# Patient Record
Sex: FEMALE | Race: WHITE | NOT HISPANIC OR LATINO | Employment: OTHER | URBAN - METROPOLITAN AREA
[De-identification: names, ages, dates, MRNs, and addresses within clinical notes are randomized per-mention and may not be internally consistent; named-entity substitution may affect disease eponyms.]

---

## 2017-01-18 ENCOUNTER — MYC MEDICAL ADVICE (OUTPATIENT)
Dept: FAMILY MEDICINE | Facility: CLINIC | Age: 67
End: 2017-01-18

## 2017-01-18 DIAGNOSIS — E03.4 HYPOTHYROIDISM DUE TO ACQUIRED ATROPHY OF THYROID: Primary | ICD-10-CM

## 2017-01-19 RX ORDER — LEVOTHYROXINE SODIUM 50 UG/1
50 TABLET ORAL EVERY MORNING
Qty: 90 TABLET | Refills: 3 | Status: CANCELLED | OUTPATIENT
Start: 2017-01-19

## 2017-01-19 NOTE — TELEPHONE ENCOUNTER
TSH   Date Value Ref Range Status   04/20/2016 4.24* 0.40 - 4.00 mU/L Final   ]   Will route to provider to advise.  See MyChart.   1. TSH out of range.   2. Does she need a new sleep study referral?     Courtney Jarrell RN   January 19, 2017 9:47 AM  [facility] Triage   [phone]

## 2017-01-20 NOTE — TELEPHONE ENCOUNTER
1.  Okay to refill until her next visit with me.  2.  She is due for labs and appointment, please schedule.  3.  She will need to see sleep clinic for cpap questions and concerns.    Iris Killian MD

## 2017-01-20 NOTE — TELEPHONE ENCOUNTER
Scheduled 1/25, (she's leaving for two months soon), she has enough meds until then & has the information for the sleep clinic.  Oma Richardson RN

## 2017-01-24 ENCOUNTER — OFFICE VISIT (OUTPATIENT)
Dept: UROLOGY | Facility: CLINIC | Age: 67
End: 2017-01-24
Payer: COMMERCIAL

## 2017-01-24 VITALS — DIASTOLIC BLOOD PRESSURE: 79 MMHG | OXYGEN SATURATION: 97 % | SYSTOLIC BLOOD PRESSURE: 125 MMHG | HEART RATE: 84 BPM

## 2017-01-24 DIAGNOSIS — Z85.51 HISTORY OF BLADDER CANCER: Primary | ICD-10-CM

## 2017-01-24 PROCEDURE — 52000 CYSTOURETHROSCOPY: CPT | Performed by: UROLOGY

## 2017-01-24 NOTE — PROGRESS NOTES
S: Abbey Omer is a 66 year old female returns for bladder cancer surveillance.    she has history of bladder cancer many years ago without any recurrence.    She received 0 courses of BCG therapy.    Patient is draped and prepped.  Flexible cystoscopy placed under direct vision.      Cysto:  The anterior urethra is normal     In the bladder there is normal mucosa.    Assessment/Plan:  (Z85.51) History of bladder cancer  (primary encounter diagnosis)  Comment: no recurrence   Plan: Cystoscopy        In one year

## 2017-01-24 NOTE — NURSING NOTE
Chief Complaint   Patient presents with     Cystoscopy       Initial /79 mmHg  Pulse 84  SpO2 97% Estimated body mass index is 33.20 kg/(m^2) as calculated from the following:    Height as of 4/20/16: 1.524 m (5').    Weight as of 4/20/16: 77.111 kg (170 lb).  BP completed using cuff size: kyle Wild CMA

## 2017-01-24 NOTE — PATIENT INSTRUCTIONS
Your next cystoscopy is scheduled 1/23/2018  @ 9:00am. Please call  if you need to reschedule this appointment.

## 2017-01-25 ENCOUNTER — OFFICE VISIT (OUTPATIENT)
Dept: FAMILY MEDICINE | Facility: CLINIC | Age: 67
End: 2017-01-25
Payer: COMMERCIAL

## 2017-01-25 VITALS
HEART RATE: 72 BPM | TEMPERATURE: 97.5 F | HEIGHT: 60 IN | BODY MASS INDEX: 33.77 KG/M2 | WEIGHT: 172 LBS | SYSTOLIC BLOOD PRESSURE: 132 MMHG | DIASTOLIC BLOOD PRESSURE: 84 MMHG | RESPIRATION RATE: 16 BRPM

## 2017-01-25 DIAGNOSIS — E03.4 HYPOTHYROIDISM DUE TO ACQUIRED ATROPHY OF THYROID: Primary | ICD-10-CM

## 2017-01-25 DIAGNOSIS — I10 HYPERTENSION GOAL BP (BLOOD PRESSURE) < 140/90: ICD-10-CM

## 2017-01-25 DIAGNOSIS — E78.1 HYPERTRIGLYCERIDEMIA: ICD-10-CM

## 2017-01-25 DIAGNOSIS — L70.8 OTHER ACNE: ICD-10-CM

## 2017-01-25 DIAGNOSIS — G47.00 INSOMNIA, UNSPECIFIED TYPE: ICD-10-CM

## 2017-01-25 DIAGNOSIS — G47.33 OSA (OBSTRUCTIVE SLEEP APNEA): ICD-10-CM

## 2017-01-25 DIAGNOSIS — E78.5 HYPERLIPIDEMIA WITH TARGET LDL LESS THAN 100: ICD-10-CM

## 2017-01-25 DIAGNOSIS — J45.20 MILD INTERMITTENT ASTHMA WITHOUT COMPLICATION: ICD-10-CM

## 2017-01-25 DIAGNOSIS — N18.30 CKD (CHRONIC KIDNEY DISEASE) STAGE 3, GFR 30-59 ML/MIN (H): ICD-10-CM

## 2017-01-25 DIAGNOSIS — F33.0 MAJOR DEPRESSIVE DISORDER, RECURRENT EPISODE, MILD DEGREE (H): ICD-10-CM

## 2017-01-25 DIAGNOSIS — K21.9 GASTROESOPHAGEAL REFLUX DISEASE WITHOUT ESOPHAGITIS: ICD-10-CM

## 2017-01-25 DIAGNOSIS — R79.89 ELEVATED LFTS: ICD-10-CM

## 2017-01-25 LAB — HGB BLD-MCNC: 14.5 G/DL (ref 11.7–15.7)

## 2017-01-25 PROCEDURE — 80061 LIPID PANEL: CPT | Performed by: FAMILY MEDICINE

## 2017-01-25 PROCEDURE — 36415 COLL VENOUS BLD VENIPUNCTURE: CPT | Performed by: FAMILY MEDICINE

## 2017-01-25 PROCEDURE — 85018 HEMOGLOBIN: CPT | Performed by: FAMILY MEDICINE

## 2017-01-25 PROCEDURE — 82043 UR ALBUMIN QUANTITATIVE: CPT | Performed by: FAMILY MEDICINE

## 2017-01-25 PROCEDURE — 80053 COMPREHEN METABOLIC PANEL: CPT | Performed by: FAMILY MEDICINE

## 2017-01-25 PROCEDURE — 84439 ASSAY OF FREE THYROXINE: CPT | Performed by: FAMILY MEDICINE

## 2017-01-25 PROCEDURE — 84443 ASSAY THYROID STIM HORMONE: CPT | Performed by: FAMILY MEDICINE

## 2017-01-25 PROCEDURE — 99214 OFFICE O/P EST MOD 30 MIN: CPT | Performed by: FAMILY MEDICINE

## 2017-01-25 RX ORDER — VENLAFAXINE HYDROCHLORIDE 150 MG/1
CAPSULE, EXTENDED RELEASE ORAL
Qty: 180 CAPSULE | Refills: 3 | Status: SHIPPED | OUTPATIENT
Start: 2017-01-25 | End: 2018-01-24

## 2017-01-25 RX ORDER — LEVOTHYROXINE SODIUM 50 UG/1
50 TABLET ORAL EVERY MORNING
Qty: 90 TABLET | Refills: 3 | Status: SHIPPED | OUTPATIENT
Start: 2017-01-25 | End: 2017-05-15 | Stop reason: DRUGHIGH

## 2017-01-25 RX ORDER — SPIRONOLACTONE 50 MG/1
100 TABLET, FILM COATED ORAL DAILY
Qty: 180 TABLET | Refills: 3 | Status: SHIPPED | OUTPATIENT
Start: 2017-01-25 | End: 2018-01-24

## 2017-01-25 RX ORDER — LEVALBUTEROL TARTRATE 45 UG/1
2 AEROSOL, METERED ORAL EVERY 6 HOURS PRN
Qty: 1 INHALER | Refills: 1 | Status: SHIPPED | OUTPATIENT
Start: 2017-01-25 | End: 2017-05-30

## 2017-01-25 RX ORDER — TRAZODONE HYDROCHLORIDE 50 MG/1
25 TABLET, FILM COATED ORAL
Qty: 90 TABLET | Refills: 0 | Status: SHIPPED | OUTPATIENT
Start: 2017-01-25 | End: 2017-01-30

## 2017-01-25 ASSESSMENT — ANXIETY QUESTIONNAIRES
2. NOT BEING ABLE TO STOP OR CONTROL WORRYING: NOT AT ALL
5. BEING SO RESTLESS THAT IT IS HARD TO SIT STILL: NOT AT ALL
3. WORRYING TOO MUCH ABOUT DIFFERENT THINGS: NOT AT ALL
6. BECOMING EASILY ANNOYED OR IRRITABLE: SEVERAL DAYS
GAD7 TOTAL SCORE: 2
1. FEELING NERVOUS, ANXIOUS, OR ON EDGE: SEVERAL DAYS
5. BEING SO RESTLESS THAT IT IS HARD TO SIT STILL: NOT AT ALL
2. NOT BEING ABLE TO STOP OR CONTROL WORRYING: NOT AT ALL
3. WORRYING TOO MUCH ABOUT DIFFERENT THINGS: NOT AT ALL
7. FEELING AFRAID AS IF SOMETHING AWFUL MIGHT HAPPEN: NOT AT ALL
GAD7 TOTAL SCORE: 2
7. FEELING AFRAID AS IF SOMETHING AWFUL MIGHT HAPPEN: NOT AT ALL
1. FEELING NERVOUS, ANXIOUS, OR ON EDGE: SEVERAL DAYS
6. BECOMING EASILY ANNOYED OR IRRITABLE: SEVERAL DAYS

## 2017-01-25 ASSESSMENT — PATIENT HEALTH QUESTIONNAIRE - PHQ9
5. POOR APPETITE OR OVEREATING: NOT AT ALL
5. POOR APPETITE OR OVEREATING: NOT AT ALL

## 2017-01-25 NOTE — PROGRESS NOTES
"  SUBJECTIVE:                                                    Abbey Omer is a 66 year old female who presents to clinic today for the following health issues:  She will be road-tripping for the next 3 months.     Depression Follow up    Status since last visit: Stable     See PHQ-9 for current symptoms.  Other associated symptoms: None    Complicating factors:   Significant life event:  No   Current substance abuse:  None  Anxiety or Panic symptoms:  No    PHQ-9  English PHQ-9   Any Language      Effexor 300 mg  She takes them both in the morning.       Hypothyroidism Follow-up      Since last visit, patient describes the following symptoms: Weight stable, no hair loss, no skin changes, no constipation, no loose stools   Levothyroxine 50 mcg  TSH   Date Value Ref Range Status   04/20/2016 4.24* 0.40 - 4.00 mU/L Final   05/04/2015 3.70 0.40 - 4.00 mU/L Final   02/27/2014 3.54 0.4 - 5.0 mU/L Final   02/27/2013 4.85 0.4 - 5.0 mU/L Final   03/05/2012 4.58 0.4 - 5.0 mU/L Final     HTN/Acne- spironolactone  She reports that this has been helping a lot and would like to stay on it.     Sleep-  She has not been sleeping well and getting headaches. She does not know if it is related to depression, or if it is sleep apnea. She no longer has her machine and is interested in getting a new one. She reports that she is tired most of the time during the day. She wakes herself up from snoring occasionally and wakes up with headaches.     Stomach- omeprazole  She has not had any symptoms since starting the medication. She is very happy with her current dosage. She ran out at one point and within a few days she could tell in her stomach.     Breathing-  She has a hard time breathing at the higher elevations in [place].She describes it as \"trying to swallow dirt.\"  She has tried albuterol, which helped with the breathing. However, it made her hyper and uncomfortable. She would like something that will help her breath, but " not make her hyper.   She does not have a family history of asthma, but she reports that certain things trigger her and she has coughing fits. She also feels short of breath at times. She has had allergy testing and she is not allergic to the things that trigger her coughing. She is taking Wal-Zyr (zyrtec) for allergies, but it does not help with the coughing.  She says it gets embarrassing when she starts coughing in the middle of a conversation.     Sleep-  She has tried several over-the-counter medication to help her sleep. She has tried melatonin, Unisom, Nyquil, Excedrin PM but these are not working for her everyday. She struggles to fall asleep, but if she takes a sleep aid, she struggles to wake up in the morning. She would like a script for trazodone.    Aches-  She reports that she has general body aches. Her muscles hurt and her skin hurts. If someone touches her she finds that she is pulling away and flinching.       Amount of exercise or physical activity: None    Problems taking medications regularly: No    Medication side effects: none    Diet: regular (no restrictions)      Problem list and histories reviewed & adjusted, as indicated.  Additional history: as documented    Patient Active Problem List   Diagnosis     RICKEY (obstructive sleep apnea)     Hypothyroidism     Chronic nonallergic rhinitis     CKD (chronic kidney disease) stage 3, GFR 30-59 ml/min     Hyperlipidemia with target LDL less than 100     Obesity     History of bladder cancer     Hypertension goal BP (blood pressure) < 140/90     Advance Care Planning     Dependent edema     Elevated LFTs     DDD (degenerative disc disease), cervical     Major depressive disorder, recurrent episode, mild degree (H)     Macular drusen     Hypertriglyceridemia     Allergic conjunctivitis     Pulmonary nodules     Major depressive disorder, recurrent episode, moderate (H)     Acne     Combined form of age-related cataract, both eyes     Glaucoma suspect,  bilateral     Drusen of macula of both eyes     Renal cyst, left     Discoid atelectasis     Past Surgical History   Procedure Laterality Date     D & c       Cholecystectomy, laporoscopic       Nose surgery       septum deviation     Hysterectomy, pap no longer indicated       BSO     Cystoscopy       bladder cancer       Social History   Substance Use Topics     Smoking status: Former Smoker -- 0.10 packs/day for 1 years     Quit date: 10/12/1969     Smokeless tobacco: Never Used     Alcohol Use: Yes      Comment: 1 drink a month     Family History   Problem Relation Age of Onset     DIABETES Maternal Grandfather      Hypertension Maternal Grandfather      DIABETES Son      HEART DISEASE Father      CHF     Thyroid Disease No family hx of      Glaucoma No family hx of      Macular Degeneration No family hx of      CANCER No family hx of      DIABETES Maternal Uncle      CEREBROVASCULAR DISEASE Maternal Uncle      DIABETES Mother      Depression Mother      Substance Abuse Brother      Depression Daughter          BP Readings from Last 3 Encounters:   01/25/17 132/84   01/24/17 125/79   04/20/16 132/86    Wt Readings from Last 3 Encounters:   01/25/17 172 lb (78.019 kg)   04/20/16 170 lb (77.111 kg)   02/22/16 173 lb 3.2 oz (78.563 kg)           ROS:  C: NEGATIVE for fever, chills, change in weight  E/M: NEGATIVE for ear, mouth and throat problems  R: NEGATIVE for significant cough or SOB  CV: NEGATIVE for chest pain, palpitations or peripheral edema    OBJECTIVE:                                                    /84 mmHg  Pulse 72  Temp(Src) 97.5  F (36.4  C) (Oral)  Resp 16  Ht 5' (1.524 m)  Wt 172 lb (78.019 kg)  BMI 33.59 kg/m2  Breastfeeding? No  Body mass index is 33.59 kg/(m^2).  GENERAL: healthy, alert and no distress  HENT: ear canals and TM's normal, nose and mouth without ulcers or lesions  NECK: no adenopathy, no asymmetry, masses, or scars and thyroid normal to palpation  RESP: lungs  clear to auscultation - no rales, rhonchi or wheezes  CV: regular rate and rhythm, normal S1 S2, no S3 or S4, no murmur, click or rub,   Psych: Mood:euthymic,  Affect: appropriate, Speech:normal, Thought Processes: normal, Suicidal Ideation: No    Diagnostic Test Results:  Results for orders placed or performed in visit on 01/25/17 (from the past 24 hour(s))   Hemoglobin   Result Value Ref Range    Hemoglobin 14.5 11.7 - 15.7 g/dL        ASSESSMENT/PLAN:                                                      Abbey was seen today for chronic disease management and medication request.    Diagnoses and all orders for this visit:    Hypothyroidism due to acquired atrophy of thyroid  -     MED THERAPY MANAGE REFERRAL  -     levothyroxine (SYNTHROID/LEVOTHROID) 50 MCG tablet; Take 1 tablet (50 mcg) by mouth every morning  -     TSH with free T4 reflex  Chronic, stable, well controlled, continue current medication, refill done if needed      Hypertension goal BP (blood pressure) < 140/90  -     MED THERAPY MANAGE REFERRAL  -     Comprehensive metabolic panel (BMP + Alb, Alk Phos, ALT, AST, Total. Bili, TP)  Chronic, stable, well controlled, continue current medication, refill done if needed      Hyperlipidemia with target LDL less than 100  -     MED THERAPY MANAGE REFERRAL  -     Lipid panel reflex to direct LDL  Await labs, hasn't wanted to do statin     CKD (chronic kidney disease) stage 3, GFR 30-59 ml/min  -     MED THERAPY MANAGE REFERRAL  -     Hemoglobin  -     Albumin Random Urine Quantitative  Await labs    RICKEY (obstructive sleep apnea)  -     SLEEP EVALUATION & MANAGEMENT REFERRAL - ADULT; Future    Elevated LFTs  -     Comprehensive metabolic panel (BMP + Alb, Alk Phos, ALT, AST, Total. Bili, TP)    Hypertriglyceridemia  Await labs    Major depressive disorder, recurrent episode, mild degree (H)  -     venlafaxine (EFFEXOR-XR) 150 MG 24 hr capsule; 2 Capsules daily  Chronic, stable, well controlled, continue  current medication, refill done if needed      Other acne  -     spironolactone (ALDACTONE) 50 MG tablet; Take 2 tablets (100 mg) by mouth daily  Chronic, stable, well controlled, continue current medication, refill done if needed      Mild intermittent asthma without complication  She is going to [place] and typically needs an inhaler when she is there.  Will try xopenex since she felt the albuterol caused a lot of jitteriness.  I would like to do further testing to determine if she needs daily steroid inhaler.  -     levalbuterol (XOPENEX HFA) 45 MCG/ACT Inhaler; Inhale 2 puffs into the lungs every 6 hours as needed for shortness of breath / dyspnea or wheezing    Gastroesophageal reflux disease without esophagitis  -     omeprazole (PRILOSEC) 20 MG CR capsule; Take 1 capsule (20 mg) by mouth daily  -     ranitidine (ZANTAC) 300 MG tablet; Take 1 tablet (300 mg) by mouth At Bedtime    Try ranitidine, if no improvement, can go back to PPI.  Reviewed risks of PPI    Insomnia, unspecified type  -     traZODone (DESYREL) 50 MG tablet; Take 0.5 tablets (25 mg) by mouth nightly as needed for sleep  She would like to try this again at low doses  Common side effects of medications prescribed at this visit were discussed with the patient. Severe side effects, including current applicable black box warnings, were discussed. We discussed options for dealing with these possible side effects and allergic reactions, based on their severity.        Discussed the long-term complications of omeprazole and patient has agreed to try ranitidine. Prescription given for omeprazole as well in case ranitidine causes side effects or doesn't work and she won't be around due to travel.    Follow up in May for discussion about body aches., ?fibro      This document serves as a record of the services and decisions personally performed and made by Iris Killian MD. It was created on her behalf by Tory Morocho, a trained medical scribe.  The creation of this document is based the provider's statements to the medical scribe.  Tory Rashawn 10:24 AM 1/25/2017    Provider:   The information in this document, created by the medical scribe for me, accurately reflects the services I personally performed and the decisions made by me. I have reviewed and approved this document for accuracy prior to leaving the patient care area.  Iris Killian MD 10:24 AM 1/25/2017    Iris Killian MD  [facility]

## 2017-01-25 NOTE — MR AVS SNAPSHOT
After Visit Summary   1/25/2017    Abbey Omer    MRN: [id number]           Patient Information     Date Of Birth          1950        Visit Information        Provider Department      1/25/2017 10:40 AM Iris Killian MD [facility]        Today's Diagnoses     Hypothyroidism due to acquired atrophy of thyroid    -  1     Hypertension goal BP (blood pressure) < 140/90         Hyperlipidemia with target LDL less than 100         CKD (chronic kidney disease) stage 3, GFR 30-59 ml/min         RICKEY (obstructive sleep apnea)         Elevated LFTs         Hypertriglyceridemia         Major depressive disorder, recurrent episode, mild degree (H)         Other acne         Mild intermittent asthma without complication         Gastroesophageal reflux disease without esophagitis         Insomnia, unspecified type           Care Instructions    Start Ranitidine instead of Omeprazole. If this does not work or gives you side effects, you can switch back to the omeprazole.     Try taking 1/4-1/2 of a Trazodone at night for sleep.     Make sure you are getting walking breaks during your travels. This will help with body aches.     Have fun!    [facility]   Discharged by : Elisha Santizo MA    Paper scripts provided to patient : no     If you have any questions regarding your visit please contact your care team:     Team Gold Clinic Hours Telephone Number   Dr. Catina Kern, ROBERTO CARLOS   7am-7pm Monday - Thursday   7am-5pm Fridays  [phone]   (Appointment scheduling available 24/7)   RN Line   [phone] option 2       For a Price Quote for your services, please call our Consumer Price Line at [phone].     What options do I have for visits at the clinic other than the traditional office visit?     To expand how we care for you, many of our providers are utilizing electronic visits (e-visits) and  telephone visits, when medically appropriate, for interactions with their patients rather than a visit in the clinic. We also offer nurse visits for many medical concerns. Just like any other service, we will bill your insurance company for this type of visit based on time spent on the phone with your provider. Not all insurance companies cover these visits. Please check with your medical insurance if this type of visit is covered. You will be responsible for any charges that are not paid by your insurance.   E-visits via Spotzer Media Grouphart: generally incur a $35.00 fee.     Telephone visits:   Time spent on the phone: *charged based on time that is spent on the phone in increments of 10 minutes. Estimated cost:   5-10 mins $30.00   11-20 mins. $59.00   21-30 mins. $85.00     Use Greytip Software (secure email communication and access to your chart) to send your primary care provider a message or make an appointment. Ask someone on your Team how to sign up for Greytip Software.     As always, Thank you for trusting us with your health care needs!      [facility]:    Scheduling Appointments  [facility]  Call: [phone]    [facility]  Call: [phone]    [facility]  Call: [phone]      WHERE TO GO FOR CARE?    Clinic    Make an appointment if you:       Are sick (cold, cough, flu, sore throat, earache or in pain).       Have a small injury (sprain, small cut, burn or broken bone).       Need a physical exam, Pap smear, vaccine or prescription refill.       Have questions about your health or medicines.    To reach us:      Call [phone] ([phone]). Open 24 hours every day. (For counseling services, call [phone].)    Log into Greytip Software at [email or web address]. (Visit [email or web address] to create an account.) Hospital emergency room    An emergency is a serious or life- threatening problem that must be treated right  away.    Call 911 or get to the hospital if you have:      Very bad or sudden:            - Chest pain or pressure         - Bleeding         - Head or belly pain         - Dizziness or trouble seeing, walking or                          Speaking      Problems breathing      Blood in your vomit or you are coughing up blood      A major injury (knocked out, loss of a finger or limb, rape, broken bone protruding from skin)    A mental health crisis. (Or call the Mental Health Crisis line at 1-510.398.6323 or Suicide Prevention Hotline at 1-164.492.7563.)    Open 24 hours every day. You don't need an appointment.     Urgent care    Visit urgent care for sickness or small injuries when the clinic is closed. You don't need an appointment. To check hours or find an urgent care near you, visit [email or web address]. Online care    Get online care from [facility] for more than 70 common problems, like colds, allergies and infections. Open 24 hours every day at: [email or web address]   Need help deciding?    For advice about where to be seen, you may call your clinic and ask to speak with a nurse. We're here for you 24 hours every day.         If you are deaf or hard of hearing, please let us know. We provide many free services including sign language interpreters, oral interpreters, TTYs, telephone amplifiers, note takers and written materials.                       Follow-ups after your visit        Additional Services     MED THERAPY MANAGE REFERRAL       Your provider has referred you to: **[facility] Scheduling (numerous locations) [phone]   [email or web address]  [facility]: [facility] [phone]   [email or web address]    Reason for Referral: Medication/supplement review and lipid management.     The [facility] department will contact you to  schedule an appointment.  You may also schedule the appointment by calling [phone].  For [facility] patients, please call [phone] to confirm/schedule your appointment on the next business day.    This service is designed to help you get the most from your medications.  A specially trained Pharmacist will work closely with you and your providers to solve any questions, concerns, issues or problems related to your medications.    Please bring all of your prescription and non-prescription medications (such as vitamins, over-the-counter medications, and herbals) or a detailed medication list to your appointment.    If you have a glucose meter or other home monitoring information, please also bring this to your appointment (i.e. blood glucose log, blood pressure log, pain log, etc.).            SLEEP EVALUATION & MANAGEMENT REFERRAL - ADULT       Please be aware that coverage of these services is subject to the terms and limitations of your health insurance plan.  Call member services at your health plan with any benefit or coverage questions.      Please bring the following to your appointment:    >>   List of current medications   >>   This referral request   >>   Any documents/labs given to you for this referral    [facility] [phone] (Age 15 and up)                  Your next 10 appointments already scheduled     Jan 23, 2018  9:00 AM   Return Visit with Omar West MD, [place] [facility]   [facility] [place] ([facility])    [address] [place] [address]  [place] [address] [phone]              Future tests that were ordered for you today     Open Future Orders        Priority Expected Expires Ordered    SLEEP EVALUATION & MANAGEMENT REFERRAL - ADULT Routine  1/25/2018 1/25/2017            Who to contact     If you have questions or need follow up information about today's clinic visit or your schedule please contact  [facility] directly at [phone].  Normal or non-critical lab and imaging results will be communicated to you by MyChart, letter or phone within 4 business days after the clinic has received the results. If you do not hear from us within 7 days, please contact the clinic through Freeze Taghart or phone. If you have a critical or abnormal lab result, we will notify you by phone as soon as possible.  Submit refill requests through Zing Systems or call your pharmacy and they will forward the refill request to us. Please allow 3 business days for your refill to be completed.          Additional Information About Your Visit        Freeze TagharDietBetter Information     Zing Systems gives you secure access to your electronic health record. If you see a primary care provider, you can also send messages to your care team and make appointments. If you have questions, please call your primary care clinic.  If you do not have a primary care provider, please call [phone] and they will assist you.        Care EveryWhere ID     This is your Care EveryWhere ID. This could be used by other organizations to access your [place] medical records  [id number]        Your Vitals Were     Pulse Temperature Respirations Height BMI (Body Mass Index) Breastfeeding?    72 97.5  F (36.4  C) (Oral) 16 5' (1.524 m) 33.59 kg/m2 No       Blood Pressure from Last 3 Encounters:   01/25/17 132/84   01/24/17 125/79   04/20/16 132/86    Weight from Last 3 Encounters:   01/25/17 172 lb (78.019 kg)   04/20/16 170 lb (77.111 kg)   02/22/16 173 lb 3.2 oz (78.563 kg)              We Performed the Following     Albumin Random Urine Quantitative     Comprehensive metabolic panel (BMP + Alb, Alk Phos, ALT, AST, Total. Bili, TP)     Hemoglobin     Lipid panel reflex to direct LDL     MED THERAPY MANAGE REFERRAL     TSH with free T4 reflex          Today's Medication Changes          These changes are accurate as of: 1/25/17 11:38 AM.  If you have any questions,  ask your nurse or doctor.               Start taking these medicines.        Dose/Directions    levalbuterol 45 MCG/ACT Inhaler   Commonly known as:  XOPENEX HFA   Used for:  Mild intermittent asthma without complication   Started by:  Iris Killian MD        Dose:  2 puff   Inhale 2 puffs into the lungs every 6 hours as needed for shortness of breath / dyspnea or wheezing   Quantity:  1 Inhaler   Refills:  1       omeprazole 20 MG CR capsule   Commonly known as:  priLOSEC   Used for:  Gastroesophageal reflux disease without esophagitis   Started by:  Iris Killian MD        Dose:  20 mg   Take 1 capsule (20 mg) by mouth daily   Quantity:  90 capsule   Refills:  3       ranitidine 300 MG tablet   Commonly known as:  ZANTAC   Used for:  Gastroesophageal reflux disease without esophagitis   Started by:  Iris Killian MD        Dose:  300 mg   Take 1 tablet (300 mg) by mouth At Bedtime   Quantity:  90 tablet   Refills:  3       traZODone 50 MG tablet   Commonly known as:  DESYREL   Used for:  Insomnia, unspecified type   Started by:  Iris Killian MD        Dose:  25 mg   Take 0.5 tablets (25 mg) by mouth nightly as needed for sleep   Quantity:  90 tablet   Refills:  0            Where to get your medicines      These medications were sent to [id number] - [place], [place] - [address] [facility] [address]  [address], [place] [address]     Phone:  [phone]    - levalbuterol 45 MCG/ACT Inhaler  - levothyroxine 50 MCG tablet  - omeprazole 20 MG CR capsule  - ranitidine 300 MG tablet  - spironolactone 50 MG tablet  - traZODone 50 MG tablet  - venlafaxine 150 MG 24 hr capsule             Primary Care Provider Office Phone # Fax #    Iris Killian [phone]       [facility] [address] [facility] [address]        Thank you!     Thank you for choosing [facility]   for your care. Our goal is always to provide you with excellent care. Hearing back from our patients is one way we can continue to improve our services. Please take a few minutes to complete the written survey that you may receive in the mail after your visit with us. Thank you!             Your Updated Medication List - Protect others around you: Learn how to safely use, store and throw away your medicines at www.disposemymeds.org.          This list is accurate as of: 1/25/17 11:38 AM.  Always use your most recent med list.                   Brand Name Dispense Instructions for use    calcium + D 600-200 MG-UNIT Tabs   Generic drug:  calcium carbonate-vitamin D      2 TABLET DAILY       cholecalciferol 5000 UNITS Caps capsule    vitamin D3     Take 5,000 Units by mouth daily       Cranberry 500 MG Caps      Take 1 capsule by mouth daily       EXCEDRIN TENSION HEADACHE 500-65 MG Tabs   Generic drug:  acetaminophen-caffeine      1 tablet twice daily as needed       Fish Oil 1200 MG Caps      two po bid       FLAXSEED OIL PO      Take 1 Tablespoonful by mouth daily       fluticasone 50 MCG/ACT spray    FLONASE    48 g    Spray 2 sprays into both nostrils daily       levalbuterol 45 MCG/ACT Inhaler    XOPENEX HFA    1 Inhaler    Inhale 2 puffs into the lungs every 6 hours as needed for shortness of breath / dyspnea or wheezing       levothyroxine 50 MCG tablet    SYNTHROID/LEVOTHROID    90 tablet    Take 1 tablet (50 mcg) by mouth every morning       magnesium 500 MG Tabs      Take 1 tablet by mouth daily       Melatonin 10 MG Tabs tablet      Take 30 mg by mouth At Bedtime       MULTIPLE vitamin  S/WOMENS Tabs      Take 2 tablets by mouth daily       omeprazole 20 MG CR capsule    priLOSEC    90 capsule    Take 1 capsule (20 mg) by mouth daily       PRILOSEC PO      Take 20 mg by mouth       PROBIOTIC DAILY PO      Take 1 tablet by mouth daily       ranitidine 300 MG tablet    ZANTAC    90 tablet    Take 1 tablet  (300 mg) by mouth At Bedtime       spironolactone 50 MG tablet    ALDACTONE    180 tablet    Take 2 tablets (100 mg) by mouth daily       STATIN NOT PRESCRIBED (INTENTIONAL)      by Other route continuous prn.       traZODone 50 MG tablet    DESYREL    90 tablet    Take 0.5 tablets (25 mg) by mouth nightly as needed for sleep       venlafaxine 150 MG 24 hr capsule    EFFEXOR-XR    180 capsule    2 Capsules daily       vitamin B complex with vitamin C Tabs tablet      Take 1 tablet by mouth daily       zinc 50 MG Tabs      Take 1 tablet by mouth daily

## 2017-01-25 NOTE — PATIENT INSTRUCTIONS
Start Ranitidine instead of Omeprazole. If this does not work or gives you side effects, you can switch back to the omeprazole.     Try taking 1/4-1/2 of a Trazodone at night for sleep.     Make sure you are getting walking breaks during your travels. This will help with body aches.     Have fun!    [facility]   Discharged by : Elisha Santizo MA    Paper scripts provided to patient : no     If you have any questions regarding your visit please contact your care team:     Team Gold Clinic Hours Telephone Number   Dr. Catina Kern, ROBERTO CARLOS   7am-7pm Monday - Thursday   7am-5pm Fridays  [phone]   (Appointment scheduling available 24/7)   RN Line   [phone] option 2       For a Price Quote for your services, please call our Bycler Price Line at [phone].     What options do I have for visits at the clinic other than the traditional office visit?     To expand how we care for you, many of our providers are utilizing electronic visits (e-visits) and telephone visits, when medically appropriate, for interactions with their patients rather than a visit in the clinic. We also offer nurse visits for many medical concerns. Just like any other service, we will bill your insurance company for this type of visit based on time spent on the phone with your provider. Not all insurance companies cover these visits. Please check with your medical insurance if this type of visit is covered. You will be responsible for any charges that are not paid by your insurance.   E-visits via ReadyDock: generally incur a $35.00 fee.     Telephone visits:   Time spent on the phone: *charged based on time that is spent on the phone in increments of 10 minutes. Estimated cost:   5-10 mins $30.00   11-20 mins. $59.00   21-30 mins. $85.00     Use ReadyDock (secure email communication and access to your chart) to send your primary care provider a message or make an  appointment. Ask someone on your Team how to sign up for FPSI.     As always, Thank you for trusting us with your health care needs!      [facility]:    Scheduling Appointments  [place] [place]  Call: [phone]    Junior Poole, [facility]  Call: [phone]    [facility]  Call: [phone]      WHERE TO GO FOR CARE?    Clinic    Make an appointment if you:       Are sick (cold, cough, flu, sore throat, earache or in pain).       Have a small injury (sprain, small cut, burn or broken bone).       Need a physical exam, Pap smear, vaccine or prescription refill.       Have questions about your health or medicines.    To reach us:      Call [phone] ([phone]). Open 24 hours every day. (For counseling services, call [phone].)    Log into FPSI at [email or web address]. (Visit LookSharp (powering [email or web address]).[email or web address] to create an account.) Hospital emergency room    An emergency is a serious or life- threatening problem that must be treated right away.    Call 318 or get to the hospital if you have:      Very bad or sudden:            - Chest pain or pressure         - Bleeding         - Head or belly pain         - Dizziness or trouble seeing, walking or                          Speaking      Problems breathing      Blood in your vomit or you are coughing up blood      A major injury (knocked out, loss of a finger or limb, rape, broken bone protruding from skin)    A mental health crisis. (Or call the Mental Health Crisis line at 1-870.340.2368 or Suicide Prevention Hotline at 1-857.503.8830.)    Open 24 hours every day. You don't need an appointment.     Urgent care    Visit urgent care for sickness or small injuries when the clinic is closed. You don't need an appointment. To check hours or find an urgent care near you, visit [email or web address]. Online care    Get online care from [facility] for more than 70 common problems, like  colds, allergies and infections. Open 24 hours every day at: [email or web address]   Need help deciding?    For advice about where to be seen, you may call your clinic and ask to speak with a nurse. We're here for you 24 hours every day.         If you are deaf or hard of hearing, please let us know. We provide many free services including sign language interpreters, oral interpreters, TTYs, telephone amplifiers, note takers and written materials.

## 2017-01-25 NOTE — NURSING NOTE
Chief Complaint   Patient presents with     Chronic Disease Management       Initial /84 mmHg  Pulse 72  Temp(Src) 97.5  F (36.4  C) (Oral)  Resp 16  Ht 5' (1.524 m)  Wt 172 lb (78.019 kg)  BMI 33.59 kg/m2  Breastfeeding? No Estimated body mass index is 33.59 kg/(m^2) as calculated from the following:    Height as of this encounter: 5' (1.524 m).    Weight as of this encounter: 172 lb (78.019 kg).  BP completed using cuff size: jose daniel Gibbs CMA ([facility])

## 2017-01-26 ENCOUNTER — TELEPHONE (OUTPATIENT)
Dept: PHARMACY | Facility: OTHER | Age: 67
End: 2017-01-26

## 2017-01-26 LAB
ALBUMIN SERPL-MCNC: 4.1 G/DL (ref 3.4–5)
ALP SERPL-CCNC: 122 U/L (ref 40–150)
ALT SERPL W P-5'-P-CCNC: 45 U/L (ref 0–50)
ANION GAP SERPL CALCULATED.3IONS-SCNC: 8 MMOL/L (ref 3–14)
AST SERPL W P-5'-P-CCNC: 27 U/L (ref 0–45)
BILIRUB SERPL-MCNC: 0.3 MG/DL (ref 0.2–1.3)
BUN SERPL-MCNC: 36 MG/DL (ref 7–30)
CALCIUM SERPL-MCNC: 9.7 MG/DL (ref 8.5–10.1)
CHLORIDE SERPL-SCNC: 99 MMOL/L (ref 94–109)
CHOLEST SERPL-MCNC: 240 MG/DL
CO2 SERPL-SCNC: 29 MMOL/L (ref 20–32)
CREAT SERPL-MCNC: 1.49 MG/DL (ref 0.52–1.04)
CREAT UR-MCNC: 117 MG/DL
GFR SERPL CREATININE-BSD FRML MDRD: 35 ML/MIN/1.7M2
GLUCOSE SERPL-MCNC: 101 MG/DL (ref 70–99)
HDLC SERPL-MCNC: 50 MG/DL
LDLC SERPL CALC-MCNC: 134 MG/DL
MICROALBUMIN UR-MCNC: 240 MG/L
MICROALBUMIN/CREAT UR: 205.13 MG/G CR (ref 0–25)
NONHDLC SERPL-MCNC: 190 MG/DL
POTASSIUM SERPL-SCNC: 4.8 MMOL/L (ref 3.4–5.3)
PROT SERPL-MCNC: 8.2 G/DL (ref 6.8–8.8)
SODIUM SERPL-SCNC: 136 MMOL/L (ref 133–144)
T4 FREE SERPL-MCNC: 0.92 NG/DL (ref 0.76–1.46)
TRIGL SERPL-MCNC: 280 MG/DL
TSH SERPL DL<=0.005 MIU/L-ACNC: 4.48 MU/L (ref 0.4–4)

## 2017-01-26 ASSESSMENT — ANXIETY QUESTIONNAIRES: GAD7 TOTAL SCORE: 2

## 2017-01-26 ASSESSMENT — PATIENT HEALTH QUESTIONNAIRE - PHQ9: SUM OF ALL RESPONSES TO PHQ QUESTIONS 1-9: 0

## 2017-01-26 NOTE — TELEPHONE ENCOUNTER
MTM referral from: [place] clinic visit (referral by provider)    MTM referral outreach attempt #1 on January 26, 2017 at 12:10 PM      Outcome: Left Message    Yvette Rodriguez MTM Coordinator

## 2017-01-28 ENCOUNTER — MYC MEDICAL ADVICE (OUTPATIENT)
Dept: FAMILY MEDICINE | Facility: CLINIC | Age: 67
End: 2017-01-28

## 2017-01-29 ENCOUNTER — MYC MEDICAL ADVICE (OUTPATIENT)
Dept: FAMILY MEDICINE | Facility: CLINIC | Age: 67
End: 2017-01-29

## 2017-01-29 DIAGNOSIS — G47.00 INSOMNIA, UNSPECIFIED TYPE: Primary | ICD-10-CM

## 2017-01-30 ENCOUNTER — MYC MEDICAL ADVICE (OUTPATIENT)
Dept: FAMILY MEDICINE | Facility: CLINIC | Age: 67
End: 2017-01-30

## 2017-01-30 NOTE — TELEPHONE ENCOUNTER
I'm assuming patient is talking about the Xopenex- in last office visit notes it states Albuterol causes jitteriness- Do you want to pursue PA or change?    Mariajose Ac MA

## 2017-01-30 NOTE — TELEPHONE ENCOUNTER
RN was going to sign Rx but then noted Trazodone is on allergy list. No reaction specified. Will route to provider to confirm this is OK.     Courtney Jarrell RN   January 30, 2017 11:55 AM  [facility] Triage   [phone]

## 2017-01-30 NOTE — TELEPHONE ENCOUNTER
Okay to increase to full tablet, 50 mg.  Try that for a few days.  Can also consider increase up to 100 mg nightly.  Please resend script and inform patient.  Iris Killian MD

## 2017-01-31 ENCOUNTER — OFFICE VISIT (OUTPATIENT)
Dept: PHARMACY | Facility: CLINIC | Age: 67
End: 2017-01-31
Payer: COMMERCIAL

## 2017-01-31 ENCOUNTER — TELEPHONE (OUTPATIENT)
Dept: FAMILY MEDICINE | Facility: CLINIC | Age: 67
End: 2017-01-31

## 2017-01-31 DIAGNOSIS — E63.9 NUTRITIONAL DEFICIENCY: ICD-10-CM

## 2017-01-31 DIAGNOSIS — E03.4 HYPOTHYROIDISM DUE TO ACQUIRED ATROPHY OF THYROID: ICD-10-CM

## 2017-01-31 DIAGNOSIS — K21.9 GASTROESOPHAGEAL REFLUX DISEASE WITHOUT ESOPHAGITIS: Primary | ICD-10-CM

## 2017-01-31 DIAGNOSIS — R06.00 DYSPNEA: ICD-10-CM

## 2017-01-31 DIAGNOSIS — R06.00 DYSPNEA, UNSPECIFIED TYPE: ICD-10-CM

## 2017-01-31 DIAGNOSIS — G47.00 INSOMNIA, UNSPECIFIED TYPE: ICD-10-CM

## 2017-01-31 DIAGNOSIS — E78.5 HYPERLIPIDEMIA WITH TARGET LDL LESS THAN 100: ICD-10-CM

## 2017-01-31 DIAGNOSIS — E03.9 HYPOTHYROIDISM: Primary | ICD-10-CM

## 2017-01-31 DIAGNOSIS — F33.0 MAJOR DEPRESSIVE DISORDER, RECURRENT EPISODE, MILD DEGREE (H): ICD-10-CM

## 2017-01-31 DIAGNOSIS — I10 HYPERTENSION GOAL BP (BLOOD PRESSURE) < 140/90: ICD-10-CM

## 2017-01-31 DIAGNOSIS — J31.0 CHRONIC NONALLERGIC RHINITIS: ICD-10-CM

## 2017-01-31 DIAGNOSIS — L70.8 OTHER ACNE: ICD-10-CM

## 2017-01-31 DIAGNOSIS — M85.80 OSTEOPENIA: ICD-10-CM

## 2017-01-31 PROCEDURE — 99607 MTMS BY PHARM ADDL 15 MIN: CPT | Performed by: PHARMACIST

## 2017-01-31 PROCEDURE — 99605 MTMS BY PHARM NP 15 MIN: CPT | Performed by: PHARMACIST

## 2017-01-31 RX ORDER — TRAZODONE HYDROCHLORIDE 50 MG/1
50-100 TABLET, FILM COATED ORAL
Qty: 90 TABLET | Refills: 3 | Status: SHIPPED | OUTPATIENT
Start: 2017-01-31 | End: 2017-02-02 | Stop reason: ALTCHOICE

## 2017-01-31 NOTE — PROGRESS NOTES
SUBJECTIVE/OBJECTIVE:                                                    Abbey Omer is a 67 year old female coming in for an initial visit for Medication Therapy Management.  She was referred to me from Iris Killian.     Chief Complaint: Medication review for any interactions.    Allergies/ADRs: Reviewed in Epic-updated today  Tobacco: History of tobacco dependence - quit 48 years   Alcohol: occasional use-rare  Caffeine: Excedrin Tension HA daily w/ 65 mg caffeine/tab, occasional soda  Activity: Not currently, hopes to increase physical activity   PMH: Reviewed in Epic    Medication Adherence: no issues reported    Hyperlipidemia: Current therapy includes Fish Oil 1200 mg once daily.  Pt reports some fishy burps but this is not bothersome when taken with meals. Patient reports that she has been on Welchol in the past with some efficacy, but insurance stopped covering it. She tolerated this medicine but the insurance company made it difficult to acquire. She has tried several statins in the past including Crestor, twice, which caused joint pain; atorvastatin caused swelling, lovastatin which caused cramping. She has also tried Niacin and can't recall the reaction and Zetia which caused fatigue.  The 10-year ASCVD risk score (Erika DC Jr., et al., 2013) is: 8.9%    Values used to calculate the score:      Age: 67 years      Sex: Female      Is an : No      Diabetic: No      Tobacco smoker: No      Systolic Blood Pressure: 132 mmHg      Prescribed Antihypertensives: No      HDL Cholesterol: 50 mg/dL      Total Cholesterol: 240 mg/dL     Acne/Hypertension: Current medications include spironolactone 50 mg daily.  Patient does not self-monitor BP.  Patient reports no current medication side effects and the med is effective in treating her acne.     Non-Allergic rhinitis/Dyspnea: Current medications include fluticasone nasal spray 2 spray(s). Patient has previously used OTC Primatene  (epinephrine) mist inhaler for breathing in [place] that caused jitteriness and rapid HR. She reports also having tried albuterol in the past which caused the same symptoms. She would prefer to try Xopenex and requested PA be completed.     Hypothyroidism: Patient is taking levothyroxine 50 mcg daily. Patient is having the following symptoms: none. Patient is taking levothyroxine along with other AM meds including supplements and omeprazole.    GERD: Current medications include: Prilosec (omeprazole) 20 mg qAM and Zantac (ranitidine) 300 mg qHS. Patient continues to take both daily as she was unclear on directions from PCP visit, which was to hold omeprazole and trial ranitidine for symptoms. Patient has h/o ulcer several years ago which is why omeprazole was started. Pt c/o heartburn, bilious reflux.  Food triggers include tomato sauces and rich foods. Patient feels that current regimen is effective.    Depression:  Current medications include: Venlafaxine 300 mg once daily. Patient feels it is effective. Patient has had withdrawal effect when missed a dose.    PHQ-9 SCORE 6/29/2015 2/22/2016 1/25/2017   Total Score 0 - -   Total Score MyChart - - -   Total Score - 6 0       Insomnia: Current medications include: trazodone  mg qHS and melatonin 30mg (three 10 mg tabs at a time) taking 2-3 times per week. Pt reports some initial difficulty falling asleep after starting trazodone but now has no difficulty. Reports her sleep has improved significantly since starting the trazodone. Reports she did experience some daytime grogginess with previous trial of trazodone but denies this with current use. She reports feeling more active and calm during the day after starting. She previously has tried OTC sleep aids (ie: Unisom, Nyquil, Excedrin PM) but indicates these only work for a few days and then the effect wears off. She reports the same experience with melatonin and has decreased how often she is taking it since  starting trazodone.      Osteopenia: Current therapy includes: calcium 600 mg/vitamin D 1000? units two tablets once daily. Pt is not experiencing side effects. Patient is unclear on if she takes an additional vitamin D supplement along with the ca/vitD combo.   Last vitamin D level: No results found for: VITDT  DEXA History: Last scan 5/23/16-showed osteopenia  Risk factors: post-menopausal, chronic PPI use    Supplements: Current therapy includes: cranberry 4200 mg daily, magnesium 500 mg daily (taking for regular BMs), zinc 50 mg tab at least once a month for 2-4 days when she is not feeling well, vitamin B-complex 2 tabs daily, multivitamin daily, flaxseed grain occasionally. She feels her supplements are helpful for her.    Current labs include:  BP Readings from Last 3 Encounters:   01/25/17 132/84   01/24/17 125/79   04/20/16 132/86     Wt Readings from Last 2 Encounters:   02/01/17 173 lb (78.472 kg)   01/31/17 173 lb 3.2 oz (78.563 kg)     Ht Readings from Last 2 Encounters:   02/01/17 5' (1.524 m)   01/25/17 5' (1.524 m)     Today's Vitals: /82 mmHg  Pulse 60  Wt 173 lb 3.2 oz (78.563 kg)    CHOL      240   1/25/2017  TRIG      280   1/25/2017  HDL       50   1/25/2017  LDL      134   1/25/2017  LDL      108   4/1/2013    Liver Function Studies -   Recent Labs   Lab Test  01/25/17   1038   PROTTOTAL  8.2   ALBUMIN  4.1   BILITOTAL  0.3   ALKPHOS  122   AST  27   ALT  45       Lab Results   Component Value Date    UCRR 117 01/25/2017    MICROL 240 01/25/2017    UMALCR 205.13* 01/25/2017       Last Basic Metabolic Panel:  NA      136   1/25/2017   POTASSIUM      4.8   1/25/2017  CHLORIDE       99   1/25/2017  BUN       36   1/25/2017  CR     1.49   1/25/2017  Calculated CrCl ([place]) = 34.2mL/min  GFR ESTIMATE   Date Value Ref Range Status   01/25/2017 35* >60 mL/min/1.7m2 Final     Comment:     Non  GFR Calc   02/22/2016 46* >60 mL/min/1.7m2 Final     Comment:     Non   GFR Calc   06/29/2015 44* >60 mL/min/1.7m2 Final     Comment:     Non  GFR Calc       TSH   Date Value Ref Range Status   01/25/2017 4.48* 0.40 - 4.00 mU/L Final     T4 FREE   Date Value Ref Range Status   01/25/2017 0.92 0.76 - 1.46 ng/dL Final       Most Recent Immunizations   Administered Date(s) Administered     Influenza (H1N1) 12/16/2009     Influenza (High Dose) 3 valent vaccine 12/08/2016     Influenza (IIV3) 10/24/2012     Influenza Vaccine IM 3yrs+ 4 Valent IIV4 10/25/2014     Pneumococcal (PCV 13) 04/20/2016     Pneumococcal 23 valent 05/28/2014     TD (ADULT, 7+) 03/21/2003     TDAP (BOOSTRIX AGES 10-64) 03/06/2013     Zoster vaccine, live 05/28/2014     ASSESSMENT:                                                       Current medications were reviewed today.     Medication Adherence: no issues identified    Hyperlipidemia: Needs further monitoring. Pt is not on moderate-high intensity statin which is indicated based on 2013 ACC/AHA guidelines for lipid management due to several statin trials and rechallenges and unable to tolerate. Non-statins have not been associated with significant improvement in cardiovascular outcomes or mortality, and additionally she has had trouble tolerating these as well. Continued monitoring and healthy diet and exercise encouraged.    Acne/Hypertension: Stable.     Non-Allergic rhinitis/Dyspnea: Needs improvement. Patient needs PA documentation for Xopenex completed prior to leaving for [place] next Monday.     Hypothyroidism: Needs Improvement. Last TSH is above normal range . However, last T4 was WNL. Pt would benefit from  levothyroxine from other morning medications, supplements and food for improved absorption.    GERD: Needs improvement. Patient did not understand PCP instructions to hold omeprazole and try ranitidine monotherapy at bedtime to assess need for ongoing PPI therapy and she would benefit from holding omeprazole at this  time. Additionally, based on patient's CrClmaximum recommended dose of ranitidine is 150 mg q24 hrs. Recommend patient try taking ranitidine 150 mg qHS.      Depression:  Stable. However, it is recommended venlafaxine dose be reduced by 25-50% in patients with a GFR 10-70 ml/min. May consider reducing patient's dose if appropriate.     Insomnia: Improved. She may benefit from d/c of melatonin, as the trazodone seems to be working well and the melatonin did not appear to be helpful even prior to starting trazodone.    Osteopenia: Needs Improvement. Pt is not meeting RDI of calcium 1200mg/day. Pt would benefit from: splitting her current dose to BID in order to properly absorb full dose of calcium and switch from calcium carbonate to calcium citrate for improved absorption in combination with acid-lowering therapy.    Osteopenia: Needs improvement. Patient is likely receiving full dose of calcium with taking 1200 mg once daily d/t the body only absorbing 500-600 mg of calcium per dose. Recommend patient separate doses to one tab in AM and one tab in PM. Patient is currently taking calcium carbonate which requires acidic environment for adequate absorption. Given patient is on acid suppressing agents would recommend patient consider changing to calcium citrate.     Supplements: Stable.       PLAN:                                                      1. Will request PCP/[employer] submit PA for Xopenex.   2. Patient will start taking levothyroxine at least 30-60 mins prior to AM meds and breakfast.   3. Pt to reduce ranitidine to 150 mg qHS. Patient will hold omeprazole and try taking only randitidine at bedtime.   4. PCP to consider reducing venlafaxine dose based on renal function.   5. Pt to D/C melatonin.  6. Patient will separate doses of calcium/vit D to 1 tab BID and switch to calcium citrate.    I spent 60 minutes with this patient today.  All changes were made via collaborative practice agreement with Constantin  Iris Mueller. A copy of the visit note was provided to the patient's primary care provider.    Will follow up in as needed.    The patient was given a summary of these recommendations as an after visit summary.     Estefani Sun, PharmD IV Student  Elisha Nolen, Pharm.D., [facility]  Medication Therapy Management Pharmacist  [phone]

## 2017-01-31 NOTE — TELEPHONE ENCOUNTER
Spoke with patient at MTM visit today - she would like to pursue PA.    Elisha Nolen, Pharm.D., [facility]  Medication Therapy Management Pharmacist  [phone]

## 2017-01-31 NOTE — TELEPHONE ENCOUNTER
Reason for Call: Request for an order or referral:    Order or referral being requested: Please put in orders for blood work-pt has lab scheduled for 5/8, 1 week prior to her appt with you.     Date needed: before my next appointment    Has the patient been seen by the PCP for this problem? YES    Additional comments: none    Phone number Patient can be reached at:  Home number on file [phone] (home)    Best Time:  any    Can we leave a detailed message on this number?  YES    Call taken on 1/31/2017 at 1:22 PM by Rosette Sousa

## 2017-01-31 NOTE — MR AVS SNAPSHOT
After Visit Summary   1/31/2017    Abbey Omer    MRN: [id number]           Patient Information     Date Of Birth          1950        Visit Information        Provider Department      1/31/2017 12:00 PM Elisha Nolen, [facility] MTM        Care Instructions    Recommendations from today's MTM visit:                                                    MTM (medication therapy management) is a service provided by a clinical pharmacist designed to help you get the most of out of your medicines.   Today we reviewed what your medicines are for, how to know if they are working, that your medicines are safe and how to make your medicine regimen as easy as possible.     1. We discussed that since the trazodone has been working well, you can likely stop taking the melatonin.    2. We also discussed that your levothryoxine (thyroid medication) can interact with your vitamins, minerals, and omeprazole (Prilosec). I would suggest taking this at least 30-60minutes BEFORE all of your morning medications and breakfast.    3. Calcium intake goals: Your daily calcium requirement is 1200mg/day, however your body can only absorb 500-600mg of calcium at a time, so be sure to separate your calcium supplements by at least 4 hours. Calcium carbonate vs citrate: Calcium carbonate needs lots of stomach acid to be absorbed . Because you are taking medication to lower stomach acid, calcium citrate would be a better option as is does not need stomach acid to be absorbed. Please change your calcium supplement to calcium citrate. Also, we discussed HOLDING the omeprazole (Prilosec) at this time, because you are trying Ranitidine instead.    4. I will let Dr. Killian know you are waiting for the paperwork for the Xopenex (levalbuterol) inhaler.    Next MTM visit: as needed    To schedule another MTM appointment, please call the clinic directly or you may call the MTM scheduling line at  [phone] or toll-free at [phone].     My Clinical Pharmacist's contact information:                                                      It was a pleasure seeing you today!  Please feel free to contact me with any questions or concerns you have.      Elisha Nolen, Pharm.D., [facility]  Medication Therapy Management Pharmacist  [phone]      You may receive a survey about the [facility] services you received.  I would appreciate your feedback to help me serve you better in the future. Please fill it out and return it when you can. Your comments will be anonymous.                  Follow-ups after your visit        Your next 10 appointments already scheduled     Feb 01, 2017 11:00 AM   New Sleep Patient with Ry Le MD   [facility] ([facility])    [address] [place] [id number] [phone]            May 15, 2017  9:40 AM   SHORT with Iris Killian MD   [facility] ([facility]    [address] [place] [id number] [phone]           Your Arrival times is X, We need you to be here at this time to get checked in and have the assistant get you ready for the provider, which can take about 15 minutes. Your appointment time with your provider is at  X. Thank you.            Jan 23, 2018  9:00 AM   Return Visit with Omar West MD, [facility]   [facility] ([address] [id number] [phone]              Who to contact     If you have questions or need follow up information about today's clinic visit or your schedule please contact [facility] MTM directly at [phone].  Normal or non-critical lab and imaging results will be communicated to you by MyChart, letter or phone within 4 business days after the clinic has received the results. If you do not  hear from us within 7 days, please contact the clinic through [employer] or phone. If you have a critical or abnormal lab result, we will notify you by phone as soon as possible.  Submit refill requests through [employer] or call your pharmacy and they will forward the refill request to us. Please allow 3 business days for your refill to be completed.          Additional Information About Your Visit        i[employer] Information     [employer] gives you secure access to your electronic health record. If you see a primary care provider, you can also send messages to your care team and make appointments. If you have questions, please call your primary care clinic.  If you do not have a primary care provider, please call [phone] and they will assist you.        Care EveryWhere ID     This is your Care EveryWhere ID. This could be used by other organizations to access your [place] medical records  [id number]        Your Vitals Were     Pulse                   60            Blood Pressure from Last 3 Encounters:   01/31/17 134/82   01/25/17 132/84   01/24/17 125/79    Weight from Last 3 Encounters:   01/25/17 172 lb (78.019 kg)   04/20/16 170 lb (77.111 kg)   02/22/16 173 lb 3.2 oz (78.563 kg)              Today, you had the following     No orders found for display       Primary Care Provider Office Phone # Fax #    Iris Killian [phone]       [address] [address]        Thank you!     Thank you for choosing [facility]  for your care. Our goal is always to provide you with excellent care. Hearing back from our patients is one way we can continue to improve our services. Please take a few minutes to complete the written survey that you may receive in the mail after your visit with us. Thank you!             Your Updated Medication List - Protect others around you: Learn how to safely use, store and throw away your medicines at  www.disposemymeds.org.          This list is accurate as of: 1/31/17  1:08 PM.  Always use your most recent med list.                   Brand Name Dispense Instructions for use    calcium + D 600-200 MG-UNIT Tabs   Generic drug:  calcium carbonate-vitamin D      2 TABLET DAILY       cholecalciferol 5000 UNITS Caps capsule    vitamin D3     Take 5,000 Units by mouth daily       Cranberry 500 MG Caps      Take 1 capsule by mouth daily       EXCEDRIN TENSION HEADACHE 500-65 MG Tabs   Generic drug:  acetaminophen-caffeine      1 tablet twice daily as needed       Fish Oil 1200 MG Caps      two po bid       FLAXSEED OIL PO      Take 1 Tablespoonful by mouth daily       fluticasone 50 MCG/ACT spray    FLONASE    48 g    Spray 2 sprays into both nostrils daily       levalbuterol 45 MCG/ACT Inhaler    XOPENEX HFA    1 Inhaler    Inhale 2 puffs into the lungs every 6 hours as needed for shortness of breath / dyspnea or wheezing       levothyroxine 50 MCG tablet    SYNTHROID/LEVOTHROID    90 tablet    Take 1 tablet (50 mcg) by mouth every morning       magnesium 500 MG Tabs      Take 1 tablet by mouth daily       Melatonin 10 MG Tabs tablet      Take 30 mg by mouth At Bedtime       MULTIPLE vitamin  S/WOMENS Tabs      Take 2 tablets by mouth daily       omeprazole 20 MG CR capsule    priLOSEC    90 capsule    Take 1 capsule (20 mg) by mouth daily       PRILOSEC PO      Take 20 mg by mouth       PROBIOTIC DAILY PO      Take 1 tablet by mouth daily       ranitidine 300 MG tablet    ZANTAC    90 tablet    Take 1 tablet (300 mg) by mouth At Bedtime       spironolactone 50 MG tablet    ALDACTONE    180 tablet    Take 2 tablets (100 mg) by mouth daily       STATIN NOT PRESCRIBED (INTENTIONAL)      by Other route continuous prn.       traZODone 50 MG tablet    DESYREL    90 tablet    Take 1-2 tablets ( mg) by mouth nightly as needed for sleep       venlafaxine 150 MG 24 hr capsule    EFFEXOR-XR    180 capsule    2 Capsules  daily       vitamin B complex with vitamin C Tabs tablet      Take 1 tablet by mouth daily       zinc 50 MG Tabs      Take 1 tablet by mouth daily

## 2017-01-31 NOTE — PATIENT INSTRUCTIONS
Recommendations from today's MTM visit:                                                    MTM (medication therapy management) is a service provided by a clinical pharmacist designed to help you get the most of out of your medicines.   Today we reviewed what your medicines are for, how to know if they are working, that your medicines are safe and how to make your medicine regimen as easy as possible.     1. We discussed that since the trazodone has been working well, you can likely stop taking the melatonin.    2. We also discussed that your levothryoxine (thyroid medication) can interact with your vitamins, minerals, and omeprazole (Prilosec). I would suggest taking this at least 30-60minutes BEFORE all of your morning medications and breakfast.    3. Calcium intake goals: Your daily calcium requirement is 1200mg/day, however your body can only absorb 500-600mg of calcium at a time, so be sure to separate your calcium supplements by at least 4 hours. Calcium carbonate vs citrate: Calcium carbonate needs lots of stomach acid to be absorbed . Because you are taking medication to lower stomach acid, calcium citrate would be a better option as is does not need stomach acid to be absorbed. Please change your calcium supplement to calcium citrate. Also, we discussed HOLDING the omeprazole (Prilosec) at this time, because you are trying Ranitidine instead.    4. I will let Dr. Killian know you are waiting for the paperwork for the Xopenex (levalbuterol) inhaler.    Next MTM visit: as needed    To schedule another MTM appointment, please call the clinic directly or you may call the MTM scheduling line at [phone] or toll-free at [phone].     My Clinical Pharmacist's contact information:                                                      It was a pleasure seeing you today!  Please feel free to contact me with any questions or concerns you have.      Elisha Nolen, Pharm.D., [facility]  Medication Therapy  Management Pharmacist  [phone]      You may receive a survey about the MTM services you received.  I would appreciate your feedback to help me serve you better in the future. Please fill it out and return it when you can. Your comments will be anonymous.

## 2017-01-31 NOTE — TELEPHONE ENCOUNTER
Ordered TSH per HM. Will postpone & await 1/25 lab result recommendations to see if there are additional labs to order before May appt.  Oma Richardson RN

## 2017-02-01 ENCOUNTER — OFFICE VISIT (OUTPATIENT)
Dept: SLEEP MEDICINE | Facility: CLINIC | Age: 67
End: 2017-02-01
Payer: COMMERCIAL

## 2017-02-01 ENCOUNTER — MYC MEDICAL ADVICE (OUTPATIENT)
Dept: FAMILY MEDICINE | Facility: CLINIC | Age: 67
End: 2017-02-01

## 2017-02-01 ENCOUNTER — THERAPY VISIT (OUTPATIENT)
Dept: SLEEP MEDICINE | Facility: CLINIC | Age: 67
End: 2017-02-01
Payer: COMMERCIAL

## 2017-02-01 VITALS
DIASTOLIC BLOOD PRESSURE: 82 MMHG | HEIGHT: 60 IN | HEART RATE: 72 BPM | WEIGHT: 173 LBS | OXYGEN SATURATION: 94 % | BODY MASS INDEX: 33.96 KG/M2 | SYSTOLIC BLOOD PRESSURE: 146 MMHG

## 2017-02-01 VITALS
DIASTOLIC BLOOD PRESSURE: 82 MMHG | BODY MASS INDEX: 33.83 KG/M2 | HEART RATE: 60 BPM | SYSTOLIC BLOOD PRESSURE: 134 MMHG | WEIGHT: 173.2 LBS

## 2017-02-01 DIAGNOSIS — G25.81 RESTLESS LEGS SYNDROME (RLS): ICD-10-CM

## 2017-02-01 DIAGNOSIS — G47.00 INSOMNIA, UNSPECIFIED TYPE: ICD-10-CM

## 2017-02-01 DIAGNOSIS — G47.33 OSA (OBSTRUCTIVE SLEEP APNEA): ICD-10-CM

## 2017-02-01 DIAGNOSIS — R53.83 FATIGUE, UNSPECIFIED TYPE: Primary | ICD-10-CM

## 2017-02-01 DIAGNOSIS — R53.83 FATIGUE, UNSPECIFIED TYPE: ICD-10-CM

## 2017-02-01 LAB
FERRITIN SERPL-MCNC: 176 NG/ML (ref 8–252)
IRON SATN MFR SERPL: 17 % (ref 15–46)
IRON SERPL-MCNC: 56 UG/DL (ref 35–180)
TIBC SERPL-MCNC: 334 UG/DL (ref 240–430)

## 2017-02-01 PROCEDURE — 36415 COLL VENOUS BLD VENIPUNCTURE: CPT | Performed by: INTERNAL MEDICINE

## 2017-02-01 PROCEDURE — 99204 OFFICE O/P NEW MOD 45 MIN: CPT | Performed by: INTERNAL MEDICINE

## 2017-02-01 PROCEDURE — 82728 ASSAY OF FERRITIN: CPT | Performed by: INTERNAL MEDICINE

## 2017-02-01 PROCEDURE — 83540 ASSAY OF IRON: CPT | Performed by: INTERNAL MEDICINE

## 2017-02-01 PROCEDURE — 83550 IRON BINDING TEST: CPT | Performed by: INTERNAL MEDICINE

## 2017-02-01 PROCEDURE — 95811 POLYSOM 6/>YRS CPAP 4/> PARM: CPT | Performed by: INTERNAL MEDICINE

## 2017-02-01 RX ORDER — PRAMIPEXOLE DIHYDROCHLORIDE 0.25 MG/1
.25-.5 TABLET ORAL AT BEDTIME
Qty: 60 TABLET | Refills: 5 | Status: SHIPPED | OUTPATIENT
Start: 2017-02-01 | End: 2017-04-25

## 2017-02-01 NOTE — PATIENT INSTRUCTIONS

## 2017-02-01 NOTE — TELEPHONE ENCOUNTER
PA was faxed to [employer] at the following fax number [phone]    Will postpone encounter for 3 days- awaiting on response.         Mariajose Ac MA

## 2017-02-01 NOTE — TELEPHONE ENCOUNTER
"[place]- Part D [phone]  ID: [id number]      PA letter started and placed into Dr. Killian \"sign me\" folder to review and complete.      Mariajose Ac MA          "

## 2017-02-01 NOTE — PROGRESS NOTES
"  Sleep Consultation:    Date on this visit: 2/1/2017    Abeby Omer is sent by Iris Killian for a sleep consultation regarding possible Obstructive Sleep Apnea and trouble sleeping.    Primary Physician: Iris Killian     Chief Complaint   Patient presents with     Consult     I have trouble sleeping     She has a history of depression, hypothyroidism, Restless Legs Syndrome, prior diagnosis of Obstructive Sleep Apnea not on CPAP.    Schedule - Retired.  Gets up \"depending on when she falls asleep.\"  Schedule is all over the place.  Thinks on a good night may get 4 - 5 hours of broken, interrupted sleep.  Some nights takes medication at 6 PM.  May fall asleep quickly or not until 5 AM.     When she was working was sleeping 10:30 - 6 AM and fell asleep fairly quickly and without difficulty.      Sleep Disordered Breathing - Snoring is variable in intensity but can be audible outside the bedroom.  Patient does have snort arousals during the night and witnessed apneas.   Has nocturia 2 - 3 times per night.  Occasional nocturnal GERD.    She reports morning headaches.  Has morning dry mouth and morning sinus congestion.   Patient was counseled on the importance of driving while alert, to pull over if drowsy, or nap before getting into the vehicle if sleepy.    Movement/Behaviors - No somniloquy.  No somnambulism.  Has had nocturnal eating but no amnestic sleep related eating.  No dream enactment behavior.   Patient reports typical restless legs syndrome symptoms.  Gets them around 10 PM when she starts relaxing.  Gets pulsing discomfort in both legs.  Walking helps.  Gets symptoms every night.  Never tried any prescriptions for this condition.     History of low iron requiring infusion.     No hypnagogic/hypnopompic hallucinations.  No sleep paralysis.  No cataplexy; does develop sporadic bilateral upper arm weakness without obvious trigger.    Alcohol use - Rare  Caffeine intake - Regular morning " Excedrin migraine use.  Tobacco exposure - None  Illicit substances - None    Lives with wife.  Has 2 biological children from previous marriage.  Has 4 pets, 2 dogs and 2 cats.     Does have family history of snoring in mother, brother, and others.    Allergies:    Allergies   Allergen Reactions     Amlodipine Swelling     Asa [Aluminum Hydroxide] GI Disturbance     Atenolol Fatigue     Atorvastatin Calcium Swelling     Codeine Nausea     Colestid [Colestipol Hcl]      Crestor [Hmg-Coa-R Inhibitors]      Joint pain     Cymbalta      Visual hallucinations     Hydralazine Fatigue     Lovastatin Cramps     Niacin      Unknown by patient     Paxil [Paroxetine] Fatigue     Potassium GI Disturbance     Sulfa Drugs Fatigue     Zetia [Ezetimibe] Fatigue     Zomig [Zolmitriptan]        Medications:    Current Outpatient Prescriptions   Medication Sig Dispense Refill     traZODone (DESYREL) 50 MG tablet Take 1-2 tablets ( mg) by mouth nightly as needed for sleep 90 tablet 3     levothyroxine (SYNTHROID/LEVOTHROID) 50 MCG tablet Take 1 tablet (50 mcg) by mouth every morning 90 tablet 3     venlafaxine (EFFEXOR-XR) 150 MG 24 hr capsule 2 Capsules daily 180 capsule 3     spironolactone (ALDACTONE) 50 MG tablet Take 2 tablets (100 mg) by mouth daily 180 tablet 3     omeprazole (PRILOSEC) 20 MG CR capsule Take 1 capsule (20 mg) by mouth daily 90 capsule 3     ranitidine (ZANTAC) 300 MG tablet Take 1 tablet (300 mg) by mouth At Bedtime 90 tablet 3     Cranberry 500 MG CAPS Take 1 capsule by mouth daily       fluticasone (FLONASE) 50 MCG/ACT nasal spray Spray 2 sprays into both nostrils daily 48 g 3     Multiple Vitamins-Minerals (MULTIPLE VITAMIN  S/WOMENS) TABS Take 2 tablets by mouth daily       magnesium 500 MG TABS Take 1 tablet by mouth daily       zinc 50 MG TABS Take 1 tablet by mouth daily       vitamin  B complex with vitamin C (VITAMIN  B COMPLEX) TABS Take 1 tablet by mouth daily       Melatonin 10 MG TABS Take 30  mg by mouth At Bedtime       Flaxseed, Linseed, (FLAXSEED OIL PO) Take 1 Tablespoonful by mouth daily       EXCEDRIN TENSION HEADACHE 500-65 MG PO TABS 1 tablet twice daily as needed       FISH OIL 1200 MG OR CAPS two po bid       CALCIUM + D 600-200 MG-UNIT OR TABS 2 TABLET DAILY       levalbuterol (XOPENEX HFA) 45 MCG/ACT Inhaler Inhale 2 puffs into the lungs every 6 hours as needed for shortness of breath / dyspnea or wheezing 1 Inhaler 1     STATIN NOT PRESCRIBED, INTENTIONAL, by Other route continuous prn.  0       Problem List:  Patient Active Problem List    Diagnosis Date Noted     Major depressive disorder, recurrent episode, mild degree (H) 08/03/2011     Priority: High     prior care with psychiatrist Dr. Ro  Has tried paxil in past.        Hypertension goal BP (blood pressure) < 140/90 06/01/2011     Priority: High     Intolerant to higher than 50 mg of metoprolol due to depressive effects       Hyperlipidemia with target LDL less than 100 05/17/2011     Priority: High     Has tried welchol, statins and developed side effects  Diagnosis updated by automated process. Provider to review and confirm.       CKD (chronic kidney disease) stage 3, GFR 30-59 ml/min 12/20/2010     Priority: High     Hypothyroidism 10/12/2009     Priority: High     Discoid atelectasis 04/28/2016     Priority: Medium     Renal cyst, left 02/22/2016     Priority: Medium     Seen on abd/pelvic CT at [facility] 2/2016.  Recommend recheck CT in one year or do ultrasound.  In review of chart, renal cyst noted on ultrasound in 2011 and CT done at [facility] facility in 2014 showing stablility       Combined form of age-related cataract, both eyes 02/17/2016     Priority: Medium     Glaucoma suspect, bilateral 02/17/2016     Priority: Medium     Target IOP:   Max IOP :   Family history: No  Trauma history: No  OCT: 8/216 Normal  [place] visual field (HVF): 8/2016   Right eye - Reliable, Normal   Left eye - Reliable, Normal  Pachymetry:  Average-thickish 553/564  C/D: 0.6/0.6  SLT:            [place]en of macula of both eyes 02/17/2016     Priority: Medium     Acne 05/20/2015     Priority: Medium     Major depressive disorder, recurrent episode, moderate (H) 06/20/2014     Priority: Medium     Pulmonary nodules 06/16/2014     Priority: Medium     [place] [facility] 6/13/14:  Pulmonary nodule, needs repeat scan in 6-12 months.       Allergic conjunctivitis 04/10/2013     Priority: Medium     Hypertriglyceridemia 03/01/2013     Priority: Medium     DDD (degenerative disc disease), cervical 06/16/2011     Priority: Medium     Elevated LFTs 06/15/2011     Priority: Medium     (Problem list name updated by automated process. Provider to review and confirm.)       Dependent edema      Priority: Medium     History of bladder cancer 05/18/2011     Priority: Medium     Dr. Daivd, [place] [facility]       Obesity 05/17/2011     Priority: Medium     Chronic nonallergic rhinitis      Priority: Medium     Dr. Peoples, allergist       RICKEY (obstructive sleep apnea) 08/11/2009     Priority: Medium     Intolerant to CPAP       Macular drusen 11/25/2011     Priority: Low     Advance Care Planning 06/15/2011     Priority: Low     Advance Care Planning 7/5/2016: Receipt of ACP document:  Received: Health Care Directive which was witnessed or notarized on 4/25/16.  Document previously scanned on 4/28/16. Also received Health Care Directive dated 3/18/16 which was valid and scanned on 4/14/16  Validation forms previously completed and sent to be scanned.  Code Status reflects choices in most recent ACP document.  Confirmed/documented designated decision maker(s).  Added by Kanchan Lazcano RN, Advance Care Planning Liaison.  Advance Care Planning 4/25/2016: ACP Facilitation Session:    Abbey GAYLE Kan presented for ACP Facilitation session at the clinic. She was accompanied by spouse. [facility] information provided and resources reviewed. She currently wishes to complete an  ACP document and needs further clarification and/or consideration prior to documenting choices.  She currently has the following questions or concerns about Advance Care Planning: none.  Confirmed/documented legally designated decision maker(s). Code status reflects choices in most recent ACP document. Follow-up meeting: not needed/applicable.  document sent to be scanned after viewed for validation.Added by Adrianna Silveira   Advance Care Planning 4/13/2016: Receipt of ACP document:  Received: invalid HCD document not signed, witness/notary and missing pages.Document not previously scanned. Validation form completed indicating invalid document. Copy sent to client with information and facilitation resources. Validation form sent to be scanned as notation of invalid document received.  Confirmed/documented RN, System Director ACP-[facility]  designated decision maker(s).  Added by Rebeca Manriquez  Advance Care Planning 6-15-11 Discussed advance care planning with patient; information given to patient to review. Karen Pro CMA          Past Medical/Surgical History:  Past Medical History   Diagnosis Date     Hyperlipidemia      Premature menopause      Cancer of bladder (H)      Migraine      resolved     RICKEY (obstructive sleep apnea)      Osteoporosis      HTN (hypertension)      Depression, major      Chronic nonallergic rhinitis 8/31/10 RAST     Hypothyroidism 10/12/2009     Renal cyst      Nasal septal deviation 8/11/2009     CKD (chronic kidney disease) stage 3, GFR 30-59 ml/min 12/20/2010     Dependent edema      Osteoarthritis of shoulder region      DDD (degenerative disc disease), cervical 6/16/2011     Cataract 11/25/2011     Past Surgical History   Procedure Laterality Date     D & c       Cholecystectomy, laporoscopic       Nose surgery       septum deviation     Hysterectomy, pap no longer indicated       BSO     Cystoscopy       bladder cancer       Social History:  Social History     Social  History     Marital Status: Life Partner     Spouse Name: Maggie     Number of Children: 2     Years of Education: 12     Occupational History     collections for [employer]     Social History Main Topics     Smoking status: Former Smoker -- 0.10 packs/day for 1 years     Quit date: 10/12/1969     Smokeless tobacco: Never Used     Alcohol Use: Yes      Comment: 1 drink a month     Drug Use: No     Sexual Activity:     Partners: Female     Birth Control/ Protection: Surgical, Post-menopausal     Other Topics Concern     Parent/Sibling W/ Cabg, Mi Or Angioplasty Before 65f 55m? No     Social History Narrative    Son  2012.    Has partner, Pat.       Family History:  Family History   Problem Relation Age of Onset     DIABETES Maternal Grandfather      Hypertension Maternal Grandfather      DIABETES Son      HEART DISEASE Father      CHF     Thyroid Disease No family hx of      Glaucoma No family hx of      Macular Degeneration No family hx of      CANCER No family hx of      DIABETES Maternal Uncle      CEREBROVASCULAR DISEASE Maternal Uncle      DIABETES Mother      Depression Mother      Substance Abuse Brother      Depression Daughter        Review of Systems:  A complete review of systems reviewed by me is negative with the exeption of what has been mentioned in the history of present illness.  Ear pain; sore throat and sinus pain; post-nasal drip and rhinorrhea  Headaches and weakness in arms  Shortness of breath and dry cough  Diarrhea and constipation  Muscle and join pain  Depression    Physical Examination:  Vitals: /82 mmHg  Pulse 72  Ht 1.524 m (5')  Wt 78.472 kg (173 lb)  BMI 33.79 kg/m2  SpO2 94%  BMI= Body mass index is 33.79 kg/(m^2).    [place] Total Score 2017   Total score - [place] 6     General appearance: No apparent distress, well groomed.    HEENT:   Head: Normocephalic, atraumatic.  Eyes: PERRL  Nose: Nares widely patent.  No exudate.  No septal deviation  "noted.   Right nasal polyps  Mouth: Teeth: Normal               Tongue: Normal  Oropharynx:  Mallampati Classification: II    Tonsils: Grade 0    Uvula: Normal    Neck: Supple without bruit.     Neck Cir (cm): 36 cm (2/1/2017 11:00 AM)    Cardiac: Regular rate and rhythm.  No murmurs.  Radial pulses are strong and symmetric.  Pulmonary: Symmetric air movement, lungs clear to auscultation bilaterally.  Musculoskeletal: No edema noted.  No clubbing or cyanosis.  Skin: Warm, dry, intact.  Neurologic: Alert and oriented to person, place and time   Gait is normal.  Psychiatric: Affect pleasant.  Mood \"getting better\".     Impression/Plan:  1. Fatigue, unspecified type  Eval for iron def given history  - IRON; Future  - IRON AND IRON BINDING CAPACITY; Future  - FERRITIN; Future    2. Insomnia, unspecified type   Part of today's visit was spent visiting sleep hygiene and CBT-I concepts.  We employed a(n) advanced review of sleep drives (homeostatic and circadian processes) as well as ultradian sleep cycles, typical sleep architecture, normative data on sleep needs and sleep timing.  During the process we identified typical contributors to poor sleep quality and recommendations for optimal sleep health.  Sleep restriction was discussed as part of the plan.  She was recommended  To employ a sleep schedule of 11:30 to 6, restrict time in bed, avoid looking at the clock, minimize electronics use in the last hours before bed, avoid sleep-waiting (laying in bed hoping for sleep) and employ paradoxical intention.  We will discuss insomnia further after sleep study.  .    3. Restless legs syndrome (RLS)  Patient has symptoms consistent moderate restless legs syndrome.  We discussed pathophysiology of disease including dopamine signaling and iron as a co-factor.  We discussed treatment options, both pharmacologic and non-pharmacologic (including massage, heating and cooling, stretching and exercise).  Specific medications were " discussed, namely Pramipexole (Mirapex).  We discuss symptoms and side effect profile of these medications.    - Patient would like to start treatment today.  - Ferritin will be checked as a surrogate for iron stores.  If ferritin is < 75, treatment will be recommended..    - pramipexole (MIRAPEX) 0.25 MG tablet; Take 1-2 tablets (0.25-0.5 mg) by mouth At Bedtime  Dispense: 60 tablet; Refill: 5    4. RICKEY (obstructive sleep apnea)  I have a high suspicion for RICKEY based on presence of snoring, snort arousals, witnessed apneas, and obesity with excessive daytime sleepiness. We discussed pathophysiology of obstructive sleep apnea, testing with overnight polysomnography, and treatment modalities (CPAP only discussed at this visit). We discussed consequences of untreated RICKEY. Patient is interested in treatment and willing to undergo overnight polysomnography.    Study has been ordered today.    - Comprehensive Sleep Study; Future    Literature provided regarding sleep apnea.      She will follow up with me in approximately two weeks after her sleep study has been competed to review the results and discuss plan of care.       Polysomnography reviewed.  Obstructive sleep apnea reviewed.  Complications of untreated sleep apnea were reviewed.    Total time of care was 69 minutes, with > 50% of time spent on counseling and coordination of care.      Ry Le MD, Sleep Physician  Feb 1, 2017     CC: Iris Killian

## 2017-02-01 NOTE — NURSING NOTE
Chief Complaint   Patient presents with     Consult     I have trouble sleeping       Initial /82 mmHg  Pulse 72  Ht 1.524 m (5')  Wt 78.472 kg (173 lb)  BMI 33.79 kg/m2  SpO2 94% Estimated body mass index is 33.79 kg/(m^2) as calculated from the following:    Height as of this encounter: 1.524 m (5').    Weight as of this encounter: 78.472 kg (173 lb).  BP completed using cuff size: large  Neck Cir (cm): 36 cm (2/1/2017 11:00 AM)  ESS - 6    Rosette Otoole, CMA

## 2017-02-01 NOTE — MR AVS SNAPSHOT
After Visit Summary   2/1/2017    Abbey Omer    MRN: [id number]           Patient Information     Date Of Birth          1950        Visit Information        Provider Department      2/1/2017 11:00 AM Ry Le MD [facility]        Today's Diagnoses     Fatigue, unspecified type    -  1     Insomnia, unspecified type         Restless legs syndrome (RLS)         RICKEY (obstructive sleep apnea)           Care Instructions      Your BMI is Body mass index is 33.79 kg/(m^2).  Weight management is a personal decision.  If you are interested in exploring weight loss strategies, the following discussion covers the approaches that may be successful. Body mass index (BMI) is one way to tell whether you are at a healthy weight, overweight, or obese. It measures your weight in relation to your height.  A BMI of 18.5 to 24.9 is in the healthy range. A person with a BMI of 25 to 29.9 is considered overweight, and someone with a BMI of 30 or greater is considered obese. More than two-thirds of American adults are considered overweight or obese.  Being overweight or obese increases the risk for further weight gain. Excess weight may lead to heart disease and diabetes.  Creating and following plans for healthy eating and physical activity may help you improve your health.  Weight control is part of healthy lifestyle and includes exercise, emotional health, and healthy eating habits. Careful eating habits lifelong are the mainstay of weight control. Though there are significant health benefits from weight loss, long-term weight loss with diet alone may be very difficult to achieve- studies show long-term success with dietary management in less than 10% of people. Attaining a healthy weight may be especially difficult to achieve in those with severe obesity. In some cases, medications, devices and surgical management might be considered.  What can you do?  If you are  overweight or obese and are interested in methods for weight loss, you should discuss this with your provider.     Consider reducing daily calorie intake by 500 calories.     Keep a food journal.     Avoiding skipping meals, consider cutting portions instead.    Diet combined with exercise helps maintain muscle while optimizing fat loss. Strength training is particularly important for building and maintaining muscle mass. Exercise helps reduce stress, increase energy, and improves fitness. Increasing exercise without diet control, however, may not burn enough calories to loose weight.       Start walking three days a week 10-20 minutes at a time    Work towards walking thirty minutes five days a week     Eventually, increase the speed of your walking for 1-2 minutes at time    In addition, we recommend that you review healthy lifestyles and methods for weight loss available through the National Institutes of Health patient information sites:  http://win.niddk.nih.gov/publications/index.htm    And look into health and wellness programs that may be available through your health insurance provider, employer, local community center, or carole club.    Weight management plan: Patient was referred to their PCP to discuss a diet and exercise plan.            Follow-ups after your visit        Your next 10 appointments already scheduled     Feb 01, 2017  1:00 PM   LAB with BK LAB   [facility] ([facility])    [address] [place] [id number] [phone]           Patient must bring picture ID.  Patient should be prepared to give a urine specimen  Please do not eat 10-12 hours before your appointment if you are coming in fasting for labs on lipids, cholesterol, or glucose (sugar).  Pregnant women should follow their Care Team instructions. Water with medications is okay. Do not drink coffee or other fluids.   If you have concerns about taking  your medications,  please ask at office or if scheduling via [employer], send a message by clicking on Secure Messaging, Message Your Care Team.            Feb 01, 2017  8:00 PM   PSG Split with BK BED 3   [facility] ([facility])    [address] [place] [id number]   [phone]            May 08, 2017  7:15 AM   LAB with NE LAB   [facility]    [address] [place] [id number] [phone]           Patient must bring picture ID.  Patient should be prepared to give a urine specimen  Please do not eat 10-12 hours before your appointment if you are coming in fasting for labs on lipids, cholesterol, or glucose (sugar).  Pregnant women should follow their Care Team instructions. Water with medications is okay. Do not drink coffee or other fluids.   If you have concerns about taking  your medications, please ask at office or if scheduling via [employer], send a message by clicking on Secure Messaging, Message Your Care Team.            May 15, 2017  9:40 AM   Office Visit with Iris Killian MD   [facility] ([facility]    [address] [place] [id number] [phone]            Jan 23, 2018  9:00 AM   [facility]   [facility] ([facility]    [address] [place] [address]  [place] [address]   [phone]              Future tests that were ordered for you today     Open Future Orders        Priority Expected Expires Ordered    Comprehensive Sleep Study Routine  7/31/2017 2/1/2017    IRON Routine  5/2/2017 2/1/2017    IRON AND IRON BINDING CAPACITY Routine  5/2/2017 2/1/2017    FERRITIN Routine  5/2/2017 2/1/2017    TSH with free T4 reflex Routine  1/31/2018 1/31/2017            Who to contact     If you have questions or need follow up information about today's  clinic visit or your schedule please contact [facility] directly at [phone].  Normal or non-critical lab and imaging results will be communicated to you by MyChart, letter or phone within 4 business days after the clinic has received the results. If you do not hear from us within 7 days, please contact the clinic through [employer] or phone. If you have a critical or abnormal lab result, we will notify you by phone as soon as possible.  Submit refill requests through [employer] or call your pharmacy and they will forward the refill request to us. Please allow 3 business days for your refill to be completed.          Additional Information About Your Visit        [employer] Information     [employer] gives you secure access to your electronic health record. If you see a primary care provider, you can also send messages to your care team and make appointments. If you have questions, please call your primary care clinic.  If you do not have a primary care provider, please call [phone] and they will assist you.        Care EveryWhere ID     This is your Care EveryWhere ID. This could be used by other organizations to access your [place] medical records  [id number]        Your Vitals Were     Pulse Height BMI (Body Mass Index) Pulse Oximetry          72 1.524 m (5') 33.79 kg/m2 94%         Blood Pressure from Last 3 Encounters:   02/01/17 146/82   01/31/17 134/82   01/25/17 132/84    Weight from Last 3 Encounters:   02/01/17 78.472 kg (173 lb)   01/31/17 78.563 kg (173 lb 3.2 oz)   01/25/17 78.019 kg (172 lb)                 Today's Medication Changes          These changes are accurate as of: 2/1/17 12:56 PM.  If you have any questions, ask your nurse or doctor.               Start taking these medicines.        Dose/Directions    pramipexole 0.25 MG tablet   Commonly known as:  MIRAPEX   Used for:  Restless legs syndrome (RLS)   Started by:  Ry Le MD        Dose:  0.25-0.5 mg    Take 1-2 tablets (0.25-0.5 mg) by mouth At Bedtime   Quantity:  60 tablet   Refills:  5            Where to get your medicines      These medications were sent to [id number] - [place], [place] - [address] AT [facility] [address]  [address], [place] [address]     Phone:  [phone]    - pramipexole 0.25 MG tablet             Primary Care Provider Office Phone # Fax #    Iris Killian [phone]       [facility] [address] [facility] [address]        Thank you!     Thank you for choosing [facility]  for your care. Our goal is always to provide you with excellent care. Hearing back from our patients is one way we can continue to improve our services. Please take a few minutes to complete the written survey that you may receive in the mail after your visit with us. Thank you!             Your Updated Medication List - Protect others around you: Learn how to safely use, store and throw away your medicines at www.disposemymeds.org.          This list is accurate as of: 2/1/17 12:56 PM.  Always use your most recent med list.                   Brand Name Dispense Instructions for use    calcium + D 600-200 MG-UNIT Tabs   Generic drug:  calcium carbonate-vitamin D      1 tablet twice a day       Cranberry 500 MG Caps      Take 1 capsule by mouth daily       EXCEDRIN TENSION HEADACHE 500-65 MG Tabs   Generic drug:  acetaminophen-caffeine      1 tablet twice daily as needed       Fish Oil 1200 MG Caps      1 capsule daily       FLAXSEED OIL PO      Take 1 Tablespoonful by mouth daily       fluticasone 50 MCG/ACT spray    FLONASE    48 g    Spray 2 sprays into both nostrils daily       levalbuterol 45 MCG/ACT Inhaler    XOPENEX HFA    1 Inhaler    Inhale 2 puffs into the lungs every 6 hours as needed for shortness of breath / dyspnea or wheezing       levothyroxine 50 MCG tablet     SYNTHROID/LEVOTHROID    90 tablet    Take 1 tablet (50 mcg) by mouth every morning       magnesium 500 MG Tabs      Take 1 tablet by mouth daily       Melatonin 10 MG Tabs tablet      Take 30 mg by mouth At Bedtime       MULTIPLE vitamin  S/WOMENS Tabs      Take 2 tablets by mouth daily       omeprazole 20 MG CR capsule    priLOSEC    90 capsule    Take 1 capsule (20 mg) by mouth daily       pramipexole 0.25 MG tablet    MIRAPEX    60 tablet    Take 1-2 tablets (0.25-0.5 mg) by mouth At Bedtime       ranitidine 300 MG tablet    ZANTAC    90 tablet    Take 1 tablet (300 mg) by mouth At Bedtime       spironolactone 50 MG tablet    ALDACTONE    180 tablet    Take 2 tablets (100 mg) by mouth daily       STATIN NOT PRESCRIBED (INTENTIONAL)      by Other route continuous prn.       traZODone 50 MG tablet    DESYREL    90 tablet    Take 1-2 tablets ( mg) by mouth nightly as needed for sleep       venlafaxine 150 MG 24 hr capsule    EFFEXOR-XR    180 capsule    2 Capsules daily       vitamin B complex with vitamin C Tabs tablet      Take 1 tablet by mouth daily       zinc 50 MG Tabs      Take 1 tablet by mouth daily

## 2017-02-02 ENCOUNTER — TELEPHONE (OUTPATIENT)
Dept: FAMILY MEDICINE | Facility: CLINIC | Age: 67
End: 2017-02-02

## 2017-02-02 NOTE — TELEPHONE ENCOUNTER
Reason for Call: Request for an order or referral:    Order or referral being requested: Prior Auth for pramipexole (MIRAPEX) 0.25 MG tablet    Date needed: at your convenience    Has the patient been seen by the PCP for this problem? YES    Additional comments: Chucky would like to speak to a nurse about this medication.     Phone number Patient can be reached at:  Other phone number:  [phone]    Best Time:  Anytime    Can we leave a detailed message on this number?  NO    Call taken on 2/2/2017 at 4:22 PM by Kami Dotson

## 2017-02-02 NOTE — PROGRESS NOTES
Pt arrived [facility]      Completed a split night PSG per provider order.    Preliminary AHI 41.  A final therapeutic PAP pressure was not achieved.    Supine REM was not seen on therapeutic pressure.    Patient reports feeling refreshed in AM.

## 2017-02-03 ENCOUNTER — TELEPHONE (OUTPATIENT)
Dept: SLEEP MEDICINE | Facility: CLINIC | Age: 67
End: 2017-02-03

## 2017-02-03 ENCOUNTER — MYC MEDICAL ADVICE (OUTPATIENT)
Dept: FAMILY MEDICINE | Facility: CLINIC | Age: 67
End: 2017-02-03

## 2017-02-03 ENCOUNTER — MYC MEDICAL ADVICE (OUTPATIENT)
Dept: SLEEP MEDICINE | Facility: CLINIC | Age: 67
End: 2017-02-03

## 2017-02-03 DIAGNOSIS — G47.33 OSA (OBSTRUCTIVE SLEEP APNEA): Primary | ICD-10-CM

## 2017-02-03 NOTE — TELEPHONE ENCOUNTER
"Called to review Polysomnography results.   Had severe Obstructive Sleep Apnea on Polysomnography.    Leaving town on Monday for vacation and wants to get set up ASAP.  Disease severity makes anything besides CPAP unlikely to be effective.    I reviewed the diagnosis of obstructive sleep apnea with patient.  We discussed health consequences of untreated sleep apnea and benefits of treatment.  She is interested in starting treatment of RICKEY with PAP therapy.  Reviewed importance of nightly therapy for effective treatment of RICKEY, and she voiced understanding and agreement.  We also reviewed importance of using PAP during the entire sleep duration to achieve maximal benefits, but reviewed that a minimum of 4 hours per night was required.  She is confident that she can achieve these goals and is willing to start therapy as soon as possible.    Regarding insomnia and Restless Legs Syndrome, reports she has been sleepier since starting Pramipexole and stopping Trazodone.  If she is taking the Pramipexole as she indicated (reports she is only taking 1/2 a pill and I ordered the 0.25 mg tablets), then I would not expect any sleepiness from 0.125 mg of this medication (or really any effect at all).  Discussed that she may be \"withdrawing\" from Trazodone instead. Asked her to try for a few more days and also wait given Obstructive Sleep Apnea severity before restarting insomnia medication.  She voiced understanding and agreement.    Ry Le MD, Sleep Physician  Feb 3, 2017     "

## 2017-02-03 NOTE — TELEPHONE ENCOUNTER
Per pharmacy, the patient's insurance requires a prior authorization for one or more of the patient's medications.  Please initiate prior authorization or call/fax pharmacy to change patient's medication.    Medication: levalbuerol  Strength: 15 gm  Sig: inhale two puffs into lungs every 6 hours as needed for shortness of breath or difficult breathing or wheezing    Pharmacy: [facility]  Phone: [phone]  Fax: [phone]    Prescription Insurance: unknown  Phone: [phone]  ID: [id number]    Additional Comments: plan does not cover this medication

## 2017-02-04 NOTE — PROCEDURES
SLEEP STUDY INTERPRETATION  SPLIT-NIGHT STUDY      Patient: Abbey Omer  YOB: 1950  Study Date: 2/01/2017  MRN: -  Referring Provider: Iris Killian MD  Ordering Provider: Ry Le MD    Indications for Polysomnography: The patient is a 67 y old Female who is 5' and weighs 173.0 lbs.  Her BMI is 34.0, [place] sleepiness scale is 6.0 and neck size is 36.0.  Relevant medical history includes depression, hypothyroidism, Restless Legs Syndrome, prior diagnosis of Obstructive Sleep Apnea not on CPAP.  A diagnostic polysomnogram was performed to evaluate for RICKEY and PLMs.  After 142.5 minutes of sleep time the patient exhibited sufficient respiratory events qualifying her for a CPAP trial which was then initiated.      Polysomnogram Data:  A full night polysomnogram recorded the standard physiologic parameters including EEG, EOG, EMG, ECG, nasal and oral airflow.  Respiratory parameters of chest and abdominal movements were recorded with respiratory inductance plethysmography.  Oxygen saturation was recorded by pulse oximetry.      Diagnostic PSG  Sleep Architecture: Normal sleep latency with sleep aid, increased arousal index, and decreased sleep efficiency.  No REM sleep noted throughout the study.  The total recording time of the diagnostic portion of the study was 215.8 minutes.  The total sleep time was 142.5 minutes.  During the diagnostic portion of the study the sleep latency was normal at 20.3 minutes with the use of a sleep aid.  REM latency was - minutes.  Arousal index was increased at 58.1 arousals per hour.  Sleep efficiency was decreased at 66.0%.  Wake after sleep onset was 47.5 minutes.   The patient spent 34.4% of total sleep time in Stage N1, 65.6% in Stage N2, 0.0% in Stage N3 and 0.0% in REM.       Respiration: Severe obstructive sleep apnea with associated oscillatory hypoxia.    Events - During the diagnostic portion of the study, the polysomnogram revealed a  presence of 7 obstructive, 1 central, and - mixed apneas resulting in an apnea index of 3.4 events per hour.  There were 103 hypopneas resulting in a hypopnea index of 43.4 events per hour.  The combined apnea/hypopnea index was 46.7 events per hour.  The REM AHI was - events per hour.  The supine AHI was 46.6 events per hour.  The RERA index was 13.1 events per hour.  The RDI was 59.8 events per hour.     Snoring - was reported as loud.    Respiratory rate and pattern - was normal.    Sustained Sleep Associated Hypoventilation - Transcutaneous carbon dioxide monitoring was not used; however significant hypoventilation was not suggested by oximetry.    Sleep Associated Hypoxemia - (Greater than 5 minutes O2 sat below 89%) was present.  Baseline oxygen saturation was 91.3%. Lowest oxygen saturation was 78.5%.  Time spent less than or equal to 88% was 14.4 minutes.  Time spent less than or equal to 89% was 30.6 minutes.  59.8 13.1 46.7     Treatment PSG  Sleep Architecture: On PAP, improved arousal index and normal sleep efficiency.  At 02:40:55 AM the patient was placed on CPAP treatment and was titrated at pressures ranging from 5 cmH2O up to 9 cmH2O.  The total recording time of the treatment portion of the study was 198.6 minutes.  The total sleep time was 183.5 minutes.  During the treatment portion of the study the sleep latency was 1.0 minutes.  REM latency was - minutes.  Arousal index was increased at 36.9 arousals per hour.  Sleep efficiency was normal at 92.4%.  Wake after sleep onset was 12.0 minutes.  The patient spent 16.3% of total sleep time in Stage N1, 69.2% in Stage N2, 14.4% in Stage N3 and 0.0% in REM.       Respiration: Unacceptable titration.  The final pressure was 9 cmH2O with an AHI of 30 events per hour.  Time in REM supine on final pressure was - minutes.   This titration was considered:  - unacceptable (does not meet any of the above criteria).    Movement Activity: Few scattered limb  movements.    Periodic Limb Movements  o During the diagnostic portion of the study, there were - PLMs recorded.   o During the treatment portion of the study, there were 5 PLMs recorded. The PLM index was 1.6 movements per hour.  The PLM Arousal Index was 1.0 per hour.    REM EMG Activity - Excessive muscle activity was not present.    Nocturnal Behavior - Abnormal sleep related behaviors were not noted.    Bruxism - None apparent.    Cardiac Summary: No arrhythmias noted.  During the diagnostic portion of the study, the average pulse rate was 76.6 bpm.  The minimum pulse rate was 67.9 bpm while the maximum pulse rate was 94.0 bpm.    During the treatment portion of the study, the average pulse rate was 74.7 bpm.  The minimum pulse rate was 64.9 bpm while the maximum pulse rate was 88.8 bpm.     Arrhythmias were not noted.    Assessment:     Normal sleep latency with sleep aid, increased arousal index, and decreased sleep efficiency.  No REM sleep noted throughout the study.    On PAP, improved arousal index and normal sleep efficiency.    Severe obstructive sleep apnea with associated oscillatory hypoxia.    Unacceptable titration.    Few scattered limb movements.    Recommendations:    Treatment of RICKEY with Auto-titrating PAP therapy with a range of 6 - 12 cmH2O.  Recommend clinical follow up with sleep management team, including review of compliance measures.    Advise regarding the risks of drowsy driving.    Suggest optimizing sleep schedule and avoiding sleep deprivation.    Weight management (if BMI > 30).    Pharmacologic therapy should be used for management of restless legs syndrome only if present and clinically indicated and not based on the presence of periodic limb movements alone.    Cardiac Comments: Normal Sinus Rhythm  Diagnostic Codes:    Obstructive Sleep Apnea G47.33    Sleep Hypoxemia/Hypoventilation G47.36   _____________________________________   Electronically Signed By: Ry Le MD  2/3/2017             Table of Oximetry Distribution    Range(%) Time in range (min) Time in range (%) Time in or below range (min) Time in or below range (%)   0.0 - 88.0 14.4 3.5% 14.4 3.5%   0.0 - 89.0 30.6 7.5% 30.6 7.5%

## 2017-02-06 ENCOUNTER — DOCUMENTATION ONLY (OUTPATIENT)
Dept: SLEEP MEDICINE | Facility: CLINIC | Age: 67
End: 2017-02-06
Payer: COMMERCIAL

## 2017-02-06 DIAGNOSIS — G47.33 OSA (OBSTRUCTIVE SLEEP APNEA): Primary | ICD-10-CM

## 2017-02-06 PROCEDURE — 99207 ZZC NO BILLABLE SERVICE THIS VISIT: CPT

## 2017-02-06 NOTE — PROGRESS NOTES
Patient was offered choice of vendor and chose [facility].  Patient Abbey Omer was set up at [facility] on February 6, 2017. Patient received a [employer] DreamStation Auto. Pressures were set at 6-12 cm H2O.   Patient s ramp is 6 cm H2O for Off and FLEX/EPR is A Flex, 2.  Patient received a Resmed Mask name: Mirage Fx Nasal mask Size For Her, Small, heated tubing and heated humidifier.  Patient is enrolled in the STM Program and does need to meet compliance. Patient will be going on vacation and will schedule a follow up with Dr. Le once she returns.    Jennifer Kinney

## 2017-02-06 NOTE — TELEPHONE ENCOUNTER
Routing to sleep clinic since they prescribed the medication per Dr. Killian.      Mariajose Ac MA

## 2017-02-08 ENCOUNTER — MYC MEDICAL ADVICE (OUTPATIENT)
Dept: FAMILY MEDICINE | Facility: CLINIC | Age: 67
End: 2017-02-08

## 2017-02-08 ENCOUNTER — MYC MEDICAL ADVICE (OUTPATIENT)
Dept: SLEEP MEDICINE | Facility: CLINIC | Age: 67
End: 2017-02-08

## 2017-02-08 ENCOUNTER — TELEPHONE (OUTPATIENT)
Dept: FAMILY MEDICINE | Facility: CLINIC | Age: 67
End: 2017-02-08

## 2017-02-08 DIAGNOSIS — G47.33 OSA (OBSTRUCTIVE SLEEP APNEA): ICD-10-CM

## 2017-02-08 NOTE — TELEPHONE ENCOUNTER
See 1/28/17 encounter-- still waiting for PA- Will update patient once we hear back from her insurance company    Mariajose Ac MA

## 2017-02-08 NOTE — TELEPHONE ENCOUNTER
Called and spoke with Cali who stated that she didn't see a PA on file but will transfer me to the PA Dept- their # is [phone].     [facility] [phone]. Called and spoke with Abby- it looks like PA is still in review so pending at this time.    Mariajose Ac MA

## 2017-02-08 NOTE — TELEPHONE ENCOUNTER
Reason for Call:  Medication or medication refill:    Do you use a [facility]?  Name of the pharmacy and phone number for the current request:  [facility]     Name of the medication requested: Inhaler     Other request: Patient recently moved, needs paperwork sent over to the pharmacy she used here so they can send her inhaler prescription to her new [facility].     Can we leave a detailed message on this number? YES    Phone number patient can be reached at: Home number on file [phone] (home)    Best Time: as soon as possible     Call taken on 2/8/2017 at 3:15 PM by Nessa Funez

## 2017-02-09 ENCOUNTER — DOCUMENTATION ONLY (OUTPATIENT)
Dept: SLEEP MEDICINE | Facility: CLINIC | Age: 67
End: 2017-02-09

## 2017-02-09 DIAGNOSIS — G47.33 OSA (OBSTRUCTIVE SLEEP APNEA): Primary | ICD-10-CM

## 2017-02-09 NOTE — PROGRESS NOTES
3 DAY STM VISIT    Patient contacted for 3 day STM visit  Subjective measures:  Pt states that she used it last night for the first time and she said that she really liked it and feels much better today than usually does.      Current settings:  6-12cm H2O        Assessment:  Pt used for the first time last night  Action plan: Pt to have f/u 14 day  STM visit.

## 2017-02-16 ENCOUNTER — DOCUMENTATION ONLY (OUTPATIENT)
Dept: SLEEP MEDICINE | Facility: CLINIC | Age: 67
End: 2017-02-16

## 2017-02-16 ENCOUNTER — MYC MEDICAL ADVICE (OUTPATIENT)
Dept: SLEEP MEDICINE | Facility: CLINIC | Age: 67
End: 2017-02-16

## 2017-02-16 DIAGNOSIS — G47.33 OSA (OBSTRUCTIVE SLEEP APNEA): Primary | ICD-10-CM

## 2017-02-16 NOTE — TELEPHONE ENCOUNTER
Called [email or web address] [facility] and spoke to representative Lj. He stated that since it has been more than 72 hours since they got the PA request, this needs to be sent to a third party - [employer].  Phone number for [facility] - [phone], ref # [id number]. Per Lj, we need to follow-up on this PA next week.  Will postpone the encounter for 3 days.  Maria Elena Gibbs CMA ([facility])

## 2017-02-16 NOTE — PROGRESS NOTES
SUBJECTIVE:  Patient called after data is now being uploaded from device,  Due to error, data from her machine is only in [employer] and not displayed in EPIC.  She is currently not having issues with pressures or comfort.  She is still finding herself waking many times during the night.    OBJECTIVE:    Download data reveals pressures hitting top of settings of 12 cm H20 with conitnued obstructive events.         .  See below for data:         ASSESSMENT/PLAN:   Order sent to provider to increase pressure settings to 9-15 cm H20 to help reduce obstructive events.  Patient is agreeable to change and will call back with any questions or concerns.     Pressure change completed in [employer].

## 2017-02-21 ENCOUNTER — DOCUMENTATION ONLY (OUTPATIENT)
Dept: SLEEP MEDICINE | Facility: CLINIC | Age: 67
End: 2017-02-21

## 2017-02-21 DIAGNOSIS — G47.33 OSA (OBSTRUCTIVE SLEEP APNEA): ICD-10-CM

## 2017-02-21 NOTE — PROGRESS NOTES
14 DAY STM VISIT    Subjective measures:  Pt states things are going well and has no issues or complaints.  Pt is benefiting from therapy.    Assessment: Pt meeting objective benchmarks.  Patient meeting subjective benchmarks.   Action plan: Pt to have 30 day STM visit.   Device settings:    Auto CPAP 9-15cm H20      Objective measures: 14 day rolling measure      Compliance   (Goal >70%)  --85.7%      Leak  (Goal < 24 lpm)  --0.0 lpm last data upload         AHI  (Goal < 5)  --7.7 last data upload         Usage  (Goal >240)  --415 min

## 2017-02-21 NOTE — TELEPHONE ENCOUNTER
Tried calling Bharat- but office is currently closed. Will try again tomorrow    Mariajose Ac MA

## 2017-02-28 NOTE — TELEPHONE ENCOUNTER
"Received letter from Medicare Part D Appeal : \"Decision is FAVORABLE. Our decision is that the Part D Plan must cover Xopenex HFA according to Plan rules\"    [facility] and patient both notified    Mariajose Ac MA    "

## 2017-03-08 ENCOUNTER — TELEPHONE (OUTPATIENT)
Dept: FAMILY MEDICINE | Facility: CLINIC | Age: 67
End: 2017-03-08

## 2017-03-08 NOTE — TELEPHONE ENCOUNTER
Per pharmacy, the patient's insurance requires a prior authorization for one or more of the patient's medications.  Please initiate prior authorization or call/fax pharmacy to change patient's medication.    Medication: venlafaxine (EFFEXOR-XR) 150 MG 24 hr capsule  Strength:   Si Capsules daily    Pharmacy: [facility]  Phone: [phone]  Fax: [phone]    Prescription Insurance: Not sure  Phone: [phone]  ID:[id number]     Additional Comments: Placed form in Dr Killian sign me folder

## 2017-03-09 ENCOUNTER — DOCUMENTATION ONLY (OUTPATIENT)
Dept: SLEEP MEDICINE | Facility: CLINIC | Age: 67
End: 2017-03-09

## 2017-03-09 DIAGNOSIS — G47.33 OSA (OBSTRUCTIVE SLEEP APNEA): ICD-10-CM

## 2017-03-09 NOTE — TELEPHONE ENCOUNTER
Attempted to submit the PA through CoverMyMeds - the system cannot find matching patient.  Left message on answering machine for patient to call back. We need to know what insurance patient is using for her Pharmacy benefit coverage.  Maria Elena Gibbs CMA ([facility])

## 2017-03-09 NOTE — PROGRESS NOTES
30 DAY STM VISIT    Subjective measures:   Pt states things are going well and has no issues or complaints.  Pt is benefiting from therapy.    Assessment: Pt meeting objective benchmarks.  Patient meeting subjective benchmarks.   Action plan: Pt to have 6 month STM visit  Patient has a follow up visit with Dr. Le on 4/25/17.   Device settings:    9-15cm H20    Objective measures: 14 day rolling measures       Compliance   (Goal >70%)  92.9%      Leak   (Goal < 10%)     0/100       AHI  (Goal < 5)  5.7      Usage  (Goal >240)  385 min

## 2017-03-09 NOTE — TELEPHONE ENCOUNTER
Reason for Call:  Other prescription    Detailed comments: Patient returning your call.  It is [employer] phone# [phone]  Plan # [id number], customer ID# [id number]    Phone Number Patient can be reached at: Home number on file [phone] (home)    Best Time: anytime    Can we leave a detailed message on this number? YES    Call taken on 3/9/2017 at 5:21 PM by Ava Mcdaniel

## 2017-03-13 NOTE — TELEPHONE ENCOUNTER
"PA letter started and placed into Dr. Killian's \"sign me\" folder to review and complete.      Mariajose Ac MA          "

## 2017-03-14 NOTE — TELEPHONE ENCOUNTER
PA was faxed to [facility] at the following fax number [phone]    Will postpone encounter for 3 days- awaiting on response.       Mariajose Ac MA

## 2017-03-17 ENCOUNTER — TELEPHONE (OUTPATIENT)
Dept: FAMILY MEDICINE | Facility: CLINIC | Age: 67
End: 2017-03-17

## 2017-03-17 NOTE — TELEPHONE ENCOUNTER
Reason for Call:  Other prescription    Detailed comments: Patient missed antidepressant dose yesterday, took one today and is feeling off/weird. Wants to know if she should take another today.     Phone Number Patient can be reached at: Home number on file [phone] (home)    Best Time: any     Can we leave a detailed message on this number? YES    Call taken on 3/17/2017 at 2:31 PM by Stefani Broderick

## 2017-03-27 NOTE — TELEPHONE ENCOUNTER
Spoke with [facility]-- PA was APPROVED effective 3/14/17-3/14/18. Patient notified-- she must be traveling (road trip) she stated that they are now in [place], [place] and will check with closest [facility] pharmacy to  rx.    Mariajose Ac MA

## 2017-04-06 ENCOUNTER — DOCUMENTATION ONLY (OUTPATIENT)
Dept: LAB | Facility: CLINIC | Age: 67
End: 2017-04-06

## 2017-04-06 DIAGNOSIS — E03.4 HYPOTHYROIDISM DUE TO ACQUIRED ATROPHY OF THYROID: Primary | ICD-10-CM

## 2017-04-06 NOTE — PROGRESS NOTES
This patient has a future lab only appointment on 05/08/2017 for fasting labs. The only orders are for TSH. Perhaps the patient is confused as to what is due. Please make any needed changes. Thanks joshua

## 2017-04-25 ENCOUNTER — OFFICE VISIT (OUTPATIENT)
Dept: SLEEP MEDICINE | Facility: CLINIC | Age: 67
End: 2017-04-25
Payer: COMMERCIAL

## 2017-04-25 VITALS
HEART RATE: 71 BPM | DIASTOLIC BLOOD PRESSURE: 84 MMHG | OXYGEN SATURATION: 94 % | BODY MASS INDEX: 33.57 KG/M2 | WEIGHT: 171 LBS | SYSTOLIC BLOOD PRESSURE: 133 MMHG | HEIGHT: 60 IN

## 2017-04-25 DIAGNOSIS — G25.81 RESTLESS LEGS SYNDROME (RLS): ICD-10-CM

## 2017-04-25 DIAGNOSIS — G47.33 OSA (OBSTRUCTIVE SLEEP APNEA): ICD-10-CM

## 2017-04-25 DIAGNOSIS — G47.00 INSOMNIA, UNSPECIFIED TYPE: Primary | ICD-10-CM

## 2017-04-25 PROCEDURE — 99213 OFFICE O/P EST LOW 20 MIN: CPT | Performed by: INTERNAL MEDICINE

## 2017-04-25 RX ORDER — TRAZODONE HYDROCHLORIDE 50 MG/1
50-100 TABLET, FILM COATED ORAL
Qty: 90 TABLET | Refills: 3 | Status: SHIPPED | OUTPATIENT
Start: 2017-04-25 | End: 2017-08-11

## 2017-04-25 RX ORDER — TRAZODONE HYDROCHLORIDE 50 MG/1
50-100 TABLET, FILM COATED ORAL
Refills: 2 | COMMUNITY
Start: 2017-04-14 | End: 2017-04-25

## 2017-04-25 NOTE — PATIENT INSTRUCTIONS

## 2017-04-25 NOTE — PROGRESS NOTES
Sleep Follow-Up Visit:    Date on this visit: 4/25/2017    Overall, the patient rates their experience with PAP as 10 (0 poor, 10 great). The mask is comfortable. The mask is not leaking, 0 nights per week. They are not snoring with the mask on. They are not having gasp arousals.  They are not having significant oral/nasal dryness. The pressure settings are comfortable.     Patient uses nasal mask.    Bedtime is typically 10pm-12am. Usually it takes about 30-60 minutes to fall asleep with the mask on. Wake time is typically 8-10am.  Patient is using PAP therapy 4-6 hours per night. The patient is usually getting 4-9   hours of sleep per night.    Patient does feel rested in the morning.    [place]ham Sleepiness Scale: 1/24    Respironics  Auto-PAP 9-15 cmH2O download:  29 total days of use. 1 nonuse days.  Average use 5 hours 27 minutes per day.  76.7% days with >4 hours use.  Large leak 2 sec. per day average. CPAP 90% pressure 12.3cm. AHI 7.8    Dexter CPAP changed her life.  At first, was sleeping great with CPAP, but now insomnia is acting up again.  Stopped Pramipexole all together.  Back on Trazodone and typically taking 100 mg (2 50 mg tabs) HS.  Restless Legs Syndrome has been not bothering her much.        Past medical/surgical history, family history, social history, medications and allergies were reviewed.      Problem List:  Patient Active Problem List    Diagnosis Date Noted     Major depressive disorder, recurrent episode, mild degree (H) 08/03/2011     Priority: High     prior care with psychiatrist Dr. Ro  Has tried paxil in past.        Hypertension goal BP (blood pressure) < 140/90 06/01/2011     Priority: High     Intolerant to higher than 50 mg of metoprolol due to depressive effects       Hyperlipidemia with target LDL less than 100 05/17/2011     Priority: High     Has tried welchol, statins and developed side effects  Diagnosis updated by automated process. Provider to review and confirm.        CKD (chronic kidney disease) stage 3, GFR 30-59 ml/min 12/20/2010     Priority: High     Hypothyroidism 10/12/2009     Priority: High     Discoid atelectasis 04/28/2016     Priority: Medium     Renal cyst, left 02/22/2016     Priority: Medium     Seen on abd/pelvic CT at [facility] 2/2016.  Recommend recheck CT in one year or do ultrasound.  In review of chart, renal cyst noted on ultrasound in 2011 and CT done at [facility] facility in 2014 showing stablility       Combined form of age-related cataract, both eyes 02/17/2016     Priority: Medium     Glaucoma suspect, bilateral 02/17/2016     Priority: Medium     Target IOP:   Max IOP :   Family history: No  Trauma history: No  OCT: 8/216 Normal  Mercado visual field (HVF): 8/2016   Right eye - Reliable, Normal   Left eye - Reliable, Normal  Pachymetry: Average-thickish 553/564  C/D: 0.6/0.6  SLT:            Drusen of macula of both eyes 02/17/2016     Priority: Medium     DDD (degenerative disc disease), cervical 06/16/2011     Priority: Medium     Elevated LFTs 06/15/2011     Priority: Medium     (Problem list name updated by automated process. Provider to review and confirm.)       Dependent edema      Priority: Medium     History of bladder cancer 05/18/2011     Priority: Medium     Dr. David, [facility]       Obesity 05/17/2011     Priority: Medium     Chronic nonallergic rhinitis      Priority: Medium     Dr. Peoples, allergist       RICKEY (obstructive sleep apnea) 08/11/2009     Priority: Medium     Intolerant to CPAP    Split Night:  Sleep Study 2/01/2017 - (173.0 lbs) AHI 29.6, RDI 59.8, Supine AHI 39.4, REM AHI -, Low O2% 78.5%, Time Spent ?88% 14.4, Time Spent ?89% 30.6, CPAP was titrated to a pressure of 9 with an AHI 30. Time spent in REM supine at this pressure was - minutes.       Macular drusen 11/25/2011     Priority: Low     Advance Care Planning 06/15/2011     Priority: Low     Advance Care Planning 7/5/2016: Receipt of ACP document:  Received:  Health Care Directive which was witnessed or notarized on 4/25/16.  Document previously scanned on 4/28/16. Also received Health Care Directive dated 3/18/16 which was valid and scanned on 4/14/16  Validation forms previously completed and sent to be scanned.  Code Status reflects choices in most recent ACP document.  Confirmed/documented designated decision maker(s).  Added by Kanchan Lazcano RN, Advance Care Planning Liaison.  Advance Care Planning 4/25/2016: ACP Facilitation Session:    Abbey Omer presented for ACP Facilitation session at the clinic. She was accompanied by spouse. Honoring Choices information provided and resources reviewed. She currently wishes to complete an ACP document and needs further clarification and/or consideration prior to documenting choices.  She currently has the following questions or concerns about Advance Care Planning: none.  Confirmed/documented legally designated decision maker(s). Code status reflects choices in most recent ACP document. Follow-up meeting: not needed/applicable.  document sent to be scanned after viewed for validation.Added by Adrianna Silveira   Advance Care Planning 4/13/2016: Receipt of ACP document:  Received: invalid HCD document not signed, witness/notary and missing pages.Document not previously scanned. Validation form completed indicating invalid document. Copy sent to client with information and facilitation resources. Validation form sent to be scanned as notation of invalid document received.  Confirmed/documented RN, System Director ACP-Honoring Choices  designated decision maker(s).  Added by Rebeca Manriquez  Advance Care Planning 6-15-11 Discussed advance care planning with patient; information given to patient to review. aKren Pro CMA       Acne 05/20/2015     Major depressive disorder, recurrent episode, moderate (H) 06/20/2014     Pulmonary nodules 06/16/2014     [place] [facility] 6/13/14:  Pulmonary nodule, needs repeat scan in 6-12  months.       Allergic conjunctivitis 04/10/2013     Hypertriglyceridemia 03/01/2013      Impression/Plan:  1. RICKEY (obstructive sleep apnea)  Still has residual elevated AHI (7.8/hr) but unacceptable titration due to trouble with intermittent exacerbations in severity during Polysomnography.  Home use shows elevated AHI primarily due to apneas, with mixed events (both centrals and apneas).  Will continue to monitor for now and follow up in 6 months.    2. Insomnia, unspecified type  Improving after treatment for Obstructive Sleep Apnea but should continue to monitor    3. Restless legs syndrome (RLS)  Not currently symptomatic so moved medications back as desired.      ICD-10-CM    1. Insomnia, unspecified type G47.00 traZODone (DESYREL) 50 MG tablet   2. RICKEY (obstructive sleep apnea) G47.33    3. Restless legs syndrome (RLS) G25.81      She will follow up with me in about 6 month(s).     Fifteen minutes spent with patient, all of which were spent face-to-face counseling, consulting, coordinating plan of care.      Ry Le MD, Sleep Physician  Apr 25, 2017     CC: Iris Killian

## 2017-04-25 NOTE — NURSING NOTE
Chief Complaint   Patient presents with     RECHECK     results and cpap f/u       Initial There were no vitals taken for this visit. Estimated body mass index is 33.79 kg/(m^2) as calculated from the following:    Height as of 2/1/17: 1.524 m (5').    Weight as of 2/1/17: 78.5 kg (173 lb).  Medication Reconciliation: complete

## 2017-04-25 NOTE — MR AVS SNAPSHOT
After Visit Summary   4/25/2017    Abbey Omer    MRN: [id number]           Patient Information     Date Of Birth          1950        Visit Information        Provider Department      4/25/2017 10:00 AM Ry Le MD [facility]        Today's Diagnoses     Insomnia, unspecified type    -  1    RICKEY (obstructive sleep apnea)        Restless legs syndrome (RLS)          Care Instructions      Your BMI is Body mass index is 33.4 kg/(m^2).  Weight management is a personal decision.  If you are interested in exploring weight loss strategies, the following discussion covers the approaches that may be successful. Body mass index (BMI) is one way to tell whether you are at a healthy weight, overweight, or obese. It measures your weight in relation to your height.  A BMI of 18.5 to 24.9 is in the healthy range. A person with a BMI of 25 to 29.9 is considered overweight, and someone with a BMI of 30 or greater is considered obese. More than two-thirds of American adults are considered overweight or obese.  Being overweight or obese increases the risk for further weight gain. Excess weight may lead to heart disease and diabetes.  Creating and following plans for healthy eating and physical activity may help you improve your health.  Weight control is part of healthy lifestyle and includes exercise, emotional health, and healthy eating habits. Careful eating habits lifelong are the mainstay of weight control. Though there are significant health benefits from weight loss, long-term weight loss with diet alone may be very difficult to achieve- studies show long-term success with dietary management in less than 10% of people. Attaining a healthy weight may be especially difficult to achieve in those with severe obesity. In some cases, medications, devices and surgical management might be considered.  What can you do?  If you are overweight or obese and are interested in  methods for weight loss, you should discuss this with your provider.     Consider reducing daily calorie intake by 500 calories.     Keep a food journal.     Avoiding skipping meals, consider cutting portions instead.    Diet combined with exercise helps maintain muscle while optimizing fat loss. Strength training is particularly important for building and maintaining muscle mass. Exercise helps reduce stress, increase energy, and improves fitness. Increasing exercise without diet control, however, may not burn enough calories to loose weight.       Start walking three days a week 10-20 minutes at a time    Work towards walking thirty minutes five days a week     Eventually, increase the speed of your walking for 1-2 minutes at time    In addition, we recommend that you review healthy lifestyles and methods for weight loss available through the National Institutes of Health patient information sites:  http://win.niddk.nih.gov/publications/index.htm    And look into health and wellness programs that may be available through your health insurance provider, employer, local community center, or carole club.    Weight management plan: Patient was referred to their PCP to discuss a diet and exercise plan.            Follow-ups after your visit        Follow-up notes from your care team     Return in about 6 months (around 10/25/2017) for PAP Compliance Check.      Your next 10 appointments already scheduled     May 08, 2017  7:15 AM CDT   LAB with [facility]   [facility]    [address] [place] [id number] [phone]           Patient must bring picture ID.  Patient should be prepared to give a urine specimen  Please do not eat 10-12 hours before your appointment if you are coming in fasting for labs on lipids, cholesterol, or glucose (sugar).  Pregnant women should follow their Care Team instructions. Water with medications is okay. Do not drink coffee or  other fluids.   If you have concerns about taking  your medications, please ask at office or if scheduling via mSnap, send a message by clicking on Secure Messaging, Message Your Care Team.            May 15, 2017  9:40 AM CDT   Office Visit with Iris Killian MD   [facility]    [address] [facility] [id number] [phone]            Jan 23, 2018  9:00 AM CST   Return Visit with Omar West MD, SHARIY CYSTO PROC ROOM   [facility]    [address] [place] [id number] [phone]              Who to contact     If you have questions or need follow up information about today's clinic visit or your schedule please contact [facility] directly at [phone].  Normal or non-critical lab and imaging results will be communicated to you by PowerCloud Systemshart, letter or phone within 4 business days after the clinic has received the results. If you do not hear from us within 7 days, please contact the clinic through PowerCloud Systemshart or phone. If you have a critical or abnormal lab result, we will notify you by phone as soon as possible.  Submit refill requests through mSnap or call your pharmacy and they will forward the refill request to us. Please allow 3 business days for your refill to be completed.          Additional Information About Your Visit        PowerCloud SystemsharCagenix Information     mSnap gives you secure access to your electronic health record. If you see a primary care provider, you can also send messages to your care team and make appointments. If you have questions, please call your primary care clinic.  If you do not have a primary care provider, please call [phone] and they will assist you.        Care EveryWhere ID     This is your Care EveryWhere ID. This could be used by other organizations to access your [place] medical records  [id number]        Your Vitals Were      Pulse Height Pulse Oximetry BMI (Body Mass Index)          71 1.524 m (5') 94% 33.4 kg/m2         Blood Pressure from Last 3 Encounters:   04/25/17 133/84   02/01/17 146/82   01/31/17 134/82    Weight from Last 3 Encounters:   04/25/17 77.6 kg (171 lb)   02/01/17 78.5 kg (173 lb)   01/31/17 78.6 kg (173 lb 3.2 oz)              Today, you had the following     No orders found for display         Today's Medication Changes          These changes are accurate as of: 4/25/17 10:35 AM.  If you have any questions, ask your nurse or doctor.               Stop taking these medicines if you haven't already. Please contact your care team if you have questions.     Melatonin 10 MG Tabs tablet           pramipexole 0.25 MG tablet   Commonly known as:  MIRAPEX                Where to get your medicines      These medications were sent to [id number] - [place], [place] - [address] AT [facility] [address]  [address], [place] [address]     Phone:  [phone]     traZODone 50 MG tablet                Primary Care Provider Office Phone # Fax #    Iris Killian [phone]       [facility] [address] [facility] [address]        Thank you!     Thank you for choosing [facility]  for your care. Our goal is always to provide you with excellent care. Hearing back from our patients is one way we can continue to improve our services. Please take a few minutes to complete the written survey that you may receive in the mail after your visit with us. Thank you!             Your Updated Medication List - Protect others around you: Learn how to safely use, store and throw away your medicines at www.disposemymeds.org.          This list is accurate as of: 4/25/17 10:35 AM.  Always use your most recent med list.                   Brand Name Dispense Instructions for use    calcium + D 600-200 MG-UNIT Tabs   Generic drug:   calcium carbonate-vitamin D      1 tablet twice a day       Cranberry 500 MG Caps      Take 1 capsule by mouth daily       EXCEDRIN TENSION HEADACHE 500-65 MG Tabs   Generic drug:  acetaminophen-caffeine      1 tablet twice daily as needed       Fish Oil 1200 MG Caps      1 capsule daily       FLAXSEED OIL PO      Take 1 Tablespoonful by mouth daily       fluticasone 50 MCG/ACT spray    FLONASE    48 g    Spray 2 sprays into both nostrils daily       levalbuterol 45 MCG/ACT Inhaler    XOPENEX HFA    1 Inhaler    Inhale 2 puffs into the lungs every 6 hours as needed for shortness of breath / dyspnea or wheezing       levothyroxine 50 MCG tablet    SYNTHROID/LEVOTHROID    90 tablet    Take 1 tablet (50 mcg) by mouth every morning       magnesium 500 MG Tabs      Take 1 tablet by mouth daily       MULTIPLE vitamin  S/WOMENS Tabs      Take 2 tablets by mouth daily       omeprazole 20 MG CR capsule    priLOSEC    90 capsule    Take 1 capsule (20 mg) by mouth daily       order for DME      Equipment ordered: Respironics Auto PAP Mask type: Nasal Settings: 9-15 cm H2O       ranitidine 300 MG tablet    ZANTAC    90 tablet    Take 1 tablet (300 mg) by mouth At Bedtime       spironolactone 50 MG tablet    ALDACTONE    180 tablet    Take 2 tablets (100 mg) by mouth daily       STATIN NOT PRESCRIBED (INTENTIONAL)      by Other route continuous prn.       traZODone 50 MG tablet    DESYREL    90 tablet    Take 1-2 tablets ( mg) by mouth nightly as needed       venlafaxine 150 MG 24 hr capsule    EFFEXOR-XR    180 capsule    2 Capsules daily       vitamin B complex with vitamin C Tabs tablet      Take 1 tablet by mouth daily       zinc 50 MG Tabs      Take 1 tablet by mouth daily

## 2017-05-08 DIAGNOSIS — E03.9 HYPOTHYROIDISM: ICD-10-CM

## 2017-05-08 LAB
T4 FREE SERPL-MCNC: 0.81 NG/DL (ref 0.76–1.46)
TSH SERPL DL<=0.005 MIU/L-ACNC: 4.93 MU/L (ref 0.4–4)

## 2017-05-08 PROCEDURE — 84439 ASSAY OF FREE THYROXINE: CPT | Performed by: FAMILY MEDICINE

## 2017-05-08 PROCEDURE — 36415 COLL VENOUS BLD VENIPUNCTURE: CPT | Performed by: FAMILY MEDICINE

## 2017-05-08 PROCEDURE — 84443 ASSAY THYROID STIM HORMONE: CPT | Performed by: FAMILY MEDICINE

## 2017-05-15 ENCOUNTER — OFFICE VISIT (OUTPATIENT)
Dept: FAMILY MEDICINE | Facility: CLINIC | Age: 67
End: 2017-05-15
Payer: COMMERCIAL

## 2017-05-15 VITALS
DIASTOLIC BLOOD PRESSURE: 78 MMHG | WEIGHT: 173.2 LBS | HEIGHT: 60 IN | SYSTOLIC BLOOD PRESSURE: 130 MMHG | BODY MASS INDEX: 34 KG/M2 | TEMPERATURE: 98 F | HEART RATE: 70 BPM

## 2017-05-15 DIAGNOSIS — E03.4 HYPOTHYROIDISM DUE TO ACQUIRED ATROPHY OF THYROID: Primary | ICD-10-CM

## 2017-05-15 DIAGNOSIS — M25.511 BILATERAL SHOULDER PAIN, UNSPECIFIED CHRONICITY: ICD-10-CM

## 2017-05-15 DIAGNOSIS — M25.512 BILATERAL SHOULDER PAIN, UNSPECIFIED CHRONICITY: ICD-10-CM

## 2017-05-15 DIAGNOSIS — N18.30 CKD (CHRONIC KIDNEY DISEASE) STAGE 3, GFR 30-59 ML/MIN (H): ICD-10-CM

## 2017-05-15 DIAGNOSIS — E78.5 HYPERLIPIDEMIA WITH TARGET LDL LESS THAN 100: ICD-10-CM

## 2017-05-15 DIAGNOSIS — I10 HYPERTENSION GOAL BP (BLOOD PRESSURE) < 140/90: ICD-10-CM

## 2017-05-15 DIAGNOSIS — Z12.31 VISIT FOR SCREENING MAMMOGRAM: ICD-10-CM

## 2017-05-15 DIAGNOSIS — G47.33 OSA (OBSTRUCTIVE SLEEP APNEA): ICD-10-CM

## 2017-05-15 PROCEDURE — 99214 OFFICE O/P EST MOD 30 MIN: CPT | Performed by: FAMILY MEDICINE

## 2017-05-15 RX ORDER — LEVOTHYROXINE SODIUM 75 UG/1
75 TABLET ORAL DAILY
Qty: 90 TABLET | Refills: 3 | Status: SHIPPED | OUTPATIENT
Start: 2017-05-15 | End: 2017-08-11 | Stop reason: DRUGHIGH

## 2017-05-15 NOTE — PROGRESS NOTES
"  SUBJECTIVE:                                                    Abbey Omer is a 67 year old female who presents to clinic today for the following health issues:      Hypertension Follow-up      Outpatient blood pressures are being checked at home.  Results are normal results ( unable to remember exact number) .    Low Salt Diet: no added salt     Hypothyroidism Follow-up      Since last visit, patient describes the following symptoms: constipation and diahrea         Amount of exercise or physical activity: trying to be more active     Problems taking medications regularly: No    Medication side effects: none    Diet: regular (no restrictions)    Dog recently . Feeling grief and more anxiety.  Feels that she has \"task anxiety\"    Bilateral arm pain-went to physical therapy in the past.  Has stretches.   When reaching out or up with arm, can have pain/weakness.  Can hurt to lie on shoulders at night      Hypothyroidism Follow-up      Since last visit, patient describes the following symptoms: weight gain of 2 lbs, anxiety and fatigue       Problem list and histories reviewed & adjusted, as indicated.  Additional history: as documented    Patient Active Problem List   Diagnosis     RICKEY (obstructive sleep apnea)     Hypothyroidism     Chronic nonallergic rhinitis     CKD (chronic kidney disease) stage 3, GFR 30-59 ml/min     Hyperlipidemia with target LDL less than 100     Obesity     History of bladder cancer     Hypertension goal BP (blood pressure) < 140/90     Advance Care Planning     Dependent edema     Elevated LFTs     DDD (degenerative disc disease), cervical     Major depressive disorder, recurrent episode, mild degree (H)     Macular drusen     Hypertriglyceridemia     Allergic conjunctivitis     Pulmonary nodules     Major depressive disorder, recurrent episode, moderate (H)     Acne     Combined form of age-related cataract, both eyes     Glaucoma suspect, bilateral     Drusen of macula of " both eyes     Renal cyst, left     Discoid atelectasis     Past Surgical History:   Procedure Laterality Date     CHOLECYSTECTOMY, LAPOROSCOPIC       CYSTOSCOPY      bladder cancer     D & C       HYSTERECTOMY, PAP NO LONGER INDICATED      BSO     NOSE SURGERY      septum deviation       Social History   Substance Use Topics     Smoking status: Former Smoker     Packs/day: 0.10     Years: 1.00     Quit date: 10/12/1969     Smokeless tobacco: Never Used     Alcohol use Yes      Comment: 1 drink a month     Family History   Problem Relation Age of Onset     DIABETES Maternal Grandfather      Hypertension Maternal Grandfather      DIABETES Son      HEART DISEASE Father      CHF     DIABETES Mother      Depression Mother      Substance Abuse Brother      Depression Daughter      DIABETES Maternal Uncle      CEREBROVASCULAR DISEASE Maternal Uncle      Thyroid Disease No family hx of      Glaucoma No family hx of      Macular Degeneration No family hx of      CANCER No family hx of          BP Readings from Last 3 Encounters:   05/15/17 130/78   04/25/17 133/84   02/01/17 146/82    Wt Readings from Last 3 Encounters:   05/15/17 173 lb 3.2 oz (78.6 kg)   04/25/17 171 lb (77.6 kg)   02/01/17 173 lb (78.5 kg)                    Reviewed and updated as needed this visit by clinical staff       Reviewed and updated as needed this visit by Provider         ROS:  Constitutional, HEENT, cardiovascular, pulmonary, gi and gu systems are negative, except as otherwise noted.    OBJECTIVE:                                                    /78 (BP Location: Right arm, Patient Position: Chair, Cuff Size: Adult Large)  Pulse 70  Temp 98  F (36.7  C) (Oral)  Ht 5' (1.524 m)  Wt 173 lb 3.2 oz (78.6 kg)  BMI 33.83 kg/m2  Body mass index is 33.83 kg/(m^2).  GENERAL: healthy, alert and no distress  NECK: no adenopathy, no asymmetry, masses, or scars and thyroid normal to palpation  RESP: lungs clear to auscultation - no rales,  rhonchi or wheezes  CV: regular rate and rhythm, normal S1 S2, no S3 or S4, no murmur, click or rub, no peripheral edema and peripheral pulses strong  MS: positive shoulder impingement bilaterally, difficulty with internal and external rotation.  Pain anterior shoulders into upper arm.  Also with posterior shoulder and back pain to palpation  NEURO: Normal strength and tone, mentation intact and speech normal  PSYCH: mentation appears normal and tearful         ASSESSMENT/PLAN:                                                      1. Hypothyroidism due to acquired atrophy of thyroid  Not controlled, increase dose and recheck in 3 months  - levothyroxine (SYNTHROID/LEVOTHROID) 75 MCG tablet; Take 1 tablet (75 mcg) by mouth daily  Dispense: 90 tablet; Refill: 3  - **TSH with free T4 reflex FUTURE anytime; Future    2. Bilateral shoulder pain, unspecified chronicity    - ORTHO  REFERRAL    3. CKD (chronic kidney disease) stage 3, GFR 30-59 ml/min  Stable GFR, continue to monitor  - **Comprehensive metabolic panel FUTURE anytime; Future    4. Hypertension goal BP (blood pressure) < 140/90  Chronic, stable, well controlled, continue current medication, refill done if needed    - **Comprehensive metabolic panel FUTURE anytime; Future    5. RICKEY (obstructive sleep apnea)  Has cpap, followed by sleep clinic    6. Visit for screening mammogram    - MA SCREENING DIGITAL BILAT - Future  (s+30); Future    7. Hyperlipidemia with target LDL less than 100  Due for recheck  - **Lipid panel reflex to direct LDL FUTURE anytime; Future    FUTURE APPOINTMENTS:       - Follow-up visit in 3 months    Iris Killian MD  [facility]

## 2017-05-15 NOTE — PATIENT INSTRUCTIONS
[facility]   Discharged by : dasha  Paper scripts provided to patient :      If you have any questions regarding your visit please contact your care team:     [facility] Clinic Hours Telephone Number   ROBERTO CARLOS [address] Dr., Dr.   7am-7pm Monday - Thursday   7am-5pm Fridays  [phone]   (Appointment scheduling available 24/7)   RN Line   [phone] option 2       For a Price Quote for your services, please call our Consumer Price Line at [phone].     What options do I have for visits at the clinic other than the traditional office visit?     To expand how we care for you, many of our providers are utilizing electronic visits (e-visits) and telephone visits, when medically appropriate, for interactions with their patients rather than a visit in the clinic. We also offer nurse visits for many medical concerns. Just like any other service, we will bill your insurance company for this type of visit based on time spent on the phone with your provider. Not all insurance companies cover these visits. Please check with your medical insurance if this type of visit is covered. You will be responsible for any charges that are not paid by your insurance.   E-visits via Perficient: generally incur a $35.00 fee.     Telephone visits:   Time spent on the phone: *charged based on time that is spent on the phone in increments of 10 minutes. Estimated cost:   5-10 mins $30.00   11-20 mins. $59.00   21-30 mins. $85.00     Use Alumnizehart (secure email communication and access to your chart) to send your primary care provider a message or make an appointment. Ask someone on your Team how to sign up for [employer].     As always, Thank you for trusting us with your health care needs!      [facility]:    Scheduling Appointments  [facility] Guillen [facility]  Call: [phone]    [facility]  Call:  [phone]    [facility]  Call: [phone]      WHERE TO GO FOR CARE?    Clinic    Make an appointment if you:       Are sick (cold, cough, flu, sore throat, earache or in pain).       Have a small injury (sprain, small cut, burn or broken bone).       Need a physical exam, Pap smear, vaccine or prescription refill.       Have questions about your health or medicines.    To reach us:      Call [phone] ([phone]). Open 24 hours every day. (For counseling services, call [phone].)    Log into [email or web address] at [employer]. (Visit [employer] to create an account.) Hospital emergency room    An emergency is a serious or life- threatening problem that must be treated right away.    Call 391 or get to the hospital if you have:      Very bad or sudden:            - Chest pain or pressure         - Bleeding         - Head or belly pain         - Dizziness or trouble seeing, walking or                          Speaking      Problems breathing      Blood in your vomit or you are coughing up blood      A major injury (knocked out, loss of a finger or limb, rape, broken bone protruding from skin)    A mental health crisis. (Or call the Mental Health Crisis line at 1-519.113.6370 or Suicide Prevention Hotline at 1-441.954.9178.)    Open 24 hours every day. You don't need an appointment.     Urgent care    Visit urgent care for sickness or small injuries when the clinic is closed. You don't need an appointment. To check hours or find an urgent care near you, visit [email or web address]. Online care    Get online care from [facility] for more than 70 common problems, like colds, allergies and infections. Open 24 hours every day at:   [email or web address]   Need help deciding?    For advice about where to be seen, you may call your clinic and ask to speak with a nurse. We're here for you 24 hours every day.         If you are deaf or hard of hearing, please let us know. We provide  many free services including sign language interpreters, oral interpreters, TTYs, telephone amplifiers, note takers and written materials.

## 2017-05-15 NOTE — NURSING NOTE
Chief Complaint   Patient presents with     Musculoskeletal Problem     fibromyalgia     Thyroid Problem     Hypertension       Initial /78 (BP Location: Right arm, Patient Position: Chair, Cuff Size: Adult Large)  Pulse 70  Temp 98  F (36.7  C) (Oral)  Ht 5' (1.524 m)  Wt 173 lb 3.2 oz (78.6 kg)  BMI 33.83 kg/m2 Estimated body mass index is 33.83 kg/(m^2) as calculated from the following:    Height as of this encounter: 5' (1.524 m).    Weight as of this encounter: 173 lb 3.2 oz (78.6 kg).  Medication Reconciliation: complete

## 2017-05-15 NOTE — MR AVS SNAPSHOT
After Visit Summary   5/15/2017    Abbey Omer    MRN: [id number]           Patient Information     Date Of Birth          1950        Visit Information        Provider Department      5/15/2017 9:40 AM Iris Killian MD [facility]        Today's Diagnoses     Hypothyroidism due to acquired atrophy of thyroid    -  1    Bilateral shoulder pain, unspecified chronicity        CKD (chronic kidney disease) stage 3, GFR 30-59 ml/min        Hypertension goal BP (blood pressure) < 140/90        RICKEY (obstructive sleep apnea)        Visit for screening mammogram        Hyperlipidemia with target LDL less than 100          Care Instructions    [facility]   Discharged by : dasha  Paper scripts provided to patient :      If you have any questions regarding your visit please contact your care team:     [facility] Clinic Hours Telephone Number   ROBERTO CARLOS [address] Dr., Dr.   7am-7pm Monday - Thursday   7am-5pm Fridays  [phone]   (Appointment scheduling available 24/7)   RN Line   [phone] option 2       For a Price Quote for your services, please call our Consumer Price Line at [phone].     What options do I have for visits at the clinic other than the traditional office visit?     To expand how we care for you, many of our providers are utilizing electronic visits (e-visits) and telephone visits, when medically appropriate, for interactions with their patients rather than a visit in the clinic. We also offer nurse visits for many medical concerns. Just like any other service, we will bill your insurance company for this type of visit based on time spent on the phone with your provider. Not all insurance companies cover these visits. Please check with your medical insurance if this type of visit is covered. You will be responsible for any charges that are not paid by your  insurance.   E-visits via Machine Talker: generally incur a $35.00 fee.     Telephone visits:   Time spent on the phone: *charged based on time that is spent on the phone in increments of 10 minutes. Estimated cost:   5-10 mins $30.00   11-20 mins. $59.00   21-30 mins. $85.00     Use Machine Talker (secure email communication and access to your chart) to send your primary care provider a message or make an appointment. Ask someone on your Team how to sign up for Machine Talker.     As always, Thank you for trusting us with your health care needs!      [facility]:    Scheduling Appointments  [facility]amela Guillen [facility]  Call: [phone]    [facility]  Call: [phone]    [facility]  Call: [phone]      WHERE TO GO FOR CARE?    Clinic    Make an appointment if you:       Are sick (cold, cough, flu, sore throat, earache or in pain).       Have a small injury (sprain, small cut, burn or broken bone).       Need a physical exam, Pap smear, vaccine or prescription refill.       Have questions about your health or medicines.    To reach us:      Call [phone] ([phone]). Open 24 hours every day. (For counseling services, call [phone].)    Log into Machine Talker at [email or web address]. (Visit [email or web address] to create an account.) Hospital emergency room    An emergency is a serious or life- threatening problem that must be treated right away.    Call 184 or get to the hospital if you have:      Very bad or sudden:            - Chest pain or pressure         - Bleeding         - Head or belly pain         - Dizziness or trouble seeing, walking or                          Speaking      Problems breathing      Blood in your vomit or you are coughing up blood      A major injury (knocked out, loss of a finger or limb, rape, broken bone protruding from skin)    A mental health crisis. (Or call the Mental Health Crisis line at 1-197.438.3578 or  Suicide Prevention Hotline at 1-134.123.5228.)    Open 24 hours every day. You don't need an appointment.     Urgent care    Visit urgent care for sickness or small injuries when the clinic is closed. You don't need an appointment. To check hours or find an urgent care near you, visit [email or web address]. Online care    Get online care from [facility] for more than 70 common problems, like colds, allergies and infections. Open 24 hours every day at:   [email or web address]   Need help deciding?    For advice about where to be seen, you may call your clinic and ask to speak with a nurse. We're here for you 24 hours every day.         If you are deaf or hard of hearing, please let us know. We provide many free services including sign language interpreters, oral interpreters, TTYs, telephone amplifiers, note takers and written materials.                       Follow-ups after your visit        Additional Services     ORTHO  REFERRAL       [facility] is referring you to the Orthopedic  Services at [facility].       The  Representative will assist you in the coordination of your Orthopedic and Musculoskeletal Care as prescribed by your physician.    The  Representative will call you within 1 business day to help schedule your appointment, or you may contact the  Representative at:    All areas ~ [phone]     Type of Referral : Non Surgical       Timeframe requested: Routine    Coverage of these services is subject to the terms and limitations of your health insurance plan.  Please call member services at your health plan with any benefit or coverage questions.      If X-rays, CT or MRI's have been performed, please contact the facility where they were done to arrange for , prior to your scheduled appointment.  Please bring this referral request to your appointment and present it to your specialist.                  Your next 10  appointments already scheduled     May 24, 2017  9:45 AM CDT   MA SCREENING DIGITAL BILATERAL [facility]   [facility]    [address] [place] [address]  [place] [address] [phone]           Do not use any powder, lotion or deodorant under your arms or on your breast. If you do, we will ask you to remove it before your exam.  Wear comfortable, two-piece clothing.  If you have any allergies, tell your care team.  Bring any previous mammograms from other facilities or have them mailed to the breast center.            Jan 23, 2018  9:00 AM CST   Return Visit with Omar West MD, [facility] PROC ROOM   [facility]    [address] [place] [id number] [phone]              Future tests that were ordered for you today     Open Future Orders        Priority Expected Expires Ordered    **Comprehensive metabolic panel FUTURE anytime Routine 5/15/2017 5/15/2018 5/15/2017    **Lipid panel reflex to direct LDL FUTURE anytime Routine 5/15/2017 5/15/2018 5/15/2017    **TSH with free T4 reflex FUTURE anytime Routine 5/15/2017 5/15/2018 5/15/2017    MA SCREENING DIGITAL BILAT - Future  (s+30) Routine  5/15/2018 5/15/2017            Who to contact     If you have questions or need follow up information about today's clinic visit or your schedule please contact [facility] directly at [phone].  Normal or non-critical lab and imaging results will be communicated to you by MyChart, letter or phone within 4 business days after the clinic has received the results. If you do not hear from us within 7 days, please contact the clinic through MyChart or phone. If you have a critical or abnormal lab result, we will notify you by phone as soon as possible.  Submit refill requests through [employer] or call your pharmacy and they will forward the refill request to us. Please allow 3 business days for your refill to  be completed.          Additional Information About Your Visit        autoGraphhart Information     LiveRe gives you secure access to your electronic health record. If you see a primary care provider, you can also send messages to your care team and make appointments. If you have questions, please call your primary care clinic.  If you do not have a primary care provider, please call [phone] and they will assist you.        Care EveryWhere ID     This is your Care EveryWhere ID. This could be used by other organizations to access your [facility] medical records  [id number]        Your Vitals Were     Pulse Temperature Height BMI (Body Mass Index)          70 98  F (36.7  C) (Oral) 5' (1.524 m) 33.83 kg/m2         Blood Pressure from Last 3 Encounters:   05/15/17 130/78   04/25/17 133/84   02/01/17 146/82    Weight from Last 3 Encounters:   05/15/17 173 lb 3.2 oz (78.6 kg)   04/25/17 171 lb (77.6 kg)   02/01/17 173 lb (78.5 kg)              We Performed the Following     ORTHO  REFERRAL          Today's Medication Changes          These changes are accurate as of: 5/15/17 10:31 AM.  If you have any questions, ask your nurse or doctor.               These medicines have changed or have updated prescriptions.        Dose/Directions    levothyroxine 75 MCG tablet   Commonly known as:  SYNTHROID/LEVOTHROID   This may have changed:    - medication strength  - how much to take  - when to take this   Used for:  Hypothyroidism due to acquired atrophy of thyroid   Changed by:  Iris Killian MD        Dose:  75 mcg   Take 1 tablet (75 mcg) by mouth daily   Quantity:  90 tablet   Refills:  3            Where to get your medicines      These medications were sent to [id number] - [place], [place] - [address] AT [facility] [address], [place] [address]     Phone:  [phone]     levothyroxine 75 MCG tablet                Primary Care Provider  Office Phone # Fax #    Iris Ami Killian [phone]       [facility] [address] [facility] [address]        Thank you!     Thank you for choosing [facility]  for your care. Our goal is always to provide you with excellent care. Hearing back from our patients is one way we can continue to improve our services. Please take a few minutes to complete the written survey that you may receive in the mail after your visit with us. Thank you!             Your Updated Medication List - Protect others around you: Learn how to safely use, store and throw away your medicines at www.disposemymeds.org.          This list is accurate as of: 5/15/17 10:31 AM.  Always use your most recent med list.                   Brand Name Dispense Instructions for use    calcium + D 600-200 MG-UNIT Tabs   Generic drug:  calcium carbonate-vitamin D      1 tablet twice a day       Cranberry 500 MG Caps      Take 1 capsule by mouth daily       EXCEDRIN TENSION HEADACHE 500-65 MG Tabs   Generic drug:  acetaminophen-caffeine      1 tablet twice daily as needed       Fish Oil 1200 MG Caps      1 capsule daily       FLAXSEED OIL PO      Take 1 Tablespoonful by mouth daily       fluticasone 50 MCG/ACT spray    FLONASE    48 g    Spray 2 sprays into both nostrils daily       levalbuterol 45 MCG/ACT Inhaler    XOPENEX HFA    1 Inhaler    Inhale 2 puffs into the lungs every 6 hours as needed for shortness of breath / dyspnea or wheezing       levothyroxine 75 MCG tablet    SYNTHROID/LEVOTHROID    90 tablet    Take 1 tablet (75 mcg) by mouth daily       magnesium 500 MG Tabs      Take 1 tablet by mouth daily       MULTIPLE vitamin  S/WOMENS Tabs      Take 2 tablets by mouth daily       order for DME      Equipment ordered: Respironics Auto PAP Mask type: Nasal Settings: 9-15 cm H2O       ranitidine 300 MG tablet    ZANTAC    90 tablet    Take 1 tablet (300 mg) by mouth At Bedtime        spironolactone 50 MG tablet    ALDACTONE    180 tablet    Take 2 tablets (100 mg) by mouth daily       STATIN NOT PRESCRIBED (INTENTIONAL)      by Other route continuous prn Reported on 5/15/2017       traZODone 50 MG tablet    DESYREL    90 tablet    Take 1-2 tablets ( mg) by mouth nightly as needed       venlafaxine 150 MG 24 hr capsule    EFFEXOR-XR    180 capsule    2 Capsules daily       VITAMIN B COMPLEX PO          zinc 50 MG Tabs      Take 1 tablet by mouth daily

## 2017-05-22 ENCOUNTER — OFFICE VISIT (OUTPATIENT)
Dept: ORTHOPEDICS | Facility: CLINIC | Age: 67
End: 2017-05-22
Payer: COMMERCIAL

## 2017-05-22 ENCOUNTER — RADIANT APPOINTMENT (OUTPATIENT)
Dept: GENERAL RADIOLOGY | Facility: CLINIC | Age: 67
End: 2017-05-22
Attending: PEDIATRICS
Payer: COMMERCIAL

## 2017-05-22 VITALS
BODY MASS INDEX: 33.77 KG/M2 | SYSTOLIC BLOOD PRESSURE: 138 MMHG | WEIGHT: 172 LBS | HEIGHT: 60 IN | DIASTOLIC BLOOD PRESSURE: 78 MMHG

## 2017-05-22 DIAGNOSIS — M19.011 OSTEOARTHRITIS OF GLENOHUMERAL JOINTS, BILATERAL: ICD-10-CM

## 2017-05-22 DIAGNOSIS — M19.012 OSTEOARTHRITIS OF GLENOHUMERAL JOINTS, BILATERAL: ICD-10-CM

## 2017-05-22 DIAGNOSIS — M25.511 BILATERAL SHOULDER PAIN, UNSPECIFIED CHRONICITY: Primary | ICD-10-CM

## 2017-05-22 DIAGNOSIS — M25.511 BILATERAL SHOULDER PAIN, UNSPECIFIED CHRONICITY: ICD-10-CM

## 2017-05-22 DIAGNOSIS — M25.512 BILATERAL SHOULDER PAIN, UNSPECIFIED CHRONICITY: Primary | ICD-10-CM

## 2017-05-22 DIAGNOSIS — M25.512 BILATERAL SHOULDER PAIN, UNSPECIFIED CHRONICITY: ICD-10-CM

## 2017-05-22 PROCEDURE — 73030 X-RAY EXAM OF SHOULDER: CPT | Mod: RT

## 2017-05-22 PROCEDURE — 99204 OFFICE O/P NEW MOD 45 MIN: CPT | Performed by: PEDIATRICS

## 2017-05-22 NOTE — MR AVS SNAPSHOT
After Visit Summary   5/22/2017    Abbey Omer    MRN: [id number]           Patient Information     Date Of Birth          1950        Visit Information        Provider Department      5/22/2017 9:40 AM Jimmy Borges,  [facility]        Today's Diagnoses     Bilateral shoulder pain, unspecified chronicity    -  1       Follow-ups after your visit        Your next 10 appointments already scheduled     May 24, 2017  9:45 AM CDT   MA SCREENING DIGITAL BILATERAL with FKMA1   [facility]    [address] [place] [address]  [place] [address]   [phone]           Do not use any powder, lotion or deodorant under your arms or on your breast. If you do, we will ask you to remove it before your exam.  Wear comfortable, two-piece clothing.  If you have any allergies, tell your care team.  Bring any previous mammograms from other facilities or have them mailed to the breast center.            Jul 19, 2017  7:45 AM CDT   LAB with NE LAB   [facility] ([facility]    [address] [place] [id number] [phone]           Patient must bring picture ID.  Patient should be prepared to give a urine specimen  Please do not eat 10-12 hours before your appointment if you are coming in fasting for labs on lipids, cholesterol, or glucose (sugar).  Pregnant women should follow their Care Team instructions. Water with medications is okay. Do not drink coffee or other fluids.   If you have concerns about taking  your medications, please ask at office or if scheduling via [employer], send a message by clicking on Secure Messaging, Message Your Care Team.            Jul 26, 2017  9:20 AM CDT   PHYSICAL with Iris Killian MD   [facility] ([facility]    [address] [place] [id number] [phone]            Jan 23, 2018   9:00 AM CST   Return Visit with Omar West MD, [place] [facility]   [facility] ([address]  [place] [address] [phone]              Who to contact     If you have questions or need follow up information about today's clinic visit or your schedule please contact [facility] directly at [phone].  Normal or non-critical lab and imaging results will be communicated to you by [employer]hart, letter or phone within 4 business days after the clinic has received the results. If you do not hear from us within 7 days, please contact the clinic through [employer]hart or phone. If you have a critical or abnormal lab result, we will notify you by phone as soon as possible.  Submit refill requests through [employer] or call your pharmacy and they will forward the refill request to us. Please allow 3 business days for your refill to be completed.          Additional Information About Your Visit        [employer] Information     [employer] gives you secure access to your electronic health record. If you see a primary care provider, you can also send messages to your care team and make appointments. If you have questions, please call your primary care clinic.  If you do not have a primary care provider, please call [phone] and they will assist you.        Care EveryWhere ID     This is your Care EveryWhere ID. This could be used by other organizations to access your [place] medical records  [id number]        Your Vitals Were     Height BMI (Body Mass Index)                5' (1.524 m) 33.59 kg/m2           Blood Pressure from Last 3 Encounters:   05/22/17 138/78   05/15/17 130/78   04/25/17 133/84    Weight from Last 3 Encounters:   05/22/17 172 lb (78 kg)   05/15/17 173 lb 3.2 oz (78.6 kg)   04/25/17 171 lb (77.6 kg)               Primary Care Provider Office Phone # Fax #    Iris Killian [phone]        [address] [address]        Thank you!     Thank you for choosing [facility]  for your care. Our goal is always to provide you with excellent care. Hearing back from our patients is one way we can continue to improve our services. Please take a few minutes to complete the written survey that you may receive in the mail after your visit with us. Thank you!             Your Updated Medication List - Protect others around you: Learn how to safely use, store and throw away your medicines at www.disposemymeds.org.          This list is accurate as of: 5/22/17 11:08 AM.  Always use your most recent med list.                   Brand Name Dispense Instructions for use    calcium + D 600-200 MG-UNIT Tabs   Generic drug:  calcium carbonate-vitamin D      1 tablet twice a day       Cranberry 500 MG Caps      Take 1 capsule by mouth daily       EXCEDRIN TENSION HEADACHE 500-65 MG Tabs   Generic drug:  acetaminophen-caffeine      1 tablet twice daily as needed       Fish Oil 1200 MG Caps      1 capsule daily       FLAXSEED OIL PO      Take 1 Tablespoonful by mouth daily       fluticasone 50 MCG/ACT spray    FLONASE    48 g    Spray 2 sprays into both nostrils daily       levalbuterol 45 MCG/ACT Inhaler    XOPENEX HFA    1 Inhaler    Inhale 2 puffs into the lungs every 6 hours as needed for shortness of breath / dyspnea or wheezing       levothyroxine 75 MCG tablet    SYNTHROID/LEVOTHROID    90 tablet    Take 1 tablet (75 mcg) by mouth daily       magnesium 500 MG Tabs      Take 1 tablet by mouth daily       MULTIPLE vitamin  S/WOMENS Tabs      Take 2 tablets by mouth daily       order for DME      Equipment ordered: Respironics Auto PAP Mask type: Nasal Settings: 9-15 cm H2O       ranitidine 300 MG tablet    ZANTAC    90 tablet    Take 1 tablet (300 mg) by mouth At Bedtime       spironolactone 50 MG tablet    ALDACTONE    180 tablet    Take 2  tablets (100 mg) by mouth daily       STATIN NOT PRESCRIBED (INTENTIONAL)      by Other route continuous prn Reported on 5/15/2017       traZODone 50 MG tablet    DESYREL    90 tablet    Take 1-2 tablets ( mg) by mouth nightly as needed       venlafaxine 150 MG 24 hr capsule    EFFEXOR-XR    180 capsule    2 Capsules daily       VITAMIN B COMPLEX PO          zinc 50 MG Tabs      Take 1 tablet by mouth daily

## 2017-05-22 NOTE — Clinical Note
I had the opportunity to see Abbeydaryl Omer in [facility] clinic for her bilateral shoulder pain, OA. Please see chart for details of the visit. Thanks.

## 2017-05-22 NOTE — PROGRESS NOTES
Sports Medicine Clinic Visit    PCP: Iris Killian IRWIN Omer is a 67 year old female who is seen  in consultation at the request of Iris Killian MD presenting with bilateral shoulder pain (R>L) which has been present for a number of years.  She has had neck pain in the past, but the pain she is currently here for is in the anterior and lateral aspect of her shoulders.  She does have pain that travels down the lateral aspect of her arms.  Pain with reaching and overhead motions.  She notes she will have episodes of weakness, where she will attempt to grab something overhead and her arm will fall due to her weakness.  Notices some crunching with pain.  Did PT for her neck and shoulders in the past.  Was given a HEP about 3-4 years ago.  Did have some improvement with the exercises.  Also notices improvement with heat and rest.  Patient is right hand dominant.  Here today with her significant other.    Injury: gradual onset    Location of Pain: bilateral anterior shoulder  Duration of Pain: 4+ year(s)  Rating of Pain at worst: 8/10  Rating of Pain Currently: 3/10  Symptoms are better with: Heat and Rest  Symptoms are worse with: IR, reaching, overhead motions  Additional Features:   Positive: grinding, catching, instability, paresthesias and weakness   Negative: swelling, bruising, locking, numbness, pain in other joints and systemic symptoms  Other evaluation and/or treatments so far consists of: currently nothing  Prior History of related problems: HX of shoulder and neck pain, PT    Social History: retired     Review of Systems  Musculoskeletal: as above  Remainder of review of systems is negative including constitutional, CV, pulmonary, GI, Skin and Neurologic except as noted in HPI or medical history.    Past Medical History:   Diagnosis Date     Cancer of bladder (H)      Cataract 11/25/2011     Chronic nonallergic rhinitis 8/31/10 RAST     CKD (chronic kidney disease) stage 3, GFR 30-59  ml/min 2010     DDD (degenerative disc disease), cervical 2011     Dependent edema      Depression, major      HTN (hypertension)      Hyperlipidemia      Hypothyroidism 10/12/2009     Migraine     resolved     Nasal septal deviation 2009     RICKEY (obstructive sleep apnea)      Osteoarthritis of shoulder region      Osteoporosis      Premature menopause      Renal cyst      Past Surgical History:   Procedure Laterality Date     CHOLECYSTECTOMY, LAPOROSCOPIC       CYSTOSCOPY      bladder cancer     D & C       HYSTERECTOMY, PAP NO LONGER INDICATED      BSO     NOSE SURGERY      septum deviation     Family History   Problem Relation Age of Onset     DIABETES Maternal Grandfather      Hypertension Maternal Grandfather      DIABETES Son      HEART DISEASE Father      CHF     DIABETES Mother      Depression Mother      Substance Abuse Brother      Depression Daughter      DIABETES Maternal Uncle      CEREBROVASCULAR DISEASE Maternal Uncle      Thyroid Disease No family hx of      Glaucoma No family hx of      Macular Degeneration No family hx of      CANCER No family hx of      Social History     Social History     Marital status: Life Partner     Spouse name: Maggie     Number of children: 2     Years of education: 12     Occupational History     collections for [employer]     Social History Main Topics     Smoking status: Former Smoker     Packs/day: 0.10     Years: 1.00     Quit date: 10/12/1969     Smokeless tobacco: Never Used     Alcohol use Yes      Comment: 1 drink a month     Drug use: No     Sexual activity: Not Currently     Partners: Female     Birth control/ protection: Surgical, Post-menopausal     Other Topics Concern     Parent/Sibling W/ Cabg, Mi Or Angioplasty Before 65f 55m? No     Social History Narrative    Son  2012.    Has partner, Pat.     This document serves as a record of the services and decisions personally performed and made by Jimmy Borges DO. It  was created on his behalf by Valorie Sevilla, a trained medical scribe. The creation of this document is based the provider's statements to the medical scribe.    Scribe Valorie Sevilla 10:53 AM 5/22/2017      Objective  /78  Ht 5' (1.524 m)  Wt 172 lb (78 kg)  BMI 33.59 kg/m2      GENERAL APPEARANCE: healthy, alert and no distress   GAIT: ambulates independently  SKIN: no suspicious lesions or rashes  NEURO: Normal strength and tone, mentation intact and speech normal  PSYCH:  mentation appears normal and affect normal/bright  HEENT: no scleral icterus  CV: no extremity edema  RESP: nonlabored breathing    Bilateral Shoulder exam    ROM:        forward flexion right: 120-130 degrees, left: 130-140 degrees       abduction bilateral: 110-120 degrees pain bilaterally       internal rotation right: back pocket, left: waist line  Occasional crepitus with motion    Tender:        Right: proximal biceps, anterior shoulder, lateral upper arm, Left: lateral, anterior shoulder, proximal biceps    Non Tender:       remainder of shoulder    Strength:        abduction 5-/5       internal rotation 5-/5       external rotation 4/5    Impingement testing:       Empty can pain bilat  Hameed pain bilat  neer pain bilat        Skin:       no visible deformities       well perfused       capillary refill brisk    Sensation:        normal sensation over shoulder and upper extremity       Radiology  3 views of the bilateral shoulders taken on 5/22/2017 was reviewed today. Narrowing of the acromioclavicular joints bilaterally, left>right. Osteophytes present bilaterally.    Assessment:  1. Bilateral shoulder pain, unspecified chronicity    2. Osteoarthritis of glenohumeral joints, bilateral        Plan:  Discussed the assessment with the patient.  I think primarily shoulder OA, though also may have some cuff component.    Radiologic images reviewed and discussed with patient today.  We discussed the following treatment options: symptom  treatment, activity modification/rest, imaging, rehab and injection therapy. Following discussion, plan:    Consider MRI of bilateral shoulders for further evaluation of primarily a rotator cuff tear. However, I think current plain films adequate, and will focus more on shoulder OA.  Activity Modification: continue with activity modification, avoid aggravating activities  Rehab: Physical Therapy: discussed; however, for now, she prefers to focus on injection, what that can do; HEP reviewed to work on ROM to get started, following injection.   Steroid injection of the bilateral shoulders: intra-articular referral was placed to Dr. Jimmy Beach today.   We also discussed potential for subacromial injection. However, with plain film and some exam findings, start with intra-articular, unless Dr Beach re-evaluates at her visit; he may change to subacromial if deemed more appropriate at that time.  Follow up: call with an update 2 weeks after injection. Consider PT, imaging, other injection if not improving.  Questions answered. The patient indicates understanding of these issues and agrees with the plan.    Jimmy Borges, , CAQ    CC: Iris Killian          The information in this document, created by a scribe for me, accurately reflects the services I personally performed and the decisions made by me. I have reviewed and approved this document for accuracy.      Disclaimer: This note consists of symbols derived from keyboarding, dictation and/or voice recognition software. As a result, there may be errors in the script that have gone undetected. Please consider this when interpreting information found in this chart.

## 2017-05-24 ENCOUNTER — RADIANT APPOINTMENT (OUTPATIENT)
Dept: MAMMOGRAPHY | Facility: CLINIC | Age: 67
End: 2017-05-24
Payer: COMMERCIAL

## 2017-05-24 ENCOUNTER — OFFICE VISIT (OUTPATIENT)
Dept: ORTHOPEDICS | Facility: CLINIC | Age: 67
End: 2017-05-24
Payer: COMMERCIAL

## 2017-05-24 VITALS
BODY MASS INDEX: 33.77 KG/M2 | WEIGHT: 172 LBS | DIASTOLIC BLOOD PRESSURE: 80 MMHG | SYSTOLIC BLOOD PRESSURE: 135 MMHG | HEIGHT: 60 IN

## 2017-05-24 DIAGNOSIS — M25.512 BILATERAL SHOULDER PAIN, UNSPECIFIED CHRONICITY: Primary | ICD-10-CM

## 2017-05-24 DIAGNOSIS — M25.511 BILATERAL SHOULDER PAIN, UNSPECIFIED CHRONICITY: Primary | ICD-10-CM

## 2017-05-24 DIAGNOSIS — Z12.31 VISIT FOR SCREENING MAMMOGRAM: ICD-10-CM

## 2017-05-24 PROCEDURE — 20611 DRAIN/INJ JOINT/BURSA W/US: CPT | Mod: 50 | Performed by: FAMILY MEDICINE

## 2017-05-24 PROCEDURE — G0202 SCR MAMMO BI INCL CAD: HCPCS | Mod: TC

## 2017-05-24 RX ORDER — TRIAMCINOLONE ACETONIDE 40 MG/ML
80 INJECTION, SUSPENSION INTRA-ARTICULAR; INTRAMUSCULAR ONCE
Qty: 2 ML | Refills: 0 | OUTPATIENT
Start: 2017-05-24 | End: 2017-05-24

## 2017-05-24 NOTE — NURSING NOTE
Chief Complaint   Patient presents with     Shoulder     bilateral shoulder pain - US injection       Initial /80  Ht 5' (1.524 m)  Wt 172 lb (78 kg)  BMI 33.59 kg/m2 Estimated body mass index is 33.59 kg/(m^2) as calculated from the following:    Height as of this encounter: 5' (1.524 m).    Weight as of this encounter: 172 lb (78 kg).  Medication Reconciliation: complete     Efren Murguia, ATC

## 2017-05-24 NOTE — MR AVS SNAPSHOT
After Visit Summary   5/24/2017    Abbey Omer    MRN: [id number]           Patient Information     Date Of Birth          1950        Visit Information        Provider Department      5/24/2017 2:20 PM Jimmy Beach,  [facility]        Today's Diagnoses     Bilateral shoulder pain, unspecified chronicity    -  1       Follow-ups after your visit        Your next 10 appointments already scheduled     Jul 19, 2017  7:45 AM CDT   LAB with NE LAB   [address] [id number] [phone]           Patient must bring picture ID.  Patient should be prepared to give a urine specimen  Please do not eat 10-12 hours before your appointment if you are coming in fasting for labs on lipids, cholesterol, or glucose (sugar).  Pregnant women should follow their Care Team instructions. Water with medications is okay. Do not drink coffee or other fluids.   If you have concerns about taking  your medications, please ask at office or if scheduling via [employer], send a message by clicking on Secure Messaging, Message Your Care Team.            Jul 26, 2017  9:20 AM CDT   PHYSICAL with Iris Killian MD   [facility] ([address] [address] [phone]            Jan 23, 2018  9:00 AM CST   Return Visit with Omar West MD, [place] [facility]   [address]  [place] [address] [phone]              Who to contact     If you have questions or need follow up information about today's clinic visit or your schedule please contact [facility] directly at [phone].  Normal or non-critical lab and imaging results will be communicated to you by MyChart, letter or phone within 4  business days after the clinic has received the results. If you do not hear from us within 7 days, please contact the clinic through [employer] or phone. If you have a critical or abnormal lab result, we will notify you by phone as soon as possible.  Submit refill requests through [employer] or call your pharmacy and they will forward the refill request to us. Please allow 3 business days for your refill to be completed.          Additional Information About Your Visit        [employer] Information     [employer] gives you secure access to your electronic health record. If you see a primary care provider, you can also send messages to your care team and make appointments. If you have questions, please call your primary care clinic.  If you do not have a primary care provider, please call [phone] and they will assist you.        Care EveryWhere ID     This is your Care EveryWhere ID. This could be used by other organizations to access your [place] medical records  [id number]        Your Vitals Were     Height BMI (Body Mass Index)                5' (1.524 m) 33.59 kg/m2           Blood Pressure from Last 3 Encounters:   05/24/17 135/80   05/22/17 138/78   05/15/17 130/78    Weight from Last 3 Encounters:   05/24/17 172 lb (78 kg)   05/22/17 172 lb (78 kg)   05/15/17 173 lb 3.2 oz (78.6 kg)              We Performed the Following     HC ARTHROCENTESIS ASPIR&/INJ MAJOR JT/BURSA W/US     TRIAMCINOLONE ACET INJ NOS          Today's Medication Changes          These changes are accurate as of: 5/24/17  3:24 PM.  If you have any questions, ask your nurse or doctor.               Start taking these medicines.        Dose/Directions    triamcinolone acetonide 40 MG/ML injection   Commonly known as:  KENALOG-40   Used for:  Bilateral shoulder pain, unspecified chronicity   Started by:  Jimmy Beach DO        Dose:  80 mg   2 mLs (80 mg) by INTRA-ARTICULAR route once for 1 dose   Quantity:  2 mL   Refills:  0             Where to get your medicines      Some of these will need a paper prescription and others can be bought over the counter.  Ask your nurse if you have questions.     You don't need a prescription for these medications     triamcinolone acetonide 40 MG/ML injection                Primary Care Provider Office Phone # Fax #    Iris Killian [phone]       [address] [address]        Thank you!     Thank you for choosing [facility]  for your care. Our goal is always to provide you with excellent care. Hearing back from our patients is one way we can continue to improve our services. Please take a few minutes to complete the written survey that you may receive in the mail after your visit with us. Thank you!             Your Updated Medication List - Protect others around you: Learn how to safely use, store and throw away your medicines at www.disposemymeds.org.          This list is accurate as of: 5/24/17  3:24 PM.  Always use your most recent med list.                   Brand Name Dispense Instructions for use    calcium + D 600-200 MG-UNIT Tabs   Generic drug:  calcium carbonate-vitamin D      1 tablet twice a day       Cranberry 500 MG Caps      Take 1 capsule by mouth daily       EXCEDRIN TENSION HEADACHE 500-65 MG Tabs   Generic drug:  acetaminophen-caffeine      1 tablet twice daily as needed       Fish Oil 1200 MG Caps      1 capsule daily       FLAXSEED OIL PO      Take 1 Tablespoonful by mouth daily       fluticasone 50 MCG/ACT spray    FLONASE    48 g    Spray 2 sprays into both nostrils daily       levalbuterol 45 MCG/ACT Inhaler    XOPENEX HFA    1 Inhaler    Inhale 2 puffs into the lungs every 6 hours as needed for shortness of breath / dyspnea or wheezing       levothyroxine 75 MCG tablet    SYNTHROID/LEVOTHROID    90 tablet    Take 1 tablet (75 mcg) by mouth daily       magnesium 500 MG Tabs       Take 1 tablet by mouth daily       MULTIPLE vitamin  S/WOMENS Tabs      Take 2 tablets by mouth daily       order for DME      Equipment ordered: Respironics Auto PAP Mask type: Nasal Settings: 9-15 cm H2O       ranitidine 300 MG tablet    ZANTAC    90 tablet    Take 1 tablet (300 mg) by mouth At Bedtime       spironolactone 50 MG tablet    ALDACTONE    180 tablet    Take 2 tablets (100 mg) by mouth daily       STATIN NOT PRESCRIBED (INTENTIONAL)      by Other route continuous prn Reported on 5/15/2017       traZODone 50 MG tablet    DESYREL    90 tablet    Take 1-2 tablets ( mg) by mouth nightly as needed       triamcinolone acetonide 40 MG/ML injection    KENALOG-40    2 mL    2 mLs (80 mg) by INTRA-ARTICULAR route once for 1 dose       venlafaxine 150 MG 24 hr capsule    EFFEXOR-XR    180 capsule    2 Capsules daily       VITAMIN B COMPLEX PO          zinc 50 MG Tabs      Take 1 tablet by mouth daily

## 2017-05-24 NOTE — PROGRESS NOTES
Abbey Omer  :  1950  DOS: 2017  MRN: [id number]    Sports Medicine Clinic Procedure    Ultrasound Guided Bilateral Intra-Articular Shoulder Injection    Clinical History: Abbey Omer is a 67 year old female who is seen  in consultation at the request of Iris Killian MD presenting with bilateral shoulder pain (R>L) which has been present for a number of years.  She has had neck pain in the past, but the pain she is currently here for is in the anterior and lateral aspect of her shoulders.  She does have pain that travels down the lateral aspect of her arms.  Pain with reaching and overhead motions.  She notes she will have episodes of weakness, where she will attempt to grab something overhead and her arm will fall due to her weakness.  Notices some crunching with pain.  Did PT for her neck and shoulders in the past.  Was given a HEP about 3-4 years ago.  Did have some improvement with the exercises.  Also notices improvement with heat and rest.  Patient is right hand dominant.  Here today with her significant other.    Diagnosis:   1. Bilateral shoulder pain, unspecified chronicity      Referring Physician: Jimmy Borges DO  Technique: The risks of the procedure were explained to the patient.  A consent was signed for the intra-articular shoulder injection.  The patient was evaluated with a [employer] ultrasound machine using a 12 MHz linear probe.     The Bilateral shoulder was prepped and draped in a sterile manner. Ultrasound identification of the glenohumeral joint in long axis to infraspinatus from a posterior approach.    The probe was placed in longitudinal view to the infraspinatus.  A 2 inch21 gauge needle was placed under ultrasound guidance into the glenohumeral joint.    A mixture of 2 ml's 1% lidocaine, 2 ml's 0.5% marcaine, and 1 ml 40 mg kenalog was injected without difficulty on long axis view.  The needle was removed and there was good hemostasis without  complications.  There was ultrasound documentation of needle placement and injection.  Pre-procedural pain 6/10 on the R and 5/10 on the left.  Post procedural pain 1/10 b/l.    Impression:  Successful Bilateral intra-articular shoulder injection.    Plan:  Follow up with Dr Borges as previously directed.   Expectations and goals of CSI reviewed  Often 2-3 days for steroid effect, and can take up to two weeks for maximum effect  We discussed modified progressive pain-free activity as tolerated  Do not overuse in first two weeks if feeling better due to concern for vulnerability while steroid is working  Supportive care reviewed  All questions were answered today  Contact us with additional questions or concerns  Signs and sx of concern reviewed        Jimmy Beach DO, CAJARAD  Primary Care Sports Medicine  [facility]

## 2017-05-30 DIAGNOSIS — J45.20 MILD INTERMITTENT ASTHMA WITHOUT COMPLICATION: ICD-10-CM

## 2017-05-31 NOTE — TELEPHONE ENCOUNTER
levalbuterol (XOPENEX HFA) 45 MCG/ACT Inhaler   2 puff, EVERY 6 HOURS PRN 1 ordered  Edit     Summary: Inhale 2 puffs into the lungs every 6 hours as needed for shortness of breath / dyspnea or wheezing, Disp-1 Inhaler, R-1, E-Prescribe   Dose, Route, Frequency: 2 puff, Inhalation, EVERY 6 HOURS PRN  Start: 1/25/2017  Ord/Sold: 1/25/2017 (O)  Report  Taking:   Long-term:   Pharmacy: [id number] - [place], [place] - [address] AT [facility] [address]  Med Dose History       Patient Sig: Inhale 2 puffs into the lungs every 6 hours as needed for shortness of breath / dyspnea or wheezing       Ordered on: 1/25/2017       Authorized by: IRIS KILLIAN       Dispense: 1 Inhaler          [place] [facility] or [facility] prescribing provider:  5-   Future Office Visit:    Next 5 appointments (look out 90 days)     Jul 26, 2017  9:20 AM CDT   PHYSICAL with Iris Killian MD   [facility] ([facility]    [address] [place] [id number] [phone]                   Date of Last Asthma Action Plan Letter:   There are no preventive care reminders to display for this patient.   Asthma Control Test:   ACT Total Scores 2/16/2012   ACT TOTAL SCORE 25   ASTHMA ER VISITS 0 = None   ASTHMA HOSPITALIZATIONS 0 = None       Date of Last Spirometry Test:   No results found for this or any previous visit.

## 2017-06-01 RX ORDER — LEVALBUTEROL TARTRATE 45 UG/1
AEROSOL, METERED ORAL
Qty: 15 G | Refills: 0 | Status: SHIPPED | OUTPATIENT
Start: 2017-06-01 | End: 2018-12-27

## 2017-06-04 ENCOUNTER — MYC MEDICAL ADVICE (OUTPATIENT)
Dept: FAMILY MEDICINE | Facility: CLINIC | Age: 67
End: 2017-06-04

## 2017-06-07 ENCOUNTER — MYC MEDICAL ADVICE (OUTPATIENT)
Dept: FAMILY MEDICINE | Facility: CLINIC | Age: 67
End: 2017-06-07

## 2017-06-07 NOTE — TELEPHONE ENCOUNTER
My chart message sent. If no further questions ok to close.  Adrianna Silveira,Clinic Rn  [place]

## 2017-06-10 ENCOUNTER — RADIANT APPOINTMENT (OUTPATIENT)
Dept: MRI IMAGING | Facility: CLINIC | Age: 67
End: 2017-06-10
Attending: PEDIATRICS
Payer: COMMERCIAL

## 2017-06-10 DIAGNOSIS — M25.511 CHRONIC PAIN OF BOTH SHOULDERS: ICD-10-CM

## 2017-06-10 DIAGNOSIS — G89.29 CHRONIC PAIN OF BOTH SHOULDERS: ICD-10-CM

## 2017-06-10 DIAGNOSIS — M25.512 CHRONIC PAIN OF BOTH SHOULDERS: ICD-10-CM

## 2017-06-10 PROCEDURE — 73221 MRI JOINT UPR EXTREM W/O DYE: CPT | Mod: TC

## 2017-06-12 ENCOUNTER — OFFICE VISIT (OUTPATIENT)
Dept: ORTHOPEDICS | Facility: CLINIC | Age: 67
End: 2017-06-12
Payer: COMMERCIAL

## 2017-06-12 VITALS
SYSTOLIC BLOOD PRESSURE: 130 MMHG | WEIGHT: 175 LBS | DIASTOLIC BLOOD PRESSURE: 84 MMHG | BODY MASS INDEX: 34.36 KG/M2 | HEIGHT: 60 IN

## 2017-06-12 DIAGNOSIS — M19.011 OSTEOARTHRITIS OF GLENOHUMERAL JOINTS, BILATERAL: ICD-10-CM

## 2017-06-12 DIAGNOSIS — M25.512 CHRONIC PAIN OF BOTH SHOULDERS: Primary | ICD-10-CM

## 2017-06-12 DIAGNOSIS — M19.012 OSTEOARTHRITIS OF GLENOHUMERAL JOINTS, BILATERAL: ICD-10-CM

## 2017-06-12 DIAGNOSIS — M25.511 CHRONIC PAIN OF BOTH SHOULDERS: Primary | ICD-10-CM

## 2017-06-12 DIAGNOSIS — G89.29 CHRONIC PAIN OF BOTH SHOULDERS: Primary | ICD-10-CM

## 2017-06-12 PROCEDURE — 99214 OFFICE O/P EST MOD 30 MIN: CPT | Performed by: PEDIATRICS

## 2017-06-12 NOTE — PROGRESS NOTES
"Sports Medicine Clinic Visit    PCP: Iris Killian IRWIN Omer is a 67 year old female who is seen in f/u up for    Chronic pain of both shoulders  Osteoarthritis of glenohumeral joints, bilateral. Since last visit on 5/22/2017 patient has undergone a bilateral US guided steroid injection with Dr. Beach on 5/24/17.  She had about 1 week of relief.  Recently underwent bilateral shoulder MRI's.  Here for results.     **  Past Injections: Great relief for 1 week in both shoulders--US-guided intra-articular steroid. However, pain has since returned.  Pain causes spasms in both arms.  She experiences a \"ripping\" sound in both shoulders.  Immediately after the injection, patient had better ROM (able to touch her back) and still has better ROM.  However, pain present again.      Review of Systems  All other systems reviewed and are negative unless noted above.    This document serves as a record of the services and decisions personally performed and made by Jimmy Borges DO, CAQ. It was created on his behalf by Bruce Nieves, a trained medical scribe. The creation of this document is based the provider's statements to the medical scribe.  Bruce Nieves June 12, 2017 4:34 PM       Past Medical History:   Diagnosis Date     Cancer of bladder (H)      Cataract 11/25/2011     Chronic nonallergic rhinitis 8/31/10 RAST     CKD (chronic kidney disease) stage 3, GFR 30-59 ml/min 12/20/2010     DDD (degenerative disc disease), cervical 6/16/2011     Dependent edema      Depression, major      HTN (hypertension)      Hyperlipidemia      Hypothyroidism 10/12/2009     Migraine     resolved     Nasal septal deviation 8/11/2009     RICKEY (obstructive sleep apnea)      Osteoarthritis of shoulder region      Osteoporosis      Premature menopause      Renal cyst      Past Surgical History:   Procedure Laterality Date     CHOLECYSTECTOMY, LAPOROSCOPIC       CYSTOSCOPY      bladder cancer     D & C       " HYSTERECTOMY, PAP NO LONGER INDICATED      BSO     NOSE SURGERY      septum deviation       Objective  /84  Ht 5' (1.524 m)  Wt 175 lb (79.4 kg)  BMI 34.18 kg/m2    GENERAL APPEARANCE: healthy, alert and no distress   GAIT: NORMAL  SKIN: no suspicious lesions or rashes  NEURO: Normal strength and tone, mentation intact and speech normal  PSYCH:  mentation appears normal and affect normal/bright  HEENT: no scleral icterus  CV: no extremity edema  RESP: nonlabored breathing     Exam  Bilateral Shoulder exam    ROM:        Continued limitation in AROM with flexion, abduction lacking full motion, with pain  Occasional crepitus with motion    Tender:        Diffuse about shoulder bilaterally    Impingement testing:       Empty can pain bilat  Hameed pain bilat  neer pain bilat    Skin:       no visible deformities       well perfused       capillary refill brisk    Sensation:        normal sensation over shoulder and upper extremity         Radiology  Visualized/reviewed recent shoulder imaging, plain films and MRI scans. Reports below. Bilateral shoulder OA and cuff tendinosis.    XR Shoulder 3 View Bilateral    Narrative    SHOULDER THREE VIEWS BILATERAL  5/22/2017 10:48 AM     HISTORY: Pain in right shoulder. Pain in left shoulder.      Impression    IMPRESSION: Bilateral glenohumeral joint degenerative arthrosis with  inferomedial humeral head osteophytic spurring. On the left, there is  a 1 cm probable articular body within the subscapularis recess beneath  the coracoid process, new since 6/15/2011.    RIKY RICHARD MD   MR Shoulder Right w/o Contrast    Narrative    MR RIGHT SHOULDER WITHOUT CONTRAST  6/10/2017 9:10 AM    HISTORY: Two-year history of bilateral shoulder pain and crepitus. No  specific injury.    TECHNIQUE: Oblique coronal T1, T2 and T2 fat suppressed images,  oblique sagittal T2 and axial proton density and T2 weighted images  were obtained.     COMPARISON: Plain films  5/22/2017.    FINDINGS:  Osseous acromial outlet: Mild hypertrophic degenerative change at the  acromioclavicular joint without impingement at this level. The distal  acromion is a type I morphology with slight negative slope on oblique  sagittal images and mild lateral downsloping on oblique coronal  images. There is a trace amount of fluid in the subacromial/subdeltoid  bursa.    Rotator cuff: Mild signal heterogeneity in the lateral supraspinatus  tendon consistent with degenerative tendinopathy. No evidence for  partial or full-thickness tear. The remainder of the supraspinatus,  infraspinatus, teres minor and subscapularis muscles and tendons are  unremarkable.    Labrum: The glenoid labrum is normal as visualized.    Biceps tendon: The biceps tendon is normal proximally at the biceps  anchor and distally in the bicipital groove.      Osseous structures and cartilaginous surfaces: No acute-appearing bony  abnormalities. Diffuse grade III chondromalacia with prominent  inferior degenerative marginal bony spurring at the glenohumeral  joint. There is a 0.5 cm degenerative subchondral cyst at the inferior  posterior glenoid labrum.    Additional findings: None.      Impression    IMPRESSION:   1. Supraspinatus degenerative tendinopathy without rotator cuff tear.  2. Osteoarthritis glenohumeral joint and acromioclavicular joint.    MELLISSA CALHOUN MD   MR Shoulder Left w/o Contrast    Narrative    MR LEFT SHOULDER 6/10/2017 9:15 AM    HISTORY: Bilateral shoulder pain and crepitus for 2 years. No specific  injury.    TECHNIQUE: Oblique coronal T1, T2 and T2 fat suppressed images,  oblique sagittal T2 and axial proton density and T2 weighted images  were obtained.     COMPARISON: Plain films 5/22/2017.    FINDINGS:  Osseous acromial outlet: Mild to moderate hypertrophic degenerative  change at the acromioclavicular joint abuts but does not indent the  supraspinatus myotendinous junction. The distal acromion shows a  type  I morphology with negative slope on oblique sagittal images and  minimal lateral downsloping on oblique coronal images. There is a  trace amount of fluid in the subacromial/subdeltoid bursa.    Rotator cuff: Signal heterogeneity in the lateral supraspinatus tendon  consistent with mild degenerative tendinopathy. There is no evidence  for partial or full-thickness tear and the remainder of the  supraspinatus, infraspinatus and teres minor muscles and tendons  appear normal. Mild thinning of the subscapularis tendon consistent  with degenerative tendinopathy. Subscapularis muscle is normal.    Labrum: The glenoid labrum is normal as visualized.    Biceps tendon: The biceps tendon is normal proximally at the biceps  anchor and distally in the bicipital groove.      Osseous structures and cartilaginous surfaces: No acute-appearing bony  abnormalities. Diffuse grade III chondromalacia at the glenohumeral  joint. Small amount of subchondral cystic change or edema in the  central glenoid. Prominent degenerative marginal bony spurring,  especially at the inferior humeral head.    Additional findings: Small glenohumeral joint effusion. There is a 0.8  cm osseous loose body within the subcoracoid recess which correlates  with an abnormal calcification seen on the recent plain film (image 4  of series 8).      Impression    IMPRESSION:   1. Supraspinatus and subscapularis degenerative tendinopathy without  evidence for rotator cuff tear.  2. Osteoarthritis acromioclavicular joint and glenohumeral joint.  3. 0.8 cm osseous loose body in subcoracoid recess correlating with  plain film.    MELLISSA CALHOUN MD         Assessment:  1. Chronic pain of both shoulders    2. Osteoarthritis of glenohumeral joints, bilateral        Plan:  Discussed the assessment with the patient and her significant other.    We discussed the following treatment options: symptom treatment, activity modification/rest, imaging, rehab, injection  therapy and referral to surgeon. Following discussion, plan:    Reviewed again use of OTC medication; reviewed imaging; reviewed potential rehab, she declined PT. Discussed possible trial subacromial steroid injection given cuff findings on MRI; she declined.  She desires referral to ortho surgery, [facility], suggested by patient's mother; referral placed. Advised her that some PT may be tried before surgical intervention, but she wants to have discussion about more definitive management of this condition.  Follow up: here is as needed.  Questions answered. The patient indicates understanding of these issues and agrees with the plan.    Jimmy Borges DO, SHADE          Disclaimer: This note consists of symbols derived from keyboarding, dictation and/or voice recognition software. As a result, there may be errors in the script that have gone undetected. Please consider this when interpreting information found in this chart.    The information in this document, created by the medical scribe for me, accurately reflects the services I personally performed and the decisions made by me. I have reviewed and approved this document for accuracy.   Jimmy Borges DO, SHADE

## 2017-06-12 NOTE — Clinical Note
Abbey MORA was seen in [facility] clinic for recheck of shoulder pain, OA. She desires to see ortho surgery next. Please see copy of the chart note for additional details. Thanks.

## 2017-06-14 ENCOUNTER — PRE VISIT (OUTPATIENT)
Dept: ORTHOPEDICS | Facility: CLINIC | Age: 67
End: 2017-06-14

## 2017-06-14 NOTE — TELEPHONE ENCOUNTER
Pt reports being seen for : Bilateral shoulder pain over 2 yrs.   1. Do you have recent xrays (if not seen in EPIC)? Yes - Xray are in EPIC.  2. Do you have any MRI's (if not seen in EPIC)?Yes - Confirmed in EPIC.  3. Have you had surgery in the past for the same issue?No.   4. Are you being referred by another provider? Yes: Dr. Jimmy Borges   If yes-Records in Epic or being obtained by: Records and imaging in Epic.  5. Is this work comp or MVA related? No.

## 2017-06-15 ENCOUNTER — OFFICE VISIT (OUTPATIENT)
Dept: ORTHOPEDICS | Facility: CLINIC | Age: 67
End: 2017-06-15
Payer: COMMERCIAL

## 2017-06-15 ENCOUNTER — RADIANT APPOINTMENT (OUTPATIENT)
Dept: GENERAL RADIOLOGY | Facility: CLINIC | Age: 67
End: 2017-06-15
Attending: ORTHOPAEDIC SURGERY
Payer: COMMERCIAL

## 2017-06-15 VITALS
WEIGHT: 170 LBS | HEIGHT: 60 IN | SYSTOLIC BLOOD PRESSURE: 139 MMHG | DIASTOLIC BLOOD PRESSURE: 75 MMHG | BODY MASS INDEX: 33.38 KG/M2 | HEART RATE: 68 BPM

## 2017-06-15 DIAGNOSIS — M67.919 DISORDER OF BURSAE AND TENDONS IN SHOULDER REGION: Primary | ICD-10-CM

## 2017-06-15 DIAGNOSIS — M25.519 SHOULDER PAIN: ICD-10-CM

## 2017-06-15 DIAGNOSIS — M71.9 DISORDER OF BURSAE AND TENDONS IN SHOULDER REGION: Primary | ICD-10-CM

## 2017-06-15 PROCEDURE — 73020 X-RAY EXAM OF SHOULDER: CPT | Mod: LT | Performed by: RADIOLOGY

## 2017-06-15 PROCEDURE — 99203 OFFICE O/P NEW LOW 30 MIN: CPT | Performed by: ORTHOPAEDIC SURGERY

## 2017-06-15 PROCEDURE — 73020 X-RAY EXAM OF SHOULDER: CPT | Mod: RT | Performed by: RADIOLOGY

## 2017-06-15 ASSESSMENT — PAIN SCALES - GENERAL: PAINLEVEL: MODERATE PAIN (4)

## 2017-06-15 NOTE — PATIENT INSTRUCTIONS
Thanks for coming today.  Ortho/Sports Medicine Clinic  [address] [place] [address]    To schedule future appointments in Ortho Clinic, you may call [phone].    To schedule ordered imaging by your provider:   Call Central Imaging [phone]    To schedule an injection ordered by your provider:  Call Central Imaging Injection scheduling line: [phone]  [employer] available online at:  [email or web address]    Please call if any further questions or concerns ([phone]).  Clinic hours 8 am to 5 pm.    Return to clinic (call) if symptoms worsen or fail to improve.

## 2017-06-15 NOTE — MR AVS SNAPSHOT
After Visit Summary   6/15/2017    Abbey Omer    MRN: [id number]           Patient Information     Date Of Birth          1950        Visit Information        Provider Department      6/15/2017 9:15 AM Coreen Luna MD [facility]        Today's Diagnoses     Disorder of bursae and tendons in shoulder region    -  1      Care Instructions    Thanks for coming today.  Ortho/Sports Medicine Clinic  [address] [place] [address]    To schedule future appointments in Ortho Clinic, you may call [phone].    To schedule ordered imaging by your provider:   Call Central Imaging [phone]    To schedule an injection ordered by your provider:  Call Central Imaging Injection scheduling line: [phone]  [employer] available online at:  [email or web address]    Please call if any further questions or concerns ([phone]).  Clinic hours 8 am to 5 pm.    Return to clinic (call) if symptoms worsen or fail to improve.          Follow-ups after your visit        Your next 10 appointments already scheduled     2017  7:45 AM CDT   LAB with [facility] LAB   [facility]    [address] [place] [id number] [phone]           Patient must bring picture ID.  Patient should be prepared to give a urine specimen  Please do not eat 10-12 hours before your appointment if you are coming in fasting for labs on lipids, cholesterol, or glucose (sugar).  Pregnant women should follow their Care Team instructions. Water with medications is okay. Do not drink coffee or other fluids.   If you have concerns about taking  your medications, please ask at office or if scheduling via [email or web address], send a message by clicking on Secure Messaging, Message Your Care Team.            2017  9:20 AM CDT   PHYSICAL with Iris Killian MD   [facility] ([facility]  [facility]    [address] [place] [id number] [phone]            Jan 23, 2018  9:00 AM CST   Return Visit with Omar West MD, DARWIN IRVING [facility] ROOM   [facility] ([facility])    [address] [place] [address]  Darwin HILLIARD [id number] [phone]              Who to contact     If you have questions or need follow up information about today's clinic visit or your schedule please contact [facility] directly at [phone].  Normal or non-critical lab and imaging results will be communicated to you by [employer], letter or phone within 4 business days after the clinic has received the results. If you do not hear from us within 7 days, please contact the clinic through [employer] or phone. If you have a critical or abnormal lab result, we will notify you by phone as soon as possible.  Submit refill requests through [employer] or call your pharmacy and they will forward the refill request to us. Please allow 3 business days for your refill to be completed.          Additional Information About Your Visit        MyChart Information     [employer] gives you secure access to your electronic health record. If you see a primary care provider, you can also send messages to your care team and make appointments. If you have questions, please call your primary care clinic.  If you do not have a primary care provider, please call [phone] and they will assist you.      [employer] is an electronic gateway that provides easy, online access to your medical records. With [employer], you can request a clinic appointment, read your test results, renew a prescription or communicate with your care team.     To access your existing account, please contact your [facility] or call [phone] for assistance.        Care EveryWhere ID     This is your Care EveryWhere ID. This could be used by other organizations to access your [facility]  records  [id number]        Your Vitals Were     Pulse Height BMI (Body Mass Index)             68 1.524 m (5') 33.2 kg/m2          Blood Pressure from Last 3 Encounters:   06/15/17 139/75   06/12/17 130/84   05/24/17 135/80    Weight from Last 3 Encounters:   06/15/17 77.1 kg (170 lb)   06/12/17 79.4 kg (175 lb)   05/24/17 78 kg (172 lb)               Primary Care Provider Office Phone # Fax #    Iris Killian [phone]       [facility] [address] [place] [address]        Equal Access to Services     BHUPINDER SUTHERLAND : Kathleen brauno Soplacido, waaxda luqadaha, qaybta kaalmada adedeshawnyada, maximiliano pathak. So [facility] [phone].    ATENCIÓN: Si habla español, tiene a smith disposición servicios gratuitos de asistencia lingüística. Llame al [phone].    We comply with applicable federal civil rights laws and [place] laws. We do not discriminate on the basis of race, color, national origin, age, disability sex, sexual orientation or gender identity.            Thank you!     Thank you for choosing [facility]  for your care. Our goal is always to provide you with excellent care. Hearing back from our patients is one way we can continue to improve our services. Please take a few minutes to complete the written survey that you may receive in the mail after your visit with us. Thank you!             Your Updated Medication List - Protect others around you: Learn how to safely use, store and throw away your medicines at www.disposemymeds.org.          This list is accurate as of: 6/15/17 11:59 PM.  Always use your most recent med list.                   Brand Name Dispense Instructions for use Diagnosis    calcium + D 600-200 MG-UNIT Tabs   Generic drug:  calcium carbonate-vitamin D      1 tablet twice a day        Cranberry 500 MG Caps      Take 1 capsule by mouth daily        EXCEDRIN TENSION HEADACHE 500-65 MG Tabs    Generic drug:  acetaminophen-caffeine      1 tablet twice daily as needed        Fish Oil 1200 MG Caps      1 capsule daily        FLAXSEED OIL PO      Take 1 Tablespoonful by mouth daily        fluticasone 50 MCG/ACT spray    FLONASE    48 g    Spray 2 sprays into both nostrils daily    Chronic rhinitis       levalbuterol 45 MCG/ACT Inhaler    XOPENEX HFA    15 g    INHALE 2 PUFFS INTO THE LUNGS EVERY 6 HOURS AS NEEDED FOR SHORTNESS OF BREATH OR DIFFICULT BREATHING OR WHEEZING    Mild intermittent asthma without complication       levothyroxine 75 MCG tablet    SYNTHROID/LEVOTHROID    90 tablet    Take 1 tablet (75 mcg) by mouth daily    Hypothyroidism due to acquired atrophy of thyroid       magnesium 500 MG Tabs      Take 1 tablet by mouth daily        MULTIPLE vitamin  S/WOMENS Tabs      Take 2 tablets by mouth daily        order for DME      Equipment ordered: [employer]s Auto PAP Mask type: Nasal Settings: 9-15 cm H2O        ranitidine 300 MG tablet    ZANTAC    90 tablet    Take 1 tablet (300 mg) by mouth At Bedtime    Gastroesophageal reflux disease without esophagitis       spironolactone 50 MG tablet    ALDACTONE    180 tablet    Take 2 tablets (100 mg) by mouth daily    Other acne       STATIN NOT PRESCRIBED (INTENTIONAL)      by Other route continuous prn Reported on 5/15/2017    Hyperlipidemia LDL goal < 100       traZODone 50 MG tablet    DESYREL    90 tablet    Take 1-2 tablets ( mg) by mouth nightly as needed    Insomnia, unspecified type       venlafaxine 150 MG 24 hr capsule    EFFEXOR-XR    180 capsule    2 Capsules daily    Major depressive disorder, recurrent episode, mild degree (H)       VITAMIN B COMPLEX PO           zinc 50 MG Tabs      Take 1 tablet by mouth daily

## 2017-06-15 NOTE — NURSING NOTE
Abbey Omer's goals for this visit include: Consult for bilateral shoulder pain.   She requests these members of her care team be copied on today's visit information: yes    PCP: Iris Killian    Referring Provider:  No referring provider defined for this encounter.    Chief Complaint   Patient presents with     Consult     Bilateral shoulder pain x2-3 years.        Initial /75  Pulse 68  Ht 1.524 m (5')  Wt 77.1 kg (170 lb)  BMI 33.2 kg/m2 Estimated body mass index is 33.2 kg/(m^2) as calculated from the following:    Height as of this encounter: 1.524 m (5').    Weight as of this encounter: 77.1 kg (170 lb).  Medication Reconciliation: complete

## 2017-06-15 NOTE — PROGRESS NOTES
"CHIEF CONCERN:  Bilateral shoulder pain, right greater than left.      REFERRING PROVIDER:  Jimmy Borges DO      HISTORY OF PRESENT ILLNESS:  Ms. Omer is a 67-year-old right-hand dominant female who I am seeing today for bilateral shoulder pain.  The patient states that she has had symptoms for perhaps 2-3 years, but these symptoms have become worse this year.  She was referred by her primary care provider, Dr. Killian to Dr. Borges who had Dr. Beach do bilateral glenohumeral cortisone injections on 05/22/2017.  She felt \"like a million bucks for 1 week,\" but then her injection wore off abruptly and her pain was worse for some time.  Now, she is back to her baseline, which is really quite bothersome.  She feels that her pain is quite severe and she notes that she wakes at night.  She says that riding in a car is bad, particularly on a bumpy road.  She finds that sitting in her jetted tube helps her discomfort.  She has not tried much else in the way of treatment.  She has chronic kidney disease and hypertension and so does not take anti-inflammatories.  She has had a history of neck arthritis and did physical therapy for that some time ago.  She notes no david numbness or tingling to her arms or hands.      PAST MEDICAL HISTORY:   1.  Fibromyalgia.   2.  Depression.   3.  Chronic kidney disease, stage III.   4.  Hypertension.   5.  Hypothyroidism.   6.  BMI 33.      MEDICATIONS:  Reviewed in Epic.      ALLERGIES:  MULTIPLE AND REVIEWED IN EPIC, BUT INCLUDES CODEINE AND SULFA IN ADDITION TO MANY OTHERS.      SOCIAL HISTORY:  The patient is retired.  She has a significant other and she has adult children.  She does not use tobacco currently.  She does not use alcohol.  She enjoys reading and also doing crafts including sewing.  She has a small dog and 2 cats.      FAMILY HISTORY:  Indicated as negative on patient intake form, which is reviewed and scanned into the chart.  There is an extensive family " history which is reviewed in Epic positive for diabetes, hypertension, congestive heart disease, depression.      REVIEW OF SYSTEMS:  Positive for that noted in the history illness and past medical history and otherwise negative on patient intake form, which is reviewed and scanned into the chart.      PHYSICAL EXAMINATION:     GENERAL:  The patient is a pleasant adult female in no acute distress.  She articulates and communicates appropriately with normal affect.  She is accompanied by her significant other today.   LUNGS:  Respirations even and unlabored.   VITAL SIGNS:  Per rooming note.   FOCUSED UPPER EXTREMITY EXAM:  Inspection of both upper extremities reveals no obvious atrophy or asymmetry.  Skin is intact.  There is no erythema or warmth.  Her motor function is intact to EPL, FPL and intrinsics.  Wrist flexion and extension and elbow flexion and extension are motor intact.  Her shoulder range of motion on the right is active, forward elevation to 90, but passive forward elevation to 135.  She has external rotation at the side to 40 and internal rotation to L5.  Her range of motion on the left is forward elevation to 155, external rotation at the side to 50 and internal rotation to L2.      IMAGING:  Radiographs of bilateral shoulders were obtained on 05/22/2017.  There are 3 views of the right and left shoulders.  This study demonstrates more advanced glenohumeral osteoarthritis on the left compared to the right.  On the left, there is a sizable ring humeral osteophyte and near complete loss of the joint space.  There is no superior humeral migration.  There is an inadequate scapular Y, but some suggestion of posterior wear on the left worse than the right.  The right shoulder has a small ring humeral osteophyte and joint space narrowing, although not to the degree of the left.      I obtained an axillary view of both the right and left shoulders today.  This study demonstrates more significant  glenohumeral arthritis on the left compared to the right.  There is slightly more posterior wear on the left compared to the right.  There is significant joint space narrowing on the right but not to the point of complete joint space loss.      MRI of the right and left shoulders were obtained on 06/10/2017.  These studies demonstrate the arthritis noted above, but also rotator cuff tendinopathy without a rotator cuff tear on either shoulder.      ASSESSMENT:  Bilateral glenohumeral osteoarthritis, left more advanced than right.      RECOMMENDATIONS:  I reviewed with Ms. Omer and her partner today the clinical and radiographic findings.  I discussed with her that I do not think that I would generally recommend exhausting nonoperative treatment prior to pursuing operative intervention such as shoulder arthroplasty.  I do not think radiographically her right shoulder is a candidate for total shoulder arthroplasty.  Unfortunately, this is the side where her symptoms are more severe.  We discussed additional nonoperative treatments including anti-inflammatories.  I discussed that I would contact her primary care physician to determine if a topical anti-inflammatory would be an acceptable option.  If not, we may consider alternative pain control measures.  We did discuss injections and I would not repeat cortisone injections given the brief relief she experienced from those.  We discussed PRP injection, including risks and benefits.  I discussed the available literature which is primarily in the knee for the use in arthritis.  Given that her arthritis on the right is more moderate, that may be an option for that side if she wished to consider it.  We discussed the recommendation for annual radiographs to watch for progression of posterior glenoid wear.  She is agreeable with the plan and we will wait to hear Dr. Killian's recommendations regarding the option of anti-inflammatories.  The patient expressed her  understanding.

## 2017-06-22 ENCOUNTER — MYC MEDICAL ADVICE (OUTPATIENT)
Dept: ORTHOPEDICS | Facility: CLINIC | Age: 67
End: 2017-06-22

## 2017-06-26 ENCOUNTER — TELEPHONE (OUTPATIENT)
Dept: ORTHOPEDICS | Facility: CLINIC | Age: 67
End: 2017-06-26

## 2017-06-26 DIAGNOSIS — L70.8 OTHER ACNE: ICD-10-CM

## 2017-06-26 RX ORDER — SPIRONOLACTONE 50 MG/1
100 TABLET, FILM COATED ORAL DAILY
Qty: 180 TABLET | Refills: 3 | Status: CANCELLED | OUTPATIENT
Start: 2017-06-26

## 2017-06-26 NOTE — TELEPHONE ENCOUNTER
Reason for Call:  Medication or medication refill:    Do you use a [facility]?  Name of the pharmacy and phone number for the current request:  [facility] [address] [facility], [facility] [phone]    Name of the medication requested: Spironolactone    Other request: Patient states she will need this med right away as she is out.  Please call her if this is going to be a problem.    Can we leave a detailed message on this number? YES    Phone number patient can be reached at: Home number on file [phone] (home)    Best Time: anytime      Call taken on 6/26/2017 at 5:11 PM by Ela Painter

## 2017-06-26 NOTE — TELEPHONE ENCOUNTER
Prior Authorization Retail Medication Request  Medication/Dose: diclofenac (VOLTAREN) 1 % GEL topical gel, Apply 4 grams to both shoulder four times daily using enclosed dosing card.  Diagnosis and ICD code: Bilateral shoulder pain, unspecified chronicity [M25.511, M25.512]   New/Renewal/Insurance Change PA: n/a  Previously Tried and Failed Therapies: Physical therapy, tylenol    Insurance ID (if provided): see chart  Insurance Phone (if provided): see chart    Any additional info from fax request:     If you received a fax notification from an outside Pharmacy:  Pharmacy Name:[id number] - [place], [place]  Pharmacy #:[phone]  Pharmacy Fax:[phone]

## 2017-06-27 ENCOUNTER — MYC MEDICAL ADVICE (OUTPATIENT)
Dept: ORTHOPEDICS | Facility: CLINIC | Age: 67
End: 2017-06-27

## 2017-06-27 NOTE — TELEPHONE ENCOUNTER
"Patient states \"[facility] told her there is a cap on it\" but they couldn't tell her what that meant. I informed her that everything appears correct on our end & offered to call them but patient will call [facility] to see if it is a insurance coverage issue & when they will be able to cover it or how much they will cover. Patient verbalized understanding.    Oma Richardson RN    "

## 2017-07-03 NOTE — TELEPHONE ENCOUNTER
[facility] Prior Authorization Team   Phone: [phone]  Fax: [phone]      PA Initiation    Medication: diclofenac (VOLTAREN) 1 % GEL topical gel  Insurance Company: [employer] - Phone [phone] Fax [phone]  Pharmacy Filling the Rx: [id number] - [place], [place] - [facility] [address] AT [facility] OF [address]  Filling Pharmacy Phone: [phone]  Filling Pharmacy Fax:    Start Date: 7/3/2017

## 2017-07-06 NOTE — TELEPHONE ENCOUNTER
Prior Authorization Approval    Authorization Effective Date: 7/3/2017  Authorization Expiration Date: 12/31/2017  Medication: diclofenac (VOLTAREN) 1 % GEL topical gel-APPROVED  Approved Dose/Quantity:   Reference #:     Insurance Company: [employer] - Phone [phone] Fax [phone]  Expected CoPay:       CoPay Card Available:      Foundation Assistance Needed:    Which Pharmacy is filling the prescription (Not needed for infusion/clinic administered): [id number] - [place], [place] - [address] AT [facility] [address] [facility]  Pharmacy Notified: Yes  Patient Notified: YesComment:  PATIENT PAID OUT OF POCKET

## 2017-07-14 ENCOUNTER — MYC MEDICAL ADVICE (OUTPATIENT)
Dept: FAMILY MEDICINE | Facility: CLINIC | Age: 67
End: 2017-07-14

## 2017-07-14 NOTE — TELEPHONE ENCOUNTER
Route to PCP to advise on krill oil. Seen 5/15.  Confirmed that Diclofenac is on med rec.   Oma Richardson RN

## 2017-07-17 NOTE — TELEPHONE ENCOUNTER
I don't need her on fish oil, why is she taking.    Ask MTM thoughts on dosing of krill oil.  Iris Killian MD

## 2017-07-19 DIAGNOSIS — E78.5 HYPERLIPIDEMIA WITH TARGET LDL LESS THAN 100: ICD-10-CM

## 2017-07-19 DIAGNOSIS — I10 HYPERTENSION GOAL BP (BLOOD PRESSURE) < 140/90: ICD-10-CM

## 2017-07-19 DIAGNOSIS — N18.30 CKD (CHRONIC KIDNEY DISEASE) STAGE 3, GFR 30-59 ML/MIN (H): ICD-10-CM

## 2017-07-19 DIAGNOSIS — E03.4 HYPOTHYROIDISM DUE TO ACQUIRED ATROPHY OF THYROID: ICD-10-CM

## 2017-07-19 LAB
ALBUMIN SERPL-MCNC: 3.4 G/DL (ref 3.4–5)
ALP SERPL-CCNC: 95 U/L (ref 40–150)
ALT SERPL W P-5'-P-CCNC: 65 U/L (ref 0–50)
ANION GAP SERPL CALCULATED.3IONS-SCNC: 7 MMOL/L (ref 3–14)
AST SERPL W P-5'-P-CCNC: 31 U/L (ref 0–45)
BILIRUB SERPL-MCNC: 0.3 MG/DL (ref 0.2–1.3)
BUN SERPL-MCNC: 18 MG/DL (ref 7–30)
CALCIUM SERPL-MCNC: 8.9 MG/DL (ref 8.5–10.1)
CHLORIDE SERPL-SCNC: 102 MMOL/L (ref 94–109)
CHOLEST SERPL-MCNC: 250 MG/DL
CO2 SERPL-SCNC: 32 MMOL/L (ref 20–32)
CREAT SERPL-MCNC: 1.31 MG/DL (ref 0.52–1.04)
GFR SERPL CREATININE-BSD FRML MDRD: 40 ML/MIN/1.7M2
GLUCOSE SERPL-MCNC: 97 MG/DL (ref 70–99)
HDLC SERPL-MCNC: 43 MG/DL
LDLC SERPL CALC-MCNC: ABNORMAL MG/DL
LDLC SERPL DIRECT ASSAY-MCNC: 129 MG/DL
NONHDLC SERPL-MCNC: 207 MG/DL
POTASSIUM SERPL-SCNC: 4.4 MMOL/L (ref 3.4–5.3)
PROT SERPL-MCNC: 7 G/DL (ref 6.8–8.8)
SODIUM SERPL-SCNC: 141 MMOL/L (ref 133–144)
T4 FREE SERPL-MCNC: 0.81 NG/DL (ref 0.76–1.46)
TRIGL SERPL-MCNC: 420 MG/DL
TSH SERPL DL<=0.005 MIU/L-ACNC: 4.95 MU/L (ref 0.4–4)

## 2017-07-19 PROCEDURE — 84439 ASSAY OF FREE THYROXINE: CPT | Performed by: FAMILY MEDICINE

## 2017-07-19 PROCEDURE — 36415 COLL VENOUS BLD VENIPUNCTURE: CPT | Performed by: FAMILY MEDICINE

## 2017-07-19 PROCEDURE — 83721 ASSAY OF BLOOD LIPOPROTEIN: CPT | Mod: 59 | Performed by: FAMILY MEDICINE

## 2017-07-19 PROCEDURE — 80061 LIPID PANEL: CPT | Performed by: FAMILY MEDICINE

## 2017-07-19 PROCEDURE — 80053 COMPREHEN METABOLIC PANEL: CPT | Performed by: FAMILY MEDICINE

## 2017-07-19 PROCEDURE — 84443 ASSAY THYROID STIM HORMONE: CPT | Performed by: FAMILY MEDICINE

## 2017-07-19 NOTE — TELEPHONE ENCOUNTER
[employer] message sent to patient with Elisha Briscoe's advice.  Will keep chart open in case she respond's. May close if no response.  Chandrika Kaplan RN

## 2017-07-19 NOTE — TELEPHONE ENCOUNTER
"Ambrosiot and chart reviewed. Patient has diagnosis of hypertriglyceridemia, in which the EPA+DHA found in fish oil may be effective/indicated -- Fish Oil (n-3 PUFAs) has been shown to reduce TG's, decrease blood pressure, platelet aggregation, and inflammation while improving vascular reactivity.   Studies have shown that 1-4g EPA+DHA or an increase in fatty fish consumption has contributed to a reduction in CV events (death, nonfatal MI, and stroke - [facility]; 29% and 33% reduction in total mortality and cardiac death - DART; 19% reduction in major coronary events - JELIS).  Recent Labs   Lab Test  01/25/17   1038  04/20/16   1047   05/04/15   0927  05/28/14   1316   CHOL  240*  250*   < >  217*  225*   HDL  50  50   < >  46*  36*   LDL  134*  140*   < >  112  119   TRIG  280*  298*   < >  295*  351*   CHOLHDLRATIO   --    --    --   4.7  6.2*    < > = values in this interval not displayed.       The impact of krill oil on triglycerides and lipoproteins is not fully elucidated, but available data indicate only modest changes. In contrast, fish oil is well established for triglyceride lowering. Because of this, I typically recommend that people stay on fish oil, as there is more evidence to support its use.    In this patient's case, I would suggest she continue on the fish oil and ensure she is getting around 3-4g of EPA+DHA to help lower her TG's. She is likely not getting this amount with one 1200mg Fish Oil daily, nor will she get this amount from one 500mg Krill Oil daily. I recommend people look for a \"Extra Strength\" or \"Triple Strength\" fish oil, which generally contains close to 1g of EPA+DHA per capsule, and take 3-4 capsules daily. If she is having issues with fishy aftertaste from the fish oil, the supplement should be stored in the refrigerator or freezer and this can eliminate the problem.      Elisha Nolen, MckaylaD., [facility]  Medication Therapy Management Pharmacist  [phone]      "

## 2017-08-01 ENCOUNTER — MYC MEDICAL ADVICE (OUTPATIENT)
Dept: ORTHOPEDICS | Facility: CLINIC | Age: 67
End: 2017-08-01

## 2017-08-03 NOTE — TELEPHONE ENCOUNTER
Sent [employer] message to patient explaining what she needs to do to get her medical records.    Angélica Vegas

## 2017-08-06 ENCOUNTER — OFFICE VISIT (OUTPATIENT)
Dept: URGENT CARE | Facility: URGENT CARE | Age: 67
End: 2017-08-06
Payer: COMMERCIAL

## 2017-08-06 VITALS
WEIGHT: 173.2 LBS | DIASTOLIC BLOOD PRESSURE: 84 MMHG | SYSTOLIC BLOOD PRESSURE: 141 MMHG | OXYGEN SATURATION: 96 % | HEART RATE: 83 BPM | BODY MASS INDEX: 34 KG/M2 | HEIGHT: 60 IN | TEMPERATURE: 98.3 F

## 2017-08-06 DIAGNOSIS — M79.675 GREAT TOE PAIN, LEFT: Primary | ICD-10-CM

## 2017-08-06 DIAGNOSIS — N18.30 CKD (CHRONIC KIDNEY DISEASE) STAGE 3, GFR 30-59 ML/MIN (H): ICD-10-CM

## 2017-08-06 PROCEDURE — 99213 OFFICE O/P EST LOW 20 MIN: CPT | Performed by: FAMILY MEDICINE

## 2017-08-06 RX ORDER — PREDNISONE 10 MG/1
TABLET ORAL
Qty: 32 TABLET | Refills: 0 | Status: SHIPPED | OUTPATIENT
Start: 2017-08-06 | End: 2017-10-25

## 2017-08-06 ASSESSMENT — PAIN SCALES - GENERAL: PAINLEVEL: EXTREME PAIN (8)

## 2017-08-06 NOTE — NURSING NOTE
Chief Complaint   Patient presents with     Foot Pain       Initial /84 (BP Location: Left arm, Patient Position: Sitting, Cuff Size: Adult Regular)  Pulse 83  Temp 98.3  F (36.8  C) (Oral)  Ht 5' (1.524 m)  Wt 173 lb 3.2 oz (78.6 kg)  SpO2 96%  BMI 33.83 kg/m2 Estimated body mass index is 33.83 kg/(m^2) as calculated from the following:    Height as of this encounter: 5' (1.524 m).    Weight as of this encounter: 173 lb 3.2 oz (78.6 kg).  Medication Reconciliation: complete   Ansley Solis MA

## 2017-08-06 NOTE — PATIENT INSTRUCTIONS
Gout Diet  Gout is a painful condition caused by an excess of uric acid, a waste product made by the body. Uric acid forms crystals that collect in the joints. The immune response to these crystals brings on symptoms of joint pain and swelling. This is called a gout attack. Often, medications and diet changes are combined to manage gout. Below are some guidelines for changing your diet to help you manage gout and prevent attacks. Your health care provider will help you determine the best eating plan for you.     Eating to manage gout  Weight loss for those who are overweight may help reduce gout attacks.  Eat less of these foods  Eating too many foods containing purines may raise the levels of uric acid in your body. This raises your risk for a gout attack. Try to limit these foods and drinks:    Alcohol, such as beer and red wine. You may be told to avoid alcohol completely.    Soft drinks that contain sugar or high fructose corn syrup    Certain fish, including anchovies, sardines, fish eggs, and herring    Shellfish    Certain meats, such as red meat, hot dogs, luncheon meats, and turkey    Organ meats, such as liver, kidneys, and sweetbreads    Legumes, such as dried beans and peas    Other high fat foods such as gravy, whole milk, and high fat cheeses    Vegetables such as asparagus, cauliflower, spinach, and mushrooms used to be thought to contribute to an increased risk for a gout attack, but recent studies show that high purine vegetables don't increase the risk for a gout attack.  Eat more of these foods  Other foods may be helpful for people with gout. Add some of these foods to your diet:    Cherries contain chemicals that may lower uric acid.    Omega fatty acids. These are found in some fatty fish such as salmon, certain oils (flax, olive, or nut), and nuts themselves. Omega fatty acids may help prevent inflammation due to gout.    Dairy products that are low-fat or fat-free, such as cheese and  yogurt    Complex carbohydrate foods, including whole grains, brown rice, oats, and beans    Coffee, in moderation    Water, approximately 64 ounces per day  Follow-up care  Follow up with your healthcare provider as advised.  When to seek medical advice  Call your healthcare provider right away if any of these occur:    Return of gout symptoms, usually at night:    Severe pain, swelling, and heat in a joint, especially the base of the big toe    Affected joint is hard to move    Skin of the affected joint is purple or red    Fever of 100.4 F (38 C) or higher    Pain that doesn't get better even with prescribed medicine   Date Last Reviewed: 1/12/2016 2000-2017 [employer]. [address] [place] [address], [place] [address]. All rights reserved. This information is not intended as a substitute for professional medical care. Always follow your healthcare professional's instructions.

## 2017-08-06 NOTE — MR AVS SNAPSHOT
After Visit Summary   8/6/2017    Abbey Omer    MRN: [id number]           Patient Information     Date Of Birth          1950        Visit Information        Provider Department      8/6/2017 12:35 PM Elbert Gandhi MD [facility]        Today's Diagnoses     Great toe pain, left    -  1    CKD (chronic kidney disease) stage 3, GFR 30-59 ml/min          Care Instructions      Gout Diet  Gout is a painful condition caused by an excess of uric acid, a waste product made by the body. Uric acid forms crystals that collect in the joints. The immune response to these crystals brings on symptoms of joint pain and swelling. This is called a gout attack. Often, medications and diet changes are combined to manage gout. Below are some guidelines for changing your diet to help you manage gout and prevent attacks. Your health care provider will help you determine the best eating plan for you.     Eating to manage gout  Weight loss for those who are overweight may help reduce gout attacks.  Eat less of these foods  Eating too many foods containing purines may raise the levels of uric acid in your body. This raises your risk for a gout attack. Try to limit these foods and drinks:    Alcohol, such as beer and red wine. You may be told to avoid alcohol completely.    Soft drinks that contain sugar or high fructose corn syrup    Certain fish, including anchovies, sardines, fish eggs, and herring    Shellfish    Certain meats, such as red meat, hot dogs, luncheon meats, and turkey    Organ meats, such as liver, kidneys, and sweetbreads    Legumes, such as dried beans and peas    Other high fat foods such as gravy, whole milk, and high fat cheeses    Vegetables such as asparagus, cauliflower, spinach, and mushrooms used to be thought to contribute to an increased risk for a gout attack, but recent studies show that high purine vegetables don't increase the risk for a gout attack.  Eat  more of these foods  Other foods may be helpful for people with gout. Add some of these foods to your diet:    Cherries contain chemicals that may lower uric acid.    Omega fatty acids. These are found in some fatty fish such as salmon, certain oils (flax, olive, or nut), and nuts themselves. Omega fatty acids may help prevent inflammation due to gout.    Dairy products that are low-fat or fat-free, such as cheese and yogurt    Complex carbohydrate foods, including whole grains, brown rice, oats, and beans    Coffee, in moderation    Water, approximately 64 ounces per day  Follow-up care  Follow up with your healthcare provider as advised.  When to seek medical advice  Call your healthcare provider right away if any of these occur:    Return of gout symptoms, usually at night:    Severe pain, swelling, and heat in a joint, especially the base of the big toe    Affected joint is hard to move    Skin of the affected joint is purple or red    Fever of 100.4 F (38 C) or higher    Pain that doesn't get better even with prescribed medicine   Date Last Reviewed: 1/12/2016 2000-2017 [employer]. [address] [place] [address]. All rights reserved. This information is not intended as a substitute for professional medical care. Always follow your healthcare professional's instructions.                Follow-ups after your visit        Your next 10 appointments already scheduled     Aug 11, 2017 10:20 AM CDT   PHYSICAL with Iris Killian MD   [facility] ([facility])    [address] [place] [id number] [phone]            Jan 23, 2018  9:00 AM CST   Return Visit with Omar West MD, [facility]   [facility] ([address] [id number] [phone]              Who to contact     If you have questions or need follow up information about today's clinic  visit or your schedule please contact [facility] directly at [phone].  Normal or non-critical lab and imaging results will be communicated to you by MyChart, letter or phone within 4 business days after the clinic has received the results. If you do not hear from us within 7 days, please contact the clinic through [employer] or phone. If you have a critical or abnormal lab result, we will notify you by phone as soon as possible.  Submit refill requests through [employer] or call your pharmacy and they will forward the refill request to us. Please allow 3 business days for your refill to be completed.          Additional Information About Your Visit        [employer] Information     [employer] gives you secure access to your electronic health record. If you see a primary care provider, you can also send messages to your care team and make appointments. If you have questions, please call your primary care clinic.  If you do not have a primary care provider, please call [phone] and they will assist you.        Care EveryWhere ID     This is your Care EveryWhere ID. This could be used by other organizations to access your [place] medical records  [id number]        Your Vitals Were     Pulse Temperature Height Pulse Oximetry BMI (Body Mass Index)       83 98.3  F (36.8  C) (Oral) 5' (1.524 m) 96% 33.83 kg/m2        Blood Pressure from Last 3 Encounters:   08/06/17 141/84   06/15/17 139/75   06/12/17 130/84    Weight from Last 3 Encounters:   08/06/17 173 lb 3.2 oz (78.6 kg)   06/15/17 170 lb (77.1 kg)   06/12/17 175 lb (79.4 kg)              Today, you had the following     No orders found for display         Today's Medication Changes          These changes are accurate as of: 8/6/17  1:02 PM.  If you have any questions, ask your nurse or doctor.               Start taking these medicines.        Dose/Directions    predniSONE 10 MG tablet   Commonly known as:  DELTASONE   Used for:  Great toe pain,  left   Started by:  Elbert Gandhi MD        40 mg (4 tabs) for 3 days  30 mg (3 tabs) for 3 days  20 mg (2 tabs) for 3 days  10 mg (1 tab) for 3 days   Quantity:  32 tablet   Refills:  0            Where to get your medicines      Some of these will need a paper prescription and others can be bought over the counter.  Ask your nurse if you have questions.     Bring a paper prescription for each of these medications     predniSONE 10 MG tablet                Primary Care Provider Office Phone # Fax #    Iris Killian [phone]       [facility] [address] [place] [address]        Equal Access to Services     BHUPINDER SUTHERLAND : Hadii madalyn ignacio hadasho Soplacido, waaxda luqadaha, qaybta kaalmada adedeshawnyada, maximiliano pathak. So [facility] [phone].    ATENCIÓN: Si habla español, tiene a smith disposición servicios gratuitos de asistencia lingüística. Llame al [phone].    We comply with applicable federal civil rights laws and [place] laws. We do not discriminate on the basis of race, color, national origin, age, disability sex, sexual orientation or gender identity.            Thank you!     Thank you for choosing [facility]  for your care. Our goal is always to provide you with excellent care. Hearing back from our patients is one way we can continue to improve our services. Please take a few minutes to complete the written survey that you may receive in the mail after your visit with us. Thank you!             Your Updated Medication List - Protect others around you: Learn how to safely use, store and throw away your medicines at www.disposemymeds.org.          This list is accurate as of: 8/6/17  1:02 PM.  Always use your most recent med list.                   Brand Name Dispense Instructions for use Diagnosis    calcium + D 600-200 MG-UNIT Tabs   Generic drug:  calcium carbonate-vitamin D      1 tablet twice a day         Cranberry 500 MG Caps      Take 1 capsule by mouth daily        diclofenac 1 % Gel topical gel    VOLTAREN    100 g    Apply 4 grams to both shoulder four times daily using enclosed dosing card.    Bilateral shoulder pain, unspecified chronicity       EXCEDRIN TENSION HEADACHE 500-65 MG Tabs   Generic drug:  acetaminophen-caffeine      1 tablet twice daily as needed        Fish Oil 1200 MG Caps      1 capsule daily        FLAXSEED OIL PO      Take 1 Tablespoonful by mouth daily        fluticasone 50 MCG/ACT spray    FLONASE    48 g    Spray 2 sprays into both nostrils daily    Chronic rhinitis       levalbuterol 45 MCG/ACT Inhaler    XOPENEX HFA    15 g    INHALE 2 PUFFS INTO THE LUNGS EVERY 6 HOURS AS NEEDED FOR SHORTNESS OF BREATH OR DIFFICULT BREATHING OR WHEEZING    Mild intermittent asthma without complication       levothyroxine 75 MCG tablet    SYNTHROID/LEVOTHROID    90 tablet    Take 1 tablet (75 mcg) by mouth daily    Hypothyroidism due to acquired atrophy of thyroid       magnesium 500 MG Tabs      Take 1 tablet by mouth daily        MULTIPLE vitamin  S/WOMENS Tabs      Take 2 tablets by mouth daily        order for DME      Equipment ordered: Respironics Auto PAP Mask type: Nasal Settings: 9-15 cm H2O        predniSONE 10 MG tablet    DELTASONE    32 tablet    40 mg (4 tabs) for 3 days  30 mg (3 tabs) for 3 days  20 mg (2 tabs) for 3 days  10 mg (1 tab) for 3 days    Great toe pain, left       ranitidine 300 MG tablet    ZANTAC    90 tablet    Take 1 tablet (300 mg) by mouth At Bedtime    Gastroesophageal reflux disease without esophagitis       spironolactone 50 MG tablet    ALDACTONE    180 tablet    Take 2 tablets (100 mg) by mouth daily    Other acne       STATIN NOT PRESCRIBED (INTENTIONAL)      by Other route continuous prn Reported on 5/15/2017    Hyperlipidemia LDL goal < 100       traZODone 50 MG tablet    DESYREL    90 tablet    Take 1-2 tablets ( mg) by mouth nightly as needed     Insomnia, unspecified type       venlafaxine 150 MG 24 hr capsule    EFFEXOR-XR    180 capsule    2 Capsules daily    Major depressive disorder, recurrent episode, mild degree (H)       VITAMIN B COMPLEX PO           zinc 50 MG Tabs      Take 1 tablet by mouth daily

## 2017-08-06 NOTE — PROGRESS NOTES
SUBJECTIVE:                                                    Abbey Omer is a 67 year old female who presents to clinic today for the following health issues:      Joint Pain    Onset: Friday     Description:   Location: Left foot   Character: Sharp, Burning and throbbing     Intensity: moderate    Progression of Symptoms: better    Accompanying Signs & Symptoms:  Other symptoms: swelling, redness and falling asleep     History:   Previous similar pain: no     No fall, trauma or injury     Precipitating factors:   Trauma or overuse: YES- walking more than usual     Alleviating factors:  Improved by: Mineral ice, and Excedrin     Therapies Tried and outcome: Excedrin       Problem list and histories reviewed & adjusted, as indicated.  Additional history: as documented    Patient Active Problem List   Diagnosis     RICKEY (obstructive sleep apnea)     Hypothyroidism     Chronic nonallergic rhinitis     CKD (chronic kidney disease) stage 3, GFR 30-59 ml/min     Hyperlipidemia with target LDL less than 100     Obesity     History of bladder cancer     Hypertension goal BP (blood pressure) < 140/90     Advance Care Planning     Dependent edema     Elevated LFTs     DDD (degenerative disc disease), cervical     Major depressive disorder, recurrent episode, mild degree (H)     Macular drusen     Hypertriglyceridemia     Allergic conjunctivitis     Pulmonary nodules     Major depressive disorder, recurrent episode, moderate (H)     Acne     Combined form of age-related cataract, both eyes     Glaucoma suspect, bilateral     Drusen of macula of both eyes     Renal cyst, left     Discoid atelectasis     Past Surgical History:   Procedure Laterality Date     CHOLECYSTECTOMY, LAPOROSCOPIC       CYSTOSCOPY      bladder cancer     D & C       HYSTERECTOMY, PAP NO LONGER INDICATED      BSO     NOSE SURGERY      septum deviation       Social History   Substance Use Topics     Smoking status: Former Smoker     Packs/day:  0.10     Years: 1.00     Quit date: 10/12/1969     Smokeless tobacco: Never Used     Alcohol use Yes      Comment: 1 drink a month     Family History   Problem Relation Age of Onset     DIABETES Maternal Grandfather      Hypertension Maternal Grandfather      DIABETES Son      HEART DISEASE Father      CHF     DIABETES Mother      Depression Mother      Substance Abuse Brother      Depression Daughter      DIABETES Maternal Uncle      CEREBROVASCULAR DISEASE Maternal Uncle      Thyroid Disease No family hx of      Glaucoma No family hx of      Macular Degeneration No family hx of      CANCER No family hx of          Current Outpatient Prescriptions   Medication Sig Dispense Refill     diclofenac (VOLTAREN) 1 % GEL topical gel Apply 4 grams to both shoulder four times daily using enclosed dosing card. 100 g 1     levalbuterol (XOPENEX HFA) 45 MCG/ACT Inhaler INHALE 2 PUFFS INTO THE LUNGS EVERY 6 HOURS AS NEEDED FOR SHORTNESS OF BREATH OR DIFFICULT BREATHING OR WHEEZING 15 g 0     B Complex Vitamins (VITAMIN B COMPLEX PO)        levothyroxine (SYNTHROID/LEVOTHROID) 75 MCG tablet Take 1 tablet (75 mcg) by mouth daily 90 tablet 3     traZODone (DESYREL) 50 MG tablet Take 1-2 tablets ( mg) by mouth nightly as needed 90 tablet 3     order for DME Equipment ordered: Respironics Auto PAP Mask type: Nasal Settings: 9-15 cm H2O       venlafaxine (EFFEXOR-XR) 150 MG 24 hr capsule 2 Capsules daily 180 capsule 3     spironolactone (ALDACTONE) 50 MG tablet Take 2 tablets (100 mg) by mouth daily 180 tablet 3     ranitidine (ZANTAC) 300 MG tablet Take 1 tablet (300 mg) by mouth At Bedtime 90 tablet 3     Cranberry 500 MG CAPS Take 1 capsule by mouth daily       fluticasone (FLONASE) 50 MCG/ACT nasal spray Spray 2 sprays into both nostrils daily 48 g 3     Multiple Vitamins-Minerals (MULTIPLE VITAMIN  S/WOMENS) TABS Take 2 tablets by mouth daily       magnesium 500 MG TABS Take 1 tablet by mouth daily       zinc 50 MG TABS  Take 1 tablet by mouth daily       Flaxseed, Linseed, (FLAXSEED OIL PO) Take 1 Tablespoonful by mouth daily       STATIN NOT PRESCRIBED, INTENTIONAL, by Other route continuous prn Reported on 5/15/2017  0     EXCEDRIN TENSION HEADACHE 500-65 MG PO TABS 1 tablet twice daily as needed       FISH OIL 1200 MG OR CAPS 1 capsule daily       CALCIUM + D 600-200 MG-UNIT OR TABS 1 tablet twice a day       Allergies   Allergen Reactions     Amlodipine Swelling     Asa [Aluminum Hydroxide] GI Disturbance     Atenolol Fatigue     Atorvastatin Calcium Swelling     Codeine Nausea     Colestid [Colestipol Hcl]      Crestor [Hmg-Coa-R Inhibitors]      Joint pain     Cymbalta      Visual hallucinations     Hydralazine Fatigue     Lovastatin Cramps     Niacin      Unknown by patient     Paxil [Paroxetine] Fatigue     Potassium GI Disturbance     Sulfa Drugs Fatigue     Zetia [Ezetimibe] Fatigue     Zomig [Zolmitriptan]      Recent Labs   Lab Test  07/19/17   0747  05/08/17   0723  01/25/17   1038  04/20/16   1047   LDL  Cannot estimate LDL when triglyceride exceeds 400 mg/dL  129*   --   134*  140*   HDL  43*   --   50  50   TRIG  420*   --   280*  298*   ALT  65*   --   45  70*   CR  1.31*   --   1.49*   --    GFRESTIMATED  40*   --   35*   --    GFRESTBLACK  49*   --   42*   --    POTASSIUM  4.4   --   4.8   --    TSH  4.95*  4.93*  4.48*  4.24*      BP Readings from Last 3 Encounters:   08/06/17 141/84   06/15/17 139/75   06/12/17 130/84    Wt Readings from Last 3 Encounters:   08/06/17 173 lb 3.2 oz (78.6 kg)   06/15/17 170 lb (77.1 kg)   06/12/17 175 lb (79.4 kg)                  Labs reviewed in EPIC          Reviewed and updated as needed this visit by clinical staffTobacco  Allergies  Meds  Med Hx  Surg Hx  Fam Hx  Soc Hx      Reviewed and updated as needed this visit by Provider         ROS:  Constitutional, HEENT, cardiovascular, pulmonary, gi and gu systems are negative, except as otherwise noted.      OBJECTIVE:    /84 (BP Location: Left arm, Patient Position: Sitting, Cuff Size: Adult Regular)  Pulse 83  Temp 98.3  F (36.8  C) (Oral)  Ht 5' (1.524 m)  Wt 173 lb 3.2 oz (78.6 kg)  SpO2 96%  BMI 33.83 kg/m2  Body mass index is 33.83 kg/(m^2).  GENERAL: alert, no distress and obese  NECK: no adenopathy, no asymmetry, masses, or scars and thyroid normal to palpation  RESP: lungs clear to auscultation - no rales, rhonchi or wheezes  CV: regular rates and rhythm, normal S1 S2, no S3 or S4 and no murmur, click or rub  MS: mildly swollen left 1st MTP joint, warmth and tender to touch, ROM limited, pulses 3+, sensation to touch and pressure intact, gait antalgic   NEURO: Normal strength and tone, mentation intact and speech normal            ASSESSMENT/PLAN:           ICD-10-CM    1. Great toe pain, left M79.675 predniSONE (DELTASONE) 10 MG tablet    suspected gout    2. CKD (chronic kidney disease) stage 3, GFR 30-59 ml/min N18.3        Discussed in detail differentials and further management. Symptoms are likely secondary to acute gout. Known to have CKD stage 3. Prednisone tapering dose prescribed, common side effects discussed. Imaging to be considered if symptoms persist or worsen. Written instructions/information provided. Patient has an appointment with PCP coming Friday. Patient understood and in agreement with the above plan. All questions are answered.       MEDICATIONS:   Orders Placed This Encounter   Medications     predniSONE (DELTASONE) 10 MG tablet     Si mg (4 tabs) for 3 days   30 mg (3 tabs) for 3 days   20 mg (2 tabs) for 3 days   10 mg (1 tab) for 3 days     Dispense:  32 tablet     Refill:  0     Patient Instructions     Gout Diet  Gout is a painful condition caused by an excess of uric acid, a waste product made by the body. Uric acid forms crystals that collect in the joints. The immune response to these crystals brings on symptoms of joint pain and swelling. This is called a gout attack.  Often, medications and diet changes are combined to manage gout. Below are some guidelines for changing your diet to help you manage gout and prevent attacks. Your health care provider will help you determine the best eating plan for you.     Eating to manage gout  Weight loss for those who are overweight may help reduce gout attacks.  Eat less of these foods  Eating too many foods containing purines may raise the levels of uric acid in your body. This raises your risk for a gout attack. Try to limit these foods and drinks:    Alcohol, such as beer and red wine. You may be told to avoid alcohol completely.    Soft drinks that contain sugar or high fructose corn syrup    Certain fish, including anchovies, sardines, fish eggs, and herring    Shellfish    Certain meats, such as red meat, hot dogs, luncheon meats, and turkey    Organ meats, such as liver, kidneys, and sweetbreads    Legumes, such as dried beans and peas    Other high fat foods such as gravy, whole milk, and high fat cheeses    Vegetables such as asparagus, cauliflower, spinach, and mushrooms used to be thought to contribute to an increased risk for a gout attack, but recent studies show that high purine vegetables don't increase the risk for a gout attack.  Eat more of these foods  Other foods may be helpful for people with gout. Add some of these foods to your diet:    Cherries contain chemicals that may lower uric acid.    Omega fatty acids. These are found in some fatty fish such as salmon, certain oils (flax, olive, or nut), and nuts themselves. Omega fatty acids may help prevent inflammation due to gout.    Dairy products that are low-fat or fat-free, such as cheese and yogurt    Complex carbohydrate foods, including whole grains, brown rice, oats, and beans    Coffee, in moderation    Water, approximately 64 ounces per day  Follow-up care  Follow up with your healthcare provider as advised.  When to seek medical advice  Call your healthcare  provider right away if any of these occur:    Return of gout symptoms, usually at night:    Severe pain, swelling, and heat in a joint, especially the base of the big toe    Affected joint is hard to move    Skin of the affected joint is purple or red    Fever of 100.4 F (38 C) or higher    Pain that doesn't get better even with prescribed medicine   Date Last Reviewed: 1/12/2016 2000-2017 [employer]. [address] [place] [address], [place] [id number]. All rights reserved. This information is not intended as a substitute for professional medical care. Always follow your healthcare professional's instructions.            Elbert Gandhi MD  [facility] [address]

## 2017-08-08 DIAGNOSIS — M25.511 BILATERAL SHOULDER PAIN, UNSPECIFIED CHRONICITY: ICD-10-CM

## 2017-08-08 DIAGNOSIS — M25.512 BILATERAL SHOULDER PAIN, UNSPECIFIED CHRONICITY: ICD-10-CM

## 2017-08-08 NOTE — TELEPHONE ENCOUNTER
Rx approved per refill protocol.    diclofenac (VOLTAREN) 1 % GEL topical gel      Last Written Prescription Date: 6/22/17  Last Fill Quantity: 100 g,  # refills: 1   Last Office Visit with [facility], [facility] or [facility] prescribing provider: 6/15/17                                         Next 5 appointments (look out 90 days)     Aug 11, 2017 10:20 AM CDT   [facility] I[facility]ian [facility]   [facility]    [address] [place] [id number] [phone]                  Jf Delgado RN

## 2017-08-11 ENCOUNTER — OFFICE VISIT (OUTPATIENT)
Dept: FAMILY MEDICINE | Facility: CLINIC | Age: 67
End: 2017-08-11
Payer: COMMERCIAL

## 2017-08-11 VITALS
HEART RATE: 76 BPM | BODY MASS INDEX: 33.22 KG/M2 | DIASTOLIC BLOOD PRESSURE: 84 MMHG | TEMPERATURE: 97.7 F | WEIGHT: 169.2 LBS | SYSTOLIC BLOOD PRESSURE: 136 MMHG | HEIGHT: 60 IN

## 2017-08-11 DIAGNOSIS — E03.4 HYPOTHYROIDISM DUE TO ACQUIRED ATROPHY OF THYROID: ICD-10-CM

## 2017-08-11 DIAGNOSIS — E78.5 HYPERLIPIDEMIA WITH TARGET LDL LESS THAN 100: ICD-10-CM

## 2017-08-11 DIAGNOSIS — Z12.11 COLON CANCER SCREENING: ICD-10-CM

## 2017-08-11 DIAGNOSIS — G47.00 INSOMNIA, UNSPECIFIED TYPE: ICD-10-CM

## 2017-08-11 DIAGNOSIS — R79.89 ELEVATED LFTS: ICD-10-CM

## 2017-08-11 DIAGNOSIS — N18.30 CKD (CHRONIC KIDNEY DISEASE) STAGE 3, GFR 30-59 ML/MIN (H): ICD-10-CM

## 2017-08-11 DIAGNOSIS — Z00.01 ENCOUNTER FOR PREVENTATIVE ADULT HEALTH CARE EXAM WITH ABNORMAL FINDINGS: Primary | ICD-10-CM

## 2017-08-11 DIAGNOSIS — E78.1 HYPERTRIGLYCERIDEMIA: ICD-10-CM

## 2017-08-11 DIAGNOSIS — M10.9 ACUTE GOUT INVOLVING TOE OF LEFT FOOT, UNSPECIFIED CAUSE: ICD-10-CM

## 2017-08-11 PROCEDURE — 99213 OFFICE O/P EST LOW 20 MIN: CPT | Mod: 25 | Performed by: FAMILY MEDICINE

## 2017-08-11 PROCEDURE — 99397 PER PM REEVAL EST PAT 65+ YR: CPT | Performed by: FAMILY MEDICINE

## 2017-08-11 PROCEDURE — 36415 COLL VENOUS BLD VENIPUNCTURE: CPT | Performed by: FAMILY MEDICINE

## 2017-08-11 PROCEDURE — 84550 ASSAY OF BLOOD/URIC ACID: CPT | Performed by: FAMILY MEDICINE

## 2017-08-11 RX ORDER — TRAZODONE HYDROCHLORIDE 50 MG/1
50-100 TABLET, FILM COATED ORAL
Qty: 180 TABLET | Refills: 3 | Status: SHIPPED | OUTPATIENT
Start: 2017-08-11 | End: 2018-03-12 | Stop reason: DRUGHIGH

## 2017-08-11 RX ORDER — LEVOTHYROXINE SODIUM 88 UG/1
88 TABLET ORAL DAILY
Qty: 90 TABLET | Refills: 1 | Status: SHIPPED | OUTPATIENT
Start: 2017-08-11 | End: 2018-01-24

## 2017-08-11 ASSESSMENT — PATIENT HEALTH QUESTIONNAIRE - PHQ9: SUM OF ALL RESPONSES TO PHQ QUESTIONS 1-9: 0

## 2017-08-11 NOTE — PATIENT INSTRUCTIONS
Preventive Health Recommendations  Female Ages 65 +    Yearly exam:     See your health care provider every year in order to  o Review health changes.   o Discuss preventive care.    o Review your medicines if your doctor has prescribed any.      You no longer need a yearly Pap test unless you've had an abnormal Pap test in the past 10 years. If you have vaginal symptoms, such as bleeding or discharge, be sure to talk with your provider about a Pap test.      Every 1 to 2 years, have a mammogram.  If you are over 69, talk with your health care provider about whether or not you want to continue having screening mammograms.      Every 10 years, have a colonoscopy. Or, have a yearly FIT test (stool test). These exams will check for colon cancer.       Have a cholesterol test every 5 years, or more often if your doctor advises it.       Have a diabetes test (fasting glucose) every three years. If you are at risk for diabetes, you should have this test more often.       At age 65, have a bone density scan (DEXA) to check for osteoporosis (brittle bone disease).    Shots:    Get a flu shot each year.    Get a tetanus shot every 10 years.    Talk to your doctor about your pneumonia vaccines. There are now two you should receive - Pneumovax (PPSV 23) and Prevnar (PCV 13).    Talk to your doctor about the shingles vaccine.    Talk to your doctor about the hepatitis B vaccine.    Nutrition:     Eat at least 5 servings of fruits and vegetables each day.      Eat whole-grain bread, whole-wheat pasta and brown rice instead of white grains and rice.      Talk to your provider about Calcium and Vitamin D.     Lifestyle    Exercise at least 150 minutes a week (30 minutes a day, 5 days a week). This will help you control your weight and prevent disease.      Limit alcohol to one drink per day.      No smoking.       Wear sunscreen to prevent skin cancer.       See your dentist twice a year for an exam and cleaning.      See your  eye doctor every 1 to 2 years to screen for conditions such as glaucoma, macular degeneration, cataracts, etc     Irritable Bowel Syndrome (IBS) is a very common problem. About 1 in 6 Americans have it, and it is more common in women.  People who have IBS get stomach pain, feel bloated, and have changes in their bowel habits. This can be constipation, diarrhea, or phases of either one.  There is nothing life-threatening or dangerous about IBS, but it can be uncomfortable and difficult to get rid of.  The cause of IBS is not clear, which makes it even harder to treat.    There are a few different things that doctors and patients have tried in order to treat IBS.   If the IBS symptoms are linked to emotions like stress and anxiety, then the symptoms might get better if the emotions are resolved.  Another thing the doctor might try is prescribing antibiotics, like rifaximin.  If the main problem is constipation, then eating more fiber might help.      Some doctors in [place], and now in the [place], are recommending a diet called the  Low FODMAP  diet.  It means a diet low in  Fermentable Oligosaccharides, Disaccharides, Monosaccharides, And Polyols.   These are different types of sugars that are found in food, which the body does not absorb very well.  When they reach the large intestine, the bacteria that live there can ferment them and make gas.  Since this gas can leave people feeling bloated and gassy, cutting out these sugars might make IBS patients feel better.  Also, these sugars can pull water into the gut and cause diarrhea.  Why FODMAP foods cause these symptoms in people with IBS, but not in everyone, is unclear.    In 2011, there was a study comparing a low FODMAP diet to standard diet advice for patients with IBS. The people on the low FODMAP diet had less bloating, diarrhea, gas, and stomach pain.  They did have to avoid a lot of different foods, though.  Cutting out foods with those FODMAP sugars means you  can t have apples, watermelon, high fructose corn syrup, soft cheese, garlic, onions, artificial sweeteners, large portions of wheat-products, or a lot of other foods.      If you are experiencing symptoms of IBS, you may want to try a low FODMAP diet.  It might seem very restricted, but there are still a lot of foods that you can eat.  There are a few good sources online to help get you started, but the FODMAP diet is still new; not every food has been analyzed to measure the FODMAP sugars.  Try cutting out some of the foods known to have a lot of FODMAP sugars, and keep a diary of you symptoms to see if they change.  A few lifestyle changes could spare you a lot of stomach upset and bowel issues.        Here is a list of food to AVOID if you follow the low FODMAP diet:  Fruit: apple, sabrina, pear, canned fruit in fruit juice, watermelon, large servings of fruit, dried, fruit, fruit juices, apple, persimmon, apricot, avocado, blackberry, cherry, lychee, nectarine, peach, pear, plum, prune  Sweeteners: fructose, high fructose corn syrup, honey, sorbitol, mannitol, xylitol, isomalt, maltritol  Milk: milk from cows, goals, sheep, custard, ice cream, yogurt.  Cheeses: soft unripened cheeses (examples-- cottage cheese, cream cheese, ricotta, mascarpone)  Vegetables: artichoke, asparagus, beetroot, broccoli, brussel spouts, cabbage, eggplant, fennel, garlic, doron, okra, onion, shallots, spring onion, cauliflower, bell pepper, mushroom, sweet corn  Cereals: wheat and rye in large amounts (crackers, bread, cookies, couscous, pasta)  Legumes: baked beans, chickpeas, kidney beans, lentils, soybeans.    Here is a list of foods that you CAN eat on a low FODMAP diet:   Fruit: banana, blueberry, boysenberry, cantaloupe, cranberry, grape, grapefruit, honeydew melon, kiwi, lemon, lime, oranges, passion fruit, raspberry, rhubarb, strawberry, tangelo.  Vegetables: alfalfa, bamboo shoots, bean sprouts, bok rocio, carrot, celery,  endive, latrice, green beans, lettuce, olives, parsnip, potato, pumpkin, red bell pepper, silver beet, spinach, squash, sweet potato, taro, tomato, turnip, yam, zucchini.   Herbs: basil, chili, coriander, latrice, lemongrass, marjoram, mint, oregano, parsely, rosemary, thyme  Cereals: gluten free bread of cereal products  Bread: 100% spelt bread  Rice  Oats  Polenta  Other: arrowroot, millet, psyllium, quinoa, sorghum, tapioca  Milk: lactose-free milk, oat milk, rice milk, soy milk  Cheeses: hard cheeses, brie, camembert  Lactose-free yogurt  Ice cream substitutes: gelato, sorbet.  Butter substitutes: olive oil  Tofu  Sweeteners: sugar, li syrup, maple syrup, molasses.

## 2017-08-11 NOTE — PROGRESS NOTES
SUBJECTIVE:   Abbey Omer is a 67 year old female who presents for Preventive Visit.    Are you in the first 12 months of your Medicare coverage?  No    Physical   Annual:     Getting at least 3 servings of Calcium per day::  NO    Bi-annual eye exam::  Yes    Dental care twice a year::  Yes    Sleep apnea or symptoms of sleep apnea::  Daytime drowsiness, Excessive snoring and Sleep apnea    Diet::  Low salt and Low fat/cholesterol    Frequency of exercise::  1 day/week    Duration of exercise::  N/A    Taking medications regularly::  Yes      COGNITIVE SCREEN  1) Repeat 3 items (Banana, Sunrise, Chair)    2) Clock draw: NORMAL  3) 3 item recall: Recalls 3 objects  Results: 3 items recalled: COGNITIVE IMPAIRMENT LESS LIKELY    Mini-CogTM Copyright S Makayla. Licensed by the author for use in [facility] [employer]; reprinted with permission ([email or web address]). All rights reserved.        Annual Physical:          Reviewed and updated as needed this visit by clinical staffTobacco  Allergies  Meds  Med Hx  Surg Hx  Fam Hx  Soc Hx        Reviewed and updated as needed this visit by Provider        Social History   Substance Use Topics     Smoking status: Former Smoker     Packs/day: 0.10     Years: 1.00     Types: Cigarettes     Start date: 8/12/1969     Quit date: 10/12/1969     Smokeless tobacco: Never Used      Comment: Smoked, very little, for 2 mo, 49 years ago.     Alcohol use Yes      Comment: Maybe 1 or 2 drinks a year       The patient does not drink >3 drinks per day nor >7 drinks per week.        Other Concerns:  -- Go over lab results-- patient is fasting today in case more blood work is needed  --Sunday went to  [facility]  for Gout- pain is better  -- F/u on Shoulder Pain-- has appt for 2nd opinion coming up with Sports Medicine/Surgeon  -- Any insight on Fod Map ?    Gout-started prednisone and feeling much better  After eating can get loose stools and diarrhea.  Can be 1-3 stools before  can move on      Today's PHQ-2 Score:   PHQ-2 ( 1999 [employer]) 8/11/2017   Q1: Little interest or pleasure in doing things 1   Q2: Feeling down, depressed or hopeless 0   PHQ-2 Score 1   Q1: Little interest or pleasure in doing things Several days   Q2: Feeling down, depressed or hopeless Not at all   PHQ-2 Score 1       Do you feel safe in your environment - Yes    Do you have a Health Care Directive?: Yes: Advance Directive has been received and scanned.    Current providers sharing in care for this patient include:   Patient Care Team:  Iris Killian MD as PCP - General (Family Practice)      Hearing impairment: Yes,  sometimes    Ability to successfully perform activities of daily living: No, needs assistance with:  mainly with lifting     Fall risk:  Fallen 2 or more times in the past year?: No  Any fall with injury in the past year?: No      Home safety:  none identified  click delete button to remove this line now    The following health maintenance items are reviewed in Epic and correct as of today:  Health Maintenance   Topic Date Due     EYE EXAM Q1 YEAR  02/17/2017     FALL RISK ASSESSMENT  04/20/2017     INFLUENZA VACCINE (SYSTEM ASSIGNED)  09/01/2017     BMP Q3 MO  10/19/2017     TSH Q3 MOS  10/19/2017     COLON CANCER SCREEN (SYSTEM ASSIGNED)  10/23/2017     LIPID MONITORING Q6 MO  01/19/2018     HEMOGLOBIN Q1 YR  01/25/2018     MICROALBUMIN Q1 YEAR  01/25/2018     PHQ-9 Q6 MONTHS  02/11/2018     MAMMO SCREEN Q2 YR (SYSTEM ASSIGNED)  05/24/2019     ADVANCE DIRECTIVE PLANNING Q5 YRS  04/25/2021     TETANUS IMMUNIZATION (SYSTEM ASSIGNED)  03/06/2023     DEXA SCAN SCREENING (SYSTEM ASSIGNED)  Completed     PNEUMOCOCCAL  Completed     HEPATITIS C SCREENING  Completed             ROS:    Constitutional, HEENT, cardiovascular, pulmonary, GI, , musculoskeletal, neuro, skin, endocrine and psych systems are negative, except as otherwise noted.      OBJECTIVE:   /84 (BP Location: Right arm, Patient  Position: Chair, Cuff Size: Adult Large)  Pulse 76  Temp 97.7  F (36.5  C) (Oral)  Ht 5' (1.524 m)  Wt 169 lb 3.2 oz (76.7 kg)  BMI 33.04 kg/m2 Estimated body mass index is 33.04 kg/(m^2) as calculated from the following:    Height as of this encounter: 5' (1.524 m).    Weight as of this encounter: 169 lb 3.2 oz (76.7 kg).  EXAM:   GENERAL APPEARANCE: healthy, alert and no distress, overweight  EYES: Eyes grossly normal to inspection, PERRL and conjunctivae and sclerae normal  HENT: ear canals and TM's normal, nose and mouth without ulcers or lesions, oropharynx clear and oral mucous membranes moist  NECK: no adenopathy, no asymmetry, masses, or scars and thyroid normal to palpation  RESP: lungs clear to auscultation - no rales, rhonchi or wheezes  BREAST: normal without masses, tenderness or nipple discharge and no palpable axillary masses or adenopathy  CV: regular rate and rhythm, normal S1 S2, no S3 or S4, no murmur, click or rub, no peripheral edema and peripheral pulses strong  ABDOMEN: soft, nontender, no hepatosplenomegaly, no masses and bowel sounds normal  MS: no swelling of MTP joints.  no musculoskeletal defects are noted and gait is age appropriate without ataxia  SKIN: no suspicious lesions or rashes  NEURO: Normal strength and tone, sensory exam grossly normal, mentation intact and speech normal  PSYCH: mentation appears normal and affect normal/bright    ASSESSMENT / PLAN:   1. Encounter for preventative adult health care exam with abnormal findings      2. Insomnia, unspecified type  Chronic.  Encourage consistent use of cpap  Chronic, stable,  continue current medication, refill done if needed    - traZODone (DESYREL) 50 MG tablet; Take 1-2 tablets ( mg) by mouth nightly as needed  Dispense: 180 tablet; Refill: 3    3. Acute gout involving toe of left foot, unspecified cause  New diagnosis  Improving with prednisone  Reviewed triggers of gout and she has received patient information  -  Uric acid    May need to consider allopurinol    4. Hypothyroidism due to acquired atrophy of thyroid  Reviewed recent TSH  Will adjust levothyroxine today and recheck labs in 3 months    - levothyroxine (SYNTHROID/LEVOTHROID) 88 MCG tablet; Take 1 tablet (88 mcg) by mouth daily  Dispense: 90 tablet; Refill: 1  - **TSH with free T4 reflex FUTURE anytime; Future    5. CKD (chronic kidney disease) stage 3, GFR 30-59 ml/min  Stable, recommend no nsaids  - **Comprehensive metabolic panel FUTURE anytime; Future    6. Hyperlipidemia with target LDL less than 100  She wants to work on nutrition changes.   She declines statin  Understands her cardiac risk  - **Lipid panel reflex to direct LDL FUTURE anytime; Future    7. Elevated LFTs  Recommend weight loss, low carbs, low sweets  - **Comprehensive metabolic panel FUTURE anytime; Future    8. Hypertriglyceridemia  She wants to work on nutrition changes.   She declines statin  Understands her cardiac risk      9. Colon cancer screening    - GASTROENTEROLOGY ADULT REF PROCEDURE ONLY    End of Life Planning:  Patient currently has an advanced directive: Yes.  Practitioner is supportive of decision.    COUNSELING:  Reviewed preventive health counseling, as reflected in patient instructions       Regular exercise       Healthy diet/nutrition       Vision screening       Hearing screening       Dental care       Osteoporosis Prevention/Bone Health       Colon cancer screening       The 10-year ASCVD risk score (Vansantaracelis ALLEN Jr, et al., 2013) is: 10%    Values used to calculate the score:      Age: 67 years      Sex: Female      Is Non- : No      Diabetic: No      Tobacco smoker: No      Systolic Blood Pressure: 136 mmHg      Is BP treated: No      HDL Cholesterol: 43 mg/dL      Total Cholesterol: 250 mg/dL       Advanced Planning       BP Screening:   Last 3 BP Readings:    BP Readings from Last 3 Encounters:   08/11/17 136/84   08/06/17 141/84   06/15/17  139/75       The following was recommended to the patient:  Re-screen BP within a year and recommended lifestyle modifications  Estimated body mass index is 33.04 kg/(m^2) as calculated from the following:    Height as of this encounter: 5' (1.524 m).    Weight as of this encounter: 169 lb 3.2 oz (76.7 kg).  Weight management plan: Discussed healthy diet and exercise guidelines and patient will follow up in 12 months in clinic to re-evaluate.   reports that she quit smoking about 47 years ago. Her smoking use included Cigarettes. She started smoking about 48 years ago. She has a 0.10 pack-year smoking history. She has never used smokeless tobacco.        Appropriate preventive services were discussed with this patient, including applicable screening as appropriate for cardiovascular disease, diabetes, osteopenia/osteoporosis, and glaucoma.  As appropriate for age/gender, discussed screening for colorectal cancer, prostate cancer, breast cancer, and cervical cancer. Checklist reviewing preventive services available has been given to the patient.    Reviewed patients plan of care and provided an AVS. The Basic Care Plan (routine screening as documented in Health Maintenance) for Abbey meets the Care Plan requirement. This Care Plan has been established and reviewed with the Patient.    Counseling Resources:  ATP IV Guidelines  Pooled Cohorts Equation Calculator  Breast Cancer Risk Calculator  FRAX Risk Assessment  ICSI Preventive Guidelines  Dietary Guidelines for Americans, 2010  USDA's MyPlate  ASA Prophylaxis  Lung CA Screening    Iris Killian MD  [facility]

## 2017-08-11 NOTE — NURSING NOTE
Chief Complaint   Patient presents with     Physical       Initial /84 (BP Location: Right arm, Patient Position: Chair, Cuff Size: Adult Large)  Pulse 76  Temp 97.7  F (36.5  C) (Oral)  Ht 5' (1.524 m)  Wt 169 lb 3.2 oz (76.7 kg)  BMI 33.04 kg/m2 Estimated body mass index is 33.04 kg/(m^2) as calculated from the following:    Height as of this encounter: 5' (1.524 m).    Weight as of this encounter: 169 lb 3.2 oz (76.7 kg).  Medication Reconciliation: complete

## 2017-08-11 NOTE — MR AVS SNAPSHOT
After Visit Summary   8/11/2017    Abbey Omer    MRN: [id number]           Patient Information     Date Of Birth          1950        Visit Information        Provider Department      8/11/2017 10:20 AM Iris Killian MD [facility]        Today's Diagnoses     Encounter for preventative adult health care exam with abnormal findings    -  1    Insomnia, unspecified type        Acute gout involving toe of left foot, unspecified cause        Hypothyroidism due to acquired atrophy of thyroid        CKD (chronic kidney disease) stage 3, GFR 30-59 ml/min        Hyperlipidemia with target LDL less than 100        Elevated LFTs        Hypertriglyceridemia        Colon cancer screening          Care Instructions      Preventive Health Recommendations  Female Ages 65 +    Yearly exam:     See your health care provider every year in order to  o Review health changes.   o Discuss preventive care.    o Review your medicines if your doctor has prescribed any.      You no longer need a yearly Pap test unless you've had an abnormal Pap test in the past 10 years. If you have vaginal symptoms, such as bleeding or discharge, be sure to talk with your provider about a Pap test.      Every 1 to 2 years, have a mammogram.  If you are over 69, talk with your health care provider about whether or not you want to continue having screening mammograms.      Every 10 years, have a colonoscopy. Or, have a yearly FIT test (stool test). These exams will check for colon cancer.       Have a cholesterol test every 5 years, or more often if your doctor advises it.       Have a diabetes test (fasting glucose) every three years. If you are at risk for diabetes, you should have this test more often.       At age 65, have a bone density scan (DEXA) to check for osteoporosis (brittle bone disease).    Shots:    Get a flu shot each year.    Get a tetanus shot every 10 years.    Talk to your doctor about  your pneumonia vaccines. There are now two you should receive - Pneumovax (PPSV 23) and Prevnar (PCV 13).    Talk to your doctor about the shingles vaccine.    Talk to your doctor about the hepatitis B vaccine.    Nutrition:     Eat at least 5 servings of fruits and vegetables each day.      Eat whole-grain bread, whole-wheat pasta and brown rice instead of white grains and rice.      Talk to your provider about Calcium and Vitamin D.     Lifestyle    Exercise at least 150 minutes a week (30 minutes a day, 5 days a week). This will help you control your weight and prevent disease.      Limit alcohol to one drink per day.      No smoking.       Wear sunscreen to prevent skin cancer.       See your dentist twice a year for an exam and cleaning.      See your eye doctor every 1 to 2 years to screen for conditions such as glaucoma, macular degeneration, cataracts, etc     Irritable Bowel Syndrome (IBS) is a very common problem. About 1 in 6 Americans have it, and it is more common in women.  People who have IBS get stomach pain, feel bloated, and have changes in their bowel habits. This can be constipation, diarrhea, or phases of either one.  There is nothing life-threatening or dangerous about IBS, but it can be uncomfortable and difficult to get rid of.  The cause of IBS is not clear, which makes it even harder to treat.    There are a few different things that doctors and patients have tried in order to treat IBS.   If the IBS symptoms are linked to emotions like stress and anxiety, then the symptoms might get better if the emotions are resolved.  Another thing the doctor might try is prescribing antibiotics, like rifaximin.  If the main problem is constipation, then eating more fiber might help.      Some doctors in [place], and now in the [place], are recommending a diet called the  Low FODMAP  diet.  It means a diet low in  Fermentable Oligosaccharides, Disaccharides, Monosaccharides, And Polyols.   These are  different types of sugars that are found in food, which the body does not absorb very well.  When they reach the large intestine, the bacteria that live there can ferment them and make gas.  Since this gas can leave people feeling bloated and gassy, cutting out these sugars might make IBS patients feel better.  Also, these sugars can pull water into the gut and cause diarrhea.  Why FODMAP foods cause these symptoms in people with IBS, but not in everyone, is unclear.    In 2011, there was a study comparing a low FODMAP diet to standard diet advice for patients with IBS. The people on the low FODMAP diet had less bloating, diarrhea, gas, and stomach pain.  They did have to avoid a lot of different foods, though.  Cutting out foods with those FODMAP sugars means you can t have apples, watermelon, high fructose corn syrup, soft cheese, garlic, onions, artificial sweeteners, large portions of wheat-products, or a lot of other foods.      If you are experiencing symptoms of IBS, you may want to try a low FODMAP diet.  It might seem very restricted, but there are still a lot of foods that you can eat.  There are a few good sources online to help get you started, but the FODMAP diet is still new; not every food has been analyzed to measure the FODMAP sugars.  Try cutting out some of the foods known to have a lot of FODMAP sugars, and keep a diary of you symptoms to see if they change.  A few lifestyle changes could spare you a lot of stomach upset and bowel issues.        Here is a list of food to AVOID if you follow the low FODMAP diet:  Fruit: apple, sabrina, pear, canned fruit in fruit juice, watermelon, large servings of fruit, dried, fruit, fruit juices, apple, persimmon, apricot, avocado, blackberry, cherry, lychee, nectarine, peach, pear, plum, prune  Sweeteners: fructose, high fructose corn syrup, honey, sorbitol, mannitol, xylitol, isomalt, maltritol  Milk: milk from cows, goals, sheep, custard, ice cream,  yogurt.  Cheeses: soft unripened cheeses (examples-- cottage cheese, cream cheese, ricotta, mascarpone)  Vegetables: artichoke, asparagus, beetroot, broccoli, brussel spouts, cabbage, eggplant, fennel, garlic, doron, okra, onion, shallots, spring onion, cauliflower, bell pepper, mushroom, sweet corn  Cereals: wheat and rye in large amounts (crackers, bread, cookies, couscous, pasta)  Legumes: baked beans, chickpeas, kidney beans, lentils, soybeans.    Here is a list of foods that you CAN eat on a low FODMAP diet:   Fruit: banana, blueberry, boysenberry, cantaloupe, cranberry, grape, grapefruit, honeydew melon, kiwi, lemon, lime, oranges, passion fruit, raspberry, rhubarb, strawberry, tangelo.  Vegetables: alfalfa, bamboo shoots, bean sprouts, bok rocio, carrot, celery, endive, latrice, green beans, lettuce, olives, parsnip, potato, pumpkin, red bell pepper, silver beet, spinach, squash, sweet potato, taro, tomato, turnip, yam, zucchini.   Herbs: basil, chili, coriander, latrice, lemongrass, marjoram, mint, oregano, parsely, rosemary, thyme  Cereals: gluten free bread of cereal products  Bread: 100% spelt bread  Rice  Oats  Polenta  Other: arrowroot, millet, psyllium, quinoa, sorghum, tapioca  Milk: lactose-free milk, oat milk, rice milk, soy milk  Cheeses: hard cheeses, brie, camembert  Lactose-free yogurt  Ice cream substitutes: gelato, sorbet.  Butter substitutes: olive oil  Tofu  Sweeteners: sugar, li syrup, maple syrup, molasses.                   Follow-ups after your visit        Additional Services     GASTROENTEROLOGY ADULT REF PROCEDURE ONLY       Last Lab Result: Creatinine (mg/dL)       Date                     Value                 07/19/2017               1.31 (H)         ----------  Body mass index is 33.04 kg/(m^2).     Needed:  No  Language:  English    Patient will be contacted to schedule procedure.     Please be aware that coverage of these services is subject to the terms and  limitations of your health insurance plan.  Call member services at your health plan with any benefit or coverage questions.  Any procedures must be performed at a [facility] facility OR coordinated by your clinic's referral office.    Please bring the following with you to your appointment:    (1) Any X-Rays, CTs or MRIs which have been performed.  Contact the facility where they were done to arrange for  prior to your scheduled appointment.    (2) List of current medications   (3) This referral request   (4) Any documents/labs given to you for this referral                  Your next 10 appointments already scheduled     Jan 23, 2018  9:00 AM CST   Return Visit with Omar West MD, [facility] PROC ROOM   [facility] ([address] [id number] [phone]              Future tests that were ordered for you today     Open Future Orders        Priority Expected Expires Ordered    **Lipid panel reflex to direct LDL FUTURE anytime Routine 8/11/2017 8/11/2018 8/11/2017    **Comprehensive metabolic panel FUTURE anytime Routine 8/11/2017 8/11/2018 8/11/2017    **TSH with free T4 reflex FUTURE anytime Routine 8/11/2017 8/11/2018 8/11/2017            Who to contact     If you have questions or need follow up information about today's clinic visit or your schedule please contact [facility] directly at [phone].  Normal or non-critical lab and imaging results will be communicated to you by MyChart, letter or phone within 4 business days after the clinic has received the results. If you do not hear from us within 7 days, please contact the clinic through MyChart or phone. If you have a critical or abnormal lab result, we will notify you by phone as soon as possible.  Submit refill requests through [employer] or call your pharmacy and they will forward the refill request to us. Please allow 3 business days for your refill to be  completed.          Additional Information About Your Visit        [employer] Information     [employer] gives you secure access to your electronic health record. If you see a primary care provider, you can also send messages to your care team and make appointments. If you have questions, please call your primary care clinic.  If you do not have a primary care provider, please call [phone] and they will assist you.        Care EveryWhere ID     This is your Care EveryWhere ID. This could be used by other organizations to access your [facility] medical records  [id number]        Your Vitals Were     Pulse Temperature Height BMI (Body Mass Index)          76 97.7  F (36.5  C) (Oral) 5' (1.524 m) 33.04 kg/m2         Blood Pressure from Last 3 Encounters:   08/11/17 136/84   08/06/17 141/84   06/15/17 139/75    Weight from Last 3 Encounters:   08/11/17 169 lb 3.2 oz (76.7 kg)   08/06/17 173 lb 3.2 oz (78.6 kg)   06/15/17 170 lb (77.1 kg)              We Performed the Following     GASTROENTEROLOGY ADULT REF PROCEDURE ONLY     Uric acid          Today's Medication Changes          These changes are accurate as of: 8/11/17 11:35 AM.  If you have any questions, ask your nurse or doctor.               These medicines have changed or have updated prescriptions.        Dose/Directions    levothyroxine 88 MCG tablet   Commonly known as:  SYNTHROID/LEVOTHROID   This may have changed:    - medication strength  - how much to take   Used for:  Hypothyroidism due to acquired atrophy of thyroid   Changed by:  Iris Killian MD        Dose:  88 mcg   Take 1 tablet (88 mcg) by mouth daily   Quantity:  90 tablet   Refills:  1            Where to get your medicines      These medications were sent to "[id number] - [place], [place] - [address] AT [facility] [address], [place] [address]     Phone:  [phone]     levothyroxine 88 MCG tablet    traZODone 50 MG  tablet                Primary Care Provider Office Phone # Fax #    Iris Killian [phone] [address] [facility] [address]        Equal Access to Services     BHUPINDER SUTHERLAND : Hadii aad ku hadandrésjazmyn Deandre, waaxda luqadaha, qaybta kaalmada franklin, maximiliano galdamez laMarybethdanilo pathak. So [facility] [phone].    ATENCIÓN: Si habla español, tiene a smith disposición servicios gratuitos de asistencia lingüística. Llame al [phone].    We comply with applicable federal civil rights laws and [place] laws. We do not discriminate on the basis of race, color, national origin, age, disability sex, sexual orientation or gender identity.            Thank you!     Thank you for choosing [facility]  for your care. Our goal is always to provide you with excellent care. Hearing back from our patients is one way we can continue to improve our services. Please take a few minutes to complete the written survey that you may receive in the mail after your visit with us. Thank you!             Your Updated Medication List - Protect others around you: Learn how to safely use, store and throw away your medicines at www.disposemymeds.org.          This list is accurate as of: 8/11/17 11:35 AM.  Always use your most recent med list.                   Brand Name Dispense Instructions for use Diagnosis    calcium + D 600-200 MG-UNIT Tabs   Generic drug:  calcium carbonate-vitamin D      1 tablet twice a day        Cranberry 500 MG Caps      Take 1 capsule by mouth daily        diclofenac 1 % Gel topical gel    VOLTAREN    100 g    Apply 4 grams to both shoulder four times daily using enclosed dosing card.    Bilateral shoulder pain, unspecified chronicity       EXCEDRIN TENSION HEADACHE 500-65 MG Tabs   Generic drug:  acetaminophen-caffeine      1 tablet twice daily as needed        Fish Oil 1200 MG Caps      3 capsule daily        FLAXSEED OIL PO      Take 1 Tablespoonful  by mouth daily        fluticasone 50 MCG/ACT spray    FLONASE    48 g    Spray 2 sprays into both nostrils daily    Chronic rhinitis       KRILL OIL PO      Take 1 capsule by mouth daily        levalbuterol 45 MCG/ACT Inhaler    XOPENEX HFA    15 g    INHALE 2 PUFFS INTO THE LUNGS EVERY 6 HOURS AS NEEDED FOR SHORTNESS OF BREATH OR DIFFICULT BREATHING OR WHEEZING    Mild intermittent asthma without complication       levothyroxine 88 MCG tablet    SYNTHROID/LEVOTHROID    90 tablet    Take 1 tablet (88 mcg) by mouth daily    Hypothyroidism due to acquired atrophy of thyroid       magnesium 500 MG Tabs      Take 1 tablet by mouth daily        MULTIPLE vitamin  S/WOMENS Tabs      Take 2 tablets by mouth daily        order for DME      Equipment ordered: Respironics Auto PAP Mask type: Nasal Settings: 9-15 cm H2O        predniSONE 10 MG tablet    DELTASONE    32 tablet    40 mg (4 tabs) for 3 days  30 mg (3 tabs) for 3 days  20 mg (2 tabs) for 3 days  10 mg (1 tab) for 3 days    Great toe pain, left       ranitidine 300 MG tablet    ZANTAC    90 tablet    Take 1 tablet (300 mg) by mouth At Bedtime    Gastroesophageal reflux disease without esophagitis       spironolactone 50 MG tablet    ALDACTONE    180 tablet    Take 2 tablets (100 mg) by mouth daily    Other acne       STATIN NOT PRESCRIBED (INTENTIONAL)      by Other route continuous prn Reported on 5/15/2017    Hyperlipidemia LDL goal < 100       traZODone 50 MG tablet    DESYREL    180 tablet    Take 1-2 tablets ( mg) by mouth nightly as needed    Insomnia, unspecified type       venlafaxine 150 MG 24 hr capsule    EFFEXOR-XR    180 capsule    2 Capsules daily    Major depressive disorder, recurrent episode, mild degree (H)       VITAMIN B COMPLEX PO           zinc 50 MG Tabs      Take 1 tablet by mouth daily

## 2017-08-12 LAB — URATE SERPL-MCNC: 7.9 MG/DL (ref 2.6–6)

## 2017-08-12 ASSESSMENT — ASTHMA QUESTIONNAIRES: ACT_TOTALSCORE: 23

## 2017-08-14 ENCOUNTER — TELEPHONE (OUTPATIENT)
Dept: FAMILY MEDICINE | Facility: CLINIC | Age: 67
End: 2017-08-14

## 2017-08-14 NOTE — TELEPHONE ENCOUNTER
Called patient to advise records and CD came today, patient stated she already picked up last wk, sending records back to HIM.    .Lilian Bridges  Patient Representative

## 2017-08-14 NOTE — TELEPHONE ENCOUNTER
Interoffice mail came into clinic today and patients CD and records came for her MRI, will call patient to advise it will be at the  in log book.    .Lilian Bridges  Patient Representative

## 2017-08-23 ENCOUNTER — MYC MEDICAL ADVICE (OUTPATIENT)
Dept: FAMILY MEDICINE | Facility: CLINIC | Age: 67
End: 2017-08-23

## 2017-09-10 ENCOUNTER — MYC MEDICAL ADVICE (OUTPATIENT)
Dept: FAMILY MEDICINE | Facility: CLINIC | Age: 67
End: 2017-09-10

## 2017-09-11 NOTE — TELEPHONE ENCOUNTER
Seen last on 8/11 but I don't see it mentioned at that visit. She saw ortho on 6/15 for bilateral shoulder pain & got a second opinion from Dr. Go & is having surgery on Nov 1st.  She states that Dr. Killian told patient to tell her if she needed something before her surgery. She is wondering if a visit is requested.    Oma Richardson RN

## 2017-09-15 ENCOUNTER — MYC MEDICAL ADVICE (OUTPATIENT)
Dept: FAMILY MEDICINE | Facility: CLINIC | Age: 67
End: 2017-09-15

## 2017-09-15 DIAGNOSIS — M10.9 GOUT, UNSPECIFIED CAUSE, UNSPECIFIED CHRONICITY, UNSPECIFIED SITE: Primary | ICD-10-CM

## 2017-09-15 RX ORDER — PREDNISONE 20 MG/1
40 TABLET ORAL DAILY
Qty: 10 TABLET | Refills: 0 | Status: CANCELLED | OUTPATIENT
Start: 2017-09-15 | End: 2017-09-20

## 2017-09-15 NOTE — TELEPHONE ENCOUNTER
Route to PCP about gout & pain. 8/11 note says: Gout was improving with prednisone; may need to consider allopurinol.  Oma Richardson RN

## 2017-09-15 NOTE — TELEPHONE ENCOUNTER
I sent patient my chart.  Please huddle with me about getting her a sooner appt with me for gout.  Iris Killian MD

## 2017-09-20 ENCOUNTER — OFFICE VISIT (OUTPATIENT)
Dept: PODIATRY | Facility: CLINIC | Age: 67
End: 2017-09-20
Payer: COMMERCIAL

## 2017-09-20 ENCOUNTER — RADIANT APPOINTMENT (OUTPATIENT)
Dept: GENERAL RADIOLOGY | Facility: CLINIC | Age: 67
End: 2017-09-20
Attending: PODIATRIST
Payer: COMMERCIAL

## 2017-09-20 VITALS
BODY MASS INDEX: 33.45 KG/M2 | DIASTOLIC BLOOD PRESSURE: 80 MMHG | HEIGHT: 60 IN | SYSTOLIC BLOOD PRESSURE: 136 MMHG | WEIGHT: 170.4 LBS | HEART RATE: 87 BPM

## 2017-09-20 DIAGNOSIS — M79.671 RIGHT FOOT PAIN: Primary | ICD-10-CM

## 2017-09-20 PROCEDURE — 99203 OFFICE O/P NEW LOW 30 MIN: CPT | Performed by: PODIATRIST

## 2017-09-20 PROCEDURE — 73630 X-RAY EXAM OF FOOT: CPT | Mod: RT

## 2017-09-20 NOTE — PATIENT INSTRUCTIONS
Follow up in 2-3 weeks if pain is not improved  Wear stiff soled shoes and inserts      PRICE Therapy    Many aches and pains throughout the foot and ankle can be helped with many simple treatments.  This is usually described as PRICE Therapy.      P - Protection - often times, inflammation/pain in the lower extremity is not able to improve simply because the areas involved are never allowed to rest.  Every step we take can bother the problematic area.  Protecting those areas is an important step in the healing process.  This may involve a walking cast boot, a special insert/orthotic device, an ankle brace, or simply avoiding barefoot walking.    R - Rest - in addition to protecting the foot/ankle, resting is an important, but often times difficult, treatment option.  Getting off your feet when they bother you, and specifically avoiding activities that cause pain/discomfort, are very beneficial to prevent, and treat, foot/ankle pain.      I - Ice - icing regularly can help to decrease inflammation and swelling in the foot, thus decreasing pain.  Using an ice pack or a bag of frozen peas works very well.  Ice for 20 minutes multiple times per day as needed.  Do not place the ice directly on the skin as this can cause tissue damage.    C - Compression - using a compression wrap or an ACE wrap can help to decrease swelling, which can help to decrease pain.  Wearing the wraps is generally not needed at night, but they should be worn on a regular basis when you are going to be on your feet for prolonged periods as gravity tends to pull fluids down to your feet/ankles.    E - Elevation - elevating your lower extremities multiple times daily for 15-20 minutes can help to decrease swelling, which works well in decreasing pain levels.      NSAID/Tylenol - An anti-inflammatory, like Aleve or ibuprofen, and/or a pain medication, such as Tylenol, can help to improve pain levels and get the issue resolved sooner rather than  later.  Anyone with liver issues should be careful with Tylenol, and anyone with high blood pressure or heart, stomach or kidney issues should be careful with anti-inflammatories.  Please ask if you have questions about these medications, including dosage.            Body Mass Index (BMI)  Many things can cause foot and ankle problems. Foot structure, activity level, foot mechanics and injuries are common causes of pain.  One very important issue that often goes unmentioned is body weight. Extra weight can cause increased stress on muscles, ligaments, bones and tendons. Sometimes just a few extra pounds is all it takes to put one over her/his threshold. Without reducing that stress, it can be difficult to alleviate pain.   Some people are uncomfortable addressing this issue, but we feel it is important for you to think about it. As Foot & Ankle specialists, our job is addressing the lower extremity problem and possible causes.   Regarding extra body weight, we encourage patients to discuss diet and weight management plans with their primary care doctors. It is this team approach that gives you the best opportunity for pain relief and getting you back on your feet.

## 2017-09-20 NOTE — PROGRESS NOTES
Weight management plan: Patient was referred to their PCP to discuss a diet and exercise plan.     MARYANN Callaway MA September 20, 2017 3:20 PM

## 2017-09-20 NOTE — PROGRESS NOTES
PATIENT HISTORY:  Abbey Omer is a 67 year old female who presents to clinic for right foot pain.  Began 1.5 weeks ago.  Hx of intermittent gout per pt.  Pt states this pain began after a period of long walking.  Pt wondering about prednisone to treat this.  2-9/10 pain.  Improving with rest.  Worse with walking.   No injury.  Denies redness, but some swelling noted.    I was requested to see this patient for this issue by Dr. Iris Killian.    Review of Systems:  Patient denies fever, chills, rash, wound, stiffness, numbness, weakness, heart burn, blood in stool, chest pain with activity, calf pain when walking, shortness of breath with activity, chronic cough, easy bleeding/bruising, excessive thirst, fatigue, depression, anxiety.  Patient admits to limping, swelling of ankles.     PAST MEDICAL HISTORY:   Past Medical History:   Diagnosis Date     Cancer of bladder (H)      Cataract 11/25/2011     Chronic nonallergic rhinitis 8/31/10 RAST     CKD (chronic kidney disease) stage 3, GFR 30-59 ml/min 12/20/2010     DDD (degenerative disc disease), cervical 6/16/2011     Dependent edema      Depression, major      Depressive disorder In 2002 or 2003     History of blood transfusion ?     HTN (hypertension)      Hyperlipidemia      Hypothyroidism 10/12/2009     Migraine     resolved     Nasal septal deviation 8/11/2009     RICKEY (obstructive sleep apnea)      Osteoarthritis of shoulder region      Osteoporosis      Premature menopause      Renal cyst         PAST SURGICAL HISTORY:   Past Surgical History:   Procedure Laterality Date     CHOLECYSTECTOMY, LAPOROSCOPIC       COLONOSCOPY  ?    Upper & Lower     CYSTOSCOPY      bladder cancer     D & C       ENT SURGERY  first 1967/second ?    2 Rhynoplasties     HYSTERECTOMY, PAP NO LONGER INDICATED      BSO     NOSE SURGERY      septum deviation        MEDICATIONS:   Current Outpatient Prescriptions:      KRILL OIL PO, Take 1 capsule by mouth daily, Disp: , Rfl:       traZODone (DESYREL) 50 MG tablet, Take 1-2 tablets ( mg) by mouth nightly as needed, Disp: 180 tablet, Rfl: 3     levothyroxine (SYNTHROID/LEVOTHROID) 88 MCG tablet, Take 1 tablet (88 mcg) by mouth daily, Disp: 90 tablet, Rfl: 1     diclofenac (VOLTAREN) 1 % GEL topical gel, Apply 4 grams to both shoulder four times daily using enclosed dosing card., Disp: 100 g, Rfl: 1     predniSONE (DELTASONE) 10 MG tablet, 40 mg (4 tabs) for 3 days  30 mg (3 tabs) for 3 days  20 mg (2 tabs) for 3 days  10 mg (1 tab) for 3 days, Disp: 32 tablet, Rfl: 0     levalbuterol (XOPENEX HFA) 45 MCG/ACT Inhaler, INHALE 2 PUFFS INTO THE LUNGS EVERY 6 HOURS AS NEEDED FOR SHORTNESS OF BREATH OR DIFFICULT BREATHING OR WHEEZING, Disp: 15 g, Rfl: 0     B Complex Vitamins (VITAMIN B COMPLEX PO), , Disp: , Rfl:      order for DME, Equipment ordered: Respironics Auto PAP Mask type: Nasal Settings: 9-15 cm H2O, Disp: , Rfl:      venlafaxine (EFFEXOR-XR) 150 MG 24 hr capsule, 2 Capsules daily, Disp: 180 capsule, Rfl: 3     spironolactone (ALDACTONE) 50 MG tablet, Take 2 tablets (100 mg) by mouth daily, Disp: 180 tablet, Rfl: 3     ranitidine (ZANTAC) 300 MG tablet, Take 1 tablet (300 mg) by mouth At Bedtime, Disp: 90 tablet, Rfl: 3     Cranberry 500 MG CAPS, Take 1 capsule by mouth daily, Disp: , Rfl:      fluticasone (FLONASE) 50 MCG/ACT nasal spray, Spray 2 sprays into both nostrils daily, Disp: 48 g, Rfl: 3     Multiple Vitamins-Minerals (MULTIPLE VITAMIN  S/WOMENS) TABS, Take 2 tablets by mouth daily, Disp: , Rfl:      magnesium 500 MG TABS, Take 1 tablet by mouth daily, Disp: , Rfl:      zinc 50 MG TABS, Take 1 tablet by mouth daily, Disp: , Rfl:      Flaxseed, Linseed, (FLAXSEED OIL PO), Take 1 Tablespoonful by mouth daily, Disp: , Rfl:      STATIN NOT PRESCRIBED, INTENTIONAL,, by Other route continuous prn Reported on 5/15/2017, Disp: , Rfl: 0     EXCEDRIN TENSION HEADACHE 500-65 MG PO TABS, 1 tablet twice daily as needed, Disp: , Rfl:       FISH OIL 1200 MG OR CAPS, 3 capsule daily, Disp: , Rfl:      CALCIUM + D 600-200 MG-UNIT OR TABS, 1 tablet twice a day, Disp: , Rfl:      ALLERGIES:    Allergies   Allergen Reactions     Amlodipine Swelling     Asa [Aluminum Hydroxide] GI Disturbance     Atenolol Fatigue     Atorvastatin Calcium Swelling     Codeine Nausea     Colestid [Colestipol Hcl]      Crestor [Hmg-Coa-R Inhibitors]      Joint pain     Cymbalta      Visual hallucinations     Hydralazine Fatigue     Lovastatin Cramps     Niacin      Unknown by patient     Paxil [Paroxetine] Fatigue     Potassium GI Disturbance     Sulfa Drugs Fatigue     Zetia [Ezetimibe] Fatigue     Zomig [Zolmitriptan]         SOCIAL HISTORY:   Social History     Social History     Marital status: Life Partner     Spouse name: Maggie     Number of children: 2     Years of education: 12     Occupational History     collections for [employer]     Social History Main Topics     Smoking status: Former Smoker     Packs/day: 0.10     Years: 1.00     Types: Cigarettes     Start date: 1969     Quit date: 10/12/1969     Smokeless tobacco: Never Used      Comment: Smoked, very little, for 2 mo, 49 years ago.     Alcohol use Yes      Comment: Maybe 1 or 2 drinks a year     Drug use: No     Sexual activity: Not Currently     Partners: Female     Birth control/ protection: Post-menopausal     Other Topics Concern     Parent/Sibling W/ Cabg, Mi Or Angioplasty Before 65f 55m? No     Social History Narrative    Son  2012.    Has partner, Pat.        FAMILY HISTORY:   Family History   Problem Relation Age of Onset     DIABETES Maternal Grandfather      Hypertension Maternal Grandfather      DIABETES Son      HEART DISEASE Father      CHF     DIABETES Mother      Depression Mother      Hyperlipidemia Mother      Anxiety Disorder Mother      Asthma Mother      Obesity Mother      Substance Abuse Brother      DIABETES Brother      Depression Daughter       DIABETES Maternal Uncle      CEREBROVASCULAR DISEASE Maternal Uncle      DIABETES Son      after accident     Depression Daughter      Depression Son      after accident     Substance Abuse Brother      Not any longer     Thyroid Disease No family hx of      Glaucoma No family hx of      Macular Degeneration No family hx of      CANCER No family hx of         EXAM:Vitals: /80 (BP Location: Left arm, Patient Position: Chair, Cuff Size: Adult Regular)  Pulse 87  Ht 5' (1.524 m)  Wt 170 lb 6.4 oz (77.3 kg)  BMI 33.28 kg/m2  BMI= Body mass index is 33.28 kg/(m^2).    General appearance: Patient is alert and fully cooperative with history & exam.  No sign of distress is noted during the visit.     Psychiatric: Affect is pleasant & appropriate.  Patient appears motivated to improve health.     Respiratory: Breathing is regular & unlabored while sitting.     HEENT: Hearing is intact to spoken word.  Speech is clear.  No gross evidence of visual impairment that would impact ambulation.     Dermatologic: Skin is intact to both lower extremities without significant lesions, rash or abrasion.  No paronychia or evidence of soft tissue infection is noted.  No erythema to suggest gout.     Vascular: DP & PT pulses are intact & regular on the right.  Mild dorsal forefoot edema. CFT and skin temperature are normal     Neurologic: Lower extremity sensation is intact to light touch.  No evidence of weakness or contracture in the lower extremities.  No evidence of neuropathy.     Musculoskeletal:  Diffuse dorsal forefoot pain over metatarsals 2-4.  No interspace pain. Patient is ambulatory without assistive device or brace.  No gross ankle deformity noted.  No foot or ankle joint effusion is noted.    XRs of right foot reviewed with pt.  No acute findings.     ASSESSMENT: Right foot pain suggestive of stress reaction/metatarsalgia     PLAN:  Reviewed patient's chart in epic.  Discussed condition and treatment options  including pros and cons.    I don't see evidence of gout today.  Prednisone not needed at this time.    Advised post op shoe.  Pt declined.    She has stiff soled shoe with inserts at home.  She will wear these.    No barefoot walking.  RICE advised.    F/u in 2-3 wks if not improved.  Consider reimaging.      Pt agrees with plan.       Cristobal Bullard, NIXON, FACFAS

## 2017-09-20 NOTE — TELEPHONE ENCOUNTER
Spoke with Abbey. Her gout has not improved with tylenol over the weekend. Painful to walk. Pain is worse in left great toe but both feet hurt.  Huddled with Dr. Killian. Appointment scheduled for today with podiatry.  Chandrika Kaplan RN

## 2017-09-20 NOTE — NURSING NOTE
Chief Complaint   Patient presents with     Foot Pain     Possible gout- mostly on R foot        Initial /80 (BP Location: Left arm, Patient Position: Chair, Cuff Size: Adult Regular)  Pulse 87  Ht 5' (1.524 m)  Wt 170 lb 6.4 oz (77.3 kg)  BMI 33.28 kg/m2 Estimated body mass index is 33.28 kg/(m^2) as calculated from the following:    Height as of this encounter: 5' (1.524 m).    Weight as of this encounter: 170 lb 6.4 oz (77.3 kg).  Medication Reconciliation: kavon Callaway MA September 20, 2017 3:20 PM

## 2017-09-20 NOTE — MR AVS SNAPSHOT
After Visit Summary   9/20/2017    Abbey Omer    MRN: [id number]           Patient Information     Date Of Birth          1950        Visit Information        Provider Department      9/20/2017 2:45 PM Cristobal Bullard DPM [facility]        Today's Diagnoses     Right foot pain    -  1      Care Instructions    Follow up in 2-3 weeks if pain is not improved  Wear stiff soled shoes and inserts      PRICE Therapy    Many aches and pains throughout the foot and ankle can be helped with many simple treatments.  This is usually described as PRICE Therapy.      P - Protection - often times, inflammation/pain in the lower extremity is not able to improve simply because the areas involved are never allowed to rest.  Every step we take can bother the problematic area.  Protecting those areas is an important step in the healing process.  This may involve a walking cast boot, a special insert/orthotic device, an ankle brace, or simply avoiding barefoot walking.    R - Rest - in addition to protecting the foot/ankle, resting is an important, but often times difficult, treatment option.  Getting off your feet when they bother you, and specifically avoiding activities that cause pain/discomfort, are very beneficial to prevent, and treat, foot/ankle pain.      I - Ice - icing regularly can help to decrease inflammation and swelling in the foot, thus decreasing pain.  Using an ice pack or a bag of frozen peas works very well.  Ice for 20 minutes multiple times per day as needed.  Do not place the ice directly on the skin as this can cause tissue damage.    C - Compression - using a compression wrap or an ACE wrap can help to decrease swelling, which can help to decrease pain.  Wearing the wraps is generally not needed at night, but they should be worn on a regular basis when you are going to be on your feet for prolonged periods as gravity tends to pull fluids down to your  feet/ankles.    E - Elevation - elevating your lower extremities multiple times daily for 15-20 minutes can help to decrease swelling, which works well in decreasing pain levels.      NSAID/Tylenol - An anti-inflammatory, like Aleve or ibuprofen, and/or a pain medication, such as Tylenol, can help to improve pain levels and get the issue resolved sooner rather than later.  Anyone with liver issues should be careful with Tylenol, and anyone with high blood pressure or heart, stomach or kidney issues should be careful with anti-inflammatories.  Please ask if you have questions about these medications, including dosage.            Body Mass Index (BMI)  Many things can cause foot and ankle problems. Foot structure, activity level, foot mechanics and injuries are common causes of pain.  One very important issue that often goes unmentioned is body weight. Extra weight can cause increased stress on muscles, ligaments, bones and tendons. Sometimes just a few extra pounds is all it takes to put one over her/his threshold. Without reducing that stress, it can be difficult to alleviate pain.   Some people are uncomfortable addressing this issue, but we feel it is important for you to think about it. As Foot & Ankle specialists, our job is addressing the lower extremity problem and possible causes.   Regarding extra body weight, we encourage patients to discuss diet and weight management plans with their primary care doctors. It is this team approach that gives you the best opportunity for pain relief and getting you back on your feet.             Follow-ups after your visit        Your next 10 appointments already scheduled     Oct 25, 2017 11:00 AM CDT   [facility]    [address] [place] [id number]   [phone]            Nov 10, 2017  7:15 AM CST   LAB with [facility]   [facility] ([facility])    [address] [facility] [address] [phone]           Patient must bring picture ID. Patient should be prepared to give a urine specimen  Please do not eat 10-12 hours before your appointment if you are coming in fasting for labs on lipids, cholesterol, or glucose (sugar). Pregnant women should follow their Care Team instructions. Water with medications is okay. Do not drink coffee or other fluids. If you have concerns about taking  your medications, please ask at office or if scheduling via SendHub, send a message by clicking on Secure Messaging, Message Your Care Team.            Jan 23, 2018  9:00 AM CST   Return Visit with Omar West MD, [facility] CYSTO PROC ROOM   [facility] ([address] [address] [phone]              Who to contact     If you have questions or need follow up information about today's clinic visit or your schedule please contact [facility] directly at [phone].  Normal or non-critical lab and imaging results will be communicated to you by [employer] [place], letter or phone within 4 business days after the clinic has received the results. If you do not hear from us within 7 days, please contact the clinic through [employer] or phone. If you have a critical or abnormal lab result, we will notify you by phone as soon as possible.  Submit refill requests through SendHub or call your pharmacy and they will forward the refill request to us. Please allow 3 business days for your refill to be completed.          Additional Information About Your Visit        SendHub Information     SendHub gives you secure access to your electronic health record. If you see a primary care provider, you can also send messages to your care team and make appointments. If you have questions, please call your primary care clinic.  If you do not have a primary care provider, please call  [phone] and they will assist you.        Care EveryWhere ID     This is your Care EveryWhere ID. This could be used by other organizations to access your [place] medical records  [id number]        Your Vitals Were     Pulse Height BMI (Body Mass Index)             87 5' (1.524 m) 33.28 kg/m2          Blood Pressure from Last 3 Encounters:   09/20/17 136/80   08/11/17 136/84   08/06/17 141/84    Weight from Last 3 Encounters:   09/20/17 170 lb 6.4 oz (77.3 kg)   08/11/17 169 lb 3.2 oz (76.7 kg)   08/06/17 173 lb 3.2 oz (78.6 kg)              We Performed the Following     XR Foot Right G/E 3 Views        Primary Care Provider Office Phone # Fax #    Iris Killian [phone]       [address] [facility] [address]        Equal Access to Services     BHUPINDER SUTHERLAND : Hadii aad ku hadasho Soomaali, waaxda luqadaha, qaybta kaalmada adeegyada, waxay idiin hayaan jamel khsarah mckinley . So [facility] [phone].    ATENCIÓN: Si randi espshahid, tiene a smith disposición servicios gratuitos de asistencia lingüística. Llame al [phone].    We comply with applicable federal civil rights laws and [place] laws. We do not discriminate on the basis of race, color, national origin, age, disability sex, sexual orientation or gender identity.            Thank you!     Thank you for choosing [facility]  for your care. Our goal is always to provide you with excellent care. Hearing back from our patients is one way we can continue to improve our services. Please take a few minutes to complete the written survey that you may receive in the mail after your visit with us. Thank you!             Your Updated Medication List - Protect others around you: Learn how to safely use, store and throw away your medicines at www.disposemymeds.org.          This list is accurate as of: 9/20/17  4:00 PM.  Always use your most recent med list.                   Brand Name Dispense Instructions for  use Diagnosis    calcium + D 600-200 MG-UNIT Tabs   Generic drug:  calcium carbonate-vitamin D      1 tablet twice a day        Cranberry 500 MG Caps      Take 1 capsule by mouth daily        diclofenac 1 % Gel topical gel    VOLTAREN    100 g    Apply 4 grams to both shoulder four times daily using enclosed dosing card.    Bilateral shoulder pain, unspecified chronicity       EXCEDRIN TENSION HEADACHE 500-65 MG Tabs   Generic drug:  acetaminophen-caffeine      1 tablet twice daily as needed        Fish Oil 1200 MG Caps      3 capsule daily        FLAXSEED OIL PO      Take 1 Tablespoonful by mouth daily        fluticasone 50 MCG/ACT spray    FLONASE    48 g    Spray 2 sprays into both nostrils daily    Chronic rhinitis       KRILL OIL PO      Take 1 capsule by mouth daily        levalbuterol 45 MCG/ACT Inhaler    XOPENEX HFA    15 g    INHALE 2 PUFFS INTO THE LUNGS EVERY 6 HOURS AS NEEDED FOR SHORTNESS OF BREATH OR DIFFICULT BREATHING OR WHEEZING    Mild intermittent asthma without complication       levothyroxine 88 MCG tablet    SYNTHROID/LEVOTHROID    90 tablet    Take 1 tablet (88 mcg) by mouth daily    Hypothyroidism due to acquired atrophy of thyroid       magnesium 500 MG Tabs      Take 1 tablet by mouth daily        MULTIPLE vitamin  S/WOMENS Tabs      Take 2 tablets by mouth daily        order for DME      Equipment ordered: Respironics Auto PAP Mask type: Nasal Settings: 9-15 cm H2O        predniSONE 10 MG tablet    DELTASONE    32 tablet    40 mg (4 tabs) for 3 days  30 mg (3 tabs) for 3 days  20 mg (2 tabs) for 3 days  10 mg (1 tab) for 3 days    Great toe pain, left       ranitidine 300 MG tablet    ZANTAC    90 tablet    Take 1 tablet (300 mg) by mouth At Bedtime    Gastroesophageal reflux disease without esophagitis       spironolactone 50 MG tablet    ALDACTONE    180 tablet    Take 2 tablets (100 mg) by mouth daily    Other acne       STATIN NOT PRESCRIBED (INTENTIONAL)      by Other route  continuous prn Reported on 5/15/2017    Hyperlipidemia LDL goal < 100       traZODone 50 MG tablet    DESYREL    180 tablet    Take 1-2 tablets ( mg) by mouth nightly as needed    Insomnia, unspecified type       venlafaxine 150 MG 24 hr capsule    EFFEXOR-XR    180 capsule    2 Capsules daily    Major depressive disorder, recurrent episode, mild degree (H)       VITAMIN B COMPLEX PO           zinc 50 MG Tabs      Take 1 tablet by mouth daily

## 2017-10-04 ENCOUNTER — ALLIED HEALTH/NURSE VISIT (OUTPATIENT)
Dept: NURSING | Facility: CLINIC | Age: 67
End: 2017-10-04
Payer: COMMERCIAL

## 2017-10-04 DIAGNOSIS — Z23 NEED FOR PROPHYLACTIC VACCINATION AND INOCULATION AGAINST INFLUENZA: Primary | ICD-10-CM

## 2017-10-04 PROCEDURE — 99207 ZZC NO CHARGE NURSE ONLY: CPT

## 2017-10-04 PROCEDURE — 90662 IIV NO PRSV INCREASED AG IM: CPT

## 2017-10-04 PROCEDURE — G0008 ADMIN INFLUENZA VIRUS VAC: HCPCS

## 2017-10-04 NOTE — MR AVS SNAPSHOT
After Visit Summary   10/4/2017    Abbey Omer    MRN: [id number]           Patient Information     Date Of Birth          1950        Visit Information        Provider Department      10/4/2017 10:00 AM CARE COORDINATOR [facility]        Today's Diagnoses     Need for prophylactic vaccination and inoculation against influenza    -  1       Follow-ups after your visit        Your next 10 appointments already scheduled     Oct 25, 2017 11:00 AM CDT   [facility]-Op physical with Iris Killian [facility]   [facility]    [address] [place] [id number] [phone]            Nov 10, 2017  7:15 AM CST   LAB with [facility]   [facility]    [address] [place] [id number] [phone]           Patient must bring picture ID. Patient should be prepared to give a urine specimen  Please do not eat 10-12 hours before your appointment if you are coming in fasting for labs on lipids, cholesterol, or glucose (sugar). Pregnant women should follow their Care Team instructions. Water with medications is okay. Do not drink coffee or other fluids. If you have concerns about taking  your medications, please ask at office or if scheduling via [facility], send a message by clicking on Secure Messaging, Message Your Care Team.            Jan 23, 2018  9:00 AM CST   Return Visit with Omar West MD, [facility] CYSTO PROC ROOM   [facility] ([address]  [place] [id number] [phone]              Who to contact     If you have questions or need follow up information about today's clinic visit or your schedule please contact [facility] directly at [phone].  Normal or non-critical lab and imaging results will be communicated to you by MyChart, letter or phone within 4 business days  after the clinic has received the results. If you do not hear from us within 7 days, please contact the clinic through [employer] or phone. If you have a critical or abnormal lab result, we will notify you by phone as soon as possible.  Submit refill requests through [employer] or call your pharmacy and they will forward the refill request to us. Please allow 3 business days for your refill to be completed.          Additional Information About Your Visit        [facility] Information     [employer] gives you secure access to your electronic health record. If you see a primary care provider, you can also send messages to your care team and make appointments. If you have questions, please call your primary care clinic.  If you do not have a primary care provider, please call [phone] and they will assist you.        Care EveryWhere ID     This is your Care EveryWhere ID. This could be used by other organizations to access your [place] medical records  [id number]         Blood Pressure from Last 3 Encounters:   09/20/17 136/80   08/11/17 136/84   08/06/17 141/84    Weight from Last 3 Encounters:   09/20/17 170 lb 6.4 oz (77.3 kg)   08/11/17 169 lb 3.2 oz (76.7 kg)   08/06/17 173 lb 3.2 oz (78.6 kg)              We Performed the Following     ADMIN INFLUENZA (For MEDICARE Patients ONLY) []     FLU VACCINE, INCREASED ANTIGEN, PRESV FREE, AGE 65+ [27702]        Primary Care Provider Office Phone # Fax #    Iris Killian [phone]       [facility] [address]        Equal Access to Services     BHUPINDER SUTHERLAND : Hadii aad ku hadasho Soomaali, waaxda luqadaha, qaybta kaalmada franklin, maximiliano mckinley . So [facility] [phone].    ATENCIÓN: Si habla español, tiene a smith disposición servicios gratuitos de asistencia lingüística. Llame al [phone].    We comply with applicable federal civil rights laws and [place] laws. We do not discriminate on the basis  of race, color, national origin, age, disability, sex, sexual orientation, or gender identity.            Thank you!     Thank you for choosing [facility]  for your care. Our goal is always to provide you with excellent care. Hearing back from our patients is one way we can continue to improve our services. Please take a few minutes to complete the written survey that you may receive in the mail after your visit with us. Thank you!             Your Updated Medication List - Protect others around you: Learn how to safely use, store and throw away your medicines at www.disposemymeds.org.          This list is accurate as of: 10/4/17 10:19 AM.  Always use your most recent med list.                   Brand Name Dispense Instructions for use Diagnosis    calcium + D 600-200 MG-UNIT Tabs   Generic drug:  calcium carbonate-vitamin D      1 tablet twice a day        Cranberry 500 MG Caps      Take 1 capsule by mouth daily        diclofenac 1 % Gel topical gel    VOLTAREN    100 g    Apply 4 grams to both shoulder four times daily using enclosed dosing card.    Bilateral shoulder pain, unspecified chronicity       EXCEDRIN TENSION HEADACHE 500-65 MG Tabs   Generic drug:  acetaminophen-caffeine      1 tablet twice daily as needed        Fish Oil 1200 MG Caps      3 capsule daily        FLAXSEED OIL PO      Take 1 Tablespoonful by mouth daily        fluticasone 50 MCG/ACT spray    FLONASE    48 g    Spray 2 sprays into both nostrils daily    Chronic rhinitis       KRILL OIL PO      Take 1 capsule by mouth daily        levalbuterol 45 MCG/ACT Inhaler    XOPENEX HFA    15 g    INHALE 2 PUFFS INTO THE LUNGS EVERY 6 HOURS AS NEEDED FOR SHORTNESS OF BREATH OR DIFFICULT BREATHING OR WHEEZING    Mild intermittent asthma without complication       levothyroxine 88 MCG tablet    SYNTHROID/LEVOTHROID    90 tablet    Take 1 tablet (88 mcg) by mouth daily    Hypothyroidism due to acquired atrophy of thyroid        magnesium 500 MG Tabs      Take 1 tablet by mouth daily        MULTIPLE vitamin  S/WOMENS Tabs      Take 2 tablets by mouth daily        order for DME      Equipment ordered: Respironics Auto PAP Mask type: Nasal Settings: 9-15 cm H2O        predniSONE 10 MG tablet    DELTASONE    32 tablet    40 mg (4 tabs) for 3 days  30 mg (3 tabs) for 3 days  20 mg (2 tabs) for 3 days  10 mg (1 tab) for 3 days    Great toe pain, left       ranitidine 300 MG tablet    ZANTAC    90 tablet    Take 1 tablet (300 mg) by mouth At Bedtime    Gastroesophageal reflux disease without esophagitis       spironolactone 50 MG tablet    ALDACTONE    180 tablet    Take 2 tablets (100 mg) by mouth daily    Other acne       STATIN NOT PRESCRIBED (INTENTIONAL)      by Other route continuous prn Reported on 5/15/2017    Hyperlipidemia LDL goal < 100       traZODone 50 MG tablet    DESYREL    180 tablet    Take 1-2 tablets ( mg) by mouth nightly as needed    Insomnia, unspecified type       venlafaxine 150 MG 24 hr capsule    EFFEXOR-XR    180 capsule    2 Capsules daily    Major depressive disorder, recurrent episode, mild degree (H)       VITAMIN B COMPLEX PO           zinc 50 MG Tabs      Take 1 tablet by mouth daily

## 2017-10-04 NOTE — NURSING NOTE
Prior to injection verified patient identity using patient's name and date of birth.    Tracy Razo CMA ([facility])

## 2017-10-04 NOTE — PROGRESS NOTES
Injectable Influenza Immunization Documentation    1.  Is the person to be vaccinated sick today?   No    2. Does the person to be vaccinated have an allergy to a component   of the vaccine?   No    3. Has the person to be vaccinated ever had a serious reaction   to influenza vaccine in the past?   No    4. Has the person to be vaccinated ever had Guillain-Barré syndrome?   No    Form completed by Tracy Razo CMA ([facility])

## 2017-10-23 ENCOUNTER — TELEPHONE (OUTPATIENT)
Dept: FAMILY MEDICINE | Facility: CLINIC | Age: 67
End: 2017-10-23

## 2017-10-23 NOTE — TELEPHONE ENCOUNTER
Reason for Call:  Omero Larsen from [facility] called       Detailed comments: she is requesting to have the pre-op information from the patients appointment on 10/25/17 and any labs and EKG be faxed to [phone] [facility]     Phone Number Patient can be reached at: Other phone number: [phone]    Best Time: any    Can we leave a detailed message on this number? YES    Call taken on 10/23/2017 at 4:45 PM by Geneva Connell

## 2017-10-25 ENCOUNTER — OFFICE VISIT (OUTPATIENT)
Dept: FAMILY MEDICINE | Facility: CLINIC | Age: 67
End: 2017-10-25
Payer: COMMERCIAL

## 2017-10-25 VITALS
HEIGHT: 60 IN | TEMPERATURE: 97.7 F | WEIGHT: 168.25 LBS | HEART RATE: 79 BPM | BODY MASS INDEX: 33.03 KG/M2 | DIASTOLIC BLOOD PRESSURE: 66 MMHG | OXYGEN SATURATION: 93 % | SYSTOLIC BLOOD PRESSURE: 140 MMHG

## 2017-10-25 DIAGNOSIS — E87.1 HYPONATREMIA: ICD-10-CM

## 2017-10-25 DIAGNOSIS — N18.30 CKD (CHRONIC KIDNEY DISEASE) STAGE 3, GFR 30-59 ML/MIN (H): ICD-10-CM

## 2017-10-25 DIAGNOSIS — M79.671 RIGHT FOOT PAIN: ICD-10-CM

## 2017-10-25 DIAGNOSIS — E83.52 HYPERCALCEMIA: ICD-10-CM

## 2017-10-25 DIAGNOSIS — I10 HYPERTENSION GOAL BP (BLOOD PRESSURE) < 140/90: ICD-10-CM

## 2017-10-25 DIAGNOSIS — M19.012 PRIMARY OSTEOARTHRITIS OF BOTH SHOULDERS: ICD-10-CM

## 2017-10-25 DIAGNOSIS — M19.011 PRIMARY OSTEOARTHRITIS OF BOTH SHOULDERS: ICD-10-CM

## 2017-10-25 DIAGNOSIS — E03.4 HYPOTHYROIDISM DUE TO ACQUIRED ATROPHY OF THYROID: ICD-10-CM

## 2017-10-25 DIAGNOSIS — Z01.818 PREOP GENERAL PHYSICAL EXAM: Primary | ICD-10-CM

## 2017-10-25 LAB — HGB BLD-MCNC: 16.3 G/DL (ref 11.7–15.7)

## 2017-10-25 PROCEDURE — 36415 COLL VENOUS BLD VENIPUNCTURE: CPT | Performed by: FAMILY MEDICINE

## 2017-10-25 PROCEDURE — 85018 HEMOGLOBIN: CPT | Performed by: FAMILY MEDICINE

## 2017-10-25 PROCEDURE — 93000 ELECTROCARDIOGRAM COMPLETE: CPT | Performed by: FAMILY MEDICINE

## 2017-10-25 PROCEDURE — 99214 OFFICE O/P EST MOD 30 MIN: CPT | Performed by: FAMILY MEDICINE

## 2017-10-25 PROCEDURE — 80048 BASIC METABOLIC PNL TOTAL CA: CPT | Performed by: FAMILY MEDICINE

## 2017-10-25 PROCEDURE — 84443 ASSAY THYROID STIM HORMONE: CPT | Performed by: FAMILY MEDICINE

## 2017-10-25 NOTE — PROGRESS NOTES
[address] [id number] [phone]  Dept: [phone]    PRE-OP EVALUATION:  Today's date: 10/25/2017    Abbey Omer (: 1950) presents for pre-operative evaluation assessment as requested by Dr. Go.  She requires evaluation and anesthesia risk assessment prior to undergoing surgery/procedure for treatment of shoulder pain .  Proposed procedure: total shoulder replacement    Date of Surgery/ Procedure: 17  Time of Surgery/ Procedure: unknown  Hospital/Surgical Facility: [facility]  Fax number for surgical facility:   Primary Physician: Iris Killian  Type of Anesthesia Anticipated: to be determined    Patient has a Health Care Directive or Living Will:  YES on file    Preop Questions 10/25/2017   1.  Do you have a history of heart attack, stroke, stent, bypass or surgery on an artery in the head, neck, heart or legs? No   2.  Do you ever have any pain or discomfort in your chest? No   3.  Do you have a history of  Heart Failure? No   4.   Are you troubled by shortness of breath when:  walking on a level surface, or up a slight hill, or at night? No   5.  Do you currently have a cold, bronchitis or other respiratory infection? No   6.  Do you have a cough, shortness of breath, or wheezing? No   7.  Do you sometimes get pains in the calves of your legs when you walk? No   8. Do you or anyone in your family have previous history of blood clots? No   9.  Do you or does anyone in your family have a serious bleeding problem such as prolonged bleeding following surgeries or cuts? No   10. Have you ever had problems with anemia or been told to take iron pills? YES - in the past did take iron pills   11. Have you had any abnormal blood loss such as black, tarry or bloody stools, or abnormal vaginal bleeding? No   12. Have you ever had a blood transfusion? YES - 15 years ago   13. Have you or any of your relatives ever had  problems with anesthesia? No   14. Do you have sleep apnea, excessive snoring or daytime drowsiness? YES - has CPAP   15. Do you have any prosthetic heart valves? No   16. Do you have prosthetic joints? No   17. Is there any chance that you may be pregnant? No           HPI:                                                      Brief HPI related to upcoming procedure: Bilateral shoulder osteoarthritis that is causing significant pain and difficulty with ADL's and range of motion.    Home blood pressure readings are excellent.      See problem list for active medical problems.  Problems all longstanding and stable, except as noted/documented.  See ROS for pertinent symptoms related to these conditions.                                                                                                  .    MEDICAL HISTORY:                                                    Patient Active Problem List    Diagnosis Date Noted     Major depressive disorder, recurrent episode, mild degree (H) 08/03/2011     Priority: High     prior care with psychiatrist Dr. Ro  Has tried paxil in past.        Hypertension goal BP (blood pressure) < 140/90 06/01/2011     Priority: High     Intolerant to higher than 50 mg of metoprolol due to depressive effects       Hyperlipidemia with target LDL less than 100 05/17/2011     Priority: High     Has tried welchol, statins and developed side effects  Diagnosis updated by automated process. Provider to review and confirm.       CKD (chronic kidney disease) stage 3, GFR 30-59 ml/min 12/20/2010     Priority: High     Hypothyroidism 10/12/2009     Priority: High     Acute gout involving toe of left foot, unspecified cause 08/11/2017     Priority: Medium     Discoid atelectasis 04/28/2016     Priority: Medium     Renal cyst, left 02/22/2016     Priority: Medium     Seen on abd/pelvic CT at [facility] 2/2016.  Recommend recheck CT in one year or do ultrasound.  In review of chart, renal cyst noted on  ultrasound in 2011 and CT done at [facility] facility in 2014 showing stablility       Combined form of age-related cataract, both eyes 02/17/2016     Priority: Medium     Glaucoma suspect, bilateral 02/17/2016     Priority: Medium     Target IOP:   Max IOP :   Family history: No  Trauma history: No  OCT: 8/216 Normal  [place] visual field (HVF): 8/2016   Right eye - Reliable, Normal   Left eye - Reliable, Normal  Pachymetry: Average-thickish 553/564  C/D: 0.6/0.6  SLT:            Drusen of macula of both eyes 02/17/2016     Priority: Medium     Acne 05/20/2015     Priority: Medium     Major depressive disorder, recurrent episode, moderate (H) 06/20/2014     Priority: Medium     Pulmonary nodules 06/16/2014     Priority: Medium     Allergic conjunctivitis 04/10/2013     Priority: Medium     Hypertriglyceridemia 03/01/2013     Priority: Medium     DDD (degenerative disc disease), cervical 06/16/2011     Priority: Medium     Elevated LFTs 06/15/2011     Priority: Medium     (Problem list name updated by automated process. Provider to review and confirm.)       Dependent edema      Priority: Medium     History of bladder cancer 05/18/2011     Priority: Medium     Dr. David, [place] [facility]       Obesity 05/17/2011     Priority: Medium     Chronic nonallergic rhinitis      Priority: Medium     Dr. Peoples, allergist       RICKEY (obstructive sleep apnea) 08/11/2009     Priority: Medium     Intolerant to CPAP    Split Night:  Sleep Study 2/01/2017 - (173.0 lbs) AHI 29.6, RDI 59.8, Supine AHI 39.4, REM AHI -, Low O2% 78.5%, Time Spent ?88% 14.4, Time Spent ?89% 30.6, CPAP was titrated to a pressure of 9 with an AHI 30. Time spent in REM supine at this pressure was - minutes.       Macular drusen 11/25/2011     Priority: Low     Advance Care Planning 06/15/2011     Priority: Low     Advance Care Planning 7/5/2016: Receipt of ACP document:  Received: Health Care Directive which was witnessed or notarized on 4/25/16.  Document  previously scanned on 4/28/16. Also received Health Care Directive dated 3/18/16 which was valid and scanned on 4/14/16  Validation forms previously completed and sent to be scanned.  Code Status reflects choices in most recent ACP document.  Confirmed/documented designated decision maker(s).  Added by Kanchan Lazcano RN, Advance Care Planning Liaison.  Advance Care Planning 4/25/2016: ACP Facilitation Session:    Abbey Omer presented for ACP Facilitation session at the clinic. She was accompanied by spouse. [facility] Choices information provided and resources reviewed. She currently wishes to complete an ACP document and needs further clarification and/or consideration prior to documenting choices.  She currently has the following questions or concerns about Advance Care Planning: none.  Confirmed/documented legally designated decision maker(s). Code status reflects choices in most recent ACP document. Follow-up meeting: not needed/applicable.  document sent to be scanned after viewed for validation.Added by Adrianna Silveira   Advance Care Planning 4/13/2016: Receipt of ACP document:  Received: invalid HCD document not signed, witness/notary and missing pages.Document not previously scanned. Validation form completed indicating invalid document. Copy sent to client with information and facilitation resources. Validation form sent to be scanned as notation of invalid document received.  Confirmed/documented RN, System Director ACP-[facility]oring Choices  designated decision maker(s).  Added by Rebeca Manriquez  Advance Care Planning 6-15-11 Discussed advance care planning with patient; information given to patient to review. Karen Pro CMA        Past Medical History:   Diagnosis Date     Cancer of bladder (H)      Cataract 11/25/2011     Chronic nonallergic rhinitis 8/31/10 RAST     CKD (chronic kidney disease) stage 3, GFR 30-59 ml/min 12/20/2010     DDD (degenerative disc disease), cervical 6/16/2011      Dependent edema      Depression, major      Depressive disorder In 2002 or 2003     History of blood transfusion ?     HTN (hypertension)      Hyperlipidemia      Hypothyroidism 10/12/2009     Migraine     resolved     Nasal septal deviation 8/11/2009     RICKEY (obstructive sleep apnea)      Osteoarthritis of shoulder region      Osteoporosis      Premature menopause      Renal cyst      Past Surgical History:   Procedure Laterality Date     CHOLECYSTECTOMY, LAPOROSCOPIC       COLONOSCOPY  ?    Upper & Lower     CYSTOSCOPY      bladder cancer     D & C       ENT SURGERY  first 1967/second ?    2 Rhynoplasties     HYSTERECTOMY, PAP NO LONGER INDICATED      BSO     NOSE SURGERY      septum deviation     Current Outpatient Prescriptions   Medication Sig Dispense Refill     order for DME Post op shoe 1 Units 0     KRILL OIL PO Take 1 capsule by mouth daily       traZODone (DESYREL) 50 MG tablet Take 1-2 tablets ( mg) by mouth nightly as needed 180 tablet 3     levothyroxine (SYNTHROID/LEVOTHROID) 88 MCG tablet Take 1 tablet (88 mcg) by mouth daily 90 tablet 1     levalbuterol (XOPENEX HFA) 45 MCG/ACT Inhaler INHALE 2 PUFFS INTO THE LUNGS EVERY 6 HOURS AS NEEDED FOR SHORTNESS OF BREATH OR DIFFICULT BREATHING OR WHEEZING 15 g 0     B Complex Vitamins (VITAMIN B COMPLEX PO)        order for DME Equipment ordered: Respironics Auto PAP Mask type: Nasal Settings: 9-15 cm H2O       venlafaxine (EFFEXOR-XR) 150 MG 24 hr capsule 2 Capsules daily 180 capsule 3     spironolactone (ALDACTONE) 50 MG tablet Take 2 tablets (100 mg) by mouth daily 180 tablet 3     ranitidine (ZANTAC) 300 MG tablet Take 1 tablet (300 mg) by mouth At Bedtime 90 tablet 3     Cranberry 500 MG CAPS Take 1 capsule by mouth daily       fluticasone (FLONASE) 50 MCG/ACT nasal spray Spray 2 sprays into both nostrils daily 48 g 3     Multiple Vitamins-Minerals (MULTIPLE VITAMIN  S/WOMENS) TABS Take 2 tablets by mouth daily       magnesium 500 MG TABS Take 1  tablet by mouth daily       zinc 50 MG TABS Take 1 tablet by mouth daily       Flaxseed, Linseed, (FLAXSEED OIL PO) Take 1 Tablespoonful by mouth daily       STATIN NOT PRESCRIBED, INTENTIONAL, by Other route continuous prn Reported on 5/15/2017  0     EXCEDRIN TENSION HEADACHE 500-65 MG PO TABS 1 tablet twice daily as needed       FISH OIL 1200 MG OR CAPS 3 capsule daily       CALCIUM + D 600-200 MG-UNIT OR TABS 1 tablet twice a day       OTC products: None, except as noted above    Allergies   Allergen Reactions     Amlodipine Swelling     Asa [Aluminum Hydroxide] GI Disturbance     Atenolol Fatigue     Atorvastatin Calcium Swelling     Codeine Nausea     Colestid [Colestipol Hcl]      Crestor [Hmg-Coa-R Inhibitors]      Joint pain     Cymbalta      Visual hallucinations     Hydralazine Fatigue     Lovastatin Cramps     Niacin      Unknown by patient     Paxil [Paroxetine] Fatigue     Potassium GI Disturbance     Sulfa Drugs Fatigue     Zetia [Ezetimibe] Fatigue     Zomig [Zolmitriptan]       Latex Allergy: NO    Social History   Substance Use Topics     Smoking status: Former Smoker     Packs/day: 0.10     Years: 1.00     Types: Cigarettes     Start date: 8/12/1969     Quit date: 10/12/1969     Smokeless tobacco: Never Used      Comment: Smoked, very little, for 2 mo, 49 years ago.     Alcohol use Yes      Comment: Maybe 1 or 2 drinks a year     History   Drug Use No       REVIEW OF SYSTEMS:                                                    C: NEGATIVE for fever, chills, change in weight  I: NEGATIVE for worrisome rashes, moles or lesions  E: NEGATIVE for vision changes or irritation  E/M: NEGATIVE for ear, mouth and throat problems  R: NEGATIVE for significant cough or SOB  B: NEGATIVE for masses, tenderness or discharge  CV: NEGATIVE for chest pain, palpitations or peripheral edema  GI: NEGATIVE for nausea, abdominal pain, heartburn, or change in bowel habits  : NEGATIVE for frequency, dysuria, or  hematuria  M: shoulder pain bilaterally  N: NEGATIVE for weakness, dizziness or paresthesias  E: NEGATIVE for temperature intolerance, skin/hair changes  H: NEGATIVE for bleeding problems  P: NEGATIVE for changes in mood or affect    EXAM:                                                    /66 (BP Location: Left arm, Patient Position: Sitting, Cuff Size: Adult Large)  Pulse 79  Temp 97.7  F (36.5  C) (Oral)  Ht 5' (1.524 m)  Wt 168 lb 4 oz (76.3 kg)  SpO2 93%  BMI 32.86 kg/m2    GENERAL APPEARANCE: healthy, alert and no distress     HENT: ear canals and TM's normal and nose and mouth without ulcers or lesions     NECK: no adenopathy, no asymmetry, masses, or scars and thyroid normal to palpation     RESP: lungs clear to auscultation - no rales, rhonchi or wheezes     CV: regular rates and rhythm, normal S1 S2, no S3 or S4 and no murmur, click or rub     ABDOMEN:  soft, nontender, no HSM or masses and bowel sounds normal     MS: extremities normal- no gross deformities noted, no evidence of inflammation in joints, FROM in all extremities.     SKIN: no suspicious lesions or rashes     NEURO: Normal strength and tone, sensory exam grossly normal, mentation intact and speech normal     PSYCH: mentation appears normal. and affect normal/bright    DIAGNOSTICS:                                                      EKG: appears normal, NSR, normal axis, normal intervals, no acute ST/T changes c/w ischemia, no LVH by voltage criteria, Right Bundle Branch Block, unchanged from previous tracings done at [facility] 2/2016  Hemoglobin (indicated for history of anemia or procedure with significant blood loss such as tonsillectomy, major intraperitoneal surgery, vascular surgery, major spine surgery, total joint replacement)    Labs done 10/27/17:  Lab Results   Component Value Date    WBC 6.4 10/27/2017     Lab Results   Component Value Date    RBC 4.61 10/27/2017     Lab Results   Component Value Date    HGB 14.3  10/27/2017     Lab Results   Component Value Date    HCT 43.2 10/27/2017     No components found for: MCT  Lab Results   Component Value Date    MCV 94 10/27/2017     Lab Results   Component Value Date    MCH 31.0 10/27/2017     Lab Results   Component Value Date    MCHC 33.1 10/27/2017     Lab Results   Component Value Date    RDW 13.0 10/27/2017     Lab Results   Component Value Date     10/27/2017     Last Basic Metabolic Panel:  Lab Results   Component Value Date     10/27/2017      Lab Results   Component Value Date    POTASSIUM 4.4 10/27/2017     Lab Results   Component Value Date    CHLORIDE 97 10/27/2017     Lab Results   Component Value Date    SAMARIA 9.7 10/27/2017     Lab Results   Component Value Date    CO2 31 10/27/2017     Lab Results   Component Value Date    BUN 31 10/27/2017     Lab Results   Component Value Date    CR 1.42 10/27/2017     Lab Results   Component Value Date     10/27/2017         Recent Labs   Lab Test  07/19/17   0747  01/25/17   1038   06/29/15   1424   07/28/11   0916  06/17/11   1531   HGB   --   14.5   --   14.6   < >  13.3  13.0   PLT   --    --    --    --    --   257  262   INR   --    --    --    --    --   1.00   --    NA  141  136   < >  135   < >  139   --    POTASSIUM  4.4  4.8   < >  4.3   < >  4.1   --    CR  1.31*  1.49*   < >  1.21*   < >  1.17*   --     < > = values in this interval not displayed.        IMPRESSION:                                                    Reason for surgery/procedure: Bilateral shoulder pain due to osteoarthritis.  Has tried and failed conservative management with injection and medication.  Diagnosis/reason for consult:   HTN-well controlled  Hypothyroid-well controlled  Right foot pain-saw podiatry, would like to get a post op shoe today  Sleep apnea-on cpap  Depression-controlled with medication    The proposed surgical procedure is considered INTERMEDIATE risk.    REVISED CARDIAC RISK INDEX  The patient has the  following serious cardiovascular risks for perioperative complications such as (MI, PE, VFib and 3  AV Block):  No serious cardiac risks  INTERPRETATION: 0 risks: Class I (very low risk - 0.4% complication rate)    The patient has the following additional risks for perioperative complications:  No identified additional risks    (Z01.818) Preop general physical exam  (primary encounter diagnosis)  Comment:   Plan: EKG 12-lead complete w/read - Clinics,         Hemoglobin, CBC with platelets differential            (M19.011,  M19.012) Primary osteoarthritis of both shoulders    (I10) Hypertension goal BP (blood pressure) < 140/90  Comment: home blood pressures well controlled  Plan: BASIC METABOLIC PANEL            (E03.4) Hypothyroidism due to acquired atrophy of thyroid  Comment: Chronic, stable, well controlled  Plan: TSH WITH FREE T4 REFLEX            (M79.671) Right foot pain  Comment: saw podiatry  Plan: order for DME            (E87.1) Hyponatremia  Comment: initial BMP done showed hyponatremia, repeated lab 10/27/17 normal sodium  Plan: Basic metabolic panel, JUST IN CASE            (E83.52) Hypercalcemia  Comment: initial labs showed high calcium, repeat on 10/27/17 normal calcium  Plan: Basic metabolic panel, JUST IN CASE            (N18.3) CKD (chronic kidney disease) stage 3, GFR 30-59 ml/min  Comment: chronic, stable  Plan: *UA reflex to Microscopic and Culture ([facility] (except [place] and         [place]), JUST IN CASE              RECOMMENDATIONS:                                                      --Consult hospital rounder / IM to assist post-op medical management    Cardiovascular Risk  Performs 4 METs exercise without symptoms (Light housework (dusting, washing dishes) and Climb a flight of stairs) .     Obstructive Sleep Apnea (or suspected sleep apnea)  Patient is to bring their home CPAP with them on the day of surgery      Anemia  Anemia and does not require  treatment prior to surgery.  Monitor Hemoglobin postoperatively.    --Patient is to take all scheduled medications on the day of surgery EXCEPT for modifications listed below.    APPROVAL GIVEN to proceed with proposed procedure, without further diagnostic evaluation       Signed Electronically by: Iris Killian MD    Copy of this evaluation report is provided to requesting physician.    [facility] Preop Guidelines

## 2017-10-25 NOTE — MR AVS SNAPSHOT
After Visit Summary   10/25/2017    Abbey Omer    MRN: [id number]           Patient Information     Date Of Birth          1950        Visit Information        Provider Department      10/25/2017 11:00 AM Iris Killian MD [facility]        Today's Diagnoses     Preop general physical exam    -  1    Primary osteoarthritis of both shoulders        Hypertension goal BP (blood pressure) < 140/90        Right foot pain        Hypothyroidism due to acquired atrophy of thyroid        Pulmonary nodules          Care Instructions      Before Your Surgery      Call your surgeon if there is any change in your health. This includes signs of a cold or flu (such as a sore throat, runny nose, cough, rash or fever).    Do not smoke, drink alcohol or take over the counter medicine (unless your surgeon or primary care doctor tells you to) for the 24 hours before and after surgery.    If you take prescribed drugs: Follow your doctor s orders about which medicines to take and which to stop until after surgery.    Eating and drinking prior to surgery: follow the instructions from your surgeon    Take a shower or bath the night before surgery. Use the soap your surgeon gave you to gently clean your skin. If you do not have soap from your surgeon, use your regular soap. Do not shave or scrub the surgery site.  Wear clean pajamas and have clean sheets on your bed.           Follow-ups after your visit        Your next 10 appointments already scheduled     Nov 10, 2017  7:15 AM CST   LAB with [facility]   [facility] ([facility])    [address] [place] [id number] [phone]           Patient must bring picture ID. Patient should be prepared to give a urine specimen  Please do not eat 10-12 hours before your appointment if you are coming in fasting for labs on lipids, cholesterol, or glucose (sugar). Pregnant women should follow  their Care Team instructions. Water with medications is okay. Do not drink coffee or other fluids. If you have concerns about taking  your medications, please ask at office or if scheduling via [employer], send a message by clicking on Secure Messaging, Message Your Care Team.            Jan 23, 2018  9:00 AM CST   Return Visit with Omar West MD, [facility]   [facility] ([address] [id number] [phone]              Who to contact     If you have questions or need follow up information about today's clinic visit or your schedule please contact [facility] directly at [phone].  Normal or non-critical lab and imaging results will be communicated to you by [employer], letter or phone within 4 business days after the clinic has received the results. If you do not hear from us within 7 days, please contact the clinic through [employer] or phone. If you have a critical or abnormal lab result, we will notify you by phone as soon as possible.  Submit refill requests through [employer] or call your pharmacy and they will forward the refill request to us. Please allow 3 business days for your refill to be completed.          Additional Information About Your Visit        [employer] Information     [employer] gives you secure access to your electronic health record. If you see a primary care provider, you can also send messages to your care team and make appointments. If you have questions, please call your primary care clinic.  If you do not have a primary care provider, please call [phone] and they will assist you.        Care EveryWhere ID     This is your Care EveryWhere ID. This could be used by other organizations to access your [facility] medical records  [id number]        Your Vitals Were     Pulse Temperature Height Pulse Oximetry BMI (Body Mass Index)       79 97.7  F (36.5  C) (Oral) 5' (1.524 m) 93% 32.86 kg/m2         Blood Pressure from Last 3 Encounters:   10/25/17 140/66   09/20/17 136/80   08/11/17 136/84    Weight from Last 3 Encounters:   10/25/17 168 lb 4 oz (76.3 kg)   09/20/17 170 lb 6.4 oz (77.3 kg)   08/11/17 169 lb 3.2 oz (76.7 kg)              We Performed the Following     BASIC METABOLIC PANEL     EKG 12-lead complete w/read - Clinics     Hemoglobin     TSH WITH FREE T4 REFLEX          Today's Medication Changes          These changes are accurate as of: 10/25/17 12:46 PM.  If you have any questions, ask your nurse or doctor.               Start taking these medicines.        Dose/Directions    order for DME   Used for:  Right foot pain   Started by:  Iris Killian MD        Post op shoe   Quantity:  1 Units   Refills:  0         Stop taking these medicines if you haven't already. Please contact your care team if you have questions.     diclofenac 1 % Gel topical gel   Commonly known as:  VOLTAREN   Stopped by:  Iris Killian MD                Where to get your medicines      Some of these will need a paper prescription and others can be bought over the counter.  Ask your nurse if you have questions.     Bring a paper prescription for each of these medications     order for DME                Primary Care Provider Office Phone # Fax #    Iris Killian [phone]       [address] [place] [address]        Equal Access to Services     [facility] : Hadii madalyn ignacio hadasho Soplaicdo, waaxda luqadaha, qaybta kaalmada franklin, maximiliano pathak. So [facility] [phone].    ATENCIÓN: Si habla español, tiene a smith disposición servicios gratuitos de asistencia lingüística. Llame al [phone].    We comply with applicable federal civil rights laws and [place] laws. We do not discriminate on the basis of race, color, national origin, age, disability, sex, sexual orientation, or gender identity.            Thank you!     Thank you for choosing  [facility]  for your care. Our goal is always to provide you with excellent care. Hearing back from our patients is one way we can continue to improve our services. Please take a few minutes to complete the written survey that you may receive in the mail after your visit with us. Thank you!             Your Updated Medication List - Protect others around you: Learn how to safely use, store and throw away your medicines at www.disposemymeds.org.          This list is accurate as of: 10/25/17 12:46 PM.  Always use your most recent med list.                   Brand Name Dispense Instructions for use Diagnosis    calcium + D 600-200 MG-UNIT Tabs   Generic drug:  calcium carbonate-vitamin D      1 tablet twice a day        Cranberry 500 MG Caps      Take 1 capsule by mouth daily        EXCEDRIN TENSION HEADACHE 500-65 MG Tabs   Generic drug:  acetaminophen-caffeine      1 tablet twice daily as needed        fish Oil 1200 MG capsule      3 capsule daily        FLAXSEED OIL PO      Take 1 Tablespoonful by mouth daily        fluticasone 50 MCG/ACT spray    FLONASE    48 g    Spray 2 sprays into both nostrils daily    Chronic rhinitis       KRILL OIL PO      Take 1 capsule by mouth daily        levalbuterol 45 MCG/ACT Inhaler    XOPENEX HFA    15 g    INHALE 2 PUFFS INTO THE LUNGS EVERY 6 HOURS AS NEEDED FOR SHORTNESS OF BREATH OR DIFFICULT BREATHING OR WHEEZING    Mild intermittent asthma without complication       levothyroxine 88 MCG tablet    SYNTHROID/LEVOTHROID    90 tablet    Take 1 tablet (88 mcg) by mouth daily    Hypothyroidism due to acquired atrophy of thyroid       magnesium 500 MG Tabs      Take 1 tablet by mouth daily        MULTIPLE vitamin  S/WOMENS Tabs      Take 2 tablets by mouth daily        order for DME      Equipment ordered: Respironics Auto PAP Mask type: Nasal Settings: 9-15 cm H2O        order for DME     1 Units    Post op shoe    Right foot pain       ranitidine 300 MG tablet     ZANTAC    90 tablet    Take 1 tablet (300 mg) by mouth At Bedtime    Gastroesophageal reflux disease without esophagitis       spironolactone 50 MG tablet    ALDACTONE    180 tablet    Take 2 tablets (100 mg) by mouth daily    Other acne       STATIN NOT PRESCRIBED (INTENTIONAL)      by Other route continuous prn Reported on 5/15/2017    Hyperlipidemia LDL goal < 100       traZODone 50 MG tablet    DESYREL    180 tablet    Take 1-2 tablets ( mg) by mouth nightly as needed    Insomnia, unspecified type       venlafaxine 150 MG 24 hr capsule    EFFEXOR-XR    180 capsule    2 Capsules daily    Major depressive disorder, recurrent episode, mild degree (H)       VITAMIN B COMPLEX PO           zinc 50 MG Tabs      Take 1 tablet by mouth daily

## 2017-10-25 NOTE — NURSING NOTE
Chief Complaint   Patient presents with     Pre-Op Exam     have the pre-op information from the patients appointment on 10/25/17 and any labs and EKG be faxed to [phone] [facility]        Initial /66 (BP Location: Left arm, Patient Position: Sitting, Cuff Size: Adult Large)  Pulse 79  Temp 97.7  F (36.5  C) (Oral)  Ht 5' (1.524 m)  Wt 168 lb 4 oz (76.3 kg)  SpO2 93%  BMI 32.86 kg/m2 Estimated body mass index is 32.86 kg/(m^2) as calculated from the following:    Height as of this encounter: 5' (1.524 m).    Weight as of this encounter: 168 lb 4 oz (76.3 kg).  Medication Reconciliation: complete     Catina GAYLE, Certified Medical Assistant (AAMA)October 25, 2017 11:02 AM    EKG done.    Catina GAYLE, Certified Medical Assistant (AAMA)October 25, 2017 11:15 AM

## 2017-10-26 ENCOUNTER — TELEPHONE (OUTPATIENT)
Dept: FAMILY MEDICINE | Facility: CLINIC | Age: 67
End: 2017-10-26

## 2017-10-26 LAB
ANION GAP SERPL CALCULATED.3IONS-SCNC: 16 MMOL/L (ref 3–14)
BUN SERPL-MCNC: 37 MG/DL (ref 7–30)
CALCIUM SERPL-MCNC: 10.5 MG/DL (ref 8.5–10.1)
CHLORIDE SERPL-SCNC: 95 MMOL/L (ref 94–109)
CO2 SERPL-SCNC: 21 MMOL/L (ref 20–32)
CREAT SERPL-MCNC: 1.61 MG/DL (ref 0.52–1.04)
GFR SERPL CREATININE-BSD FRML MDRD: 32 ML/MIN/1.7M2
GLUCOSE SERPL-MCNC: 98 MG/DL (ref 70–99)
POTASSIUM SERPL-SCNC: 4.7 MMOL/L (ref 3.4–5.3)
SODIUM SERPL-SCNC: 132 MMOL/L (ref 133–144)
TSH SERPL DL<=0.005 MIU/L-ACNC: 0.88 MU/L (ref 0.4–4)

## 2017-10-26 NOTE — TELEPHONE ENCOUNTER
Bobby naqvi/ Dr Killian and she has not completed the Pre Op physical yet.    Once it is completed, I will fax.    Angélica Vegas

## 2017-10-26 NOTE — TELEPHONE ENCOUNTER
TC-please huddle with me before you call patient to set up lab appointment.    I called patient regarding abnormal labs.  She reports minimal food and water intake yesterday when labs were done.  I've asked that she have labs repeated tomorrow.    Iris Killian MD

## 2017-10-26 NOTE — TELEPHONE ENCOUNTER
Reason for Call:  Other     Detailed comments: Jennie Caballero calling to have patient's pre op, labs, and ekg if done faxed to [phone]. Patient is scheduled for surgery on 11/01/2017. TC was going to fax but office notes are not yet completed.    Phone Number Patient can be reached at: Other phone number:  [phone]    Best Time: any    Can we leave a detailed message on this number? YES    Call taken on 10/26/2017 at 11:54 AM by Masha Goncalves

## 2017-10-27 DIAGNOSIS — E83.52 HYPERCALCEMIA: ICD-10-CM

## 2017-10-27 DIAGNOSIS — Z01.818 PREOP GENERAL PHYSICAL EXAM: ICD-10-CM

## 2017-10-27 DIAGNOSIS — E87.1 HYPONATREMIA: ICD-10-CM

## 2017-10-27 DIAGNOSIS — N18.30 CKD (CHRONIC KIDNEY DISEASE) STAGE 3, GFR 30-59 ML/MIN (H): ICD-10-CM

## 2017-10-27 LAB
ALBUMIN UR-MCNC: NEGATIVE MG/DL
ANION GAP SERPL CALCULATED.3IONS-SCNC: 7 MMOL/L (ref 3–14)
APPEARANCE UR: CLEAR
BASOPHILS # BLD AUTO: 0 10E9/L (ref 0–0.2)
BASOPHILS NFR BLD AUTO: 0.3 %
BILIRUB UR QL STRIP: NEGATIVE
BUN SERPL-MCNC: 31 MG/DL (ref 7–30)
CALCIUM SERPL-MCNC: 9.7 MG/DL (ref 8.5–10.1)
CHLORIDE SERPL-SCNC: 97 MMOL/L (ref 94–109)
CO2 SERPL-SCNC: 31 MMOL/L (ref 20–32)
COLOR UR AUTO: YELLOW
CREAT SERPL-MCNC: 1.42 MG/DL (ref 0.52–1.04)
DIFFERENTIAL METHOD BLD: NORMAL
EOSINOPHIL # BLD AUTO: 0.2 10E9/L (ref 0–0.7)
EOSINOPHIL NFR BLD AUTO: 3.3 %
ERYTHROCYTE [DISTWIDTH] IN BLOOD BY AUTOMATED COUNT: 13 % (ref 10–15)
GFR SERPL CREATININE-BSD FRML MDRD: 37 ML/MIN/1.7M2
GLUCOSE SERPL-MCNC: 112 MG/DL (ref 70–99)
GLUCOSE UR STRIP-MCNC: NEGATIVE MG/DL
HCT VFR BLD AUTO: 43.2 % (ref 35–47)
HGB BLD-MCNC: 14.3 G/DL (ref 11.7–15.7)
HGB UR QL STRIP: NEGATIVE
KETONES UR STRIP-MCNC: NEGATIVE MG/DL
LEUKOCYTE ESTERASE UR QL STRIP: NEGATIVE
LYMPHOCYTES # BLD AUTO: 1.6 10E9/L (ref 0.8–5.3)
LYMPHOCYTES NFR BLD AUTO: 24.5 %
MCH RBC QN AUTO: 31 PG (ref 26.5–33)
MCHC RBC AUTO-ENTMCNC: 33.1 G/DL (ref 31.5–36.5)
MCV RBC AUTO: 94 FL (ref 78–100)
MONOCYTES # BLD AUTO: 0.5 10E9/L (ref 0–1.3)
MONOCYTES NFR BLD AUTO: 8.4 %
NEUTROPHILS # BLD AUTO: 4.1 10E9/L (ref 1.6–8.3)
NEUTROPHILS NFR BLD AUTO: 63.5 %
NITRATE UR QL: NEGATIVE
PH UR STRIP: 6 PH (ref 5–7)
PLATELET # BLD AUTO: 257 10E9/L (ref 150–450)
POTASSIUM SERPL-SCNC: 4.4 MMOL/L (ref 3.4–5.3)
RBC # BLD AUTO: 4.61 10E12/L (ref 3.8–5.2)
SODIUM SERPL-SCNC: 135 MMOL/L (ref 133–144)
SOURCE: NORMAL
SP GR UR STRIP: 1.01 (ref 1–1.03)
UROBILINOGEN UR STRIP-ACNC: 0.2 EU/DL (ref 0.2–1)
WBC # BLD AUTO: 6.4 10E9/L (ref 4–11)

## 2017-10-27 PROCEDURE — 36415 COLL VENOUS BLD VENIPUNCTURE: CPT | Performed by: FAMILY MEDICINE

## 2017-10-27 PROCEDURE — 85025 COMPLETE CBC W/AUTO DIFF WBC: CPT | Performed by: FAMILY MEDICINE

## 2017-10-27 PROCEDURE — 80048 BASIC METABOLIC PNL TOTAL CA: CPT | Performed by: FAMILY MEDICINE

## 2017-10-27 PROCEDURE — 81003 URINALYSIS AUTO W/O SCOPE: CPT | Performed by: FAMILY MEDICINE

## 2017-10-31 ENCOUNTER — TELEPHONE (OUTPATIENT)
Dept: FAMILY MEDICINE | Facility: CLINIC | Age: 67
End: 2017-10-31

## 2017-10-31 NOTE — TELEPHONE ENCOUNTER
Reason for Call:  Question    Detailed comments: Please call patient to advise about taking antidepressant prior to surgery tomorrow AM.    Phone Number Patient can be reached at: Home number on file [phone] (home)    Best Time: Any    Can we leave a detailed message on this number? YES    Call taken on 10/31/2017 at 1:39 PM by Bernard Gallego

## 2017-10-31 NOTE — TELEPHONE ENCOUNTER
Reviewed pre-op & there are no exceptions listed so patient will take all scheduled medications with a little bit of water. Warned about stomach upset but patient denies a problem in the past.  Oma Richardson RN

## 2017-11-02 ENCOUNTER — TRANSFERRED RECORDS (OUTPATIENT)
Dept: HEALTH INFORMATION MANAGEMENT | Facility: CLINIC | Age: 67
End: 2017-11-02

## 2017-11-06 ENCOUNTER — TELEPHONE (OUTPATIENT)
Dept: FAMILY MEDICINE | Facility: CLINIC | Age: 67
End: 2017-11-06

## 2017-11-06 NOTE — TELEPHONE ENCOUNTER
Patient states they couldn't rule out a stress fracture on 9/20. She denies new injury or swelling.  Scheduled 11/8.  Oma Richardson RN

## 2017-11-06 NOTE — TELEPHONE ENCOUNTER
Reason for call:  Patient reporting a symptom    Symptom or request: Patient calling stating that she has a lot of pain in her right big toe. It is a little red off and on. Patient is wondering if she should see Dr Killian for this or if she should see the foot doctor as she has done before.    Duration (how long have symptoms been present): 4 days    Have you been treated for this before? No    Additional comments: Patient uses [employer]    Phone Number patient can be reached at:  Home number on file [phone] (home)    Best Time:  any    Can we leave a detailed message on this number:  YES    Call taken on 11/6/2017 at 12:52 PM by Masha Goncalves

## 2017-11-08 ENCOUNTER — OFFICE VISIT (OUTPATIENT)
Dept: PODIATRY | Facility: CLINIC | Age: 67
End: 2017-11-08
Payer: COMMERCIAL

## 2017-11-08 ENCOUNTER — MYC MEDICAL ADVICE (OUTPATIENT)
Dept: FAMILY MEDICINE | Facility: CLINIC | Age: 67
End: 2017-11-08

## 2017-11-08 ENCOUNTER — TELEPHONE (OUTPATIENT)
Dept: FAMILY MEDICINE | Facility: CLINIC | Age: 67
End: 2017-11-08

## 2017-11-08 VITALS
DIASTOLIC BLOOD PRESSURE: 82 MMHG | WEIGHT: 168 LBS | HEART RATE: 77 BPM | BODY MASS INDEX: 32.98 KG/M2 | HEIGHT: 60 IN | SYSTOLIC BLOOD PRESSURE: 131 MMHG

## 2017-11-08 DIAGNOSIS — M10.9 ACUTE GOUT OF RIGHT FOOT, UNSPECIFIED CAUSE: Primary | ICD-10-CM

## 2017-11-08 PROCEDURE — 99214 OFFICE O/P EST MOD 30 MIN: CPT | Performed by: PODIATRIST

## 2017-11-08 RX ORDER — HYDROCODONE BITARTRATE AND ACETAMINOPHEN 5; 325 MG/1; MG/1
1-2 TABLET ORAL
COMMUNITY
Start: 2017-11-02 | End: 2017-11-15

## 2017-11-08 RX ORDER — METHYLPREDNISOLONE 4 MG
TABLET, DOSE PACK ORAL
Qty: 21 TABLET | Refills: 0 | Status: SHIPPED | OUTPATIENT
Start: 2017-11-08 | End: 2018-03-12

## 2017-11-08 NOTE — TELEPHONE ENCOUNTER
This patient was discharged from [facility] on 11/02/2017.    Discharge Diagnosis: right glenohumeral osteoarthritis    Follow-up instructions: Follow up in office 7-10 days    A follow-up visit has not been scheduled.      Number of ED/ER visits in the last 12 months:  1     Please follow-up with patient.    Angélica Vegas

## 2017-11-08 NOTE — PROGRESS NOTES
PATIENT HISTORY:  Abbey Omer is a 67 year old female who presents to clinic for new onset right 1st MPJ pain, redness.  1 week duration.  No injury.  10/10 pain.  Hx of gout with elevated uric acid in the past.  This feels the same to her.  Denies fevers, chills.  Nonsmoker.  Not working.  Nondiabetic.  Family hx of DM.       EXAM:Vitals: /82 (BP Location: Left arm, Patient Position: Chair, Cuff Size: Adult Regular)  Pulse 77  Ht 5' (1.524 m)  Wt 168 lb (76.2 kg)  BMI 32.81 kg/m2  BMI= Body mass index is 32.81 kg/(m^2).    General appearance: Patient is alert and fully cooperative with history & exam.  No sign of distress is noted during the visit.     Dermatologic: Medial aspect of right 1st MPJ with edema, erythema.  Skin is intact to the right foot.     Vascular: DP & PT pulses are intact & regular on the right.  CFT and skin temperature are normal.     Neurologic: Lower extremity sensation is intact to light touch.  No evidence of weakness or contracture in the lower extremities.  No evidence of neuropathy.     Musculoskeletal: Patient is ambulatory without assistive device or brace.  No gross ankle deformity noted.  No foot or ankle joint effusion is noted.     ASSESSMENT: R foot gout     PLAN:  Reviewed patient's chart in epic.  Discussed condition and treatment options including pros and cons.    Presentation classic for acute gout.  Will treat with Medrol dose pack.  Advised PCP f/u regarding considering of long term gout meds given frequent attacks.  Discussed food triggers to avoid.  Handout given.  F/u with me prn.       Critsobal Bullard, NIXON, FACFAS

## 2017-11-08 NOTE — LETTER
11/8/2017         RE: Abbey Omer  [address] [facility] [address]        Dear Colleague,    Thank you for referring your patient, Abbey Omer, to the [facility]. Please see a copy of my visit note below.    PATIENT HISTORY:  Abbey Omer is a 67 year old female who presents to clinic for new onset right 1st MPJ pain, redness.  1 week duration.  No injury.  10/10 pain.  Hx of gout with elevated uric acid in the past.  This feels the same to her.  Denies fevers, chills.  Nonsmoker.  Not working.  Nondiabetic.  Family hx of DM.       EXAM:Vitals: /82 (BP Location: Left arm, Patient Position: Chair, Cuff Size: Adult Regular)  Pulse 77  Ht 5' (1.524 m)  Wt 168 lb (76.2 kg)  BMI 32.81 kg/m2  BMI= Body mass index is 32.81 kg/(m^2).    General appearance: Patient is alert and fully cooperative with history & exam.  No sign of distress is noted during the visit.     Dermatologic: Medial aspect of right 1st MPJ with edema, erythema.  Skin is intact to the right foot.     Vascular: DP & PT pulses are intact & regular on the right.  CFT and skin temperature are normal.     Neurologic: Lower extremity sensation is intact to light touch.  No evidence of weakness or contracture in the lower extremities.  No evidence of neuropathy.     Musculoskeletal: Patient is ambulatory without assistive device or brace.  No gross ankle deformity noted.  No foot or ankle joint effusion is noted.     ASSESSMENT: R foot gout     PLAN:  Reviewed patient's chart in epic.  Discussed condition and treatment options including pros and cons.    Presentation classic for acute gout.  Will treat with Medrol dose pack.  Advised PCP f/u regarding considering of long term gout meds given frequent attacks.  Discussed food triggers to avoid.  Handout given.  F/u with me prn.       Cristobal Bullard DPM, FACFAS          Weight management plan: Patient was referred to their PCP to  discuss a diet and exercise plan.     MARYANN Callaway MA November 8, 2017 10:05 AM      Again, thank you for allowing me to participate in the care of your patient.        Sincerely,        Cristobal Bullard DPM

## 2017-11-08 NOTE — PATIENT INSTRUCTIONS
GOUT  Gout is a disorder that results from the build-up of uric acid in the tissues or a joint. It most often affects the joint of the big toe.    CAUSES  Gout attacks are caused by deposits of crystallized uric acid in the joint. Uric acid is present in the blood and eliminated in the urine, but in people who have gout, uric acid accumulates and crystallizes in the joints. Uric acid is the result of the breakdown of purines, chemicals that are found naturally in our bodies and in food. Some people develop gout because their kidneys have difficulty eliminating normal amounts of uric acid, while others produce too much uric acid.  Gout occurs most commonly in the big toe because uric acid is sensitive to temperature changes. At cooler temperatures, uric acid turns into crystals. Since the toe is the part of the body that is farthest from the heart, it s also the coolest part of the body   and, thus, the most likely target of gout. However, gout can affect any joint in the body.  The tendency to accumulate uric acid is often inherited. Other factors that put a person at risk for developing gout include: high blood pressure, diabetes, obesity, surgery, chemotherapy, stress, and certain medications and vitamins. For example, the body s ability to remove uric acid can be negatively affected by taking aspirin, some diuretic medications ( water pills ), and the vitamin niacin (also called nicotinic acid). While gout is more common in men aged 40 to 60 years, it can occur in younger men as well as in women.  Consuming foods and beverages that contain high levels of purines can trigger an attack of gout. Some foods contain more purines than others and have been associated with an increase of uric acid, which leads to gout. You may be able to reduce your chances of getting a gout attack by limiting or avoiding shellfish, organ meats (kidney, liver, etc.), red wine, beer, and red meat.  Other Triggers include but are not  limited to:  Congestive heart failure, deep vein thrombosis, pneumonia, fasting, dehydration, trauma/surgery, psoriasis.  SYMTOMS  An attack of gout can be miserable, marked by the following symptoms:  Intense pain that comes on suddenly   often in the middle of the night or upon arising   Signs of inflammation such as redness, swelling, and warmth over the joint.   DIAGNOSIS  To diagnose gout, the foot and ankle surgeon will ask questions about your personal and family medical history, followed by an examination of the affected joint. Laboratory tests and x-rays are sometimes ordered to determine if the inflammation is caused by something other than gout.  TREATMENT  Initial treatment of an attack of gout typically includes the following:  Medications. Prescription medications or injections are used to treat the pain, swelling, and inflammation.   Dietary restrictions. Foods and beverages that are high in purines should be avoided, since purines are converted in the body to uric acid.   Fluids. Drink plenty of water and other fluids each day, while also avoiding alcoholic beverages, which cause dehydration. Cherry Juice works well to help decrease pain.  Immobilize and elevate the foot. Avoid standing and walking to give your foot a rest. Also, elevate your foot (level with or slightly above the heart) to help reduce swelling.   The symptoms of gout and the inflammatory process usually resolve in three to ten days with treatment. If gout symptoms continue despite the initial treatment, or if repeated attacks occur, see your primary care physician for maintenance treatment that may involve daily medication. In cases of repeated episodes, the underlying problem must be addressed, as the build-up of uric acid over time can cause arthritic damage to the joint.  DIET CHANGE  A gout diet reduces your intake of foods that are high in purines, which helps control your body's production of uric acid. If you're overweight or  obese, lose weight. However, avoid fasting and rapid weight loss because these can promote a gout attack. Drink plenty of fluids to help flush uric acid from your body. Also avoid high-protein diets, which can cause you to produce too much uric acid (hyperuricemia).   To follow the diet:   Limit meat, poultry and fish. Animal proteins are high in purine. Avoid or severely limit high-purine foods, such as organ meats, herring, anchovies and mackerel. Red meat (beef, pork and lamb), fatty fish and seafood (tuna, shrimp, lobster and scallops) are associated with increased risk of gout. Because all meat, poultry and fish contain purines, limit your intake to 4 to 6 ounces (113 to 170 grams) daily.   Eat more plant-based proteins. You can increase your protein by including more plant-based sources, such as beans and legumes. This switch will also help you cut down on saturated fats, which may indirectly contribute to obesity and gout.   Limit or avoid alcohol. Alcohol interferes with the elimination of uric acid from your body. Drinking beer, in particular, has been linked to gout attacks. If you're having an attack, avoid alcohol. However, when you're not having an attack, drinking one or two 5-ounce (148 milliliter) servings a day of wine is not likely to increase your risk.   Drink plenty of fluids, particularly water. Fluids can help remove uric acid from your body. Aim for eight to 16 8-ounce (237 milliliter) glasses a day.   Choose low-fat or fat-free dairy products. Some studies have shown that drinking skim or low-fat milk and eating foods made with them, such as yogurt, help reduce the risk of gout. Aim for adequate dairy intake of 16 to 24 fluid ounces (473 to 710 milliliters) daily.   Choose complex carbohydrates. Eat more whole grains and fruits and vegetables and fewer refined carbohydrates, such as white bread, cakes and candy.   Limit or avoid sugar. Too many sweets can leave you with no room for  plant-based proteins and low-fat or fat-free dairy products   the foods you need to avoid gout. Sugary foods also tend to be high in calories, so they make it easier to eat more than you're likely to burn off. Although there's debate about whether sugar has a direct effect on uric acid levels, sweets are definitely linked to overweight and obesity.   There's also some evidence that drinking four to six cups of coffee a day lowers gout risk in men.   RESULTS  Following a gout diet can help you limit your body's uric acid production and increase its elimination. It's not likely to lower the uric acid concentration in your blood enough to treat your gout without medication, but it may help decrease the number of attacks and limit their severity. Following the gout diet and limiting your calories   particularly if you also add in moderate daily exercise, such as brisk walking   also can improve your overall health by helping you achieve and maintain a healthy weight.

## 2017-11-08 NOTE — PROGRESS NOTES
Weight management plan: Patient was referred to their PCP to discuss a diet and exercise plan.     MARYANN Callaway MA November 8, 2017 10:05 AM

## 2017-11-08 NOTE — TELEPHONE ENCOUNTER
"Hospital/TCU/ED for chronic condition Discharge Protocol    \"Hi, my name is Adrianna Silveira, a registered nurse, and I am calling from [facility].  I am calling to follow up and see how things are going for you after your recent emergency visit/hospital/TCU stay.\"    Tell me how you are doing now that you are home?\" doing good saw podiarty      Discharge Instructions    \"Let's review your discharge instructions.  What is/are the follow-up recommendations?  Pt. Response: health wise I am doing good    \"Has an appointment with your primary care provider been scheduled?\"   Yes. (confirm)    \"When you see the provider, I would recommend that you bring your medications with you.\"    Medications    \"Tell me what changed about your medicines when you discharged?\"    Changes to chronic meds?        \"What questions do you have about your medications?\"    None     New diagnoses of heart failure, COPD, diabetes, or MI?    No                  Medication reconciliation completed? Yes  Was MTM referral placed (*Make sure to put transitions as reason for referral)?   No    Call Summary    \"What questions or concerns do you have about your recent visit and your follow-up care?\"     none    \"If you have questions or things don't continue to improve, we encourage you contact us through the main clinic number (give number).  Even if the clinic is not open, triage nurses are available 24/7 to help you.     We would like you to know that our clinic has extended hours (provide information).  We also have urgent care (provide details on closest location and hours/contact info)\"      \"Thank you for your time and take care!\"     Adrianna Silveira,Clinic Rn  [facility]      "

## 2017-11-08 NOTE — TELEPHONE ENCOUNTER
Patient sent in another MyChart clarifying she does not have an upcoming appointment with provider but will be scheduling one.    Bonnie Oh RN  [facility]

## 2017-11-08 NOTE — MR AVS SNAPSHOT
After Visit Summary   11/8/2017    Abbey Omer    MRN: [id number]           Patient Information     Date Of Birth          1950        Visit Information        Provider Department      11/8/2017 9:45 AM Cristobal Bullard DPM [facility]        Today's Diagnoses     Acute gout of right foot, unspecified cause    -  1      Care Instructions    GOUT  Gout is a disorder that results from the build-up of uric acid in the tissues or a joint. It most often affects the joint of the big toe.    CAUSES  Gout attacks are caused by deposits of crystallized uric acid in the joint. Uric acid is present in the blood and eliminated in the urine, but in people who have gout, uric acid accumulates and crystallizes in the joints. Uric acid is the result of the breakdown of purines, chemicals that are found naturally in our bodies and in food. Some people develop gout because their kidneys have difficulty eliminating normal amounts of uric acid, while others produce too much uric acid.  Gout occurs most commonly in the big toe because uric acid is sensitive to temperature changes. At cooler temperatures, uric acid turns into crystals. Since the toe is the part of the body that is farthest from the heart, it s also the coolest part of the body - and, thus, the most likely target of gout. However, gout can affect any joint in the body.  The tendency to accumulate uric acid is often inherited. Other factors that put a person at risk for developing gout include: high blood pressure, diabetes, obesity, surgery, chemotherapy, stress, and certain medications and vitamins. For example, the body s ability to remove uric acid can be negatively affected by taking aspirin, some diuretic medications ( water pills ), and the vitamin niacin (also called nicotinic acid). While gout is more common in men aged 40 to 60 years, it can occur in younger men as well as in women.  Consuming foods and beverages  that contain high levels of purines can trigger an attack of gout. Some foods contain more purines than others and have been associated with an increase of uric acid, which leads to gout. You may be able to reduce your chances of getting a gout attack by limiting or avoiding shellfish, organ meats (kidney, liver, etc.), red wine, beer, and red meat.  Other Triggers include but are not limited to:  Congestive heart failure, deep vein thrombosis, pneumonia, fasting, dehydration, trauma/surgery, psoriasis.  SYMTOMS  An attack of gout can be miserable, marked by the following symptoms:  Intense pain that comes on suddenly - often in the middle of the night or upon arising   Signs of inflammation such as redness, swelling, and warmth over the joint.   DIAGNOSIS  To diagnose gout, the foot and ankle surgeon will ask questions about your personal and family medical history, followed by an examination of the affected joint. Laboratory tests and x-rays are sometimes ordered to determine if the inflammation is caused by something other than gout.  TREATMENT  Initial treatment of an attack of gout typically includes the following:  Medications. Prescription medications or injections are used to treat the pain, swelling, and inflammation.   Dietary restrictions. Foods and beverages that are high in purines should be avoided, since purines are converted in the body to uric acid.   Fluids. Drink plenty of water and other fluids each day, while also avoiding alcoholic beverages, which cause dehydration. Cherry Juice works well to help decrease pain.  Immobilize and elevate the foot. Avoid standing and walking to give your foot a rest. Also, elevate your foot (level with or slightly above the heart) to help reduce swelling.   The symptoms of gout and the inflammatory process usually resolve in three to ten days with treatment. If gout symptoms continue despite the initial treatment, or if repeated attacks occur, see your primary  care physician for maintenance treatment that may involve daily medication. In cases of repeated episodes, the underlying problem must be addressed, as the build-up of uric acid over time can cause arthritic damage to the joint.  DIET CHANGE  A gout diet reduces your intake of foods that are high in purines, which helps control your body's production of uric acid. If you're overweight or obese, lose weight. However, avoid fasting and rapid weight loss because these can promote a gout attack. Drink plenty of fluids to help flush uric acid from your body. Also avoid high-protein diets, which can cause you to produce too much uric acid (hyperuricemia).   To follow the diet:   Limit meat, poultry and fish. Animal proteins are high in purine. Avoid or severely limit high-purine foods, such as organ meats, herring, anchovies and mackerel. Red meat (beef, pork and lamb), fatty fish and seafood (tuna, shrimp, lobster and scallops) are associated with increased risk of gout. Because all meat, poultry and fish contain purines, limit your intake to 4 to 6 ounces (113 to 170 grams) daily.   Eat more plant-based proteins. You can increase your protein by including more plant-based sources, such as beans and legumes. This switch will also help you cut down on saturated fats, which may indirectly contribute to obesity and gout.   Limit or avoid alcohol. Alcohol interferes with the elimination of uric acid from your body. Drinking beer, in particular, has been linked to gout attacks. If you're having an attack, avoid alcohol. However, when you're not having an attack, drinking one or two 5-ounce (148 milliliter) servings a day of wine is not likely to increase your risk.   Drink plenty of fluids, particularly water. Fluids can help remove uric acid from your body. Aim for eight to 16 8-ounce (237 milliliter) glasses a day.   Choose low-fat or fat-free dairy products. Some studies have shown that drinking skim or low-fat milk and  eating foods made with them, such as yogurt, help reduce the risk of gout. Aim for adequate dairy intake of 16 to 24 fluid ounces (473 to 710 milliliters) daily.   Choose complex carbohydrates. Eat more whole grains and fruits and vegetables and fewer refined carbohydrates, such as white bread, cakes and candy.   Limit or avoid sugar. Too many sweets can leave you with no room for plant-based proteins and low-fat or fat-free dairy products -- the foods you need to avoid gout. Sugary foods also tend to be high in calories, so they make it easier to eat more than you're likely to burn off. Although there's debate about whether sugar has a direct effect on uric acid levels, sweets are definitely linked to overweight and obesity.   There's also some evidence that drinking four to six cups of coffee a day lowers gout risk in men.   RESULTS  Following a gout diet can help you limit your body's uric acid production and increase its elimination. It's not likely to lower the uric acid concentration in your blood enough to treat your gout without medication, but it may help decrease the number of attacks and limit their severity. Following the gout diet and limiting your calories -- particularly if you also add in moderate daily exercise, such as brisk walking -- also can improve your overall health by helping you achieve and maintain a healthy weight.                   Follow-ups after your visit        Your next 10 appointments already scheduled     Nov 09, 2017  9:30 AM CST   [facility]    [address] [place] [address] [phone]           Please do not eat 10-12 hours before your appointment if you are coming in fasting for labs on lipids, cholesterol, or glucose (sugar). This does not apply to pregnant women. Water, hot tea and black coffee (with nothing added) are okay. Do not drink other fluids, diet soda or chew gum.             Jan 23, 2018  9:00 AM CST   Return Visit with Omar West MD, [place] [facility]   [facility] ([address]  [place] [address] [phone]              Who to contact     If you have questions or need follow up information about today's clinic visit or your schedule please contact [facility] directly at [phone].  Normal or non-critical lab and imaging results will be communicated to you by Xagenichart, letter or phone within 4 business days after the clinic has received the results. If you do not hear from us within 7 days, please contact the clinic through Xagenichart or phone. If you have a critical or abnormal lab result, we will notify you by phone as soon as possible.  Submit refill requests through [employer] or call your pharmacy and they will forward the refill request to us. Please allow 3 business days for your refill to be completed.          Additional Information About Your Visit        [employer] Information     [employer] gives you secure access to your electronic health record. If you see a primary care provider, you can also send messages to your care team and make appointments. If you have questions, please call your primary care clinic.  If you do not have a primary care provider, please call [phone] and they will assist you.        Care EveryWhere ID     This is your Care EveryWhere ID. This could be used by other organizations to access your [place] medical records  [id number]        Your Vitals Were     Pulse Height BMI (Body Mass Index)             77 5' (1.524 m) 32.81 kg/m2          Blood Pressure from Last 3 Encounters:   11/08/17 131/82   10/25/17 140/66   09/20/17 136/80    Weight from Last 3 Encounters:   11/08/17 168 lb (76.2 kg)   10/25/17 168 lb 4 oz (76.3 kg)   09/20/17 170 lb 6.4 oz (77.3 kg)              Today, you had the following     No orders found for display         Today's Medication  Changes          These changes are accurate as of: 11/8/17 10:09 AM.  If you have any questions, ask your nurse or doctor.               Start taking these medicines.        Dose/Directions    methylPREDNISolone 4 MG tablet   Commonly known as:  MEDROL DOSEPAK   Used for:  Acute gout of right foot, unspecified cause   Started by:  Cristobal Bullard DPM        Follow package instructions   Quantity:  21 tablet   Refills:  0            Where to get your medicines      These medications were sent to [id number] - [place], [place] - [address] AT [facility] [address]  [address], [place] [address]     Phone:  [phone]     methylPREDNISolone 4 MG tablet                Primary Care Provider Office Phone # Fax #    Iris Killian [phone] [address] [facility] [address]        Equal Access to Services     BHUPINDER SUTHERLAND : Hadii aad ku hadasho Soomaali, waaxda luqadaha, qaybta kaalmada adeegyada, waxay idiin hayaan jamel kharawilliam mckinley . So [facility] [phone].    ATENCIÓN: Si habla español, tiene a smith disposición servicios gratuitos de asistencia lingüística. Llame al [phone].    We comply with applicable federal civil rights laws and [place] laws. We do not discriminate on the basis of race, color, national origin, age, disability, sex, sexual orientation, or gender identity.            Thank you!     Thank you for choosing [facility]  for your care. Our goal is always to provide you with excellent care. Hearing back from our patients is one way we can continue to improve our services. Please take a few minutes to complete the written survey that you may receive in the mail after your visit with us. Thank you!             Your Updated Medication List - Protect others around you: Learn how to safely use, store and throw away your medicines at www.disposemymeds.org.          This list is accurate as  of: 11/8/17 10:09 AM.  Always use your most recent med list.                   Brand Name Dispense Instructions for use Diagnosis    calcium + D 600-200 MG-UNIT Tabs   Generic drug:  calcium carbonate-vitamin D      1 tablet twice a day        Cranberry 500 MG Caps      Take 1 capsule by mouth daily        EXCEDRIN TENSION HEADACHE 500-65 MG Tabs   Generic drug:  acetaminophen-caffeine      1 tablet twice daily as needed        fish Oil 1200 MG capsule      3 capsule daily        FLAXSEED OIL PO      Take 1 Tablespoonful by mouth daily        fluticasone 50 MCG/ACT spray    FLONASE    48 g    Spray 2 sprays into both nostrils daily    Chronic rhinitis       HYDROcodone-acetaminophen 5-325 MG per tablet    NORCO     Take 1-2 tablets by mouth        KRILL OIL PO      Take 1 capsule by mouth daily        levalbuterol 45 MCG/ACT Inhaler    XOPENEX HFA    15 g    INHALE 2 PUFFS INTO THE LUNGS EVERY 6 HOURS AS NEEDED FOR SHORTNESS OF BREATH OR DIFFICULT BREATHING OR WHEEZING    Mild intermittent asthma without complication       levothyroxine 88 MCG tablet    SYNTHROID/LEVOTHROID    90 tablet    Take 1 tablet (88 mcg) by mouth daily    Hypothyroidism due to acquired atrophy of thyroid       magnesium 500 MG Tabs      Take 1 tablet by mouth daily        methylPREDNISolone 4 MG tablet    MEDROL DOSEPAK    21 tablet    Follow package instructions    Acute gout of right foot, unspecified cause       MULTIPLE vitamin  S/WOMENS Tabs      Take 2 tablets by mouth daily        order for DME      Equipment ordered: Respironics Auto PAP Mask type: Nasal Settings: 9-15 cm H2O        order for DME     1 Units    Post op shoe    Right foot pain       ranitidine 300 MG tablet    ZANTAC    90 tablet    Take 1 tablet (300 mg) by mouth At Bedtime    Gastroesophageal reflux disease without esophagitis       spironolactone 50 MG tablet    ALDACTONE    180 tablet    Take 2 tablets (100 mg) by mouth daily    Other acne       STATIN NOT  PRESCRIBED (INTENTIONAL)      by Other route continuous prn Reported on 5/15/2017    Hyperlipidemia LDL goal < 100       traZODone 50 MG tablet    DESYREL    180 tablet    Take 1-2 tablets ( mg) by mouth nightly as needed    Insomnia, unspecified type       venlafaxine 150 MG 24 hr capsule    EFFEXOR-XR    180 capsule    2 Capsules daily    Major depressive disorder, recurrent episode, mild degree (H)       VITAMIN B COMPLEX PO           zinc 50 MG Tabs      Take 1 tablet by mouth daily

## 2017-11-08 NOTE — TELEPHONE ENCOUNTER
Routed to provider to advise if patient can address her gout at her post op or if she needs a separate appointment for this.    Bonnie Oh RN  [facility]

## 2017-11-08 NOTE — NURSING NOTE
Chief Complaint   Patient presents with     Toe Pain     R 1st toe pain and redness       Initial /82 (BP Location: Left arm, Patient Position: Chair, Cuff Size: Adult Regular)  Pulse 77  Ht 5' (1.524 m)  Wt 168 lb (76.2 kg)  BMI 32.81 kg/m2 Estimated body mass index is 32.81 kg/(m^2) as calculated from the following:    Height as of this encounter: 5' (1.524 m).    Weight as of this encounter: 168 lb (76.2 kg).  Medication Reconciliation: unable or not appropriate to perform    MARYANN Callaway MA November 8, 2017 10:05 AM

## 2017-11-08 NOTE — TELEPHONE ENCOUNTER
ED / Discharge Outreach Protocol    Patient Contact    Attempt # 1    Was call answered?  No.  Left message on voicemail with information to call me back.    Bonnie Oh RN  [facility]

## 2017-11-09 ENCOUNTER — TELEPHONE (OUTPATIENT)
Dept: FAMILY MEDICINE | Facility: CLINIC | Age: 67
End: 2017-11-09

## 2017-11-09 DIAGNOSIS — M10.9 ACUTE GOUT INVOLVING TOE OF LEFT FOOT, UNSPECIFIED CAUSE: ICD-10-CM

## 2017-11-09 DIAGNOSIS — R79.89 ELEVATED LFTS: ICD-10-CM

## 2017-11-09 DIAGNOSIS — E03.4 HYPOTHYROIDISM DUE TO ACQUIRED ATROPHY OF THYROID: ICD-10-CM

## 2017-11-09 DIAGNOSIS — E78.5 HYPERLIPIDEMIA WITH TARGET LDL LESS THAN 100: ICD-10-CM

## 2017-11-09 DIAGNOSIS — N18.30 CKD (CHRONIC KIDNEY DISEASE) STAGE 3, GFR 30-59 ML/MIN (H): ICD-10-CM

## 2017-11-09 LAB
ALBUMIN SERPL-MCNC: 3.1 G/DL (ref 3.4–5)
ALP SERPL-CCNC: 165 U/L (ref 40–150)
ALT SERPL W P-5'-P-CCNC: 82 U/L (ref 0–50)
ANION GAP SERPL CALCULATED.3IONS-SCNC: 10 MMOL/L (ref 3–14)
AST SERPL W P-5'-P-CCNC: 32 U/L (ref 0–45)
BILIRUB SERPL-MCNC: 0.4 MG/DL (ref 0.2–1.3)
BUN SERPL-MCNC: 20 MG/DL (ref 7–30)
CALCIUM SERPL-MCNC: 9.4 MG/DL (ref 8.5–10.1)
CHLORIDE SERPL-SCNC: 93 MMOL/L (ref 94–109)
CHOLEST SERPL-MCNC: 174 MG/DL
CO2 SERPL-SCNC: 26 MMOL/L (ref 20–32)
CREAT SERPL-MCNC: 1.02 MG/DL (ref 0.52–1.04)
GFR SERPL CREATININE-BSD FRML MDRD: 54 ML/MIN/1.7M2
GLUCOSE SERPL-MCNC: 99 MG/DL (ref 70–99)
HDLC SERPL-MCNC: 54 MG/DL
LDLC SERPL CALC-MCNC: 98 MG/DL
NONHDLC SERPL-MCNC: 120 MG/DL
POTASSIUM SERPL-SCNC: 4.4 MMOL/L (ref 3.4–5.3)
PROT SERPL-MCNC: 7.9 G/DL (ref 6.8–8.8)
SODIUM SERPL-SCNC: 129 MMOL/L (ref 133–144)
TRIGL SERPL-MCNC: 109 MG/DL
TSH SERPL DL<=0.005 MIU/L-ACNC: 1.18 MU/L (ref 0.4–4)
URATE SERPL-MCNC: 5.7 MG/DL (ref 2.6–6)

## 2017-11-09 PROCEDURE — 80061 LIPID PANEL: CPT | Performed by: FAMILY MEDICINE

## 2017-11-09 PROCEDURE — 80053 COMPREHEN METABOLIC PANEL: CPT | Performed by: FAMILY MEDICINE

## 2017-11-09 PROCEDURE — 36415 COLL VENOUS BLD VENIPUNCTURE: CPT | Performed by: FAMILY MEDICINE

## 2017-11-09 PROCEDURE — 84550 ASSAY OF BLOOD/URIC ACID: CPT | Performed by: FAMILY MEDICINE

## 2017-11-09 PROCEDURE — 84443 ASSAY THYROID STIM HORMONE: CPT | Performed by: FAMILY MEDICINE

## 2017-11-09 NOTE — TELEPHONE ENCOUNTER
Please triage to determine if she is having any symptoms of low sodium.  Sodium 129.  She had surgery this month, how is she feeling? New meds? Change in meds?  Prior to surgery I talked to her about hydration, ask about fluid intake since surgery to determine if she is doing too much.    Kidney function much improved, thyroid normal, cholesterol better.    Can she see me 11/15/17 at noon for visit?    Iris Killian MD

## 2017-11-10 NOTE — TELEPHONE ENCOUNTER
Bobby, Fluid restriction of 1000-1200ml/day, relayed message that that is similar to 4-5 glasses/day, patient verbalized understanding.     Oma Richardson RN

## 2017-11-15 ENCOUNTER — OFFICE VISIT (OUTPATIENT)
Dept: FAMILY MEDICINE | Facility: CLINIC | Age: 67
End: 2017-11-15
Payer: COMMERCIAL

## 2017-11-15 ENCOUNTER — MYC MEDICAL ADVICE (OUTPATIENT)
Dept: FAMILY MEDICINE | Facility: CLINIC | Age: 67
End: 2017-11-15

## 2017-11-15 VITALS
DIASTOLIC BLOOD PRESSURE: 81 MMHG | SYSTOLIC BLOOD PRESSURE: 127 MMHG | BODY MASS INDEX: 32.39 KG/M2 | HEIGHT: 60 IN | TEMPERATURE: 97.9 F | WEIGHT: 165 LBS | HEART RATE: 87 BPM | OXYGEN SATURATION: 95 %

## 2017-11-15 DIAGNOSIS — N18.30 CKD (CHRONIC KIDNEY DISEASE) STAGE 3, GFR 30-59 ML/MIN (H): ICD-10-CM

## 2017-11-15 DIAGNOSIS — M10.071 ACUTE IDIOPATHIC GOUT OF RIGHT FOOT: ICD-10-CM

## 2017-11-15 DIAGNOSIS — E87.1 HYPONATREMIA: Primary | ICD-10-CM

## 2017-11-15 DIAGNOSIS — Z98.890 S/P SHOULDER SURGERY: ICD-10-CM

## 2017-11-15 LAB
ANION GAP SERPL CALCULATED.3IONS-SCNC: 7 MMOL/L (ref 3–14)
BUN SERPL-MCNC: 37 MG/DL (ref 7–30)
CALCIUM SERPL-MCNC: 9.5 MG/DL (ref 8.5–10.1)
CHLORIDE SERPL-SCNC: 98 MMOL/L (ref 94–109)
CO2 SERPL-SCNC: 27 MMOL/L (ref 20–32)
CREAT SERPL-MCNC: 1.16 MG/DL (ref 0.52–1.04)
GFR SERPL CREATININE-BSD FRML MDRD: 46 ML/MIN/1.7M2
GLUCOSE SERPL-MCNC: 119 MG/DL (ref 70–99)
POTASSIUM SERPL-SCNC: 4.4 MMOL/L (ref 3.4–5.3)
SODIUM SERPL-SCNC: 132 MMOL/L (ref 133–144)

## 2017-11-15 PROCEDURE — 80048 BASIC METABOLIC PNL TOTAL CA: CPT | Performed by: FAMILY MEDICINE

## 2017-11-15 PROCEDURE — 99214 OFFICE O/P EST MOD 30 MIN: CPT | Performed by: FAMILY MEDICINE

## 2017-11-15 PROCEDURE — 36415 COLL VENOUS BLD VENIPUNCTURE: CPT | Performed by: FAMILY MEDICINE

## 2017-11-15 RX ORDER — COLCHICINE 0.6 MG/1
TABLET ORAL
Qty: 20 TABLET | Refills: 0 | Status: SHIPPED | OUTPATIENT
Start: 2017-11-15 | End: 2018-01-24

## 2017-11-15 NOTE — TELEPHONE ENCOUNTER
Please send my chart 11/16/17 afternoon:    Blessing Potter,     Has the new medication for gout given you any relief yet?  Have you had side effects like loose stools?  Iris Killian MD

## 2017-11-15 NOTE — PATIENT INSTRUCTIONS
You can take up to 3,000 mg of tylenol daily for pain.    Gout: I will guide you as to how we will start other medications once we get your lab results from today. Then we could try colchicine and consider a daily preventative medicine called allopurinol to help reduce flare ups in the future.     [facility]   Discharged by : Elisha Santizo MA    Paper scripts provided to patient : no     If you have any questions regarding your visit please contact your care team:     Team Gold                Clinic Hours Telephone Number     Dr. Catina Hernández 7am-7pm  Monday - Thursday   7am-5pm  Fridays  [phone]   (Appointment scheduling available 24/7)     RN Line  [phone] option 2     Urgent Care - [place] and [facility] - 11am-9pm Monday-Friday Saturday-Sunday- 9am-5pm     [place] -   5pm-9pm Monday-Friday Saturday-Sunday- 9am-5pm    [phone] - [place]    [phone] - [place]       For a Price Quote for your services, please call our Consumer Price Line at [phone].     What options do I have for visits at the clinic other than the traditional office visit?     To expand how we care for you, many of our providers are utilizing electronic visits (e-visits) and telephone visits, when medically appropriate, for interactions with their patients rather than a visit in the clinic. We also offer nurse visits for many medical concerns. Just like any other service, we will bill your insurance company for this type of visit based on time spent on the phone with your provider. Not all insurance companies cover these visits. Please check with your medical insurance if this type of visit is covered. You will be responsible for any charges that are not paid by your insurance.   E-visits via [employer]: generally incur a $35.00 fee.     Telephone visits:   Time spent on the phone: *charged  based on time that is spent on the phone in increments of 10 minutes. Estimated cost:   5-10 mins $30.00   11-20 mins. $59.00   21-30 mins. $85.00     Use [employer] (secure email communication and access to your chart) to send your primary care provider a message or make an appointment. Ask someone on your Team how to sign up for [employer].     As always, Thank you for trusting us with your health care needs!      [facility]:    Scheduling Appointments  [place] [place]  Call: [phone]    [place], [facility]  Call: [phone]    [facility]  Call: [phone]    For Gastroenterology referrals   [facility]   Clinics and Surgery [place], 4th Floor   [address] [place] [address]   Appointments: [phone]    WHERE TO GO FOR CARE?  Clinic    Make an appointment if you:       Are sick (cold, cough, flu, sore throat, earache or in pain).       Have a small injury (sprain, small cut, burn or broken bone).       Need a physical exam, Pap smear, vaccine or prescription refill.       Have questions about your health or medicines.    To reach us:      Call [phone] ([phone]). Open 24 hours every day. (For counseling services, call [phone].)    Log into [employer] at [email or web address]. (Visit [email or web address] to create an account.) Hospital emergency room    An emergency is a serious or life- threatening problem that must be treated right away.    Call 921 or get to the hospital if you have:      Very bad or sudden:            - Chest pain or pressure         - Bleeding         - Head or belly pain         - Dizziness or trouble seeing, walking or                          Speaking      Problems breathing      Blood in your vomit or you are coughing up blood      A major injury (knocked out, loss of a finger or limb, rape, broken bone protruding from skin)    A mental health crisis. (Or call the  Mental Health Crisis line at 1-565.193.1703 or Suicide Prevention Hotline at 1-659.855.4005.)    Open 24 hours every day. You don't need an appointment.     Urgent care    Visit urgent care for sickness or small injuries when the clinic is closed. You don't need an appointment. To check hours or find an urgent care near you, visit [email or web address]. Online care    Get online care from [facility] for more than 70 common problems, like colds, allergies and infections. Open 24 hours every day at:   [email or web address]   Need help deciding?    For advice about where to be seen, you may call your clinic and ask to speak with a nurse. We're here for you 24 hours every day.         If you are deaf or hard of hearing, please let us know. We provide many free services including sign language interpreters, oral interpreters, TTYs, telephone amplifiers, note takers and written materials.

## 2017-11-15 NOTE — MR AVS SNAPSHOT
After Visit Summary   11/15/2017    Abbey Omer    MRN: [id number]           Patient Information     Date Of Birth          1950        Visit Information        Provider Department      11/15/2017 12:00 PM Iris Killian MD [facility]        Today's Diagnoses     Hyponatremia    -  1    Acute idiopathic gout of right foot        CKD (chronic kidney disease) stage 3, GFR 30-59 ml/min          Care Instructions    You can take up to 3,000 mg of tylenol daily for pain.    Gout: I will guide you as to how we will start other medications once we get your lab results from today. Then we could try colchicine and consider a daily preventative medicine called allopurinol to help reduce flare ups in the future.     [facility]   Discharged by : Elisha Santizo MA    Paper scripts provided to patient : no     If you have any questions regarding your visit please contact your care team:     Team Gold                Clinic Hours Telephone Number     Dr. Catina Hernández 7am-7pm  Monday - Thursday   7am-5pm  Fridays  [phone]   (Appointment scheduling available 24/7)     RN Line  [phone] option 2     Urgent Care - [facility] - 11am-9pm Monday-Friday Saturday-Sunday- 9am-5pm     [place] -   5pm-9pm Monday-Friday Saturday-Sunday- 9am-5pm    [phone] - [facility]    [phone] - [place]       For a Price Quote for your services, please call our Consumer Price Line at [phone].     What options do I have for visits at the clinic other than the traditional office visit?     To expand how we care for you, many of our providers are utilizing electronic visits (e-visits) and telephone visits, when medically appropriate, for interactions with their patients rather than a visit in the clinic. We also offer nurse visits for many  medical concerns. Just like any other service, we will bill your insurance company for this type of visit based on time spent on the phone with your provider. Not all insurance companies cover these visits. Please check with your medical insurance if this type of visit is covered. You will be responsible for any charges that are not paid by your insurance.   E-visits via [employer]: generally incur a $35.00 fee.     Telephone visits:   Time spent on the phone: *charged based on time that is spent on the phone in increments of 10 minutes. Estimated cost:   5-10 mins $30.00   11-20 mins. $59.00   21-30 mins. $85.00     Use [employer] (secure email communication and access to your chart) to send your primary care provider a message or make an appointment. Ask someone on your Team how to sign up for [employer].     As always, Thank you for trusting us with your health care needs!      [facility]:    Scheduling Appointments  [facility]  Call: [phone]    [facility]  Call: [phone]    [facility]  Call: [phone]    For Gastroenterology referrals   [facility]   Clinics and Surgery Center, 4th Floor   [address] [place] [address]   Appointments: [phone]    WHERE TO GO FOR CARE?  Clinic    Make an appointment if you:       Are sick (cold, cough, flu, sore throat, earache or in pain).       Have a small injury (sprain, small cut, burn or broken bone).       Need a physical exam, Pap smear, vaccine or prescription refill.       Have questions about your health or medicines.    To reach us:      Call [phone] ([phone]). Open 24 hours every day. (For counseling services, call [phone].)    Log into Xierkang at [email or web address]. (Visit [email or web address] to create an account.) Hospital emergency room    An emergency is a serious or life- threatening problem that must be treated  right away.    Call 911 or get to the hospital if you have:      Very bad or sudden:            - Chest pain or pressure         - Bleeding         - Head or belly pain         - Dizziness or trouble seeing, walking or                          Speaking      Problems breathing      Blood in your vomit or you are coughing up blood      A major injury (knocked out, loss of a finger or limb, rape, broken bone protruding from skin)    A mental health crisis. (Or call the Mental Health Crisis line at 1-588.720.3920 or Suicide Prevention Hotline at 1-232.320.2178.)    Open 24 hours every day. You don't need an appointment.     Urgent care    Visit urgent care for sickness or small injuries when the clinic is closed. You don't need an appointment. To check hours or find an urgent care near you, visit [email or web address]. Online care    Get online care from [facility] for more than 70 common problems, like colds, allergies and infections. Open 24 hours every day at:   [email or web address]   Need help deciding?    For advice about where to be seen, you may call your clinic and ask to speak with a nurse. We're here for you 24 hours every day.         If you are deaf or hard of hearing, please let us know. We provide many free services including sign language interpreters, oral interpreters, TTYs, telephone amplifiers, note takers and written materials.                   Follow-ups after your visit        Your next 10 appointments already scheduled     Jan 23, 2018  9:00 AM CST   Return Visit with Omar West MD, [facility] PROC ROOM   [facility] ([address] [id number] [phone]              Who to contact     If you have questions or need follow up information about today's clinic visit or your schedule please contact [facility] directly at [phone].  Normal or non-critical lab and imaging results will be communicated to you by Radha  letter or phone within 4 business days after the clinic has received the results. If you do not hear from us within 7 days, please contact the clinic through [employer] or phone. If you have a critical or abnormal lab result, we will notify you by phone as soon as possible.  Submit refill requests through [employer] or call your pharmacy and they will forward the refill request to us. Please allow 3 business days for your refill to be completed.          Additional Information About Your Visit        [employer] Information     [employer] gives you secure access to your electronic health record. If you see a primary care provider, you can also send messages to your care team and make appointments. If you have questions, please call your primary care clinic.  If you do not have a primary care provider, please call [phone] and they will assist you.        Care EveryWhere ID     This is your Care EveryWhere ID. This could be used by other organizations to access your [place] medical records  [id number]        Your Vitals Were     Pulse Temperature Height Pulse Oximetry BMI (Body Mass Index)       87 97.9  F (36.6  C) (Oral) 5' (1.524 m) 95% 32.22 kg/m2        Blood Pressure from Last 3 Encounters:   11/15/17 127/81   11/08/17 131/82   10/25/17 140/66    Weight from Last 3 Encounters:   11/15/17 165 lb (74.8 kg)   11/08/17 168 lb (76.2 kg)   10/25/17 168 lb 4 oz (76.3 kg)              We Performed the Following     Basic metabolic panel        Primary Care Provider Office Phone # Fax #    Iris Killian [phone]       [facility] [address]        Equal Access to Services     [facility] : Hadii madalyn carroll Soplacido, waaxda luqadaha, qaybta kaalmada maximiliano reid. So [facility] [phone].    ATENCIÓN: Si habla español, tiene a smith disposición servicios gratuitos de asistencia lingüística. Llame al [phone].    We comply with applicable  federal civil rights laws and [place] laws. We do not discriminate on the basis of race, color, national origin, age, disability, sex, sexual orientation, or gender identity.            Thank you!     Thank you for choosing [facility]  for your care. Our goal is always to provide you with excellent care. Hearing back from our patients is one way we can continue to improve our services. Please take a few minutes to complete the written survey that you may receive in the mail after your visit with us. Thank you!             Your Updated Medication List - Protect others around you: Learn how to safely use, store and throw away your medicines at www.disposemymeds.org.          This list is accurate as of: 11/15/17 12:31 PM.  Always use your most recent med list.                   Brand Name Dispense Instructions for use Diagnosis    calcium + D 600-200 MG-UNIT Tabs   Generic drug:  calcium carbonate-vitamin D      1 tablet twice a day        Cranberry 500 MG Caps      Take 1 capsule by mouth daily        EXCEDRIN TENSION HEADACHE 500-65 MG Tabs   Generic drug:  acetaminophen-caffeine      1 tablet twice daily as needed        fish Oil 1200 MG capsule      3 capsule daily        FLAXSEED OIL PO      Take 1 Tablespoonful by mouth daily        fluticasone 50 MCG/ACT spray    FLONASE    48 g    Spray 2 sprays into both nostrils daily    Chronic rhinitis       KRILL OIL PO      Take 1 capsule by mouth daily        levalbuterol 45 MCG/ACT Inhaler    XOPENEX HFA    15 g    INHALE 2 PUFFS INTO THE LUNGS EVERY 6 HOURS AS NEEDED FOR SHORTNESS OF BREATH OR DIFFICULT BREATHING OR WHEEZING    Mild intermittent asthma without complication       levothyroxine 88 MCG tablet    SYNTHROID/LEVOTHROID    90 tablet    Take 1 tablet (88 mcg) by mouth daily    Hypothyroidism due to acquired atrophy of thyroid       magnesium 500 MG Tabs      Take 1 tablet by mouth daily        methylPREDNISolone 4 MG tablet    MEDROL  DOSEPAK    21 tablet    Follow package instructions    Acute gout of right foot, unspecified cause       MULTIPLE vitamin  S/WOMENS Tabs      Take 2 tablets by mouth daily        order for DME      Equipment ordered: Respironics Auto PAP Mask type: Nasal Settings: 9-15 cm H2O        order for DME     1 Units    Post op shoe    Right foot pain       ranitidine 300 MG tablet    ZANTAC    90 tablet    Take 1 tablet (300 mg) by mouth At Bedtime    Gastroesophageal reflux disease without esophagitis       spironolactone 50 MG tablet    ALDACTONE    180 tablet    Take 2 tablets (100 mg) by mouth daily    Other acne       STATIN NOT PRESCRIBED (INTENTIONAL)      by Other route continuous prn Reported on 5/15/2017    Hyperlipidemia LDL goal < 100       traZODone 50 MG tablet    DESYREL    180 tablet    Take 1-2 tablets ( mg) by mouth nightly as needed    Insomnia, unspecified type       venlafaxine 150 MG 24 hr capsule    EFFEXOR-XR    180 capsule    2 Capsules daily    Major depressive disorder, recurrent episode, mild degree (H)       VITAMIN B COMPLEX PO           zinc 50 MG Tabs      Take 1 tablet by mouth daily

## 2017-11-15 NOTE — NURSING NOTE
Chief Complaint   Patient presents with     RECHECK     sodium     Medication Reconciliation     done with medrol dose pack and Norco       Initial /81  Pulse 87  Temp 97.9  F (36.6  C) (Oral)  Ht 5' (1.524 m)  Wt 165 lb (74.8 kg)  SpO2 95%  BMI 32.22 kg/m2 Estimated body mass index is 32.22 kg/(m^2) as calculated from the following:    Height as of this encounter: 5' (1.524 m).    Weight as of this encounter: 165 lb (74.8 kg).  Medication Reconciliation: complete   Elisha Santizo MA

## 2017-11-15 NOTE — PROGRESS NOTES
SUBJECTIVE:   Abbey Omer is a 67 year old female who presents to clinic today for the following health issues:      Patient is here for recheck of sodium   Patient reports she has finished the Norco and Medrol dose pack.    L Shoulder: surgery went well, no complications. Today she woke up at 5 AM and took 2 tylenol and at 7 Am she took 2 more. Otherwise she hs been taking 2 (arthritis) tylenol every 8 hours.    Moods: Feeling a little down lately.    Hydration: Has been drinking 32 ounces of water daily. She feels better with more water.     Gout: Almost 2 weeks. Some days the pain is worse in her R foot than in her shoulder. Has some pain walking. Has had in both feet in past. Got over her last gout flare up with Prednisone.     Sleep: Has been able to sleep longer amounts since being off of narcotics. Took her last dose of hydrocodone yesterday.       Problem list and histories reviewed & adjusted, as indicated.  Additional history: as documented    Patient Active Problem List   Diagnosis     RICKEY (obstructive sleep apnea)     Hypothyroidism     Chronic nonallergic rhinitis     CKD (chronic kidney disease) stage 3, GFR 30-59 ml/min     Hyperlipidemia with target LDL less than 100     Obesity     History of bladder cancer     Hypertension goal BP (blood pressure) < 140/90     Advance Care Planning     Dependent edema     Elevated LFTs     DDD (degenerative disc disease), cervical     Major depressive disorder, recurrent episode, mild degree (H)     Macular drusen     Hypertriglyceridemia     Allergic conjunctivitis     Pulmonary nodules     Major depressive disorder, recurrent episode, moderate (H)     Acne     Combined form of age-related cataract, both eyes     Glaucoma suspect, bilateral     Drusen of macula of both eyes     Renal cyst, left     Discoid atelectasis     Acute gout involving toe of left foot, unspecified cause     Past Surgical History:   Procedure Laterality Date     CHOLECYSTECTOMY,  LAPOROSCOPIC       COLONOSCOPY  ?    Upper & Lower     CYSTOSCOPY      bladder cancer     D & C       ENT SURGERY  first 1967/second ?    2 Rhynoplasties     HYSTERECTOMY, PAP NO LONGER INDICATED      BSO     NOSE SURGERY      septum deviation       Social History   Substance Use Topics     Smoking status: Former Smoker     Packs/day: 0.10     Years: 1.00     Types: Cigarettes     Start date: 8/12/1969     Quit date: 10/12/1969     Smokeless tobacco: Never Used      Comment: Smoked, very little, for 2 mo, 49 years ago.     Alcohol use Yes      Comment: Maybe 1 or 2 drinks a year     Family History   Problem Relation Age of Onset     DIABETES Maternal Grandfather      Hypertension Maternal Grandfather      DIABETES Son      HEART DISEASE Father      CHF     DIABETES Mother      Depression Mother      Hyperlipidemia Mother      Anxiety Disorder Mother      Asthma Mother      Obesity Mother      Substance Abuse Brother      DIABETES Brother      Depression Daughter      DIABETES Maternal Uncle      CEREBROVASCULAR DISEASE Maternal Uncle      DIABETES Son      after accident     Depression Daughter      Depression Son      after accident     Substance Abuse Brother      Not any longer     Thyroid Disease No family hx of      Glaucoma No family hx of      Macular Degeneration No family hx of      CANCER No family hx of          Current Outpatient Prescriptions   Medication Sig Dispense Refill     order for DME Post op shoe 1 Units 0     KRILL OIL PO Take 1 capsule by mouth daily       traZODone (DESYREL) 50 MG tablet Take 1-2 tablets ( mg) by mouth nightly as needed 180 tablet 3     levothyroxine (SYNTHROID/LEVOTHROID) 88 MCG tablet Take 1 tablet (88 mcg) by mouth daily 90 tablet 1     levalbuterol (XOPENEX HFA) 45 MCG/ACT Inhaler INHALE 2 PUFFS INTO THE LUNGS EVERY 6 HOURS AS NEEDED FOR SHORTNESS OF BREATH OR DIFFICULT BREATHING OR WHEEZING 15 g 0     B Complex Vitamins (VITAMIN B COMPLEX PO)        order for DME  Equipment ordered: Respironics Auto PAP Mask type: Nasal Settings: 9-15 cm H2O       venlafaxine (EFFEXOR-XR) 150 MG 24 hr capsule 2 Capsules daily 180 capsule 3     spironolactone (ALDACTONE) 50 MG tablet Take 2 tablets (100 mg) by mouth daily 180 tablet 3     ranitidine (ZANTAC) 300 MG tablet Take 1 tablet (300 mg) by mouth At Bedtime 90 tablet 3     Cranberry 500 MG CAPS Take 1 capsule by mouth daily       fluticasone (FLONASE) 50 MCG/ACT nasal spray Spray 2 sprays into both nostrils daily 48 g 3     Multiple Vitamins-Minerals (MULTIPLE VITAMIN  S/WOMENS) TABS Take 2 tablets by mouth daily       magnesium 500 MG TABS Take 1 tablet by mouth daily       zinc 50 MG TABS Take 1 tablet by mouth daily       Flaxseed, Linseed, (FLAXSEED OIL PO) Take 1 Tablespoonful by mouth daily       STATIN NOT PRESCRIBED, INTENTIONAL, by Other route continuous prn Reported on 5/15/2017  0     EXCEDRIN TENSION HEADACHE 500-65 MG PO TABS 1 tablet twice daily as needed       FISH OIL 1200 MG OR CAPS 3 capsule daily       CALCIUM + D 600-200 MG-UNIT OR TABS 1 tablet twice a day       methylPREDNISolone (MEDROL DOSEPAK) 4 MG tablet Follow package instructions (Patient not taking: Reported on 11/15/2017) 21 tablet 0     Allergies   Allergen Reactions     Amlodipine Swelling     Asa [Aluminum Hydroxide] GI Disturbance     Atenolol Fatigue     Atorvastatin Calcium Swelling     Codeine Nausea     Colestid [Colestipol Hcl]      Crestor [Hmg-Coa-R Inhibitors]      Joint pain     Cymbalta      Visual hallucinations     Hydralazine Fatigue     Lovastatin Cramps     Niacin      Unknown by patient     Paxil [Paroxetine] Fatigue     Potassium GI Disturbance     Sulfa Drugs Fatigue     Zetia [Ezetimibe] Fatigue     Zomig [Zolmitriptan]          Reviewed and updated as needed this visit by clinical staff  Tobacco  Med Hx  Surg Hx  Fam Hx  Soc Hx      Reviewed and updated as needed this visit by Provider       ROS:  Constitutional, HEENT,  cardiovascular, pulmonary, GI, , musculoskeletal, neuro, skin, endocrine and psych systems are negative, except as otherwise noted.    This document serves as a record of the services and decisions personally performed and made by Iris Killian MD. It was created on her behalf by Hermila Anaya, a trained medical scribe. The creation of this document is based the provider's statements to the medical scribe.    Hermila Anaya November 15, 2017 12:28 PM    OBJECTIVE:   /81  Pulse 87  Temp 97.9  F (36.6  C) (Oral)  Ht 5' (1.524 m)  Wt 165 lb (74.8 kg)  SpO2 95%  BMI 32.22 kg/m2  Body mass index is 32.22 kg/(m^2).  GENERAL: overweight, alert and no distress  MS: R  MTP joint, erythema and swelling, and very tender to palpation.  PSYCH: mentation appears normal, affect normal    Diagnostic Test Results:  Results for orders placed or performed in visit on 11/15/17 (from the past 24 hour(s))   Basic metabolic panel   Result Value Ref Range    Sodium 132 (L) 133 - 144 mmol/L    Potassium 4.4 3.4 - 5.3 mmol/L    Chloride 98 94 - 109 mmol/L    Carbon Dioxide 27 20 - 32 mmol/L    Anion Gap 7 3 - 14 mmol/L    Glucose 119 (H) 70 - 99 mg/dL    Urea Nitrogen 37 (H) 7 - 30 mg/dL    Creatinine 1.16 (H) 0.52 - 1.04 mg/dL    GFR Estimate 46 (L) >60 mL/min/1.7m2    GFR Estimate If Black 56 (L) >60 mL/min/1.7m2    Calcium 9.5 8.5 - 10.1 mg/dL       ASSESSMENT/PLAN:     1. Hyponatremia  Await labs, has been on fluid restriction.  If sodium improving, okay to resume typical fluids but not over hydration.  Reviewed in detail together.  - Basic metabolic panel    2. Acute idiopathic gout of right foot  Await today's GFR result to determine if I will start colchicine vs short term NSAIDS. Cannot be on steroids at this time due to recent shoulder surgery. Also need to consider allopurinol for prevention.   Talked with patient this evening and due to GFR, will try colchicine as she can't take steroid or  nsaids.    Colchicine, we reviewed risk with kidneys and also side effect of diarrhea  11/15/17 take 2 tablets for first dose, then may take 1 tablet 4-6 hours later.   Starting 11/16/17 one tablet daily.  Once gout has resolved, take one daily for 2 more days and then stop    Common side effects of medications prescribed at this visit were discussed with the patient. Severe side effects, including current applicable black box warnings, were discussed. We discussed options for dealing with these possible side effects and allergic reactions, based on their severity.    Colchicine faxed to [facility] 5:20 PM    3. CKD (chronic kidney disease) stage 3, GFR 30-59 ml/min  Discussion about medications and potential for renal insufficiency with gout medications.     (Z98.890) S/P shoulder surgery  Comment:   Plan: followed by surgeon.          FUTURE APPOINTMENTS:       - Follow-up visit : pending lab results    Iris Killian MD  [facility]    The information in this document, created by the medical scribe for me, accurately reflects the services I personally performed and the decisions made by me. I have reviewed and approved this document for accuracy prior to leaving the patient care area.

## 2017-11-20 ENCOUNTER — MYC MEDICAL ADVICE (OUTPATIENT)
Dept: FAMILY MEDICINE | Facility: CLINIC | Age: 67
End: 2017-11-20

## 2017-11-21 NOTE — TELEPHONE ENCOUNTER
Reviewed.  Please schedule a future visit with me to discuss long term gout plan  Iris Killian MD

## 2017-11-22 ENCOUNTER — MYC MEDICAL ADVICE (OUTPATIENT)
Dept: FAMILY MEDICINE | Facility: CLINIC | Age: 67
End: 2017-11-22

## 2017-11-24 NOTE — TELEPHONE ENCOUNTER
Huddled with Dr. Killian.  Per Dr. Killian okay to stop colchicine now if gout pain is improved, even though it may not be completely resolved.    Called patient to discuss. She reports pain is much improved and even more so now compared to 2 days ago when she sent [employer] message.  Advised her to stop colchicine as advised and to let us know if pain worsens after doing so. She has 7 tablets of colchicine left.  Otherwise, plan to meet with PCP as scheduled in January.    Patient verbalized understanding.     FYI to Dr. Killian.    John Arzola RN

## 2018-01-16 NOTE — TELEPHONE ENCOUNTER
"Chief Complaint   Patient presents with     Back Pain     4 days   woke up saturday morning with discomfort      Mass     2 small lumps behind the right ear for the past 4 days   Painful to the touch.        Initial /60  Pulse 65  Temp 98.1  F (36.7  C) (Oral)  Resp 15  Ht 5' 8.75\" (1.746 m)  Wt 128 lb 9.6 oz (58.3 kg)  LMP 12/21/2017 (Exact Date)  SpO2 100%  BMI 19.13 kg/m2 Estimated body mass index is 19.13 kg/(m^2) as calculated from the following:    Height as of this encounter: 5' 8.75\" (1.746 m).    Weight as of this encounter: 128 lb 9.6 oz (58.3 kg).  Medication Reconciliation: complete    " Relayed message, she feels tired but was at the clinic & had company, more pain because she is decreasing norco but is taking 1/2 tylenol every 8 hours, jittery & hard to go back to sleep due to steroids.   She denies AMS, headache, or irritability. Kandi is with her & is helping.  New meds: methylprednisolone & norco.   She thought she was drinking okay but the lab had trouble drawing her blood at 9am. She drank about 36oz so far today. Appetite is coming back. She will drink a little less & try to eat some foods with salt in them.  Scheduled 11/15.  Oma Richardson RN

## 2018-01-23 ENCOUNTER — OFFICE VISIT (OUTPATIENT)
Dept: UROLOGY | Facility: CLINIC | Age: 68
End: 2018-01-23
Payer: COMMERCIAL

## 2018-01-23 VITALS — HEART RATE: 74 BPM | BODY MASS INDEX: 33.01 KG/M2 | OXYGEN SATURATION: 97 % | WEIGHT: 169 LBS

## 2018-01-23 DIAGNOSIS — Z85.51 HISTORY OF BLADDER CANCER: Primary | ICD-10-CM

## 2018-01-23 PROCEDURE — 52000 CYSTOURETHROSCOPY: CPT | Performed by: UROLOGY

## 2018-01-23 NOTE — PROGRESS NOTES
S: Abbey Omer is a 67 year old female returns for bladder cancer surveillance.    she has history of bladder cancer many years ago without any recurrence.    She received 0 courses of BCG therapy.    Patient is draped and prepped.  Flexible cystoscopy placed under direct vision.      Cysto:  The anterior urethra is normal     In the bladder there is normal mucosa.    Assessment/Plan:  (Z85.51) History of bladder cancer  (primary encounter diagnosis)  Comment: no recurrence   Plan: Cystoscopy        In one year

## 2018-01-23 NOTE — MR AVS SNAPSHOT
After Visit Summary   1/23/2018    Abbey Omer    MRN: [id number]           Patient Information     Date Of Birth          1950        Visit Information        Provider Department      1/23/2018 9:00 AM Omar West MD; [facility] CYSTO PROC ROOM [facility]        Today's Diagnoses     History of bladder cancer    -  1       Follow-ups after your visit        Your next 10 appointments already scheduled     Jan 24, 2018  7:40 AM CST   SHORT with Iris Killian MD   [facility] ([facility])    [address] [place] [id number]   [phone]           Your Arrival times is X, We need you to be here at this time to get checked in and have the assistant get you ready for the provider, which can take about 15 minutes. Your appointment time with your provider is at  X. Thank you.            Jan 22, 2019  9:00 AM CST   Return Visit with Omar West MD, [facility] CYSTO PROC ROOM   [facility] ([address] [id number] [phone]              Who to contact     If you have questions or need follow up information about today's clinic visit or your schedule please contact [facility] directly at [phone].  Normal or non-critical lab and imaging results will be communicated to you by MyChart, letter or phone within 4 business days after the clinic has received the results. If you do not hear from us within 7 days, please contact the clinic through MyChart or phone. If you have a critical or abnormal lab result, we will notify you by phone as soon as possible.  Submit refill requests through [employer] or call your pharmacy and they will forward the refill request to us. Please allow 3 business days for your refill to be completed.          Additional Information About Your Visit        [employer] Information     [employer] gives you secure access to  your electronic health record. If you see a primary care provider, you can also send messages to your care team and make appointments. If you have questions, please call your primary care clinic.  If you do not have a primary care provider, please call [phone] and they will assist you.        Care EveryWhere ID     This is your Care EveryWhere ID. This could be used by other organizations to access your [facility] medical records  [id number]        Your Vitals Were     Pulse Pulse Oximetry BMI (Body Mass Index)             74 97% 33.01 kg/m2          Blood Pressure from Last 3 Encounters:   11/15/17 127/81   11/08/17 131/82   10/25/17 140/66    Weight from Last 3 Encounters:   01/23/18 76.7 kg (169 lb)   11/15/17 74.8 kg (165 lb)   11/08/17 76.2 kg (168 lb)              We Performed the Following     CYSTOURETHROSCOPY ([id number])        Primary Care Provider Office Phone # Fax #    Iris Killian [phone]       [facility] [address]        Equal Access to Services     [facility]: Hadii aad ku hadasho Soplacido, waaxda luqadaha, qaybta kaalmada franklin, maximiliano mckinley . So [facility] [phone].    ATENCIÓN: Si habla español, tiene a smith disposición servicios gratuitos de asistencia lingüística. Kishor al [phone].    We comply with applicable federal civil rights laws and [place] laws. We do not discriminate on the basis of race, color, national origin, age, disability, sex, sexual orientation, or gender identity.            Thank you!     Thank you for choosing [facility]  for your care. Our goal is always to provide you with excellent care. Hearing back from our patients is one way we can continue to improve our services. Please take a few minutes to complete the written survey that you may receive in the mail after your visit with us. Thank you!             Your Updated Medication List - Protect others around you: Learn how  to safely use, store and throw away your medicines at www.disposemymeds.org.          This list is accurate as of: 1/23/18  9:01 AM.  Always use your most recent med list.                   Brand Name Dispense Instructions for use Diagnosis    calcium + D 600-200 MG-UNIT Tabs   Generic drug:  calcium carbonate-vitamin D      1 tablet twice a day        colchicine 0.6 MG tablet    COLCRYS    20 tablet    11/15/17 take 2 tablets for first dose, then may take 1 tablet 4-6 hours later.   Starting 11/16/17 one tablet daily.  Once gout has resolved, take one daily for 2 more days and then stop.    Acute idiopathic gout of right foot       Cranberry 500 MG Caps      Take 1 capsule by mouth daily        EXCEDRIN TENSION HEADACHE 500-65 MG Tabs   Generic drug:  acetaminophen-caffeine      1 tablet twice daily as needed        fish Oil 1200 MG capsule      3 capsule daily        FLAXSEED OIL PO      Take 1 Tablespoonful by mouth daily        fluticasone 50 MCG/ACT spray    FLONASE    48 g    Spray 2 sprays into both nostrils daily    Chronic rhinitis       KRILL OIL PO      Take 1 capsule by mouth daily        levalbuterol 45 MCG/ACT Inhaler    XOPENEX HFA    15 g    INHALE 2 PUFFS INTO THE LUNGS EVERY 6 HOURS AS NEEDED FOR SHORTNESS OF BREATH OR DIFFICULT BREATHING OR WHEEZING    Mild intermittent asthma without complication       levothyroxine 88 MCG tablet    SYNTHROID/LEVOTHROID    90 tablet    Take 1 tablet (88 mcg) by mouth daily    Hypothyroidism due to acquired atrophy of thyroid       magnesium 500 MG Tabs      Take 1 tablet by mouth daily        methylPREDNISolone 4 MG tablet    MEDROL DOSEPAK    21 tablet    Follow package instructions    Acute gout of right foot, unspecified cause       MULTIPLE vitamin  S/WOMENS Tabs      Take 2 tablets by mouth daily        order for DME      Equipment ordered: Respironics Auto PAP Mask type: Nasal Settings: 9-15 cm H2O        order for DME     1 Units    Post op shoe    Right  foot pain       ranitidine 300 MG tablet    ZANTAC    90 tablet    Take 1 tablet (300 mg) by mouth At Bedtime    Gastroesophageal reflux disease without esophagitis       spironolactone 50 MG tablet    ALDACTONE    180 tablet    Take 2 tablets (100 mg) by mouth daily    Other acne       STATIN NOT PRESCRIBED (INTENTIONAL)      by Other route continuous prn Reported on 5/15/2017    Hyperlipidemia LDL goal < 100       traZODone 50 MG tablet    DESYREL    180 tablet    Take 1-2 tablets ( mg) by mouth nightly as needed    Insomnia, unspecified type       venlafaxine 150 MG 24 hr capsule    EFFEXOR-XR    180 capsule    2 Capsules daily    Major depressive disorder, recurrent episode, mild degree (H)       VITAMIN B COMPLEX PO           zinc 50 MG Tabs      Take 1 tablet by mouth daily

## 2018-01-24 ENCOUNTER — OFFICE VISIT (OUTPATIENT)
Dept: FAMILY MEDICINE | Facility: CLINIC | Age: 68
End: 2018-01-24
Payer: COMMERCIAL

## 2018-01-24 VITALS
SYSTOLIC BLOOD PRESSURE: 136 MMHG | BODY MASS INDEX: 34.16 KG/M2 | TEMPERATURE: 96.5 F | HEART RATE: 74 BPM | DIASTOLIC BLOOD PRESSURE: 82 MMHG | OXYGEN SATURATION: 97 % | WEIGHT: 174 LBS | HEIGHT: 60 IN

## 2018-01-24 DIAGNOSIS — L70.8 OTHER ACNE: ICD-10-CM

## 2018-01-24 DIAGNOSIS — E87.1 HYPONATREMIA: ICD-10-CM

## 2018-01-24 DIAGNOSIS — K21.9 GASTROESOPHAGEAL REFLUX DISEASE WITHOUT ESOPHAGITIS: ICD-10-CM

## 2018-01-24 DIAGNOSIS — N18.30 CKD (CHRONIC KIDNEY DISEASE) STAGE 3, GFR 30-59 ML/MIN (H): ICD-10-CM

## 2018-01-24 DIAGNOSIS — F33.0 MAJOR DEPRESSIVE DISORDER, RECURRENT EPISODE, MILD DEGREE (H): ICD-10-CM

## 2018-01-24 DIAGNOSIS — M1A.9XX0 CHRONIC GOUT INVOLVING TOE WITHOUT TOPHUS, UNSPECIFIED CAUSE, UNSPECIFIED LATERALITY: ICD-10-CM

## 2018-01-24 DIAGNOSIS — E03.4 HYPOTHYROIDISM DUE TO ACQUIRED ATROPHY OF THYROID: Primary | ICD-10-CM

## 2018-01-24 PROCEDURE — 36415 COLL VENOUS BLD VENIPUNCTURE: CPT | Performed by: FAMILY MEDICINE

## 2018-01-24 PROCEDURE — 99214 OFFICE O/P EST MOD 30 MIN: CPT | Performed by: FAMILY MEDICINE

## 2018-01-24 PROCEDURE — 80048 BASIC METABOLIC PNL TOTAL CA: CPT | Performed by: FAMILY MEDICINE

## 2018-01-24 PROCEDURE — 82043 UR ALBUMIN QUANTITATIVE: CPT | Performed by: FAMILY MEDICINE

## 2018-01-24 RX ORDER — VENLAFAXINE HYDROCHLORIDE 150 MG/1
CAPSULE, EXTENDED RELEASE ORAL
Qty: 180 CAPSULE | Refills: 3 | Status: SHIPPED | OUTPATIENT
Start: 2018-01-24 | End: 2018-12-27

## 2018-01-24 RX ORDER — ALLOPURINOL 100 MG/1
100 TABLET ORAL DAILY
Qty: 90 TABLET | Refills: 0 | Status: SHIPPED | OUTPATIENT
Start: 2018-01-24 | End: 2018-03-12

## 2018-01-24 RX ORDER — COLCHICINE 0.6 MG/1
0.6 TABLET ORAL DAILY
Qty: 90 TABLET | Refills: 3 | Status: SHIPPED | OUTPATIENT
Start: 2018-01-24 | End: 2018-12-27

## 2018-01-24 RX ORDER — LEVOTHYROXINE SODIUM 88 UG/1
88 TABLET ORAL DAILY
Qty: 90 TABLET | Refills: 1 | Status: SHIPPED | OUTPATIENT
Start: 2018-01-24 | End: 2018-08-23

## 2018-01-24 RX ORDER — SPIRONOLACTONE 50 MG/1
100 TABLET, FILM COATED ORAL DAILY
Qty: 180 TABLET | Refills: 3 | Status: SHIPPED | OUTPATIENT
Start: 2018-01-24 | End: 2018-09-13

## 2018-01-24 ASSESSMENT — ANXIETY QUESTIONNAIRES
5. BEING SO RESTLESS THAT IT IS HARD TO SIT STILL: NOT AT ALL
IF YOU CHECKED OFF ANY PROBLEMS ON THIS QUESTIONNAIRE, HOW DIFFICULT HAVE THESE PROBLEMS MADE IT FOR YOU TO DO YOUR WORK, TAKE CARE OF THINGS AT HOME, OR GET ALONG WITH OTHER PEOPLE: NOT DIFFICULT AT ALL
2. NOT BEING ABLE TO STOP OR CONTROL WORRYING: NOT AT ALL
1. FEELING NERVOUS, ANXIOUS, OR ON EDGE: NOT AT ALL
3. WORRYING TOO MUCH ABOUT DIFFERENT THINGS: NOT AT ALL
6. BECOMING EASILY ANNOYED OR IRRITABLE: SEVERAL DAYS
GAD7 TOTAL SCORE: 1
7. FEELING AFRAID AS IF SOMETHING AWFUL MIGHT HAPPEN: NOT AT ALL

## 2018-01-24 ASSESSMENT — PATIENT HEALTH QUESTIONNAIRE - PHQ9
5. POOR APPETITE OR OVEREATING: NOT AT ALL
SUM OF ALL RESPONSES TO PHQ QUESTIONS 1-9: 0

## 2018-01-24 NOTE — PATIENT INSTRUCTIONS
Arm: You can use ice and heat to help relieve soreness     Gout: We will start allopurinol at a low dose and adjust depending on your kidney function.  Along with this medication we will also restart colchiceine for 4-6 months to help prevent a flare up.   I will guide you on the dosage depending on your lab results today.   Remember that hydration is very important.    [facility]   Discharged by : Maria Elena MUELLER CMA ([facility])    Paper scripts provided to patient : none      If you have any questions regarding your visit please contact your care team:     Team Gold                Clinic Hours Telephone Number     Dr. Catina Hernández 7am-7pm  Monday - Thursday   7am-5pm  Fridays  [phone]   (Appointment scheduling available 24/7)     RN Line  [phone] option 2     Urgent Care - [place] - 11am-9pm Monday-Friday Saturday-Sunday- 9am-5pm     [place] -   5pm-9pm Monday-Friday Saturday-Sunday- 9am-5pm    [phone] - [place]    [phone] - [place]       For a Price Quote for your services, please call our Consumer Price Line at [phone].     What options do I have for visits at the clinic other than the traditional office visit?     To expand how we care for you, many of our providers are utilizing electronic visits (e-visits) and telephone visits, when medically appropriate, for interactions with their patients rather than a visit in the clinic. We also offer nurse visits for many medical concerns. Just like any other service, we will bill your insurance company for this type of visit based on time spent on the phone with your provider. Not all insurance companies cover these visits. Please check with your medical insurance if this type of visit is covered. You will be responsible for any charges that are not paid by your insurance.   E-visits via [employer]: generally  incur a $35.00 fee.     Telephone visits:   Time spent on the phone: *charged based on time that is spent on the phone in increments of 10 minutes. Estimated cost:   5-10 mins $30.00   11-20 mins. $59.00   21-30 mins. $85.00     Use [employer] (secure email communication and access to your chart) to send your primary care provider a message or make an appointment. Ask someone on your Team how to sign up for [employer].     As always, Thank you for trusting us with your health care needs!      [facility]:    Scheduling Appointments  [facility]  Call: [phone]    [facility]  Call: [phone]    [facility]  Call: [phone]    For Gastroenterology referrals   [facility]enter[facility]   Clinics and Surgery Center, 4th Floor   [address] [place] [address]   Appointments: [phone]    WHERE TO GO FOR CARE?  Clinic    Make an appointment if you:       Are sick (cold, cough, flu, sore throat, earache or in pain).       Have a small injury (sprain, small cut, burn or broken bone).       Need a physical exam, Pap smear, vaccine or prescription refill.       Have questions about your health or medicines.    To reach us:      Call [phone] ([phone]). Open 24 hours every day. (For counseling services, call [phone].)    Log into [employer] at [email or web address]. (Visit [email or web address] to create an account.) Hospital emergency room    An emergency is a serious or life- threatening problem that must be treated right away.    Call 747 or get to the hospital if you have:      Very bad or sudden:            - Chest pain or pressure         - Bleeding         - Head or belly pain         - Dizziness or trouble seeing, walking or                          Speaking      Problems breathing      Blood in your vomit or you are coughing up blood      A major injury (knocked out, loss of a finger or limb,  rape, broken bone protruding from skin)    A mental health crisis. (Or call the Mental Health Crisis line at 1-394.213.9669 or Suicide Prevention Hotline at 1-814.851.4200.)    Open 24 hours every day. You don't need an appointment.     Urgent care    Visit urgent care for sickness or small injuries when the clinic is closed. You don't need an appointment. To check hours or find an urgent care near you, visit [email or web address]. Online care    Get online care from [facility] for more than 70 common problems, like colds, allergies and infections. Open 24 hours every day at:   [email or web address]   Need help deciding?    For advice about where to be seen, you may call your clinic and ask to speak with a nurse. We're here for you 24 hours every day.         If you are deaf or hard of hearing, please let us know. We provide many free services including sign language interpreters, oral interpreters, TTYs, telephone amplifiers, note takers and written materials.

## 2018-01-24 NOTE — MR AVS SNAPSHOT
After Visit Summary   1/24/2018    Abbey Omer    MRN: [id number]           Patient Information     Date Of Birth          1950        Visit Information        Provider Department      1/24/2018 7:40 AM Iris Killian MD [facility]        Today's Diagnoses     Hypothyroidism due to acquired atrophy of thyroid    -  1    Major depressive disorder, recurrent episode, mild degree (H)        Other acne        Gastroesophageal reflux disease without esophagitis        CKD (chronic kidney disease) stage 3, GFR 30-59 ml/min        Chronic gout involving toe without tophus, unspecified cause, unspecified laterality        Hyponatremia          Care Instructions    Arm: You can use ice and heat to help relieve soreness     Gout: We will start allopurinol at a low dose and adjust depending on your kidney function.  Along with this medication we will also restart colchiceine for 4-6 months to help prevent a flare up.   I will guide you on the dosage depending on your lab results today.   Remember that hydration is very important.    [facility]   Discharged by : Maria Elena MUELLER CMA ([facility])    Paper scripts provided to patient : none      If you have any questions regarding your visit please contact your care team:     Team Gold                Clinic Hours Telephone Number     Dr. Catina Hernández 7am-7pm  Monday - Thursday   7am-5pm  Fridays  [phone]   (Appointment scheduling available 24/7)     RN Line  [phone] option 2     Urgent Care - [facility] [place] [facility] - 11am-9pm Monday-Friday Saturday-Sunday- 9am-5pm     [place] -   5pm-9pm Monday-Friday Saturday-Sunday- 9am-5pm    [phone] - Zelda Reed    [phone] - Vicki       For a Price Quote for your services, please call our Consumer Price Line at [phone].     What options do I have  for visits at the clinic other than the traditional office visit?     To expand how we care for you, many of our providers are utilizing electronic visits (e-visits) and telephone visits, when medically appropriate, for interactions with their patients rather than a visit in the clinic. We also offer nurse visits for many medical concerns. Just like any other service, we will bill your insurance company for this type of visit based on time spent on the phone with your provider. Not all insurance companies cover these visits. Please check with your medical insurance if this type of visit is covered. You will be responsible for any charges that are not paid by your insurance.   E-visits via Textinglyt: generally incur a $35.00 fee.     Telephone visits:   Time spent on the phone: *charged based on time that is spent on the phone in increments of 10 minutes. Estimated cost:   5-10 mins $30.00   11-20 mins. $59.00   21-30 mins. $85.00     Use Textinglyt (secure email communication and access to your chart) to send your primary care provider a message or make an appointment. Ask someone on your Team how to sign up for [employer].     As always, Thank you for trusting us with your health care needs!      [facility]:    Scheduling Appointments  Pamela Guillen [facility]  Call: [phone]    [facility]  Call: [phone]    [facility]  Call: [phone]    For Gastroenterology referrals   [facility], 4th Floor   [address] [place] [address]   Appointments: [phone]    WHERE TO GO FOR CARE?  Clinic    Make an appointment if you:       Are sick (cold, cough, flu, sore throat, earache or in pain).       Have a small injury (sprain, small cut, burn or broken bone).       Need a physical exam, Pap smear, vaccine or prescription refill.       Have questions about your health or medicines.    To  reach us:      Call [phone] ([phone]). Open 24 hours every day. (For counseling services, call [phone].)    Log into [employer] at [employer]. (Visit [employer] to create an account.) Hospital emergency room    An emergency is a serious or life- threatening problem that must be treated right away.    Call 911 or get to the hospital if you have:      Very bad or sudden:            - Chest pain or pressure         - Bleeding         - Head or belly pain         - Dizziness or trouble seeing, walking or                          Speaking      Problems breathing      Blood in your vomit or you are coughing up blood      A major injury (knocked out, loss of a finger or limb, rape, broken bone protruding from skin)    A mental health crisis. (Or call the Mental Health Crisis line at 1-790.780.1632 or Suicide Prevention Hotline at 1-950.850.6778.)    Open 24 hours every day. You don't need an appointment.     Urgent care    Visit urgent care for sickness or small injuries when the clinic is closed. You don't need an appointment. To check hours or find an urgent care near you, visit [email or web address]. Online care    Get online care from [facility] for more than 70 common problems, like colds, allergies and infections. Open 24 hours every day at:   [email or web address]   Need help deciding?    For advice about where to be seen, you may call your clinic and ask to speak with a nurse. We're here for you 24 hours every day.         If you are deaf or hard of hearing, please let us know. We provide many free services including sign language interpreters, oral interpreters, TTYs, telephone amplifiers, note takers and written materials.                   Follow-ups after your visit        Your next 10 appointments already scheduled     Jan 22, 2019  9:00 AM CST   Return Visit with Omar West MD, [place] [facility]   [facility] [place] ([facility])    [address] [place] [address]  [place]  [place] [id number] [phone]              Who to contact     If you have questions or need follow up information about today's clinic visit or your schedule please contact [facility] directly at [phone].  Normal or non-critical lab and imaging results will be communicated to you by MyChart, letter or phone within 4 business days after the clinic has received the results. If you do not hear from us within 7 days, please contact the clinic through its learninghart or phone. If you have a critical or abnormal lab result, we will notify you by phone as soon as possible.  Submit refill requests through [employer] or call your pharmacy and they will forward the refill request to us. Please allow 3 business days for your refill to be completed.          Additional Information About Your Visit        its learninghart Information     [employer] gives you secure access to your electronic health record. If you see a primary care provider, you can also send messages to your care team and make appointments. If you have questions, please call your primary care clinic.  If you do not have a primary care provider, please call [phone] and they will assist you.        Care EveryWhere ID     This is your Care EveryWhere ID. This could be used by other organizations to access your [facility] medical records  [id number]        Your Vitals Were     Pulse Temperature Height Pulse Oximetry Breastfeeding? BMI (Body Mass Index)    74 96.5  F (35.8  C) (Tympanic) 5' (1.524 m) 97% No 33.98 kg/m2       Blood Pressure from Last 3 Encounters:   01/24/18 136/82   11/15/17 127/81   11/08/17 131/82    Weight from Last 3 Encounters:   01/24/18 174 lb (78.9 kg)   01/23/18 169 lb (76.7 kg)   11/15/17 165 lb (74.8 kg)              We Performed the Following     **Basic metabolic panel FUTURE 14d     Albumin Random Urine Quantitative with Creat Ratio          Today's Medication Changes          These changes are accurate as of 1/24/18   8:51 AM.  If you have any questions, ask your nurse or doctor.               Start taking these medicines.        Dose/Directions    allopurinol 100 MG tablet   Commonly known as:  ZYLOPRIM   Used for:  Chronic gout involving toe without tophus, unspecified cause, unspecified laterality   Started by:  Iris Killian MD        Dose:  100 mg   Take 1 tablet (100 mg) by mouth daily   Quantity:  90 tablet   Refills:  0         These medicines have changed or have updated prescriptions.        Dose/Directions    colchicine 0.6 MG tablet   Commonly known as:  COLCRYS   This may have changed:    - how much to take  - how to take this  - when to take this  - additional instructions   Used for:  Chronic gout involving toe without tophus, unspecified cause, unspecified laterality   Changed by:  Iris Killian MD        Dose:  0.6 mg   Take 1 tablet (0.6 mg) by mouth daily   Quantity:  90 tablet   Refills:  3            Where to get your medicines      These medications were sent to [id number] - [place], [place] - [address] AT [facility] [address]  [address], [place] [address]     Phone:  [phone]     allopurinol 100 MG tablet    colchicine 0.6 MG tablet    levothyroxine 88 MCG tablet    ranitidine 300 MG tablet    spironolactone 50 MG tablet    venlafaxine 150 MG 24 hr capsule                Primary Care Provider Office Phone # Fax #    Iris Killian [phone]       [address] [facility] [address]        Equal Access to Services     BHUPINDER SUTHERLAND AH: Hadii madalyn ignacio hadandréso Sosegunali, waaxda luqadaha, qaybta kaalmada adeegyatushar, maximiliano pathak. So [facility] [phone].    ATENCIÓN: Si habla ebenezer, tiene a smith disposición servicios gratuitos de asistencia lingüística. Kishor al [phone].    We comply with applicable federal civil rights laws and [place] laws. We do not discriminate on the basis of  race, color, national origin, age, disability, sex, sexual orientation, or gender identity.            Thank you!     Thank you for choosing [facility]  for your care. Our goal is always to provide you with excellent care. Hearing back from our patients is one way we can continue to improve our services. Please take a few minutes to complete the written survey that you may receive in the mail after your visit with us. Thank you!             Your Updated Medication List - Protect others around you: Learn how to safely use, store and throw away your medicines at www.disposemymeds.org.          This list is accurate as of 1/24/18  8:51 AM.  Always use your most recent med list.                   Brand Name Dispense Instructions for use Diagnosis    allopurinol 100 MG tablet    ZYLOPRIM    90 tablet    Take 1 tablet (100 mg) by mouth daily    Chronic gout involving toe without tophus, unspecified cause, unspecified laterality       calcium + D 600-200 MG-UNIT Tabs   Generic drug:  calcium carbonate-vitamin D      1 tablet twice a day        colchicine 0.6 MG tablet    COLCRYS    90 tablet    Take 1 tablet (0.6 mg) by mouth daily    Chronic gout involving toe without tophus, unspecified cause, unspecified laterality       Cranberry 500 MG Caps      Take 1 capsule by mouth daily        EXCEDRIN TENSION HEADACHE 500-65 MG Tabs   Generic drug:  acetaminophen-caffeine      1 tablet twice daily as needed        fish Oil 1200 MG capsule      3 capsule daily        FLAXSEED OIL PO      Take 1 Tablespoonful by mouth daily        fluticasone 50 MCG/ACT spray    FLONASE    48 g    Spray 2 sprays into both nostrils daily    Chronic rhinitis       KRILL OIL PO      Take 1 capsule by mouth daily        levalbuterol 45 MCG/ACT Inhaler    XOPENEX HFA    15 g    INHALE 2 PUFFS INTO THE LUNGS EVERY 6 HOURS AS NEEDED FOR SHORTNESS OF BREATH OR DIFFICULT BREATHING OR WHEEZING    Mild intermittent asthma without  complication       levothyroxine 88 MCG tablet    SYNTHROID/LEVOTHROID    90 tablet    Take 1 tablet (88 mcg) by mouth daily    Hypothyroidism due to acquired atrophy of thyroid       magnesium 500 MG Tabs      Take 1 tablet by mouth daily        methylPREDNISolone 4 MG tablet    MEDROL DOSEPAK    21 tablet    Follow package instructions    Acute gout of right foot, unspecified cause       MULTIPLE vitamin  S/WOMENS Tabs      Take 2 tablets by mouth daily        order for DME      Equipment ordered: Respironics Auto PAP Mask type: Nasal Settings: 9-15 cm H2O        order for DME     1 Units    Post op shoe    Right foot pain       ranitidine 300 MG tablet    ZANTAC    90 tablet    Take 1 tablet (300 mg) by mouth At Bedtime    Gastroesophageal reflux disease without esophagitis       spironolactone 50 MG tablet    ALDACTONE    180 tablet    Take 2 tablets (100 mg) by mouth daily    Other acne       STATIN NOT PRESCRIBED (INTENTIONAL)      by Other route continuous prn Reported on 5/15/2017    Hyperlipidemia LDL goal < 100       traZODone 50 MG tablet    DESYREL    180 tablet    Take 1-2 tablets ( mg) by mouth nightly as needed    Insomnia, unspecified type       venlafaxine 150 MG 24 hr capsule    EFFEXOR-XR    180 capsule    2 Capsules daily    Major depressive disorder, recurrent episode, mild degree (H)       VITAMIN B COMPLEX PO           zinc 50 MG Tabs      Take 1 tablet by mouth daily

## 2018-01-24 NOTE — PROGRESS NOTES
SUBJECTIVE:   Abbey Omer is a 67 year old female who presents to clinic today for the following health issues:      Hyperlipidemia Follow-Up      Rate your low fat/cholesterol diet?: good    Taking statin?  No    Other lipid medications/supplements?:  none      Depression Followup    Status since last visit: Stable     See PHQ-9 for current symptoms.  Other associated symptoms: None    Complicating factors:   Significant life event:  No   Current substance abuse:  None  Anxiety or Panic symptoms:  No    PHQ-9 2/22/2016 1/25/2017 8/11/2017   Total Score 6 0 0   Q9: Suicide Ideation Not at all Not at all Not at all     PHQ-9  English  PHQ-9   Any Language  Suicide Assessment Five-step Evaluation and Treatment (SAFE-T)    Amount of exercise or physical activity: None    Problems taking medications regularly: No    Medication side effects: none    Diet: regular (no restrictions)      1/23/18: Patient saw urology    Fall: Patient fell on the ice yesterday getting out of her car. She landed on her R elbow/arm.   Has some mild soreness in her upper R arm.     Shoulder: Rehab has been going well. Almost three months since surgery.  L arm is now bothering her more.    Gout: Still feels pain in her big toes that is tolerable, no swelling. Willing to start long term gout medication.     Hydration: Has been good this winter.    Problem list and histories reviewed & adjusted, as indicated.  Additional history: as documented    Patient Active Problem List   Diagnosis     RICKEY (obstructive sleep apnea)     Hypothyroidism     Chronic nonallergic rhinitis     CKD (chronic kidney disease) stage 3, GFR 30-59 ml/min     Hyperlipidemia with target LDL less than 100     Obesity     History of bladder cancer     Hypertension goal BP (blood pressure) < 140/90     Advance Care Planning     Dependent edema     Elevated LFTs     DDD (degenerative disc disease), cervical     Major depressive disorder, recurrent episode, mild degree  (H)     Macular drusen     Hypertriglyceridemia     Allergic conjunctivitis     Pulmonary nodules     Major depressive disorder, recurrent episode, moderate (H)     Acne     Combined form of age-related cataract, both eyes     Glaucoma suspect, bilateral     Drusen of macula of both eyes     Renal cyst, left     Discoid atelectasis     Acute gout involving toe of left foot, unspecified cause     Past Surgical History:   Procedure Laterality Date     CHOLECYSTECTOMY, LAPOROSCOPIC       COLONOSCOPY  ?    Upper & Lower     CYSTOSCOPY      bladder cancer     D & C       ENT SURGERY  first 1967/second ?    2 Rhynoplasties     HYSTERECTOMY, PAP NO LONGER INDICATED      BSO     NOSE SURGERY      septum deviation       Social History   Substance Use Topics     Smoking status: Former Smoker     Packs/day: 0.10     Years: 1.00     Types: Cigarettes     Start date: 8/12/1969     Quit date: 10/12/1969     Smokeless tobacco: Never Used      Comment: Smoked, very little, for 2 mo, 49 years ago.     Alcohol use Yes      Comment: Maybe 1 or 2 drinks a year     Family History   Problem Relation Age of Onset     DIABETES Maternal Grandfather      Hypertension Maternal Grandfather      DIABETES Son      HEART DISEASE Father      CHF     DIABETES Mother      Depression Mother      Hyperlipidemia Mother      Anxiety Disorder Mother      Asthma Mother      Obesity Mother      Substance Abuse Brother      DIABETES Brother      Depression Daughter      DIABETES Maternal Uncle      CEREBROVASCULAR DISEASE Maternal Uncle      DIABETES Son      after accident     Depression Daughter      Depression Son      after accident     Substance Abuse Brother      Not any longer     Thyroid Disease No family hx of      Glaucoma No family hx of      Macular Degeneration No family hx of      CANCER No family hx of          Current Outpatient Prescriptions   Medication Sig Dispense Refill     order for DME Post op shoe 1 Units 0     KRILL OIL PO Take 1  capsule by mouth daily       traZODone (DESYREL) 50 MG tablet Take 1-2 tablets ( mg) by mouth nightly as needed 180 tablet 3     levothyroxine (SYNTHROID/LEVOTHROID) 88 MCG tablet Take 1 tablet (88 mcg) by mouth daily 90 tablet 1     levalbuterol (XOPENEX HFA) 45 MCG/ACT Inhaler INHALE 2 PUFFS INTO THE LUNGS EVERY 6 HOURS AS NEEDED FOR SHORTNESS OF BREATH OR DIFFICULT BREATHING OR WHEEZING 15 g 0     B Complex Vitamins (VITAMIN B COMPLEX PO)        order for DME Equipment ordered: Respironics Auto PAP Mask type: Nasal Settings: 9-15 cm H2O       venlafaxine (EFFEXOR-XR) 150 MG 24 hr capsule 2 Capsules daily 180 capsule 3     spironolactone (ALDACTONE) 50 MG tablet Take 2 tablets (100 mg) by mouth daily 180 tablet 3     ranitidine (ZANTAC) 300 MG tablet Take 1 tablet (300 mg) by mouth At Bedtime 90 tablet 3     Cranberry 500 MG CAPS Take 1 capsule by mouth daily       fluticasone (FLONASE) 50 MCG/ACT nasal spray Spray 2 sprays into both nostrils daily 48 g 3     Multiple Vitamins-Minerals (MULTIPLE VITAMIN  S/WOMENS) TABS Take 2 tablets by mouth daily       magnesium 500 MG TABS Take 1 tablet by mouth daily       zinc 50 MG TABS Take 1 tablet by mouth daily       Flaxseed, Linseed, (FLAXSEED OIL PO) Take 1 Tablespoonful by mouth daily       EXCEDRIN TENSION HEADACHE 500-65 MG PO TABS 1 tablet twice daily as needed       FISH OIL 1200 MG OR CAPS 3 capsule daily       CALCIUM + D 600-200 MG-UNIT OR TABS 1 tablet twice a day       colchicine (COLCRYS) 0.6 MG tablet 11/15/17 take 2 tablets for first dose, then may take 1 tablet 4-6 hours later.   Starting 11/16/17 one tablet daily.  Once gout has resolved, take one daily for 2 more days and then stop. (Patient not taking: Reported on 1/24/2018) 20 tablet 0     methylPREDNISolone (MEDROL DOSEPAK) 4 MG tablet Follow package instructions (Patient not taking: Reported on 1/24/2018) 21 tablet 0     STATIN NOT PRESCRIBED, INTENTIONAL, by Other route continuous prn  Reported on 5/15/2017  0     Allergies   Allergen Reactions     Amlodipine Swelling     Asa [Aluminum Hydroxide] GI Disturbance     Atenolol Fatigue     Atorvastatin Calcium Swelling     Codeine Nausea     Colestid [Colestipol Hcl]      Crestor [Hmg-Coa-R Inhibitors]      Joint pain     Cymbalta      Visual hallucinations     Hydralazine Fatigue     Lovastatin Cramps     Niacin      Unknown by patient     Paxil [Paroxetine] Fatigue     Potassium GI Disturbance     Sulfa Drugs Fatigue     Zetia [Ezetimibe] Fatigue     Zomig [Zolmitriptan]        Reviewed and updated as needed this visit by clinical staff  Tobacco  Allergies  Meds  Med Hx  Surg Hx  Fam Hx  Soc Hx      Reviewed and updated as needed this visit by Provider       ROS:  Constitutional, HEENT, cardiovascular, pulmonary, GI, , musculoskeletal, neuro, skin, endocrine and psych systems are negative, except as otherwise noted.    This document serves as a record of the services and decisions personally performed and made by Iris Killian MD. It was created on her behalf by Hermila Anaya, a trained medical scribe. The creation of this document is based the provider's statements to the medical scribe.    January 24, 2018 8:52 AM    OBJECTIVE:     /82  Pulse 74  Temp 96.5  F (35.8  C) (Tympanic)  Ht 1.524 m (5')  Wt 78.9 kg (174 lb)  SpO2 97%  Breastfeeding? No  BMI 33.98 kg/m2  Body mass index is 33.98 kg/(m^2).  GENERAL: overweight, alert and no distress  RESP: lungs clear to auscultation - no rales, rhonchi or wheezes  CV: regular rate and rhythm, normal S1 S2, no S3 or S4, no murmur, click or rub, no peripheral edema and peripheral pulses strong  PSYCH: mentation appears normal, affect normal/bright    Diagnostic Test Results:  No results found for this or any previous visit (from the past 24 hour(s)).    ASSESSMENT/PLAN:     1. Hypothyroidism due to acquired atrophy of thyroid  Chronic, stable, well controlled, continue current  medication, refill done if needed    - levothyroxine (SYNTHROID/LEVOTHROID) 88 MCG tablet; Take 1 tablet (88 mcg) by mouth daily  Dispense: 90 tablet; Refill: 1    2. Major depressive disorder, recurrent episode, mild degree (H)  Chronic, stable, well controlled, continue current medication, refill done if needed    - venlafaxine (EFFEXOR-XR) 150 MG 24 hr capsule; 2 Capsules daily  Dispense: 180 capsule; Refill: 3    3. Other acne  Chronic, stable, well controlled, continue current medication, refill done if needed    - spironolactone (ALDACTONE) 50 MG tablet; Take 2 tablets (100 mg) by mouth daily  Dispense: 180 tablet; Refill: 3    4. Gastroesophageal reflux disease without esophagitis  Chronic, stable, well controlled, continue current medication, refill done if needed    - ranitidine (ZANTAC) 300 MG tablet; Take 1 tablet (300 mg) by mouth At Bedtime  Dispense: 90 tablet; Refill: 3    5. CKD (chronic kidney disease) stage 3, GFR 30-59 ml/min  BMP today. Results will determine gout medication dosing.  - Albumin Random Urine Quantitative with Creat Ratio    6. Chronic gout involving toe without tophus, unspecified cause, unspecified laterality  Long discussion about medication prevention, caution with kidney function  Start slow allopurinol 100 mg daily, consider adjustment by 50 mg every 2-4 weeks if renal function okay  Goal to get uric acid level <6  Colchicine for 6 months.    - colchicine (COLCRYS) 0.6 MG tablet; Take 1 tablet (0.6 mg) by mouth daily  Dispense: 90 tablet; Refill: 3  - allopurinol (ZYLOPRIM) 100 MG tablet; Take 1 tablet (100 mg) by mouth daily  Dispense: 90 tablet; Refill: 0    Common side effects of medications prescribed at this visit were discussed with the patient. Severe side effects, including current applicable black box warnings, were discussed. We discussed options for dealing with these possible side effects and allergic reactions, based on their severity.      7. Hyponatremia  Await  labs.  - **Basic metabolic panel FUTURE 14d    FUTURE APPOINTMENTS:       - Follow-up visit pending lab results.    Iris Killian MD  [facility]    The information in this document, created by the medical scribe for me, accurately reflects the services I personally performed and the decisions made by me. I have reviewed and approved this document for accuracy prior to leaving the patient care area.

## 2018-01-25 LAB
ANION GAP SERPL CALCULATED.3IONS-SCNC: 9 MMOL/L (ref 3–14)
BUN SERPL-MCNC: 32 MG/DL (ref 7–30)
CALCIUM SERPL-MCNC: 9.1 MG/DL (ref 8.5–10.1)
CHLORIDE SERPL-SCNC: 101 MMOL/L (ref 94–109)
CO2 SERPL-SCNC: 27 MMOL/L (ref 20–32)
CREAT SERPL-MCNC: 1.28 MG/DL (ref 0.52–1.04)
CREAT UR-MCNC: 202 MG/DL
GFR SERPL CREATININE-BSD FRML MDRD: 41 ML/MIN/1.7M2
GLUCOSE SERPL-MCNC: 106 MG/DL (ref 70–99)
MICROALBUMIN UR-MCNC: 404 MG/L
MICROALBUMIN/CREAT UR: 200 MG/G CR (ref 0–25)
POTASSIUM SERPL-SCNC: 4.2 MMOL/L (ref 3.4–5.3)
SODIUM SERPL-SCNC: 137 MMOL/L (ref 133–144)

## 2018-01-25 ASSESSMENT — ANXIETY QUESTIONNAIRES: GAD7 TOTAL SCORE: 1

## 2018-02-12 ENCOUNTER — MYC MEDICAL ADVICE (OUTPATIENT)
Dept: FAMILY MEDICINE | Facility: CLINIC | Age: 68
End: 2018-02-12

## 2018-02-14 ENCOUNTER — MYC MEDICAL ADVICE (OUTPATIENT)
Dept: FAMILY MEDICINE | Facility: CLINIC | Age: 68
End: 2018-02-14

## 2018-03-02 DIAGNOSIS — G47.00 INSOMNIA, UNSPECIFIED TYPE: ICD-10-CM

## 2018-03-02 RX ORDER — TRAZODONE HYDROCHLORIDE 100 MG/1
50 TABLET ORAL
Qty: 90 TABLET | Refills: 1 | Status: SHIPPED | OUTPATIENT
Start: 2018-03-02 | End: 2018-09-13

## 2018-03-02 RX ORDER — TRAZODONE HYDROCHLORIDE 50 MG/1
50-100 TABLET, FILM COATED ORAL
Qty: 180 TABLET | Refills: 3 | Status: CANCELLED | OUTPATIENT
Start: 2018-03-02

## 2018-03-02 NOTE — TELEPHONE ENCOUNTER
Faxed message from pharmacy: traZODone (DESYREL) 50 MG tablet is on back order , please send in an order for 100 MG and to use 1/2.

## 2018-03-02 NOTE — TELEPHONE ENCOUNTER
Route refill to request to provider team, as pharmacy is requesting a change in order; please see note below.  Ping Cheatham RN

## 2018-03-12 ENCOUNTER — OFFICE VISIT (OUTPATIENT)
Dept: FAMILY MEDICINE | Facility: CLINIC | Age: 68
End: 2018-03-12
Payer: COMMERCIAL

## 2018-03-12 VITALS
TEMPERATURE: 97.8 F | OXYGEN SATURATION: 95 % | BODY MASS INDEX: 34.16 KG/M2 | HEIGHT: 60 IN | HEART RATE: 74 BPM | DIASTOLIC BLOOD PRESSURE: 76 MMHG | WEIGHT: 174 LBS | SYSTOLIC BLOOD PRESSURE: 126 MMHG

## 2018-03-12 DIAGNOSIS — G47.33 OSA (OBSTRUCTIVE SLEEP APNEA): ICD-10-CM

## 2018-03-12 DIAGNOSIS — G47.00 INSOMNIA, UNSPECIFIED TYPE: ICD-10-CM

## 2018-03-12 DIAGNOSIS — Z12.11 SCREEN FOR COLON CANCER: ICD-10-CM

## 2018-03-12 DIAGNOSIS — Z01.818 PREOP GENERAL PHYSICAL EXAM: Primary | ICD-10-CM

## 2018-03-12 DIAGNOSIS — I10 HYPERTENSION GOAL BP (BLOOD PRESSURE) < 140/90: ICD-10-CM

## 2018-03-12 DIAGNOSIS — N18.30 CKD (CHRONIC KIDNEY DISEASE) STAGE 3, GFR 30-59 ML/MIN (H): ICD-10-CM

## 2018-03-12 DIAGNOSIS — M1A.9XX0 CHRONIC GOUT INVOLVING TOE WITHOUT TOPHUS, UNSPECIFIED CAUSE, UNSPECIFIED LATERALITY: ICD-10-CM

## 2018-03-12 DIAGNOSIS — M19.012 PRIMARY OSTEOARTHRITIS OF LEFT SHOULDER: ICD-10-CM

## 2018-03-12 LAB
ERYTHROCYTE [DISTWIDTH] IN BLOOD BY AUTOMATED COUNT: 14.4 % (ref 10–15)
HCT VFR BLD AUTO: 45.6 % (ref 35–47)
HGB BLD-MCNC: 14.8 G/DL (ref 11.7–15.7)
MCH RBC QN AUTO: 30.6 PG (ref 26.5–33)
MCHC RBC AUTO-ENTMCNC: 32.5 G/DL (ref 31.5–36.5)
MCV RBC AUTO: 94 FL (ref 78–100)
PLATELET # BLD AUTO: 269 10E9/L (ref 150–450)
RBC # BLD AUTO: 4.83 10E12/L (ref 3.8–5.2)
WBC # BLD AUTO: 6.1 10E9/L (ref 4–11)

## 2018-03-12 PROCEDURE — 85027 COMPLETE CBC AUTOMATED: CPT | Performed by: NURSE PRACTITIONER

## 2018-03-12 PROCEDURE — 80053 COMPREHEN METABOLIC PANEL: CPT | Performed by: NURSE PRACTITIONER

## 2018-03-12 PROCEDURE — 36415 COLL VENOUS BLD VENIPUNCTURE: CPT | Performed by: NURSE PRACTITIONER

## 2018-03-12 PROCEDURE — 99215 OFFICE O/P EST HI 40 MIN: CPT | Performed by: NURSE PRACTITIONER

## 2018-03-12 PROCEDURE — 93000 ELECTROCARDIOGRAM COMPLETE: CPT | Performed by: NURSE PRACTITIONER

## 2018-03-12 PROCEDURE — 84550 ASSAY OF BLOOD/URIC ACID: CPT | Performed by: NURSE PRACTITIONER

## 2018-03-12 RX ORDER — TRAZODONE HYDROCHLORIDE 50 MG/1
50-100 TABLET, FILM COATED ORAL
Qty: 180 TABLET | Refills: 1 | Status: SHIPPED | OUTPATIENT
Start: 2018-03-12 | End: 2018-10-30

## 2018-03-12 RX ORDER — ALLOPURINOL 100 MG/1
100 TABLET ORAL DAILY
Qty: 90 TABLET | Refills: 1 | Status: SHIPPED | OUTPATIENT
Start: 2018-03-12 | End: 2018-09-13

## 2018-03-12 NOTE — Clinical Note
Hi,  I saw this patient for a pre-op exam.  She has CKD (GFR 40, creatinine clearance 50.1) and I was reviewing to make sure her medications were renally dosed. Would you recommend that her Effexor total daily dose be decreased by 25-50%?  Thanks for your help.  Sarah Vallejo, nurse practitioner

## 2018-03-12 NOTE — PATIENT INSTRUCTIONS
Stop Aspirin, NSAIDS (Ibuprofen, Motrin), Fish Oil, and Vitamin E    Tylenol is okay.    The morning of surgery hold the Spironolactone and Colchicine.    Before Your Surgery       Call your surgeon if there is any change in your health. This includes signs of a cold or flu (such as a sore throat, runny nose, cough, rash or fever).    Do not smoke, drink alcohol or take over the counter medicine (unless your surgeon or primary care doctor tells you to) for the 24 hours before and after surgery.    If you take prescribed drugs: Follow your doctor s orders about which medicines to take and which to stop until after surgery.    Eating and drinking prior to surgery: follow the instructions from your surgeon    Take a shower or bath the night before surgery. Use the soap your surgeon gave you to gently clean your skin. If you do not have soap from your surgeon, use your regular soap. Do not shave or scrub the surgery site.  Wear clean pajamas and have clean sheets on your bed.

## 2018-03-12 NOTE — NURSING NOTE
Chief Complaint   Patient presents with     Pre-Op Exam     DOS: 3/28/18     Arthritis     F/u on Gout-- needs lab work done--FUTURE orders in [facility]     Sleep Problem     Thinks she needs Trazodone 50-100mg- PRN. Would like Rx to reflect that        Initial /76 (BP Location: Right arm, Patient Position: Sitting, Cuff Size: Adult Regular)  Pulse 74  Temp 97.8  F (36.6  C) (Oral)  Ht 5' (1.524 m)  Wt 174 lb (78.9 kg)  SpO2 95%  BMI 33.98 kg/m2 Estimated body mass index is 33.98 kg/(m^2) as calculated from the following:    Height as of this encounter: 5' (1.524 m).    Weight as of this encounter: 174 lb (78.9 kg).  Medication Reconciliation: complete

## 2018-03-12 NOTE — MR AVS SNAPSHOT
After Visit Summary   3/12/2018    Abbey Omer    MRN: [id number]           Patient Information     Date Of Birth          1950        Visit Information        Provider Department      3/12/2018 9:40 AM Sarah Vallejo NP [facility]        Today's Diagnoses     Preop general physical exam    -  1    Screen for colon cancer        Chronic gout involving toe without tophus, unspecified cause, unspecified laterality        Primary osteoarthritis of left shoulder        Insomnia, unspecified type          Care Instructions    Stop Aspirin, NSAIDS (Ibuprofen, Motrin), Fish Oil, and Vitamin E    Tylenol is okay.    The morning of surgery hold the Spironolactone and Colchicine.    Before Your Surgery      Call your surgeon if there is any change in your health. This includes signs of a cold or flu (such as a sore throat, runny nose, cough, rash or fever).    Do not smoke, drink alcohol or take over the counter medicine (unless your surgeon or primary care doctor tells you to) for the 24 hours before and after surgery.    If you take prescribed drugs: Follow your doctor s orders about which medicines to take and which to stop until after surgery.    Eating and drinking prior to surgery: follow the instructions from your surgeon    Take a shower or bath the night before surgery. Use the soap your surgeon gave you to gently clean your skin. If you do not have soap from your surgeon, use your regular soap. Do not shave or scrub the surgery site.  Wear clean pajamas and have clean sheets on your bed.           Follow-ups after your visit        Your next 10 appointments already scheduled     Jan 22, 2019  9:00 AM CST   Return Visit with Omar West MD, [facility]    [address] [place] [id number]   [phone]              Future tests that were ordered for you today     Open Future Orders         Priority Expected Expires Ordered    Fecal colorectal cancer screen (FIT) Routine 4/2/2018 6/4/2018 3/12/2018            Who to contact     If you have questions or need follow up information about today's clinic visit or your schedule please contact [facility] directly at [phone].  Normal or non-critical lab and imaging results will be communicated to you by MyChart, letter or phone within 4 business days after the clinic has received the results. If you do not hear from us within 7 days, please contact the clinic through [employer] or phone. If you have a critical or abnormal lab result, we will notify you by phone as soon as possible.  Submit refill requests through [employer] or call your pharmacy and they will forward the refill request to us. Please allow 3 business days for your refill to be completed.          Additional Information About Your Visit        MyChart Information     [employer] gives you secure access to your electronic health record. If you see a primary care provider, you can also send messages to your care team and make appointments. If you have questions, please call your primary care clinic.  If you do not have a primary care provider, please call [phone] and they will assist you.        Care EveryWhere ID     This is your Care EveryWhere ID. This could be used by other organizations to access your [place] medical records  [id number]        Your Vitals Were     Pulse Temperature Height Pulse Oximetry BMI (Body Mass Index)       74 97.8  F (36.6  C) (Oral) 5' (1.524 m) 95% 33.98 kg/m2        Blood Pressure from Last 3 Encounters:   03/12/18 126/76   01/24/18 136/82   11/15/17 127/81    Weight from Last 3 Encounters:   03/12/18 174 lb (78.9 kg)   01/24/18 174 lb (78.9 kg)   01/23/18 169 lb (76.7 kg)              We Performed the Following     CBC with platelets     Comprehensive metabolic panel (BMP + Alb, Alk Phos, ALT, AST, Total. Bili, TP)     EKG 12-lead  complete w/read - Clinics     Uric acid          Today's Medication Changes          These changes are accurate as of 3/12/18 10:26 AM.  If you have any questions, ask your nurse or doctor.               These medicines have changed or have updated prescriptions.        Dose/Directions    * traZODone 100 MG tablet   Commonly known as:  DESYREL   This may have changed:  Another medication with the same name was changed. Make sure you understand how and when to take each.   Used for:  Insomnia, unspecified type   Changed by:  Sarah Vallejo NP        Dose:  50 mg   Take 0.5 tablets (50 mg) by mouth nightly as needed for sleep   Quantity:  90 tablet   Refills:  1       * traZODone 50 MG tablet   Commonly known as:  DESYREL   This may have changed:  reasons to take this   Used for:  Insomnia, unspecified type   Changed by:  Sarah Vallejo NP        Dose:   mg   Take 1-2 tablets ( mg) by mouth nightly as needed for sleep   Quantity:  180 tablet   Refills:  1       * Notice:  This list has 2 medication(s) that are the same as other medications prescribed for you. Read the directions carefully, and ask your doctor or other care provider to review them with you.         Where to get your medicines      These medications were sent to [id number] - [place], [place] - [address] AT [facility] [address]  [address], [place] [address]     Phone:  [phone]     allopurinol 100 MG tablet    traZODone 50 MG tablet                Primary Care Provider Office Phone # Fax #    [facility] [phone]       [address] [place] [id number]        Equal Access to Services     BHUPNIDER SUTHERLAND AH: Hadmaria isabel Carrera, waaxda luqadaha, qaybta kaalmatushar reid, maximiliano pathak. So [facility] [phone].    ATENCIÓN: Si habla español, tiene a smith disposición servicios gratuitos de asistencia  lingüísticaMegan Iverson al [phone].    We comply with applicable federal civil rights laws and [place] laws. We do not discriminate on the basis of race, color, national origin, age, disability, sex, sexual orientation, or gender identity.            Thank you!     Thank you for choosing [facility]  for your care. Our goal is always to provide you with excellent care. Hearing back from our patients is one way we can continue to improve our services. Please take a few minutes to complete the written survey that you may receive in the mail after your visit with us. Thank you!             Your Updated Medication List - Protect others around you: Learn how to safely use, store and throw away your medicines at www.disposemymeds.org.          This list is accurate as of 3/12/18 10:26 AM.  Always use your most recent med list.                   Brand Name Dispense Instructions for use Diagnosis    allopurinol 100 MG tablet    ZYLOPRIM    90 tablet    Take 1 tablet (100 mg) by mouth daily    Chronic gout involving toe without tophus, unspecified cause, unspecified laterality       calcium + D 600-200 MG-UNIT Tabs   Generic drug:  calcium carbonate-vitamin D      1 tablet twice a day        colchicine 0.6 MG tablet    COLCRYS    90 tablet    Take 1 tablet (0.6 mg) by mouth daily    Chronic gout involving toe without tophus, unspecified cause, unspecified laterality       Cranberry 500 MG Caps      Take 1 capsule by mouth daily        EXCEDRIN TENSION HEADACHE 500-65 MG Tabs   Generic drug:  acetaminophen-caffeine      1 tablet twice daily as needed        fish Oil 1200 MG capsule      3 capsule daily        FLAXSEED OIL PO      Take 1 Tablespoonful by mouth daily        fluticasone 50 MCG/ACT spray    FLONASE    48 g    Spray 2 sprays into both nostrils daily    Chronic rhinitis       KRILL OIL PO      Take 1 capsule by mouth daily        levalbuterol 45 MCG/ACT Inhaler    XOPENEX HFA    15 g    INHALE 2  PUFFS INTO THE LUNGS EVERY 6 HOURS AS NEEDED FOR SHORTNESS OF BREATH OR DIFFICULT BREATHING OR WHEEZING    Mild intermittent asthma without complication       levothyroxine 88 MCG tablet    SYNTHROID/LEVOTHROID    90 tablet    Take 1 tablet (88 mcg) by mouth daily    Hypothyroidism due to acquired atrophy of thyroid       magnesium 500 MG Tabs      Take 1 tablet by mouth daily        MULTIPLE vitamin  S/WOMENS Tabs      Take 2 tablets by mouth daily        order for DME      Equipment ordered: Respironics Auto PAP Mask type: Nasal Settings: 9-15 cm H2O        ranitidine 300 MG tablet    ZANTAC    90 tablet    Take 1 tablet (300 mg) by mouth At Bedtime    Gastroesophageal reflux disease without esophagitis       spironolactone 50 MG tablet    ALDACTONE    180 tablet    Take 2 tablets (100 mg) by mouth daily    Other acne       STATIN NOT PRESCRIBED (INTENTIONAL)      by Other route continuous prn Reported on 5/15/2017    Hyperlipidemia LDL goal < 100       * traZODone 100 MG tablet    DESYREL    90 tablet    Take 0.5 tablets (50 mg) by mouth nightly as needed for sleep    Insomnia, unspecified type       * traZODone 50 MG tablet    DESYREL    180 tablet    Take 1-2 tablets ( mg) by mouth nightly as needed for sleep    Insomnia, unspecified type       venlafaxine 150 MG 24 hr capsule    EFFEXOR-XR    180 capsule    2 Capsules daily    Major depressive disorder, recurrent episode, mild degree (H)       VITAMIN B COMPLEX PO           zinc 50 MG Tabs      Take 1 tablet by mouth daily        * Notice:  This list has 2 medication(s) that are the same as other medications prescribed for you. Read the directions carefully, and ask your doctor or other care provider to review them with you.

## 2018-03-12 NOTE — PROGRESS NOTES
Message was sent to the patient via [employer]:    Your blood counts (CBC) was within the normal limits.    Sarah Vallejo DNP, APRN, CNP

## 2018-03-12 NOTE — PROGRESS NOTES
[address] [id number] [phone]  Dept: [phone]    PRE-OP EVALUATION:  Today's date: 3/12/2018    Abbey Omer (: 1950) presents for pre-operative evaluation assessment as requested by Dr. Omar Go.  He requires evaluation and anesthesia risk assessment prior to undergoing surgery/procedure for treatment of Shoulder Replacement .    Patient is new to this provider.    Fax number for surgical facility: [phone]  Primary Physician: [facility]  Type of Anesthesia Anticipated: General    Patient has a Health Care Directive or Living Will:  YES  On File    Preop Questions 3/12/2018   Who is doing your surgery? Dr. Go   What are you having done? Complete shoulder replacement   Date of Surgery/Procedure: 3 28 18   Facility or Hospital where procedure/surgery will be performed: [facility]   1.  Do you have a history of Heart attack, stroke, stent, coronary bypass surgery, or other heart surgery? No   2.  Do you ever have any pain or discomfort in your chest? No   3.  Do you have a history of  Heart Failure? No   4.   Are you troubled by shortness of breath when:  walking on a level surface, or up a slight hill, or at night? No   5.  Do you currently have a cold, bronchitis or other respiratory infection? No   6.  Do you have a cough, shortness of breath, or wheezing? No   7.  Do you sometimes get pains in the calves of your legs when you walk? No   8. Do you or anyone in your family have previous history of blood clots? No   9.  Do you or does anyone in your family have a serious bleeding problem such as prolonged bleeding following surgeries or cuts? No   10. Have you ever had problems with anemia or been told to take iron pills? No   11. Have you had any abnormal blood loss such as black, tarry or bloody stools, or abnormal vaginal bleeding? No   12. Have you ever had a blood  transfusion? YES - In the past approximately 15 years ago   13. Have you or any of your relatives ever had problems with anesthesia? No   14. Do you have sleep apnea, excessive snoring or daytime drowsiness? YES - CPAP.  Will bring CPAP to the hospital   15. Do you have any prosthetic heart valves? No   16. Do you have prosthetic joints? No   17. Is there any chance that you may be pregnant? No         HPI:     HPI related to upcoming procedure: Needs gout medication refill today.  She stopped the Colchicine 2 days ago and continues to take Allopurinol.  Has not had any gout flares in the past few months.    Insomnia:  Also asks for refill of Trazodone which she takes  mg at bedtime as needed.  She prefers an RX for the 50 mg tablets.    See problem list for active medical problems.  Problems all longstanding and stable, except as noted/documented.  See ROS for pertinent symptoms related to these conditions.                            Chronic Kidney Disease:  Will check labs today to evaluate stability of this.                                                                         .  HYPERTENSION - Patient has longstanding history of HTN , currently denies any symptoms referable to elevated blood pressure. Specifically denies chest pain, palpitations, dyspnea, orthopnea, PND or peripheral edema. Blood pressure readings have been in normal range. Current medication regimen is as listed below. Patient denies any side effects of medication.                                                                                                                                                                                          .  DEPRESSION - Patient has a long history of Depression of moderate severity requiring medication for control with recent symptoms being stable. Current symptoms of depression include sleep disturbance, difficulty falling asleep.                                                                                                                                                                                     .  HYPOTHYROIDISM - Patient has a longstanding history of chronic Hypothyroidism. Patient has been doing well, noting no tremor, hair loss or changes in skin texture. Last TSH value of 1.18 on 11/9/17. Continues to take medications as directed, without adverse reactions or side effects.                                                                                                                                                                                                                        .  SLEEP PROBLEM - Patient has a longstanding history of sleep apnea and insomnia of moderate severity. Patient has tried OTC medications with limited success.  She uses CPAP and admits this is inconsistent.  She takes Trazodone as needed at bedtime.                                                                                                                                   .    MEDICAL HISTORY:     Patient Active Problem List    Diagnosis Date Noted     Major depressive disorder, recurrent episode, mild degree (H) 08/03/2011     Priority: High     prior care with psychiatrist Dr. Ro  Has tried paxil in past.        Hypertension goal BP (blood pressure) < 140/90 06/01/2011     Priority: High     Intolerant to higher than 50 mg of metoprolol due to depressive effects       Hyperlipidemia with target LDL less than 100 05/17/2011     Priority: High     Has tried welchol, statins and developed side effects  Diagnosis updated by automated process. Provider to review and confirm.       CKD (chronic kidney disease) stage 3, GFR 30-59 ml/min 12/20/2010     Priority: High     3/13/18:  Creatinine 1.32, GFR 40, creatinine clearance 50.1 mL/Min  Medications renally dosed with note that modification of Effexor may be indicated (reduce total daily dose by 25-50%) and will curbside consult with [facility] about  this       Hypothyroidism 10/12/2009     Priority: High     Acute gout involving toe of left foot, unspecified cause 08/11/2017     Priority: Medium     Discoid atelectasis 04/28/2016     Priority: Medium     Renal cyst, left 02/22/2016     Priority: Medium     Seen on abd/pelvic CT at [facility] 2/2016.  Recommend recheck CT in one year or do ultrasound.  In review of chart, renal cyst noted on ultrasound in 2011 and CT done at [facility] facility in 2014 showing stablility       Combined form of age-related cataract, both eyes 02/17/2016     Priority: Medium     Glaucoma suspect, bilateral 02/17/2016     Priority: Medium     Target IOP:   Max IOP :   Family history: No  Trauma history: No  OCT: 8/216 Normal  [place]ado visual field (HVF): 8/2016   Right eye - Reliable, Normal   Left eye - Reliable, Normal  Pachymetry: Average-thickish 553/564  C/D: 0.6/0.6  SLT:            Drusen of macula of both eyes 02/17/2016     Priority: Medium     Acne 05/20/2015     Priority: Medium     Major depressive disorder, recurrent episode, moderate (H) 06/20/2014     Priority: Medium     Pulmonary nodules 06/16/2014     Priority: Medium     Allergic conjunctivitis 04/10/2013     Priority: Medium     Hypertriglyceridemia 03/01/2013     Priority: Medium     DDD (degenerative disc disease), cervical 06/16/2011     Priority: Medium     Elevated LFTs 06/15/2011     Priority: Medium     (Problem list name updated by automated process. Provider to review and confirm.)       Dependent edema      Priority: Medium     History of bladder cancer 05/18/2011     Priority: Medium     Dr. David, [facility]       Obesity 05/17/2011     Priority: Medium     Chronic nonallergic rhinitis      Priority: Medium     Dr. Peoples, allergist       RICKEY (obstructive sleep apnea) 08/11/2009     Priority: Medium     Intolerant to CPAP    Split Night:  Sleep Study 2/01/2017 - (173.0 lbs) AHI 29.6, RDI 59.8, Supine AHI 39.4, REM AHI -, Low O2% 78.5%, Time Spent ?88%  14.4, Time Spent ?89% 30.6, CPAP was titrated to a pressure of 9 with an AHI 30. Time spent in REM supine at this pressure was - minutes.       Macular drusen 11/25/2011     Priority: Low     Advance Care Planning 06/15/2011     Priority: Low     Advance Care Planning 7/5/2016: Receipt of ACP document:  Received: Health Care Directive which was witnessed or notarized on 4/25/16.  Document previously scanned on 4/28/16. Also received Health Care Directive dated 3/18/16 which was valid and scanned on 4/14/16  Validation forms previously completed and sent to be scanned.  Code Status reflects choices in most recent ACP document.  Confirmed/documented designated decision maker(s).  Added by Kanchan Lazcano RN, Advance Care Planning Liaison.  Advance Care Planning 4/25/2016: ACP Facilitation Session:    Abbey Omer presented for ACP Facilitation session at the clinic. She was accompanied by spouse. Honoring Choices information provided and resources reviewed. She currently wishes to complete an ACP document and needs further clarification and/or consideration prior to documenting choices.  She currently has the following questions or concerns about Advance Care Planning: none.  Confirmed/documented legally designated decision maker(s). Code status reflects choices in most recent ACP document. Follow-up meeting: not needed/applicable.  document sent to be scanned after viewed for validation.Added by Adrianna Silveira   Advance Care Planning 4/13/2016: Receipt of ACP document:  Received: invalid HCD document not signed, witness/notary and missing pages.Document not previously scanned. Validation form completed indicating invalid document. Copy sent to client with information and facilitation resources. Validation form sent to be scanned as notation of invalid document received.  Confirmed/documented RN, System Director ACP-Honoring Choices  designated decision maker(s).  Added by Rebeca Manriquez  Advance Care  Planning 6-15-11 Discussed advance care planning with patient; information given to patient to review. Karen Pro CMA        Past Medical History:   Diagnosis Date     Cancer of bladder (H)      Cataract 11/25/2011     Chronic nonallergic rhinitis 8/31/10 [facility]     CKD (chronic kidney disease) stage 3, GFR 30-59 ml/min 12/20/2010     DDD (degenerative disc disease), cervical 6/16/2011     Dependent edema      Depression, major      Depressive disorder In 2002 or 2003     History of blood transfusion ?     HTN (hypertension)      Hyperlipidemia      Hypothyroidism 10/12/2009     Migraine     resolved     Nasal septal deviation 8/11/2009     Obesity 5/17/2011     RICKEY (obstructive sleep apnea)      Osteoarthritis of shoulder region      Osteoporosis      Premature menopause      Renal cyst      Past Surgical History:   Procedure Laterality Date     CHOLECYSTECTOMY, LAPOROSCOPIC       COLONOSCOPY  ?    Upper & Lower     CYSTOSCOPY      bladder cancer     D & C       ENT SURGERY  first 1967/second ?    2 Rhynoplasties     HYSTERECTOMY, PAP NO LONGER INDICATED      BSO     NOSE SURGERY      septum deviation     Current Outpatient Prescriptions   Medication Sig Dispense Refill     allopurinol (ZYLOPRIM) 100 MG tablet Take 1 tablet (100 mg) by mouth daily 90 tablet 1     traZODone (DESYREL) 50 MG tablet Take 1-2 tablets ( mg) by mouth nightly as needed for sleep 180 tablet 1            levothyroxine (SYNTHROID/LEVOTHROID) 88 MCG tablet Take 1 tablet (88 mcg) by mouth daily 90 tablet 1     venlafaxine (EFFEXOR-XR) 150 MG 24 hr capsule 2 Capsules daily 180 capsule 3     spironolactone (ALDACTONE) 50 MG tablet Take 2 tablets (100 mg) by mouth daily 180 tablet 3     ranitidine (ZANTAC) 300 MG tablet Take 1 tablet (300 mg) by mouth At Bedtime 90 tablet 3     colchicine (COLCRYS) 0.6 MG tablet Take 1 tablet (0.6 mg) by mouth daily 90 tablet 3     KRILL OIL PO Take 1 capsule by mouth daily       levalbuterol (XOPENEX  HFA) 45 MCG/ACT Inhaler INHALE 2 PUFFS INTO THE LUNGS EVERY 6 HOURS AS NEEDED FOR SHORTNESS OF BREATH OR DIFFICULT BREATHING OR WHEEZING 15 g 0     B Complex Vitamins (VITAMIN B COMPLEX PO)        order for DME Equipment ordered: Respironics Auto PAP Mask type: Nasal Settings: 9-15 cm H2O       Cranberry 500 MG CAPS Take 1 capsule by mouth daily       fluticasone (FLONASE) 50 MCG/ACT nasal spray Spray 2 sprays into both nostrils daily 48 g 3     Multiple Vitamins-Minerals (MULTIPLE VITAMIN  S/WOMENS) TABS Take 2 tablets by mouth daily       magnesium 500 MG TABS Take 1 tablet by mouth daily       zinc 50 MG TABS Take 1 tablet by mouth daily       Flaxseed, Linseed, (FLAXSEED OIL PO) Take 1 Tablespoonful by mouth daily       STATIN NOT PRESCRIBED, INTENTIONAL, by Other route continuous prn Reported on 5/15/2017  0     EXCEDRIN TENSION HEADACHE 500-65 MG PO TABS 1 tablet twice daily as needed       FISH OIL 1200 MG OR CAPS 3 capsule daily       CALCIUM + D 600-200 MG-UNIT OR TABS 1 tablet twice a day       OTC products: Tylenol Arthritis, Excedrin tension (without Aspirin)    Allergies   Allergen Reactions     Amlodipine Swelling     Asa [Aluminum Hydroxide] GI Disturbance     Atenolol Fatigue     Atorvastatin Calcium Swelling     Codeine Nausea     Colestid [Colestipol Hcl]      Crestor [Hmg-Coa-R Inhibitors]      Joint pain     Cymbalta      Visual hallucinations     Hydralazine Fatigue     Lovastatin Cramps     Niacin      Unknown by patient     Paxil [Paroxetine] Fatigue     Potassium GI Disturbance     Sulfa Drugs Fatigue     Zetia [Ezetimibe] Fatigue     Zomig [Zolmitriptan]       Latex Allergy: NO    Social History   Substance Use Topics     Smoking status: Former Smoker     Packs/day: 0.10     Years: 1.00     Types: Cigarettes     Start date: 8/12/1969     Quit date: 10/12/1969     Smokeless tobacco: Never Used      Comment: Smoked, very little, for 2 mo, 49 years ago.     Alcohol use Yes      Comment: Maybe 1  or 2 drinks a year     History   Drug Use No       REVIEW OF SYSTEMS:   Constitutional, neuro, ENT, endocrine, pulmonary, cardiac, gastrointestinal, genitourinary, musculoskeletal, integument and psychiatric systems are negative, except as otherwise noted.    EXAM:   /76 (BP Location: Right arm, Patient Position: Sitting, Cuff Size: Adult Regular)  Pulse 74  Temp 97.8  F (36.6  C) (Oral)  Ht 5' (1.524 m)  Wt 174 lb (78.9 kg)  SpO2 95%  BMI 33.98 kg/m2    GENERAL APPEARANCE: healthy, alert and no distress     EYES: EOMI, PERRL     NECK: no adenopathy, no asymmetry, masses, or scars and thyroid normal to palpation     RESP: lungs clear to auscultation - no rales, rhonchi or wheezes     CV: regular rates and rhythm, normal S1 S2, no S3 or S4 and no murmur, click or rub     NEURO: Normal strength and tone, sensory exam grossly normal, mentation intact and speech normal     PSYCH: mentation appears normal. and affect normal/bright, mildly anxious     LYMPHATICS: No cervical adenopathy    DIAGNOSTICS:     EKG: sinus rhythm with right bundle branch block, unchanged from previous tracing 10/2017  Serum Potassium  Serum Creatinine  Labs Resulted Today:   Results for orders placed or performed in visit on 03/12/18   CBC with platelets   Result Value Ref Range    WBC 6.1 4.0 - 11.0 10e9/L    RBC Count 4.83 3.8 - 5.2 10e12/L    Hemoglobin 14.8 11.7 - 15.7 g/dL    Hematocrit 45.6 35.0 - 47.0 %    MCV 94 78 - 100 fl    MCH 30.6 26.5 - 33.0 pg    MCHC 32.5 31.5 - 36.5 g/dL    RDW 14.4 10.0 - 15.0 %    Platelet Count 269 150 - 450 10e9/L   Comprehensive metabolic panel (BMP + Alb, Alk Phos, ALT, AST, Total. Bili, TP)   Result Value Ref Range    Sodium 136 133 - 144 mmol/L    Potassium 4.4 3.4 - 5.3 mmol/L    Chloride 102 94 - 109 mmol/L    Carbon Dioxide 28 20 - 32 mmol/L    Anion Gap 6 3 - 14 mmol/L    Glucose 89 70 - 99 mg/dL    Urea Nitrogen 28 7 - 30 mg/dL    Creatinine 1.32 (H) 0.52 - 1.04 mg/dL    GFR Estimate 40  (L) >60 mL/min/1.7m2    GFR Estimate If Black 48 (L) >60 mL/min/1.7m2    Calcium 9.7 8.5 - 10.1 mg/dL    Bilirubin Total 0.3 0.2 - 1.3 mg/dL    Albumin 4.2 3.4 - 5.0 g/dL    Protein Total 8.2 6.8 - 8.8 g/dL    Alkaline Phosphatase 108 40 - 150 U/L    ALT 81 (H) 0 - 50 U/L    AST 49 (H) 0 - 45 U/L   Uric acid   Result Value Ref Range    Uric Acid 5.3 2.6 - 6.0 mg/dL       Recent Labs   Lab Test  01/24/18   0855  11/15/17   1237   10/27/17   1034  10/25/17   1139   07/28/11   0916   HGB   --    --    --   14.3  16.3*   < >  13.3   PLT   --    --    --   257   --    --   257   INR   --    --    --    --    --    --   1.00   NA  137  132*   < >  135  132*   < >  139   POTASSIUM  4.2  4.4   < >  4.4  4.7   < >  4.1   CR  1.28*  1.16*   < >  1.42*  1.61*   < >  1.17*    < > = values in this interval not displayed.        IMPRESSION:   Reason for surgery/procedure: osteoarthritis of shoulder  Diagnosis/reason for consult: pre op exam    The proposed surgical procedure is considered INTERMEDIATE risk.    REVISED CARDIAC RISK INDEX  The patient has the following serious cardiovascular risks for perioperative complications such as (MI, PE, VFib and 3  AV Block):  No serious cardiac risks  INTERPRETATION: 0 risks: Class I (very low risk - 0.4% complication rate)    The patient has the following additional risks for perioperative complications:  No identified additional risks  EKG today shows right bundle branch block but this is unchanged from previous EKG 10/24/17.      ICD-10-CM    1. Preop general physical exam Z01.818 EKG 12-lead complete w/read - Clinics     CBC with platelets     Comprehensive metabolic panel (BMP + Alb, Alk Phos, ALT, AST, Total. Bili, TP)   2. Primary osteoarthritis of left shoulder M19.012    3. Screen for colon cancer Z12.11 Fecal colorectal cancer screen (FIT)   4. Chronic gout involving toe without tophus, unspecified cause, unspecified laterality [id number].[id number] allopurinol (ZYLOPRIM) 100 MG tablet      Uric acid   5. Insomnia, unspecified type G47.00 traZODone (DESYREL) 50 MG tablet   6. CKD (chronic kidney disease) stage 3, GFR 30-59 ml/min N18.3 Check CBC in the context of CKD   7. RICKEY (obstructive sleep apnea) G47.33 Advised patient to bring CPAP to surgery   8. Hypertension goal BP (blood pressure) < 140/90 I10 Hold Spironolactone morning of surgery       RECOMMENDATIONS:       Cardiovascular Risk  Performs 4 METs exercise without symptoms (Climb a flight of stairs) .       Obstructive Sleep Apnea (or suspected sleep apnea)  Patient is to bring their home CPAP with them on the day of surgery       --Patient is to take all scheduled medications on the day of surgery EXCEPT for modifications listed below.    Stop Aspirin, NSAIDS (Ibuprofen, Motrin), Fish Oil, and Vitamin E 7 days prior to surgery.    Tylenol is okay.    The morning of surgery hold the Spironolactone and Colchicine.    APPROVAL GIVEN to proceed with proposed procedure, without further diagnostic evaluation     Patient will follow-up in 3 months for chronic management disease management.    Signed Electronically by: Sarah Vallejo NP    Copy of this evaluation report is provided to requesting physician.    [place] Preop Guidelines

## 2018-03-13 LAB
ALBUMIN SERPL-MCNC: 4.2 G/DL (ref 3.4–5)
ALP SERPL-CCNC: 108 U/L (ref 40–150)
ALT SERPL W P-5'-P-CCNC: 81 U/L (ref 0–50)
ANION GAP SERPL CALCULATED.3IONS-SCNC: 6 MMOL/L (ref 3–14)
AST SERPL W P-5'-P-CCNC: 49 U/L (ref 0–45)
BILIRUB SERPL-MCNC: 0.3 MG/DL (ref 0.2–1.3)
BUN SERPL-MCNC: 28 MG/DL (ref 7–30)
CALCIUM SERPL-MCNC: 9.7 MG/DL (ref 8.5–10.1)
CHLORIDE SERPL-SCNC: 102 MMOL/L (ref 94–109)
CO2 SERPL-SCNC: 28 MMOL/L (ref 20–32)
CREAT SERPL-MCNC: 1.32 MG/DL (ref 0.52–1.04)
GFR SERPL CREATININE-BSD FRML MDRD: 40 ML/MIN/1.7M2
GLUCOSE SERPL-MCNC: 89 MG/DL (ref 70–99)
POTASSIUM SERPL-SCNC: 4.4 MMOL/L (ref 3.4–5.3)
PROT SERPL-MCNC: 8.2 G/DL (ref 6.8–8.8)
SODIUM SERPL-SCNC: 136 MMOL/L (ref 133–144)
URATE SERPL-MCNC: 5.3 MG/DL (ref 2.6–6)

## 2018-03-14 ENCOUNTER — MYC MEDICAL ADVICE (OUTPATIENT)
Dept: FAMILY MEDICINE | Facility: CLINIC | Age: 68
End: 2018-03-14

## 2018-03-15 NOTE — TELEPHONE ENCOUNTER
Yes, Abbey can start by cutting the Zantac 300 mg in half and taking 1/2 tablet (150 MG) daily.  If she finds this difficult to do I am happy to sent a new RX for the 150 MG tablets.

## 2018-03-19 PROCEDURE — 82274 ASSAY TEST FOR BLOOD FECAL: CPT | Performed by: NURSE PRACTITIONER

## 2018-03-23 DIAGNOSIS — Z12.11 SCREEN FOR COLON CANCER: ICD-10-CM

## 2018-03-23 LAB — HEMOCCULT STL QL IA: NEGATIVE

## 2018-04-02 ENCOUNTER — OFFICE VISIT (OUTPATIENT)
Dept: FAMILY MEDICINE | Facility: CLINIC | Age: 68
End: 2018-04-02
Payer: COMMERCIAL

## 2018-04-02 VITALS
TEMPERATURE: 97.7 F | HEIGHT: 60 IN | SYSTOLIC BLOOD PRESSURE: 132 MMHG | WEIGHT: 175 LBS | BODY MASS INDEX: 34.36 KG/M2 | DIASTOLIC BLOOD PRESSURE: 70 MMHG | HEART RATE: 80 BPM

## 2018-04-02 DIAGNOSIS — Z09 HOSPITAL DISCHARGE FOLLOW-UP: ICD-10-CM

## 2018-04-02 DIAGNOSIS — Z96.612 STATUS POST TOTAL SHOULDER ARTHROPLASTY, LEFT: ICD-10-CM

## 2018-04-02 DIAGNOSIS — K59.01 SLOW TRANSIT CONSTIPATION: ICD-10-CM

## 2018-04-02 DIAGNOSIS — N18.30 CKD (CHRONIC KIDNEY DISEASE) STAGE 3, GFR 30-59 ML/MIN (H): Primary | ICD-10-CM

## 2018-04-02 PROCEDURE — 36415 COLL VENOUS BLD VENIPUNCTURE: CPT | Performed by: NURSE PRACTITIONER

## 2018-04-02 PROCEDURE — 80048 BASIC METABOLIC PNL TOTAL CA: CPT | Performed by: NURSE PRACTITIONER

## 2018-04-02 PROCEDURE — 99495 TRANSJ CARE MGMT MOD F2F 14D: CPT | Performed by: NURSE PRACTITIONER

## 2018-04-02 RX ORDER — POLYETHYLENE GLYCOL 3350 17 G/17G
1 POWDER, FOR SOLUTION ORAL 2 TIMES DAILY PRN
Qty: 510 G | Refills: 1 | Status: SHIPPED | OUTPATIENT
Start: 2018-04-02 | End: 2018-12-27

## 2018-04-02 NOTE — PATIENT INSTRUCTIONS
[facility]   Discharged by : Sarah Vallejo NP  Paper scripts provided to patient : none     If you have any questions regarding your visit please contact your care team:     Team Gold                Clinic Hours Telephone Number     Dr. Catina Vallejo NP 7am-7pm  Monday - Thursday   7am-5pm  Fridays  [phone]   (Appointment scheduling available 24/7)     RN Line  [phone] option 2     Urgent [facility] - [facility] [place] [facility] - 11am-9pm Monday-Friday Saturday-Sunday- 9am-5pm     [place] -   5pm-9pm Monday-Friday Saturday-Sunday- 9am-5pm    [phone] - [facility]    [phone] - [place]       For a Price Quote for your services, please call our Consumer Price Line at [phone].     What options do I have for visits at the clinic other than the traditional office visit?     To expand how we care for you, many of our providers are utilizing electronic visits (e-visits) and telephone visits, when medically appropriate, for interactions with their patients rather than a visit in the clinic. We also offer nurse visits for many medical concerns. Just like any other service, we will bill your insurance company for this type of visit based on time spent on the phone with your provider. Not all insurance companies cover these visits. Please check with your medical insurance if this type of visit is covered. You will be responsible for any charges that are not paid by your insurance.   E-visits via archify: generally incur a $35.00 fee.     Telephone visits:   Time spent on the phone: *charged based on time that is spent on the phone in increments of 10 minutes. Estimated cost:   5-10 mins $30.00   11-20 mins. $59.00   21-30 mins. $85.00     Use archify (secure email communication and access to your chart) to send your primary care provider a message or make an appointment. Ask someone on  your Team how to sign up for [facility].     As always, Thank you for trusting us with your health care needs!      [facility]:    Scheduling Appointments  [place] [place]  Call: [phone]    [facility]  Call: [phone]    [facility]  Call: [phone]    For Gastroenterology referrals   [facility]   Clinics and Surgery [place], 4th Floor   [address] [place] [address]   Appointments: [phone]    WHERE TO GO FOR CARE?  Clinic    Make an appointment if you:       Are sick (cold, cough, flu, sore throat, earache or in pain).       Have a small injury (sprain, small cut, burn or broken bone).       Need a physical exam, Pap smear, vaccine or prescription refill.       Have questions about your health or medicines.    To reach us:      Call [phone] ([phone]). Open 24 hours every day. (For counseling services, call [phone].)    Log into Qminder at [email or web address]. (Visit [email or web address] to create an account.) Hospital emergency room    An emergency is a serious or life- threatening problem that must be treated right away.    Call 191 or get to the hospital if you have:      Very bad or sudden:            - Chest pain or pressure         - Bleeding         - Head or belly pain         - Dizziness or trouble seeing, walking or                          Speaking      Problems breathing      Blood in your vomit or you are coughing up blood      A major injury (knocked out, loss of a finger or limb, rape, broken bone protruding from skin)    A mental health crisis. (Or call the Mental Health Crisis line at 1-909.530.3955 or Suicide Prevention Hotline at 1-198.226.4977.)    Open 24 hours every day. You don't need an appointment.     Urgent care    Visit urgent care for sickness or small injuries when the clinic is closed. You don't need an appointment. To check hours or find  an urgent care near you, visit [email or web address]. Online care    Get online care from [facility] for more than 70 common problems, like colds, allergies and infections. Open 24 hours every day at:   [email or web address]   Need help deciding?    For advice about where to be seen, you may call your clinic and ask to speak with a nurse. We're here for you 24 hours every day.         If you are deaf or hard of hearing, please let us know. We provide many free services including sign language interpreters, oral interpreters, TTYs, telephone amplifiers, note takers and written materials.

## 2018-04-02 NOTE — MR AVS SNAPSHOT
After Visit Summary   4/2/2018    Abbey Omer    MRN: [id number]           Patient Information     Date Of Birth          1950        Visit Information        Provider Department      4/2/2018 2:20 PM Sarah Vallejo NP [facility]        Today's Diagnoses     CKD (chronic kidney disease) stage 3, GFR 30-59 ml/min    -  1    Slow transit constipation          Care Instructions    [facility]   Discharged by : Sarah Vallejo NP  Paper scripts provided to patient : none     If you have any questions regarding your visit please contact your care team:     Team Gold                Clinic Hours Telephone Number     Dr. Catina Vallejo NP 7am-7pm  Monday - Thursday   7am-5pm  Fridays  [phone]   (Appointment scheduling available 24/7)     RN Line  [phone] option 2     Urgent Care - [place] - 11am-9pm Monday-Friday Saturday-Sunday- 9am-5pm     [place] -   5pm-9pm Monday-Friday Saturday-Sunday- 9am-5pm    [phone] - [place]    [phone] - [place]       For a Price Quote for your services, please call our Consumer Price Line at [phone].     What options do I have for visits at the clinic other than the traditional office visit?     To expand how we care for you, many of our providers are utilizing electronic visits (e-visits) and telephone visits, when medically appropriate, for interactions with their patients rather than a visit in the clinic. We also offer nurse visits for many medical concerns. Just like any other service, we will bill your insurance company for this type of visit based on time spent on the phone with your provider. Not all insurance companies cover these visits. Please check with your medical insurance if this type of visit is covered. You will be responsible for any charges that are not paid  by your insurance.   E-visits via Simplist: generally incur a $35.00 fee.     Telephone visits:   Time spent on the phone: *charged based on time that is spent on the phone in increments of 10 minutes. Estimated cost:   5-10 mins $30.00   11-20 mins. $59.00   21-30 mins. $85.00     Use Simplist (secure email communication and access to your chart) to send your primary care provider a message or make an appointment. Ask someone on your Team how to sign up for Simplist.     As always, Thank you for trusting us with your health care needs!      [facility]:    Scheduling Appointments  [facility]  Call: [phone]    [facility]  Call: [phone]    [facility]  Call: [phone]    For Gastroenterology referrals   [facility]   Clinics and Surgery Center, 4th Floor   [address] [place] [address]   Appointments: [phone]    WHERE TO GO FOR CARE?  Clinic    Make an appointment if you:       Are sick (cold, cough, flu, sore throat, earache or in pain).       Have a small injury (sprain, small cut, burn or broken bone).       Need a physical exam, Pap smear, vaccine or prescription refill.       Have questions about your health or medicines.    To reach us:      Call [phone] ([phone]). Open 24 hours every day. (For counseling services, call [phone].)    Log into Simplist at [email or web address]. (Visit [email or web address] to create an account.) Hospital emergency room    An emergency is a serious or life- threatening problem that must be treated right away.    Call 237 or get to the hospital if you have:      Very bad or sudden:            - Chest pain or pressure         - Bleeding         - Head or belly pain         - Dizziness or trouble seeing, walking or                          Speaking      Problems breathing      Blood in your vomit or you are coughing up blood      A  major injury (knocked out, loss of a finger or limb, rape, broken bone protruding from skin)    A mental health crisis. (Or call the Mental Health Crisis line at 1-771.898.2901 or Suicide Prevention Hotline at 1-478.175.2523.)    Open 24 hours every day. You don't need an appointment.     Urgent care    Visit urgent care for sickness or small injuries when the clinic is closed. You don't need an appointment. To check hours or find an urgent care near you, visit [email or web address]. Online care    Get online care from [employer]Class[employer] for more than 70 common problems, like colds, allergies and infections. Open 24 hours every day at:   [email or web address]   Need help deciding?    For advice about where to be seen, you may call your clinic and ask to speak with a nurse. We're here for you 24 hours every day.         If you are deaf or hard of hearing, please let us know. We provide many free services including sign language interpreters, oral interpreters, TTYs, telephone amplifiers, note takers and written materials.                   Follow-ups after your visit        Your next 10 appointments already scheduled     May 30, 2018 11:15 AM CDT   (Arrive by 11:00 AM)   MA SCREENING DIGITAL BILATERAL with FKMA[facility]   [facility]    [address] [place] [id number] [phone]           Do not use any powder, lotion or deodorant under your arms or on your breast. If you do, we will ask you to remove it before your exam.  Wear comfortable, two-piece clothing.  If you have any allergies, tell your care team.  Bring any previous mammograms from other facilities or have them mailed to the breast center.            Jan 22, 2019  9:00 AM CST   Return Visit with Omar West MD, [facility] CYST[facility] PROC ROOM   [facility] ([facility])    [address] [place] [id number] [phone]              Who to contact     If you have questions or need follow up  information about today's clinic visit or your schedule please contact [facility] directly at [phone].  Normal or non-critical lab and imaging results will be communicated to you by [employer] [facility] [employer], letter or phone within 4 business days after the clinic has received the results. If you do not hear from us within 7 days, please contact the clinic through [employer] [facility] [employer] or phone. If you have a critical or abnormal lab result, we will notify you by phone as soon as possible.  Submit refill requests through Allani or call your pharmacy and they will forward the refill request to us. Please allow 3 business days for your refill to be completed.          Additional Information About Your Visit        [employer] [facility] [employer] creations Information     Allani gives you secure access to your electronic health record. If you see a primary care provider, you can also send messages to your care team and make appointments. If you have questions, please call your primary care clinic.  If you do not have a primary care provider, please call [phone] and they will assist you.        Care EveryWhere ID     This is your Care EveryWhere ID. This could be used by other organizations to access your [facility] medical records  [id number]        Your Vitals Were     Pulse Temperature Height Breastfeeding? BMI (Body Mass Index)       80 97.7  F (36.5  C) (Oral) 5' (1.524 m) No 34.18 kg/m2        Blood Pressure from Last 3 Encounters:   04/02/18 132/70   03/12/18 126/76   01/24/18 136/82    Weight from Last 3 Encounters:   04/02/18 175 lb (79.4 kg)   03/12/18 174 lb (78.9 kg)   01/24/18 174 lb (78.9 kg)              We Performed the Following     Basic metabolic panel  (Ca, Cl, CO2, Creat, Gluc, K, Na, BUN)          Today's Medication Changes          These changes are accurate as of 4/2/18  3:17 PM.  If you have any questions, ask your nurse or doctor.               Start taking these medicines.        Dose/Directions    polyethylene glycol powder    Commonly known as:  MIRALAX   Used for:  Slow transit constipation   Started by:  Sarah Vallejo NP        Dose:  1 capful   Take 17 g (1 capful) by mouth 2 times daily as needed for constipation   Quantity:  510 g   Refills:  1         These medicines have changed or have updated prescriptions.        Dose/Directions    colchicine 0.6 MG tablet   Commonly known as:  COLCRYS   This may have changed:    - when to take this  - reasons to take this   Used for:  Chronic gout involving toe without tophus, unspecified cause, unspecified laterality        Dose:  0.6 mg   Take 1 tablet (0.6 mg) by mouth daily   Quantity:  90 tablet   Refills:  3            Where to get your medicines      These medications were sent to [id number] - [place], [place] - [address] [facility] [address]  [address], [place] [address]     Phone:  [phone]     polyethylene glycol powder                Primary Care Provider Office Phone # Fax #    Sarah Vallejo [phone]       [address] [place] [address]        Equal Access to Services     BHUPINDER SUTHERLAND : Hadii madalyn ku hadasho Soomaali, waaxda luqadaha, qaybta kaalmada adeegyada, maximiliano pathak. So [facility] [phone].    ATENCIÓN: Si habla español, tiene a smith disposición servicios gratuitos de asistencia lingüística. [facility] [phone].    We comply with applicable federal civil rights laws and [place] laws. We do not discriminate on the basis of race, color, national origin, age, disability, sex, sexual orientation, or gender identity.            Thank you!     Thank you for choosing [facility]  for your care. Our goal is always to provide you with excellent care. Hearing back from our patients is one way we can continue to improve our services. Please take a few minutes to complete the written survey that you may receive in the mail after  your visit with us. Thank you!             Your Updated Medication List - Protect others around you: Learn how to safely use, store and throw away your medicines at www.disposemymeds.org.          This list is accurate as of 4/2/18  3:17 PM.  Always use your most recent med list.                   Brand Name Dispense Instructions for use Diagnosis    allopurinol 100 MG tablet    ZYLOPRIM    90 tablet    Take 1 tablet (100 mg) by mouth daily    Chronic gout involving toe without tophus, unspecified cause, unspecified laterality       calcium + D 600-200 MG-UNIT Tabs   Generic drug:  calcium carbonate-vitamin D      1 tablet twice a day        colchicine 0.6 MG tablet    COLCRYS    90 tablet    Take 1 tablet (0.6 mg) by mouth daily    Chronic gout involving toe without tophus, unspecified cause, unspecified laterality       Cranberry 500 MG Caps      Take 1 capsule by mouth daily        EXCEDRIN TENSION HEADACHE 500-65 MG Tabs   Generic drug:  acetaminophen-caffeine      1 tablet twice daily as needed        fish Oil 1200 MG capsule      3 capsule daily        FLAXSEED OIL PO      Take 1 Tablespoonful by mouth daily        fluticasone 50 MCG/ACT spray    FLONASE    48 g    Spray 2 sprays into both nostrils daily    Chronic rhinitis       KRILL OIL PO      Take 1 capsule by mouth daily        levalbuterol 45 MCG/ACT Inhaler    XOPENEX HFA    15 g    INHALE 2 PUFFS INTO THE LUNGS EVERY 6 HOURS AS NEEDED FOR SHORTNESS OF BREATH OR DIFFICULT BREATHING OR WHEEZING    Mild intermittent asthma without complication       levothyroxine 88 MCG tablet    SYNTHROID/LEVOTHROID    90 tablet    Take 1 tablet (88 mcg) by mouth daily    Hypothyroidism due to acquired atrophy of thyroid       magnesium 500 MG Tabs      Take 1 tablet by mouth daily        MULTIPLE vitamin  S/WOMENS Tabs      Take 2 tablets by mouth daily        order for DME      Equipment ordered: Respironics Auto PAP Mask type: Nasal Settings: 9-15 cm H2O         polyethylene glycol powder    MIRALAX    510 g    Take 17 g (1 capful) by mouth 2 times daily as needed for constipation    Slow transit constipation       ranitidine 300 MG tablet    ZANTAC    90 tablet    Take 1 tablet (300 mg) by mouth At Bedtime    Gastroesophageal reflux disease without esophagitis       spironolactone 50 MG tablet    ALDACTONE    180 tablet    Take 2 tablets (100 mg) by mouth daily    Other acne       STATIN NOT PRESCRIBED (INTENTIONAL)      by Other route continuous prn Reported on 5/15/2017    Hyperlipidemia LDL goal < 100       * traZODone 100 MG tablet    DESYREL    90 tablet    Take 0.5 tablets (50 mg) by mouth nightly as needed for sleep    Insomnia, unspecified type       * traZODone 50 MG tablet    DESYREL    180 tablet    Take 1-2 tablets ( mg) by mouth nightly as needed for sleep    Insomnia, unspecified type       venlafaxine 150 MG 24 hr capsule    EFFEXOR-XR    180 capsule    2 Capsules daily    Major depressive disorder, recurrent episode, mild degree (H)       VITAMIN B COMPLEX PO           zinc 50 MG Tabs      Take 1 tablet by mouth daily        * Notice:  This list has 2 medication(s) that are the same as other medications prescribed for you. Read the directions carefully, and ask your doctor or other care provider to review them with you.

## 2018-04-02 NOTE — PROGRESS NOTES
SUBJECTIVE:   Abbey Omer is a 68 year old female who presents to clinic today for the following health issues:    Constipation:  Started Dulcolax yesterday.  Had stool softners in the hospital but not now.  Today started drinking hot water.  Yesterday stopped narcotics, today took Tylenol arthritis that is effective for her pain.  Will be having an appointment next week with singh.  No stool since prior to surgery.  Positive for gas and burping.    Hospital Follow-up Visit:    Hospital/Nursing Home/ Rehab Facility: [facility]  Date of Admission: 3/28/18  Date of Discharge: 3/29/18  Reason(s) for Admission: Left total shoulder arthroplasty            Problems taking medications regularly:  None       Medication changes since discharge: stopped Spironolactone due to kidney function        Problems adhering to non-medication therapy:  None    Summary of hospitalization:  CareEverywhere information obtained and reviewed  Diagnostic Tests/Treatments reviewed.  Follow up needed: she needs repeat Creatinine and Potassium today.  Last Cr was 1.35, GFR 39, and potassium 5.1 on 3/29/18.  Last hemoglobin was 13.1.  Other Healthcare Providers Involved in Patient s Care:         Surgical follow-up appointment - next week and will also start Physical Therapy  Update since discharge: improved.    Post Discharge Medication Reconciliation: discharge medications reconciled, continue medications without change.  Plan of care communicated with patient and and partner     Coding guidelines for this visit:  Type of Medical   Decision Making Face-to-Face Visit       within 7 Days of discharge Face-to-Face Visit        within 14 days of discharge   Moderate Complexity 31928 [id number]   High Complexity [id number]          She is wondering what she can do for her skin on her face now that she is holding the Spironolactone.  No skin problem today.  Had seen Dermatology in the past.    Problem list and histories reviewed &  adjusted, as indicated.  Additional history: as documented    Patient Active Problem List   Diagnosis     RICKEY (obstructive sleep apnea)     Hypothyroidism     Chronic nonallergic rhinitis     CKD (chronic kidney disease) stage 3, GFR 30-59 ml/min     Hyperlipidemia with target LDL less than 100     Obesity     History of bladder cancer     Hypertension goal BP (blood pressure) < 140/90     Advance Care Planning     Dependent edema     Elevated LFTs     DDD (degenerative disc disease), cervical     Major depressive disorder, recurrent episode, mild degree (H)     Macular drusen     Hypertriglyceridemia     Allergic conjunctivitis     Pulmonary nodules     Major depressive disorder, recurrent episode, moderate (H)     Acne     Combined form of age-related cataract, both eyes     Glaucoma suspect, bilateral     Drusen of macula of both eyes     Renal cyst, left     Discoid atelectasis     Acute gout involving toe of left foot, unspecified cause     Past Surgical History:   Procedure Laterality Date     ARTHROPLASTY SHOULDER Right 11/01/2017    Right total shoulder arthroplasty by Dr. Go at      ARTHROPLASTY SHOULDER Left 03/28/2018    Left total shoulder arthroplasty by Dr. Go at      CHOLECYSTECTOMY, LAPOROSCOPIC       COLONOSCOPY  ?    Upper & Lower     CYSTOSCOPY      bladder cancer     D & C       ENT SURGERY  first 1967/second ?    2 Rhynoplasties     HYSTERECTOMY, PAP NO LONGER INDICATED      BSO     NOSE SURGERY      septum deviation       Social History   Substance Use Topics     Smoking status: Former Smoker     Packs/day: 0.10     Years: 1.00     Types: Cigarettes     Start date: 8/12/1969     Quit date: 10/12/1969     Smokeless tobacco: Never Used      Comment: Smoked, very little, for 2 mo, 49 years ago.     Alcohol use Yes      Comment: Maybe 1 or 2 drinks a year     Family History   Problem Relation Age of Onset     DIABETES Maternal  Grandfather      Hypertension Maternal Grandfather      DIABETES Son      HEART DISEASE Father      CHF     DIABETES Mother      Depression Mother      Hyperlipidemia Mother      Anxiety Disorder Mother      Asthma Mother      Obesity Mother      Substance Abuse Brother      DIABETES Brother      Depression Daughter      DIABETES Maternal Uncle      CEREBROVASCULAR DISEASE Maternal Uncle      DIABETES Son      after accident     Depression Daughter      Depression Son      after accident     Substance Abuse Brother      Not any longer     Thyroid Disease No family hx of      Glaucoma No family hx of      Macular Degeneration No family hx of      CANCER No family hx of          Current Outpatient Prescriptions   Medication Sig Dispense Refill     polyethylene glycol (MIRALAX) powder Take 17 g (1 capful) by mouth 2 times daily as needed for constipation 510 g 1     allopurinol (ZYLOPRIM) 100 MG tablet Take 1 tablet (100 mg) by mouth daily 90 tablet 1     traZODone (DESYREL) 50 MG tablet Take 1-2 tablets ( mg) by mouth nightly as needed for sleep 180 tablet 1             levothyroxine (SYNTHROID/LEVOTHROID) 88 MCG tablet Take 1 tablet (88 mcg) by mouth daily 90 tablet 1     venlafaxine (EFFEXOR-XR) 150 MG 24 hr capsule 2 Capsules daily 180 capsule 3     ranitidine (ZANTAC) 300 MG tablet Take 1 tablet (300 mg) by mouth At Bedtime 90 tablet 3     colchicine (COLCRYS) 0.6 MG tablet Take 1 tablet (0.6 mg) by mouth daily (Patient taking differently: Take 0.6 mg by mouth as needed ) 90 tablet 3     KRILL OIL PO Take 1 capsule by mouth daily       levalbuterol (XOPENEX HFA) 45 MCG/ACT Inhaler INHALE 2 PUFFS INTO THE LUNGS EVERY 6 HOURS AS NEEDED FOR SHORTNESS OF BREATH OR DIFFICULT BREATHING OR WHEEZING 15 g 0     B Complex Vitamins (VITAMIN B COMPLEX PO)        order for DME Equipment ordered: Respironics Auto PAP Mask type: Nasal Settings: 9-15 cm H2O       Cranberry 500 MG CAPS Take 1 capsule by mouth daily        fluticasone (FLONASE) 50 MCG/ACT nasal spray Spray 2 sprays into both nostrils daily 48 g 3     Multiple Vitamins-Minerals (MULTIPLE VITAMIN  S/WOMENS) TABS Take 2 tablets by mouth daily       magnesium 500 MG TABS Take 1 tablet by mouth daily       zinc 50 MG TABS Take 1 tablet by mouth daily       Flaxseed, Linseed, (FLAXSEED OIL PO) Take 1 Tablespoonful by mouth daily       EXCEDRIN TENSION HEADACHE 500-65 MG PO TABS 1 tablet twice daily as needed       FISH OIL 1200 MG OR CAPS 3 capsule daily       CALCIUM + D 600-200 MG-UNIT OR TABS 1 tablet twice a day       spironolactone (ALDACTONE) 50 MG tablet Take 2 tablets (100 mg) by mouth daily (Patient not taking: Reported on 4/2/2018) 180 tablet 3     STATIN NOT PRESCRIBED, INTENTIONAL, by Other route continuous prn Reported on 5/15/2017  0     Allergies   Allergen Reactions     Amlodipine Swelling     Asa [Aluminum Hydroxide] GI Disturbance     Atenolol Fatigue     Atorvastatin Calcium Swelling     Codeine Nausea     Colestid [Colestipol Hcl]      Crestor [Hmg-Coa-R Inhibitors]      Other reaction(s): Other - Describe In Comment Field  Joint pain  Joint pain     Cymbalta      Visual hallucinations     Hydralazine Fatigue     Lovastatin Cramps     Niacin      Unknown by patient     Paxil [Paroxetine] Fatigue     Potassium GI Disturbance     Sulfa Drugs Fatigue     Zetia [Ezetimibe] Fatigue     Zomig [Zolmitriptan]        Reviewed and updated as needed this visit by clinical staff  Tobacco  Allergies  Meds  Med Hx  Surg Hx  Fam Hx  Soc Hx      Reviewed and updated as needed this visit by Provider         ROS:  Constitutional, HEENT, cardiovascular, pulmonary, gi and muskuloskeletal systems are negative, except as otherwise noted.    OBJECTIVE:     /70  Pulse 80  Temp 97.7  F (36.5  C) (Oral)  Ht 5' (1.524 m)  Wt 175 lb (79.4 kg)  Breastfeeding? No  BMI 34.18 kg/m2  Body mass index is 34.18 kg/(m^2).  GENERAL: healthy, alert and no distress  RESP:  lungs clear to auscultation - no rales, rhonchi or wheezes  CV: regular rate and rhythm, normal S1 S2, no S3 or S4, no murmur, click or rub, no peripheral edema and peripheral pulses strong  ABDOMEN: soft, nontender, no hepatosplenomegaly, no masses and bowel sounds normal  MS: left arm in a sling  SKIN: no lesions of face.  Left anterior shoulder surgical incision is clean, dry, intact without erythema or drainage, steri-strips in place.    Diagnostic Test Results:  Results for orders placed or performed in visit on 04/02/18   Basic metabolic panel  (Ca, Cl, CO2, Creat, Gluc, K, Na, BUN)   Result Value Ref Range    Sodium 131 (L) 133 - 144 mmol/L    Potassium 3.8 3.4 - 5.3 mmol/L    Chloride 96 94 - 109 mmol/L    Carbon Dioxide 30 20 - 32 mmol/L    Anion Gap 5 3 - 14 mmol/L    Glucose 85 70 - 99 mg/dL    Urea Nitrogen 15 7 - 30 mg/dL    Creatinine 1.00 0.52 - 1.04 mg/dL    GFR Estimate 55 (L) >60 mL/min/1.7m2    GFR Estimate If Black 67 >60 mL/min/1.7m2    Calcium 9.9 8.5 - 10.1 mg/dL       ASSESSMENT/PLAN:     1. Hospital discharge follow-up    2. Status post total shoulder arthroplasty, left  - Patient is following up with surgeon next week.  She will also be beginning Physical Therapy.    3. CKD (chronic kidney disease) stage 3, GFR 30-59 ml/min    - Basic metabolic panel  (Ca, Cl, CO2, Creat, Gluc, K, Na, BUN)    4. Slow transit constipation    - polyethylene glycol (MIRALAX) powder; Take 17 g (1 capful) by mouth 2 times daily as needed for constipation  Dispense: 510 g; Refill: 1    Follow up as needed for any health concerns and Patient/guardian verbalized understanding and agreement with the plan.    Sarah Vallejo NP  [facility]

## 2018-04-03 LAB
ANION GAP SERPL CALCULATED.3IONS-SCNC: 5 MMOL/L (ref 3–14)
BUN SERPL-MCNC: 15 MG/DL (ref 7–30)
CALCIUM SERPL-MCNC: 9.9 MG/DL (ref 8.5–10.1)
CHLORIDE SERPL-SCNC: 96 MMOL/L (ref 94–109)
CO2 SERPL-SCNC: 30 MMOL/L (ref 20–32)
CREAT SERPL-MCNC: 1 MG/DL (ref 0.52–1.04)
GFR SERPL CREATININE-BSD FRML MDRD: 55 ML/MIN/1.7M2
GLUCOSE SERPL-MCNC: 85 MG/DL (ref 70–99)
POTASSIUM SERPL-SCNC: 3.8 MMOL/L (ref 3.4–5.3)
SODIUM SERPL-SCNC: 131 MMOL/L (ref 133–144)

## 2018-04-04 NOTE — PROGRESS NOTES
Notified patient about results and plan via jellyfish message.    Your kidney function and potassium is slightly improved/stable!    Sarah Vallejo, LUIS, APRN, CNP

## 2018-05-04 ENCOUNTER — OFFICE VISIT (OUTPATIENT)
Dept: OPHTHALMOLOGY | Facility: CLINIC | Age: 68
End: 2018-05-04
Payer: COMMERCIAL

## 2018-05-04 DIAGNOSIS — H25.812 COMBINED FORM OF AGE-RELATED CATARACT, LEFT EYE: ICD-10-CM

## 2018-05-04 DIAGNOSIS — H52.12 MYOPIA OF LEFT EYE: ICD-10-CM

## 2018-05-04 DIAGNOSIS — H52.4 PRESBYOPIA: ICD-10-CM

## 2018-05-04 DIAGNOSIS — H25.813 COMBINED FORM OF AGE-RELATED CATARACT, BOTH EYES: ICD-10-CM

## 2018-05-04 DIAGNOSIS — H40.003 GLAUCOMA SUSPECT, BILATERAL: Primary | ICD-10-CM

## 2018-05-04 DIAGNOSIS — H52.202 ASTIGMATISM OF LEFT EYE, UNSPECIFIED TYPE: ICD-10-CM

## 2018-05-04 DIAGNOSIS — H35.363 DRUSEN OF MACULA OF BOTH EYES: ICD-10-CM

## 2018-05-04 DIAGNOSIS — H25.811 COMBINED FORM OF AGE-RELATED CATARACT, RIGHT EYE: ICD-10-CM

## 2018-05-04 PROCEDURE — 92015 DETERMINE REFRACTIVE STATE: CPT | Performed by: STUDENT IN AN ORGANIZED HEALTH CARE EDUCATION/TRAINING PROGRAM

## 2018-05-04 PROCEDURE — 92014 COMPRE OPH EXAM EST PT 1/>: CPT | Performed by: STUDENT IN AN ORGANIZED HEALTH CARE EDUCATION/TRAINING PROGRAM

## 2018-05-04 ASSESSMENT — EXTERNAL EXAM - LEFT EYE: OS_EXAM: NORMAL

## 2018-05-04 ASSESSMENT — VISUAL ACUITY
OD_BAT_MED: 20/200
OD_CC: 8
OS_PH_SC: 30-2
OS_CC: 12
OS_SC: 20/40
OD_SC: 20/40
OS_BAT_MED: >20/400
OD_PH_SC: 20/30
OS_SC+: -2
METHOD: SNELLEN - LINEAR

## 2018-05-04 ASSESSMENT — REFRACTION_MANIFEST
OS_AXIS: 007
OD_SPHERE: +0.25
OS_SPHERE: -0.75
OD_CYLINDER: SPHERE
OS_ADD: +2.75
OS_CYLINDER: +1.50
OD_ADD: +2.75

## 2018-05-04 ASSESSMENT — PACHYMETRY
OD_CT(UM): 553
OS_CT(UM): 564

## 2018-05-04 ASSESSMENT — CUP TO DISC RATIO
OD_RATIO: 0.45
OS_RATIO: 0.65

## 2018-05-04 ASSESSMENT — REFRACTION_WEARINGRX
OD_AXIS: 005
OS_AXIS: 005
OS_ADD: +2.90
OD_ADD: +2.90
OD_SPHERE: -1.25
OS_CYLINDER: +2.00
OD_CYLINDER: +0.50
OS_SPHERE: -1.00

## 2018-05-04 ASSESSMENT — CONF VISUAL FIELD
OD_NORMAL: 1
OS_NORMAL: 1

## 2018-05-04 ASSESSMENT — TONOMETRY
OD_IOP_MMHG: 19
IOP_METHOD: APPLANATION
OS_IOP_MMHG: 20

## 2018-05-04 ASSESSMENT — SLIT LAMP EXAM - LIDS
COMMENTS: NORMAL
COMMENTS: NORMAL

## 2018-05-04 ASSESSMENT — EXTERNAL EXAM - RIGHT EYE: OD_EXAM: NORMAL

## 2018-05-04 NOTE — LETTER
"    5/4/2018         RE: Abbey Omer  [address] [facility] [address]        Dear Colleague,    Thank you for referring your patient, Abbey Omer, to the [facility].     I recommend cataract surgery left eye, then right eye. Please see a copy of my visit note below.     Current Eye Medications:  Drops for opening her tear ducts and one to substitute for tears daily.  Also another drop for producing tears, daily.      Subjective:  Comprehensive Eye Exam.  Patient does not like the yellow tint in her lenses and she does not like the frame.  Distance vision is clearer, without correction, but she needs her glasses for reading.  She is very sensitive to bright lights.    Assessment:  Abbey Omer is a 68 year old female who presents with:     Glaucoma suspect, bilateral      Combined form of age-related cataract, both eyes Visually significant per BAT (cortical cataract).      Drusen of macula of both eyes        Plan:  Use artificial tears up to 4 times per day (Refresh Plus, Systane Balance, or generic artificial tears are ok. Avoid \"get the red out\" drops).     Visually significant cataract that is interfering with daily activities of living. Offered cataract extraction and intraocular lens implant left eye, then the right eye   Risks, benefits, complications, and alternatives discussed with patient including possibility of limitations from coexistent eye disease and loss of vision. Target refraction and lens options discussed.  Patient understands.    Offered cataract surgery both eyes at [facility] at anytime. Call [phone] to schedule.     Otherwise Return visit one year for a complete exam.    Ryne Pascal MD  [phone]    Eye: Left, then OD  Pupil: 5.5  Pseudoexfoliation: No  Flomax: No  Diabetes mellitus: No  Glaucoma: No  Guttae: No  S/p refractive surgery or other eye surgery: No  Refractive target:  emmetropia - " discuss  Anticoagulation: none  Anesthesia: MAC/topical  Able to lay flat:  Yes  Additional concerns: Trypan MELISSA rodriguez  D: 4          Again, thank you for allowing me to participate in the care of your patient.        Sincerely,        Ryne Pascal MD

## 2018-05-04 NOTE — PROGRESS NOTES
" Current Eye Medications:  Drops for opening her tear ducts and one to substitute for tears daily.  Also another drop for producing tears, daily.      Subjective:  Comprehensive Eye Exam.  Patient does not like the yellow tint in her lenses and she does not like the frame.  Distance vision is clearer, without correction, but she needs her glasses for reading.  She is very sensitive to bright lights.    Assessment:  Abbey Omer is a 68 year old female who presents with:     Glaucoma suspect, bilateral      Combined form of age-related cataract, both eyes Visually significant per BAT (cortical cataract).      Drusen of macula of both eyes        Plan:  Use artificial tears up to 4 times per day (Refresh Plus, Systane Balance, or generic artificial tears are ok. Avoid \"get the red out\" drops).     Visually significant cataract that is interfering with daily activities of living. Offered cataract extraction and intraocular lens implant left eye, then the right eye   Risks, benefits, complications, and alternatives discussed with patient including possibility of limitations from coexistent eye disease and loss of vision. Target refraction and lens options discussed.  Patient understands.    Offered cataract surgery both eyes at [facility] at anytime. Call [phone] to schedule.     Otherwise Return visit one year for a complete exam.    Ryne Pascal MD  [phone]    Eye: Left, then OD  Pupil: 5.5  Pseudoexfoliation: No  Flomax: No  Diabetes mellitus: No  Glaucoma: No  Guttae: No  S/p refractive surgery or other eye surgery: No  Refractive target:  emmetropia - discuss  Anticoagulation: none  Anesthesia: MAC/topical  Able to lay flat:  Yes  Additional concerns: Trypan for MELISSA novak  D: 4        "

## 2018-05-04 NOTE — MR AVS SNAPSHOT
"              After Visit Summary   5/4/2018    Abbey Omer    MRN: [id number]           Patient Information     Date Of Birth          1950        Visit Information        Provider Department      5/4/2018 2:00 PM Ryne Pascal MD [facility]        Today's Diagnoses     Glaucoma suspect, bilateral    -  1    Presbyopia        Myopia of left eye        Astigmatism of left eye, unspecified type        Combined form of age-related cataract, both eyes        Drusen of macula of both eyes          Care Instructions    Use artificial tears up to 4 times per day (Refresh Plus, Systane Balance, or generic artificial tears are ok. Avoid \"get the red out\" drops).     Visually significant cataract that is interfering with daily activities of living. Offered cataract extraction and intraocular lens implant left eye, then the right eye   Risks, benefits, complications, and alternatives discussed with patient including possibility of limitations from coexistent eye disease and loss of vision. Target refraction and lens options discussed.  Patient understands.    Offered cataract surgery both eyes at [facility] at anytime. Call [phone] to schedule.   Otherwise Return visit one year for a complete exam.    Ryne Pascal MD  [phone]              Follow-ups after your visit        Follow-up notes from your care team     Return in about 1 year (around 5/4/2019) for Complete Exam.      Your next 10 appointments already scheduled     May 30, 2018 11:15 AM CDT   MA SCREENING DIGITAL BILATERAL with [id number]   [facility]    [address] [place] [address] [phone]           Do not use any powder, lotion or deodorant under your arms or on your breast. If you do, we will ask you to remove it before your exam.  Wear comfortable, two-piece clothing.  If you have any allergies, tell your care team.  Bring any previous " mammograms from other facilities or have them mailed to the breast center.            Jan 22, 2019  9:00 AM CST   Return Visit with Omar West MD, [place] [facility]   [facility] [place] ([address]  [place] [address] [phone]              Who to contact     If you have questions or need follow up information about today's clinic visit or your schedule please contact [facility] [place] directly at [phone].  Normal or non-critical lab and imaging results will be communicated to you by [employer], letter or phone within 4 business days after the clinic has received the results. If you do not hear from us within 7 days, please contact the clinic through [employer] or phone. If you have a critical or abnormal lab result, we will notify you by phone as soon as possible.  Submit refill requests through [employer] or call your pharmacy and they will forward the refill request to us. Please allow 3 business days for your refill to be completed.          Additional Information About Your Visit        [employer] Information     [employer] gives you secure access to your electronic health record. If you see a primary care provider, you can also send messages to your care team and make appointments. If you have questions, please call your primary care clinic.  If you do not have a primary care provider, please call [phone] and they will assist you.        Care EveryWhere ID     This is your Care EveryWhere ID. This could be used by other organizations to access your [facility] medical records  [id number]         Blood Pressure from Last 3 Encounters:   04/02/18 132/70   03/12/18 126/76   01/24/18 136/82    Weight from Last 3 Encounters:   04/02/18 79.4 kg (175 lb)   03/12/18 78.9 kg (174 lb)   01/24/18 78.9 kg (174 lb)              Today, you had the following     No orders found for display         Today's Medication Changes          These changes are  accurate as of 5/4/18  2:43 PM.  If you have any questions, ask your nurse or doctor.               These medicines have changed or have updated prescriptions.        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 So [facility] [phone].    ATENCIÓN: Si habla español, tiene a smith disposición servicios gratuitos de asistencia lingüística. Llame al [phone].    We comply with applicable federal civil rights laws and [place] laws. We do not discriminate on the basis of race, color, national origin, age, disability, sex, sexual orientation, or gender identity.            Thank you!     Thank you for choosing [facility]  for your care. Our goal is always to provide you with excellent care. Hearing back from our patients is one way we can continue to improve our services. Please take a few minutes to complete the written survey that you may receive in the mail after your visit with us. Thank you!             Your Updated Medication List - Protect others around you: Learn how to safely use, store and throw away your medicines at www.disposemymeds.org.          This list is accurate as of 5/4/18  2:43 PM.  Always use your most recent med list.                   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 tophus, unspecified cause, unspecified laterality       Cranberry 500 MG Caps      Take 1 capsule by mouth daily        EXCEDRIN TENSION HEADACHE 500-65 MG Tabs   Generic drug:  acetaminophen-caffeine      1 tablet twice daily as needed        fish Oil 1200 MG capsule      3 capsule daily        FLAXSEED OIL PO      Take 1 Tablespoonful by mouth daily        fluticasone 50 MCG/ACT spray    FLONASE    48 g    Spray 2 sprays into both nostrils daily    Chronic rhinitis       KRILL OIL PO      Take 1 capsule by mouth daily        levalbuterol 45 MCG/ACT Inhaler    XOPENEX HFA    15 g    INHALE 2 PUFFS INTO THE LUNGS EVERY 6 HOURS AS NEEDED FOR SHORTNESS OF BREATH OR DIFFICULT BREATHING OR WHEEZING    Mild intermittent asthma without complication       levothyroxine 88 MCG tablet    SYNTHROID/LEVOTHROID    90 tablet    Take 1 tablet (88 mcg) by mouth daily    Hypothyroidism due to acquired atrophy of thyroid       magnesium 500 MG Tabs      Take 1 tablet by mouth daily        MULTIPLE vitamin  S/WOMENS Tabs      Take 2 tablets by mouth daily        order for DME      Equipment ordered: Respironics Auto PAP Mask type: Nasal Settings: 9-15 cm H2O        polyethylene glycol powder    MIRALAX    510 g    Take 17 g (1 capful) by mouth 2 times daily as needed for constipation    Slow transit constipation       ranitidine 300 MG tablet    ZANTAC    90 tablet    Take 1 tablet (300 mg) by mouth At Bedtime    Gastroesophageal reflux disease without esophagitis       spironolactone 50 MG tablet    ALDACTONE    180 tablet    Take 2 tablets (100 mg) by mouth daily    Other acne       STATIN NOT  PRESCRIBED (INTENTIONAL)      by Other route continuous prn Reported on 5/15/2017    Hyperlipidemia LDL goal < 100       * traZODone 100 MG tablet    DESYREL    90 tablet    Take 0.5 tablets (50 mg) by mouth nightly as needed for sleep    Insomnia, unspecified type       * traZODone 50 MG tablet    DESYREL    180 tablet    Take 1-2 tablets ( mg) by mouth nightly as needed for sleep    Insomnia, unspecified type       venlafaxine 150 MG 24 hr capsule    EFFEXOR-XR    180 capsule    2 Capsules daily    Major depressive disorder, recurrent episode, mild degree (H)       VITAMIN B COMPLEX PO           zinc 50 MG Tabs      Take 1 tablet by mouth daily        * Notice:  This list has 2 medication(s) that are the same as other medications prescribed for you. Read the directions carefully, and ask your doctor or other care provider to review them with you.

## 2018-05-04 NOTE — PATIENT INSTRUCTIONS
"Use artificial tears up to 4 times per day (Refresh Plus, Systane Balance, or generic artificial tears are ok. Avoid \"get the red out\" drops).     Visually significant cataract that is interfering with daily activities of living. Offered cataract extraction and intraocular lens implant left eye, then the right eye   Risks, benefits, complications, and alternatives discussed with patient including possibility of limitations from coexistent eye disease and loss of vision. Target refraction and lens options discussed.  Patient understands.    Offered cataract surgery both eyes at [facility] at anytime. Call [phone] to schedule.     Otherwise Return visit one year for a complete exam.    Ryne Pascal MD  [phone]      "

## 2018-05-30 ENCOUNTER — RADIANT APPOINTMENT (OUTPATIENT)
Dept: MAMMOGRAPHY | Facility: CLINIC | Age: 68
End: 2018-05-30
Payer: COMMERCIAL

## 2018-05-30 DIAGNOSIS — Z12.31 VISIT FOR SCREENING MAMMOGRAM: ICD-10-CM

## 2018-05-30 PROCEDURE — 77067 SCR MAMMO BI INCL CAD: CPT | Mod: TC

## 2018-07-18 ENCOUNTER — MYC MEDICAL ADVICE (OUTPATIENT)
Dept: FAMILY MEDICINE | Facility: CLINIC | Age: 68
End: 2018-07-18

## 2018-07-18 ENCOUNTER — RADIANT APPOINTMENT (OUTPATIENT)
Dept: GENERAL RADIOLOGY | Facility: CLINIC | Age: 68
End: 2018-07-18
Attending: PHYSICIAN ASSISTANT
Payer: COMMERCIAL

## 2018-07-18 ENCOUNTER — OFFICE VISIT (OUTPATIENT)
Dept: URGENT CARE | Facility: URGENT CARE | Age: 68
End: 2018-07-18
Payer: COMMERCIAL

## 2018-07-18 VITALS
SYSTOLIC BLOOD PRESSURE: 168 MMHG | OXYGEN SATURATION: 94 % | WEIGHT: 164.8 LBS | DIASTOLIC BLOOD PRESSURE: 74 MMHG | HEART RATE: 109 BPM | TEMPERATURE: 99.7 F | BODY MASS INDEX: 32.19 KG/M2

## 2018-07-18 DIAGNOSIS — N18.30 CKD (CHRONIC KIDNEY DISEASE) STAGE 3, GFR 30-59 ML/MIN (H): ICD-10-CM

## 2018-07-18 DIAGNOSIS — J20.9 ACUTE BRONCHITIS WITH COEXISTING CONDITION REQUIRING PROPHYLACTIC TREATMENT: Primary | ICD-10-CM

## 2018-07-18 DIAGNOSIS — R05.9 COUGH: Primary | ICD-10-CM

## 2018-07-18 DIAGNOSIS — R05.9 COUGH: ICD-10-CM

## 2018-07-18 DIAGNOSIS — R50.9 FEVER, UNSPECIFIED FEVER CAUSE: ICD-10-CM

## 2018-07-18 DIAGNOSIS — R07.0 THROAT PAIN: ICD-10-CM

## 2018-07-18 DIAGNOSIS — J98.01 ACUTE BRONCHOSPASM: ICD-10-CM

## 2018-07-18 LAB
DEPRECATED S PYO AG THROAT QL EIA: NORMAL
SPECIMEN SOURCE: NORMAL

## 2018-07-18 PROCEDURE — 87081 CULTURE SCREEN ONLY: CPT | Performed by: PHYSICIAN ASSISTANT

## 2018-07-18 PROCEDURE — 87880 STREP A ASSAY W/OPTIC: CPT | Performed by: PHYSICIAN ASSISTANT

## 2018-07-18 PROCEDURE — 71046 X-RAY EXAM CHEST 2 VIEWS: CPT | Mod: FY

## 2018-07-18 PROCEDURE — 99214 OFFICE O/P EST MOD 30 MIN: CPT | Performed by: PHYSICIAN ASSISTANT

## 2018-07-18 RX ORDER — METHYLPREDNISOLONE 4 MG
8 TABLET, DOSE PACK ORAL SEE ADMIN INSTRUCTIONS
Status: CANCELLED | OUTPATIENT
Start: 2018-07-18

## 2018-07-18 RX ORDER — PREDNISONE 20 MG/1
40 TABLET ORAL DAILY
Qty: 10 TABLET | Refills: 0 | Status: SHIPPED | OUTPATIENT
Start: 2018-07-18 | End: 2018-07-20

## 2018-07-18 RX ORDER — AZITHROMYCIN 250 MG/1
TABLET, FILM COATED ORAL
Qty: 6 TABLET | Refills: 0 | Status: SHIPPED | OUTPATIENT
Start: 2018-07-18 | End: 2018-09-13

## 2018-07-18 ASSESSMENT — ENCOUNTER SYMPTOMS
ALLERGIC/IMMUNOLOGIC NEGATIVE: 1
WEAKNESS: 0
HEMATOLOGIC/LYMPHATIC NEGATIVE: 1
FEVER: 1
BACK PAIN: 0
CARDIOVASCULAR NEGATIVE: 1
NECK STIFFNESS: 0
VOMITING: 0
DIARRHEA: 0
CHILLS: 0
SORE THROAT: 0
EYES NEGATIVE: 1
BRUISES/BLEEDS EASILY: 0
RHINORRHEA: 0
NAUSEA: 0
MUSCULOSKELETAL NEGATIVE: 1
NECK PAIN: 0
MYALGIAS: 0
ARTHRALGIAS: 0
DIZZINESS: 0
LIGHT-HEADEDNESS: 0
SHORTNESS OF BREATH: 0
COUGH: 1
HEADACHES: 0
JOINT SWELLING: 0
PALPITATIONS: 0
WOUND: 0

## 2018-07-18 NOTE — TELEPHONE ENCOUNTER
MyChart message sent.  I pended an order for Methylprednisolone incase patient decides to try that medication instead of the Prednisone.  Sarah Vallejo, DNP, APRN, CNP

## 2018-07-18 NOTE — MR AVS SNAPSHOT
After Visit Summary   7/18/2018    Abbey Omer    MRN: [id number]           Patient Information     Date Of Birth          1950        Visit Information        Provider Department      7/18/2018 11:00 AM Oskar Navarro PA-C [facility]        Today's Diagnoses     Acute bronchitis with coexisting condition requiring prophylactic treatment    -  1    Cough        Throat pain        Acute bronchospasm        Fever, unspecified fever cause        CKD (chronic kidney disease) stage 3, GFR 30-59 ml/min           Follow-ups after your visit        Your next 10 appointments already scheduled     Jan 22, 2019  9:00 AM CST   Return Visit with mOar West MD, [place] [facility] PROC ROOM   [facility] ([address] [address] [phone]              Who to contact     If you have questions or need follow up information about today's clinic visit or your schedule please contact [facility] directly at [phone].  Normal or non-critical lab and imaging results will be communicated to you by [employer], letter or phone within 4 business days after the clinic has received the results. If you do not hear from us within 7 days, please contact the clinic through [employer] or phone. If you have a critical or abnormal lab result, we will notify you by phone as soon as possible.  Submit refill requests through [employer] or call your pharmacy and they will forward the refill request to us. Please allow 3 business days for your refill to be completed.          Additional Information About Your Visit        [employer] Information     [employer] gives you secure access to your electronic health record. If you see a primary care provider, you can also send messages to your care team and make appointments. If you have questions, please call your primary care clinic.  If you do not have a primary care provider,  please call [phone] and they will assist you.        Care EveryWhere ID     This is your Care EveryWhere ID. This could be used by other organizations to access your [facility] medical records  [id number]        Your Vitals Were     Pulse Temperature Pulse Oximetry BMI (Body Mass Index)          109 99.7  F (37.6  C) (Oral) 94% 32.19 kg/m2         Blood Pressure from Last 3 Encounters:   07/18/18 168/74   04/02/18 132/70   03/12/18 126/76    Weight from Last 3 Encounters:   07/18/18 164 lb 12.8 oz (74.8 kg)   04/02/18 175 lb (79.4 kg)   03/12/18 174 lb (78.9 kg)              We Performed the Following     Beta strep group A culture     Strep, Rapid Screen     XR Chest 2 Views          Today's Medication Changes          These changes are accurate as of 7/18/18 12:31 PM.  If you have any questions, ask your nurse or doctor.               Start taking these medicines.        Dose/Directions    azithromycin 250 MG tablet   Commonly known as:  ZITHROMAX   Used for:  Acute bronchitis with coexisting condition requiring prophylactic treatment   Started by:  Oskar Navarro PA-C        Two tablets first day, then one tablet daily for four days.   Quantity:  6 tablet   Refills:  0       predniSONE 20 MG tablet   Commonly known as:  DELTASONE   Used for:  Acute bronchospasm   Started by:  Oskar Navarro PA-C        Dose:  40 mg   Take 2 tablets (40 mg) by mouth daily for 5 days   Quantity:  10 tablet   Refills:  0         These medicines have changed or have updated prescriptions.        Dose/Directions    colchicine 0.6 MG tablet   Commonly known as:  COLCRYS   This may have changed:    - when to take this  - reasons to take this   Used for:  Chronic gout involving toe without tophus, unspecified cause, unspecified laterality        Dose:  0.6 mg   Take 1 tablet (0.6 mg) by mouth daily   Quantity:  90 tablet   Refills:  3            Where to get your medicines      These medications were sent to [id number] - [place], [place] - [address]  [address], [place] [address]     Phone:  [phone]     azithromycin 250 MG tablet    predniSONE 20 MG tablet                Primary Care Provider Office Phone # Fax #    Sarah Vallejo [phone]       [address] [facility] [address]        Equal Access to Services     BHUPINDER SUTHERLAND : Hadii aad ku hadasho Soomaali, waaxda luqadaha, qaybta kaalmada adeegyada, waxay idiin hayaan adeeg kharash la'aan . So [facility] [phone].    ATENCIÓN: Si habla español, tiene a smith disposición servicios gratuitos de asistencia lingüística. [facility] [phone].    We comply with applicable federal civil rights laws and [place] laws. We do not discriminate on the basis of race, color, national origin, age, disability, sex, sexual orientation, or gender identity.            Thank you!     Thank you for choosing [facility]  for your care. Our goal is always to provide you with excellent care. Hearing back from our patients is one way we can continue to improve our services. Please take a few minutes to complete the written survey that you may receive in the mail after your visit with us. Thank you!             Your Updated Medication List - Protect others around you: Learn how to safely use, store and throw away your medicines at www.disposemymeds.org.          This list is accurate as of 7/18/18 12:31 PM.  Always use your most recent med list.                   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 tophus, unspecified cause, unspecified laterality       Cranberry 500 MG Caps      Take 1 capsule by mouth daily        EXCEDRIN TENSION HEADACHE 500-65 MG Tabs   Generic drug:  acetaminophen-caffeine      1 tablet twice daily as needed        fish Oil 1200 MG capsule      3 capsule daily        FLAXSEED OIL PO      Take 1 Tablespoonful by mouth daily        fluticasone 50 MCG/ACT spray    FLONASE    48 g    Spray 2 sprays into both nostrils daily    Chronic rhinitis       KRILL OIL PO      Take 1 capsule by mouth daily        levalbuterol 45 MCG/ACT Inhaler    XOPENEX HFA    15 g    INHALE 2 PUFFS INTO THE LUNGS EVERY 6 HOURS AS NEEDED FOR SHORTNESS OF BREATH OR DIFFICULT BREATHING OR WHEEZING    Mild intermittent asthma without complication       levothyroxine 88 MCG tablet    SYNTHROID/LEVOTHROID    90 tablet    Take 1 tablet (88 mcg) by mouth daily    Hypothyroidism due to acquired atrophy of thyroid       magnesium 500 MG Tabs      Take 1 tablet by mouth daily        MULTIPLE vitamin  S/WOMENS Tabs      Take 2 tablets by mouth daily        order for DME      Equipment ordered: Respironics Auto PAP Mask type: Nasal Settings: 9-15 cm H2O        polyethylene glycol powder    MIRALAX    510 g    Take 17 g (1 capful) by mouth 2 times daily as needed for constipation    Slow transit constipation       predniSONE 20 MG tablet    DELTASONE    10 tablet    Take 2 tablets (40 mg) by mouth daily for 5 days    Acute bronchospasm       ranitidine 300 MG tablet    ZANTAC    90 tablet    Take 1 tablet (300 mg) by mouth At Bedtime    Gastroesophageal reflux disease without esophagitis       spironolactone 50 MG tablet    ALDACTONE    180 tablet    Take 2 tablets  (100 mg) by mouth daily    Other acne       STATIN NOT PRESCRIBED (INTENTIONAL)      by Other route continuous prn Reported on 5/15/2017    Hyperlipidemia LDL goal < 100       * traZODone 100 MG tablet    DESYREL    90 tablet    Take 0.5 tablets (50 mg) by mouth nightly as needed for sleep    Insomnia, unspecified type       * traZODone 50 MG tablet    DESYREL    180 tablet    Take 1-2 tablets ( mg) by mouth nightly as needed for sleep    Insomnia, unspecified type       venlafaxine 150 MG 24 hr capsule    EFFEXOR-XR    180 capsule    2 Capsules daily    Major depressive disorder, recurrent episode, mild degree (H)       VITAMIN B COMPLEX PO           zinc 50 MG Tabs      Take 1 tablet by mouth daily        * Notice:  This list has 2 medication(s) that are the same as other medications prescribed for you. Read the directions carefully, and ask your doctor or other care provider to review them with you.

## 2018-07-18 NOTE — PROGRESS NOTES
Chief Complaint:    Chief Complaint   Patient presents with     Fever     Patient complains of fever, hot and cold flashes, and cough        HPI: Abbey Omer is an 68 year old female who presents for evaluation and treatment of cough.  This started roughly 1 week ago and has not changed.  The cough is dry.  She has had a sore throat, and some congestion.  Fever and chills mostly at night.  She denies any chest pain, SOB, or hemoptysis.  No dizziness.      ROS:      Review of Systems   Constitutional: Positive for fever. Negative for chills.   HENT: Negative for congestion, ear pain, rhinorrhea and sore throat.    Eyes: Negative.    Respiratory: Positive for cough. Negative for shortness of breath.    Cardiovascular: Negative.  Negative for chest pain and palpitations.   Gastrointestinal: Negative for diarrhea, nausea and vomiting.   Endocrine: Positive for cold intolerance and heat intolerance.   Genitourinary: Negative.    Musculoskeletal: Negative.  Negative for arthralgias, back pain, joint swelling, myalgias, neck pain and neck stiffness.   Skin: Negative.  Negative for rash and wound.   Allergic/Immunologic: Negative.  Negative for immunocompromised state.   Neurological: Negative for dizziness, weakness, light-headedness and headaches.   Hematological: Negative.  Does not bruise/bleed easily.        Family History   Family History   Problem Relation Age of Onset     Diabetes Maternal Grandfather      Hypertension Maternal Grandfather      Diabetes Son      HEART DISEASE Father      CHF     Diabetes Mother      Depression Mother      Hyperlipidemia Mother      Anxiety Disorder Mother      Asthma Mother      Obesity Mother      Substance Abuse Brother      Diabetes Brother      Depression Daughter      Diabetes Maternal Uncle      Cerebrovascular Disease Maternal Uncle      Diabetes Son      after accident     Depression Daughter      Depression Son      after accident     Substance Abuse Brother       Not any longer     Thyroid Disease No family hx of      Glaucoma No family hx of      Macular Degeneration No family hx of      Cancer No family hx of        Social History  Social History     Social History     Marital status: Life Partner     Spouse name: Maggie     Number of children: 2     Years of education: 12     Occupational History     collections for [employer]     Social History Main Topics     Smoking status: Former Smoker     Packs/day: 0.10     Years: 1.00     Types: Cigarettes     Start date: 1969     Quit date: 10/12/1969     Smokeless tobacco: Never Used      Comment: Smoked, very little, for 2 mo, 49 years ago.     Alcohol use Yes      Comment: Maybe 1 or 2 drinks a year     Drug use: No     Sexual activity: Not Currently     Partners: Female     Birth control/ protection: Post-menopausal     Other Topics Concern     Parent/Sibling W/ Cabg, Mi Or Angioplasty Before 65f 55m? No     Social History Narrative    Son  2012.    Wife, Maggie        Surgical History:  Past Surgical History:   Procedure Laterality Date     ARTHROPLASTY SHOULDER Right 2017    Right total shoulder arthroplasty by Dr. Go at [facility]     ARTHROPLASTY SHOULDER Left 2018    Left total shoulder arthroplasty by Dr. Go at [facility]     CHOLECYSTECTOMY, LAPOROSCOPIC       COLONOSCOPY  ?    Upper & Lower     CYSTOSCOPY      bladder cancer     D & C       ENT SURGERY  first /second ?    2 Rhynoplasties     HYSTERECTOMY, PAP NO LONGER INDICATED      BSO     NOSE SURGERY      septum deviation        Problem List:  Patient Active Problem List   Diagnosis     RICKEY (obstructive sleep apnea)     Hypothyroidism     Chronic nonallergic rhinitis     CKD (chronic kidney disease) stage 3, GFR 30-59 ml/min     Hyperlipidemia with target LDL less than 100     Obesity     History of bladder cancer     Hypertension goal BP (blood pressure) < 140/90      Advance Care Planning     Dependent edema     Elevated LFTs     DDD (degenerative disc disease), cervical     Major depressive disorder, recurrent episode, mild degree (H)     Macular drusen     Hypertriglyceridemia     Allergic conjunctivitis     Pulmonary nodules     Major depressive disorder, recurrent episode, moderate (H)     Acne     Combined form of age-related cataract, both eyes     Glaucoma suspect, bilateral     Drusen of macula of both eyes     Renal cyst, left     Discoid atelectasis     Acute gout involving toe of left foot, unspecified cause        Allergies:  Allergies   Allergen Reactions     Albuterol Other (See Comments)     Very jittery and couldn't tolerate     Amlodipine Swelling and Other (See Comments)     Swelling- lower legs     Asa [Aspirin] GI Disturbance     Atenolol Fatigue     Atenolol Other (See Comments)     Fatigue     Atorvastatin Other (See Comments)     Swelling legs     Atorvastatin Calcium Swelling     Bupropion Unknown     Codeine Nausea     Colestid [Colestipol Hcl]      Crestor [Hmg-Coa-R Inhibitors] Other (See Comments)     Joint pain  Other reaction(s): Other - Describe In Comment Field  Joint pain  Joint pain     Cymbalta      Visual hallucinations     Ezetimibe Other (See Comments)     Fatigue     Hydralazine Fatigue and Other (See Comments)     Fatigue     Lovastatin Cramps and Other (See Comments)     Cramps     Niacin      Unknown by patient     Paroxetine Other (See Comments)     Fatigue     Paxil [Paroxetine] Fatigue     Potassium GI Disturbance     Sulfa Drugs Fatigue     Sulfasalazine Other (See Comments)     Triamterene Unknown     Zetia [Ezetimibe] Fatigue     Zomig [Zolmitriptan] Other (See Comments)     Cant wake up        Current Meds:    Current Outpatient Prescriptions:      allopurinol (ZYLOPRIM) 100 MG tablet, Take 1 tablet (100 mg) by mouth daily, Disp: 90 tablet, Rfl: 1     azithromycin (ZITHROMAX) 250 MG tablet, Two tablets first day, then one tablet  daily for four days., Disp: 6 tablet, Rfl: 0     B Complex Vitamins (VITAMIN B COMPLEX PO), , Disp: , Rfl:      CALCIUM + D 600-200 MG-UNIT OR TABS, 1 tablet twice a day, Disp: , Rfl:      colchicine (COLCRYS) 0.6 MG tablet, Take 1 tablet (0.6 mg) by mouth daily (Patient taking differently: Take 0.6 mg by mouth as needed ), Disp: 90 tablet, Rfl: 3     Cranberry 500 MG CAPS, Take 1 capsule by mouth daily, Disp: , Rfl:      EXCEDRIN TENSION HEADACHE 500-65 MG PO TABS, 1 tablet twice daily as needed, Disp: , Rfl:      FISH OIL 1200 MG OR CAPS, 3 capsule daily, Disp: , Rfl:      Flaxseed, Linseed, (FLAXSEED OIL PO), Take 1 Tablespoonful by mouth daily, Disp: , Rfl:      fluticasone (FLONASE) 50 MCG/ACT nasal spray, Spray 2 sprays into both nostrils daily, Disp: 48 g, Rfl: 3     KRILL OIL PO, Take 1 capsule by mouth daily, Disp: , Rfl:      levalbuterol (XOPENEX HFA) 45 MCG/ACT Inhaler, INHALE 2 PUFFS INTO THE LUNGS EVERY 6 HOURS AS NEEDED FOR SHORTNESS OF BREATH OR DIFFICULT BREATHING OR WHEEZING, Disp: 15 g, Rfl: 0     levothyroxine (SYNTHROID/LEVOTHROID) 88 MCG tablet, Take 1 tablet (88 mcg) by mouth daily, Disp: 90 tablet, Rfl: 1     magnesium 500 MG TABS, Take 1 tablet by mouth daily, Disp: , Rfl:      Multiple Vitamins-Minerals (MULTIPLE VITAMIN  S/WOMENS) TABS, Take 2 tablets by mouth daily, Disp: , Rfl:      order for DME, Equipment ordered: Respironics Auto PAP Mask type: Nasal Settings: 9-15 cm H2O, Disp: , Rfl:      polyethylene glycol (MIRALAX) powder, Take 17 g (1 capful) by mouth 2 times daily as needed for constipation, Disp: 510 g, Rfl: 1     predniSONE (DELTASONE) 20 MG tablet, Take 2 tablets (40 mg) by mouth daily for 5 days, Disp: 10 tablet, Rfl: 0     ranitidine (ZANTAC) 300 MG tablet, Take 1 tablet (300 mg) by mouth At Bedtime, Disp: 90 tablet, Rfl: 3     spironolactone (ALDACTONE) 50 MG tablet, Take 2 tablets (100 mg) by mouth daily, Disp: 180 tablet, Rfl: 3     STATIN NOT PRESCRIBED, INTENTIONAL,,  by Other route continuous prn Reported on 5/15/2017, Disp: , Rfl: 0     traZODone (DESYREL) 100 MG tablet, Take 0.5 tablets (50 mg) by mouth nightly as needed for sleep, Disp: 90 tablet, Rfl: 1     traZODone (DESYREL) 50 MG tablet, Take 1-2 tablets ( mg) by mouth nightly as needed for sleep, Disp: 180 tablet, Rfl: 1     venlafaxine (EFFEXOR-XR) 150 MG 24 hr capsule, 2 Capsules daily, Disp: 180 capsule, Rfl: 3     zinc 50 MG TABS, Take 1 tablet by mouth daily, Disp: , Rfl:      PHYSICAL EXAM:     Vital signs noted and reviewed by Oskar Navarro  /74 (BP Location: Left arm, Patient Position: Chair, Cuff Size: Adult Regular)  Pulse 109  Temp 99.7  F (37.6  C) (Oral)  Wt 164 lb 12.8 oz (74.8 kg)  SpO2 94%  BMI 32.19 kg/m2     PEFR:    Physical Exam   Constitutional: She is oriented to person, place, and time. She appears well-developed and well-nourished. No distress.   HENT:   Head: Normocephalic and atraumatic.   Right Ear: Tympanic membrane and external ear normal.   Left Ear: Tympanic membrane and external ear normal.   Mouth/Throat: No uvula swelling. Posterior oropharyngeal erythema present. No oropharyngeal exudate, posterior oropharyngeal edema or tonsillar abscesses.   Eyes: EOM are normal. Pupils are equal, round, and reactive to light.   Neck: Normal range of motion. Neck supple.   Cardiovascular: Normal rate, regular rhythm, normal heart sounds and intact distal pulses.  Exam reveals no gallop and no friction rub.    No murmur heard.  Pulmonary/Chest: Effort normal. No respiratory distress. She has wheezes. She has rhonchi.   Wheezing and rhonchi in all lung fields.   Abdominal: Soft. Bowel sounds are normal. She exhibits no distension and no mass. There is no tenderness. There is no guarding.   Lymphadenopathy:     She has no cervical adenopathy.   Neurological: She is alert and oriented to person, place, and time. She has normal reflexes. No cranial nerve deficit.   Skin: Skin is warm and  dry. She is not diaphoretic.   Psychiatric: She has a normal mood and affect. Her behavior is normal. Judgment and thought content normal.   Nursing note and vitals reviewed.       Labs:     Results for orders placed or performed in visit on 07/18/18   XR Chest 2 Views    Narrative    XR CHEST 2 VW 7/18/2018 11:56 AM    COMPARISON: CT dated 4/26/2016    HISTORY: 1 week of cough and fever.      Impression    IMPRESSION: Cardiac silhouette and pulmonary vasculature are within  normal limits. No focal airspace disease, pleural effusion or  pneumothorax.    ALYSIA FERRARI MD   Strep, Rapid Screen   Result Value Ref Range    Specimen Description Throat     Rapid Strep A Screen       NEGATIVE: No Group A streptococcal antigen detected by immunoassay, await culture report.       Medical Decision Making:    Differential Diagnosis:  URI Adult/Peds:  Bronchitis-viral, Bronchospasm, Pneumonia, Strep pharyngitis, Viral pharyngitis and Viral upper respiratory illness      ASSESSMENT:     1. Cough    2. Throat pain    3. Acute bronchospasm    4. Fever, unspecified fever cause    5. CKD (chronic kidney disease) stage 3, GFR 30-59 ml/min    6. Acute bronchitis with coexisting condition requiring prophylactic treatment           PLAN:     Patient presents with 1 week of cough, sore throat, and chills.  Lung sounds have wheezing and rhonchi.  Discussed imaging with patient.  CXR was negative for any acute infiltrates.  RST was negative.  We will call with culture results if positive.  With wheezing, Rx for Prednisone sent in.  Patient has had fevers, and chills.  O2 sats 92% on RA in clinic today.  With symptoms, CKD, and low O2 sats, Patient started on Zithromax today.  No dosing adjustment needed for prednisone or Zithromax due to CKD.  Worrisome symptoms discussed with instructions to go to the ED.  Patient verbalized understanding and agreed with this plan.     Oskar Navarro  7/18/2018, 11:22 AM

## 2018-07-19 LAB
BACTERIA SPEC CULT: NORMAL
SPECIMEN SOURCE: NORMAL

## 2018-07-20 ENCOUNTER — OFFICE VISIT (OUTPATIENT)
Dept: FAMILY MEDICINE | Facility: CLINIC | Age: 68
End: 2018-07-20
Payer: COMMERCIAL

## 2018-07-20 VITALS
HEART RATE: 74 BPM | WEIGHT: 165.2 LBS | DIASTOLIC BLOOD PRESSURE: 76 MMHG | TEMPERATURE: 98.1 F | BODY MASS INDEX: 32.43 KG/M2 | OXYGEN SATURATION: 95 % | HEIGHT: 60 IN | SYSTOLIC BLOOD PRESSURE: 136 MMHG

## 2018-07-20 DIAGNOSIS — J20.9 ACUTE BRONCHITIS, UNSPECIFIED ORGANISM: Primary | ICD-10-CM

## 2018-07-20 PROCEDURE — 99213 OFFICE O/P EST LOW 20 MIN: CPT | Performed by: NURSE PRACTITIONER

## 2018-07-20 ASSESSMENT — PATIENT HEALTH QUESTIONNAIRE - PHQ9: 5. POOR APPETITE OR OVEREATING: NOT AT ALL

## 2018-07-20 ASSESSMENT — ANXIETY QUESTIONNAIRES
7. FEELING AFRAID AS IF SOMETHING AWFUL MIGHT HAPPEN: NOT AT ALL
GAD7 TOTAL SCORE: 0
2. NOT BEING ABLE TO STOP OR CONTROL WORRYING: NOT AT ALL
5. BEING SO RESTLESS THAT IT IS HARD TO SIT STILL: NOT AT ALL
IF YOU CHECKED OFF ANY PROBLEMS ON THIS QUESTIONNAIRE, HOW DIFFICULT HAVE THESE PROBLEMS MADE IT FOR YOU TO DO YOUR WORK, TAKE CARE OF THINGS AT HOME, OR GET ALONG WITH OTHER PEOPLE: NOT DIFFICULT AT ALL
3. WORRYING TOO MUCH ABOUT DIFFERENT THINGS: NOT AT ALL
6. BECOMING EASILY ANNOYED OR IRRITABLE: NOT AT ALL
1. FEELING NERVOUS, ANXIOUS, OR ON EDGE: NOT AT ALL

## 2018-07-20 NOTE — PROGRESS NOTES
SUBJECTIVE:   Abbey Omer is a 68 year old female who presents to clinic today for the following health issues:      ED/UC Followup:    Facility:  [facility]  Date of visit: 7/18/18  Reason for visit: Bronchitis  Current Status: Only picked up Z-ganesh--Didn't want the Prednisone due to prior intolerance to it in the past-- says she is doing better     Feeling better now.  Still coughing but less.  Is able to rest better.  No longer vomiting.  She is able to keep food and fluids down.  Still some shortness of breath with coughing.  No nausea, vomiting, and diarrhea.  No fevers or chills.  She is not using any inhaler now but has Xopenex at home.    Problem list and histories reviewed & adjusted, as indicated.  Additional history: as documented    Patient Active Problem List   Diagnosis     RICKEY (obstructive sleep apnea)     Hypothyroidism     Chronic nonallergic rhinitis     CKD (chronic kidney disease) stage 3, GFR 30-59 ml/min     Hyperlipidemia with target LDL less than 100     Obesity     History of bladder cancer     Hypertension goal BP (blood pressure) < 140/90     Advance Care Planning     Dependent edema     Elevated LFTs     DDD (degenerative disc disease), cervical     Major depressive disorder, recurrent episode, mild degree (H)     Macular drusen     Hypertriglyceridemia     Allergic conjunctivitis     Pulmonary nodules     Major depressive disorder, recurrent episode, moderate (H)     Acne     Combined form of age-related cataract, both eyes     Glaucoma suspect, bilateral     Drusen of macula of both eyes     Renal cyst, left     Discoid atelectasis     Acute gout involving toe of left foot, unspecified cause     Past Surgical History:   Procedure Laterality Date     ARTHROPLASTY SHOULDER Right 11/01/2017    Right total shoulder arthroplasty by Dr. Go at [facility]     ARTHROPLASTY SHOULDER Left 03/28/2018    Left total shoulder arthroplasty by   Abbi at [facility]     CHOLECYSTECTOMY, LAPOROSCOPIC       COLONOSCOPY  ?    Upper & Lower     CYSTOSCOPY      bladder cancer     D & C       ENT SURGERY  first 1967/second ?    2 Rhynoplasties     HYSTERECTOMY, PAP NO LONGER INDICATED      BSO     NOSE SURGERY      septum deviation       Social History   Substance Use Topics     Smoking status: Former Smoker     Packs/day: 0.10     Years: 1.00     Types: Cigarettes     Start date: 8/12/1969     Quit date: 10/12/1969     Smokeless tobacco: Never Used      Comment: Smoked, very little, for 2 mo, 49 years ago.     Alcohol use Yes      Comment: Maybe 1 or 2 drinks a year     Family History   Problem Relation Age of Onset     Diabetes Maternal Grandfather      Hypertension Maternal Grandfather      Diabetes Son      HEART DISEASE Father      CHF     Diabetes Mother      Depression Mother      Hyperlipidemia Mother      Anxiety Disorder Mother      Asthma Mother      Obesity Mother      Substance Abuse Brother      Diabetes Brother      Depression Daughter      Diabetes Maternal Uncle      Cerebrovascular Disease Maternal Uncle      Diabetes Son      after accident     Depression Daughter      Depression Son      after accident     Substance Abuse Brother      Not any longer     Thyroid Disease No family hx of      Glaucoma No family hx of      Macular Degeneration No family hx of      Cancer No family hx of          Current Outpatient Prescriptions   Medication Sig Dispense Refill     allopurinol (ZYLOPRIM) 100 MG tablet Take 1 tablet (100 mg) by mouth daily 90 tablet 1     azithromycin (ZITHROMAX) 250 MG tablet Two tablets first day, then one tablet daily for four days. 6 tablet 0     B Complex Vitamins (VITAMIN B COMPLEX PO)        CALCIUM + D 600-200 MG-UNIT OR TABS 1 tablet twice a day       colchicine (COLCRYS) 0.6 MG tablet Take 1 tablet (0.6 mg) by mouth daily (Patient taking differently: Take 0.6 mg by mouth as needed ) 90 tablet 3      Cranberry 500 MG CAPS Take 1 capsule by mouth daily       EXCEDRIN TENSION HEADACHE 500-65 MG PO TABS 1 tablet twice daily as needed       FISH OIL 1200 MG OR CAPS 3 capsule daily       Flaxseed, Linseed, (FLAXSEED OIL PO) Take 1 Tablespoonful by mouth daily       fluticasone (FLONASE) 50 MCG/ACT nasal spray Spray 2 sprays into both nostrils daily 48 g 3     KRILL OIL PO Take 1 capsule by mouth daily       levalbuterol (XOPENEX HFA) 45 MCG/ACT Inhaler INHALE 2 PUFFS INTO THE LUNGS EVERY 6 HOURS AS NEEDED FOR SHORTNESS OF BREATH OR DIFFICULT BREATHING OR WHEEZING 15 g 0     levothyroxine (SYNTHROID/LEVOTHROID) 88 MCG tablet Take 1 tablet (88 mcg) by mouth daily 90 tablet 1     magnesium 500 MG TABS Take 1 tablet by mouth daily       Multiple Vitamins-Minerals (MULTIPLE VITAMIN  S/WOMENS) TABS Take 2 tablets by mouth daily       order for DME Equipment ordered: Respironics Auto PAP Mask type: Nasal Settings: 9-15 cm H2O       polyethylene glycol (MIRALAX) powder Take 17 g (1 capful) by mouth 2 times daily as needed for constipation 510 g 1     ranitidine (ZANTAC) 300 MG tablet Take 1 tablet (300 mg) by mouth At Bedtime 90 tablet 3     spironolactone (ALDACTONE) 50 MG tablet Take 2 tablets (100 mg) by mouth daily 180 tablet 3     STATIN NOT PRESCRIBED, INTENTIONAL, by Other route continuous prn Reported on 5/15/2017  0     traZODone (DESYREL) 100 MG tablet Take 0.5 tablets (50 mg) by mouth nightly as needed for sleep 90 tablet 1     traZODone (DESYREL) 50 MG tablet Take 1-2 tablets ( mg) by mouth nightly as needed for sleep 180 tablet 1     venlafaxine (EFFEXOR-XR) 150 MG 24 hr capsule 2 Capsules daily 180 capsule 3     zinc 50 MG TABS Take 1 tablet by mouth daily       Allergies   Allergen Reactions     Albuterol Other (See Comments)     Very jittery and couldn't tolerate     Amlodipine Swelling and Other (See Comments)     Swelling- lower legs     Asa [Aspirin] GI Disturbance     Atenolol Fatigue     Atenolol  Other (See Comments)     Fatigue     Atorvastatin Other (See Comments)     Swelling legs     Atorvastatin Calcium Swelling     Bupropion Unknown     Codeine Nausea     Colestid [Colestipol Hcl]      Crestor [Hmg-Coa-R Inhibitors] Other (See Comments)     Joint pain  Other reaction(s): Other - Describe In Comment Field  Joint pain  Joint pain     Cymbalta      Visual hallucinations     Ezetimibe Other (See Comments)     Fatigue     Hydralazine Fatigue and Other (See Comments)     Fatigue     Lovastatin Cramps and Other (See Comments)     Cramps     Niacin      Unknown by patient     Paroxetine Other (See Comments)     Fatigue     Paxil [Paroxetine] Fatigue     Potassium GI Disturbance     Sulfa Drugs Fatigue     Sulfasalazine Other (See Comments)     Triamterene Unknown     Zetia [Ezetimibe] Fatigue     Zomig [Zolmitriptan] Other (See Comments)     Cant wake up     BP Readings from Last 3 Encounters:   07/20/18 136/76   07/18/18 168/74   04/02/18 132/70    Wt Readings from Last 3 Encounters:   07/20/18 165 lb 3.2 oz (74.9 kg)   07/18/18 164 lb 12.8 oz (74.8 kg)   04/02/18 175 lb (79.4 kg)                    Reviewed and updated as needed this visit by clinical staff  Tobacco  Allergies  Meds  Problems  Med Hx  Surg Hx  Fam Hx  Soc Hx        Reviewed and updated as needed this visit by Provider  Allergies  Meds  Problems         ROS:  Constitutional, HEENT, cardiovascular, pulmonary, gi and gu systems are negative, except as otherwise noted.    OBJECTIVE:     /76 (BP Location: Right arm, Patient Position: Sitting, Cuff Size: Adult Large)  Pulse 74  Temp 98.1  F (36.7  C) (Oral)  Ht 5' (1.524 m)  Wt 165 lb 3.2 oz (74.9 kg)  SpO2 95%  BMI 32.26 kg/m2  Body mass index is 32.26 kg/(m^2).  GENERAL: healthy, alert, no distress and obese  EYES: Eyes grossly normal to inspection, PERRL and conjunctivae and sclerae normal  HENT: ear canals and TM's normal, nose and mouth without ulcers or lesions  NECK:  no adenopathy and no asymmetry, masses, or scars  RESP: Faint expiratory wheeze bilaterally, scattered rhonchi.  No crackles.  Positive intermittent cough.  CV: regular rate and rhythm, normal S1 S2, no S3 or S4, no murmur, click or rub, no peripheral edema and peripheral pulses strong    Diagnostic Test Results:  Results for orders placed or performed in visit on 07/18/18   XR Chest 2 Views    Narrative    XR CHEST 2 VW 7/18/2018 11:56 AM    COMPARISON: CT dated 4/26/2016    HISTORY: 1 week of cough and fever.      Impression    IMPRESSION: Cardiac silhouette and pulmonary vasculature are within  normal limits. No focal airspace disease, pleural effusion or  pneumothorax.    ALYSIA FERRARI MD   Strep, Rapid Screen   Result Value Ref Range    Specimen Description Throat     Rapid Strep A Screen       NEGATIVE: No Group A streptococcal antigen detected by immunoassay, await culture report.   Beta strep group A culture   Result Value Ref Range    Specimen Description Throat     Culture Micro No beta hemolytic Streptococcus Group A isolated        ASSESSMENT/PLAN:     1. Acute bronchitis, unspecified organism  Improving.    - Continue Azithromycin as prescribed until full completion.  - Due to improving status, okay to not take oral steroids at this time.  - I recommended to patient to start using Xopenex inhaler with a SPACER as needed for her symptoms.  Patient will obtain a Spacer.    Return if symptoms worsen or fail to improve.    Sarah Vallejo NP  [facility]

## 2018-07-20 NOTE — PATIENT INSTRUCTIONS
Get the SPACER and use with the inhaler for the cough    [facility]   Discharged by : Marijaose GOODE MA    If you have any questions regarding your visit please contact your care team:     Team Gold                Clinic Hours Telephone Number     Dr. Catina Brewer  Sarah Hernandezberyl, CNP 7am-7pm  Monday - Thursday   7am-5pm  Fridays  [phone]   (Appointment scheduling available 24/7)     RN Line  [phone] option 2     [facility] [place] [facility] 11am-9pm Monday-Friday Saturday-Sunday- 9am-5pm     [place] -   5pm-9pm Monday-Friday Saturday-Sunday- 9am-5pm    [phone] - [place]    [phone] - [place]       For a Price Quote for your services, please call our Consumer Price Line at [phone].     What options do I have for visits at the clinic other than the traditional office visit?     To expand how we care for you, many of our providers are utilizing electronic visits (e-visits) and telephone visits, when medically appropriate, for interactions with their patients rather than a visit in the clinic. We also offer nurse visits for many medical concerns. Just like any other service, we will bill your insurance company for this type of visit based on time spent on the phone with your provider. Not all insurance companies cover these visits. Please check with your medical insurance if this type of visit is covered. You will be responsible for any charges that are not paid by your insurance.   E-visits via [employer]: generally incur a $35.00 fee.     Telephone visits:  Time spent on the phone: *charged based on time that is spent on the phone in increments of 10 minutes. Estimated cost:   5-10 mins $30.00   11-20 mins. $59.00   21-30 mins. $85.00       Use [employer] (secure email communication and access to your chart) to send your primary care provider a message or make an appointment. Ask someone on your Team  how to sign up for 1bib.     As always, Thank you for trusting us with your health care needs!      [facility]:    Scheduling Appointments  [place]  Call: [phone]    [facility]  Call: [phone]    [facility]  Call: [phone]    For Gastroenterology referrals   [facility]   Clinics and Surgery [place], 4th Floor   [address] [place] [address]   Appointments: [phone]    WHERE TO GO FOR CARE?  Clinic    Make an appointment if you:       Are sick (cold, cough, flu, sore throat, earache or in pain).       Have a small injury (sprain, small cut, burn or broken bone).       Need a physical exam, Pap smear, vaccine or prescription refill.       Have questions about your health or medicines.    To reach us:      Call [phone] ([phone]). Open 24 hours every day. (For counseling services, call [phone].)    Log into 1bib at TC Website [email or web address]. (Visit [email or web address] to create an account.) Hospital emergency room    An emergency is a serious or life- threatening problem that must be treated right away.    Call [phone] or get to the hospital if you have:      Very bad or sudden:            - Chest pain or pressure         - Bleeding         - Head or belly pain         - Dizziness or trouble seeing, walking or                          Speaking      Problems breathing      Blood in your vomit or you are coughing up blood      A major injury (knocked out, loss of a finger or limb, rape, broken bone protruding from skin)    A mental health crisis. (Or call the Mental Health Crisis line at 1-874.463.3534 or Suicide Prevention Hotline at 1-830.794.8429.)    Open 24 hours every day. You don't need an appointment.     Urgent care    Visit urgent care for sickness or small injuries when the clinic is closed. You don't need an appointment. To check hours or find an urgent  care near you, visit [email or web address]. Online care    Get online care from [facility] for more than 70 common problems, like colds, allergies and infections. Open 24 hours every day at:   [email or web address]   Need help deciding?    For advice about where to be seen, you may call your clinic and ask to speak with a nurse. We're here for you 24 hours every day.         If you are deaf or hard of hearing, please let us know. We provide many free services including sign language interpreters, oral interpreters, TTYs, telephone amplifiers, note takers and written materials.

## 2018-07-20 NOTE — MR AVS SNAPSHOT
After Visit Summary   7/20/2018    Abbey Omer    MRN: [id number]           Patient Information     Date Of Birth          1950        Visit Information        Provider Department      7/20/2018 11:00 AM Sarah Vallejo NP [facility]        Today's Diagnoses     Acute bronchitis, unspecified organism    -  1      Care Instructions    Get the SPACER and use with the inhaler for the cough    [facility]   Discharged by : Mariajose GOODE MA    If you have any questions regarding your visit please contact your care team:     Team Gold                Clinic Hours Telephone Number     Dr. Catina Vallejo, CNP 7am-7pm  Monday - Thursday   7am-5pm  Fridays  [phone]   (Appointment scheduling available 24/7)     RN Line  [phone] option 2     Urgent Care - [place] and [place] - 11am-9pm Monday-Friday Saturday-Sunday- 9am-5pm     [place] -   5pm-9pm Monday-Friday Saturday-Sunday- 9am-5pm    [phone] - [place]    [phone] - [place]       For a Price Quote for your services, please call our Consumer Price Line at [phone].     What options do I have for visits at the clinic other than the traditional office visit?     To expand how we care for you, many of our providers are utilizing electronic visits (e-visits) and telephone visits, when medically appropriate, for interactions with their patients rather than a visit in the clinic. We also offer nurse visits for many medical concerns. Just like any other service, we will bill your insurance company for this type of visit based on time spent on the phone with your provider. Not all insurance companies cover these visits. Please check with your medical insurance if this type of visit is covered. You will be responsible for any charges that are not paid by your insurance.   E-visits via [employer].At: generally incur  a $35.00 fee.     Telephone visits:  Time spent on the phone: *charged based on time that is spent on the phone in increments of 10 minutes. Estimated cost:   5-10 mins $30.00   11-20 mins. $59.00   21-30 mins. $85.00       Use H[employer] Junglehart (secure email communication and access to your chart) to send your primary care provider a message or make an appointment. Ask someone on your Team how to sign up for Medication Reviewt.     As always, Thank you for trusting us with your health care needs!      [facility]:    Scheduling Appointments  [facility]  Call: [phone]    [facility] [place] [facility]  Call: [phone]    [facility]  Call: [phone]    For Gastroenterology referrals   [facility]   Clinics and Surgery Center, 4th Floor   [address] [place] [address]   Appointments: [phone]    WHERE TO GO FOR CARE?  Clinic    Make an appointment if you:       Are sick (cold, cough, flu, sore throat, earache or in pain).       Have a small injury (sprain, small cut, burn or broken bone).       Need a physical exam, Pap smear, vaccine or prescription refill.       Have questions about your health or medicines.    To reach us:      Call [phone] ([phone]). Open 24 hours every day. (For counseling services, call [phone].)    Log into ShoeDazzle at [email or web address]. (Visit [email or web address] to create an account.) Hospital emergency room    An emergency is a serious or life- threatening problem that must be treated right away.    Call [phone] or get to the hospital if you have:      Very bad or sudden:            - Chest pain or pressure         - Bleeding         - Head or belly pain         - Dizziness or trouble seeing, walking or                          Speaking      Problems breathing      Blood in your vomit or you are coughing up blood      A major injury (knocked out, loss of a finger or limb, rape,  broken bone protruding from skin)    A mental health crisis. (Or call the Mental Health Crisis line at 1-624.674.3869 or Suicide Prevention Hotline at 1-833.389.4302.)    Open 24 hours every day. You don't need an appointment.     Urgent care    Visit urgent care for sickness or small injuries when the clinic is closed. You don't need an appointment. To check hours or find an urgent care near you, visit [email or web address]. Online care    Get [facility] from [facility] for more than 70 common problems, like colds, allergies and infections. Open 24 hours every day at:   [email or web address]   Need help deciding?    For advice about where to be seen, you may call your clinic and ask to speak with a nurse. We're here for you 24 hours every day.         If you are deaf or hard of hearing, please let us know. We provide many free services including sign language interpreters, oral interpreters, TTYs, telephone amplifiers, note takers and written materials.                   Follow-ups after your visit        Your next 10 appointments already scheduled     Jan 22, 2019  9:00 AM CST   Return Visit with Omar West MD, HO[facility]ST[facility] PROC ROOM   [facility] ([facility])    [address] [place] [address] [phone]              Who to contact     If you have questions or need follow up information about today's clinic visit or your schedule please contact [facility] directly at [phone].  Normal or non-critical lab and imaging results will be communicated to you by MyChart, letter or phone within 4 business days after the clinic has received the results. If you do not hear from us within 7 days, please contact the clinic through MyChart or phone. If you have a critical or abnormal lab result, we will notify you by phone as soon as possible.  Submit refill requests through Superbac or call your pharmacy and they will forward the refill request to us. Please allow 3  business days for your refill to be completed.          Additional Information About Your Visit        MyChart Information     RevoDealshart gives you secure access to your electronic health record. If you see a primary care provider, you can also send messages to your care team and make appointments. If you have questions, please call your primary care clinic.  If you do not have a primary care provider, please call [phone] and they will assist you.        Care EveryWhere ID     This is your Care EveryWhere ID. This could be used by other organizations to access your [place] medical records  [id number]        Your Vitals Were     Pulse Temperature Height Pulse Oximetry BMI (Body Mass Index)       74 98.1  F (36.7  C) (Oral) 5' (1.524 m) 95% 32.26 kg/m2        Blood Pressure from Last 3 Encounters:   07/20/18 136/76   07/18/18 168/74   04/02/18 132/70    Weight from Last 3 Encounters:   07/20/18 165 lb 3.2 oz (74.9 kg)   07/18/18 164 lb 12.8 oz (74.8 kg)   04/02/18 175 lb (79.4 kg)              Today, you had the following     No orders found for display         Today's Medication Changes          These changes are accurate as of 7/20/18 11:24 AM.  If you have any questions, ask your nurse or doctor.               These medicines have changed or have updated prescriptions.        Dose/Directions    colchicine 0.6 MG tablet   Commonly known as:  COLCRYS   This may have changed:    - when to take this  - reasons to take this   Used for:  Chronic gout involving toe without tophus, unspecified cause, unspecified laterality        Dose:  0.6 mg   Take 1 tablet (0.6 mg) by mouth daily   Quantity:  90 tablet   Refills:  3                Primary Care Provider Office Phone # Fax #    Sarah JUNIE Vallejo [phone]       [address] [place] [id number]        Equal Access to Services     BHUPINDER SUTHERLAND AH: Kathleen Carrrea, lianna wallis, maximiliano bird  christianodeshawn sandrasarah laMarybethaan ah. So [facility] [phone].    ATENCIÓN: Si randi sol, tiene a smith disposición servicios gratuitos de asistencia lingüística. Kishor ramos [phone].    We comply with applicable federal civil rights laws and [place] laws. We do not discriminate on the basis of race, color, national origin, age, disability, sex, sexual orientation, or gender identity.            Thank you!     Thank you for choosing [facility]  for your care. Our goal is always to provide you with excellent care. Hearing back from our patients is one way we can continue to improve our services. Please take a few minutes to complete the written survey that you may receive in the mail after your visit with us. Thank you!             Your Updated Medication List - Protect others around you: Learn how to safely use, store and throw away your medicines at www.disposemymeds.org.          This list is accurate as of 7/20/18 11:24 AM.  Always use your most recent med list.                   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 tophus, unspecified cause, unspecified laterality       Cranberry 500 MG Caps      Take 1 capsule by mouth daily        EXCEDRIN TENSION HEADACHE 500-65 MG Tabs   Generic drug:  acetaminophen-caffeine      1 tablet twice daily as needed        fish Oil 1200 MG  capsule      3 capsule daily        FLAXSEED OIL PO      Take 1 Tablespoonful by mouth daily        fluticasone 50 MCG/ACT spray    FLONASE    48 g    Spray 2 sprays into both nostrils daily    Chronic rhinitis       KRILL OIL PO      Take 1 capsule by mouth daily        levalbuterol 45 MCG/ACT Inhaler    XOPENEX HFA    15 g    INHALE 2 PUFFS INTO THE LUNGS EVERY 6 HOURS AS NEEDED FOR SHORTNESS OF BREATH OR DIFFICULT BREATHING OR WHEEZING    Mild intermittent asthma without complication       levothyroxine 88 MCG tablet    SYNTHROID/LEVOTHROID    90 tablet    Take 1 tablet (88 mcg) by mouth daily    Hypothyroidism due to acquired atrophy of thyroid       magnesium 500 MG Tabs      Take 1 tablet by mouth daily        MULTIPLE vitamin  S/WOMENS Tabs      Take 2 tablets by mouth daily        order for DME      Equipment ordered: Respironics Auto PAP Mask type: Nasal Settings: 9-15 cm H2O        polyethylene glycol powder    MIRALAX    510 g    Take 17 g (1 capful) by mouth 2 times daily as needed for constipation    Slow transit constipation       ranitidine 300 MG tablet    ZANTAC    90 tablet    Take 1 tablet (300 mg) by mouth At Bedtime    Gastroesophageal reflux disease without esophagitis       spironolactone 50 MG tablet    ALDACTONE    180 tablet    Take 2 tablets (100 mg) by mouth daily    Other acne       STATIN NOT PRESCRIBED (INTENTIONAL)      by Other route continuous prn Reported on 5/15/2017    Hyperlipidemia LDL goal < 100       * traZODone 100 MG tablet    DESYREL    90 tablet    Take 0.5 tablets (50 mg) by mouth nightly as needed for sleep    Insomnia, unspecified type       * traZODone 50 MG tablet    DESYREL    180 tablet    Take 1-2 tablets ( mg) by mouth nightly as needed for sleep    Insomnia, unspecified type       venlafaxine 150 MG 24 hr capsule    EFFEXOR-XR    180 capsule    2 Capsules daily    Major depressive disorder, recurrent episode, mild degree (H)       VITAMIN B COMPLEX PO            zinc 50 MG Tabs      Take 1 tablet by mouth daily        * Notice:  This list has 2 medication(s) that are the same as other medications prescribed for you. Read the directions carefully, and ask your doctor or other care provider to review them with you.

## 2018-07-21 ASSESSMENT — PATIENT HEALTH QUESTIONNAIRE - PHQ9: SUM OF ALL RESPONSES TO PHQ QUESTIONS 1-9: 0

## 2018-07-21 ASSESSMENT — ANXIETY QUESTIONNAIRES: GAD7 TOTAL SCORE: 0

## 2018-08-13 ENCOUNTER — HOSPITAL ENCOUNTER (OUTPATIENT)
Facility: CLINIC | Age: 68
End: 2018-08-13
Attending: STUDENT IN AN ORGANIZED HEALTH CARE EDUCATION/TRAINING PROGRAM | Admitting: STUDENT IN AN ORGANIZED HEALTH CARE EDUCATION/TRAINING PROGRAM
Payer: MEDICARE

## 2018-08-13 ENCOUNTER — TELEPHONE (OUTPATIENT)
Dept: OPHTHALMOLOGY | Facility: CLINIC | Age: 68
End: 2018-08-13

## 2018-08-13 NOTE — TELEPHONE ENCOUNTER
Type of surgery: Cataract Right Eye CPT code 50208  Combined form of age-related cataract, right eye [H25.811]    Location of surgery: [facility]  Date and time of surgery: 10/08/2018 @ 8:15am  Surgeon: Dr. Pascal  Pre-Op Appt Date: 10/02/2018  Post-Op Appt Date: 10/09/2018   Packet sent out: Yes  Pre-cert/Authorization completed:  No prior auth required.   Date: 08/13/2018    Insurance valid

## 2018-08-13 NOTE — TELEPHONE ENCOUNTER
Type of surgery: Cataract Left Eye CPT code 27878  Combined form of age-related cataract, left eye [H25.812]   Location of surgery: [facility]  Date and time of surgery: 09/24/2018 @ 10:30am  Surgeon: Dr. Pascal  Pre-Op Appt Date: 09/13/2018  Post-Op Appt Date: 09/25/2018   Packet sent out: Yes  Pre-cert/Authorization completed:  No prior auth required.   Date: 08/13/2018  Insurance valid

## 2018-08-23 DIAGNOSIS — E03.4 HYPOTHYROIDISM DUE TO ACQUIRED ATROPHY OF THYROID: ICD-10-CM

## 2018-08-23 RX ORDER — LEVOTHYROXINE SODIUM 88 UG/1
TABLET ORAL
Qty: 90 TABLET | Refills: 0 | Status: SHIPPED | OUTPATIENT
Start: 2018-08-23 | End: 2018-10-30

## 2018-08-23 NOTE — TELEPHONE ENCOUNTER
Seen 7/20. Prescription approved per [facility] Refill Protocol.  Oma Richardson RN

## 2018-08-23 NOTE — TELEPHONE ENCOUNTER
"Requested Prescriptions   Pending Prescriptions Disp Refills     levothyroxine (SYNTHROID/LEVOTHROID) 88 MCG tablet [Pharmacy Med Name: LEVOTHYROXINE 0.088MG (88MCG) TAB]  Last Written Prescription Date:  1/24/2018  Last Fill Quantity: 90 tablet,  # refills: 1   Last office visit: 1/24/2018 with prescribing provider:  SARINA Killian   Future Office Visit:   Next 5 appointments (look out 90 days)     Sep 12, 2018  1:45 PM CDT   Return Visit with Ryne Pascal MD   [facility] ([facility])    [address] [place] [address]   [phone]            Sep 13, 2018 11:00 AM CDT   Pre-Op physical with Sarah Vallejo NP   [facility] ([facility])    [address] [place] [id number] [phone]            Sep 25, 2018  9:15 AM CDT   Return Visit with Ryne Pascal MD   [facility] ([facility])    [address] [place] [address]   [phone]            Oct 02, 2018  9:15 AM CDT   Return Visit with Ryne Pascal MD   [facility] ([facility])    [address] [place] [address]   [phone]            Oct 09, 2018  9:15 AM CDT   Return Visit with Ryne Pascal MD   [facility] ([address] [address]   [phone]                  90 tablet 0     Sig: TAKE 1 TABLET(88 MCG) BY MOUTH DAILY    Thyroid Protocol Passed    8/23/2018 10:15 AM       Passed - Patient is 12 years or older       Passed - Recent (12 mo) or future (30 days) visit within the authorizing provider's specialty    Patient had office visit in the last 12 months or has a visit in the next 30 days with authorizing provider or within the authorizing provider's specialty.  See \"Patient Info\" tab in inbasket, or \"Choose Columns\" in Meds & Orders section of the refill encounter.           Passed - Normal TSH " on file in past 12 months    Recent Labs   Lab Test  11/09/17   0851   TSH  1.18             Passed - No active pregnancy on record    If patient is pregnant or has had a positive pregnancy test, please check TSH.         Passed - No positive pregnancy test in past 12 months    If patient is pregnant or has had a positive pregnancy test, please check TSH.

## 2018-09-12 ENCOUNTER — OFFICE VISIT (OUTPATIENT)
Dept: OPHTHALMOLOGY | Facility: CLINIC | Age: 68
End: 2018-09-12
Payer: COMMERCIAL

## 2018-09-12 DIAGNOSIS — H25.813 COMBINED FORM OF AGE-RELATED CATARACT, BOTH EYES: Primary | ICD-10-CM

## 2018-09-12 PROBLEM — H25.812 COMBINED FORMS OF AGE-RELATED CATARACT OF LEFT EYE: Status: ACTIVE | Noted: 2018-09-12

## 2018-09-12 PROCEDURE — 92136 OPHTHALMIC BIOMETRY: CPT | Mod: TC | Performed by: STUDENT IN AN ORGANIZED HEALTH CARE EDUCATION/TRAINING PROGRAM

## 2018-09-12 PROCEDURE — 92012 INTRM OPH EXAM EST PATIENT: CPT | Performed by: STUDENT IN AN ORGANIZED HEALTH CARE EDUCATION/TRAINING PROGRAM

## 2018-09-12 PROCEDURE — 92136 OPHTHALMIC BIOMETRY: CPT | Mod: 26 | Performed by: STUDENT IN AN ORGANIZED HEALTH CARE EDUCATION/TRAINING PROGRAM

## 2018-09-12 RX ORDER — BROMFENAC SODIUM 100 %
1 POWDER (GRAM) MISCELLANEOUS 4 TIMES DAILY
Qty: 1 BOTTLE | Refills: 1 | Status: SHIPPED | OUTPATIENT
Start: 2018-09-23 | End: 2018-11-13

## 2018-09-12 RX ORDER — DEXAMETHASONE ACETATE, MICRO 100 %
1 POWDER (GRAM) MISCELLANEOUS 4 TIMES DAILY
Qty: 1 BOTTLE | Refills: 1 | Status: SHIPPED | OUTPATIENT
Start: 2018-09-23 | End: 2018-11-25

## 2018-09-12 RX ORDER — MONOCHLOROACETIC ACID
1 CRYSTALS MISCELLANEOUS 4 TIMES DAILY
Qty: 1 BOTTLE | Refills: 1 | Status: SHIPPED | OUTPATIENT
Start: 2018-09-23 | End: 2018-11-13

## 2018-09-12 ASSESSMENT — SLIT LAMP EXAM - LIDS
COMMENTS: NORMAL
COMMENTS: NORMAL

## 2018-09-12 ASSESSMENT — VISUAL ACUITY
OS_CC: 20/50-
METHOD: SNELLEN - LINEAR
OD_CC: 20/30-
CORRECTION_TYPE: GLASSES

## 2018-09-12 ASSESSMENT — EXTERNAL EXAM - LEFT EYE: OS_EXAM: NORMAL

## 2018-09-12 ASSESSMENT — EXTERNAL EXAM - RIGHT EYE: OD_EXAM: NORMAL

## 2018-09-12 NOTE — MR AVS SNAPSHOT
After Visit Summary   9/12/2018    Abbey Omer    MRN: [id number]           Patient Information     Date Of Birth          1950        Visit Information        Provider Department      9/12/2018 1:45 PM Ryne Pascal MD [facility]        Today's Diagnoses     Combined form of age-related cataract, both eyes    -  1      Care Instructions    PRE-OP CATARACT INSTRUCTIONS    *Use the following drop in the left eye 4 times on the day before surgery and once the morning of surgery:                                    CatarActive3    **We will start the prednisolone (white or pink top) drops AFTER surgery.    *Please bring all your eyedrops to the surgery center.    *If taking more than one drop, wait five minutes between drops.    *No solid food or drink after midnight.     Ryne Pascal MD  [phone]            Follow-ups after your visit        Your next 10 appointments already scheduled     Sep 13, 2018 11:00 AM CDT   Pre-Op physical with Sarah Vallejo NP   [facility] ([facility])    [address] [place] [id number] [phone]            Sep 24, 2018   Procedure with Ryne Pascal MD   [facility] (--)    [address]  [facility] [address]   [phone]            Sep 25, 2018  9:15 AM CDT   Return Visit with Ryne Pascal MD   [facility] ([facility]    [address] [address]   [phone]            Oct 02, 2018  9:15 AM CDT   [facility] Visit with Ryne Pascal MD   [facility] ([facility])    [address] [facility] [address]   [phone]            Oct 08, 2018   [facility]    [address]  [facility] [address]   [phone]            Oct 09, 2018  9:15 AM CDT   Return Visit  with Ryne Pascal MD   [facility] ([facility])    [address]  [place] [address]   [phone]            Oct 16, 2018  9:15 AM CDT   Return Visit with Ryne Pascal MD   [facility] ([facility])    [address]  [place] [address]   [phone]            Nov 14, 2018  1:45 PM CST   Return Visit with Ryne Pascal MD   [facility] ([facility])    [address]  [place] [address]   [phone]            Jan 22, 2019  9:00 AM CST   Return Visit with Omar West MD, [facility] CYSTO PROC ROOM   [facility] ([facility])    [address]  [place] [address]   [phone]              Who to contact     If you have questions or need follow up information about today's clinic visit or your schedule please contact [facility] directly at [phone].  Normal or non-critical lab and imaging results will be communicated to you by MyChart, letter or phone within 4 business days after the clinic has received the results. If you do not hear from us within 7 days, please contact the clinic through [employer] or phone. If you have a critical or abnormal lab result, we will notify you by phone as soon as possible.  Submit refill requests through [employer] or call your pharmacy and they will forward the refill request to us. Please allow 3 business days for your refill to be completed.          Additional Information About Your Visit        [employer] Information     [email or web address] gives you secure access to your electronic health record. If you see a primary care provider, you can also send messages to your care team and make appointments. If you have questions, please call your primary care clinic.  If you do not have a primary care provider, please call [phone] and they will assist you.        Care EveryWhere ID     This is your Care EveryWhere  ID. This could be used by other organizations to access your [facility] medical records  [id number]         Blood Pressure from Last 3 Encounters:   07/20/18 136/76   07/18/18 168/74   04/02/18 132/70    Weight from Last 3 Encounters:   07/20/18 74.9 kg (165 lb 3.2 oz)   07/18/18 74.8 kg (164 lb 12.8 oz)   04/02/18 79.4 kg (175 lb)              We Performed the Following     IOL MASTER (1st eye)          Today's Medication Changes          These changes are accurate as of 9/12/18  2:39 PM.  If you have any questions, ask your nurse or doctor.               Start taking these medicines.        Dose/Directions    Bromfenac Sodium Powd   Used for:  Combined form of age-related cataract, both eyes   Started by:  Ryne Pascal MD        Dose:  1 Bottle   Start taking on:  9/23/2018   1 Bottle 4 times daily 1 drop four times a day in the operative eye starting 1 day before surgery. Use until the bottle runs out.   Quantity:  1 Bottle   Refills:  1       Dexamethasone Acetate Powd   Used for:  Combined form of age-related cataract, both eyes   Started by:  Ryne Pascal MD        Dose:  1 Bottle   Start taking on:  9/23/2018   1 Bottle 4 times daily 1 drop four times a day in the operative eye starting 1 day before surgery. Use until the bottle runs out.   Quantity:  1 Bottle   Refills:  1       Moxifloxacin HCl Powd   Used for:  Combined form of age-related cataract, both eyes   Started by:  Ryne Pascal MD        Dose:  1 Bottle   Start taking on:  9/23/2018   1 Bottle 4 times daily 1 drop four times a day in the operative eye starting 1 day before surgery. Use until the bottle runs out.   Quantity:  1 Bottle   Refills:  1         These medicines have changed or have updated prescriptions.        Dose/Directions    colchicine 0.6 MG tablet   Commonly known as:  COLCRYS   This may have changed:    - when to take this  - reasons to take this   Used for:  Chronic gout involving toe without tophus,  unspecified cause, unspecified laterality        Dose:  0.6 mg   Take 1 tablet (0.6 mg) by mouth daily   Quantity:  90 tablet   Refills:  3            Where to get your medicines      These medications were sent to  [facility] [address]  [address] [facility] [address]     Phone:  [phone]     Bromfenac Sodium Powd    Dexamethasone Acetate Powd    Moxifloxacin HCl Powd                Primary Care Provider Office Phone # Fax #    Sarah VallejoVENUS [phone]       [facility] [facility] [address]        Equal Access to Services     [facility] : Hadii madalyn ignacio hadasho Soomaali, waaxda luqadaha, qaybta kaalmada adeegyada, maximiliano mckinley . So [facility] [phone].    ATENCIÓN: Si habla español, tiene a smith disposición servicios gratuitos de asistencia lingüística. Llame al [phone].    We comply with applicable federal civil rights laws and [place] laws. We do not discriminate on the basis of race, color, national origin, age, disability, sex, sexual orientation, or gender identity.            Thank you!     Thank you for choosing [facility]\""  for your care. Our goal is always to provide you with excellent care. Hearing back from our patients is one way we can continue to improve our services. Please take a few minutes to complete the written survey that you may receive in the mail after your visit with us. Thank you!             Your Updated Medication List - Protect others around you: Learn how to safely use, store and throw away your medicines at www.disposemymeds.org.          This list is accurate as of 9/12/18  2:39 PM.  Always use your most recent med list.                   Brand Name Dispense Instructions for use Diagnosis    allopurinol 100 MG tablet    ZYLOPRIM    90 tablet    Take 1 tablet (100 mg) by mouth daily    Chronic gout involving toe without tophus, unspecified cause, unspecified  laterality       azithromycin 250 MG tablet    ZITHROMAX    6 tablet    Two tablets first day, then one tablet daily for four days.    Acute bronchitis with coexisting condition requiring prophylactic treatment       Bromfenac Sodium Powd   Start taking on:  9/23/2018     1 Bottle    1 Bottle 4 times daily 1 drop four times a day in the operative eye starting 1 day before surgery. Use until the bottle runs out.    Combined form of age-related cataract, both eyes       calcium + D 600-200 MG-UNIT Tabs   Generic drug:  calcium carbonate-vitamin D      1 tablet twice a day        colchicine 0.6 MG tablet    COLCRYS    90 tablet    Take 1 tablet (0.6 mg) by mouth daily    Chronic gout involving toe without tophus, unspecified cause, unspecified laterality       Cranberry 500 MG Caps      Take 1 capsule by mouth daily        Dexamethasone Acetate Powd   Start taking on:  9/23/2018     1 Bottle    1 Bottle 4 times daily 1 drop four times a day in the operative eye starting 1 day before surgery. Use until the bottle runs out.    Combined form of age-related cataract, both eyes       EXCEDRIN TENSION HEADACHE 500-65 MG Tabs   Generic drug:  acetaminophen-caffeine      1 tablet twice daily as needed        fish Oil 1200 MG capsule      3 capsule daily        FLAXSEED OIL PO      Take 1 Tablespoonful by mouth daily        fluticasone 50 MCG/ACT spray    FLONASE    48 g    Spray 2 sprays into both nostrils daily    Chronic rhinitis       KRILL OIL PO      Take 1 capsule by mouth daily        levalbuterol 45 MCG/ACT Inhaler    XOPENEX HFA    15 g    INHALE 2 PUFFS INTO THE LUNGS EVERY 6 HOURS AS NEEDED FOR SHORTNESS OF BREATH OR DIFFICULT BREATHING OR WHEEZING    Mild intermittent asthma without complication       levothyroxine 88 MCG tablet    SYNTHROID/LEVOTHROID    90 tablet    TAKE 1 TABLET(88 MCG) BY MOUTH DAILY    Hypothyroidism due to acquired atrophy of thyroid       magnesium 500 MG Tabs      Take 1 tablet by mouth  daily        Moxifloxacin HCl Powd   Start taking on:  9/23/2018     1 Bottle    1 Bottle 4 times daily 1 drop four times a day in the operative eye starting 1 day before surgery. Use until the bottle runs out.    Combined form of age-related cataract, both eyes       MULTIPLE vitamin  S/WOMENS Tabs      Take 2 tablets by mouth daily        order for DME      Equipment ordered: Respironics Auto PAP Mask type: Nasal Settings: 9-15 cm H2O        polyethylene glycol powder    MIRALAX    510 g    Take 17 g (1 capful) by mouth 2 times daily as needed for constipation    Slow transit constipation       ranitidine 300 MG tablet    ZANTAC    90 tablet    Take 1 tablet (300 mg) by mouth At Bedtime    Gastroesophageal reflux disease without esophagitis       spironolactone 50 MG tablet    ALDACTONE    180 tablet    Take 2 tablets (100 mg) by mouth daily    Other acne       STATIN NOT PRESCRIBED (INTENTIONAL)      by Other route continuous prn Reported on 5/15/2017    Hyperlipidemia LDL goal < 100       * traZODone 100 MG tablet    DESYREL    90 tablet    Take 0.5 tablets (50 mg) by mouth nightly as needed for sleep    Insomnia, unspecified type       * traZODone 50 MG tablet    DESYREL    180 tablet    Take 1-2 tablets ( mg) by mouth nightly as needed for sleep    Insomnia, unspecified type       venlafaxine 150 MG 24 hr capsule    EFFEXOR-XR    180 capsule    2 Capsules daily    Major depressive disorder, recurrent episode, mild degree (H)       VITAMIN B COMPLEX PO           zinc 50 MG Tabs      Take 1 tablet by mouth daily        * Notice:  This list has 2 medication(s) that are the same as other medications prescribed for you. Read the directions carefully, and ask your doctor or other care provider to review them with you.

## 2018-09-12 NOTE — PROGRESS NOTES
Current Eye Medications:  None     Subjective:  Preop Kelman Phacoemulsification Intraocular lens left eye  Patient wants to proceed with surgery on the left eye. Vision is a little worse in the left eye than previously.     Objective:  See Ophthalmology Exam.       Assessment:  Abbey Omer is a 68 year old female who presents with:   Encounter Diagnosis   Name Primary?     Combined form of age-related cataract, both eyes Preop left eye. Target mild myopia, discussed astigmatism including lens options.        Plan:  PRE-OP CATARACT INSTRUCTIONS    *Use the following drop in the left eye 4 times on the day before surgery and once the morning of surgery:                                  CatarActive3    *Please bring all your eyedrops to the surgery center.    *No solid food or drink after midnight. Plan to take all morning meds after surgery on that day.    Ryne Pascal MD  [phone]

## 2018-09-12 NOTE — LETTER
9/12/2018         RE: Abbey Omer  [address] [facility] [address]        Dear Colleague,    Thank you for referring your patient, Abbey Omer, to the [facility]. Please see a copy of my visit note below.     Current Eye Medications:  None     Subjective:  Preop Kelman Phacoemulsification Intraocular lens left eye  Patient wants to proceed with surgery on the left eye     Objective:  See Ophthalmology Exam.       Assessment:      Plan:       Again, thank you for allowing me to participate in the care of your patient.        Sincerely,        Ryne Pascal MD

## 2018-09-12 NOTE — PATIENT INSTRUCTIONS
PRE-OP CATARACT INSTRUCTIONS    *Use the following drop in the left eye 4 times on the day before surgery and once the morning of surgery:                                    CatarActive3    **We will start the prednisolone (white or pink top) drops AFTER surgery.    *Please bring all your eyedrops to the surgery center.    *If taking more than one drop, wait five minutes between drops.    *No solid food or drink after midnight.     Ryne Pascal MD  [phone]

## 2018-09-13 ENCOUNTER — OFFICE VISIT (OUTPATIENT)
Dept: FAMILY MEDICINE | Facility: CLINIC | Age: 68
End: 2018-09-13
Payer: COMMERCIAL

## 2018-09-13 VITALS
SYSTOLIC BLOOD PRESSURE: 146 MMHG | BODY MASS INDEX: 33.02 KG/M2 | WEIGHT: 168.2 LBS | TEMPERATURE: 97.8 F | HEART RATE: 70 BPM | DIASTOLIC BLOOD PRESSURE: 92 MMHG | HEIGHT: 60 IN

## 2018-09-13 DIAGNOSIS — Z23 NEED FOR PROPHYLACTIC VACCINATION AND INOCULATION AGAINST INFLUENZA: ICD-10-CM

## 2018-09-13 DIAGNOSIS — J31.0 CHRONIC NONALLERGIC RHINITIS: ICD-10-CM

## 2018-09-13 DIAGNOSIS — G47.33 OSA (OBSTRUCTIVE SLEEP APNEA): ICD-10-CM

## 2018-09-13 DIAGNOSIS — Z01.818 PREOP GENERAL PHYSICAL EXAM: Primary | ICD-10-CM

## 2018-09-13 DIAGNOSIS — M1A.9XX0 CHRONIC GOUT INVOLVING TOE WITHOUT TOPHUS, UNSPECIFIED CAUSE, UNSPECIFIED LATERALITY: ICD-10-CM

## 2018-09-13 DIAGNOSIS — I10 HYPERTENSION GOAL BP (BLOOD PRESSURE) < 140/90: ICD-10-CM

## 2018-09-13 DIAGNOSIS — R05.9 COUGH: ICD-10-CM

## 2018-09-13 DIAGNOSIS — N18.30 CKD (CHRONIC KIDNEY DISEASE) STAGE 3, GFR 30-59 ML/MIN (H): ICD-10-CM

## 2018-09-13 DIAGNOSIS — H26.9 CATARACT OF BOTH EYES, UNSPECIFIED CATARACT TYPE: ICD-10-CM

## 2018-09-13 PROCEDURE — 99214 OFFICE O/P EST MOD 30 MIN: CPT | Mod: 25 | Performed by: NURSE PRACTITIONER

## 2018-09-13 PROCEDURE — 90471 IMMUNIZATION ADMIN: CPT | Performed by: NURSE PRACTITIONER

## 2018-09-13 PROCEDURE — 80048 BASIC METABOLIC PNL TOTAL CA: CPT | Performed by: NURSE PRACTITIONER

## 2018-09-13 PROCEDURE — 90662 IIV NO PRSV INCREASED AG IM: CPT | Performed by: NURSE PRACTITIONER

## 2018-09-13 PROCEDURE — 36415 COLL VENOUS BLD VENIPUNCTURE: CPT | Performed by: NURSE PRACTITIONER

## 2018-09-13 RX ORDER — ALLOPURINOL 100 MG/1
100 TABLET ORAL DAILY
Qty: 90 TABLET | Refills: 1 | Status: SHIPPED | OUTPATIENT
Start: 2018-09-13 | End: 2018-12-27

## 2018-09-13 NOTE — MR AVS SNAPSHOT
After Visit Summary   9/13/2018    Abbey Omer    MRN: [id number]           Patient Information     Date Of Birth          1950        Visit Information        Provider Department      9/13/2018 11:00 AM Sarah Vallejo NP [facility]        Today's Diagnoses     Preop general physical exam    -  1    RICKEY (obstructive sleep apnea)        Cough        Chronic nonallergic rhinitis        CKD (chronic kidney disease) stage 3, GFR 30-59 ml/min          Care Instructions      Before Your Surgery      Call your surgeon if there is any change in your health. This includes signs of a cold or flu (such as a sore throat, runny nose, cough, rash or fever).    Do not smoke, drink alcohol or take over the counter medicine (unless your surgeon or primary care doctor tells you to) for the 24 hours before and after surgery.    If you take prescribed drugs: Follow your doctor s orders about which medicines to take and which to stop until after surgery.    Eating and drinking prior to surgery: follow the instructions from your surgeon    Take a shower or bath the night before surgery. Use the soap your surgeon gave you to gently clean your skin. If you do not have soap from your surgeon, use your regular soap. Do not shave or scrub the surgery site.  Wear clean pajamas and have clean sheets on your bed.   [facility]   Discharged by : Svitlana Recinos CMA    If you have any questions regarding your visit please contact your care team:     Team Gold                Clinic Hours Telephone Number     KELSEY Ashley Dr., Dr., CNP 7am-7pm  Monday - Thursday   7am-5pm  Fridays  [phone]   (Appointment scheduling available 24/7)     RN Line  [phone] option 2     Urgent Care - [place] and [facility] - 11am-9pm Monday-Friday Saturday-Sunday- 9am-5pm     [place] -   5pm-9pm  Monday-Friday Saturday-Sunday- 9am-5pm    [phone] - [place] Reed    [phone] - [place]       For a Price Quote for your services, please call our Consumer Price Line at [phone].     What options do I have for visits at the clinic other than the traditional office visit?     To expand how we care for you, many of our providers are utilizing electronic visits (e-visits) and telephone visits, when medically appropriate, for interactions with their patients rather than a visit in the clinic. We also offer nurse visits for many medical concerns. Just like any other service, we will bill your insurance company for this type of visit based on time spent on the phone with your provider. Not all insurance companies cover these visits. Please check with your medical insurance if this type of visit is covered. You will be responsible for any charges that are not paid by your insurance.   E-visits via Tokopedia: generally incur a $35.00 fee.     Telephone visits:  Time spent on the phone: *charged based on time that is spent on the phone in increments of 10 minutes. Estimated cost:   5-10 mins $30.00   11-20 mins. $59.00   21-30 mins. $85.00       Use Kihonhart (secure email communication and access to your chart) to send your primary care provider a message or make an appointment. Ask someone on your Team how to sign up for Tokopedia.     As always, Thank you for trusting us with your health care needs!      [facility]:    Scheduling Appointments  Pamela Guillen [place]  Call: [phone]    [facility]  Call: [phone]    [facility]  Call: [phone]    For Gastroenterology referrals   [facility] Gastroenterology   Clinics and Surgery Center, 4th Floor   [address] [place] [address]   Appointments: [phone]    WHERE TO GO FOR CARE?  Clinic    Make an appointment if you:     Are sick (cold, cough, flu, sore  throat, earache or in pain).     Have a small injury (sprain, small cut, burn or broken bone).     Need a physical exam, Pap smear, vaccine or prescription refill.     Have questions about your health or medicines.    To reach us:    Call [phone] ([phone]). Open 24 hours every day. (For counseling services, call [phone].)  Log into Wheebox at APSX. (Visit [employer] to create an account.) Hospital emergency room    An emergency is a serious or life- threatening problem that must be treated right away.    Call 911 or get to the hospital if you have:    Very bad or sudden:            - Chest pain or pressure         - Bleeding         - Head or belly pain         - Dizziness or trouble seeing, walking or                          Speaking    Problems breathing    Blood in your vomit or you are coughing up blood    A major injury (knocked out, loss of a finger or limb, rape, broken bone protruding from skin)  A mental health crisis. (Or call the Mental Health Crisis line at 1-932.608.7463 or Suicide Prevention Hotline at 1-519.457.5528.)    Open 24 hours every day. You don't need an appointment.     Urgent care    Visit urgent care for sickness or small injuries when the clinic is closed. You don't need an appointment. To check hours or find an urgent care near you, visit [email or web address]. Online care    Get online care from [facility] for more than 70 common problems, like colds, allergies and infections. Open 24 hours every day at:   [email or web address]   Need help deciding?    For advice about where to be seen, you may call your clinic and ask to speak with a nurse. We're here for you 24 hours every day.         If you are deaf or hard of hearing, please let us know. We provide many free services including sign language interpreters, oral interpreters, TTYs, telephone amplifiers, note takers and written materials.                       Follow-ups after your visit        Additional  Services     PULMONARY MEDICINE REFERRAL       Your provider has referred you to: [facility]: [facility] (Adults & Pediatrics) - Vendor [phone]   [email or web address]  N: [facility] - [place] [phone]   http://[employer]/  [place] [phone]   http://[employer]/    Please be aware that coverage of these services is subject to the terms and limitations of your health insurance plan.  Call member services at your health plan with any benefit or coverage questions.      Please bring the following with you to your appointment:    (1) Any X-Rays, CTs or MRIs which have been performed.  Contact the facility where they were done to arrange for  prior to your scheduled appointment.    (2) List of current medications   (3) This referral request   (4) Any documents/labs given to you for this referral                  Your next 10 appointments already scheduled     Sep 24, 2018   Procedure with Ryne Pascal MD   [facility] (--)    [address]  [facility] [address] [phone]            Sep 25, 2018  9:15 AM CDT   [facility]    [address] [address]   [phone]            Oct 02, 2018  9:15 AM CDT   Return Visit with Ryne Pascal MD   [facility] ([facility])    [address] [address]   [phone]            Oct 08, 2018   Procedure with Ryne Pascal MD   [facility] (--)    [address]  [facility] [address]   [phone]            Oct 09, 2018  9:15 AM CDT   [facility]   [facility]    [address] [address] [phone]            Oct 16, 2018  9:15 AM CDT   [facility]  [facility]   [facility]    [address] [facility]  [place] [address]   [phone]            Nov 14, 2018  1:45 PM CST   Return Visit with Ryne Pascal MD   [facility]    [address] [facility]  [place] [address]   [phone]            Jan 22, 2019  9:00 AM CST   Return Visit with Omar West MD, [facility] CYSTO PROC ROOM   [facility]    [address] [facility] [place] [address]   [phone]              Who to contact     If you have questions or need follow up information about today's clinic visit or your schedule please contact [facility] directly at [phone].  Normal or non-critical lab and imaging results will be communicated to you by MyChart, letter or phone within 4 business days after the clinic has received the results. If you do not hear from us within 7 days, please contact the clinic through CensorNethart or phone. If you have a critical or abnormal lab result, we will notify you by phone as soon as possible.  Submit refill requests through Fishidy or call your pharmacy and they will forward the refill request to us. Please allow 3 business days for your refill to be completed.          Additional Information About Your Visit        CensorNethart Information     Fishidy gives you secure access to your electronic health record. If you see a primary care provider, you can also send messages to your care team and make appointments. If you have questions, please call your primary care clinic.  If you do not have a primary care provider, please call [phone] and they will assist you.        Care EveryWhere ID     This is your Care EveryWhere ID. This could be used by other organizations to access your [place] medical records  [id number]        Your Vitals Were     Pulse Temperature Height BMI (Body Mass Index)          70 97.8  F  (36.6  C) (Oral) 5' (1.524 m) 32.85 kg/m2         Blood Pressure from Last 3 Encounters:   09/13/18 (!) 146/92   07/20/18 136/76   07/18/18 168/74    Weight from Last 3 Encounters:   09/13/18 168 lb 3.2 oz (76.3 kg)   07/20/18 165 lb 3.2 oz (74.9 kg)   07/18/18 164 lb 12.8 oz (74.8 kg)              We Performed the Following     BASIC METABOLIC PANEL     PULMONARY MEDICINE REFERRAL          Today's Medication Changes          These changes are accurate as of 9/13/18 11:41 AM.  If you have any questions, ask your nurse or doctor.               These medicines have changed or have updated prescriptions.        Dose/Directions    colchicine 0.6 MG tablet   Commonly known as:  COLCRYS   This may have changed:    - when to take this  - reasons to take this   Used for:  Chronic gout involving toe without tophus, unspecified cause, unspecified laterality        Dose:  0.6 mg   Take 1 tablet (0.6 mg) by mouth daily   Quantity:  90 tablet   Refills:  3                Primary Care Provider Office Phone # Fax #    Sarah JUNIE Vallejo [phone]       [address] [place], [place] [address]        Equal Access to Services     BHUPINDER SUTHERLAND AH: Kathleen brauno Sosegunali, waaxda luqadaha, qaybta kaalmada adeegyada, maximiliano pathak. So [facility] [phone].    ATENCIÓN: Si habla español, tiene a smith disposición servicios gratuitos de asistencia lingüística. Llame al [phone].    We comply with applicable federal civil rights laws and [place] laws. We do not discriminate on the basis of race, color, national origin, age, disability, sex, sexual orientation, or gender identity.            Thank you!     Thank you for choosing [facility]  for your care. Our goal is always to provide you with excellent care. Hearing back from our patients is one way we can continue to improve our services. Please take a few minutes to complete the written survey that you may receive  in the mail after your visit with us. Thank you!             Your Updated Medication List - Protect others around you: Learn how to safely use, store and throw away your medicines at www.disposemymeds.org.          This list is accurate as of 9/13/18 11:41 AM.  Always use your most recent med list.                   Brand Name Dispense Instructions for use Diagnosis    allopurinol 100 MG tablet    ZYLOPRIM    90 tablet    Take 1 tablet (100 mg) by mouth daily    Chronic gout involving toe without tophus, unspecified cause, unspecified laterality       Bromfenac Sodium Powd   Start taking on:  9/23/2018     1 Bottle    1 Bottle 4 times daily 1 drop four times a day in the operative eye starting 1 day before surgery. Use until the bottle runs out.    Combined form of age-related cataract, both eyes       calcium + D 600-200 MG-UNIT Tabs   Generic drug:  calcium carbonate-vitamin D      1 tablet twice a day        colchicine 0.6 MG tablet    COLCRYS    90 tablet    Take 1 tablet (0.6 mg) by mouth daily    Chronic gout involving toe without tophus, unspecified cause, unspecified laterality       Cranberry 500 MG Caps      Take 1 capsule by mouth daily        Dexamethasone Acetate Powd   Start taking on:  9/23/2018     1 Bottle    1 Bottle 4 times daily 1 drop four times a day in the operative eye starting 1 day before surgery. Use until the bottle runs out.    Combined form of age-related cataract, both eyes       EXCEDRIN TENSION HEADACHE 500-65 MG Tabs   Generic drug:  acetaminophen-caffeine      1 tablet twice daily as needed        fish Oil 1200 MG capsule      3 capsule daily        FLAXSEED OIL PO      Take 1 Tablespoonful by mouth daily        fluticasone 50 MCG/ACT spray    FLONASE    48 g    Spray 2 sprays into both nostrils daily    Chronic rhinitis       KRILL OIL PO      Take 1 capsule by mouth daily        levalbuterol 45 MCG/ACT Inhaler    XOPENEX HFA    15 g    INHALE 2 PUFFS INTO THE LUNGS EVERY 6 HOURS  AS NEEDED FOR SHORTNESS OF BREATH OR DIFFICULT BREATHING OR WHEEZING    Mild intermittent asthma without complication       levothyroxine 88 MCG tablet    SYNTHROID/LEVOTHROID    90 tablet    TAKE 1 TABLET(88 MCG) BY MOUTH DAILY    Hypothyroidism due to acquired atrophy of thyroid       magnesium 500 MG Tabs      Take 1 tablet by mouth daily        Moxifloxacin HCl Powd   Start taking on:  9/23/2018     1 Bottle    1 Bottle 4 times daily 1 drop four times a day in the operative eye starting 1 day before surgery. Use until the bottle runs out.    Combined form of age-related cataract, both eyes       MULTIPLE vitamin  S/WOMENS Tabs      Take 2 tablets by mouth daily        order for DME      Equipment ordered: Respironics Auto PAP Mask type: Nasal Settings: 9-15 cm H2O        polyethylene glycol powder    MIRALAX    510 g    Take 17 g (1 capful) by mouth 2 times daily as needed for constipation    Slow transit constipation       ranitidine 300 MG tablet    ZANTAC    90 tablet    Take 1 tablet (300 mg) by mouth At Bedtime    Gastroesophageal reflux disease without esophagitis       STATIN NOT PRESCRIBED (INTENTIONAL)      by Other route continuous prn Reported on 5/15/2017    Hyperlipidemia LDL goal < 100       traZODone 50 MG tablet    DESYREL    180 tablet    Take 1-2 tablets ( mg) by mouth nightly as needed for sleep    Insomnia, unspecified type       venlafaxine 150 MG 24 hr capsule    EFFEXOR-XR    180 capsule    2 Capsules daily    Major depressive disorder, recurrent episode, mild degree (H)       VITAMIN B COMPLEX PO           zinc 50 MG Tabs      Take 1 tablet by mouth daily

## 2018-09-13 NOTE — PROGRESS NOTES

## 2018-09-13 NOTE — PATIENT INSTRUCTIONS
Before Your Surgery      Call your surgeon if there is any change in your health. This includes signs of a cold or flu (such as a sore throat, runny nose, cough, rash or fever).    Do not smoke, drink alcohol or take over the counter medicine (unless your surgeon or primary care doctor tells you to) for the 24 hours before and after surgery.    If you take prescribed drugs: Follow your doctor s orders about which medicines to take and which to stop until after surgery.    Eating and drinking prior to surgery: follow the instructions from your surgeon    Take a shower or bath the night before surgery. Use the soap your surgeon gave you to gently clean your skin. If you do not have soap from your surgeon, use your regular soap. Do not shave or scrub the surgery site.  Wear clean pajamas and have clean sheets on your bed.   [facility]   Discharged by : Svitlana Recinos CMA    If you have any questions regarding your visit please contact your care team:     Team Gold                Clinic Hours Telephone Number     Dr. Catina Vallejo, KELSEY Killian, CNP 7am-7pm  Monday - Thursday   7am-5pm  Fridays  [phone]   (Appointment scheduling available 24/7)     RN Line  [phone] option 2     Urgent Care - [place] and [facility] - 11am-9pm Monday-Friday Saturday-Sunday- 9am-5pm     [place] -   5pm-9pm Monday-Friday Saturday-Sunday- 9am-5pm    [phone] - [place]    [phone] - [place]       For a Price Quote for your services, please call our Consumer Price Line at [phone].     What options do I have for visits at the clinic other than the traditional office visit?     To expand how we care for you, many of our providers are utilizing electronic visits (e-visits) and telephone visits, when medically appropriate, for interactions with their patients rather than a visit in the clinic. We also  offer nurse visits for many medical concerns. Just like any other service, we will bill your insurance company for this type of visit based on time spent on the phone with your provider. Not all insurance companies cover these visits. Please check with your medical insurance if this type of visit is covered. You will be responsible for any charges that are not paid by your insurance.   E-visits via Atlas Cloudhart: generally incur a $35.00 fee.     Telephone visits:  Time spent on the phone: *charged based on time that is spent on the phone in increments of 10 minutes. Estimated cost:   5-10 mins $30.00   11-20 mins. $59.00   21-30 mins. $85.00       Use Vendat (secure email communication and access to your chart) to send your primary care provider a message or make an appointment. Ask someone on your Team how to sign up for Houserie.     As always, Thank you for trusting us with your health care needs!      [facility]:    Scheduling Appointments  Pamela Guillen [facility] [place] [facility]  Call: [phone]    [facility]  Call: [phone]    [facility]  Call: [phone]    For Gastroenterology referrals   [facility] Gastroenterology   Clinics and Surgery Center, 4th Floor   [address] [place] [address]   Appointments: [phone]    WHERE TO GO FOR CARE?  Clinic    Make an appointment if you:     Are sick (cold, cough, flu, sore throat, earache or in pain).     Have a small injury (sprain, small cut, burn or broken bone).     Need a physical exam, Pap smear, vaccine or prescription refill.     Have questions about your health or medicines.    To reach us:    Call [phone] ([phone]). Open 24 hours every day. (For counseling services, call [phone].)  Log into [email or web address] [email or web address]. (Visit [email or web address] to create an account.) Hospital emergency room    An emergency is a serious or life- threatening problem that  must be treated right away.    Call 911 or get to the hospital if you have:    Very bad or sudden:            - Chest pain or pressure         - Bleeding         - Head or belly pain         - Dizziness or trouble seeing, walking or                          Speaking    Problems breathing    Blood in your vomit or you are coughing up blood    A major injury (knocked out, loss of a finger or limb, rape, broken bone protruding from skin)  A mental health crisis. (Or call the Mental Health Crisis line at 1-914.628.8733 or Suicide Prevention Hotline at 1-723.576.1634.)    Open 24 hours every day. You don't need an appointment.     Urgent care    Visit urgent care for sickness or small injuries when the clinic is closed. You don't need an appointment. To check hours or find an urgent care near you, visit [email or web address]. Online care    Get [facility] care from [facility] for more than 70 common problems, like colds, allergies and infections. Open 24 hours every day at:   [email or web address]   Need help deciding?    For advice about where to be seen, you may call your clinic and ask to speak with a nurse. We're here for you 24 hours every day.         If you are deaf or hard of hearing, please let us know. We provide many free services including sign language interpreters, oral interpreters, TTYs, telephone amplifiers, note takers and written materials.

## 2018-09-13 NOTE — PROGRESS NOTES
[address] [id number] [phone]  Dept: [phone]    PRE-OP EVALUATION:  Today's date: 2018    Abbey Omer (: 1950) presents for pre-operative evaluation assessment as requested by Dr. Pascal.  She requires evaluation and anesthesia risk assessment prior to undergoing surgery/procedure for treatment of Cataracts .    Primary Physician: Sarah Vallejo  Type of Anesthesia Anticipated: Combined Mac with topical    Patient has a Health Care Directive or Living Will:  YES     Preop Questions 2018   Who is doing your surgery? Dr Flood   What are you having done? Left eye Cataract   Date of Surgery/Procedure: 18,   10/15/18 for the right eye   Facility or Hospital where procedure/surgery will be performed: [facility]   1.  Do you have a history of Heart attack, stroke, stent, coronary bypass surgery, or other heart surgery? No   2.  Do you ever have any pain or discomfort in your chest? No   3.  Do you have a history of  Heart Failure? No   4.   Are you troubled by shortness of breath when:  walking on a level surface, or up a slight hill, or at night? No   5.  Do you currently have a cold, bronchitis or other respiratory infection? No   6.  Do you have a cough, shortness of breath, or wheezing? No   7.  Do you sometimes get pains in the calves of your legs when you walk? No   8. Do you or anyone in your family have previous history of blood clots? No   9.  Do you or does anyone in your family have a serious bleeding problem such as prolonged bleeding following surgeries or cuts? No   10. Have you ever had problems with anemia or been told to take iron pills? No   11. Have you had any abnormal blood loss such as black, tarry or bloody stools, or abnormal vaginal bleeding? No   12. Have you ever had a blood transfusion? YES - years ago   13. Have you or any of your relatives ever had problems with anesthesia? No    14. Do you have sleep apnea, excessive snoring or daytime drowsiness? YES - has obstructive sleep apnea and uses CPAP   15. Do you have any prosthetic heart valves? No   16. Do you have prosthetic joints? YES- shoulder replacements   17. Is there any chance that you may be pregnant? No         HPI:     HPI related to upcoming procedure: is getting cataract removal      See problem list for active medical problems.  Problems all longstanding and stable, except as noted/documented.  See ROS for pertinent symptoms related to these conditions.                                                                                                                                                          .    MEDICAL HISTORY:     Patient Active Problem List    Diagnosis Date Noted     Major depressive disorder, recurrent episode, mild degree (H) 08/03/2011     Priority: High     prior care with psychiatrist Dr. Ro  Has tried paxil in past.        Hypertension goal BP (blood pressure) < 140/90 06/01/2011     Priority: High     Intolerant to higher than 50 mg of metoprolol due to depressive effects       Hyperlipidemia with target LDL less than 100 05/17/2011     Priority: High     Has tried welchol, statins and developed side effects  Diagnosis updated by automated process. Provider to review and confirm.       CKD (chronic kidney disease) stage 3, GFR 30-59 ml/min 12/20/2010     Priority: High     3/13/18:  Creatinine 1.32, GFR 40, creatinine clearance 50.1 mL/Min    Tray consulted with [facility] 3/14/18.  Renal function has declined since addition of Allopurinol and Colchicine.    Renally cleared drug recommendations:  - Continue to avoid NSAIDs  - Allopurinol 100 mg dosage okay to continue  - Colchicine back off dosage.  3/12/18 patient changed to AS NEEDED usage.  - Ranitidine 300 mg/day change to 150 mg/day.  3/14/18 patient changed to 150 mg dosage.  - Spironolactone 100 mg/day consider decreasing to 50 mg.  -  "Effexor 300 mg/day may reduce total daily dose by 25-50% but this should be done last due to risk of destabilizing mental health       Hypothyroidism 10/12/2009     Priority: High     Acute gout involving toe of left foot, unspecified cause 08/11/2017     Priority: Medium     Discoid atelectasis 04/28/2016     Priority: Medium     4/26/16:  Chest CT - \"FINDINGS:   CHEST: Series 3 image 23, there is a peripheral indeterminate 0.3 cm  right mid lung nodule near the minor fissure which is stable since  6/3/2014. No acute pulmonary disease or additional nodules identified.  There is discoid atelectasis in the right lower lobe. Left lung is  clear. No mediastinal, hilar or axillary adenopathy. Cholecystectomy  clips. Elevated right hemidiaphragm.\"       Renal cyst, left 02/22/2016     Priority: Medium     Seen on abd/pelvic CT at [facility] 2/14/2016.  Recommend recheck CT in one year or do ultrasound.  In review of chart, renal cyst noted on ultrasound in 2011 and CT done at [facility] facility in 2014 showing stablility       Combined form of age-related cataract, both eyes 02/17/2016     Priority: Medium     Glaucoma suspect, bilateral 02/17/2016     Priority: Medium     Target IOP:   Max IOP :   Family history: No  Trauma history: No  OCT: 8/216 Normal  [place]ado visual field (HVF): 8/2016   Right eye - Reliable, Normal   Left eye - Reliable, Normal  Pachymetry: Average-thickish 553/564  C/D: 0.6/0.6  SLT:            Drusen of macula of both eyes 02/17/2016     Priority: Medium     Acne 05/20/2015     Priority: Medium     Major depressive disorder, recurrent episode, moderate (H) 06/20/2014     Priority: Medium     Pulmonary nodules 06/16/2014     Priority: Medium     4/26/16:  Chest CT - \"IMPRESSION: Stable 0.3 cm right midlung nodule since June 2014.\"       Allergic conjunctivitis 04/10/2013     Priority: Medium     Hypertriglyceridemia 03/01/2013     Priority: Medium     DDD (degenerative disc disease), cervical 06/16/2011 " "    Priority: Medium     Elevated LFTs 06/15/2011     Priority: Medium     6/21/2011:  [facility] Ultrasound \"IMPRESSION:  1. Previous cholecystectomy.  2. Otherwise unremarkable ultrasound of the right upper quadrant.\"  2/14/16:  Abdominal CT through [facility] shows unremarkable liver       Dependent edema      Priority: Medium     History of bladder cancer 05/18/2011     Priority: Medium     Dr. David, [facility]       Obesity 05/17/2011     Priority: Medium     Chronic nonallergic rhinitis      Priority: Medium     Dr. Peoples, allergist       RICKEY (obstructive sleep apnea) 08/11/2009     Priority: Medium     Intolerant to CPAP    Split Night:  Sleep Study 2/01/2017 - (173.0 lbs) AHI 29.6, RDI 59.8, Supine AHI 39.4, REM AHI -, Low O2% 78.5%, Time Spent ?88% 14.4, Time Spent ?89% 30.6, CPAP was titrated to a pressure of 9 with an AHI 30. Time spent in REM supine at this pressure was - minutes.       Macular edward 11/25/2011     Priority: Low     Advance Care Planning 06/15/2011     Priority: Low     Advance Care Planning 7/5/2016: Receipt of ACP document:  Received: Health Care Directive which was witnessed or notarized on 4/25/16.  Document previously scanned on 4/28/16. Also received Health Care Directive dated 3/18/16 which was valid and scanned on 4/14/16  Validation forms previously completed and sent to be scanned.  Code Status reflects choices in most recent ACP document.  Confirmed/documented designated decision maker(s).  Added by Kanchan Lazcano RN, Advance Care Planning Liaison.  Advance Care Planning 4/25/2016: ACP Facilitation Session:    Abbey GAYLE Kan presented for ACP Facilitation session at the clinic. She was accompanied by spouse. Honoring Choices information provided and resources reviewed. She currently wishes to complete an ACP document and needs further clarification and/or consideration prior to documenting choices.  She currently has the following questions or concerns about Advance Care " Planning: none.  Confirmed/documented legally designated decision maker(s). Code status reflects choices in most recent ACP document. Follow-up meeting: not needed/applicable.  document sent to be scanned after viewed for validation.Added by Adrianna Silveira   Advance Care Planning 4/13/2016: Receipt of ACP document:  Received: invalid HCD document not signed, witness/notary and missing pages.Document not previously scanned. Validation form completed indicating invalid document. Copy sent to client with information and facilitation resources. Validation form sent to be scanned as notation of invalid document received.  Confirmed/documented RN, System Director ACP-Honoring Choices  designated decision maker(s).  Added by Rebeca Manriquez  Advance Care Planning 6-15-11 Discussed advance care planning with patient; information given to patient to review. Karen Pro CMA        Past Medical History:   Diagnosis Date     Cancer of bladder (H)      Cataract 11/25/2011     Chronic nonallergic rhinitis 8/31/10 RAST     CKD (chronic kidney disease) stage 3, GFR 30-59 ml/min 12/20/2010     DDD (degenerative disc disease), cervical 6/16/2011     Dependent edema      Depression, major      Depressive disorder In 2002 or 2003     History of blood transfusion ?     HTN (hypertension)      Hyperlipidemia      Hypothyroidism 10/12/2009     Migraine     resolved     Nasal septal deviation 8/11/2009     Obesity 5/17/2011     RICKEY (obstructive sleep apnea)      Osteoarthritis of shoulder region      Osteoporosis      Premature menopause      Renal cyst      Past Surgical History:   Procedure Laterality Date     ARTHROPLASTY SHOULDER Right 11/01/2017    Right total shoulder arthroplasty by Dr. Go at [facility]     ARTHROPLASTY SHOULDER Left 03/28/2018    Left total shoulder arthroplasty by Dr. Go at [facility]     CHOLECYSTECTOMY, LAPOROSCOPIC       COLONOSCOPY  ?    Upper & Lower      CYSTOSCOPY      bladder cancer     D & C       ENT SURGERY  first 1967/second ?    2 Rhynoplasties     HYSTERECTOMY, PAP NO LONGER INDICATED      BSO     NOSE SURGERY      septum deviation     Current Outpatient Prescriptions   Medication Sig Dispense Refill     allopurinol (ZYLOPRIM) 100 MG tablet Take 1 tablet (100 mg) by mouth daily 90 tablet 1     B Complex Vitamins (VITAMIN B COMPLEX PO)        [START ON 9/23/2018] Bromfenac Sodium POWD 1 Bottle 4 times daily 1 drop four times a day in the operative eye starting 1 day before surgery. Use until the bottle runs out. 1 Bottle 1     CALCIUM + D 600-200 MG-UNIT OR TABS 1 tablet twice a day       colchicine (COLCRYS) 0.6 MG tablet Take 1 tablet (0.6 mg) by mouth daily (Patient taking differently: Take 0.6 mg by mouth as needed ) 90 tablet 3     Cranberry 500 MG CAPS Take 1 capsule by mouth daily       [START ON 9/23/2018] Dexamethasone Acetate POWD 1 Bottle 4 times daily 1 drop four times a day in the operative eye starting 1 day before surgery. Use until the bottle runs out. 1 Bottle 1     EXCEDRIN TENSION HEADACHE 500-65 MG PO TABS 1 tablet twice daily as needed       FISH OIL 1200 MG OR CAPS 3 capsule daily       Flaxseed, Linseed, (FLAXSEED OIL PO) Take 1 Tablespoonful by mouth daily       fluticasone (FLONASE) 50 MCG/ACT nasal spray Spray 2 sprays into both nostrils daily 48 g 3     KRILL OIL PO Take 1 capsule by mouth daily       levalbuterol (XOPENEX HFA) 45 MCG/ACT Inhaler INHALE 2 PUFFS INTO THE LUNGS EVERY 6 HOURS AS NEEDED FOR SHORTNESS OF BREATH OR DIFFICULT BREATHING OR WHEEZING 15 g 0     levothyroxine (SYNTHROID/LEVOTHROID) 88 MCG tablet TAKE 1 TABLET(88 MCG) BY MOUTH DAILY 90 tablet 0     magnesium 500 MG TABS Take 1 tablet by mouth daily       [START ON 9/23/2018] Moxifloxacin HCl POWD 1 Bottle 4 times daily 1 drop four times a day in the operative eye starting 1 day before surgery. Use until the bottle runs out. 1 Bottle 1     Multiple  Vitamins-Minerals (MULTIPLE VITAMIN  S/WOMENS) TABS Take 2 tablets by mouth daily       order for DME Equipment ordered: Respironics Auto PAP Mask type: Nasal Settings: 9-15 cm H2O       polyethylene glycol (MIRALAX) powder Take 17 g (1 capful) by mouth 2 times daily as needed for constipation 510 g 1     ranitidine (ZANTAC) 300 MG tablet Take 1 tablet (300 mg) by mouth At Bedtime 90 tablet 3     STATIN NOT PRESCRIBED, INTENTIONAL, by Other route continuous prn Reported on 5/15/2017  0     traZODone (DESYREL) 50 MG tablet Take 1-2 tablets ( mg) by mouth nightly as needed for sleep 180 tablet 1     venlafaxine (EFFEXOR-XR) 150 MG 24 hr capsule 2 Capsules daily 180 capsule 3     zinc 50 MG TABS Take 1 tablet by mouth daily              OTC products: None, except as noted above    Allergies   Allergen Reactions     Albuterol Other (See Comments)     Very jittery and couldn't tolerate     Amlodipine Swelling and Other (See Comments)     Swelling- lower legs     Asa [Aspirin] GI Disturbance     Atenolol Fatigue     Atenolol Other (See Comments)     Fatigue     Atorvastatin Other (See Comments)     Swelling legs     Atorvastatin Calcium Swelling     Bupropion Unknown     Codeine Nausea     Colestid [Colestipol Hcl]      Crestor [Hmg-Coa-R Inhibitors] Other (See Comments)     Joint pain  Other reaction(s): Other - Describe In Comment Field  Joint pain  Joint pain     Cymbalta      Visual hallucinations     Ezetimibe Other (See Comments)     Fatigue     Hydralazine Fatigue and Other (See Comments)     Fatigue     Lovastatin Cramps and Other (See Comments)     Cramps     Niacin      Unknown by patient     Paroxetine Other (See Comments)     Fatigue     Paxil [Paroxetine] Fatigue     Potassium GI Disturbance     Sulfa Drugs Fatigue     Sulfasalazine Other (See Comments)     Triamterene Unknown     Zetia [Ezetimibe] Fatigue     Zomig [Zolmitriptan] Other (See Comments)     Cant wake up      Latex Allergy: NO    Social  History   Substance Use Topics     Smoking status: Former Smoker     Packs/day: 0.10     Years: 1.00     Types: Cigarettes     Start date: 8/12/1969     Quit date: 10/12/1969     Smokeless tobacco: Never Used      Comment: Smoked, very little, for 2 mo, 49 years ago.     Alcohol use Yes      Comment: Maybe 1 or 2 drinks a year     History   Drug Use No       REVIEW OF SYSTEMS:   Constitutional, HEENT, cardiovascular, pulmonary, gi and gu systems are negative, except as otherwise noted.    EXAM:   BP (!) 146/92 (BP Location: Right arm, Patient Position: Chair, Cuff Size: Adult Large)  Pulse 70  Temp 97.8  F (36.6  C) (Oral)  Ht 5' (1.524 m)  Wt 168 lb 3.2 oz (76.3 kg)  BMI 32.85 kg/m2    GENERAL APPEARANCE: healthy, alert and over weight     EYES: EOMI, PERRL     HENT: ear canals and TM's normal and nose and mouth without ulcers or lesions     NECK: no adenopathy, no asymmetry, masses, or scars and thyroid normal to palpation     RESP: lungs clear to auscultation - no rales, rhonchi or wheezes     CV: regular rates and rhythm, normal S1 S2, no S3 or S4 and no murmur, click or rub     PSYCH: mentation appears normal. and affect normal/bright     LYMPHATICS: No cervical adenopathy    DIAGNOSTICS:   No labs or EKG required for low risk surgery (cataract, skin procedure, breast biopsy, etc)    Recent Labs   Lab Test  04/02/18   1520  03/12/18   1037   10/27/17   1034   07/28/11   0916   HGB   --   14.8   --   14.3   < >  13.3   PLT   --   269   --   257   --   257   INR   --    --    --    --    --   1.00   NA  131*  136   < >  135   < >  139   POTASSIUM  3.8  4.4   < >  4.4   < >  4.1   CR  1.00  1.32*   < >  1.42*   < >  1.17*    < > = values in this interval not displayed.        IMPRESSION:   Reason for surgery/procedure: cataract  Diagnosis/reason for consult: pre op exam    The proposed surgical procedure is considered LOW risk.    REVISED CARDIAC RISK INDEX  The patient has the following serious  cardiovascular risks for perioperative complications such as (MI, PE, VFib and 3  AV Block):  No serious cardiac risks  INTERPRETATION: 0 risks: Class I (very low risk - 0.4% complication rate)    The patient has the following additional risks for perioperative complications:  No identified additional risks      ICD-10-CM    1. Preop general physical exam Z01.818    2. Cataract, Bilateral  RICKEY (obstructive sleep apnea) G47.33    3. CKD (chronic kidney disease) stage 3, GFR 30-59 ml/min N18.3 BASIC METABOLIC PANEL   4. Cough R05 Patient has an intermittent cough (not currently) that comes and goes; sometimes triggered by laughing.  Had previously seen allergy clinic and spirometry was normal.  Will send for a consult with Pulmonary.  Differential considered cough variant asthma, vocal cord dysfunction  PULMONARY MEDICINE REFERRAL for consultation   5. Chronic nonallergic rhinitis J31.0    6. Chronic gout involving toe without tophus, unspecified cause, unspecified laterality [id number] allopurinol (ZYLOPRIM) 100 MG tablet   7. Need for prophylactic vaccination and inoculation against influenza Z23 FLU VACCINE, INCREASED ANTIGEN, PRESV FREE, AGE 65+ [02504]     Vaccine Administration, Initial [91313]       RECOMMENDATIONS:     --Patient is to take all scheduled medications on the day of surgery EXCEPT for modifications listed below.    APPROVAL GIVEN to proceed with proposed procedure, without further diagnostic evaluation     Addendum:  Patient has surgery scheduled for 10/15/18 and I still recommend pre-op clearance for that surgery.    Signed Electronically by: Sarah Vallejo NP    Copy of this evaluation report is provided to requesting physician.    [facility] Preop Guidelines    Revised Cardiac Risk Index

## 2018-09-14 LAB
ANION GAP SERPL CALCULATED.3IONS-SCNC: 8 MMOL/L (ref 3–14)
BUN SERPL-MCNC: 20 MG/DL (ref 7–30)
CALCIUM SERPL-MCNC: 9 MG/DL (ref 8.5–10.1)
CHLORIDE SERPL-SCNC: 104 MMOL/L (ref 94–109)
CO2 SERPL-SCNC: 28 MMOL/L (ref 20–32)
CREAT SERPL-MCNC: 1.04 MG/DL (ref 0.52–1.04)
GFR SERPL CREATININE-BSD FRML MDRD: 52 ML/MIN/1.7M2
GLUCOSE SERPL-MCNC: 93 MG/DL (ref 70–99)
POTASSIUM SERPL-SCNC: 3.8 MMOL/L (ref 3.4–5.3)
SODIUM SERPL-SCNC: 140 MMOL/L (ref 133–144)

## 2018-09-24 ENCOUNTER — SURGERY (OUTPATIENT)
Age: 68
End: 2018-09-24

## 2018-09-24 ENCOUNTER — ANESTHESIA EVENT (OUTPATIENT)
Dept: SURGERY | Facility: CLINIC | Age: 68
End: 2018-09-24
Payer: MEDICARE

## 2018-09-24 ENCOUNTER — HOSPITAL ENCOUNTER (OUTPATIENT)
Facility: CLINIC | Age: 68
Discharge: HOME | End: 2018-09-24
Attending: STUDENT IN AN ORGANIZED HEALTH CARE EDUCATION/TRAINING PROGRAM | Admitting: STUDENT IN AN ORGANIZED HEALTH CARE EDUCATION/TRAINING PROGRAM
Payer: MEDICARE

## 2018-09-24 ENCOUNTER — ANESTHESIA (OUTPATIENT)
Dept: SURGERY | Facility: CLINIC | Age: 68
End: 2018-09-24
Payer: MEDICARE

## 2018-09-24 VITALS
DIASTOLIC BLOOD PRESSURE: 59 MMHG | RESPIRATION RATE: 18 BRPM | SYSTOLIC BLOOD PRESSURE: 117 MMHG | OXYGEN SATURATION: 97 % | TEMPERATURE: 97.2 F

## 2018-09-24 PROCEDURE — V2632 POST CHMBR INTRAOCULAR LENS: HCPCS | Performed by: STUDENT IN AN ORGANIZED HEALTH CARE EDUCATION/TRAINING PROGRAM

## 2018-09-24 PROCEDURE — 37000009 ZZH ANESTHESIA TECHNICAL FEE, EACH ADDTL 15 MIN: Performed by: STUDENT IN AN ORGANIZED HEALTH CARE EDUCATION/TRAINING PROGRAM

## 2018-09-24 PROCEDURE — 27210794 ZZH OR GENERAL SUPPLY STERILE: Performed by: STUDENT IN AN ORGANIZED HEALTH CARE EDUCATION/TRAINING PROGRAM

## 2018-09-24 PROCEDURE — 25000125 ZZHC RX 250: Performed by: NURSE ANESTHETIST, CERTIFIED REGISTERED

## 2018-09-24 PROCEDURE — 40000170 ZZH STATISTIC PRE-PROCEDURE ASSESSMENT II: Performed by: STUDENT IN AN ORGANIZED HEALTH CARE EDUCATION/TRAINING PROGRAM

## 2018-09-24 PROCEDURE — 36000101 ZZH EYE SURGERY LEVEL 3 1ST 30 MIN: Performed by: STUDENT IN AN ORGANIZED HEALTH CARE EDUCATION/TRAINING PROGRAM

## 2018-09-24 PROCEDURE — 25000128 H RX IP 250 OP 636: Performed by: SURGERY

## 2018-09-24 PROCEDURE — 25000128 H RX IP 250 OP 636: Performed by: STUDENT IN AN ORGANIZED HEALTH CARE EDUCATION/TRAINING PROGRAM

## 2018-09-24 PROCEDURE — 25000128 H RX IP 250 OP 636: Performed by: NURSE ANESTHETIST, CERTIFIED REGISTERED

## 2018-09-24 PROCEDURE — 25000125 ZZHC RX 250: Performed by: STUDENT IN AN ORGANIZED HEALTH CARE EDUCATION/TRAINING PROGRAM

## 2018-09-24 PROCEDURE — 36000102 ZZH EYE SURGERY LEVEL 3 EA 15 ADDTL MIN: Performed by: STUDENT IN AN ORGANIZED HEALTH CARE EDUCATION/TRAINING PROGRAM

## 2018-09-24 PROCEDURE — 71000028 ZZH EYE RECOVERY PHASE 2 EACH 15 MINS: Performed by: STUDENT IN AN ORGANIZED HEALTH CARE EDUCATION/TRAINING PROGRAM

## 2018-09-24 PROCEDURE — 37000008 ZZH ANESTHESIA TECHNICAL FEE, 1ST 30 MIN: Performed by: STUDENT IN AN ORGANIZED HEALTH CARE EDUCATION/TRAINING PROGRAM

## 2018-09-24 PROCEDURE — 66984 XCAPSL CTRC RMVL W/O ECP: CPT | Mod: LT | Performed by: STUDENT IN AN ORGANIZED HEALTH CARE EDUCATION/TRAINING PROGRAM

## 2018-09-24 DEVICE — EYE IMP IOL AMO PCL TECNIS ZCB00 19.5: Type: IMPLANTABLE DEVICE | Site: EYE | Status: FUNCTIONAL

## 2018-09-24 RX ORDER — NALOXONE HYDROCHLORIDE 0.4 MG/ML
.1-.4 INJECTION, SOLUTION INTRAMUSCULAR; INTRAVENOUS; SUBCUTANEOUS
Status: DISCONTINUED | OUTPATIENT
Start: 2018-09-24 | End: 2018-09-24 | Stop reason: HOSPADM

## 2018-09-24 RX ORDER — MEPERIDINE HYDROCHLORIDE 25 MG/ML
12.5 INJECTION INTRAMUSCULAR; INTRAVENOUS; SUBCUTANEOUS
Status: DISCONTINUED | OUTPATIENT
Start: 2018-09-24 | End: 2018-09-24 | Stop reason: HOSPADM

## 2018-09-24 RX ORDER — PHENYLEPHRINE HYDROCHLORIDE 25 MG/ML
1 SOLUTION/ DROPS OPHTHALMIC
Status: COMPLETED | OUTPATIENT
Start: 2018-09-24 | End: 2018-09-24

## 2018-09-24 RX ORDER — LIDOCAINE 40 MG/G
CREAM TOPICAL
Status: DISCONTINUED | OUTPATIENT
Start: 2018-09-24 | End: 2018-09-24 | Stop reason: HOSPADM

## 2018-09-24 RX ORDER — LIDOCAINE HYDROCHLORIDE 10 MG/ML
INJECTION, SOLUTION EPIDURAL; INFILTRATION; INTRACAUDAL; PERINEURAL PRN
Status: DISCONTINUED | OUTPATIENT
Start: 2018-09-24 | End: 2018-09-24 | Stop reason: HOSPADM

## 2018-09-24 RX ORDER — ONDANSETRON 2 MG/ML
4 INJECTION INTRAMUSCULAR; INTRAVENOUS EVERY 30 MIN PRN
Status: DISCONTINUED | OUTPATIENT
Start: 2018-09-24 | End: 2018-09-24 | Stop reason: HOSPADM

## 2018-09-24 RX ORDER — TROPICAMIDE 10 MG/ML
1 SOLUTION/ DROPS OPHTHALMIC
Status: COMPLETED | OUTPATIENT
Start: 2018-09-24 | End: 2018-09-24

## 2018-09-24 RX ORDER — ONDANSETRON 2 MG/ML
INJECTION INTRAMUSCULAR; INTRAVENOUS PRN
Status: DISCONTINUED | OUTPATIENT
Start: 2018-09-24 | End: 2018-09-24

## 2018-09-24 RX ORDER — PROPARACAINE HYDROCHLORIDE 5 MG/ML
1 SOLUTION/ DROPS OPHTHALMIC ONCE
Status: DISCONTINUED | OUTPATIENT
Start: 2018-09-24 | End: 2018-09-24 | Stop reason: HOSPADM

## 2018-09-24 RX ORDER — SODIUM CHLORIDE, SODIUM LACTATE, POTASSIUM CHLORIDE, CALCIUM CHLORIDE 600; 310; 30; 20 MG/100ML; MG/100ML; MG/100ML; MG/100ML
INJECTION, SOLUTION INTRAVENOUS CONTINUOUS
Status: DISCONTINUED | OUTPATIENT
Start: 2018-09-24 | End: 2018-09-24 | Stop reason: HOSPADM

## 2018-09-24 RX ORDER — BALANCED SALT SOLUTION 6.4; .75; .48; .3; 3.9; 1.7 MG/ML; MG/ML; MG/ML; MG/ML; MG/ML; MG/ML
SOLUTION OPHTHALMIC PRN
Status: DISCONTINUED | OUTPATIENT
Start: 2018-09-24 | End: 2018-09-24 | Stop reason: HOSPADM

## 2018-09-24 RX ORDER — PROPARACAINE HYDROCHLORIDE 5 MG/ML
1 SOLUTION/ DROPS OPHTHALMIC ONCE
Status: COMPLETED | OUTPATIENT
Start: 2018-09-24 | End: 2018-09-24

## 2018-09-24 RX ORDER — SODIUM CHLORIDE, SODIUM LACTATE, POTASSIUM CHLORIDE, CALCIUM CHLORIDE 600; 310; 30; 20 MG/100ML; MG/100ML; MG/100ML; MG/100ML
500 INJECTION, SOLUTION INTRAVENOUS CONTINUOUS
Status: DISCONTINUED | OUTPATIENT
Start: 2018-09-24 | End: 2018-09-24 | Stop reason: HOSPADM

## 2018-09-24 RX ORDER — ONDANSETRON 4 MG/1
4 TABLET, ORALLY DISINTEGRATING ORAL EVERY 30 MIN PRN
Status: DISCONTINUED | OUTPATIENT
Start: 2018-09-24 | End: 2018-09-24 | Stop reason: HOSPADM

## 2018-09-24 RX ORDER — CYCLOPENTOLATE HYDROCHLORIDE 10 MG/ML
1 SOLUTION/ DROPS OPHTHALMIC
Status: COMPLETED | OUTPATIENT
Start: 2018-09-24 | End: 2018-09-24

## 2018-09-24 RX ORDER — TETRACAINE HYDROCHLORIDE 5 MG/ML
SOLUTION OPHTHALMIC PRN
Status: DISCONTINUED | OUTPATIENT
Start: 2018-09-24 | End: 2018-09-24 | Stop reason: HOSPADM

## 2018-09-24 RX ADMIN — DEXMEDETOMIDINE HYDROCHLORIDE 4 MCG: 100 INJECTION, SOLUTION INTRAVENOUS at 10:27

## 2018-09-24 RX ADMIN — DEXMEDETOMIDINE HYDROCHLORIDE 8 MCG: 100 INJECTION, SOLUTION INTRAVENOUS at 10:30

## 2018-09-24 RX ADMIN — SODIUM CHLORIDE, POTASSIUM CHLORIDE, SODIUM LACTATE AND CALCIUM CHLORIDE 500 ML: 600; 310; 30; 20 INJECTION, SOLUTION INTRAVENOUS at 09:48

## 2018-09-24 RX ADMIN — PHENYLEPHRINE HYDROCHLORIDE 1 DROP: 2.5 SOLUTION/ DROPS OPHTHALMIC at 09:39

## 2018-09-24 RX ADMIN — EPINEPHRINE 500 ML: 1 INJECTION, SOLUTION, CONCENTRATE INTRAVENOUS at 10:20

## 2018-09-24 RX ADMIN — TROPICAMIDE 1 DROP: 10 SOLUTION/ DROPS OPHTHALMIC at 09:32

## 2018-09-24 RX ADMIN — TROPICAMIDE 1 DROP: 10 SOLUTION/ DROPS OPHTHALMIC at 09:46

## 2018-09-24 RX ADMIN — CYCLOPENTOLATE HYDROCHLORIDE 1 DROP: 10 SOLUTION/ DROPS OPHTHALMIC at 09:32

## 2018-09-24 RX ADMIN — SODIUM CHONDROITIN SULFATE / SODIUM HYALURONATE 1 ML: 0.55-0.5 INJECTION INTRAOCULAR at 10:22

## 2018-09-24 RX ADMIN — PHENYLEPHRINE HYDROCHLORIDE 1 DROP: 2.5 SOLUTION/ DROPS OPHTHALMIC at 09:46

## 2018-09-24 RX ADMIN — MIDAZOLAM 1 MG: 1 INJECTION INTRAMUSCULAR; INTRAVENOUS at 10:10

## 2018-09-24 RX ADMIN — CYCLOPENTOLATE HYDROCHLORIDE 1 DROP: 10 SOLUTION/ DROPS OPHTHALMIC at 09:46

## 2018-09-24 RX ADMIN — TRYPAN BLUE 0.5 ML: 0.3 INJECTION, SOLUTION INTRAOCULAR; OPHTHALMIC at 10:24

## 2018-09-24 RX ADMIN — PROPARACAINE HYDROCHLORIDE 1 DROP: 5 SOLUTION/ DROPS OPHTHALMIC at 09:32

## 2018-09-24 RX ADMIN — DEXMEDETOMIDINE HYDROCHLORIDE 8 MCG: 100 INJECTION, SOLUTION INTRAVENOUS at 10:18

## 2018-09-24 RX ADMIN — LIDOCAINE HYDROCHLORIDE 1 ML: 10 INJECTION, SOLUTION EPIDURAL; INFILTRATION; INTRACAUDAL; PERINEURAL at 10:22

## 2018-09-24 RX ADMIN — TROPICAMIDE 1 DROP: 10 SOLUTION/ DROPS OPHTHALMIC at 09:39

## 2018-09-24 RX ADMIN — MIDAZOLAM 1 MG: 1 INJECTION INTRAMUSCULAR; INTRAVENOUS at 10:05

## 2018-09-24 RX ADMIN — CYCLOPENTOLATE HYDROCHLORIDE 1 DROP: 10 SOLUTION/ DROPS OPHTHALMIC at 09:39

## 2018-09-24 RX ADMIN — EPINEPHRINE 1 ML: 1 INJECTION INTRAMUSCULAR; INTRAVENOUS; SUBCUTANEOUS at 10:21

## 2018-09-24 RX ADMIN — DEXMEDETOMIDINE HYDROCHLORIDE 8 MCG: 100 INJECTION, SOLUTION INTRAVENOUS at 10:26

## 2018-09-24 RX ADMIN — ONDANSETRON 4 MG: 2 INJECTION INTRAMUSCULAR; INTRAVENOUS at 10:11

## 2018-09-24 RX ADMIN — PHENYLEPHRINE HYDROCHLORIDE 1 DROP: 2.5 SOLUTION/ DROPS OPHTHALMIC at 09:32

## 2018-09-24 RX ADMIN — BALANCED SALT SOLUTION 15 ML: 6.4; .75; .48; .3; 3.9; 1.7 SOLUTION OPHTHALMIC at 10:17

## 2018-09-24 RX ADMIN — TETRACAINE HYDROCHLORIDE 2 DROP: 5 SOLUTION OPHTHALMIC at 10:23

## 2018-09-24 NOTE — IP AVS SNAPSHOT
MRN:[id number]                      After Visit Summary   9/24/2018    Abbey Omer    MRN: [id number]           Thank you!     Thank you for choosing [facility] for your care. Our goal is always to provide you with excellent care. Hearing back from our patients is one way we can continue to improve our services. Please take a few minutes to complete the written survey that you may receive in the mail after you visit with us. Thank you!        Patient Information     Date Of Birth          1950        About your hospital stay     You were admitted on:  September 24, 2018 You last received care in the:  [facility]    You were discharged on:  September 24, 2018       Who to Call     For medical emergencies, please call 911.  For non-urgent questions about your medical care, please call your primary care provider or clinic, [phone]  For questions related to your surgery, please call your surgery clinic        Attending Provider     Provider Specialty    Ryne Pascal MD Ophthalmology       Primary Care Provider Office Phone # Fax #    Sarah JUNIE Vallejo [phone]      Your next 10 appointments already scheduled     Sep 25, 2018  9:15 AM CDT   Return Visit with Ryne Pascal MD   [facility] ([facility])    [address]  [place] [address] [phone]            Oct 02, 2018  9:15 AM CDT   Return Visit with Ryne Pascal MD   [facility] [place] ([facility])    [address]  [place] [address]   [phone]            Oct 08, 2018   Procedure with Ryne Pascal MD   [facility] (--)    [address]  [place] [address] [phone]            Oct 09, 2018  9:15 AM CDT   Return Visit with Ryne Pascal MD   [facility] ([facility])    [address]  [place] [address]    [phone]            Oct 16, 2018  9:15 AM CDT   Return Visit with Ryne Pascal MD   [facility] ([facility])    [address]  [place] [address] [phone]            Nov 14, 2018  1:45 PM CST   Return Visit with Ryne Pascal MD   [facility] ([facility])    [address]  [place] [address] [phone]            Jan 22, 2019  9:00 AM CST   Return Visit with Omar West MD, FRIGEETHA CYSTO PROC ROOM   [facility] ([facility])    [address]  [place] [address] [phone]              Further instructions from your care team       CATARACT SURGERY POST-OP INSTRUCTIONS  Dr. Ryne Pascal  [phone]      *Continue using CatarActive3 four times a day in the operative eye.  *You should get 3 doses in today and 4 doses daily starting tomorrow.       Keep the eye shield taped in place unless putting drops in. We will remove it for you in the office tomorrow.      Light sensitivity may be noticed. Sunglasses may be worn for comfort.      Do not rub the operated eye.      Keep the operated eye dry. You may wash your hair, bathe or shower, but keep the operated eye closed while doing so.       No swimming, hot tub, or sauna for 2 weeks.      No make up around eye for 5 days.      No bending at the waist or lifting more than 10 pounds for one week.      May take Tylenol (per directions on bottle) for mild pain.      Call the office at [phone] and ask to speak to the on-call ophthalmologist  if any of the following should occur:  o Any sudden vision changes  o Nausea or severe headache  o Increase in pain not controlled  o Or signs of infection (pus, increasing redness or tenderness)      [facility] Discharge  Instructions  Anesthesia (Eye Care Center)   Adult Discharge Instructions    For 24 hours after surgery    1. Get plenty of  rest.  Make arrangements to have a responsible adult stay with you for at least 24 hours after you leave the hospital.  2. Do not drive or use heavy equipment for 24 hours.    3. Do not drink alcohol for 24 hours.  4. Do not sign legal documents or make important decisions for 24 hours.  5. Avoid strenuous or risky activities. You may feel lightheaded.  If so, sit for a few minutes before standing.  Have someone help you get up.   6. Conscious sedation patients may resume a regular diet..  7. Any questions of medical nature, call your physician.          Pending Results     No orders found from 9/22/2018 to 9/25/2018.            Admission Information     Date & Time Provider Department Dept. Phone    9/24/2018 Ryne Pascal MD [facility] [phone]      Your Vitals Were     Blood Pressure Temperature Respirations Pulse Oximetry          117/59 97.2  F (36.2  C) (Temporal) 16 95%        MyChart Information     [facility] gives you secure access to your electronic health record. If you see a primary care provider, you can also send messages to your care team and make appointments. If you have questions, please call your primary care clinic.  If you do not have a primary care provider, please call [phone] and they will assist you.        Care EveryWhere ID     This is your Care EveryWhere ID. This could be used by other organizations to access your [facility] medical records  [id number]        Equal Access to Services     BHUPINDER SUTHERLAND : Kathleen Carrera, waaxda luedgar, qaybta kaalmaximiliano ochoa. [facility] [phone].    ATENCIÓN: Si habla español, tiene a smith disposición servicios gratuitos de asistencia lingüística. Kishor al [phone].    We comply with applicable federal civil rights laws and [place] laws. We do not discriminate on the basis of race, color, national origin, age, disability, sex, sexual orientation, or gender  identity.               Review of your medicines      CONTINUE these medicines which may have CHANGED, or have new prescriptions. If we are uncertain of the size of tablets/capsules you have at home, strength may be listed as something that might have changed.        Dose / Directions    colchicine 0.6 MG tablet   Commonly known as:  COLCRYS   This may have changed:    - when to take this  - reasons to take this   Used for:  Chronic gout involving toe without tophus, unspecified cause, unspecified laterality        Dose:  0.6 mg   Take 1 tablet (0.6 mg) by mouth daily   Quantity:  90 tablet   Refills:  3         CONTINUE these medicines which have NOT CHANGED        Dose / Directions    allopurinol 100 MG tablet   Commonly known as:  ZYLOPRIM   Used for:  Chronic gout involving toe without tophus, unspecified cause, unspecified laterality        Dose:  100 mg   Take 1 tablet (100 mg) by mouth daily   Quantity:  90 tablet   Refills:  1       Bromfenac Sodium Powd   Used for:  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 Use until the bottle runs out.   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 Use until the bottle runs out.   Quantity:  1 Bottle   Refills:  1       EXCEDRIN TENSION HEADACHE 500-65 MG Tabs   Generic drug:  acetaminophen-caffeine        1 tablet twice daily as needed   Refills:  0       fish Oil 1200 MG capsule        3 capsule daily   Refills:  0        FLAXSEED OIL PO        Dose:  1 Tablespoonful   Take 1 Tablespoonful by mouth daily   Refills:  0       fluticasone 50 MCG/ACT spray   Commonly known as:  FLONASE   Used for:  Chronic rhinitis        Dose:  2 spray   Spray 2 sprays into both nostrils daily   Quantity:  48 g   Refills:  3       KRILL OIL PO        Dose:  1 capsule   Take 1 capsule by mouth daily   Refills:  0       levalbuterol 45 MCG/ACT Inhaler   Commonly known as:  XOPENEX HFA   Used for:  Mild intermittent asthma without complication        INHALE 2 PUFFS INTO THE LUNGS EVERY 6 HOURS AS NEEDED FOR SHORTNESS OF BREATH OR DIFFICULT BREATHING OR WHEEZING   Quantity:  15 g   Refills:  0       levothyroxine 88 MCG tablet   Commonly known as:  SYNTHROID/LEVOTHROID   Used for:  Hypothyroidism due to acquired atrophy of thyroid        TAKE 1 TABLET(88 MCG) BY MOUTH DAILY   Quantity:  90 tablet   Refills:  0       magnesium 500 MG Tabs        Dose:  1 tablet   Take 1 tablet by mouth daily   Refills:  0       Moxifloxacin HCl Powd   Used for:  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 Use until the bottle runs out.   Quantity:  1 Bottle   Refills:  1       MULTIPLE vitamin  S/WOMENS Tabs        Dose:  2 tablet   Take 2 tablets by mouth daily   Refills:  0       order for DME        Equipment ordered: Respironics Auto PAP Mask type: Nasal Settings: 9-15 cm H2O   Refills:  0       polyethylene glycol powder   Commonly known as:  MIRALAX   Used for:  Slow transit constipation        Dose:  1 capful   Take 17 g (1 capful) by mouth 2 times daily as needed for constipation   Quantity:  510 g   Refills:  1       ranitidine 300 MG tablet   Commonly known as:  ZANTAC   Used for:  Gastroesophageal reflux disease without esophagitis        Dose:  300 mg   Take 1 tablet (300 mg) by mouth At Bedtime   Quantity:  90 tablet   Refills:  3        STATIN NOT PRESCRIBED (INTENTIONAL)   Used for:  Hyperlipidemia LDL goal < 100        by Other route continuous prn Reported on 5/15/2017   Refills:  0       traZODone 50 MG tablet   Commonly known as:  DESYREL   Used for:  Insomnia, unspecified type        Dose:   mg   Take 1-2 tablets ( mg) by mouth nightly as needed for sleep   Quantity:  180 tablet   Refills:  1       venlafaxine 150 MG 24 hr capsule   Commonly known as:  EFFEXOR-XR   Used for:  Major depressive disorder, recurrent episode, mild degree (H)        2 Capsules daily   Quantity:  180 capsule   Refills:  3       VITAMIN B COMPLEX PO        Refills:  0       zinc 50 MG Tabs        Dose:  1 tablet   Take 1 tablet by mouth daily   Refills:  0                Protect others around you: Learn how to safely use, store and throw away your medicines at www.disposemymeds.org.             Medication List: This is a list of all your medications and when to take them. Check marks below indicate your daily home schedule. Keep this list as a reference.      Medications           Morning Afternoon Evening Bedtime As Needed    allopurinol 100 MG tablet   Commonly known as:  ZYLOPRIM   Take 1 tablet (100 mg) by mouth daily                                Bromfenac Sodium Powd   1 Bottle 4 times daily 1 drop four times a day in the operative eye starting 1 day before surgery. Use until the bottle runs out.                                calcium + D 600-200 MG-UNIT Tabs   1 tablet twice a day   Generic drug:  calcium carbonate-vitamin D                                colchicine 0.6 MG tablet   Commonly known as:  COLCRYS   Take 1 tablet (0.6 mg) by mouth daily                                Cranberry 500 MG Caps   Take 1 capsule by mouth daily                                Dexamethasone Acetate Powd   1 Bottle 4 times daily 1 drop four times a day in the operative eye starting 1 day before surgery. Use until the bottle runs out.                                 EXCEDRIN TENSION HEADACHE 500-65 MG Tabs   1 tablet twice daily as needed   Generic drug:  acetaminophen-caffeine                                fish Oil 1200 MG capsule   3 capsule daily                                FLAXSEED OIL PO   Take 1 Tablespoonful by mouth daily                                fluticasone 50 MCG/ACT spray   Commonly known as:  FLONASE   Spray 2 sprays into both nostrils daily                                KRILL OIL PO   Take 1 capsule by mouth daily                                levalbuterol 45 MCG/ACT Inhaler   Commonly known as:  XOPENEX HFA   INHALE 2 PUFFS INTO THE LUNGS EVERY 6 HOURS AS NEEDED FOR SHORTNESS OF BREATH OR DIFFICULT BREATHING OR WHEEZING                                levothyroxine 88 MCG tablet   Commonly known as:  SYNTHROID/LEVOTHROID   TAKE 1 TABLET(88 MCG) BY MOUTH DAILY                                magnesium 500 MG Tabs   Take 1 tablet by mouth daily                                Moxifloxacin HCl Powd   1 Bottle 4 times daily 1 drop four times a day in the operative eye starting 1 day before surgery. Use until the bottle runs out.                                MULTIPLE vitamin  S/WOMENS Tabs   Take 2 tablets by mouth daily                                order for DME   Equipment ordered: Respironics Auto PAP Mask type: Nasal Settings: 9-15 cm H2O                                polyethylene glycol powder   Commonly known as:  MIRALAX   Take 17 g (1 capful) by mouth 2 times daily as needed for constipation                                ranitidine 300 MG tablet   Commonly known as:  ZANTAC   Take 1 tablet (300 mg) by mouth At Bedtime                                STATIN NOT PRESCRIBED (INTENTIONAL)   by Other route continuous prn Reported on 5/15/2017                                traZODone 50 MG tablet   Commonly known as:  DESYREL   Take 1-2 tablets ( mg) by mouth nightly as needed for sleep                                venlafaxine 150 MG 24  hr capsule   Commonly known as:  EFFEXOR-XR   2 Capsules daily                                VITAMIN B COMPLEX PO                                zinc 50 MG Tabs   Take 1 tablet by mouth daily

## 2018-09-24 NOTE — IP AVS SNAPSHOT
[facility]    [address]    [place] [address]    Phone:  [phone]    Fax:  [phone]                                       After Visit Summary   9/24/2018    Abbey Omer    MRN: [id number]           After Visit Summary Signature Page     I have received my discharge instructions, and my questions have been answered. I have discussed any challenges I see with this plan with the nurse or doctor.    ..........................................................................................................................................  Patient/Patient Representative Signature      ..........................................................................................................................................  Patient Representative Print Name and Relationship to Patient    ..................................................               ................................................  Date                                   Time    ..........................................................................................................................................  Reviewed by Signature/Title    ...................................................              ..............................................  Date                                               Time          22EPIC Rev 08/18

## 2018-09-24 NOTE — OR NURSING
In Phase II post left eye cataract surgery: pt required nasal cannula for short time to maintain O2 sats. Initially on room air, pt would drift to 86- 88%. Needed frequent stimulation to take deep breaths and to maintain O2 sats greater than 90%. Pt uses nasal CPAP at home for RICKEY.  Pt able to get Incentive Spirometer to 1200-1500mL. O2 sats readily increased to 97-99% after IS. Weaned off O2 with sats stabilizing. Pt and spouse, Maggie Weller, instructed to use pt's CPAP today when dozing and as usual at night. Instructed on applying the eye shield around the mask.  Sent home with IS, Maggie said pt has used before. Alert, oriented calm, on discharge. Pt had brought a package with eye drops for left and right eyes (for next surgery). Confirmed that both eye drop bottles were with pt belongings upon discharge.

## 2018-09-24 NOTE — DISCHARGE INSTRUCTIONS
CATARACT SURGERY POST-OP INSTRUCTIONS  Dr. Ryne Pascal  [phone]      *Continue using CatarActive3 four times a day in the operative eye.  *You should get 3 doses in today and 4 doses daily starting tomorrow.       Keep the eye shield taped in place unless putting drops in. We will remove it for you in the office tomorrow.      Light sensitivity may be noticed. Sunglasses may be worn for comfort.      Do not rub the operated eye.      Keep the operated eye dry. You may wash your hair, bathe or shower, but keep the operated eye closed while doing so.       No swimming, hot tub, or sauna for 2 weeks.      No make up around eye for 5 days.      No bending at the waist or lifting more than 10 pounds for one week.      May take Tylenol (per directions on bottle) for mild pain.      Call the office at [phone] and ask to speak to the on-call ophthalmologist  if any of the following should occur:  o Any sudden vision changes  o Nausea or severe headache  o Increase in pain not controlled  o Or signs of infection (pus, increasing redness or tenderness)      [facility] Discharge  Instructions  Anesthesia (Eye Care Center)   Adult Discharge Instructions    For 24 hours after surgery    1. Get plenty of rest.  Make arrangements to have a responsible adult stay with you for at least 24 hours after you leave the hospital.  2. Do not drive or use heavy equipment for 24 hours.    3. Do not drink alcohol for 24 hours.  4. Do not sign legal documents or make important decisions for 24 hours.  5. Avoid strenuous or risky activities. You may feel lightheaded.  If so, sit for a few minutes before standing.  Have someone help you get up.   6. Conscious sedation patients may resume a regular diet..  7. Any questions of medical nature, call your physician.

## 2018-09-24 NOTE — ANESTHESIA CARE TRANSFER NOTE
Patient: Abbey Omer    Procedure(s):  LEFT EYE PHACOEMULSIFICATION CLEAR CORNEA WITH STANDARD INTRAOCULAR LENS IMPLANT - Wound Class: I-Clean    Diagnosis: LEFT EYE CATARACT  Diagnosis Additional Information: No value filed.    Anesthesia Type:   MAC     Note:  Airway :Room Air  Patient transferred to:Phase II  Comments: Pt awake and able to verbalize needs. Spontaneous respiration without difficulty.  All monitors on and audible. Report given to PACU RN, Vital Signs Stable. Pt denies pain.Handoff Report: Identifed the Patient, Identified the Reponsible Provider, Reviewed the pertinent medical history, Discussed the surgical course, Reviewed Intra-OP anesthesia mangement and issues during anesthesia, Set expectations for post-procedure period and Allowed opportunity for questions and acknowledgement of understanding      Vitals: (Last set prior to Anesthesia Care Transfer)    CRNA VITALS  9/24/2018 1003 - 9/24/2018 1038      9/24/2018             Pulse: 58    Ht Rate: 58    SpO2: 99 %    Resp Rate (set): 10                Electronically Signed By: RADHA Aguillon CRNA  September 24, 2018  10:38 AM

## 2018-09-24 NOTE — OP NOTE
PreOp Diagnosis: Visually significant nuclear sclerotic cataract left eye  PostOp Diagnosis: Same  Surgeon: Ryne Pascal MD  Implant: Technis [id number] 19.5D    Procedures:   1. Review of intraocular lens calculations, both eyes   2. Phacoemulsification and extraction of lens  left eye   3. Intraocular lens implantation left eye  Anesthesia: MAC/topical  Complications: None  EBL: <1cc    Abbey Omer suffers from a visually significant cataract of the left eye. This has caused problems with distance and reading vision, including glare. After discussing the risks, benefits, and alternatives, the patient wishes to proceed with cataract surgery.    The patient was identified in the pre-op area where the left eye was marked. The patient was then brought to the operating room where a time out was called, identifying the patient, the procedure, and the correct site. Tetracaine drops were applied to both eyes. The operative eye was then prepped and draped in the usual sterile ophthalmic fashion. An eyelid speculum was placed into the operative eye. Additional tetracaine drops were applied. A paracentesis was made inferior with a side port blade.. Due to poor red reflex, trypan blue was irrigated into the anterior chamber to enhance capsular visualization.  This was irrigated from the anterior chamber after 30 seconds with 1% preservative-free lidocaine and epinephrine.   Viscoat was injected to deepen the anterior chamber. A 2.4mm clear corneal wound was created with a keratome blade temporally.  A continuous curvilinear capsulorrhexis was started with a bent cystitome and completed with Utrada forceps. Hydrodissection of the lens nucleus was performed with BSS on a cannula. The lens nucleus was rotated. Phacoemulsification of the lens nucleus was accomplished in a phacoemulsification stop and chop technique. Remaining cortex was removed with irrigation and aspiration. The lens capsule was noted to be intact.  Provisc was used to inflate the capsular bag.  The lens was injected into the capsular bag. Remaining viscoelastic was removed with irrigation and aspiration. The wounds were checked and found to be watertight after hydration. The eyelid speculum was removed and CatarActive3 drops were placed into the operative eye. The patient tolerated the procedure well and was in stable condition on the way to the recovery area.     Ryne Pascal MD

## 2018-09-24 NOTE — ANESTHESIA PREPROCEDURE EVALUATION
Procedure: Procedure(s):  PHACOEMULSIFICATION CLEAR CORNEA WITH STANDARD INTRAOCULAR LENS IMPLANT  Preop diagnosis: LEFT EYE CATARACT    Allergies   Allergen Reactions     Albuterol Other (See Comments)     Very jittery and couldn't tolerate     Amlodipine Swelling and Other (See Comments)     Swelling- lower legs     Asa [Aspirin] GI Disturbance     Atenolol Fatigue     Atenolol Other (See Comments)     Fatigue     Atorvastatin Other (See Comments)     Swelling legs     Atorvastatin Calcium Swelling     Bupropion Unknown     Codeine Nausea     Colestid [Colestipol Hcl]      Crestor [Hmg-Coa-R Inhibitors] Other (See Comments)     Joint pain  Other reaction(s): Other - Describe In Comment Field  Joint pain  Joint pain     Cymbalta      Visual hallucinations     Ezetimibe Other (See Comments)     Fatigue     Hydralazine Fatigue and Other (See Comments)     Fatigue     Lovastatin Cramps and Other (See Comments)     Cramps     Niacin      Unknown by patient     Paroxetine Other (See Comments)     Fatigue     Paxil [Paroxetine] Fatigue     Potassium GI Disturbance     Sulfa Drugs Fatigue     Sulfasalazine Other (See Comments)     Triamterene Unknown     Zetia [Ezetimibe] Fatigue     Zomig [Zolmitriptan] Other (See Comments)     Cant wake up     Past Medical History:   Diagnosis Date     Cancer of bladder (H)      Cataract 11/25/2011     Chronic nonallergic rhinitis 8/31/10 RAST     CKD (chronic kidney disease) stage 3, GFR 30-59 ml/min 12/20/2010     DDD (degenerative disc disease), cervical 6/16/2011     Dependent edema      Depression, major      Depressive disorder In 2002 or 2003     History of blood transfusion ?     HTN (hypertension)      Hyperlipidemia      Hypothyroidism 10/12/2009     Migraine     resolved     Nasal septal deviation 8/11/2009     Obesity 5/17/2011     RICKEY (obstructive sleep apnea)      Osteoarthritis of shoulder region      Osteoporosis      Premature menopause      Renal cyst      Past  Surgical History:   Procedure Laterality Date     ARTHROPLASTY SHOULDER Right 11/01/2017    Right total shoulder arthroplasty by Dr. Go at [facility]     ARTHROPLASTY SHOULDER Left 03/28/2018    Left total shoulder arthroplasty by Dr. Go at [facility]     CHOLECYSTECTOMY, LAPOROSCOPIC       COLONOSCOPY  ?    Upper & Lower     CYSTOSCOPY      bladder cancer     D & C       ENT SURGERY  first 1967/second ?    2 Rhynoplasties     HYSTERECTOMY, PAP NO LONGER INDICATED      BSO     NOSE SURGERY      septum deviation     Social History   Substance Use Topics     Smoking status: Former Smoker     Packs/day: 0.10     Years: 1.00     Types: Cigarettes     Start date: 8/12/1969     Quit date: 10/12/1969     Smokeless tobacco: Never Used      Comment: Smoked, very little, for 2 mo, 49 years ago.     Alcohol use Yes      Comment: Maybe 1 or 2 drinks a year     Prior to Admission medications    Medication Sig Start Date End Date Taking? Authorizing Provider   allopurinol (ZYLOPRIM) 100 MG tablet Take 1 tablet (100 mg) by mouth daily 9/13/18  Yes Sarah Valeljo NP   B Complex Vitamins (VITAMIN B COMPLEX PO)    Yes Reported, Patient   CALCIUM + D 600-200 MG-UNIT OR TABS 1 tablet twice a day   Yes Reported, Patient   colchicine (COLCRYS) 0.6 MG tablet Take 1 tablet (0.6 mg) by mouth daily  Patient taking differently: Take 0.6 mg by mouth as needed  1/24/18  Yes Iris Killian MD   Cranberry 500 MG CAPS Take 1 capsule by mouth daily   Yes Reported, Patient   EXCEDRIN TENSION HEADACHE 500-65 MG PO TABS 1 tablet twice daily as needed   Yes Reported, Patient   FISH OIL 1200 MG OR CAPS 3 capsule daily   Yes Reported, Patient   Flaxseed, Linseed, (FLAXSEED OIL PO) Take 1 Tablespoonful by mouth daily   Yes Reported, Patient   levalbuterol (XOPENEX HFA) 45 MCG/ACT Inhaler INHALE 2 PUFFS INTO THE LUNGS EVERY 6 HOURS AS NEEDED FOR SHORTNESS OF BREATH OR DIFFICULT BREATHING OR WHEEZING  6/1/17  Yes Iris Killian MD   levothyroxine (SYNTHROID/LEVOTHROID) 88 MCG tablet TAKE 1 TABLET(88 MCG) BY MOUTH DAILY 8/23/18  Yes Iris Killian MD   magnesium 500 MG TABS Take 1 tablet by mouth daily   Yes Reported, Patient   Multiple Vitamins-Minerals (MULTIPLE VITAMIN  S/WOMENS) TABS Take 2 tablets by mouth daily   Yes Reported, Patient   ranitidine (ZANTAC) 300 MG tablet Take 1 tablet (300 mg) by mouth At Bedtime 1/24/18  Yes Iris Killian MD   traZODone (DESYREL) 50 MG tablet Take 1-2 tablets ( mg) by mouth nightly as needed for sleep 3/12/18  Yes Sarah Vallejo NP   venlafaxine (EFFEXOR-XR) 150 MG 24 hr capsule 2 Capsules daily 1/24/18  Yes Iris Killian MD   zinc 50 MG TABS Take 1 tablet by mouth daily   Yes Reported, Patient   Bromfenac Sodium POWD 1 Bottle 4 times daily 1 drop four times a day in the operative eye starting 1 day before surgery. Use until the bottle runs out. 9/23/18   Ryne Pascal MD   Dexamethasone Acetate POWD 1 Bottle 4 times daily 1 drop four times a day in the operative eye starting 1 day before surgery. Use until the bottle runs out. 9/23/18   Ryne Pascal MD   fluticasone (FLONASE) 50 MCG/ACT nasal spray Spray 2 sprays into both nostrils daily 2/22/16   Iris Killian MD   KRILL OIL PO Take 1 capsule by mouth daily    Reported, Patient   Moxifloxacin HCl POWD 1 Bottle 4 times daily 1 drop four times a day in the operative eye starting 1 day before surgery. Use until the bottle runs out. 9/23/18   Ryne Pascal MD   order for DME Equipment ordered: Respironics Auto PAP Mask type: Nasal Settings: 9-15 cm H2O    Reported, Patient   polyethylene glycol (MIRALAX) powder Take 17 g (1 capful) by mouth 2 times daily as needed for constipation 4/2/18   Sarah Vallejo NP   STATIN NOT PRESCRIBED, INTENTIONAL, by Other route continuous prn Reported on 5/15/2017 3/1/12   Adelaide Lozano MD     Current Facility-Administered  Medications Ordered in Epic   Medication Dose Route Frequency Last Rate Last Dose     cyclopentolate (CYCLOGYL) 1 % ophthalmic solution 1 drop  1 drop Ophthalmic q5 Min Prior to Surgery   1 drop at 09/24/18 0932     lactated ringers infusion  500 mL Intravenous Continuous         lidocaine (LMX4) cream   Topical Q1H PRN         lidocaine 1 % 1 mL  1 mL Other Q1H PRN         phenylephrine (MYDFRIN /GLENIS-SYNEPHRINE) 2.5 % ophthalmic solution 1 drop  1 drop Ophthalmic q5 Min Prior to Surgery   1 drop at 09/24/18 0932     proparacaine (ALCAINE) 0.5 % ophthalmic solution 1 drop  1 drop Ophthalmic Once         sodium chloride (PF) 0.9% PF flush 3 mL  3 mL Intracatheter Q1H PRN         sodium chloride (PF) 0.9% PF flush 3 mL  3 mL Intracatheter Q8H         tropicamide (MYDRIACYL) 1 % ophthalmic solution 1 drop  1 drop Ophthalmic q5 Min Prior to Surgery   1 drop at 09/24/18 0932     No current [facility]-ordered outpatient prescriptions on file.       lactated ringers       Wt Readings from Last 1 Encounters:   09/13/18 76.3 kg (168 lb 3.2 oz)     Temp Readings from Last 1 Encounters:   09/24/18 36.2  C (97.2  F) (Temporal)     BP Readings from Last 6 Encounters:   09/24/18 176/85   09/13/18 (!) 146/92   07/20/18 136/76   07/18/18 168/74   04/02/18 132/70   03/12/18 126/76     Pulse Readings from Last 4 Encounters:   09/13/18 70   07/20/18 74   07/18/18 109   04/02/18 80     Resp Readings from Last 1 Encounters:   09/24/18 16     SpO2 Readings from Last 1 Encounters:   09/24/18 95%     Recent Labs   Lab Test  09/13/18   1149  04/02/18   1520   NA  140  131*   POTASSIUM  3.8  3.8   CHLORIDE  104  96   CO2  28  30   ANIONGAP  8  5   GLC  93  85   BUN  20  15   CR  1.04  1.00   SAMARIA  9.0  9.9     Recent Labs   Lab Test  03/12/18   1037  11/09/17   0851   AST  49*  32   ALT  81*  82*   ALKPHOS  108  165*   BILITOTAL  0.3  0.4     Recent Labs   Lab Test  03/12/18   1037  10/27/17   1034   WBC  6.1  6.4   HGB  14.8  14.3   PLT  269  257      No results for input(s): ABO, RH in the last 56023 hours.  Recent Labs   Lab Test  07/28/11   0916   INR  1.00      No results for input(s): TROPI in the last 92389 hours.  No results for input(s): PH, PCO2, PO2, HCO3 in the last 25242 hours.  No results for input(s): HCG in the last 49906 hours.  No results found for this or any previous visit (from the past 744 hour(s)).    RECENT LABS:   ECG:   ECHO:       Anesthesia Evaluation     .             ROS/MED HX    ENT/Pulmonary:     (+)sleep apnea, allergic rhinitis, uses CPAP , . Other pulmonary disease pulmonary nodules.    Neurologic:     (+)other neuro DDD    Cardiovascular:     (+) Dyslipidemia, hypertension----. : . . . :. .       METS/Exercise Tolerance:     Hematologic:         Musculoskeletal:   (+) arthritis, , , other musculoskeletal- gout      GI/Hepatic:     (+) GERD       Renal/Genitourinary:     (+) chronic renal disease, type: CRI, Other Renal/ Genitourinary, h/o bladder CA      Endo:     (+) thyroid problem hypothyroidism, .      Psychiatric:     (+) psychiatric history depression      Infectious Disease:         Malignancy:         Other:                     Physical Exam  Normal systems: dental    Airway   Mallampati: II  TM distance: >3 FB  Neck ROM: full    Dental     Cardiovascular   Rhythm and rate: regular and normal      Pulmonary    breath sounds clear to auscultation                    Anesthesia Plan      History & Physical Review  History and physical reviewed and following examination; no interval change.    ASA Status:  3 .    NPO Status:  > 8 hours    Plan for MAC Reason for MAC:  Procedure to face, neck, head or breast  PONV prophylaxis:  Ondansetron (or other 5HT-3)       Postoperative Care  Postoperative pain management:  IV analgesics.      Consents  Anesthetic plan, risks, benefits and alternatives discussed with:  Patient..                          .

## 2018-09-24 NOTE — ANESTHESIA POSTPROCEDURE EVALUATION
Patient: Abbey Omer    Procedure(s):  LEFT EYE PHACOEMULSIFICATION CLEAR CORNEA WITH STANDARD INTRAOCULAR LENS IMPLANT - Wound Class: I-Clean    Diagnosis:LEFT EYE CATARACT  Diagnosis Additional Information: No value filed.    Anesthesia Type:  MAC    Note:  Anesthesia Post Evaluation    Patient location during evaluation: PACU  Patient participation: Able to fully participate in evaluation  Level of consciousness: awake and alert  Pain management: adequate  Airway patency: patent  Cardiovascular status: acceptable and hemodynamically stable  Respiratory status: acceptable and unassisted  Hydration status: acceptable  PONV: none     Anesthetic complications: None          Last vitals:  Vitals:    09/24/18 1055 09/24/18 1100 09/24/18 1110   BP: 117/59     Resp: 16 14 18   Temp:      SpO2: 95% 94% 97%         Electronically Signed By: Aj Uribe MD  September 24, 2018  12:11 PM

## 2018-09-25 ENCOUNTER — OFFICE VISIT (OUTPATIENT)
Dept: OPHTHALMOLOGY | Facility: CLINIC | Age: 68
End: 2018-09-25
Payer: COMMERCIAL

## 2018-09-25 DIAGNOSIS — Z96.1 PSEUDOPHAKIA OF LEFT EYE: Primary | ICD-10-CM

## 2018-09-25 PROCEDURE — 99024 POSTOP FOLLOW-UP VISIT: CPT | Performed by: STUDENT IN AN ORGANIZED HEALTH CARE EDUCATION/TRAINING PROGRAM

## 2018-09-25 ASSESSMENT — TONOMETRY
IOP_METHOD: APPLANATION
OD_IOP_MMHG: 26

## 2018-09-25 ASSESSMENT — VISUAL ACUITY
OS_PH_SC: 20/25
METHOD: SNELLEN - LINEAR
OS_SC: 20/40

## 2018-09-25 ASSESSMENT — EXTERNAL EXAM - LEFT EYE: OS_EXAM: NORMAL

## 2018-09-25 ASSESSMENT — SLIT LAMP EXAM - LIDS
COMMENTS: NORMAL
COMMENTS: NORMAL

## 2018-09-25 ASSESSMENT — EXTERNAL EXAM - RIGHT EYE: OD_EXAM: NORMAL

## 2018-09-25 NOTE — PATIENT INSTRUCTIONS
POST-OP CATARACT INSTRUCTIONS    *   Use the following drop(s) in the LEFT EYE four times a day:        CatarActive 3    *   Wear eye shield when sleeping for one week.    *   No eye rubbing or lifting more than 10 pounds for one week.    *   Keep water out of eye for two weeks.    *   OK to resume aspirin and/or other blood thinners.    *   Return as scheduled in about one week.    *   If your vision worsens, eye becomes increasingly red, or becomes painful, call [phone].     Ryne Pascal M.D.

## 2018-09-25 NOTE — PROGRESS NOTES
Current Eye Medications:  CatarActive 3 three times a day left eye yesterday after coming home, and once so far today.       Subjective:  Status/Post Kelman Phacoemulsification with Posterior Chamber Lens left eye:  9-24-18.  Slept well.  No pain.       Objective:  See Ophthalmology Exam.       Assessment:  Abbey Omer is a 68 year old female who presents with:   Encounter Diagnosis   Name Primary?     Pseudophakia of left eye PO1 left eye, doing well.        Plan:  POST-OP CATARACT INSTRUCTIONS    *   Use the following drop(s) in the LEFT EYE four times a day:        CatarActive 3    *   Wear eye shield when sleeping for one week.  *   No eye rubbing or lifting more than 10 pounds for one week.  *   Keep water out of eye for two weeks.  *   OK to resume aspirin and/or other blood thinners.  *   Return as scheduled in about one week.  *   If your vision worsens, eye becomes increasingly red, or becomes painful, call [phone].     Ryne Pascal M.D.

## 2018-09-25 NOTE — LETTER
9/25/2018         RE: Abbey Omer  [address] [facility] [address]        Dear Colleague,    Thank you for referring your patient, Abbey Omer, to the [facility].   She is doing well after cataract surgery in the left eye. Please see a copy of my visit note below.     Current Eye Medications:  CatarActive 3 three times a day left eye yesterday after coming home, and once so far today.       Subjective:  Status/Post Kelman Phacoemulsification with Posterior Chamber Lens left eye:  9-24-18.  Slept well.  No pain.       Objective:  See Ophthalmology Exam.       Assessment:  Abbey Omer is a 68 year old female who presents with:   Encounter Diagnosis   Name Primary?     Pseudophakia of left eye PO1 left eye, doing well.        Plan:  POST-OP CATARACT INSTRUCTIONS    *   Use the following drop(s) in the LEFT EYE four times a day:        CatarActive 3    *   Wear eye shield when sleeping for one week.  *   No eye rubbing or lifting more than 10 pounds for one week.  *   Keep water out of eye for two weeks.  *   OK to resume aspirin and/or other blood thinners.  *   Return as scheduled in about one week.  *   If your vision worsens, eye becomes increasingly red, or becomes painful, call [phone].     Ryne Pascal M.D.      Again, thank you for allowing me to participate in the care of your patient.        Sincerely,        Ryne Pascal MD

## 2018-09-25 NOTE — MR AVS SNAPSHOT
After Visit Summary   9/25/2018    Abbey Omer    MRN: [id number]           Patient Information     Date Of Birth          1950        Visit Information        Provider Department      9/25/2018 9:15 AM Ryne Pascal MD [facility]        Today's Diagnoses     Pseudophakia of left eye    -  1      Care Instructions    POST-OP CATARACT INSTRUCTIONS    *   Use the following drop(s) in the LEFT EYE four times a day:        CatarActive 3    *   Wear eye shield when sleeping for one week.    *   No eye rubbing or lifting more than 10 pounds for one week.    *   Keep water out of eye for two weeks.    *   OK to resume aspirin and/or other blood thinners.    *   Return as scheduled in about one week.    *   If your vision worsens, eye becomes increasingly red, or becomes painful, call [phone].     Ryne Pascal M.D.          Follow-ups after your visit        Follow-up notes from your care team     Return for PO2, as scheduled.      Your next 10 appointments already scheduled     Oct 02, 2018  9:15 AM CDT   [facility]    [address] [address]   [phone]            Oct 08, 2018   Procedure with Ryne Pascal MD   [facility] (--)    [address]  [facility] [address]   [phone]            Oct 09, 2018  9:15 AM CDT   [facility]    [address] [address]   [phone]            Oct 16, 2018  9:15 AM CDT   Return Visit with MD Aki Fenton ([facility])    [address] [address]   [phone]            Nov 14, 2018  1:45 PM CST   Return Visit with Ryne Pascal MD   [facility]    [address] [place] [address]  [place] [address]   [phone]            Jan 22, 2019  9:00 AM CST   Return Visit with Omar West MD, [facility]   [facility] [place] [address]   [phone]              Who to contact     If you have questions or need follow up information about today's clinic visit or your schedule please contact [facility] directly at [phone].  Normal or non-critical lab and imaging results will be communicated to you by [employer], letter or phone within 4 business days after the clinic has received the results. If you do not hear from us within 7 days, please contact the clinic through [employer] or phone. If you have a critical or abnormal lab result, we will notify you by phone as soon as possible.  Submit refill requests through Triptease or call your pharmacy and they will forward the refill request to us. Please allow 3 business days for your refill to be completed.          Additional Information About Your Visit        MyChart Information     T[employer]ease gives you secure access to your electronic health record. If you see a primary care provider, you can also send messages to your care team and make appointments. If you have questions, please call your primary care clinic.  If you do not have a primary care provider, please call [phone] and they will assist you.        Care EveryWhere ID     This is your Care EveryWhere ID. This could be used by other organizations to access your [facility] medical records  [id number]         Blood Pressure from Last 3 Encounters:   09/24/18 117/59   09/13/18 (!) 146/92   07/20/18 136/76    Weight from Last 3 Encounters:   09/13/18 76.3 kg (168 lb 3.2 oz)   07/20/18 74.9 kg (165 lb 3.2 oz)   07/18/18 74.8 kg (164 lb 12.8 oz)              Today, you had the following     No orders found for display         Today's Medication Changes          These changes are accurate as of  9/25/18  9:32 AM.  If you have any questions, ask your nurse or doctor.               These medicines have changed or have updated prescriptions.        Dose/Directions    colchicine 0.6 MG tablet   Commonly known as:  COLCRYS   This may have changed:    - when to take this  - reasons to take this   Used for:  Chronic gout involving toe without tophus, unspecified cause, unspecified laterality        Dose:  0.6 mg   Take 1 tablet (0.6 mg) by mouth daily   Quantity:  90 tablet   Refills:  3                Primary Care Provider Office Phone # Fax #    Sarah VallejoVENUS [phone]       [address] [place] [address]        Equal Access to Services     BHUPINDER SUTHERLAND : Kathleen Carrera, wacharlotte wallis, qaybcynthia kaalmada franklin, maximiliano pathak. So [facility] [phone].    ATENCIÓN: Si habla español, tiene a smith disposición servicios gratuitos de asistencia lingüística. Llame al [phone].    We comply with applicable federal civil rights laws and [place] laws. We do not discriminate on the basis of race, color, national origin, age, disability, sex, sexual orientation, or gender identity.            Thank you!     Thank you for choosing [facility]  for your care. Our goal is always to provide you with excellent care. Hearing back from our patients is one way we can continue to improve our services. Please take a few minutes to complete the written survey that you may receive in the mail after your visit with us. Thank you!             Your Updated Medication List - Protect others around you: Learn how to safely use, store and throw away your medicines at www.disposemymeds.org.          This list is accurate as of 9/25/18  9:32 AM.  Always use your most recent med list.                   Brand Name Dispense Instructions for use Diagnosis    allopurinol 100 MG tablet    ZYLOPRIM    90 tablet    Take 1 tablet (100 mg) by mouth daily     Chronic gout involving toe without tophus, unspecified cause, unspecified laterality       Bromfenac Sodium Powd     1 Bottle    1 Bottle 4 times daily 1 drop four times a day in the operative eye starting 1 day before surgery. Use until the bottle runs out.    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 Use until the bottle runs out.    Combined form of age-related cataract, both eyes       EXCEDRIN TENSION HEADACHE 500-65 MG Tabs   Generic drug:  acetaminophen-caffeine      1 tablet twice daily as needed        fish Oil 1200 MG capsule      3 capsule daily        FLAXSEED OIL PO      Take 1 Tablespoonful by mouth daily        fluticasone 50 MCG/ACT spray    FLONASE    48 g    Spray 2 sprays into both nostrils daily    Chronic rhinitis       KRILL OIL PO      Take 1 capsule by mouth daily        levalbuterol 45 MCG/ACT Inhaler    XOPENEX HFA    15 g    INHALE 2 PUFFS INTO THE LUNGS EVERY 6 HOURS AS NEEDED FOR SHORTNESS OF BREATH OR DIFFICULT BREATHING OR WHEEZING    Mild intermittent asthma without complication       levothyroxine 88 MCG tablet    SYNTHROID/LEVOTHROID    90 tablet    TAKE 1 TABLET(88 MCG) BY MOUTH DAILY    Hypothyroidism due to acquired atrophy of thyroid       magnesium 500 MG Tabs      Take 1 tablet by mouth daily        Moxifloxacin HCl Powd     1 Bottle    1 Bottle 4 times daily 1 drop four times a day in the operative eye starting 1 day before surgery. Use until the bottle runs out.     Combined form of age-related cataract, both eyes       MULTIPLE vitamin  S/WOMENS Tabs      Take 2 tablets by mouth daily        order for DME      Equipment ordered: Respironics Auto PAP Mask type: Nasal Settings: 9-15 cm H2O        polyethylene glycol powder    MIRALAX    510 g    Take 17 g (1 capful) by mouth 2 times daily as needed for constipation    Slow transit constipation       ranitidine 300 MG tablet    ZANTAC    90 tablet    Take 1 tablet (300 mg) by mouth At Bedtime    Gastroesophageal reflux disease without esophagitis       STATIN NOT PRESCRIBED (INTENTIONAL)      by Other route continuous prn Reported on 5/15/2017    Hyperlipidemia LDL goal < 100       traZODone 50 MG tablet    DESYREL    180 tablet    Take 1-2 tablets ( mg) by mouth nightly as needed for sleep    Insomnia, unspecified type       venlafaxine 150 MG 24 hr capsule    EFFEXOR-XR    180 capsule    2 Capsules daily    Major depressive disorder, recurrent episode, mild degree (H)       VITAMIN B COMPLEX PO           zinc 50 MG Tabs      Take 1 tablet by mouth daily

## 2018-10-01 NOTE — H&P (VIEW-ONLY)
[address] [id number] [phone]  Dept: [phone]    PRE-OP EVALUATION:  Today's date: 2018    Abbey Omer (: 1950) presents for pre-operative evaluation assessment as requested by Dr. Pascal.  She requires evaluation and anesthesia risk assessment prior to undergoing surgery/procedure for treatment of Cataracts .    Primary Physician: Sarah Vallejo  Type of Anesthesia Anticipated: Combined Mac with topical    Patient has a Health Care Directive or Living Will:  YES     Preop Questions 2018   Who is doing your surgery? Dr Flood   What are you having done? Left eye Cataract   Date of Surgery/Procedure: 18   Facility or Hospital where procedure/surgery will be performed: [facility]   1.  Do you have a history of Heart attack, stroke, stent, coronary bypass surgery, or other heart surgery? No   2.  Do you ever have any pain or discomfort in your chest? No   3.  Do you have a history of  Heart Failure? No   4.   Are you troubled by shortness of breath when:  walking on a level surface, or up a slight hill, or at night? No   5.  Do you currently have a cold, bronchitis or other respiratory infection? No   6.  Do you have a cough, shortness of breath, or wheezing? No   7.  Do you sometimes get pains in the calves of your legs when you walk? No   8. Do you or anyone in your family have previous history of blood clots? No   9.  Do you or does anyone in your family have a serious bleeding problem such as prolonged bleeding following surgeries or cuts? No   10. Have you ever had problems with anemia or been told to take iron pills? No   11. Have you had any abnormal blood loss such as black, tarry or bloody stools, or abnormal vaginal bleeding? No   12. Have you ever had a blood transfusion? YES - years ago   13. Have you or any of your relatives ever had problems with anesthesia? No   14. Do you have sleep apnea,  excessive snoring or daytime drowsiness? YES - has obstructive sleep apnea and uses CPAP   15. Do you have any prosthetic heart valves? No   16. Do you have prosthetic joints? YES- shoulder replacements   17. Is there any chance that you may be pregnant? No         HPI:     HPI related to upcoming procedure: is getting cataract removal      See problem list for active medical problems.  Problems all longstanding and stable, except as noted/documented.  See ROS for pertinent symptoms related to these conditions.                                                                                                                                                          .    MEDICAL HISTORY:     Patient Active Problem List    Diagnosis Date Noted     Major depressive disorder, recurrent episode, mild degree (H) 08/03/2011     Priority: High     prior care with psychiatrist Dr. Ro  Has tried paxil in past.        Hypertension goal BP (blood pressure) < 140/90 06/01/2011     Priority: High     Intolerant to higher than 50 mg of metoprolol due to depressive effects       Hyperlipidemia with target LDL less than 100 05/17/2011     Priority: High     Has tried welchol, statins and developed side effects  Diagnosis updated by automated process. Provider to review and confirm.       CKD (chronic kidney disease) stage 3, GFR 30-59 ml/min 12/20/2010     Priority: High     3/13/18:  Creatinine 1.32, GFR 40, creatinine clearance 50.1 mL/Min    Tray consulted with [facility] 3/14/18.  Renal function has declined since addition of Allopurinol and Colchicine.    Renally cleared drug recommendations:  - Continue to avoid NSAIDs  - Allopurinol 100 mg dosage okay to continue  - Colchicine back off dosage.  3/12/18 patient changed to AS NEEDED usage.  - Ranitidine 300 mg/day change to 150 mg/day.  3/14/18 patient changed to 150 mg dosage.  - Spironolactone 100 mg/day consider decreasing to 50 mg.  - Effexor 300 mg/day may reduce  "total daily dose by 25-50% but this should be done last due to risk of destabilizing mental health       Hypothyroidism 10/12/2009     Priority: High     Acute gout involving toe of left foot, unspecified cause 08/11/2017     Priority: Medium     Discoid atelectasis 04/28/2016     Priority: Medium     4/26/16:  Chest CT - \"FINDINGS:   CHEST: Series 3 image 23, there is a peripheral indeterminate 0.3 cm  right mid lung nodule near the minor fissure which is stable since  6/3/2014. No acute pulmonary disease or additional nodules identified.  There is discoid atelectasis in the right lower lobe. Left lung is  clear. No mediastinal, hilar or axillary adenopathy. Cholecystectomy  clips. Elevated right hemidiaphragm.\"       Renal cyst, left 02/22/2016     Priority: Medium     Seen on abd/pelvic CT at [facility] 2/14/2016.  Recommend recheck CT in one year or do ultrasound.  In review of chart, renal cyst noted on ultrasound in 2011 and CT done at [facility] facility in 2014 showing stablility       Combined form of age-related cataract, both eyes 02/17/2016     Priority: Medium     Glaucoma suspect, bilateral 02/17/2016     Priority: Medium     Target IOP:   Max IOP :   Family history: No  Trauma history: No  OCT: 8/216 Normal  [place] visual field (HVF): 8/2016   Right eye - Reliable, Normal   Left eye - Reliable, Normal  Pachymetry: Average-thickish 553/564  C/D: 0.6/0.6  SLT:            Drusen of macula of both eyes 02/17/2016     Priority: Medium     Acne 05/20/2015     Priority: Medium     Major depressive disorder, recurrent episode, moderate (H) 06/20/2014     Priority: Medium     Pulmonary nodules 06/16/2014     Priority: Medium     4/26/16:  Chest CT - \"IMPRESSION: Stable 0.3 cm right midlung nodule since June 2014.\"       Allergic conjunctivitis 04/10/2013     Priority: Medium     Hypertriglyceridemia 03/01/2013     Priority: Medium     DDD (degenerative disc disease), cervical 06/16/2011     Priority: Medium     " "Elevated LFTs 06/15/2011     Priority: Medium     6/21/2011:  [facility] Ultrasound \"IMPRESSION:  1. Previous cholecystectomy.  2. Otherwise unremarkable ultrasound of the right upper quadrant.\"  2/14/16:  Abdominal CT through [facility] shows unremarkable liver       Dependent edema      Priority: Medium     History of bladder cancer 05/18/2011     Priority: Medium     Dr. David, [facility]       Obesity 05/17/2011     Priority: Medium     Chronic nonallergic rhinitis      Priority: Medium     Dr. Peoples, allergist       RICKEY (obstructive sleep apnea) 08/11/2009     Priority: Medium     Intolerant to CPAP    Split Night:  Sleep Study 2/01/2017 - (173.0 lbs) AHI 29.6, RDI 59.8, Supine AHI 39.4, REM AHI -, Low O2% 78.5%, Time Spent ?88% 14.4, Time Spent ?89% 30.6, CPAP was titrated to a pressure of 9 with an AHI 30. Time spent in REM supine at this pressure was - minutes.       Macular drusen 11/25/2011     Priority: Low     Advance Care Planning 06/15/2011     Priority: Low     Advance Care Planning 7/5/2016: Receipt of ACP document:  Received: Health Care Directive which was witnessed or notarized on 4/25/16.  Document previously scanned on 4/28/16. Also received Health Care Directive dated 3/18/16 which was valid and scanned on 4/14/16  Validation forms previously completed and sent to be scanned.  Code Status reflects choices in most recent ACP document.  Confirmed/documented designated decision maker(s).  Added by Kanchan Lazcano RN, Advance Care Planning Liaison.  Advance Care Planning 4/25/2016: ACP Facilitation Session:    Abbey GAYLE Kan presented for ACP Facilitation session at the clinic. She was accompanied by spouse. Hon[facility] Choices information provided and resources reviewed. She currently wishes to complete an ACP document and needs further clarification and/or consideration prior to documenting choices.  She currently has the following questions or concerns about Advance Care Planning: none.  " Confirmed/documented legally designated decision maker(s). Code status reflects choices in most recent ACP document. Follow-up meeting: not needed/applicable.  document sent to be scanned after viewed for validation.Added by Adrianna Silveira   Advance Care Planning 4/13/2016: Receipt of ACP document:  Received: invalid HCD document not signed, witness/notary and missing pages.Document not previously scanned. Validation form completed indicating invalid document. Copy sent to client with information and facilitation resources. Validation form sent to be scanned as notation of invalid document received.  Confirmed/documented RN, System Director ACP-Honoring Choices  designated decision maker(s).  Added by Rebeca Manriquez  Advance Care Planning 6-15-11 Discussed advance care planning with patient; information given to patient to review. Karen Pro CMA        Past Medical History:   Diagnosis Date     Cancer of bladder (H)      Cataract 11/25/2011     Chronic nonallergic rhinitis 8/31/10 RAST     CKD (chronic kidney disease) stage 3, GFR 30-59 ml/min 12/20/2010     DDD (degenerative disc disease), cervical 6/16/2011     Dependent edema      Depression, major      Depressive disorder In 2002 or 2003     History of blood transfusion ?     HTN (hypertension)      Hyperlipidemia      Hypothyroidism 10/12/2009     Migraine     resolved     Nasal septal deviation 8/11/2009     Obesity 5/17/2011     RICKEY (obstructive sleep apnea)      Osteoarthritis of shoulder region      Osteoporosis      Premature menopause      Renal cyst      Past Surgical History:   Procedure Laterality Date     ARTHROPLASTY SHOULDER Right 11/01/2017    Right total shoulder arthroplasty by Dr. Go at [facility]     ARTHROPLASTY SHOULDER Left 03/28/2018    Left total shoulder arthroplasty by Dr. Go at [facility]     CHOLECYSTECTOMY, LAPOROSCOPIC       COLONOSCOPY  ?    Upper & Lower     CYSTOSCOPY       bladder cancer     D & C       ENT SURGERY  first 1967/second ?    2 Rhynoplasties     HYSTERECTOMY, PAP NO LONGER INDICATED      BSO     NOSE SURGERY      septum deviation     Current Outpatient Prescriptions   Medication Sig Dispense Refill     allopurinol (ZYLOPRIM) 100 MG tablet Take 1 tablet (100 mg) by mouth daily 90 tablet 1     B Complex Vitamins (VITAMIN B COMPLEX PO)        [START ON 9/23/2018] Bromfenac Sodium POWD 1 Bottle 4 times daily 1 drop four times a day in the operative eye starting 1 day before surgery. Use until the bottle runs out. 1 Bottle 1     CALCIUM + D 600-200 MG-UNIT OR TABS 1 tablet twice a day       colchicine (COLCRYS) 0.6 MG tablet Take 1 tablet (0.6 mg) by mouth daily (Patient taking differently: Take 0.6 mg by mouth as needed ) 90 tablet 3     Cranberry 500 MG CAPS Take 1 capsule by mouth daily       [START ON 9/23/2018] Dexamethasone Acetate POWD 1 Bottle 4 times daily 1 drop four times a day in the operative eye starting 1 day before surgery. Use until the bottle runs out. 1 Bottle 1     EXCEDRIN TENSION HEADACHE 500-65 MG PO TABS 1 tablet twice daily as needed       FISH OIL 1200 MG OR CAPS 3 capsule daily       Flaxseed, Linseed, (FLAXSEED OIL PO) Take 1 Tablespoonful by mouth daily       fluticasone (FLONASE) 50 MCG/ACT nasal spray Spray 2 sprays into both nostrils daily 48 g 3     KRILL OIL PO Take 1 capsule by mouth daily       levalbuterol (XOPENEX HFA) 45 MCG/ACT Inhaler INHALE 2 PUFFS INTO THE LUNGS EVERY 6 HOURS AS NEEDED FOR SHORTNESS OF BREATH OR DIFFICULT BREATHING OR WHEEZING 15 g 0     levothyroxine (SYNTHROID/LEVOTHROID) 88 MCG tablet TAKE 1 TABLET(88 MCG) BY MOUTH DAILY 90 tablet 0     magnesium 500 MG TABS Take 1 tablet by mouth daily       [START ON 9/23/2018] Moxifloxacin HCl POWD 1 Bottle 4 times daily 1 drop four times a day in the operative eye starting 1 day before surgery. Use until the bottle runs out. 1 Bottle 1     Multiple Vitamins-Minerals (MULTIPLE  VITAMIN  S/WOMENS) TABS Take 2 tablets by mouth daily       order for DME Equipment ordered: Respironics Auto PAP Mask type: Nasal Settings: 9-15 cm H2O       polyethylene glycol (MIRALAX) powder Take 17 g (1 capful) by mouth 2 times daily as needed for constipation 510 g 1     ranitidine (ZANTAC) 300 MG tablet Take 1 tablet (300 mg) by mouth At Bedtime 90 tablet 3     STATIN NOT PRESCRIBED, INTENTIONAL, by Other route continuous prn Reported on 5/15/2017  0     traZODone (DESYREL) 50 MG tablet Take 1-2 tablets ( mg) by mouth nightly as needed for sleep 180 tablet 1     venlafaxine (EFFEXOR-XR) 150 MG 24 hr capsule 2 Capsules daily 180 capsule 3     zinc 50 MG TABS Take 1 tablet by mouth daily              OTC products: None, except as noted above    Allergies   Allergen Reactions     Albuterol Other (See Comments)     Very jittery and couldn't tolerate     Amlodipine Swelling and Other (See Comments)     Swelling- lower legs     Asa [Aspirin] GI Disturbance     Atenolol Fatigue     Atenolol Other (See Comments)     Fatigue     Atorvastatin Other (See Comments)     Swelling legs     Atorvastatin Calcium Swelling     Bupropion Unknown     Codeine Nausea     Colestid [Colestipol Hcl]      Crestor [Hmg-Coa-R Inhibitors] Other (See Comments)     Joint pain  Other reaction(s): Other - Describe In Comment Field  Joint pain  Joint pain     Cymbalta      Visual hallucinations     Ezetimibe Other (See Comments)     Fatigue     Hydralazine Fatigue and Other (See Comments)     Fatigue     Lovastatin Cramps and Other (See Comments)     Cramps     Niacin      Unknown by patient     Paroxetine Other (See Comments)     Fatigue     Paxil [Paroxetine] Fatigue     Potassium GI Disturbance     Sulfa Drugs Fatigue     Sulfasalazine Other (See Comments)     Triamterene Unknown     Zetia [Ezetimibe] Fatigue     Zomig [Zolmitriptan] Other (See Comments)     Cant wake up      Latex Allergy: NO    Social History   Substance Use Topics      Smoking status: Former Smoker     Packs/day: 0.10     Years: 1.00     Types: Cigarettes     Start date: 8/12/1969     Quit date: 10/12/1969     Smokeless tobacco: Never Used      Comment: Smoked, very little, for 2 mo, 49 years ago.     Alcohol use Yes      Comment: Maybe 1 or 2 drinks a year     History   Drug Use No       REVIEW OF SYSTEMS:   Constitutional, HEENT, cardiovascular, pulmonary, gi and gu systems are negative, except as otherwise noted.    EXAM:   BP (!) 146/92 (BP Location: Right arm, Patient Position: Chair, Cuff Size: Adult Large)  Pulse 70  Temp 97.8  F (36.6  C) (Oral)  Ht 5' (1.524 m)  Wt 168 lb 3.2 oz (76.3 kg)  BMI 32.85 kg/m2    GENERAL APPEARANCE: healthy, alert and over weight     EYES: EOMI, PERRL     HENT: ear canals and TM's normal and nose and mouth without ulcers or lesions     NECK: no adenopathy, no asymmetry, masses, or scars and thyroid normal to palpation     RESP: lungs clear to auscultation - no rales, rhonchi or wheezes     CV: regular rates and rhythm, normal S1 S2, no S3 or S4 and no murmur, click or rub     PSYCH: mentation appears normal. and affect normal/bright     LYMPHATICS: No cervical adenopathy    DIAGNOSTICS:   No labs or EKG required for low risk surgery (cataract, skin procedure, breast biopsy, etc)    Recent Labs   Lab Test  04/02/18   1520  03/12/18   1037   10/27/17   1034   07/28/11   0916   HGB   --   14.8   --   14.3   < >  13.3   PLT   --   269   --   257   --   257   INR   --    --    --    --    --   1.00   NA  131*  136   < >  135   < >  139   POTASSIUM  3.8  4.4   < >  4.4   < >  4.1   CR  1.00  1.32*   < >  1.42*   < >  1.17*    < > = values in this interval not displayed.        IMPRESSION:   Reason for surgery/procedure: cataract  Diagnosis/reason for consult: pre op exam    The proposed surgical procedure is considered LOW risk.    REVISED CARDIAC RISK INDEX  The patient has the following serious cardiovascular risks for perioperative  complications such as (MI, PE, VFib and 3  AV Block):  No serious cardiac risks  INTERPRETATION: 0 risks: Class I (very low risk - 0.4% complication rate)    The patient has the following additional risks for perioperative complications:  No identified additional risks      ICD-10-CM    1. Preop general physical exam Z01.818    2. RICKEY (obstructive sleep apnea) G47.33    3. CKD (chronic kidney disease) stage 3, GFR 30-59 ml/min N18.3 BASIC METABOLIC PANEL   4. Cough R05 Patient has an intermittent cough (not currently) that comes and goes; sometimes triggered by laughing.  Had previously seen allergy clinic and spirometry was normal.  Will send for a consult with Pulmonary.  Differential considered cough variant asthma, vocal cord dysfunction  PULMONARY MEDICINE REFERRAL for consultation   5. Chronic nonallergic rhinitis J31.0    6. Chronic gout involving toe without tophus, unspecified cause, unspecified laterality [id number] allopurinol (ZYLOPRIM) 100 MG tablet   7. Need for prophylactic vaccination and inoculation against influenza Z23 FLU VACCINE, INCREASED ANTIGEN, PRESV FREE, AGE 65+ [60190]     Vaccine Administration, Initial [[id number]]       RECOMMENDATIONS:     --Patient is to take all scheduled medications on the day of surgery EXCEPT for modifications listed below.    APPROVAL GIVEN to proceed with proposed procedure, without further diagnostic evaluation       Signed Electronically by: Sarah Vallejo NP    Copy of this evaluation report is provided to requesting physician.    [facility] Preop Guidelines    Revised Cardiac Risk Index

## 2018-10-02 ENCOUNTER — OFFICE VISIT (OUTPATIENT)
Dept: OPHTHALMOLOGY | Facility: CLINIC | Age: 68
End: 2018-10-02
Payer: COMMERCIAL

## 2018-10-02 DIAGNOSIS — H40.003 GLAUCOMA SUSPECT, BILATERAL: ICD-10-CM

## 2018-10-02 DIAGNOSIS — H25.813 COMBINED FORM OF AGE-RELATED CATARACT, BOTH EYES: ICD-10-CM

## 2018-10-02 DIAGNOSIS — Z96.1 PSEUDOPHAKIA OF LEFT EYE: Primary | ICD-10-CM

## 2018-10-02 PROCEDURE — 92136 OPHTHALMIC BIOMETRY: CPT | Mod: 26 | Performed by: STUDENT IN AN ORGANIZED HEALTH CARE EDUCATION/TRAINING PROGRAM

## 2018-10-02 PROCEDURE — 92012 INTRM OPH EXAM EST PATIENT: CPT | Mod: 24 | Performed by: STUDENT IN AN ORGANIZED HEALTH CARE EDUCATION/TRAINING PROGRAM

## 2018-10-02 ASSESSMENT — TONOMETRY
OD_IOP_MMHG: 20
OS_IOP_MMHG: 20
IOP_METHOD: APPLANATION

## 2018-10-02 ASSESSMENT — VISUAL ACUITY
METHOD: SNELLEN - LINEAR
OD_SC: 20/40
OS_SC: 20/30-1

## 2018-10-02 ASSESSMENT — REFRACTION_MANIFEST
OS_CYLINDER: +1.75
OS_SPHERE: -1.00
OS_AXIS: 178

## 2018-10-02 ASSESSMENT — EXTERNAL EXAM - LEFT EYE: OS_EXAM: NORMAL

## 2018-10-02 ASSESSMENT — SLIT LAMP EXAM - LIDS
COMMENTS: NORMAL
COMMENTS: NORMAL

## 2018-10-02 ASSESSMENT — EXTERNAL EXAM - RIGHT EYE: OD_EXAM: NORMAL

## 2018-10-02 NOTE — MR AVS SNAPSHOT
After Visit Summary   10/2/2018    Abbey Omer    MRN: [id number]           Patient Information     Date Of Birth          1950        Visit Information        Provider Department      10/2/2018 9:15 AM Ryne Pascal MD [facility]        Today's Diagnoses     Pseudophakia of left eye    -  1    Combined form of age-related cataract, both eyes        Glaucoma suspect, bilateral          Care Instructions    *   For the left eye, continue CatarActive3 four times a day until the bottle runs out.    *   Stop wearing eye shield.    *   No eye rubbing. May resume heavy lifting.    *   If you are taking glaucoma drops, continue as usual.    *   Return one month for final exam with glasses check.    *   If your vision worsens, eye becomes increasingly red, or becomes painful, call [phone].    Ryne Pascal M.D.    PRE-OP CATARACT INSTRUCTIONS    ** 3 eye drops from the pharmacy at least 3 days before surgery.    *Use the following drops in the right eye 4 times on the day before surgery and once the morning of surgery:                                 CatarActive3    *Please bring all your eyedrops to the surgery center.    *If taking more than one drop, wait five minutes between drops.    *No solid food or drink after midnight. Please take the starred medications with a small sip of water the morning of surgery.    *If you are diabetic, please do not take any diabetic medications the morning of surgery.    *If you are taking glaucoma drops, continue as usual.    Ryne Pascal MD  [phone]            Follow-ups after your visit        Your next 10 appointments already scheduled     Oct 08, 2018   [facility]    [address]  [facility] [address]   [phone]            Oct 09, 2018  9:15 AM CDT   Return Visit with MD Aki Fenton ([place]  [facility]    [address]  [place] [address]   [phone]            Oct 16, 2018  9:15 AM CDT   [facility] Visit with Ryne Pascal MD   [facility]    [address]  [place] [address]   [phone]            Nov 14, 2018  1:45 PM CST   Return Visit with Ryne Pascal MD   [facility] ([facility]    [address]  [place] [address]   [phone]            Jan 22, 2019  9:00 AM CST   Return Visit with Omar West MD, [facility] CYSTO PROC ROOM   [facility] ([facility])    [address]  [place] [address]   [phone]              Who to contact     If you have questions or need follow up information about today's clinic visit or your schedule please contact [facility] directly at [phone].  Normal or non-critical lab and imaging results will be communicated to you by [employer], letter or phone within 4 business days after the clinic has received the results. If you do not hear from us within 7 days, please contact the clinic through [employer] or phone. If you have a critical or abnormal lab result, we will notify you by phone as soon as possible.  Submit refill requests through [employer] or call your pharmacy and they will forward the refill request to us. Please allow 3 business days for your refill to be completed.          Additional Information About Your Visit        [facility] [employer] Information     [employer] gives you secure access to your electronic health record. If you see a primary care provider, you can also send messages to your care team and make appointments. If you have questions, please call your primary care clinic.  If you do not have a primary care provider, please call [phone] and they will assist you.        Care EveryWhere ID     This is your Care EveryWhere ID. This could be used by other organizations to access your  [place] medical records  [id number]         Blood Pressure from Last 3 Encounters:   09/24/18 117/59   09/13/18 (!) 146/92   07/20/18 136/76    Weight from Last 3 Encounters:   09/13/18 76.3 kg (168 lb 3.2 oz)   07/20/18 74.9 kg (165 lb 3.2 oz)   07/18/18 74.8 kg (164 lb 12.8 oz)              We Performed the Following     IOL MASTER (2nd eye)          Today's Medication Changes          These changes are accurate as of 10/2/18 10:27 AM.  If you have any questions, ask your nurse or doctor.               These medicines have changed or have updated prescriptions.        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 So [facility] [phone].    ATENCIÓN: Si habla español, tiene a smith disposición servicios gratuitos de asistencia lingüística. [facility] [phone].    We comply with applicable federal civil rights laws and [place] laws. We do not discriminate on the basis of race, color, national origin, age, disability, sex, sexual orientation, or gender identity.            Thank you!     Thank you for choosing [facility]  for your care. Our goal is always to provide you with excellent care. Hearing back from our patients is one way we can continue to improve our services. Please take a few minutes to complete the written survey that you may receive in the mail after your visit with us.  Thank you!             Your Updated Medication List - Protect others around you: Learn how to safely use, store and throw away your medicines at www.disposemymeds.org.          This list is accurate as of 10/2/18 10:27 AM.  Always use your most recent med list.                   Brand Name Dispense Instructions for use Diagnosis    allopurinol 100 MG tablet    ZYLOPRIM    90 tablet    Take 1 tablet (100 mg) by mouth daily    Chronic gout involving toe without tophus, unspecified cause, unspecified laterality       Bromfenac Sodium Powd     1 Bottle    1 Bottle 4 times daily 1 drop four times a day in the operative eye starting 1 day before surgery. Use until the bottle runs out.    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 Use until the bottle runs out.    Combined form of age-related cataract, both eyes       EXCEDRIN TENSION HEADACHE 500-65 MG Tabs   Generic drug:  acetaminophen-caffeine      1 tablet twice daily as needed        fish Oil 1200 MG capsule      3 capsule daily        FLAXSEED OIL PO      Take 1 Tablespoonful by mouth daily        fluticasone 50 MCG/ACT spray    FLONASE    48 g    Spray 2 sprays into both nostrils daily    Chronic rhinitis       KRILL OIL PO      Take 1 capsule by mouth daily        levalbuterol 45 MCG/ACT Inhaler    XOPENEX HFA    15 g    INHALE 2 PUFFS INTO THE LUNGS EVERY 6 HOURS AS NEEDED FOR SHORTNESS OF BREATH OR DIFFICULT BREATHING OR WHEEZING    Mild intermittent asthma  without complication       levothyroxine 88 MCG tablet    SYNTHROID/LEVOTHROID    90 tablet    TAKE 1 TABLET(88 MCG) BY MOUTH DAILY    Hypothyroidism due to acquired atrophy of thyroid       magnesium 500 MG Tabs      Take 1 tablet by mouth daily        Moxifloxacin HCl Powd     1 Bottle    1 Bottle 4 times daily 1 drop four times a day in the operative eye starting 1 day before surgery. Use until the bottle runs out.    Combined form of age-related cataract, both eyes       MULTIPLE vitamin  S/WOMENS Tabs      Take 2 tablets by mouth daily        order for DME      Equipment ordered: Respironics Auto PAP Mask type: Nasal Settings: 9-15 cm H2O        polyethylene glycol powder    MIRALAX    510 g    Take 17 g (1 capful) by mouth 2 times daily as needed for constipation    Slow transit constipation       ranitidine 300 MG tablet    ZANTAC    90 tablet    Take 1 tablet (300 mg) by mouth At Bedtime    Gastroesophageal reflux disease without esophagitis       STATIN NOT PRESCRIBED (INTENTIONAL)      by Other route continuous prn Reported on 5/15/2017    Hyperlipidemia LDL goal < 100       traZODone 50 MG tablet    DESYREL    180 tablet    Take 1-2 tablets ( mg) by mouth nightly as needed for sleep    Insomnia, unspecified type       venlafaxine 150 MG 24 hr capsule    EFFEXOR-XR    180 capsule    2 Capsules daily    Major depressive disorder, recurrent episode, mild degree (H)       VITAMIN B COMPLEX PO           zinc 50 MG Tabs      Take 1 tablet by mouth daily

## 2018-10-02 NOTE — PATIENT INSTRUCTIONS
*   For the left eye, continue CatarActive3 four times a day until the bottle runs out.    *   Stop wearing eye shield.    *   No eye rubbing. May resume heavy lifting.    *   If you are taking glaucoma drops, continue as usual.    *   Return one month for final exam with glasses check.    *   If your vision worsens, eye becomes increasingly red, or becomes painful, call [phone].    Ryne Pascal M.D.    PRE-OP CATARACT INSTRUCTIONS    *Use the following drops in the right eye 4 times on the day before surgery and once the morning of surgery:                                 CatarActive3    *Please bring all your eyedrops to the surgery center.    *If taking more than one drop, wait five minutes between drops.    *No solid food or drink after midnight. Please take the starred medications with a small sip of water the morning of surgery.    *If you are diabetic, please do not take any diabetic medications the morning of surgery.    *If you are taking glaucoma drops, continue as usual.    Ryne Pascal MD  [phone]

## 2018-10-02 NOTE — PROGRESS NOTES
Current Eye Medications:  cataractive 3 four times day left eye     Subjective:  Po2 left eye and preop kpe right eye   Pt reports see very well with right eye and this eye feels fine. Pt is scheduled for kpe right eye on 10/08/2018     Objective:  See Ophthalmology Exam.       Assessment:  Abbey Omer is a 68 year old female who presents with:   Encounter Diagnoses   Name Primary?     Pseudophakia of left eye PO2 left eye, doing well.     Combined form of age-related cataract, both eyes Pre-op cataract right eye, target mild myopia right eye.      Glaucoma suspect, bilateral        Plan:  *   For the left eye, continue CatarActive3 four times a day until the bottle runs out.  *   Stop wearing eye shield.  *   No eye rubbing. May resume heavy lifting.  *   If you are taking glaucoma drops, continue as usual.  *   Return one month for final exam with glasses check.  *   If your vision worsens, eye becomes increasingly red, or becomes painful, call [phone].    PRE-OP CATARACT INSTRUCTIONS    *Use the following drops in the right eye 4 times on the day before surgery and once the morning of surgery:                                 CatarActive3    *Please bring all your eyedrops to the surgery center.  *If taking more than one drop, wait five minutes between drops.  *No solid food or drink after midnight. Please take the starred medications with a small sip of water the morning of surgery.  *If you are diabetic, please do not take any diabetic medications the morning of surgery.  *If you are taking glaucoma drops, continue as usual.    Ryne Pascal MD  [phone]

## 2018-10-02 NOTE — LETTER
10/2/2018         RE: Abbey Omer  [address] [address]        Dear Colleague,    Thank you for referring your patient, Abbey Omer, to the [facility].     She is doing well after cataract surgery in the left eye. She is scheduled for cataract surgery right eye on 10/8/18. Please see a copy of my visit note below.     Current Eye Medications:  cataractive 3 four times day left eye     Subjective:  Po2 left eye and preop kpe right eye   Pt reports see very well with right eye and this eye feels fine. Pt is scheduled for kpe right eye on 10/08/2018     Objective:  See Ophthalmology Exam.       Assessment:  Abbey Omer is a 68 year old female who presents with:   Encounter Diagnoses   Name Primary?     Pseudophakia of left eye PO2 left eye, doing well.     Combined form of age-related cataract, both eyes Pre-op cataract right eye, target mild myopia right eye.      Glaucoma suspect, bilateral        Plan:  *   For the left eye, continue CatarActive3 four times a day until the bottle runs out.  *   Stop wearing eye shield.  *   No eye rubbing. May resume heavy lifting.  *   If you are taking glaucoma drops, continue as usual.  *   Return one month for final exam with glasses check.  *   If your vision worsens, eye becomes increasingly red, or becomes painful, call [phone].    PRE-OP CATARACT INSTRUCTIONS    *Use the following drops in the right eye 4 times on the day before surgery and once the morning of surgery:                                 CatarActive3    *Please bring all your eyedrops to the surgery center.  *If taking more than one drop, wait five minutes between drops.  *No solid food or drink after midnight. Please take the starred medications with a small sip of water the morning of surgery.  *If you are diabetic, please do not take any diabetic medications the morning of surgery.  *If you are taking glaucoma drops, continue as  usual.    Ryne Pascal MD  [phone]        Again, thank you for allowing me to participate in the care of your patient.        Sincerely,        Ryne Pascal MD

## 2018-10-05 RX ORDER — CYCLOPENTOLATE HYDROCHLORIDE 10 MG/ML
1 SOLUTION/ DROPS OPHTHALMIC
Status: CANCELLED | OUTPATIENT
Start: 2018-10-05

## 2018-10-05 RX ORDER — TROPICAMIDE 10 MG/ML
1 SOLUTION/ DROPS OPHTHALMIC
Status: CANCELLED | OUTPATIENT
Start: 2018-10-05

## 2018-10-05 RX ORDER — PROPARACAINE HYDROCHLORIDE 5 MG/ML
1 SOLUTION/ DROPS OPHTHALMIC ONCE
Status: CANCELLED | OUTPATIENT
Start: 2018-10-05 | End: 2018-10-05

## 2018-10-05 RX ORDER — PHENYLEPHRINE HYDROCHLORIDE 25 MG/ML
1 SOLUTION/ DROPS OPHTHALMIC
Status: CANCELLED | OUTPATIENT
Start: 2018-10-05

## 2018-10-09 NOTE — TELEPHONE ENCOUNTER
SURGERY DATE CHANGE:    Type of surgery: Cataract Extraction with Intraocular Lens Implant Right Eye  Location of surgery: [facility]  Date and time of surgery: 10/15/2018 @ 9:00am  Surgeon: Dr. Pascal  Pre-Op Appt Date: 10/02/2018  Post-Op Appt Date: 10/16/2018   Packet sent out: Yes  Pre-cert/Authorization completed:  Yes  Date: 10/09/2018

## 2018-10-11 ENCOUNTER — ANESTHESIA EVENT (OUTPATIENT)
Dept: SURGERY | Facility: AMBULATORY SURGERY CENTER | Age: 68
End: 2018-10-11

## 2018-10-12 ENCOUNTER — TELEPHONE (OUTPATIENT)
Dept: FAMILY MEDICINE | Facility: CLINIC | Age: 68
End: 2018-10-12

## 2018-10-12 NOTE — TELEPHONE ENCOUNTER
Called patient- surgery is asking for an addendum to the pre-op note since it has been more than 30 days since her post-op.    Bobby, PCP agrees. Junior can see our notes in EPIC.   Oma Richardson RN

## 2018-10-12 NOTE — TELEPHONE ENCOUNTER
Reason for Call:  Questions about Surgery     Detailed comments: Patient eye surgery was moved to this Monday instead of last Saturday. Patient is needing a call back with question on Pre Op    Phone Number Patient can be reached at: Home number on file [phone] (home)    Best Time: Anytime (ASAP)    Can we leave a detailed message on this number? YES    Call taken on 10/12/2018 at 8:18 AM by Lashawn Juarez

## 2018-10-12 NOTE — TELEPHONE ENCOUNTER
I addend the pre-op note to include 10/14/18 surgery date as well.  Sarah Vallejo, LUIS, APRN, CNP

## 2018-10-15 ENCOUNTER — HOSPITAL ENCOUNTER (OUTPATIENT)
Facility: AMBULATORY SURGERY CENTER | Age: 68
Discharge: HOME | End: 2018-10-15
Attending: STUDENT IN AN ORGANIZED HEALTH CARE EDUCATION/TRAINING PROGRAM | Admitting: STUDENT IN AN ORGANIZED HEALTH CARE EDUCATION/TRAINING PROGRAM
Payer: COMMERCIAL

## 2018-10-15 ENCOUNTER — SURGERY (OUTPATIENT)
Age: 68
End: 2018-10-15

## 2018-10-15 ENCOUNTER — ANESTHESIA (OUTPATIENT)
Dept: SURGERY | Facility: AMBULATORY SURGERY CENTER | Age: 68
End: 2018-10-15
Payer: COMMERCIAL

## 2018-10-15 VITALS
DIASTOLIC BLOOD PRESSURE: 69 MMHG | OXYGEN SATURATION: 95 % | TEMPERATURE: 97.5 F | SYSTOLIC BLOOD PRESSURE: 153 MMHG | RESPIRATION RATE: 18 BRPM

## 2018-10-15 PROCEDURE — 66984 XCAPSL CTRC RMVL W/O ECP: CPT | Mod: RT

## 2018-10-15 PROCEDURE — 66984 XCAPSL CTRC RMVL W/O ECP: CPT | Mod: 79 | Performed by: STUDENT IN AN ORGANIZED HEALTH CARE EDUCATION/TRAINING PROGRAM

## 2018-10-15 PROCEDURE — G8918 PT W/O PREOP ORDER IV AB PRO: HCPCS

## 2018-10-15 PROCEDURE — G8907 PT DOC NO EVENTS ON DISCHARG: HCPCS

## 2018-10-15 DEVICE — IMPLANTABLE DEVICE: Type: IMPLANTABLE DEVICE | Site: EYE | Status: FUNCTIONAL

## 2018-10-15 RX ORDER — HYDROMORPHONE HYDROCHLORIDE 1 MG/ML
.3-.5 INJECTION, SOLUTION INTRAMUSCULAR; INTRAVENOUS; SUBCUTANEOUS EVERY 10 MIN PRN
Status: DISCONTINUED | OUTPATIENT
Start: 2018-10-15 | End: 2018-10-16 | Stop reason: HOSPADM

## 2018-10-15 RX ORDER — ONDANSETRON 2 MG/ML
4 INJECTION INTRAMUSCULAR; INTRAVENOUS EVERY 30 MIN PRN
Status: DISCONTINUED | OUTPATIENT
Start: 2018-10-15 | End: 2018-10-16 | Stop reason: HOSPADM

## 2018-10-15 RX ORDER — CYCLOPENTOLATE HYDROCHLORIDE 10 MG/ML
1 SOLUTION/ DROPS OPHTHALMIC
Status: COMPLETED | OUTPATIENT
Start: 2018-10-15 | End: 2018-10-15

## 2018-10-15 RX ORDER — TROPICAMIDE 10 MG/ML
1 SOLUTION/ DROPS OPHTHALMIC
Status: COMPLETED | OUTPATIENT
Start: 2018-10-15 | End: 2018-10-15

## 2018-10-15 RX ORDER — PHENYLEPHRINE HYDROCHLORIDE 25 MG/ML
1 SOLUTION/ DROPS OPHTHALMIC
Status: COMPLETED | OUTPATIENT
Start: 2018-10-15 | End: 2018-10-15

## 2018-10-15 RX ORDER — FENTANYL CITRATE 50 UG/ML
25-50 INJECTION, SOLUTION INTRAMUSCULAR; INTRAVENOUS
Status: DISCONTINUED | OUTPATIENT
Start: 2018-10-15 | End: 2018-10-16 | Stop reason: HOSPADM

## 2018-10-15 RX ORDER — MOXIFLOXACIN 5 MG/ML
SOLUTION/ DROPS OPHTHALMIC PRN
Status: DISCONTINUED | OUTPATIENT
Start: 2018-10-15 | End: 2018-10-15 | Stop reason: HOSPADM

## 2018-10-15 RX ORDER — MEPERIDINE HYDROCHLORIDE 25 MG/ML
12.5 INJECTION INTRAMUSCULAR; INTRAVENOUS; SUBCUTANEOUS
Status: DISCONTINUED | OUTPATIENT
Start: 2018-10-15 | End: 2018-10-16 | Stop reason: HOSPADM

## 2018-10-15 RX ORDER — LIDOCAINE 40 MG/G
CREAM TOPICAL
Status: DISCONTINUED | OUTPATIENT
Start: 2018-10-15 | End: 2018-10-16 | Stop reason: HOSPADM

## 2018-10-15 RX ORDER — NALOXONE HYDROCHLORIDE 0.4 MG/ML
.1-.4 INJECTION, SOLUTION INTRAMUSCULAR; INTRAVENOUS; SUBCUTANEOUS
Status: DISCONTINUED | OUTPATIENT
Start: 2018-10-15 | End: 2018-10-16 | Stop reason: HOSPADM

## 2018-10-15 RX ORDER — PROPARACAINE HYDROCHLORIDE 5 MG/ML
1 SOLUTION/ DROPS OPHTHALMIC ONCE
Status: COMPLETED | OUTPATIENT
Start: 2018-10-15 | End: 2018-10-15

## 2018-10-15 RX ORDER — OXYCODONE HYDROCHLORIDE 5 MG/1
5 TABLET ORAL EVERY 4 HOURS PRN
Status: DISCONTINUED | OUTPATIENT
Start: 2018-10-15 | End: 2018-10-16 | Stop reason: HOSPADM

## 2018-10-15 RX ORDER — SODIUM CHLORIDE, SODIUM LACTATE, POTASSIUM CHLORIDE, CALCIUM CHLORIDE 600; 310; 30; 20 MG/100ML; MG/100ML; MG/100ML; MG/100ML
INJECTION, SOLUTION INTRAVENOUS CONTINUOUS
Status: DISCONTINUED | OUTPATIENT
Start: 2018-10-15 | End: 2018-10-16 | Stop reason: HOSPADM

## 2018-10-15 RX ORDER — TETRACAINE HYDROCHLORIDE 5 MG/ML
SOLUTION OPHTHALMIC PRN
Status: DISCONTINUED | OUTPATIENT
Start: 2018-10-15 | End: 2018-10-15 | Stop reason: HOSPADM

## 2018-10-15 RX ORDER — ONDANSETRON 4 MG/1
4 TABLET, ORALLY DISINTEGRATING ORAL EVERY 30 MIN PRN
Status: DISCONTINUED | OUTPATIENT
Start: 2018-10-15 | End: 2018-10-16 | Stop reason: HOSPADM

## 2018-10-15 RX ADMIN — MOXIFLOXACIN 2 DROP: 5 SOLUTION/ DROPS OPHTHALMIC at 00:00

## 2018-10-15 RX ADMIN — Medication 1 DROP: at 08:55

## 2018-10-15 RX ADMIN — TROPICAMIDE 1 DROP: 10 SOLUTION/ DROPS OPHTHALMIC at 08:02

## 2018-10-15 RX ADMIN — CYCLOPENTOLATE HYDROCHLORIDE 1 DROP: 10 SOLUTION/ DROPS OPHTHALMIC at 08:02

## 2018-10-15 RX ADMIN — CYCLOPENTOLATE HYDROCHLORIDE 1 DROP: 10 SOLUTION/ DROPS OPHTHALMIC at 07:46

## 2018-10-15 RX ADMIN — TETRACAINE HYDROCHLORIDE 4 DROP: 5 SOLUTION OPHTHALMIC at 09:04

## 2018-10-15 RX ADMIN — PHENYLEPHRINE HYDROCHLORIDE 1 DROP: 25 SOLUTION/ DROPS OPHTHALMIC at 08:02

## 2018-10-15 RX ADMIN — TROPICAMIDE 1 DROP: 10 SOLUTION/ DROPS OPHTHALMIC at 07:53

## 2018-10-15 RX ADMIN — PROPARACAINE HYDROCHLORIDE 1 DROP: 5 SOLUTION/ DROPS OPHTHALMIC at 07:46

## 2018-10-15 RX ADMIN — Medication 250 ML: at 09:03

## 2018-10-15 RX ADMIN — TROPICAMIDE 1 DROP: 10 SOLUTION/ DROPS OPHTHALMIC at 07:46

## 2018-10-15 RX ADMIN — PHENYLEPHRINE HYDROCHLORIDE 1 DROP: 25 SOLUTION/ DROPS OPHTHALMIC at 07:46

## 2018-10-15 RX ADMIN — PHENYLEPHRINE HYDROCHLORIDE 1 DROP: 25 SOLUTION/ DROPS OPHTHALMIC at 07:53

## 2018-10-15 RX ADMIN — CYCLOPENTOLATE HYDROCHLORIDE 1 DROP: 10 SOLUTION/ DROPS OPHTHALMIC at 07:53

## 2018-10-15 NOTE — IP AVS SNAPSHOT
[facility]    [address] [place] [address]    Phone:  [phone]                                       After Visit Summary   10/15/2018    Abbey Omer    MRN: [id number]           After Visit Summary Signature Page     I have received my discharge instructions, and my questions have been answered. I have discussed any challenges I see with this plan with the nurse or doctor.    ..........................................................................................................................................  Patient/Patient Representative Signature      ..........................................................................................................................................  Patient Representative Print Name and Relationship to Patient    ..................................................               ................................................  Date                                   Time    ..........................................................................................................................................  Reviewed by Signature/Title    ...................................................              ..............................................  Date                                               Time          22EPIC Rev 08/18

## 2018-10-15 NOTE — ANESTHESIA POSTPROCEDURE EVALUATION
Patient: Abbey Omer    Procedure(s):  PHACOEMULSIFICATION WITH STANDARD INTRAOCULAR LENS IMPLANT  - Wound Class: I-Clean    Diagnosis:Right cataract  Diagnosis Additional Information: No value filed.    Anesthesia Type:  MAC    Note:  Anesthesia Post Evaluation    Patient location during evaluation: Phase 2  Patient participation: Able to fully participate in evaluation  Level of consciousness: awake  Pain management: adequate  Airway patency: patent  Cardiovascular status: acceptable  Respiratory status: acceptable  Hydration status: acceptable  PONV: none     Anesthetic complications: None    Comments: Stable recovery noted.        Last vitals:  Vitals:    10/15/18 0745 10/15/18 0928   BP: 167/89 149/71   Resp: 16 18   Temp: 97.7  F (36.5  C) 97.5  F (36.4  C)   SpO2: 95% 92%         Electronically Signed By: Silvino Rodriguez MD  October 15, 2018  9:35 AM

## 2018-10-15 NOTE — ANESTHESIA CARE TRANSFER NOTE
Patient: Abbey Omer    Procedure(s):  PHACOEMULSIFICATION WITH STANDARD INTRAOCULAR LENS IMPLANT  - Wound Class: I-Clean    Diagnosis: Right cataract  Diagnosis Additional Information: No value filed.    Anesthesia Type:   Other     Note:  Airway :Room Air  Patient transferred to:Phase II  Handoff Report: Identifed the Patient, Identified the Reponsible Provider, Reviewed the pertinent medical history, Discussed the surgical course, Reviewed Intra-OP anesthesia mangement and issues during anesthesia, Set expectations for post-procedure period and Allowed opportunity for questions and acknowledgement of understanding      Vitals: (Last set prior to Anesthesia Care Transfer)    CRNA VITALS  10/15/2018 0854 - 10/15/2018 0928      10/15/2018             Pulse: 64    SpO2: 99 %                Electronically Signed By: RADHA Tran CRNA  October 15, 2018  9:28 AM

## 2018-10-15 NOTE — OP NOTE
PreOp Diagnosis: Visually significant nuclear sclerotic cataract right eye  PostOp Diagnosis: Same  Surgeon: Ryne Pascal MD  Implant: Technis [id number]    Procedures:   1. Review of intraocular lens calculations, both eyes   2. Phacoemulsification and extraction of lens  right eye   3. Intraocular lens implantation right eye  Anesthesia: MAC/topical  Complications: None  EBL: <1cc    Abbey Omer suffers from a visually significant cataract of the right eye. This has caused problems with distance and reading vision, including glare. After discussing the risks, benefits, and alternatives, the patient wishes to proceed with cataract surgery.    The patient was identified in the pre-op area where the right eye was marked. The patient was then brought to the operating room where a time out was called, identifying the patient, the procedure, and the correct site. Tetracaine drops were applied to both eyes. The operative eye was then prepped and draped in the usual sterile ophthalmic fashion. An eyelid speculum was placed into the operative eye. Additional tetracaine drops were applied. A paracentesis was made superior with a side port blade. . Due to poor red reflex, trypan blue was irrigated into the anterior chamber to enhance capsular visualization.  This was irrigated from the anterior chamber after 30 seconds with 1% preservative-free lidocaine and phenylephrine.   Endocoat was injected to deepen the anterior chamber. A 2.4mm clear corneal wound was created with a keratome blade temporally.  A continuous curvilinear capsulorrhexis was started with a bent cystitome and completed with Utrada forceps. Hydrodissection of the lens nucleus was performed with BSS on a cannula. The lens nucleus was rotated. Phacoemulsification of the lens nucleus was accomplished in a phacoemulsification stop and chop technique. Remaining cortex was removed with irrigation and aspiration. The lens capsule was noted to be  intact. Healon was used to inflate the capsular bag.  The lens was injected into the capsular bag. Remaining viscoelastic was removed with irrigation and aspiration. The wounds were checked and found to be watertight after hydration. The eyelid speculum was removed and Vigamox drops were placed into the operative eye. The patient tolerated the procedure well and was in stable condition on the way to the recovery area.     Ryne Pascal MD

## 2018-10-15 NOTE — DISCHARGE INSTRUCTIONS
CATARACT SURGERY POST-OP INSTRUCTIONS  Dr. Ryne Pascal  [phone]      *Continue using CatarActive3 four times a day in the operative eye.  *You should get 3 doses in today and 4 doses daily starting tomorrow.       Keep the eye shield taped in place unless putting drops in. We will remove it for you in the office tomorrow.      Light sensitivity may be noticed. Sunglasses may be worn for comfort.      Do not rub the operated eye.      Keep the operated eye dry. You may wash your hair, bathe or shower, but keep the operated eye closed while doing so.       No swimming, hot tub, or sauna for 2 weeks.      No make up around eye for 5 days.      No bending at the waist or lifting more than 10 pounds for one week.      May take Tylenol (per directions on bottle) for mild pain.      Call the office at [phone] and ask to speak to the on-call ophthalmologist  if any of the following should occur:  o Any sudden vision changes  o Nausea or severe headache  o Increase in pain not controlled  o Or signs of infection (pus, increasing redness or tenderness)      [facility]  Same-Day Surgery   Adult Discharge Orders & Instructions   For 24 hours after surgery  1. Get plenty of rest.  A responsible adult must stay with you for at least 24 hours after you leave the hospital.   2. Do not drive or use heavy equipment.  If you have weakness or tingling, don't drive or use heavy equipment until this feeling goes away.  3. Do not drink alcohol.  4. Avoid strenuous or risky activities.  Ask for help when climbing stairs.   5. You may feel lightheaded.  IF so, sit for a few minutes before standing.  Have someone help you get up.   6. If you have nausea (feel sick to your stomach): Drink only clear liquids such as apple juice, ginger ale, broth or 7-Up.  Rest may also help.  Be sure to drink enough fluids.  Move to a regular diet as you feel able.  7. You may have a slight fever. Call the doctor if your  fever is over 100 F (37.7 C) (taken under the tongue) or lasts longer than 24 hours.  8. You may have a dry mouth, a sore throat, muscle aches or trouble sleeping.  These should go away after 24 hours.  9. Do not make important or legal decisions.   Call your doctor for any of the followin.  Signs of infection (fever, growing tenderness at the surgery site, a large amount of drainage or bleeding, severe pain, foul-smelling drainage, redness, swelling).    2. It has been over 8 to 10 hours since surgery and you are still not able to urinate (pass water).    3.  Headache for over 24 hours.    4.  Numbness, tingling or weakness the day after surgery (if you had spinal anesthesia).  To contact a doctor, call ___[phone]________________________

## 2018-10-15 NOTE — ANESTHESIA PREPROCEDURE EVALUATION
Anesthesia Evaluation     . Pt has had prior anesthetic. Type: MAC and General    No history of anesthetic complications          ROS/MED HX    ENT/Pulmonary:     (+)sleep apnea, , . .    Neurologic:  - neg neurologic ROS     Cardiovascular:     (+) Dyslipidemia, hypertension----. : . . . :. .       METS/Exercise Tolerance:     Hematologic:  - neg hematologic  ROS       Musculoskeletal:  - neg musculoskeletal ROS       GI/Hepatic:  - neg GI/hepatic ROS       Renal/Genitourinary:     (+) chronic renal disease, type: CRI, Pt does not require dialysis,       Endo:     (+) thyroid problem hypothyroidism, Obesity, .      Psychiatric:     (+) psychiatric history depression      Infectious Disease:  - neg infectious disease ROS       Malignancy:      - no malignancy   Other:    - neg other ROS                 Physical Exam  Normal systems: cardiovascular, pulmonary and dental    Airway   Mallampati: I  TM distance: >3 FB  Neck ROM: full    Dental     Cardiovascular       Pulmonary    breath sounds clear to auscultation                    Anesthesia Plan      History & Physical Review  History and physical reviewed and following examination; no interval change.    ASA Status:  3 .    NPO Status:  > 8 hours    Plan for Other with Other induction. Maintenance will be Other.    PONV prophylaxis:  Ondansetron (or other 5HT-3) and Dexamethasone or Solumedrol       Postoperative Care  Postoperative pain management:  Multi-modal analgesia.      Consents                          .

## 2018-10-15 NOTE — IP AVS SNAPSHOT
MRN:[id number]                      After Visit Summary   10/15/2018    Abbey Omer    MRN: [id number]           Thank you!     Thank you for choosing [facility] for your care. Our goal is always to provide you with excellent care. Hearing back from our patients is one way we can continue to improve our services. Please take a few minutes to complete the written survey that you may receive in the mail after you visit with us. Thank you!        Patient Information     Date Of Birth          1950        About your hospital stay     You were admitted on:  October 15, 2018 [place]  [facility] [place] on:  October 15, 2018       Who to Call     For medical emergencies, please call 911.  For non-urgent questions about your medical care, please call your primary care provider or clinic, [phone]  For questions related to your surgery, please call your surgery clinic        Attending Provider     Provider Specialty    Ryne Pascal MD Ophthalmology       Primary Care Provider Office Phone # Fax #    Sarah ZAMAN V[place] [phone]      Your next 10 appointments already scheduled     Oct 16, 2018  9:45 AM CDT   Return Visit with Ryne Pascal MD   [facility] ([facility]    [address]  [place] [address] [phone]            Oct 25, 2018  9:45 AM CDT   Return Visit with Ryne Pascal MD   [facility] [place] ([facility])    [address]  [place] [address]   [phone]            Nov 16, 2018 10:45 AM CST   Return Visit with Ryne Pascal MD   [facility] [place] ([facility])    [address]  [place] [address]   [phone]            Jan 22, 2019  9:00 AM CST   Return Visit with Omar West MD, [facility] CYSTO PROC ROOM   [facility] ([facility])    [address]  [place] [address] [phone]              Further instructions from your care team       CATARACT SURGERY POST-OP INSTRUCTIONS  Dr. Ryne Pascal  [phone]      *Continue using CatarActive3 four times a day in the operative eye.  *You should get 3 doses in today and 4 doses daily starting tomorrow.       Keep the eye shield taped in place unless putting drops in. We will remove it for you in the office tomorrow.      Light sensitivity may be noticed. Sunglasses may be worn for comfort.      Do not rub the operated eye.      Keep the operated eye dry. You may wash your hair, bathe or shower, but keep the operated eye closed while doing so.       No swimming, hot tub, or sauna for 2 weeks.      No make up around eye for 5 days.      No bending at the waist or lifting more than 10 pounds for one week.      May take Tylenol (per directions on bottle) for mild pain.      Call the office at [phone] and ask to speak to the on-call ophthalmologist  if any of the following should occur:  o Any sudden vision changes  o Nausea or severe headache  o Increase in pain not controlled  o Or signs of infection (pus, increasing redness or tenderness)      [facility]  Same-Day Surgery   Adult Discharge Orders & Instructions   For 24 hours after surgery  1. Get plenty of rest.  A responsible adult must stay with you for at least 24 hours after you leave the hospital.   2. Do not drive or use heavy equipment.  If you have weakness or tingling, don't drive or use heavy equipment until this feeling goes away.  3. Do not drink alcohol.  4. Avoid strenuous or risky activities.  Ask for help when climbing stairs.   5. You may feel lightheaded.  IF so, sit for a few minutes before standing.  Have someone help you get up.   6. If you have nausea (feel sick to your stomach): Drink only clear liquids such as apple juice, ginger ale, broth or 7-Up.  Rest may also help.  Be sure to  drink enough fluids.  Move to a regular diet as you feel able.  7. You may have a slight fever. Call the doctor if your fever is over 100 F (37.7 C) (taken under the tongue) or lasts longer than 24 hours.  8. You may have a dry mouth, a sore throat, muscle aches or trouble sleeping.  These should go away after 24 hours.  9. Do not make important or legal decisions.   Call your doctor for any of the followin.  Signs of infection (fever, growing tenderness at the surgery site, a large amount of drainage or bleeding, severe pain, foul-smelling drainage, redness, swelling).    2. It has been over 8 to 10 hours since surgery and you are still not able to urinate (pass water).    3.  Headache for over 24 hours.    4.  Numbness, tingling or weakness the day after surgery (if you had spinal anesthesia).  To contact a doctor, call ___[phone]________________________       Pending Results     No orders found from 10/13/2018 to 10/16/2018.            Admission Information     Date & Time Provider Department Dept. Phone    10/15/2018 [place]cal [place] [facility] [phone]      Your Vitals Were     Blood Pressure Temperature Respirations Pulse Oximetry          149/71 97.5  F (36.4  C) (Temporal) 18 92%        MyChart Information     [facility] gives you secure access to your electronic health record. If you see a primary care provider, you can also send messages to your care team and make appointments. If you have questions, please call your primary care clinic.  If you do not have a primary care provider, please call [phone] and they will assist you.        Care EveryWhere ID     This is your Care EveryWhere ID. This could be used by other organizations to access your [facility] records  [id number]        Equal Access to Services     BHUPINDER SUTHERLAND : Kathleen Carrera, lianna wallis, maximiliano bird. So [facility]  [phone].    ATENCIÓN: Si randi sol, tiene a smith disposición servicios gratuitos de asistencia lingüística. Kishor ramos [phone].    We comply with applicable federal civil rights laws and [place] laws. We do not discriminate on the basis of race, color, national origin, age, disability, sex, sexual orientation, or gender identity.               Review of your medicines      CONTINUE these medicines which may have CHANGED, or have new prescriptions. If we are uncertain of the size of tablets/capsules you have at home, strength may be listed as something that might have changed.        Dose / Directions    colchicine 0.6 MG tablet   Commonly known as:  COLCRYS   This may have changed:    - when to take this  - reasons to take this   Used for:  Chronic gout involving toe without tophus, unspecified cause, unspecified laterality        Dose:  0.6 mg   Take 1 tablet (0.6 mg) by mouth daily   Quantity:  90 tablet   Refills:  3         CONTINUE these medicines which have NOT CHANGED        Dose / Directions    allopurinol 100 MG tablet   Commonly known as:  ZYLOPRIM   Used for:  Chronic gout involving toe without tophus, unspecified cause, unspecified laterality        Dose:  100 mg   Take 1 tablet (100 mg) by mouth daily   Quantity:  90 tablet   Refills:  1       Bromfenac Sodium Powd   Used for:  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 Use until the bottle runs out.   Quantity:  1 Bottle   Refills:  1       calcium + D 600-200 MG-UNIT Tabs   Generic drug:  calcium carbonate-vitamin D        1 tablet twice a day   Refills:  0       Cranberry 500 MG Caps        Dose:  1 capsule   Take 1 capsule by mouth daily   Refills:  0       Dexamethasone Acetate Powd   Used for:  Combined form of age-related cataract, both eyes        Dose:  1 Bottle   1 Bottle 4 times daily 1 drop four times  a day in the operative eye starting 1 day before surgery. Use until the bottle runs out.   Quantity:  1 Bottle   Refills:  1       EXCEDRIN TENSION HEADACHE 500-65 MG Tabs   Generic drug:  acetaminophen-caffeine        1 tablet twice daily as needed   Refills:  0       fish Oil 1200 MG capsule        3 capsule daily   Refills:  0       FLAXSEED OIL PO        Dose:  1 Tablespoonful   Take 1 Tablespoonful by mouth daily   Refills:  0       fluticasone 50 MCG/ACT spray   Commonly known as:  FLONASE   Used for:  Chronic rhinitis        Dose:  2 spray   Spray 2 sprays into both nostrils daily   Quantity:  48 g   Refills:  3       KRILL OIL PO        Dose:  1 capsule   Take 1 capsule by mouth daily   Refills:  0       levalbuterol 45 MCG/ACT Inhaler   Commonly known as:  XOPENEX HFA   Used for:  Mild intermittent asthma without complication        INHALE 2 PUFFS INTO THE LUNGS EVERY 6 HOURS AS NEEDED FOR SHORTNESS OF BREATH OR DIFFICULT BREATHING OR WHEEZING   Quantity:  15 g   Refills:  0       levothyroxine 88 MCG tablet   Commonly known as:  SYNTHROID/LEVOTHROID   Used for:  Hypothyroidism due to acquired atrophy of thyroid        TAKE 1 TABLET(88 MCG) BY MOUTH DAILY   Quantity:  90 tablet   Refills:  0       magnesium 500 MG Tabs        Dose:  1 tablet   Take 1 tablet by mouth daily   Refills:  0       Moxifloxacin HCl Powd   Used for:  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 Use until the bottle runs out.   Quantity:  1 Bottle   Refills:  1       MULTIPLE vitamin  S/WOMENS Tabs        Dose:  2 tablet   Take 2 tablets by mouth daily   Refills:  0       order for DME        Equipment ordered: Respironics Auto PAP Mask type: Nasal Settings: 9-15 cm H2O   Refills:  0       polyethylene glycol powder   Commonly known as:  MIRALAX   Used for:  Slow transit constipation        Dose:  1 capful    Take 17 g (1 capful) by mouth 2 times daily as needed for constipation   Quantity:  510 g   Refills:  1       ranitidine 300 MG tablet   Commonly known as:  ZANTAC   Used for:  Gastroesophageal reflux disease without esophagitis        Dose:  300 mg   Take 1 tablet (300 mg) by mouth At Bedtime   Quantity:  90 tablet   Refills:  3       STATIN NOT PRESCRIBED (INTENTIONAL)   Used for:  Hyperlipidemia LDL goal < 100        by Other route continuous prn Reported on 5/15/2017   Refills:  0       traZODone 50 MG tablet   Commonly known as:  DESYREL   Used for:  Insomnia, unspecified type        Dose:   mg   Take 1-2 tablets ( mg) by mouth nightly as needed for sleep   Quantity:  180 tablet   Refills:  1       venlafaxine 150 MG 24 hr capsule   Commonly known as:  EFFEXOR-XR   Used for:  Major depressive disorder, recurrent episode, mild degree (H)        2 Capsules daily   Quantity:  180 capsule   Refills:  3       VITAMIN B COMPLEX PO        Refills:  0       zinc 50 MG Tabs        Dose:  1 tablet   Take 1 tablet by mouth daily   Refills:  0                Protect others around you: Learn how to safely use, store and throw away your medicines at www.disposemymeds.org.             Medication List: This is a list of all your medications and when to take them. Check marks below indicate your daily home schedule. Keep this list as a reference.      Medications           Morning Afternoon Evening Bedtime As Needed    allopurinol 100 MG tablet   Commonly known as:  ZYLOPRIM   Take 1 tablet (100 mg) by mouth daily                                Bromfenac Sodium Powd   1 Bottle 4 times daily 1 drop four times a day in the operative eye starting 1 day before surgery. Use until the bottle runs out.                                calcium + D 600-200 MG-UNIT Tabs   1 tablet twice a day   Generic drug:  calcium carbonate-vitamin D                                colchicine 0.6 MG tablet   Commonly known as:  COLCRYS    Take 1 tablet (0.6 mg) by mouth daily                                Cranberry 500 MG Caps   Take 1 capsule by mouth daily                                Dexamethasone Acetate Powd   1 Bottle 4 times daily 1 drop four times a day in the operative eye starting 1 day before surgery. Use until the bottle runs out.                                EXCEDRIN TENSION HEADACHE 500-65 MG Tabs   1 tablet twice daily as needed   Generic drug:  acetaminophen-caffeine                                fish Oil 1200 MG capsule   3 capsule daily                                FLAXSEED OIL PO   Take 1 Tablespoonful by mouth daily                                fluticasone 50 MCG/ACT spray   Commonly known as:  FLONASE   Spray 2 sprays into both nostrils daily                                KRILL OIL PO   Take 1 capsule by mouth daily                                levalbuterol 45 MCG/ACT Inhaler   Commonly known as:  XOPENEX HFA   INHALE 2 PUFFS INTO THE LUNGS EVERY 6 HOURS AS NEEDED FOR SHORTNESS OF BREATH OR DIFFICULT BREATHING OR WHEEZING                                levothyroxine 88 MCG tablet   Commonly known as:  SYNTHROID/LEVOTHROID   TAKE 1 TABLET(88 MCG) BY MOUTH DAILY                                magnesium 500 MG Tabs   Take 1 tablet by mouth daily                                Moxifloxacin HCl Powd   1 Bottle 4 times daily 1 drop four times a day in the operative eye starting 1 day before surgery. Use until the bottle runs out.                                MULTIPLE vitamin  S/WOMENS Tabs   Take 2 tablets by mouth daily                                order for DME   Equipment ordered: Respironics Auto PAP Mask type: Nasal Settings: 9-15 cm H2O                                polyethylene glycol powder   Commonly known as:  MIRALAX   Take 17 g (1 capful) by mouth 2 times daily as needed for constipation                                ranitidine 300 MG tablet   Commonly known as:  ZANTAC   Take 1 tablet (300 mg) by mouth  At Bedtime                                STATIN NOT PRESCRIBED (INTENTIONAL)   by Other route continuous prn Reported on 5/15/2017                                traZODone 50 MG tablet   Commonly known as:  DESYREL   Take 1-2 tablets ( mg) by mouth nightly as needed for sleep                                venlafaxine 150 MG 24 hr capsule   Commonly known as:  EFFEXOR-XR   2 Capsules daily                                VITAMIN B COMPLEX PO                                zinc 50 MG Tabs   Take 1 tablet by mouth daily

## 2018-10-16 ENCOUNTER — OFFICE VISIT (OUTPATIENT)
Dept: OPHTHALMOLOGY | Facility: CLINIC | Age: 68
End: 2018-10-16
Payer: COMMERCIAL

## 2018-10-16 DIAGNOSIS — Z96.1 PSEUDOPHAKIA OF BOTH EYES: Primary | ICD-10-CM

## 2018-10-16 PROCEDURE — 99024 POSTOP FOLLOW-UP VISIT: CPT | Performed by: STUDENT IN AN ORGANIZED HEALTH CARE EDUCATION/TRAINING PROGRAM

## 2018-10-16 ASSESSMENT — SLIT LAMP EXAM - LIDS
COMMENTS: NORMAL
COMMENTS: NORMAL

## 2018-10-16 ASSESSMENT — EXTERNAL EXAM - RIGHT EYE: OD_EXAM: NORMAL

## 2018-10-16 ASSESSMENT — TONOMETRY
IOP_METHOD: APPLANATION
OD_IOP_MMHG: 21
OS_IOP_MMHG: 21

## 2018-10-16 ASSESSMENT — EXTERNAL EXAM - LEFT EYE: OS_EXAM: NORMAL

## 2018-10-16 ASSESSMENT — VISUAL ACUITY
METHOD: SNELLEN - LINEAR
OS_SC: 20/30
OD_SC: 20/25

## 2018-10-16 NOTE — PATIENT INSTRUCTIONS
POST-OP CATARACT INSTRUCTIONS    EYEDROPS FOR THE RIGHT EYE:   continue CatarActive3 four times a day until the bottle runs out.    EYEDROPS FOR THE LEFT EYE:         continue CatarActive3 four times a day until the bottle runs out.           *   Wait at least 5 minutes between drops.    *   If you are taking glaucoma drops, continue as usual.    *   Wear eye shield when sleeping for one week.    *   No eye rubbing or lifting more than 10 pounds for one week.    *   Keep water out of eye for two weeks.    *   OK to resume aspirin and/or other blood thinners.    *   Return as scheduled in about one week.    *   If your vision worsens, eye becomes increasingly red, or becomes painful, call [phone].     Ryne Pascal MD

## 2018-10-16 NOTE — LETTER
10/16/2018         RE: Abbey Omer  [address] [place] [address]        Dear Colleague,    Thank you for referring your patient, Abbey Omer, to the [facility].     She is doing well after cataract surgery in both eyes. Please see a copy of my visit note below.     Current Eye Medications:  CatarActive3 four times a day both eyes.     Subjective:  Po1, KPE right eye 10/15/18. KPE left eye 9/24/18. Patient slept very well last night. Vision is doing very well both eyes. Vision is much brighter both eyes. No eye pain or discomfort in either eye.      Objective:  See Ophthalmology Exam.       Assessment:  Abbey Omer is a 68 year old female who presents with:   Encounter Diagnosis   Name Primary?     Pseudophakia of both eyes PO1 left eye, doing well.       Plan:  POST-OP CATARACT INSTRUCTIONS    EYEDROPS FOR THE RIGHT EYE:   continue CatarActive3 four times a day until the bottle runs out.    EYEDROPS FOR THE LEFT EYE:         continue CatarActive3 four times a day until the bottle runs out.           *   Wait at least 5 minutes between drops.  *   If you are taking glaucoma drops, continue as usual.  *   Wear eye shield when sleeping for one week.  *   No eye rubbing or lifting more than 10 pounds for one week.  *   Keep water out of eye for two weeks.  *   OK to resume aspirin and/or other blood thinners.  *   Return as scheduled in about one week.  *   If your vision worsens, eye becomes increasingly red, or becomes painful, call [phone].     Ryne Pascal MD            Again, thank you for allowing me to participate in the care of your patient.        Sincerely,        Ryne Pascal MD

## 2018-10-16 NOTE — PROGRESS NOTES
Current Eye Medications:  CatarActive3 four times a day both eyes.     Subjective:  Po1, KPE right eye 10/15/18. KPE left eye 9/24/18. Patient slept very well last night. Vision is doing very well both eyes. Vision is much brighter both eyes. No eye pain or discomfort in either eye.      Objective:  See Ophthalmology Exam.       Assessment:  Abbey Omer is a 68 year old female who presents with:   Encounter Diagnosis   Name Primary?     Pseudophakia of both eyes PO1 left eye, doing well.       Plan:  POST-OP CATARACT INSTRUCTIONS    EYEDROPS FOR THE RIGHT EYE:   continue CatarActive3 four times a day until the bottle runs out.    EYEDROPS FOR THE LEFT EYE:         continue CatarActive3 four times a day until the bottle runs out.           *   Wait at least 5 minutes between drops.  *   If you are taking glaucoma drops, continue as usual.  *   Wear eye shield when sleeping for one week.  *   No eye rubbing or lifting more than 10 pounds for one week.  *   Keep water out of eye for two weeks.  *   OK to resume aspirin and/or other blood thinners.  *   Return as scheduled in about one week.  *   If your vision worsens, eye becomes increasingly red, or becomes painful, call [phone].     Ryne Pascal MD

## 2018-10-16 NOTE — MR AVS SNAPSHOT
After Visit Summary   10/16/2018    Abbey Omer    MRN: [id number]           Patient Information     Date Of Birth          1950        Visit Information        Provider Department      10/16/2018 9:45 AM Ryne Pascal MD [facility] [place]        Today's Diagnoses     Pseudophakia of both eyes    -  1      Care Instructions    POST-OP CATARACT INSTRUCTIONS    EYEDROPS FOR THE RIGHT EYE:   continue CatarActive3 four times a day until the bottle runs out.    EYEDROPS FOR THE LEFT EYE:         continue CatarActive3 four times a day until the bottle runs out.           *   Wait at least 5 minutes between drops.    *   If you are taking glaucoma drops, continue as usual.    *   Wear eye shield when sleeping for one week.    *   No eye rubbing or lifting more than 10 pounds for one week.    *   Keep water out of eye for two weeks.    *   OK to resume aspirin and/or other blood thinners.    *   Return as scheduled in about one week.    *   If your vision worsens, eye becomes increasingly red, or becomes painful, call [phone].     Ryne Pascal MD                Follow-ups after your visit        Follow-up notes from your care team     Return for PO2, as scheduled.      Your next 10 appointments already scheduled     Oct 25, 2018  9:45 AM CDT   Return Visit with Ryne Pascal MD   [facility] [place] ([facility]    [address] [facility] [address] [phone]            Nov 16, 2018 10:45 AM CST   [facility] [place] [facility]    [address] [facility] [address] [phone]            Jan 22, 2019  9:00 AM CST   Return Visit with Omra West MD, [place] [facility]   [facility]    [address] [place] [address] [phone]              Who to contact     If you have questions or need follow  up information about today's clinic visit or your schedule please contact [facility] directly at [phone].  Normal or non-critical lab and imaging results will be communicated to you by miacosahart, letter or phone within 4 business days after the clinic has received the results. If you do not hear from us within 7 days, please contact the clinic through miacosahart or phone. If you have a critical or abnormal lab result, we will notify you by phone as soon as possible.  Submit refill requests through [employer] or call your pharmacy and they will forward the refill request to us. Please allow 3 business days for your refill to be completed.          Additional Information About Your Visit        miacosaharTutor Information     [employer] gives you secure access to your electronic health record. If you see a primary care provider, you can also send messages to your care team and make appointments. If you have questions, please call your primary care clinic.  If you do not have a primary care provider, please call [phone] and they will assist you.        Care EveryWhere ID     This is your Care EveryWhere ID. This could be used by other organizations to access your [place] medical records  [id number]         Blood Pressure from Last 3 Encounters:   10/15/18 153/69   09/24/18 117/59   09/13/18 (!) 146/92    Weight from Last 3 Encounters:   09/13/18 76.3 kg (168 lb 3.2 oz)   07/20/18 74.9 kg (165 lb 3.2 oz)   07/18/18 74.8 kg (164 lb 12.8 oz)              Today, you had the following     No orders found for display         Today's Medication Changes          These changes are accurate as of 10/16/18 10:26 AM.  If you have any questions, ask your nurse or doctor.               These medicines have changed or have updated prescriptions.        Dose/Directions    colchicine 0.6 MG tablet   Commonly known as:  COLCRYS   This may have changed:    - when to take this  - reasons to take this   Used for:  Chronic gout  involving toe without tophus, unspecified cause, unspecified laterality        Dose:  0.6 mg   Take 1 tablet (0.6 mg) by mouth daily   Quantity:  90 tablet   Refills:  3                Primary Care Provider Office Phone # Fax #    Sarah Vallejo [phone]       [address] [place], [place] [id number]        Equal Access to Services     [facility]: Hadii aad ku hadasho Soomaali, waaxda luqadaha, qaybta kaalmada adeegyada, waxay idiin hayaan adeeg kherinsh la'janetn ah. So [facility] [phone].    ATENCIÓN: Si habla espshahid, tiene a smith disposición servicios gratuitos de asistencia lingüística. Kishor al [phone].    We comply with applicable federal civil rights laws and [place] laws. We do not discriminate on the basis of race, color, national origin, age, disability, sex, sexual orientation, or gender identity.            Thank you!     Thank you for choosing [facility]  for your care. Our goal is always to provide you with excellent care. Hearing back from our patients is one way we can continue to improve our services. Please take a few minutes to complete the written survey that you may receive in the mail after your visit with us. Thank you!             Your Updated Medication List - Protect others around you: Learn how to safely use, store and throw away your medicines at www.disposemymeds.org.          This list is accurate as of 10/16/18 10:26 AM.  Always use your most recent med list.                   Brand Name Dispense Instructions for use Diagnosis    allopurinol 100 MG tablet    ZYLOPRIM    90 tablet    Take 1 tablet (100 mg) by mouth daily    Chronic gout involving toe without tophus, unspecified cause, unspecified laterality       Bromfenac Sodium Powd     1 Bottle    1 Bottle 4 times daily 1 drop four times a day in the operative eye starting 1 day before surgery. Use until the bottle runs out.    Combined form of age-related cataract, both eyes       calcium  + D 600-200 MG-UNIT Tabs   Generic drug:  calcium carbonate-vitamin D      1 tablet twice a day        colchicine 0.6 MG tablet    COLCRYS    90 tablet    Take 1 tablet (0.6 mg) by mouth daily    Chronic gout involving toe without tophus, unspecified cause, unspecified laterality       Cranberry 500 MG Caps      Take 1 capsule by mouth daily        Dexamethasone Acetate Powd     1 Bottle    1 Bottle 4 times daily 1 drop four times a day in the operative eye starting 1 day before surgery. Use until the bottle runs out.    Combined form of age-related cataract, both eyes       EXCEDRIN TENSION HEADACHE 500-65 MG Tabs   Generic drug:  acetaminophen-caffeine      1 tablet twice daily as needed        fish Oil 1200 MG capsule      3 capsule daily        FLAXSEED OIL PO      Take 1 Tablespoonful by mouth daily        fluticasone 50 MCG/ACT spray    FLONASE    48 g    Spray 2 sprays into both nostrils daily    Chronic rhinitis       KRILL OIL PO      Take 1 capsule by mouth daily        levalbuterol 45 MCG/ACT Inhaler    XOPENEX HFA    15 g    INHALE 2 PUFFS INTO THE LUNGS EVERY 6 HOURS AS NEEDED FOR SHORTNESS OF BREATH OR DIFFICULT BREATHING OR WHEEZING    Mild intermittent asthma without complication       levothyroxine 88 MCG tablet    SYNTHROID/LEVOTHROID    90 tablet    TAKE 1 TABLET(88 MCG) BY MOUTH DAILY    Hypothyroidism due to acquired atrophy of thyroid       magnesium 500 MG Tabs      Take 1 tablet by mouth daily        Moxifloxacin HCl Powd     1 Bottle    1 Bottle 4 times daily 1 drop four times a day in the operative eye starting 1 day before surgery. Use until the bottle runs out.    Combined form of age-related cataract, both eyes       MULTIPLE vitamin  S/WOMENS Tabs      Take 2 tablets by mouth daily        order for DME      Equipment ordered: Respironics Auto PAP Mask type: Nasal Settings: 9-15 cm H2O        polyethylene glycol powder    MIRALAX    510 g    Take 17 g (1 capful) by mouth 2 times daily as  needed for constipation    Slow transit constipation       ranitidine 300 MG tablet    ZANTAC    90 tablet    Take 1 tablet (300 mg) by mouth At Bedtime    Gastroesophageal reflux disease without esophagitis       STATIN NOT PRESCRIBED (INTENTIONAL)      by Other route continuous prn Reported on 5/15/2017    Hyperlipidemia LDL goal < 100       traZODone 50 MG tablet    DESYREL    180 tablet    Take 1-2 tablets ( mg) by mouth nightly as needed for sleep    Insomnia, unspecified type       venlafaxine 150 MG 24 hr capsule    EFFEXOR-XR    180 capsule    2 Capsules daily    Major depressive disorder, recurrent episode, mild degree (H)       VITAMIN B COMPLEX PO           zinc 50 MG Tabs      Take 1 tablet by mouth daily

## 2018-10-25 ENCOUNTER — OFFICE VISIT (OUTPATIENT)
Dept: OPHTHALMOLOGY | Facility: CLINIC | Age: 68
End: 2018-10-25
Payer: COMMERCIAL

## 2018-10-25 DIAGNOSIS — Z96.1 PSEUDOPHAKIA OF BOTH EYES: Primary | ICD-10-CM

## 2018-10-25 PROCEDURE — 99024 POSTOP FOLLOW-UP VISIT: CPT | Performed by: STUDENT IN AN ORGANIZED HEALTH CARE EDUCATION/TRAINING PROGRAM

## 2018-10-25 ASSESSMENT — REFRACTION_MANIFEST
OD_AXIS: 180
OD_SPHERE: PLANO
OD_CYLINDER: +0.75

## 2018-10-25 ASSESSMENT — EXTERNAL EXAM - RIGHT EYE: OD_EXAM: NORMAL

## 2018-10-25 ASSESSMENT — VISUAL ACUITY
OD_SC+: -3
METHOD: SNELLEN - LINEAR
OD_SC: 20/25

## 2018-10-25 ASSESSMENT — EXTERNAL EXAM - LEFT EYE: OS_EXAM: NORMAL

## 2018-10-25 ASSESSMENT — TONOMETRY
IOP_METHOD: APPLANATION
OD_IOP_MMHG: 21

## 2018-10-25 ASSESSMENT — SLIT LAMP EXAM - LIDS
COMMENTS: NORMAL
COMMENTS: NORMAL

## 2018-10-25 NOTE — PROGRESS NOTES
" Current Eye Medications:  CatarActive 3 right eye four times a day.       Subjective:  Status/Post Kelman Phacoemulsification with Posterior Chamber Lens right eye:  10-15-18.  Vision is good.  She occasionally feels like she has a contact lens in her eye, but it goes away.  Occasionally, she see's a slightly dark ring in her peripheral vision.       Objective:  See Ophthalmology Exam.      Assessment:  Abbey Omer is a 68 year old female who presents with:     Pseudophakia of both eyes PO2 right eye        Plan:  *   For the right eye, continue CatarActive3 four times a day until the bottle runs out.  *   Use artificial tears up to 4 times per day (Refresh Plus, Systane Balance, or generic artificial tears are ok. Avoid \"get the red out\" drops).   *   Stop wearing eye shield.  *   No eye rubbing. May resume heavy lifting.  *   If you are taking glaucoma drops, continue as usual.  *   Return one month for final exam with glasses check.  *   If your vision worsens, eye becomes increasingly red, or becomes painful, call [phone].    Ryne Pascal M.D.         "

## 2018-10-25 NOTE — MR AVS SNAPSHOT
After Visit Summary   10/25/2018    Abbey Omer    MRN: [id number]           Patient Information     Date Of Birth          1950        Visit Information        Provider Department      10/25/2018 9:45 AM [facility]        Today's Diagnoses     Pseudophakia of both eyes    -  1      Care Instructions    *   For the right eye, continue CatarActive3 four times a day until the bottle runs out.    *   Stop wearing eye shield.    *   No eye rubbing. May resume heavy lifting.    *   If you are taking glaucoma drops, continue as usual.    *   Return one month for final exam with glasses check.    *   If your vision worsens, eye becomes increasingly red, or becomes painful, call [phone].    Ryne Pascal M.D.          Follow-ups after your visit        Follow-up notes from your care team     Return for Final MR, as scheduled.      Your next 10 appointments already scheduled     Oct 29, 2018  1:00 PM CDT   SLEEP DME MASK FITTING with BK SC DME   [facility] ([facility])    [address] [place] [address]   [phone]            Nov 13, 2018  9:30 AM CST   Return Sleep Patient with Ry Le MD   [facility] ([facility])    [address] [place] [address]   [phone]            Nov 16, 2018 10:45 AM CST   Return Visit with Ryne Pascal MD   [facility] ([facility])    [address] [facility] [facility] [address] [phone]            Jan 22, 2019  9:00 AM CST   Return Visit with Omar West MD, [place] [facility] PROC ROOM   [facility] ([facility])    [address] [facility]  [place] [address] [phone]              Who to contact     If you have questions or need follow up information about today's clinic visit or  your schedule please contact [facility] directly at [phone].  Normal or non-critical lab and imaging results will be communicated to you by MyChart, letter or phone within 4 business days after the clinic has received the results. If you do not hear from us within 7 days, please contact the clinic through Brow[employer] or phone. If you have a critical or abnormal lab result, we will notify you by phone as soon as possible.  Submit refill requests through [employer] or call your pharmacy and they will forward the refill request to us. Please allow 3 business days for your refill to be completed.          Additional Information About Your Visit        BrowseLabsharCocodrilo Dog Information     [employer] gives you secure access to your electronic health record. If you see a primary care provider, you can also send messages to your care team and make appointments. If you have questions, please call your primary care clinic.  If you do not have a primary care provider, please call [phone] and they will assist you.        Care EveryWhere ID     This is your Care EveryWhere ID. This could be used by other organizations to access your [place] medical records  [id number]         Blood Pressure from Last 3 Encounters:   10/15/18 153/69   09/24/18 117/59   09/13/18 (!) 146/92    Weight from Last 3 Encounters:   09/13/18 76.3 kg (168 lb 3.2 oz)   07/20/18 74.9 kg (165 lb 3.2 oz)   07/18/18 74.8 kg (164 lb 12.8 oz)              Today, you had the following     No orders found for display         Today's Medication Changes          These changes are accurate as of 10/25/18 10:19 AM.  If you have any questions, ask your nurse or doctor.               These medicines have changed or have updated prescriptions.        Dose/Directions    colchicine 0.6 MG tablet   Commonly known as:  COLCRYS   This may have changed:    - when to take this  - reasons to take this   Used for:  Chronic gout involving toe without tophus, unspecified cause,  unspecified laterality        Dose:  0.6 mg   Take 1 tablet (0.6 mg) by mouth daily   Quantity:  90 tablet   Refills:  3                Primary Care Provider Office Phone # Fax #    Sarah Vallejo [phone]       [address] [place] [address]        Equal Access to Services     [facility] : Hadii aad ku hadasho Soomaali, waaxda luqadaha, qaybta kaalmada adeegyada, maximiliano lau giulianon jamel bridgetwilliam mckinley . So [facility] [phone].    ATENCIÓN: Si habla español, tiene a smith disposición servicios gratuitos de asistencia lingüística. Kishor al [phone].    We comply with applicable federal civil rights laws and [place] laws. We do not discriminate on the basis of race, color, national origin, age, disability, sex, sexual orientation, or gender identity.            Thank you!     Thank you for choosing [facility]  for your care. Our goal is always to provide you with excellent care. Hearing back from our patients is one way we can continue to improve our services. Please take a few minutes to complete the written survey that you may receive in the mail after your visit with us. Thank you!             Your Updated Medication List - Protect others around you: Learn how to safely use, store and throw away your medicines at www.disposemymeds.org.          This list is accurate as of 10/25/18 10:19 AM.  Always use your most recent med list.                   Brand Name Dispense Instructions for use Diagnosis    allopurinol 100 MG tablet    ZYLOPRIM    90 tablet    Take 1 tablet (100 mg) by mouth daily    Chronic gout involving toe without tophus, unspecified cause, unspecified laterality       Bromfenac Sodium Powd     1 Bottle    1 Bottle 4 times daily 1 drop four times a day in the operative eye starting 1 day before surgery. Use until the bottle runs out.    Combined form of age-related cataract, both eyes       calcium + D 600-200 MG-UNIT Tabs   Generic drug:  calcium  carbonate-vitamin D      1 tablet twice a day        colchicine 0.6 MG tablet    COLCRYS    90 tablet    Take 1 tablet (0.6 mg) by mouth daily    Chronic gout involving toe without tophus, unspecified cause, unspecified laterality       Cranberry 500 MG Caps      Take 1 capsule by mouth daily        Dexamethasone Acetate Powd     1 Bottle    1 Bottle 4 times daily 1 drop four times a day in the operative eye starting 1 day before surgery. Use until the bottle runs out.    Combined form of age-related cataract, both eyes       EXCEDRIN TENSION HEADACHE 500-65 MG Tabs   Generic drug:  acetaminophen-caffeine      1 tablet twice daily as needed        fish Oil 1200 MG capsule      3 capsule daily        FLAXSEED OIL PO      Take 1 Tablespoonful by mouth daily        fluticasone 50 MCG/ACT spray    FLONASE    48 g    Spray 2 sprays into both nostrils daily    Chronic rhinitis       KRILL OIL PO      Take 1 capsule by mouth daily        levalbuterol 45 MCG/ACT Inhaler    XOPENEX HFA    15 g    INHALE 2 PUFFS INTO THE LUNGS EVERY 6 HOURS AS NEEDED FOR SHORTNESS OF BREATH OR DIFFICULT BREATHING OR WHEEZING    Mild intermittent asthma without complication       levothyroxine 88 MCG tablet    SYNTHROID/LEVOTHROID    90 tablet    TAKE 1 TABLET(88 MCG) BY MOUTH DAILY    Hypothyroidism due to acquired atrophy of thyroid       magnesium 500 MG Tabs      Take 1 tablet by mouth daily        Moxifloxacin HCl Powd     1 Bottle    1 Bottle 4 times daily 1 drop four times a day in the operative eye starting 1 day before surgery. Use until the bottle runs out.    Combined form of age-related cataract, both eyes       MULTIPLE vitamin  S/WOMENS Tabs      Take 2 tablets by mouth daily        order for DME      Equipment ordered: Respironics Auto PAP Mask type: Nasal Settings: 9-15 cm H2O        polyethylene glycol powder    MIRALAX    510 g    Take 17 g (1 capful) by mouth 2 times daily as needed for constipation    Slow transit  constipation       ranitidine 300 MG tablet    ZANTAC    90 tablet    Take 1 tablet (300 mg) by mouth At Bedtime    Gastroesophageal reflux disease without esophagitis       STATIN NOT PRESCRIBED (INTENTIONAL)      by Other route continuous prn Reported on 5/15/2017    Hyperlipidemia LDL goal < 100       traZODone 50 MG tablet    DESYREL    180 tablet    Take 1-2 tablets ( mg) by mouth nightly as needed for sleep    Insomnia, unspecified type       venlafaxine 150 MG 24 hr capsule    EFFEXOR-XR    180 capsule    2 Capsules daily    Major depressive disorder, recurrent episode, mild degree (H)       VITAMIN B COMPLEX PO           zinc 50 MG Tabs      Take 1 tablet by mouth daily

## 2018-10-25 NOTE — LETTER
"    10/25/2018         RE: Abbey Omer  [address] [facility] [facility] [address]        Dear Colleague,    Thank you for referring your patient, Abbey Omer, to the [facility].    She is doing well after cataract surgery in both eyes. Please see a copy of my visit note below.     Current Eye Medications:  CatarActive 3 right eye four times a day.       Subjective:  Status/Post Kelman Phacoemulsification with Posterior Chamber Lens right eye:  10-15-18.  Vision is good.  She occasionally feels like she has a contact lens in her eye, but it goes away.  Occasionally, she see's a slightly dark ring in her peripheral vision.       Objective:  See Ophthalmology Exam.      Assessment:  Abbey Omer is a 68 year old female who presents with:     Pseudophakia of both eyes PO2 right eye        Plan:  *   For the right eye, continue CatarActive3 four times a day until the bottle runs out.  *   Use artificial tears up to 4 times per day (Refresh Plus, Systane Balance, or generic artificial tears are ok. Avoid \"get the red out\" drops).   *   Stop wearing eye shield.  *   No eye rubbing. May resume heavy lifting.  *   If you are taking glaucoma drops, continue as usual.  *   Return one month for final exam with glasses check.  *   If your vision worsens, eye becomes increasingly red, or becomes painful, call [phone].    Ryne Pascal M.D.           Again, thank you for allowing me to participate in the care of your patient.        Sincerely,        Ryne Pascal MD    "

## 2018-10-25 NOTE — PATIENT INSTRUCTIONS
"*   For the right eye, continue CatarActive3 four times a day until the bottle runs out.    *   Use artificial tears up to 4 times per day (Refresh Plus, Systane Balance, or generic artificial tears are ok. Avoid \"get the red out\" drops).     *   Stop wearing eye shield.    *   No eye rubbing. May resume heavy lifting.    *   If you are taking glaucoma drops, continue as usual.    *   Return one month for final exam with glasses check.    *   If your vision worsens, eye becomes increasingly red, or becomes painful, call [phone].    Ryne Pascal M.D.  "

## 2018-10-29 ENCOUNTER — DOCUMENTATION ONLY (OUTPATIENT)
Dept: SLEEP MEDICINE | Facility: CLINIC | Age: 68
End: 2018-10-29
Payer: COMMERCIAL

## 2018-10-29 DIAGNOSIS — G47.33 OSA (OBSTRUCTIVE SLEEP APNEA): ICD-10-CM

## 2018-10-29 NOTE — PROGRESS NOTES
Patient came to [facility] for mask fitting appointment on October 29, 2018. Patient requested to switch masks because she is having some sensitivity with her Nasal cushion mask and wants to try a Pillow mask.  Patient tried on the following Pillow masks: Resmed Airfit P10 (Small & For Her XS), [employer] Brevida (XS/S), Resmed Felton FX (Small) and Ma Respironics Nuance Pro (Small).  Patient selected [employer] Respironics, type Nuance Pro Pillow mask, size Small.      During visit, patient mentioned that she uses a SoClean.  Patient also stated that she tried a small nasal device that keeps her airway open and was wondering if she might be able to stop using her CPAP because the device seems to work just as well.  Recommended patient discuss this at her upcoming appointment with Dr. Le (scheduled for 11/23/18).

## 2018-10-30 ENCOUNTER — MYC MEDICAL ADVICE (OUTPATIENT)
Dept: FAMILY MEDICINE | Facility: CLINIC | Age: 68
End: 2018-10-30

## 2018-10-30 DIAGNOSIS — G47.00 INSOMNIA, UNSPECIFIED TYPE: ICD-10-CM

## 2018-10-30 DIAGNOSIS — M1A.9XX0 CHRONIC GOUT INVOLVING TOE WITHOUT TOPHUS, UNSPECIFIED CAUSE, UNSPECIFIED LATERALITY: ICD-10-CM

## 2018-10-30 DIAGNOSIS — E03.4 HYPOTHYROIDISM DUE TO ACQUIRED ATROPHY OF THYROID: ICD-10-CM

## 2018-10-30 RX ORDER — ALLOPURINOL 100 MG/1
100 TABLET ORAL DAILY
Qty: 90 TABLET | Refills: 1 | Status: CANCELLED | OUTPATIENT
Start: 2018-10-30

## 2018-10-30 RX ORDER — LEVOTHYROXINE SODIUM 88 UG/1
TABLET ORAL
Qty: 30 TABLET | Refills: 0 | Status: SHIPPED | OUTPATIENT
Start: 2018-10-30 | End: 2018-12-27

## 2018-10-30 RX ORDER — TRAZODONE HYDROCHLORIDE 50 MG/1
50-100 TABLET, FILM COATED ORAL
Qty: 180 TABLET | Refills: 1 | Status: SHIPPED | OUTPATIENT
Start: 2018-10-30 | End: 2018-12-27

## 2018-10-30 NOTE — TELEPHONE ENCOUNTER
"Requested Prescriptions   Pending Prescriptions Disp Refills     allopurinol (ZYLOPRIM) 100 MG tablet  Last Written Prescription Date:  9/13/2018  Last Fill Quantity: 90 tabs,  # refills: 1   Last office visit: 9/13/2018 with prescribing provider:  Genevieve   Future Office Visit:   Next 5 appointments (look out 90 days)     Nov 16, 2018 10:45 AM CST   Return Visit with Ryne Pascal MD   [facility]    [address] [address]   [phone]            Jan 22, 2019  9:00 AM CST   Return Visit with Omar West MD, [facility] CYSTO PROC ROOM   [facility] ([facility])    [address] [address]   [phone]                  90 tablet 1     Sig: Take 1 tablet (100 mg) by mouth daily    Gout Agents Protocol Passed    10/30/2018 10:31 AM       Passed - CBC on file in past 12 months    Recent Labs   Lab Test  03/12/18   1037   WBC  6.1   RBC  4.83   HGB  14.8   HCT  45.6   PLT  269                Passed - ALT on file in past 12 months    Recent Labs   Lab Test  03/12/18   1037   ALT  81*            Passed - Has Uric Acid on file in past 12 months and value is less than 6    Recent Labs   Lab Test  03/12/18   1037   URIC  5.3     If level is 6mg/dL or greater, ok to refill one time and refer to provider.          Passed - Recent (12 mo) or future (30 days) visit within the authorizing provider's specialty    Patient had office visit in the last 12 months or has a visit in the next 30 days with authorizing provider or within the authorizing provider's specialty.  See \"Patient Info\" tab in inbasket, or \"Choose Columns\" in Meds & Orders section of the refill encounter.             Passed - Patient is age 18 or older       Passed - No active pregnancy on record       Passed - Normal serum creatinine on file in the past 12 months    Recent Labs   Lab Test  09/13/18   1149   CR  1.04            Passed - No " "positive pregnancy test in past year        traZODone (DESYREL) 50 MG tablet  Last Written Prescription Date:  3/12/2018  Last Fill Quantity: 180 tabs,  # refills: 1   Last office visit: 9/13/2018 with prescribing provider:  Genevieve Hobson Office Visit:   Next 5 appointments (look out 90 days)     Nov 16, 2018 10:45 AM CST   Return Visit with Ryne Pascal MD   [facility]    [address] [address]   [phone]            Jan 22, 2019  9:00 AM CST   Return Visit with Omar West MD, [facility] PROC ROOM   [facility] ([address] [address]   [phone]                  180 tablet 1     Sig: Take 1-2 tablets ( mg) by mouth nightly as needed for sleep    Serotonin Modulators Passed    10/30/2018 10:31 AM       Passed - Recent (12 mo) or future (30 days) visit within the authorizing provider's specialty    Patient had office visit in the last 12 months or has a visit in the next 30 days with authorizing provider or within the authorizing provider's specialty.  See \"Patient Info\" tab in inbasket, or \"Choose Columns\" in Meds & Orders section of the refill encounter.             Passed - Patient is age 18 or older       Passed - No active pregnancy on record       Passed - No positive pregnancy test in past 12 months        levothyroxine (SYNTHROID/LEVOTHROID) 88 MCG tablet  Last Written Prescription Date:  8/23/2018  Last Fill Quantity: 90 tabs,  # refills: 0   Last office visit: 9/13/2018 with prescribing provider:  Genevieve Hobson Office Visit:   Next 5 appointments (look out 90 days)     Nov 16, 2018 10:45 AM CST   Return Visit with Ryne Pascal MD   [facility] ([facility])    [address]  [place] [address]   [phone]            Jan 22, 2019  9:00 AM CST   Return Visit with Omar West MD, [facility] " "PROC ROOM   [facility] ([facility])    [address]  [place] [address] [phone]                  90 tablet 0     Sig: TAKE 1 TABLET(88 MCG) BY MOUTH DAILY    Thyroid Protocol Passed    10/30/2018 10:31 AM       Passed - Patient is 12 years or older       Passed - Recent (12 mo) or future (30 days) visit within the authorizing provider's specialty    Patient had office visit in the last 12 months or has a visit in the next 30 days with authorizing provider or within the authorizing provider's specialty.  See \"Patient Info\" tab in inbasket, or \"Choose Columns\" in Meds & Orders section of the refill encounter.             Passed - Normal TSH on file in past 12 months    Recent Labs   Lab Test  11/09/17   0851   TSH  1.18             Passed - No active pregnancy on record    If patient is pregnant or has had a positive pregnancy test, please check TSH.         Passed - No positive pregnancy test in past 12 months    If patient is pregnant or has had a positive pregnancy test, please check TSH.            "

## 2018-10-30 NOTE — TELEPHONE ENCOUNTER
Allopurinol was just sent last month for 6 months and we received receipt.  Cori Levothyroxine since labs are due.   Trazodone prescription approved per [facility] Refill Protocol.  In today's other encounter, patient was planning to schedule her wellness visit with labs.  Oma Richardson RN

## 2018-10-31 ENCOUNTER — TELEPHONE (OUTPATIENT)
Dept: FAMILY MEDICINE | Facility: CLINIC | Age: 68
End: 2018-10-31

## 2018-10-31 DIAGNOSIS — E03.4 HYPOTHYROIDISM DUE TO ACQUIRED ATROPHY OF THYROID: ICD-10-CM

## 2018-10-31 NOTE — TELEPHONE ENCOUNTER
"Requested Prescriptions   Pending Prescriptions Disp Refills     levothyroxine (SYNTHROID/LEVOTHROID) 88 MCG tablet  Pharmacy is requesting a 90 day supply    Last Written Prescription Date:  10/30/2018  Last Fill Quantity: 30 tabs,  # refills: 0   Last office visit: 9/13/2018 with prescribing provider:  Genevieve   Future Office Visit:   Next 5 appointments (look out 90 days)     Nov 16, 2018 10:45 AM CST   Return Visit with Ryne Pascal MD   [facility]    [address] [address]   [phone]            Jan 22, 2019  9:00 AM CST   Return Visit with Omar West MD, [facility] CYSTO PROC ROOM   [facility])    [address] [address]   [phone]                  30 tablet 0     Sig: TAKE 1 TABLET(88 MCG) BY MOUTH DAILY    Thyroid Protocol Passed    10/31/2018 10:14 AM       Passed - Patient is 12 years or older       Passed - Recent (12 mo) or future (30 days) visit within the authorizing provider's specialty    Patient had office visit in the last 12 months or has a visit in the next 30 days with authorizing provider or within the authorizing provider's specialty.  See \"Patient Info\" tab in inbasket, or \"Choose Columns\" in Meds & Orders section of the refill encounter.             Passed - Normal TSH on file in past 12 months    Recent Labs   Lab Test  11/09/17   0851   TSH  1.18             Passed - No active pregnancy on record    If patient is pregnant or has had a positive pregnancy test, please check TSH.         Passed - No positive pregnancy test in past 12 months    If patient is pregnant or has had a positive pregnancy test, please check TSH.            "

## 2018-11-01 RX ORDER — LEVOTHYROXINE SODIUM 88 UG/1
TABLET ORAL
Qty: 30 TABLET | Refills: 0 | OUTPATIENT
Start: 2018-11-01

## 2018-11-01 NOTE — TELEPHONE ENCOUNTER
Due for labs and patient was planning on scheduling a wellness visit but hasn't yet. A leonard was given on 10/30. Denied.  O[place] Richardson RN

## 2018-11-05 ENCOUNTER — MYC MEDICAL ADVICE (OUTPATIENT)
Dept: FAMILY MEDICINE | Facility: CLINIC | Age: 68
End: 2018-11-05

## 2018-11-05 DIAGNOSIS — E78.5 HYPERLIPIDEMIA LDL GOAL <100: Primary | ICD-10-CM

## 2018-11-05 DIAGNOSIS — E03.4 HYPOTHYROIDISM DUE TO ACQUIRED ATROPHY OF THYROID: ICD-10-CM

## 2018-11-13 ENCOUNTER — OFFICE VISIT (OUTPATIENT)
Dept: SLEEP MEDICINE | Facility: CLINIC | Age: 68
End: 2018-11-13
Payer: COMMERCIAL

## 2018-11-13 VITALS
SYSTOLIC BLOOD PRESSURE: 154 MMHG | OXYGEN SATURATION: 95 % | HEART RATE: 72 BPM | BODY MASS INDEX: 33.77 KG/M2 | HEIGHT: 60 IN | DIASTOLIC BLOOD PRESSURE: 82 MMHG | WEIGHT: 172 LBS

## 2018-11-13 DIAGNOSIS — G47.33 OSA (OBSTRUCTIVE SLEEP APNEA): ICD-10-CM

## 2018-11-13 PROCEDURE — 99213 OFFICE O/P EST LOW 20 MIN: CPT | Mod: 24 | Performed by: INTERNAL MEDICINE

## 2018-11-13 NOTE — MR AVS SNAPSHOT
After Visit Summary   11/13/2018    Abbey Omer    MRN: [id number]           Patient Information     Date Of Birth          1950        Visit Information        Provider Department      11/13/2018 9:30 AM Ry Le MD [facility]        Today's Diagnoses     RICKEY (obstructive sleep apnea)          Care Instructions      Your BMI is Body mass index is 33.59 kg/(m^2).  Weight management is a personal decision.  If you are interested in exploring weight loss strategies, the following discussion covers the approaches that may be successful. Body mass index (BMI) is one way to tell whether you are at a healthy weight, overweight, or obese. It measures your weight in relation to your height.  A BMI of 18.5 to 24.9 is in the healthy range. A person with a BMI of 25 to 29.9 is considered overweight, and someone with a BMI of 30 or greater is considered obese. More than two-thirds of American adults are considered overweight or obese.  Being overweight or obese increases the risk for further weight gain. Excess weight may lead to heart disease and diabetes.  Creating and following plans for healthy eating and physical activity may help you improve your health.  Weight control is part of healthy lifestyle and includes exercise, emotional health, and healthy eating habits. Careful eating habits lifelong are the mainstay of weight control. Though there are significant health benefits from weight loss, long-term weight loss with diet alone may be very difficult to achieve- studies show long-term success with dietary management in less than 10% of people. Attaining a healthy weight may be especially difficult to achieve in those with severe obesity. In some cases, medications, devices and surgical management might be considered.  What can you do?  If you are overweight or obese and are interested in methods for weight loss, you should discuss this with your provider.      Consider reducing daily calorie intake by 500 calories.     Keep a food journal.     Avoiding skipping meals, consider cutting portions instead.    Diet combined with exercise helps maintain muscle while optimizing fat loss. Strength training is particularly important for building and maintaining muscle mass. Exercise helps reduce stress, increase energy, and improves fitness. Increasing exercise without diet control, however, may not burn enough calories to loose weight.       Start walking three days a week 10-20 minutes at a time    Work towards walking thirty minutes five days a week     Eventually, increase the speed of your walking for 1-2 minutes at time    In addition, we recommend that you review healthy lifestyles and methods for weight loss available through the National Institutes of Health patient information sites:  http://win.niddk.nih.gov/publications/index.htm    And look into health and wellness programs that may be available through your health insurance provider, employer, local community center, or carole club.    Weight management plan: Patient was referred to their PCP to discuss a diet and exercise plan.              Follow-ups after your visit        Follow-up notes from your care team     Return in about 6 months (around 5/13/2019) for PAP Compliance Check.      Your next 10 appointments already scheduled     Nov 16, 2018 10:45 AM CST   Return Visit with Ryne Pascal MD   [facility]    [address] [address]   [phone]            Jan 22, 2019  9:00 AM CST   Return Visit with Omar West MD, [facility] CYSTO PROC ROOM   [facility])    [address] [address]   [phone]            May 07, 2019 10:30 AM CDT   Return Sleep Patient with Ry Le MD   [facility] ([facility])    [address] [place] [address]  [facility] [address]  [facility] [address] [phone]              Who to contact     If you have questions or need follow up information about today's clinic visit or your schedule please contact [facility] directly at [phone].  Normal or non-critical lab and imaging results will be communicated to you by MyChart, letter or phone within 4 business days after the clinic has received the results. If you do not hear from us within 7 days, please contact the clinic through Spotplex[employer] or phone. If you have a critical or abnormal lab result, we will notify you by phone as soon as possible.  Submit refill requests through [email or web address] or call your pharmacy and they will forward the refill request to us. Please allow 3 business days for your refill to be completed.          Additional Information About Your Visit        Spotplex[employer] Information     [email or web address] gives you secure access to your electronic health record. If you see a primary care provider, you can also send messages to your care team and make appointments. If you have questions, please call your primary care clinic.  If you do not have a primary care provider, please call [phone] and they will assist you.        Care EveryWhere ID     This is your Care EveryWhere ID. This could be used by other organizations to access your [place] medical records  [id number]        Your Vitals Were     Pulse Height Pulse Oximetry BMI (Body Mass Index)          72 1.524 m (5') 95% 33.59 kg/m2         Blood Pressure from Last 3 Encounters:   11/13/18 154/82   10/15/18 153/69   09/24/18 117/59    Weight from Last 3 Encounters:   11/13/18 78 kg (172 lb)   09/13/18 76.3 kg (168 lb 3.2 oz)   07/20/18 74.9 kg (165 lb 3.2 oz)              Today, you had the following     No orders found for display         Today's Medication Changes          These changes are accurate as of 11/13/18 10:15 AM.  If you have any questions, ask your nurse or doctor.               These  medicines have changed or have updated prescriptions.        Dose/Directions    colchicine 0.6 MG tablet   Commonly known as:  COLCRYS   This may have changed:    - when to take this  - reasons to take this   Used for:  Chronic gout involving toe without tophus, unspecified cause, unspecified laterality        Dose:  0.6 mg   Take 1 tablet (0.6 mg) by mouth daily   Quantity:  90 tablet   Refills:  3                Primary Care Provider Office Phone # Fax #    Sarah Vallejo [phone]       [address] [place] [address]        Equal Access to Services     [facility]: Hadii madalyn ignacio hadasho Soplacido, waaxda luqadaha, qaybta kaalmada franklin, maximiliano mckinley . So [facility] [phone].    ATENCIÓN: Si habla español, tiene a smith disposición servicios gratuitos de asistencia lingüística. [facility] [phone].    We comply with applicable federal civil rights laws and [place] laws. We do not discriminate on the basis of race, color, national origin, age, disability, sex, sexual orientation, or gender identity.            Thank you!     Thank you for choosing [facility]  for your care. Our goal is always to provide you with excellent care. Hearing back from our patients is one way we can continue to improve our services. Please take a few minutes to complete the written survey that you may receive in the mail after your visit with us. Thank you!             Your Updated Medication List - Protect others around you: Learn how to safely use, store and throw away your medicines at www.disposemymeds.org.          This list is accurate as of 11/13/18 10:15 AM.  Always use your most recent med list.                   Brand Name Dispense Instructions for use Diagnosis    allopurinol 100 MG tablet    ZYLOPRIM    90 tablet    Take 1 tablet (100 mg) by mouth daily    Chronic gout involving toe without tophus, unspecified cause, unspecified laterality        calcium + D 600-200 MG-UNIT Tabs   Generic drug:  calcium carbonate-vitamin D      1 tablet twice a day        colchicine 0.6 MG tablet    COLCRYS    90 tablet    Take 1 tablet (0.6 mg) by mouth daily    Chronic gout involving toe without tophus, unspecified cause, unspecified laterality       Cranberry 500 MG Caps      Take 1 capsule by mouth daily        Dexamethasone Acetate Powd     1 Bottle    1 Bottle 4 times daily 1 drop four times a day in the operative eye starting 1 day before surgery. Use until the bottle runs out.    Combined form of age-related cataract, both eyes       EXCEDRIN TENSION HEADACHE 500-65 MG Tabs   Generic drug:  acetaminophen-caffeine      1 tablet twice daily as needed        fish Oil 1200 MG capsule      3 capsule daily        FLAXSEED OIL PO      Take 1 Tablespoonful by mouth daily        fluticasone 50 MCG/ACT spray    FLONASE    48 g    Spray 2 sprays into both nostrils daily    Chronic rhinitis       KRILL OIL PO      Take 1 capsule by mouth daily        levalbuterol 45 MCG/ACT Inhaler    XOPENEX HFA    15 g    INHALE 2 PUFFS INTO THE LUNGS EVERY 6 HOURS AS NEEDED FOR SHORTNESS OF BREATH OR DIFFICULT BREATHING OR WHEEZING    Mild intermittent asthma without complication       levothyroxine 88 MCG tablet    SYNTHROID/LEVOTHROID    30 tablet    TAKE 1 TABLET(88 MCG) BY MOUTH DAILY    Hypothyroidism due to acquired atrophy of thyroid       magnesium 500 MG Tabs      Take 1 tablet by mouth daily        MELATONIN PO           MULTIPLE vitamin  S/WOMENS Tabs      Take 2 tablets by mouth daily        order for DME      Equipment ordered: Respironics Auto PAP Mask type: Nasal Settings: 9-15 cm H2O        polyethylene glycol powder    MIRALAX    510 g    Take 17 g (1 capful) by mouth 2 times daily as needed for constipation    Slow transit constipation       ranitidine 300 MG tablet    ZANTAC    90 tablet    Take 1 tablet (300 mg) by mouth At Bedtime    Gastroesophageal reflux disease  without esophagitis       STATIN NOT PRESCRIBED (INTENTIONAL)      by Other route continuous prn Reported on 5/15/2017    Hyperlipidemia LDL goal < 100       traZODone 50 MG tablet    DESYREL    180 tablet    Take 1-2 tablets ( mg) by mouth nightly as needed for sleep    Insomnia, unspecified type       venlafaxine 150 MG 24 hr capsule    EFFEXOR-XR    180 capsule    2 Capsules daily    Major depressive disorder, recurrent episode, mild degree (H)       VITAMIN B COMPLEX PO           zinc 50 MG Tabs      Take 1 tablet by mouth daily

## 2018-11-13 NOTE — PROGRESS NOTES
"Obstructive Sleep Apnea - PAP Follow-Up Visit:    Chief Complaint   Patient presents with     CPAP Follow Up       Abbey Omer comes in today for follow-up of their moderate-to-severe sleep apnea, managed with CPAP.     No specialty comments available.    Since having shoulder surgeries has been having trouble with sleep and CPAP.  Sometimes reports she puts CPAP on and can't breath (not sure if it is too much or too little pressure).  Has not tried ramp feature.    Having trouble getting mask adjusted properly.    Overall, she rates the experience with PAP as 8 (0 poor, 10 great). The mask is not comfortable.  The mask is uncomfortable because of \"sometimes can't get it arranged properly\".  The mask is not leaking .  She is not snoring with the mask on. She is not having gasp arousals.  She is not having significant oral/nasal dryness. The pressure is comfortable.     Her PAP interface is Nasal Pillows.    Bedtime is typically 2200. Usually it takes about 15 min minutes to fall asleep with the mask on. Wake time is typically 0700.  Patient is using PAP therapy 4 hours per night. The patient is usually getting 9 hours of sleep per night.    She does feel rested in the morning.    Total score - [place]: 1 (11/13/2018  9:00 AM)    Respironics  Auto-PAP 9 - 15 cmH2O 30 day usage data:    40% of days with > 4 hours of use. 7/30 days with no use.   Average use 3 hours 34  minutes per day.     Average time of night in large leak 0 seconds.    CPAP 90% pressure 10.9cm.   AHI 5.0 events per hour.    Past medical/surgical history, family history, social history, medications and allergies were reviewed.      Problem List:  Patient Active Problem List    Diagnosis Date Noted     Major depressive disorder, recurrent episode, mild degree (H) 08/03/2011     Priority: High     prior care with psychiatrist Dr. Ro  Has tried paxil in past.        Hypertension goal BP (blood pressure) < 140/90 06/01/2011     Priority: " "High     Intolerant to higher than 50 mg of metoprolol due to depressive effects       Hyperlipidemia with target LDL less than 100 05/17/2011     Priority: High     Has tried welchol, statins and developed side effects  Diagnosis updated by automated process. Provider to review and confirm.       CKD (chronic kidney disease) stage 3, GFR 30-59 ml/min (H) 12/20/2010     Priority: High     3/13/18:  Creatinine 1.32, GFR 40, creatinine clearance 50.1 mL/Min    Tray consulted with [facility] 3/14/18.  Renal function has declined since addition of Allopurinol and Colchicine.    Renally cleared drug recommendations:  - Continue to avoid NSAIDs  - Allopurinol 100 mg dosage okay to continue  - Colchicine back off dosage.  3/12/18 patient changed to AS NEEDED usage.  - Ranitidine 300 mg/day change to 150 mg/day.  3/14/18 patient changed to 150 mg dosage.  - Spironolactone 100 mg/day consider decreasing to 50 mg.  - Effexor 300 mg/day may reduce total daily dose by 25-50% but this should be done last due to risk of destabilizing mental health       Hypothyroidism 10/12/2009     Priority: High     Acute gout involving toe of left foot, unspecified cause 08/11/2017     Priority: Medium     Discoid atelectasis 04/28/2016     Priority: Medium     4/26/16:  Chest CT - \"FINDINGS:   CHEST: Series 3 image 23, there is a peripheral indeterminate 0.3 cm  right mid lung nodule near the minor fissure which is stable since  6/3/2014. No acute pulmonary disease or additional nodules identified.  There is discoid atelectasis in the right lower lobe. Left lung is  clear. No mediastinal, hilar or axillary adenopathy. Cholecystectomy  clips. Elevated right hemidiaphragm.\"       Renal cyst, left 02/22/2016     Priority: Medium     Seen on abd/pelvic CT at [facility] 2/14/2016.  Recommend recheck CT in one year or do ultrasound.  In review of chart, renal cyst noted on ultrasound in 2011 and CT done at [facility] in 2014 showing " "stablility       Combined form of age-related cataract, both eyes 02/17/2016     Priority: Medium     Glaucoma suspect, bilateral 02/17/2016     Priority: Medium     Target IOP:   Max IOP :   Family history: No  Trauma history: No  OCT: 8/216 Normal  [place] visual field (HVF): 8/2016   Right eye - Reliable, Normal   Left eye - Reliable, Normal  Pachymetry: Average-thickish 553/564  C/D: 0.6/0.6  SLT:            Drusen of macula of both eyes 02/17/2016     Priority: Medium     Acne 05/20/2015     Priority: Medium     Major depressive disorder, recurrent episode, moderate (H) 06/20/2014     Priority: Medium     Pulmonary nodules 06/16/2014     Priority: Medium     4/26/16:  Chest CT - \"IMPRESSION: Stable 0.3 cm right midlung nodule since June 2014.\"       Allergic conjunctivitis 04/10/2013     Priority: Medium     Hypertriglyceridemia 03/01/2013     Priority: Medium     DDD (degenerative disc disease), cervical 06/16/2011     Priority: Medium     Elevated LFTs 06/15/2011     Priority: Medium     6/21/2011:  [facility] Ultrasound \"IMPRESSION:  1. Previous cholecystectomy.  2. Otherwise unremarkable ultrasound of the right upper quadrant.\"  2/14/16:  Abdominal CT through [facility] shows unremarkable liver       Dependent edema      Priority: Medium     History of bladder cancer 05/18/2011     Priority: Medium     Dr. David, [place] [facility]       Obesity 05/17/2011     Priority: Medium     Chronic nonallergic rhinitis      Priority: Medium     Dr. Peoples, allergist       RICKEY (obstructive sleep apnea) 08/11/2009     Priority: Medium     Intolerant to CPAP    Split Night:  Sleep Study 2/01/2017 - (173.0 lbs) AHI 29.6, RDI 59.8, Supine AHI 39.4, REM AHI -, Low O2% 78.5%, Time Spent ?88% 14.4, Time Spent ?89% 30.6, CPAP was titrated to a pressure of 9 with an AHI 30. Time spent in REM supine at this pressure was - minutes.       Macular drusen 11/25/2011     Priority: Low     Advance Care Planning 06/15/2011     Priority: Low     " Advance Care Planning 7/5/2016: Receipt of ACP document:  Received: Health Care Directive which was witnessed or notarized on 4/25/16.  Document previously scanned on 4/28/16. Also received Health Care Directive dated 3/18/16 which was valid and scanned on 4/14/16  Validation forms previously completed and sent to be scanned.  Code Status reflects choices in most recent ACP document.  Confirmed/documented designated decision maker(s).  Added by Kanchan Lazcano RN, Advance Care Planning Liaison.  Advance Care Planning 4/25/2016: ACP Facilitation Session:    Abbey Omer presented for ACP Facilitation session at the clinic. She was accompanied by spouse. Honoring Choices information provided and resources reviewed. She currently wishes to complete an ACP document and needs further clarification and/or consideration prior to documenting choices.  She currently has the following questions or concerns about Advance Care Planning: none.  Confirmed/documented legally designated decision maker(s). Code status reflects choices in most recent ACP document. Follow-up meeting: not needed/applicable.  document sent to be scanned after viewed for validation.Added by Adrianna Silveira   Advance Care Planning 4/13/2016: Receipt of ACP document:  Received: invalid HCD document not signed, witness/notary and missing pages.Document not previously scanned. Validation form completed indicating invalid document. Copy sent to client with information and facilitation resources. Validation form sent to be scanned as notation of invalid document received.  Confirmed/documented RN, System Director ACP-Honoring Choices  designated decision maker(s).  Added by Rebeca Manriquez  Advance Care Planning 6-15-11 Discussed advance care planning with patient; information given to patient to review. Karen Pro CMA        /82  Pulse 72  Ht 1.524 m (5')  Wt 78 kg (172 lb)  SpO2 95%  BMI 33.59 kg/m2    Impression/Plan:  1. RICKEY  (obstructive sleep apnea)  Moderate Sleep apnea. Tolerating PAP ok. Daytime symptoms are stable.   Discussed cloth barriers for nasal mask, cleaning even with so-clean.  We adjusted fit and tested pressures and ramp.    Abbey Omer will follow up in about 6 month(s).     Fifteen minutes spent with patient, all of which were spent face-to-face counseling, consulting, coordinating plan of care.      CC:  Sarah Vallejo

## 2018-11-13 NOTE — NURSING NOTE
Chief Complaint   Patient presents with     CPAP Follow Up       Initial /82  Pulse 72  Ht 1.524 m (5')  Wt 78 kg (172 lb)  SpO2 95%  BMI 33.59 kg/m2 Estimated body mass index is 33.59 kg/(m^2) as calculated from the following:    Height as of this encounter: 1.524 m (5').    Weight as of this encounter: 78 kg (172 lb).    Medication Reconciliation: complete

## 2018-11-13 NOTE — PATIENT INSTRUCTIONS

## 2018-11-15 NOTE — TELEPHONE ENCOUNTER
Called patient and she does remember speaking to me last month and she had forgotten to schedule an appointment.  Patient said she will schedule but not today.    Angélica Vegas

## 2018-11-16 ENCOUNTER — OFFICE VISIT (OUTPATIENT)
Dept: OPHTHALMOLOGY | Facility: CLINIC | Age: 68
End: 2018-11-16
Payer: COMMERCIAL

## 2018-11-16 DIAGNOSIS — Z96.1 PSEUDOPHAKIA OF BOTH EYES: Primary | ICD-10-CM

## 2018-11-16 DIAGNOSIS — H40.003 GLAUCOMA SUSPECT, BILATERAL: ICD-10-CM

## 2018-11-16 PROCEDURE — 99024 POSTOP FOLLOW-UP VISIT: CPT | Performed by: STUDENT IN AN ORGANIZED HEALTH CARE EDUCATION/TRAINING PROGRAM

## 2018-11-16 ASSESSMENT — TONOMETRY
IOP_METHOD: APPLANATION
OD_IOP_MMHG: 19
OS_IOP_MMHG: 20

## 2018-11-16 ASSESSMENT — EXTERNAL EXAM - LEFT EYE: OS_EXAM: NORMAL

## 2018-11-16 ASSESSMENT — REFRACTION_MANIFEST
OD_SPHERE: PLANO
OS_AXIS: 003
OD_CYLINDER: +0.75
OD_AXIS: 175
OS_SPHERE: -1.00
OS_ADD: +3.00
OD_ADD: +3.00
OS_CYLINDER: +1.50

## 2018-11-16 ASSESSMENT — VISUAL ACUITY
OD_SC: 20/25-3
METHOD: SNELLEN - LINEAR
OS_SC: 20/50

## 2018-11-16 ASSESSMENT — EXTERNAL EXAM - RIGHT EYE: OD_EXAM: NORMAL

## 2018-11-16 ASSESSMENT — SLIT LAMP EXAM - LIDS
COMMENTS: NORMAL
COMMENTS: NORMAL

## 2018-11-16 ASSESSMENT — CUP TO DISC RATIO
OS_RATIO: 0.65
OD_RATIO: 0.45

## 2018-11-16 NOTE — MR AVS SNAPSHOT
After Visit Summary   11/16/2018    Abbey Omer    MRN: [id number]           Patient Information     Date Of Birth          1950        Visit Information        Provider Department      11/16/2018 10:45 AM Ryne Pascal MD [facility]        Today's Diagnoses     Pseudophakia of both eyes    -  1    Glaucoma suspect, bilateral          Care Instructions    *  Fill prescription for new glasses or drugstore readers.    *  Return to clinic in 6 months for glaucoma tests.    Ryne Pascal M.D.  [phone]          Follow-ups after your visit        Follow-up notes from your care team     Return in about 6 months (around 5/16/2019) for IOP check, HVF, OCT optic nerve.      Your next 10 appointments already scheduled     Jan 22, 2019  9:00 AM CST   Return Visit with Omar West MD, [facility]ST[facility] PROC ROOM   [facility] ([address] [id number] [phone]            May 07, 2019 10:30 AM CDT   Return Sleep Patient with Ry Le MD   [facility] ([facility])    [address] [place] [id number] [phone]              Who to contact     If you have questions or need follow up information about today's clinic visit or your schedule please contact [facility] directly at [phone].  Normal or non-critical lab and imaging results will be communicated to you by MyChart, letter or phone within 4 business days after the clinic has received the results. If you do not hear from us within 7 days, please contact the clinic through MyChart or phone. If you have a critical or abnormal lab result, we will notify you by phone as soon as possible.  Submit refill requests through [employer] or call your pharmacy and they will forward the refill request to us. Please allow 3 business days for your refill to be  completed.          Additional Information About Your Visit        [employer] Information     [employer] gives you secure access to your electronic health record. If you see a primary care provider, you can also send messages to your care team and make appointments. If you have questions, please call your primary care clinic.  If you do not have a primary care provider, please call [phone] and they will assist you.        Care EveryWhere ID     This is your Care EveryWhere ID. This could be used by other organizations to access your [place] medical records  [id number]         Blood Pressure from Last 3 Encounters:   11/13/18 154/82   10/15/18 153/69   09/24/18 117/59    Weight from Last 3 Encounters:   11/13/18 78 kg (172 lb)   09/13/18 76.3 kg (168 lb 3.2 oz)   07/20/18 74.9 kg (165 lb 3.2 oz)              Today, you had the following     No orders found for display         Today's Medication Changes          These changes are accurate as of 11/16/18 11:55 AM.  If you have any questions, ask your nurse or doctor.               These medicines have changed or have updated prescriptions.        Dose/Directions    colchicine 0.6 MG tablet   Commonly known as:  COLCRYS   This may have changed:    - when to take this  - reasons to take this   Used for:  Chronic gout involving toe without tophus, unspecified cause, unspecified laterality        Dose:  0.6 mg   Take 1 tablet (0.6 mg) by mouth daily   Quantity:  90 tablet   Refills:  3                Primary Care Provider Office Phone # Fax #    Sarah JUNIE Vallejo [phone]       [address] [place] [address]        Equal Access to Services     [facility]: Hadii madalyn carroll Soplacido, waaxda luqadaha, qaybta kaalmada maximiliano reid . So [facility] [place] [phone].    ATENCIÓN: Si habla español, tiene a smith disposición servicios gratuitos de asistencia lingüística. Llame al [phone].    We comply with  applicable federal civil rights laws and [place] laws. We do not discriminate on the basis of race, color, national origin, age, disability, sex, sexual orientation, or gender identity.            Thank you!     Thank you for choosing [facility]  for your care. Our goal is always to provide you with excellent care. Hearing back from our patients is one way we can continue to improve our services. Please take a few minutes to complete the written survey that you may receive in the mail after your visit with us. Thank you!             Your Updated Medication List - Protect others around you: Learn how to safely use, store and throw away your medicines at www.disposemymeds.org.          This list is accurate as of 11/16/18 11:55 AM.  Always use your most recent med list.                   Brand Name Dispense Instructions for use Diagnosis    allopurinol 100 MG tablet    ZYLOPRIM    90 tablet    Take 1 tablet (100 mg) by mouth daily    Chronic gout involving toe without tophus, unspecified cause, unspecified laterality       calcium + D 600-200 MG-UNIT Tabs   Generic drug:  calcium carbonate-vitamin D      1 tablet twice a day        colchicine 0.6 MG tablet    COLCRYS    90 tablet    Take 1 tablet (0.6 mg) by mouth daily    Chronic gout involving toe without tophus, unspecified cause, unspecified laterality       Cranberry 500 MG Caps      Take 1 capsule by mouth daily        Dexamethasone Acetate Powd     1 Bottle    1 Bottle 4 times daily 1 drop four times a day in the operative eye starting 1 day before surgery. Use until the bottle runs out.    Combined form of age-related cataract, both eyes       EXCEDRIN TENSION HEADACHE 500-65 MG Tabs   Generic drug:  acetaminophen-caffeine      1 tablet twice daily as needed        fish Oil 1200 MG capsule      3 capsule daily        FLAXSEED OIL PO      Take 1 Tablespoonful by mouth daily        fluticasone 50 MCG/ACT spray    FLONASE    48 g    Spray 2 sprays  into both nostrils daily    Chronic rhinitis       KRILL OIL PO      Take 1 capsule by mouth daily        levalbuterol 45 MCG/ACT Inhaler    XOPENEX HFA    15 g    INHALE 2 PUFFS INTO THE LUNGS EVERY 6 HOURS AS NEEDED FOR SHORTNESS OF BREATH OR DIFFICULT BREATHING OR WHEEZING    Mild intermittent asthma without complication       levothyroxine 88 MCG tablet    SYNTHROID/LEVOTHROID    30 tablet    TAKE 1 TABLET(88 MCG) BY MOUTH DAILY    Hypothyroidism due to acquired atrophy of thyroid       magnesium 500 MG Tabs      Take 1 tablet by mouth daily        MELATONIN PO           MULTIPLE vitamin  S/WOMENS Tabs      Take 2 tablets by mouth daily        order for DME      Equipment ordered: Respironics Auto PAP Mask type: Nasal Settings: 9-15 cm H2O        polyethylene glycol powder    MIRALAX    510 g    Take 17 g (1 capful) by mouth 2 times daily as needed for constipation    Slow transit constipation       ranitidine 300 MG tablet    ZANTAC    90 tablet    Take 1 tablet (300 mg) by mouth At Bedtime    Gastroesophageal reflux disease without esophagitis       STATIN NOT PRESCRIBED (INTENTIONAL)      by Other route continuous prn Reported on 5/15/2017    Hyperlipidemia LDL goal < 100       traZODone 50 MG tablet    DESYREL    180 tablet    Take 1-2 tablets ( mg) by mouth nightly as needed for sleep    Insomnia, unspecified type       venlafaxine 150 MG 24 hr capsule    EFFEXOR-XR    180 capsule    2 Capsules daily    Major depressive disorder, recurrent episode, mild degree (H)       VITAMIN B COMPLEX PO           zinc 50 MG Tabs      Take 1 tablet by mouth daily

## 2018-11-16 NOTE — LETTER
11/16/2018         RE: Abbey Omer  [address] [facility] [address] [address]        Dear Colleague,    Thank you for referring your patient, Abbey Omer, to the [facility].     She is doing well after cataract surgery in both eyes. Please see a copy of my visit note below.     Current Eye Medications: no      Subjective:  Final mr od  Pt reports she is seeing well with both her eyes and her eyes feel fine.     Objective:  See Ophthalmology Exam.       Assessment:  Abbey Omer is a 68 year old female who presents with:   Encounter Diagnoses   Name Primary?     Pseudophakia of both eyes Final MR both eyes, doing well.      Glaucoma suspect, bilateral        Plan:  *  Fill prescription for new glasses or drugstore readers.  *  Return to clinic in 6 months for glaucoma tests.    Ryne Pascal M.D.  [phone]      Again, thank you for allowing me to participate in the care of your patient.        Sincerely,        Ryne Pascal MD

## 2018-11-16 NOTE — PATIENT INSTRUCTIONS
*  Fill prescription for new glasses or drugstore readers.    *  Return to clinic in 6 months for glaucoma tests.    Ryne Pascal M.D.  [phone]

## 2018-11-16 NOTE — PROGRESS NOTES
Current Eye Medications: no      Subjective:  Final mr od  Pt reports she is seeing well with both her eyes and her eyes feel fine.     Objective:  See Ophthalmology Exam.       Assessment:  Abbey Omer is a 68 year old female who presents with:   Encounter Diagnoses   Name Primary?     Pseudophakia of both eyes Final MR both eyes, doing well.      Glaucoma suspect, bilateral        Plan:  *  Fill prescription for new glasses or drugstore readers.  *  Return to clinic in 6 months for glaucoma tests.    Ryne Pascal M.D.  [phone]

## 2018-11-28 ENCOUNTER — DOCUMENTATION ONLY (OUTPATIENT)
Dept: SLEEP MEDICINE | Facility: CLINIC | Age: 68
End: 2018-11-28

## 2018-11-28 DIAGNOSIS — G47.33 OSA (OBSTRUCTIVE SLEEP APNEA): ICD-10-CM

## 2018-11-28 NOTE — PROGRESS NOTES
STM Recheck Visit    Subjective measures:   Pt is still struggling with her mask.  She would like to go back to her nasal mask.  Spoke with [facility] and they will have it ready for her to pick at the [facility].    Assessment: Pt not meeting objective benchmarks for compliance  Patient failing following subjective benchmarks: mask discomfort   Action plan: schedule mask fit appointment and 2 week STM recheck appt scheduled    Device type: Auto CPAP  PAP settings: CPAP min 9 cm  H20     CPAP max 15 cm  H20  Objective measures: 14 day rolling measures      Compliance  21.4 %     % of night spent in large leak  0 % last  upload      AHI 5.2  last  upload      Average number of minutes 138        Objective measure goal  Compliance   Goal >70%  Leak   Goal < 10%  AHI  Goal < 5  Usage  Goal >240

## 2018-12-14 ENCOUNTER — DOCUMENTATION ONLY (OUTPATIENT)
Dept: SLEEP MEDICINE | Facility: CLINIC | Age: 68
End: 2018-12-14

## 2018-12-14 DIAGNOSIS — G47.33 OSA (OBSTRUCTIVE SLEEP APNEA): ICD-10-CM

## 2018-12-14 NOTE — PROGRESS NOTES
STM Recheck Visit     Subjective measures:    Pt states that she's really struggling to wear her machine every night.  When she is able to wear it and fall asleep, she does feel better.  Sometimes she gets out of bed and goes to the living room and doesn't take it with her.  She states that the reason she is struggling with it is mostly because she has a lot of trouble sleeping.  She is going to get a stand no wheels and will put it on that and then she can take it with her when she gets out of bed and sleeps in her chair. She doesn't think that it's an issue with the mask or pressure.  She is going to try to wear it nightly.    Assessment: Pt not meeting objective benchmarks for compliance  Patient meeting subjective benchmarks.   Action plan: Patient has a follow up visit with Dr. Le on 5/19/19.   Device type: Auto  PAP settings: CPAP min 9 cm  H20     CPAP max 15 cm  H20  Objective measures:      Compliance: 21.4%       Leak 0.1%      AHI 5.3      Average usage 201 minutes

## 2018-12-18 DIAGNOSIS — N18.30 CKD (CHRONIC KIDNEY DISEASE) STAGE 3, GFR 30-59 ML/MIN (H): ICD-10-CM

## 2018-12-18 DIAGNOSIS — E03.4 HYPOTHYROIDISM DUE TO ACQUIRED ATROPHY OF THYROID: ICD-10-CM

## 2018-12-18 DIAGNOSIS — E78.5 HYPERLIPIDEMIA LDL GOAL <100: ICD-10-CM

## 2018-12-18 DIAGNOSIS — M1A.9XX0 CHRONIC GOUT INVOLVING TOE WITHOUT TOPHUS, UNSPECIFIED CAUSE, UNSPECIFIED LATERALITY: ICD-10-CM

## 2018-12-18 PROCEDURE — 80048 BASIC METABOLIC PNL TOTAL CA: CPT | Performed by: NURSE PRACTITIONER

## 2018-12-18 PROCEDURE — 36415 COLL VENOUS BLD VENIPUNCTURE: CPT | Performed by: NURSE PRACTITIONER

## 2018-12-18 PROCEDURE — 84443 ASSAY THYROID STIM HORMONE: CPT | Performed by: NURSE PRACTITIONER

## 2018-12-18 PROCEDURE — 80061 LIPID PANEL: CPT | Performed by: NURSE PRACTITIONER

## 2018-12-19 LAB
ANION GAP SERPL CALCULATED.3IONS-SCNC: 6 MMOL/L (ref 3–14)
BUN SERPL-MCNC: 16 MG/DL (ref 7–30)
CALCIUM SERPL-MCNC: 9.5 MG/DL (ref 8.5–10.1)
CHLORIDE SERPL-SCNC: 103 MMOL/L (ref 94–109)
CHOLEST SERPL-MCNC: 265 MG/DL
CO2 SERPL-SCNC: 29 MMOL/L (ref 20–32)
CREAT SERPL-MCNC: 1.05 MG/DL (ref 0.52–1.04)
GFR SERPL CREATININE-BSD FRML MDRD: 54 ML/MIN/1.7M2
GLUCOSE SERPL-MCNC: 93 MG/DL (ref 70–99)
HDLC SERPL-MCNC: 53 MG/DL
LDLC SERPL CALC-MCNC: 167 MG/DL
NONHDLC SERPL-MCNC: 212 MG/DL
POTASSIUM SERPL-SCNC: 3.6 MMOL/L (ref 3.4–5.3)
SODIUM SERPL-SCNC: 138 MMOL/L (ref 133–144)
TRIGL SERPL-MCNC: 227 MG/DL
TSH SERPL DL<=0.005 MIU/L-ACNC: 2.68 MU/L (ref 0.4–4)

## 2018-12-27 ENCOUNTER — OFFICE VISIT (OUTPATIENT)
Dept: FAMILY MEDICINE | Facility: CLINIC | Age: 68
End: 2018-12-27
Payer: COMMERCIAL

## 2018-12-27 VITALS
DIASTOLIC BLOOD PRESSURE: 72 MMHG | BODY MASS INDEX: 34.55 KG/M2 | TEMPERATURE: 97.6 F | WEIGHT: 176 LBS | HEIGHT: 60 IN | SYSTOLIC BLOOD PRESSURE: 144 MMHG

## 2018-12-27 DIAGNOSIS — K59.01 SLOW TRANSIT CONSTIPATION: ICD-10-CM

## 2018-12-27 DIAGNOSIS — E66.09 CLASS 1 OBESITY DUE TO EXCESS CALORIES WITH SERIOUS COMORBIDITY AND BODY MASS INDEX (BMI) OF 34.0 TO 34.9 IN ADULT: Chronic | ICD-10-CM

## 2018-12-27 DIAGNOSIS — G47.33 OSA (OBSTRUCTIVE SLEEP APNEA): Chronic | ICD-10-CM

## 2018-12-27 DIAGNOSIS — J31.0 CHRONIC NONALLERGIC RHINITIS: Chronic | ICD-10-CM

## 2018-12-27 DIAGNOSIS — M1A.9XX0 CHRONIC GOUT INVOLVING TOE WITHOUT TOPHUS, UNSPECIFIED CAUSE, UNSPECIFIED LATERALITY: ICD-10-CM

## 2018-12-27 DIAGNOSIS — R05.9 COUGH: ICD-10-CM

## 2018-12-27 DIAGNOSIS — G47.00 INSOMNIA, UNSPECIFIED TYPE: ICD-10-CM

## 2018-12-27 DIAGNOSIS — E78.5 HYPERLIPIDEMIA WITH TARGET LDL LESS THAN 100: Chronic | ICD-10-CM

## 2018-12-27 DIAGNOSIS — K21.9 GASTROESOPHAGEAL REFLUX DISEASE WITHOUT ESOPHAGITIS: ICD-10-CM

## 2018-12-27 DIAGNOSIS — Z00.00 ROUTINE HISTORY AND PHYSICAL EXAMINATION OF ADULT: Primary | ICD-10-CM

## 2018-12-27 DIAGNOSIS — N18.30 CKD (CHRONIC KIDNEY DISEASE) STAGE 3, GFR 30-59 ML/MIN (H): Chronic | ICD-10-CM

## 2018-12-27 DIAGNOSIS — I10 HYPERTENSION GOAL BP (BLOOD PRESSURE) < 140/90: Chronic | ICD-10-CM

## 2018-12-27 DIAGNOSIS — E03.4 HYPOTHYROIDISM DUE TO ACQUIRED ATROPHY OF THYROID: Chronic | ICD-10-CM

## 2018-12-27 DIAGNOSIS — F33.0 MAJOR DEPRESSIVE DISORDER, RECURRENT EPISODE, MILD DEGREE (H): Chronic | ICD-10-CM

## 2018-12-27 DIAGNOSIS — I45.10 RIGHT BUNDLE BRANCH BLOCK: ICD-10-CM

## 2018-12-27 DIAGNOSIS — E66.811 CLASS 1 OBESITY DUE TO EXCESS CALORIES WITH SERIOUS COMORBIDITY AND BODY MASS INDEX (BMI) OF 34.0 TO 34.9 IN ADULT: Chronic | ICD-10-CM

## 2018-12-27 PROCEDURE — 99214 OFFICE O/P EST MOD 30 MIN: CPT | Mod: 25 | Performed by: NURSE PRACTITIONER

## 2018-12-27 PROCEDURE — 93000 ELECTROCARDIOGRAM COMPLETE: CPT | Performed by: NURSE PRACTITIONER

## 2018-12-27 PROCEDURE — 99397 PER PM REEVAL EST PAT 65+ YR: CPT | Performed by: NURSE PRACTITIONER

## 2018-12-27 PROCEDURE — 82043 UR ALBUMIN QUANTITATIVE: CPT | Performed by: NURSE PRACTITIONER

## 2018-12-27 RX ORDER — LEVOTHYROXINE SODIUM 88 UG/1
TABLET ORAL
Qty: 90 TABLET | Refills: 3 | Status: SHIPPED | OUTPATIENT
Start: 2018-12-27 | End: 2019-10-07

## 2018-12-27 RX ORDER — LEVOTHYROXINE SODIUM 88 UG/1
TABLET ORAL
Qty: 30 TABLET | Refills: 0 | Status: SHIPPED | OUTPATIENT
Start: 2018-12-27 | End: 2018-12-27

## 2018-12-27 RX ORDER — FLUTICASONE PROPIONATE 50 MCG
2 SPRAY, SUSPENSION (ML) NASAL DAILY
Qty: 48 G | Refills: 3 | Status: SHIPPED | OUTPATIENT
Start: 2018-12-27 | End: 2019-01-14

## 2018-12-27 RX ORDER — VENLAFAXINE HYDROCHLORIDE 150 MG/1
CAPSULE, EXTENDED RELEASE ORAL
Qty: 180 CAPSULE | Refills: 1 | Status: SHIPPED | OUTPATIENT
Start: 2018-12-27 | End: 2019-07-29

## 2018-12-27 RX ORDER — TRAZODONE HYDROCHLORIDE 50 MG/1
50-100 TABLET, FILM COATED ORAL
Qty: 180 TABLET | Refills: 3 | Status: SHIPPED | OUTPATIENT
Start: 2018-12-27 | End: 2019-10-07

## 2018-12-27 RX ORDER — LISINOPRIL 5 MG/1
5 TABLET ORAL DAILY
Qty: 90 TABLET | Refills: 3 | Status: SHIPPED | OUTPATIENT
Start: 2018-12-27 | End: 2019-01-14 | Stop reason: SINTOL

## 2018-12-27 RX ORDER — LEVALBUTEROL TARTRATE 45 UG/1
AEROSOL, METERED ORAL
Qty: 15 G | Refills: 1 | Status: SHIPPED | OUTPATIENT
Start: 2018-12-27 | End: 2020-02-26

## 2018-12-27 RX ORDER — POLYETHYLENE GLYCOL 3350 17 G/17G
1 POWDER, FOR SOLUTION ORAL 2 TIMES DAILY PRN
Qty: 510 G | Refills: 1 | Status: SHIPPED | OUTPATIENT
Start: 2018-12-27 | End: 2019-01-25

## 2018-12-27 RX ORDER — LISINOPRIL 5 MG/1
5 TABLET ORAL DAILY
Qty: 30 TABLET | Refills: 3 | Status: SHIPPED | OUTPATIENT
Start: 2018-12-27 | End: 2018-12-27

## 2018-12-27 RX ORDER — COLCHICINE 0.6 MG/1
0.6 TABLET ORAL PRN
Qty: 30 TABLET | Refills: 3 | Status: SHIPPED | OUTPATIENT
Start: 2018-12-27 | End: 2020-02-26

## 2018-12-27 RX ORDER — ALLOPURINOL 100 MG/1
100 TABLET ORAL DAILY
Qty: 90 TABLET | Refills: 3 | Status: SHIPPED | OUTPATIENT
Start: 2018-12-27 | End: 2019-10-07

## 2018-12-27 ASSESSMENT — ENCOUNTER SYMPTOMS
NERVOUS/ANXIOUS: 0
CHILLS: 0
WEAKNESS: 1
NAUSEA: 0
FREQUENCY: 0
HEARTBURN: 0
ARTHRALGIAS: 1
SORE THROAT: 0
CONSTIPATION: 0
HEMATURIA: 0
COUGH: 0
PARESTHESIAS: 0
FEVER: 0
DYSURIA: 0
EYE PAIN: 0
JOINT SWELLING: 0
DIARRHEA: 0
BREAST MASS: 0
SHORTNESS OF BREATH: 0
PALPITATIONS: 0
MYALGIAS: 1
ABDOMINAL PAIN: 0
HEMATOCHEZIA: 0

## 2018-12-27 ASSESSMENT — MIFFLIN-ST. JEOR: SCORE: 1244.83

## 2018-12-27 ASSESSMENT — ACTIVITIES OF DAILY LIVING (ADL): CURRENT_FUNCTION: NO ASSISTANCE NEEDED

## 2018-12-27 NOTE — PATIENT INSTRUCTIONS
Preventive Health Recommendations    See your health care provider every year to    Review health changes.     Discuss preventive care.      Review your medicines if your doctor has prescribed any.      You no longer need a yearly Pap test unless you've had an abnormal Pap test in the past 10 years. If you have vaginal symptoms, such as bleeding or discharge, be sure to talk with your provider about a Pap test.      Every 1 to 2 years, have a mammogram.  If you are over 69, talk with your health care provider about whether or not you want to continue having screening mammograms.      Every 10 years, have a colonoscopy. Or, have a yearly FIT test (stool test). These exams will check for colon cancer.       Have a cholesterol test every 5 years, or more often if your doctor advises it.       Have a diabetes test (fasting glucose) every three years. If you are at risk for diabetes, you should have this test more often.       At age 65, have a bone density scan (DEXA) to check for osteoporosis (brittle bone disease).    Shots:    Get a flu shot each year.    Get a tetanus shot every 10 years.    Talk to your doctor about your pneumonia vaccines. There are now two you should receive - Pneumovax (PPSV 23) and Prevnar (PCV 13).    Talk to your pharmacist about the shingles vaccine.    Talk to your doctor about the hepatitis B vaccine.    Nutrition:     Eat at least 5 servings of fruits and vegetables each day.      Eat whole-grain bread, whole-wheat pasta and brown rice instead of white grains and rice.      Get adequate about Calcium and Vitamin D.     Lifestyle    Exercise at least 150 minutes a week (30 minutes a day, 5 days a week). This will help you control your weight and prevent disease.      Limit alcohol to one drink per day.      No smoking.       Wear sunscreen to prevent skin cancer.       See your dentist twice a year for an exam and cleaning.      See your eye doctor every 1 to 2 years to screen for  conditions such as glaucoma, macular degeneration, cataracts, etc.    Personalized Prevention Plan  You are due for the preventive services outlined below.  Your care team is available to assist you in scheduling these services.  If you have already completed any of these items, please share that information with your care team to update in your medical record.    Health Maintenance Due   Topic Date Due     Zoster (Chicken Pox) Vaccine (2 of 3) 07/23/2014     Wellness Visit with your Primary Provider - yearly  08/11/2018     Depression Assessment - every 6 months  01/20/2019     Microalbumin Lab - yearly  01/24/2019

## 2018-12-27 NOTE — PROGRESS NOTES
"SUBJECTIVE:   Abbey Omer is a 68 year old female who presents for Preventive Visit    Are you in the first 12 months of your Medicare coverage?  No    Annual Wellness Visit     In general, how would you rate your overall health?  Good    Frequency of exercise:  1 day/week    Do you usually eat at least 4 servings of fruit and vegetables a day, include whole grains    & fiber and avoid regularly eating high fat or \"junk\" foods?  No    Taking medications regularly:  Yes    Medication side effects:  Other    Ability to successfully perform activities of daily living:  No assistance needed    Home Safety:  No safety concerns identified    Hearing Impairment:  No hearing concerns    In the past 6 months, have you been bothered by leaking of urine? Yes    In general, how would you rate your overall mental or emotional health?  Good    PHQ-2 Total Score: 2    Additional concerns today:  No    Do you feel safe in your environment? Yes    Do you have a Health Care Directive? Yes: Patient states has Advance Directive and will bring in a copy to clinic.    Fall risk  Fallen 2 or more times in the past year?: No  Any fall with injury in the past year?: No    Cognitive Screening   1) Repeat 3 items (Leader, Season, Table)    2) Clock draw: NORMAL  3) 3 item recall: Recalls 3 objects  Results: 3 items recalled: COGNITIVE IMPAIRMENT LESS LIKELY    Mini-CogTM Copyright S Makayla. Licensed by the author for use in [facility]; reprinted with permission (indira@.[email or web address]). All rights reserved.      Do you have sleep apnea, excessive snoring or daytime drowsiness?: yes, currently on CPAP    Had stopped sugar and this made her painful acne better.  Sodium makes her legs swell.  Uses fresh vegetables.  Can stay on feet longer doing housework.  No formal exercise.    Hypertension:  Has been elevated over the past several office visits.  Used to be on spironolactone for acne but stopped this due to chronic kidney disease.  " Triamterene and Atenolol caused fatigue.  Norvasc caused swelling in legs.  Denies shortness of breath and chest pain.    Reviewed and updated as needed this visit by clinical staff  Tobacco  Allergies  Meds  Problems  Med Hx  Surg Hx  Fam Hx         Reviewed and updated as needed this visit by Provider  Tobacco  Allergies  Meds  Problems  Med Hx  Surg Hx  Fam Hx        Social History     Tobacco Use     Smoking status: Former Smoker     Packs/day: 0.10     Years: 1.00     Pack years: 0.10     Types: Cigarettes     Start date: 1969     Last attempt to quit: 10/12/1969     Years since quittin.2     Smokeless tobacco: Never Used     Tobacco comment: Smoked, very little, for 2 mo, 49 years ago.   Substance Use Topics     Alcohol use: Yes     Comment: Maybe 1 or 2 drinks a year       Alcohol Use 2018   If you drink alcohol do you typically have greater than 3 drinks per day OR greater than 7 drinks per week? Not Applicable   No flowsheet data found.        Current providers sharing in care for this patient include:   Patient Care Team:  Sarah Vallejo NP as PCP - General (Nurse Practitioner - Family)  Sarah Vallejo NP as PCP - Assigned PCP    The following health maintenance items are reviewed in Epic and correct as of today:  Health Maintenance   Topic Date Due     ZOSTER IMMUNIZATION (2 of 3) 2014     ASTHMA CONTROL TEST Q6 MOS  2018     PHQ-9 Q6 MONTHS  2019     HEMOGLOBIN Q1 YR  2019     FIT Q1 YR  2019     BMP Q6 MOS  2019     FALL RISK ASSESSMENT  2019     EYE EXAM Q1 YEAR  2019     TSH Q1 YEAR  2019     LIPID MONITORING Q1 YEAR  2019     MICROALBUMIN Q1 YEAR  2019     WELLNESS VISIT Q1 YR  2019     ASTHMA ACTION PLAN Q1 YR  2019     MAMMO SCREEN Q2 YR (SYSTEM ASSIGNED)  2020     ADVANCE DIRECTIVE PLANNING Q5 YRS  2021     DTAP/TDAP/TD IMMUNIZATION (3 - Td) 2023     DEXA SCAN  SCREENING (SYSTEM ASSIGNED)  Completed     DEPRESSION ACTION PLAN  Completed     INFLUENZA VACCINE  Completed     PNEUMOVAX IMMUNIZATION 65+ LOW/MEDIUM RISK  Completed     HEPATITIS C SCREENING  Completed     IPV IMMUNIZATION  Aged Out     MENINGITIS IMMUNIZATION  Aged Out     BP Readings from Last 3 Encounters:   12/27/18 144/72   11/13/18 154/82   10/15/18 153/69    Wt Readings from Last 3 Encounters:   12/27/18 79.8 kg (176 lb)   11/13/18 78 kg (172 lb)   09/13/18 76.3 kg (168 lb 3.2 oz)                  Patient Active Problem List   Diagnosis     RCIKEY (obstructive sleep apnea)     Hypothyroidism     Chronic nonallergic rhinitis     CKD (chronic kidney disease) stage 3, GFR 30-59 ml/min (H)     Hyperlipidemia with target LDL less than 100     Obesity     History of bladder cancer     Hypertension goal BP (blood pressure) < 140/90     Advance Care Planning     Dependent edema     Elevated LFTs     DDD (degenerative disc disease), cervical     Major depressive disorder, recurrent episode, mild degree (H)     Macular drusen     Hypertriglyceridemia     Allergic conjunctivitis     Pulmonary nodules     Acne     Combined form of age-related cataract, both eyes     Glaucoma suspect, bilateral     Drusen of macula of both eyes     Renal cyst, left     Discoid atelectasis     Acute gout involving toe of left foot, unspecified cause     Cough     Past Surgical History:   Procedure Laterality Date     ARTHROPLASTY SHOULDER Right 11/01/2017    Right total shoulder arthroplasty by Dr. Go at [facility]     ARTHROPLASTY SHOULDER Left 03/28/2018    Left total shoulder arthroplasty by Dr. Go at [facility]     CHOLECYSTECTOMY, LAPOROSCOPIC       COLONOSCOPY  ?    Upper & Lower     CYSTOSCOPY      bladder cancer     D & C       ENT SURGERY  first 1967/second ?    2 Rhynoplasties     HYSTERECTOMY, PAP NO LONGER INDICATED      BSO     NOSE SURGERY      septum deviation      PHACOEMULSIFICATION CLEAR CORNEA WITH STANDARD INTRAOCULAR LENS IMPLANT Left 2018    Procedure: PHACOEMULSIFICATION CLEAR CORNEA WITH STANDARD INTRAOCULAR LENS IMPLANT;  LEFT EYE PHACOEMULSIFICATION CLEAR CORNEA WITH STANDARD INTRAOCULAR LENS IMPLANT;  Surgeon: Ryne Pascal MD;  Location:  [facility]     PHACOEMULSIFICATION WITH STANDARD INTRAOCULAR LENS IMPLANT Right 10/15/2018    Procedure: PHACOEMULSIFICATION WITH STANDARD INTRAOCULAR LENS IMPLANT ;  Surgeon: Ryne Pascal MD;  Location:  [facility]       Social History     Tobacco Use     Smoking status: Former Smoker     Packs/day: 0.10     Years: 1.00     Pack years: 0.10     Types: Cigarettes     Start date: 1969     Last attempt to quit: 10/12/1969     Years since quittin.2     Smokeless tobacco: Never Used     Tobacco comment: Smoked, very little, for 2 mo, 49 years ago.   Substance Use Topics     Alcohol use: Yes     Comment: Maybe 1 or 2 drinks a year     Family History   Problem Relation Age of Onset     Diabetes Maternal Grandfather      Hypertension Maternal Grandfather      Diabetes Son      Heart Disease Father         CHF     Diabetes Mother      Depression Mother      Hyperlipidemia Mother      Anxiety Disorder Mother      Asthma Mother      Obesity Mother      Substance Abuse Brother      Diabetes Brother      Depression Daughter      Diabetes Maternal Uncle      Cerebrovascular Disease Maternal Uncle      Diabetes Son         after accident     Depression Daughter      Depression Son         after accident     Substance Abuse Brother         Not any longer     Thyroid Disease No family hx of      Glaucoma No family hx of      Macular Degeneration No family hx of      Cancer No family hx of          Current Outpatient Medications   Medication Sig Dispense Refill     allopurinol (ZYLOPRIM) 100 MG tablet Take 1 tablet (100 mg) by mouth daily 90 tablet 3     B Complex Vitamins (VITAMIN B COMPLEX PO)        CALCIUM + D 600-200 MG-UNIT  OR TABS 1 tablet twice a day       colchicine (COLCRYS) 0.6 MG tablet Take 1 tablet (0.6 mg) by mouth as needed (goute flare) 30 tablet 3     Cranberry 500 MG CAPS Take 1 capsule by mouth daily       EXCEDRIN TENSION HEADACHE 500-65 MG PO TABS 1 tablet twice daily as needed       FISH OIL 1200 MG OR CAPS 3 capsule daily       Flaxseed, Linseed, (FLAXSEED OIL PO) Take 1 Tablespoonful by mouth daily       fluticasone (FLONASE) 50 MCG/ACT nasal spray Spray 2 sprays into both nostrils daily 48 g 3     KRILL OIL PO Take 1 capsule by mouth daily       levalbuterol (XOPENEX HFA) 45 MCG/ACT inhaler INHALE 2 PUFFS INTO THE LUNGS EVERY 6 HOURS AS NEEDED FOR SHORTNESS OF BREATH OR DIFFICULT BREATHING OR WHEEZING 15 g 1     levothyroxine (SYNTHROID/LEVOTHROID) 88 MCG tablet TAKE 1 TABLET(88 MCG) BY MOUTH DAILY 90 tablet 3     lisinopril (PRINIVIL/ZESTRIL) 5 MG tablet Take 1 tablet (5 mg) by mouth daily 90 tablet 3     magnesium 500 MG TABS Take 1 tablet by mouth daily       MELATONIN PO        Multiple Vitamins-Minerals (MULTIPLE VITAMIN  S/WOMENS) TABS Take 2 tablets by mouth daily       order for DME Equipment ordered: Respironics Auto PAP Mask type: Nasal Settings: 9-15 cm H2O       polyethylene glycol (MIRALAX) powder Take 17 g (1 capful) by mouth 2 times daily as needed for constipation 510 g 1     ranitidine (ZANTAC) 300 MG tablet Take 1 tablet (300 mg) by mouth At Bedtime 90 tablet 3     STATIN NOT PRESCRIBED, INTENTIONAL, by Other route continuous prn Reported on 5/15/2017  0     traZODone (DESYREL) 50 MG tablet Take 1-2 tablets ( mg) by mouth nightly as needed for sleep 180 tablet 3     venlafaxine (EFFEXOR-XR) 150 MG 24 hr capsule 2 Capsules daily 180 capsule 1     zinc 50 MG TABS Take 1 tablet by mouth daily       Allergies   Allergen Reactions     Albuterol Other (See Comments)     Very jittery and couldn't tolerate     Amlodipine Swelling and Other (See Comments)     Swelling- lower legs     Asa [Aspirin] GI  Disturbance     Atenolol Fatigue     Atorvastatin Other (See Comments)     Swelling legs     Atorvastatin Calcium Swelling     Bupropion Unknown     Codeine Nausea     Colestid [Colestipol Hcl]      Crestor [Hmg-Coa-R Inhibitors] Other (See Comments)     Joint pain  Other reaction(s): Other - Describe In Comment Field  Joint pain  Joint pain     Cymbalta      Visual hallucinations     Ezetimibe Other (See Comments)     Fatigue     Hydralazine Fatigue and Other (See Comments)     Fatigue     Lovastatin Cramps and Other (See Comments)     Cramps     Niacin      Unknown by patient     Paroxetine Other (See Comments)     Fatigue     Paxil [Paroxetine] Fatigue     Potassium GI Disturbance     Sulfa Drugs Fatigue     Sulfasalazine Other (See Comments)     Triamterene Unknown     Zetia [Ezetimibe] Fatigue     Zomig [Zolmitriptan] Other (See Comments)     Cant wake up       Review of Systems   Constitutional: Negative for chills and fever.   HENT: Positive for congestion. Negative for ear pain and sore throat.    Eyes: Negative for pain.   Respiratory: Negative for cough and shortness of breath.    Cardiovascular: Positive for peripheral edema. Negative for chest pain and palpitations.   Gastrointestinal: Negative for abdominal pain, constipation, diarrhea, heartburn, hematochezia and nausea.   Breasts:  Negative for tenderness, breast mass and discharge.   Genitourinary: Positive for urgency. Negative for dysuria, frequency, genital sores, hematuria, pelvic pain, vaginal bleeding and vaginal discharge.   Musculoskeletal: Positive for arthralgias and myalgias. Negative for joint swelling.   Skin: Negative for rash.   Neurological: Positive for weakness. Negative for paresthesias.   Psychiatric/Behavioral: Positive for mood changes. The patient is not nervous/anxious.    All her joints ache.  Hips and knees ache.  Achy muscles.  Spasms in the back.      OBJECTIVE:   /72 (BP Location: Right arm, Patient Position:  Sitting, Cuff Size: Adult Regular)   Temp 97.6  F (36.4  C) (Oral)   Ht 1.524 m (5')   Wt 79.8 kg (176 lb)   BMI 34.37 kg/m   Estimated body mass index is 34.37 kg/m  as calculated from the following:    Height as of this encounter: 1.524 m (5').    Weight as of this encounter: 79.8 kg (176 lb).  Physical Exam  GENERAL: healthy, alert, no distress and obese  EYES: Eyes grossly normal to inspection, PERRL and conjunctivae and sclerae normal  HENT: ear canals and TM's normal, nose and mouth without ulcers or lesions  NECK: no adenopathy, no asymmetry, masses, or scars and thyroid normal to palpation  RESP: lungs clear to auscultation - no rales, rhonchi or wheezes  BREAST: normal without masses, tenderness or nipple discharge and no palpable axillary masses or adenopathy  CV: regular rate and rhythm, normal S1 S2, no S3 or S4, no murmur, click or rub, no peripheral edema and peripheral pulses strong  ABDOMEN: soft, nontender, no hepatosplenomegaly, no masses and bowel sounds normal  MS: no gross musculoskeletal defects noted, no edema  SKIN: no suspicious lesions or rashes  NEURO: Normal strength and tone, mentation intact and speech normal  PSYCH: mentation appears normal, affect normal/bright    Diagnostic Test Results:  Results for orders placed or performed in visit on 12/18/18   **TSH with free T4 reflex FUTURE anytime   Result Value Ref Range    TSH 2.68 0.40 - 4.00 mU/L   Lipid panel reflex to direct LDL Fasting   Result Value Ref Range    Cholesterol 265 (H) <200 mg/dL    Triglycerides 227 (H) <150 mg/dL    HDL Cholesterol 53 >49 mg/dL    LDL Cholesterol Calculated 167 (H) <100 mg/dL    Non HDL Cholesterol 212 (H) <130 mg/dL   **Basic metabolic panel FUTURE anytime   Result Value Ref Range    Sodium 138 133 - 144 mmol/L    Potassium 3.6 3.4 - 5.3 mmol/L    Chloride 103 94 - 109 mmol/L    Carbon Dioxide 29 20 - 32 mmol/L    Anion Gap 6 3 - 14 mmol/L    Glucose 93 70 - 99 mg/dL    Urea Nitrogen 16 7 - 30  mg/dL    Creatinine 1.05 (H) 0.52 - 1.04 mg/dL    GFR Estimate 54 (L) >60 mL/min/1.7m2    GFR Estimate If Black 62 >60 mL/min/1.7m2    Calcium 9.5 8.5 - 10.1 mg/dL       ASSESSMENT / PLAN:   1. Routine history and physical examination of adult    2. Hypertension goal BP (blood pressure) < 140/90  Uncontrolled.    - EKG 12-lead complete w/read - Clinics  - Previous intolerances and allergies reviewed.    Start Lisinopril (PRINIVIL/ZESTRIL) 5 MG tablet; Take 1 tablet (5 mg) by mouth daily  Dispense: 90 tablet; Refill: 3  - Follow-up in 2-3 weeks for BP check.    3. Hyperlipidemia with target LDL less than 100    - NUTRITION REFERRAL    4. Right bundle branch block    - CARDIOLOGY EVAL ADULT REFERRAL    5. Class 1 obesity due to excess calories with serious comorbidity and body mass index (BMI) of 34.0 to 34.9 in adult    - NUTRITION REFERRAL    6. CKD (chronic kidney disease) stage 3, GFR 30-59 ml/min (H)  - Albumin Random Urine Quantitative with Creat Ratio  - NUTRITION REFERRAL    7. RICKEY (obstructive sleep apnea)  Patient reports she has been using her CPAP more often.    8. Hypothyroidism due to acquired atrophy of thyroid  Chronic, stable, continue current treatment.    - levothyroxine (SYNTHROID/LEVOTHROID) 88 MCG tablet; TAKE 1 TABLET(88 MCG) BY MOUTH DAILY  Dispense: 90 tablet; Refill: 3    9. Major depressive disorder, recurrent episode, mild degree (H)  Chronic, stable, continue current treatment.  - venlafaxine (EFFEXOR-XR) 150 MG 24 hr capsule; 2 Capsules daily  Dispense: 180 capsule; Refill: 1    Chronic gout involving toe without tophus, unspecified cause, unspecified laterality  Chronic, stable, continue current treatment.    - allopurinol (ZYLOPRIM) 100 MG tablet; Take 1 tablet (100 mg) by mouth daily  Dispense: 90 tablet; Refill: 3  - colchicine (COLCRYS) 0.6 MG tablet; Take 1 tablet (0.6 mg) by mouth as needed (goute flare)  Dispense: 30 tablet; Refill: 3    13. Chronic rhinitis    - fluticasone  (FLONASE) 50 MCG/ACT nasal spray; Spray 2 sprays into both nostrils daily  Dispense: 48 g; Refill: 3    Cough  Intermittent cough which patient uses inhaler infrequently.  Cough variant asthma and vocal cord dysfunction suspected in the past.  Patient was not concerned about this and never went to pulmonary as was recommended at her last visit.  - levalbuterol (XOPENEX HFA) 45 MCG/ACT inhaler; INHALE 2 PUFFS INTO THE LUNGS EVERY 6 HOURS AS NEEDED FOR SHORTNESS OF BREATH OR DIFFICULT BREATHING OR WHEEZING  Dispense: 15 g; Refill: 1    15. Slow transit constipation    - polyethylene glycol (MIRALAX) powder; Take 17 g (1 capful) by mouth 2 times daily as needed for constipation  Dispense: 510 g; Refill: 1    16. Gastroesophageal reflux disease without esophagitis    - ranitidine (ZANTAC) 300 MG tablet; Take 1 tablet (300 mg) by mouth At Bedtime  Dispense: 90 tablet; Refill: 3    17. Insomnia, unspecified type    - traZODone (DESYREL) 50 MG tablet; Take 1-2 tablets ( mg) by mouth nightly as needed for sleep  Dispense: 180 tablet; Refill: 3      End of Life Planning:  Patient currently has an advanced directive: No.  I have verified the patient's ablity to prepare an advanced directive/make health care decisions.  Literature was provided to assist patient in preparing an advanced directive.    COUNSELING:  Reviewed preventive health counseling, as reflected in patient instructions       Regular exercise       Healthy diet/nutrition    BP Readings from Last 1 Encounters:   12/27/18 144/72     Estimated body mass index is 34.37 kg/m  as calculated from the following:    Height as of this encounter: 1.524 m (5').    Weight as of this encounter: 79.8 kg (176 lb).      Weight management plan: Discussed healthy diet and exercise guidelines     reports that she quit smoking about 49 years ago. Her smoking use included cigarettes. She started smoking about 49 years ago. She has a 0.10 pack-year smoking history. she has  never used smokeless tobacco.      Appropriate preventive services were discussed with this patient, including applicable screening as appropriate for cardiovascular disease, diabetes, osteopenia/osteoporosis, and glaucoma.  As appropriate for age/gender, discussed screening for colorectal cancer, prostate cancer, breast cancer, and cervical cancer. Checklist reviewing preventive services available has been given to the patient.    Reviewed patients plan of care and provided an AVS. The Basic Care Plan (routine screening as documented in Health Maintenance) for Abbey meets the Care Plan requirement. This Care Plan has been established and reviewed with the Patient and significant other.    Counseling Resources:  ATP IV Guidelines  Pooled Cohorts Equation Calculator  Breast Cancer Risk Calculator  FRAX Risk Assessment  ICSI Preventive Guidelines  Dietary Guidelines for Americans, 2010  [employer] MyPlate  ASA Prophylaxis  Lung CA Screening    Sarah Vallejo NP  [facility]

## 2018-12-27 NOTE — PROGRESS NOTES
"SUBJECTIVE:   Abbey Omer is a 68 year old female who presents for Preventive Visit.  {PVP to remind patient that this is not necessarily a physical exam; physical exam may or may not be done:651577::\"click delete button to remove this line now\"}  {PVP to inform patient that additional E&M charge may apply, if additional problems addressed:334191::\"click delete button to remove this line now\"}  Are you in the first 12 months of your Medicare coverage?  {No Yes:078710::\"No\"}    HPI  Do you feel safe in your environment? {YES/NO/NA:618115}    Do you have a Health Care Directive? {HEALTHCARE DIRECTIVE STATUS:356707}    {Hearing Test Done (Optional):285065}  Fall risk  {Document Fall Risk in the Assessments Section of the Navigator:284635}  {If any of the above assessments are answered yes, consider ordering appropriate referrals (Optional):230845::\"click delete button to remove this line now\"}  Cognitive Screening { :360650}    {Do you have sleep apnea, excessive snoring or daytime drowsiness? (Optional):510283}    Reviewed and updated as needed this visit by clinical staff         Reviewed and updated as needed this visit by Provider        Social History     Tobacco Use     Smoking status: Former Smoker     Packs/day: 0.10     Years: 1.00     Pack years: 0.10     Types: Cigarettes     Start date: 1969     Last attempt to quit: 10/12/1969     Years since quittin.2     Smokeless tobacco: Never Used     Tobacco comment: Smoked, very little, for 2 mo, 49 years ago.   Substance Use Topics     Alcohol use: Yes     Comment: Maybe 1 or 2 drinks a year       No flowsheet data found.{add AUDIT responses (Optional) (A score of 7 for adult men is an indication of hazardous drinking; a score of 8 or more is an indication of an alcohol use disorder.  A score of 7 or more for adult women is an indication of hazardous drinking or an alchohol use disorder):262344}    {Outside tests to abstract? " ":670717}    {additional problems to add (Optional):945563}    Current providers sharing in care for this patient include:   Patient Care Team:  Sarah Vallejo NP as PCP - General (Nurse Practitioner - Family)  Sarah Vallejo NP as PCP - Assigned PCP    The following health maintenance items are reviewed in Epic and correct as of today:  Health Maintenance   Topic Date Due     ZOSTER IMMUNIZATION (2 of 3) 2014     WELLNESS VISIT Q1 YR  2018     PHQ-9 Q6 MONTHS  2019     MICROALBUMIN Q1 YEAR  2019     HEMOGLOBIN Q1 YR  2019     FIT Q1 YR  2019     BMP Q6 MOS  2019     FALL RISK ASSESSMENT  2019     EYE EXAM Q1 YEAR  2019     TSH Q1 YEAR  2019     LIPID MONITORING Q1 YEAR  2019     MAMMO SCREEN Q2 YR (SYSTEM ASSIGNED)  2020     ADVANCE DIRECTIVE PLANNING Q5 YRS  2021     DTAP/TDAP/TD IMMUNIZATION (3 - Td) 2023     DEXA SCAN SCREENING (SYSTEM ASSIGNED)  Completed     INFLUENZA VACCINE  Completed     PNEUMOVAX IMMUNIZATION 65+ LOW/MEDIUM RISK  Completed     HEPATITIS C SCREENING  Completed     IPV IMMUNIZATION  Aged Out     MENINGITIS IMMUNIZATION  Aged Out     {Chronicprobdata (Optional):012246}  {Decision Support (Optional):488441}    Review of Systems  {ROS COMP (Optional):091657}    OBJECTIVE:   There were no vitals taken for this visit. Estimated body mass index is 33.59 kg/m  as calculated from the following:    Height as of 18: 1.524 m (5').    Weight as of 18: 78 kg (172 lb).  Physical Exam  {Exam (Optional) :395375}    {Diagnostic Test Results (Optional):250723::\"Diagnostic Test Results:\",\"none \"}    ASSESSMENT / PLAN:   {Diag Picklist:222089}    End of Life Planning:  Patient currently has an advanced directive: { :723466}    COUNSELING:  {Medicare Counselin}    BP Readings from Last 1 Encounters:   18 154/82     Estimated body mass index is 33.59 kg/m  as calculated from the following:    " Height as of 11/13/18: 1.524 m (5').    Weight as of 11/13/18: 78 kg (172 lb).    {BP Counseling- Complete if BP >= 120/80  (Optional):503800}  {Weight Management Plan (ACO) Complete if BMI is abnormal-  Ages 18-64  BMI >24.9.  Age 65+ with BMI <23 or >30 (Optional):498750}     reports that she quit smoking about 49 years ago. Her smoking use included cigarettes. She started smoking about 49 years ago. She has a 0.10 pack-year smoking history. she has never used smokeless tobacco.  {Tobacco Cessation -- Complete if patient is a smoker (Optional):177501}    Appropriate preventive services were discussed with this patient, including applicable screening as appropriate for cardiovascular disease, diabetes, osteopenia/osteoporosis, and glaucoma.  As appropriate for age/gender, discussed screening for colorectal cancer, prostate cancer, breast cancer, and cervical cancer. Checklist reviewing preventive services available has been given to the patient.    Reviewed patients plan of care and provided an AVS. The {CarePlan:571851} for Abbey meets the Care Plan requirement. This Care Plan has been established and reviewed with the {PATIENT, FAMILY MEMBER, CAREGIVER:121794}.    Counseling Resources:  ATP IV Guidelines  Pooled Cohorts Equation Calculator  Breast Cancer Risk Calculator  FRAX Risk Assessment  ICSI Preventive Guidelines  Dietary Guidelines for Americans, 2010  USDA's MyPlate  ASA Prophylaxis  Lung CA Screening    Sarah Vallejo, NP  [facility]

## 2018-12-28 ENCOUNTER — MYC MEDICAL ADVICE (OUTPATIENT)
Dept: FAMILY MEDICINE | Facility: CLINIC | Age: 68
End: 2018-12-28

## 2018-12-28 LAB
CREAT UR-MCNC: 75 MG/DL
MICROALBUMIN UR-MCNC: 1170 MG/L
MICROALBUMIN/CREAT UR: 1560 MG/G CR (ref 0–25)

## 2019-01-02 ENCOUNTER — DOCUMENTATION ONLY (OUTPATIENT)
Dept: OPHTHALMOLOGY | Facility: CLINIC | Age: 69
End: 2019-01-02

## 2019-01-11 ENCOUNTER — OFFICE VISIT (OUTPATIENT)
Dept: CARDIOLOGY | Facility: CLINIC | Age: 69
End: 2019-01-11
Payer: MEDICARE

## 2019-01-11 VITALS
HEART RATE: 75 BPM | SYSTOLIC BLOOD PRESSURE: 149 MMHG | WEIGHT: 173 LBS | BODY MASS INDEX: 33.79 KG/M2 | OXYGEN SATURATION: 96 % | DIASTOLIC BLOOD PRESSURE: 83 MMHG

## 2019-01-11 DIAGNOSIS — R07.89 ATYPICAL CHEST PAIN: Primary | ICD-10-CM

## 2019-01-11 DIAGNOSIS — R94.31 ABNORMAL ELECTROCARDIOGRAM: ICD-10-CM

## 2019-01-11 PROCEDURE — 99204 OFFICE O/P NEW MOD 45 MIN: CPT | Performed by: INTERNAL MEDICINE

## 2019-01-11 NOTE — PROGRESS NOTES
Cardiology Consultation      HPI:    This is a 68 year old patient with PMH HTN, HLD, and syncope in setting of dehydration here for new patient appointment in setting of RBBB discovered on EKG.     She has no david syncope history or bradycardia that she knows about. No LOC in past or unexplained falls. Has a h/o migranes which seem well controlled. Only had an episode in [place] once of dizziness leading to diaphoresis and passing out for which she was sent to hospital and improved with fluids. She was told her neck veins by ultrasound were very small.     She has a h/o high cholesterol, stopped taking statin but she is unclear why. Her LDL was 167 and TC and TGs > 200. BP has been overall well controlled and is being managed by PCP.     On ROS she has some atypical chest pain at rest. Does not heavily exert herself and is not involved in an exercise regimen. Her CP is better with burping. However on climbing stairs she fatigues easy and gets short of breath. She feels her legs are swollen. No varicose veins, ulcerations, claudication. No orthopnea or PND.       PAST MEDICAL HISTORY  Past Medical History:   Diagnosis Date     Cancer of bladder (H)      Cataract 11/25/2011     Chronic nonallergic rhinitis 8/31/10 RAST     CKD (chronic kidney disease) stage 3, GFR 30-59 ml/min (H) 12/20/2010     DDD (degenerative disc disease), cervical 6/16/2011     Dependent edema      Depression, major      Depressive disorder In 2002 or 2003     History of blood transfusion ?     HTN (hypertension)      Hyperlipidemia      Hypothyroidism 10/12/2009     Migraine     resolved     Nasal septal deviation 8/11/2009     Obesity 5/17/2011     RICKEY (obstructive sleep apnea)      Osteoarthritis of shoulder region      Osteoporosis      Premature menopause      Renal cyst        CURRENT MEDICATIONS  Current Outpatient Medications   Medication Sig Dispense Refill     allopurinol (ZYLOPRIM) 100 MG tablet Take 1 tablet (100 mg) by  mouth daily 90 tablet 3     B Complex Vitamins (VITAMIN B COMPLEX PO)        CALCIUM + D 600-200 MG-UNIT OR TABS 1 tablet twice a day       colchicine (COLCRYS) 0.6 MG tablet Take 1 tablet (0.6 mg) by mouth as needed (goute flare) 30 tablet 3     Cranberry 500 MG CAPS Take 1 capsule by mouth daily       EXCEDRIN TENSION HEADACHE 500-65 MG PO TABS 1 tablet twice daily as needed       FISH OIL 1200 MG OR CAPS 3 capsule daily       Flaxseed, Linseed, (FLAXSEED OIL PO) Take 1 Tablespoonful by mouth daily       fluticasone (FLONASE) 50 MCG/ACT nasal spray Spray 2 sprays into both nostrils daily 48 g 3     KRILL OIL PO Take 1 capsule by mouth daily       levalbuterol (XOPENEX HFA) 45 MCG/ACT inhaler INHALE 2 PUFFS INTO THE LUNGS EVERY 6 HOURS AS NEEDED FOR SHORTNESS OF BREATH OR DIFFICULT BREATHING OR WHEEZING 15 g 1     levothyroxine (SYNTHROID/LEVOTHROID) 88 MCG tablet TAKE 1 TABLET(88 MCG) BY MOUTH DAILY 90 tablet 3     lisinopril (PRINIVIL/ZESTRIL) 5 MG tablet Take 1 tablet (5 mg) by mouth daily 90 tablet 3     magnesium 500 MG TABS Take 1 tablet by mouth daily       MELATONIN PO        Multiple Vitamins-Minerals (MULTIPLE VITAMIN  S/WOMENS) TABS Take 2 tablets by mouth daily       polyethylene glycol (MIRALAX) powder Take 17 g (1 capful) by mouth 2 times daily as needed for constipation 510 g 1     ranitidine (ZANTAC) 300 MG tablet Take 1 tablet (300 mg) by mouth At Bedtime 90 tablet 3     traZODone (DESYREL) 50 MG tablet Take 1-2 tablets ( mg) by mouth nightly as needed for sleep 180 tablet 3     venlafaxine (EFFEXOR-XR) 150 MG 24 hr capsule 2 Capsules daily 180 capsule 1     zinc 50 MG TABS Take 1 tablet by mouth daily       order for DME Equipment ordered: Respironics Auto PAP Mask type: Nasal Settings: 9-15 cm H2O       STATIN NOT PRESCRIBED, INTENTIONAL, by Other route continuous prn Reported on 5/15/2017  0       PAST SURGICAL HISTORY:  Past Surgical History:   Procedure Laterality Date     ARTHROPLASTY  SHOULDER Right 11/01/2017    Right total shoulder arthroplasty by Dr. Go at [facility]     ARTHROPLASTY SHOULDER Left 03/28/2018    Left total shoulder arthroplasty by Dr. Go at [facility]     CHOLECYSTECTOMY, LAPOROSCOPIC       COLONOSCOPY  ?    Upper & Lower     CYSTOSCOPY      bladder cancer     D & C       ENT SURGERY  first 1967/second ?    2 Rhynoplasties     HYSTERECTOMY, PAP NO LONGER INDICATED      BSO     NOSE SURGERY      septum deviation     PHACOEMULSIFICATION CLEAR CORNEA WITH STANDARD INTRAOCULAR LENS IMPLANT Left 9/24/2018    Procedure: PHACOEMULSIFICATION CLEAR CORNEA WITH STANDARD INTRAOCULAR LENS IMPLANT;  LEFT EYE PHACOEMULSIFICATION CLEAR CORNEA WITH STANDARD INTRAOCULAR LENS IMPLANT;  Surgeon: Ryne Pascal MD;  Location:  [facility]     PHACOEMULSIFICATION WITH STANDARD INTRAOCULAR LENS IMPLANT Right 10/15/2018    Procedure: PHACOEMULSIFICATION WITH STANDARD INTRAOCULAR LENS IMPLANT ;  Surgeon: Ryne Pascal MD;  Location: [facility] OR       ALLERGIES     Allergies   Allergen Reactions     Albuterol Other (See Comments)     Very jittery and couldn't tolerate     Amlodipine Swelling and Other (See Comments)     Swelling- lower legs     Asa [Aspirin] GI Disturbance     Atenolol Fatigue     Atorvastatin Other (See Comments)     Swelling legs     Atorvastatin Calcium Swelling     Bupropion Unknown     Codeine Nausea     Colestid [Colestipol Hcl]      Crestor [Hmg-Coa-R Inhibitors] Other (See Comments)     Joint pain  Other reaction(s): Other - Describe In Comment Field  Joint pain  Joint pain     Cymbalta      Visual hallucinations     Ezetimibe Other (See Comments)     Fatigue     Hydralazine Fatigue and Other (See Comments)     Fatigue     Lovastatin Cramps and Other (See Comments)     Cramps     Niacin      Unknown by patient     Paroxetine Other (See Comments)     Fatigue     Paxil [Paroxetine] Fatigue     Potassium GI Disturbance     Sulfa Drugs  Fatigue     Sulfasalazine Other (See Comments)     Triamterene Unknown     Zetia [Ezetimibe] Fatigue     Zomig [Zolmitriptan] Other (See Comments)     Cant wake up       FAMILY HISTORY  Family History   Problem Relation Age of Onset     Diabetes Maternal Grandfather      Hypertension Maternal Grandfather      Diabetes Son      Heart Disease Father         CHF     Diabetes Mother      Depression Mother      Hyperlipidemia Mother      Anxiety Disorder Mother      Asthma Mother      Obesity Mother      Substance Abuse Brother      Diabetes Brother      Depression Daughter      Diabetes Maternal Uncle      Cerebrovascular Disease Maternal Uncle      Diabetes Son         after accident     Depression Daughter      Depression Son         after accident     Substance Abuse Brother         Not any longer     Thyroid Disease No family hx of      Glaucoma No family hx of      Macular Degeneration No family hx of      Cancer No family hx of        SOCIAL HISTORY  Social History     Socioeconomic History     Marital status:      Spouse name: Maggie     Number of children: 2     Years of education: 12     Highest education level: Not on file   Social Needs     Financial resource strain: Not on file     Food insecurity - worry: Not on file     Food insecurity - inability: Not on file     Transportation needs - medical: Not on file     Transportation needs - non-medical: Not on file   Occupational History     Occupation: collections for [employer]     Employer: [employer]   Tobacco Use     Smoking status: Former Smoker     Packs/day: 0.10     Years: 1.00     Pack years: 0.10     Types: Cigarettes     Start date: 1969     Last attempt to quit: 10/12/1969     Years since quittin.2     Smokeless tobacco: Never Used     Tobacco comment: Smoked, very little, for 2 mo, 49 years ago.   Substance and Sexual Activity     Alcohol use: Yes     Comment: Maybe 1 or 2 drinks a year     Drug use: No     Sexual activity: Not  Currently     Partners: Female     Birth control/protection: Post-menopausal   Other Topics Concern     Parent/sibling w/ CABG, MI or angioplasty before 65F 55M? No   Social History Narrative    Son  2012.    Wife, Maggie       ROS:   Constitutional: No fever, chills, or sweats. No weight gain/loss   ENT: No visual disturbance, ear ache, epistaxis, sore throat  Allergies/Immunologic: Negative  Respiratory: No cough, hemoptysia  Cardiovascular: As per HPI  GI: No nausea, vomiting, hematemesis, melena, or hematochezia  : No urinary frequency, dysuria, or hematuria  Integument: Negative  Psychiatric: Negative  Neuro: Negative  Endocrinology: Negative   Musculoskeletal: Negative  Vascular: No walking impairment, claudication, ischemic rest pain or nonhealing wounds    EXAM:  /83 (BP Location: Left arm, Patient Position: Chair, Cuff Size: Adult Large)   Pulse 75   Wt 78.5 kg (173 lb)   SpO2 96%   BMI 33.79 kg/m    In general, the patient is a pleasant female in no apparent distress.    HEENT: NC/AT.  PERRLA.  EOMI.  Sclerae white, not injected.  Nares clear.  Pharynx without erythema or exudate.  Dentition intact.    Neck: No adenopathy.  No thyromegaly. Carotids +2/2 bilaterally without bruits.  No jugular venous distension.   Heart: RRR. Normal S1, S2 splits physiologically. No murmur, rub, click, or gallop. The PMI is in the 5th ICS in the midclavicular line. There is no heave.    Lungs: CTA.  No ronchi, wheezes, rales.  No dullness to percussion.   Abdomen: Soft, nontender, nondistended. No organomegaly. No AAA.  No bruits.   Extremities: No clubbing, cyanosis, or edema.  No wounds. No varicose veins signs of chronic venous insufficiency.   Vascular: No bruits are noted.    Labs:  LIPID RESULTS:  Lab Results   Component Value Date    CHOL 265 (H) 2018    HDL 53 2018     (H) 2018    TRIG 227 (H) 2018    CHOLHDLRATIO 4.7 2015    NHDL 212 (H) 2018        LIVER ENZYME RESULTS:  Lab Results   Component Value Date    AST 49 (H) 03/12/2018    ALT 81 (H) 03/12/2018       CBC RESULTS:  Lab Results   Component Value Date    WBC 6.1 03/12/2018    RBC 4.83 03/12/2018    HGB 14.8 03/12/2018    HCT 45.6 03/12/2018    MCV 94 03/12/2018    MCH 30.6 03/12/2018    MCHC 32.5 03/12/2018    RDW 14.4 03/12/2018     03/12/2018       BMP RESULTS:  Lab Results   Component Value Date     12/18/2018    POTASSIUM 3.6 12/18/2018    CHLORIDE 103 12/18/2018    CO2 29 12/18/2018    ANIONGAP 6 12/18/2018    GLC 93 12/18/2018    BUN 16 12/18/2018    CR 1.05 (H) 12/18/2018    GFRESTIMATED 54 (L) 12/18/2018    GFRESTBLACK 62 12/18/2018    SAMARIA 9.5 12/18/2018        A1C RESULTS:  No results found for: A1C    Procedures:    EKG - RBBB, no Q waves.       Assessment and Plan:     68 year old with new RBBB discovered on EKG.     She has atypical chest pain and SOB on climbing stairs thus given her age, sex and symptoms it is reasonable to obtain a CT coronary angiogram for intermediate pre test probability for CAD. We discussed how RBBB may not indicate any underlying pathology, and can be a natural aging progression of conduction disease, alternatively can indicate underlying structural heart disease which we can work up with CT and echo. If the above studies are unremarkable, would consider PE work up as well but it is unusual to cause RBBB in the absence of right heart strain which is detected by echo. Her leg swelling was not significant and is more consistent with lipedema and less fluid-based therefore do not think she needs LE duplex at this time.     Recommendations:  1) CT coronary angiogram  2) echocardiogram   3) Recommend starting a statin at next PCP visit (LDL goal < 130 mg/dL)  4) Goal BP < 130/90   5) Discussed mediterranean style of eating which provides optimal health and eating a low processed, low sugar diet    Can follow up in 3 months with me.       Ibeth  MD Naresh MSc  Division of Cardiology  [facility]

## 2019-01-11 NOTE — NURSING NOTE
Cardiac Testing: Patient given instructions regarding: Coronary CT Angiogram and  echocardiogram . Discussed purpose, preparation, procedure and when to expect results reported back to the patient. Patient demonstrated understanding of this information and agreed to call with further questions or concerns.      Med Reconcile: Reviewed and verified all current medications with the patient. The updated medication list was printed and given to the patient.    Return Appointment: 3 month follow-up with Dr Infante    Patient was instructed to follow-up with PCP for potential increase in BP medication and initiation of cholesterol lowering medication.    Patient expressed  understanding to all health information given,and asked to call clinic with any questions or concerns.    Dana Lancaster RN

## 2019-01-11 NOTE — PATIENT INSTRUCTIONS
Thank you for coming to the [facility] @ [facility]; please note the following instructions:    1. Echocardiogram at [place]   2. Coronary CT angiogram at the [facility]   3. Recommend initiation of cholesterol lowering medication (statin) to be determined by the primary care physician given high cholesterol   4. Would recommend increasing blood pressure medication for goal BP < 130/80  5. Adhere to a non-processed, low cholesterol, low salt and low sugar diet (Mediterranean)   6. Follow up 3 months     If you have any questions regarding your visit please contact your care team:     Cardiology  Telephone Number   Alejandra HERCULES, RN  Amairani RUFF, RN   Katiana PRINGLE, [place] HSIEH MA   [phone]    *After hours: [phone]   For scheduling appts:     [phone] or    [phone] (select option 1)    *After hours: [phone]     For the Device Clinic (Pacemakers and ICD's)  RN's :  Krissy Leger   During business hours: [phone]    *After business hours:  [phone] (select option 4)      Normal test result notifications will be released via [employer] or mailed within 7 business days.  All other test results, will be communicated via telephone once reviewed by your cardiologist.    If you need a medication refill please contact your pharmacy.  Please allow 3 business days for your refill to be completed.    As always, thank you for trusting us with your health care needs!

## 2019-01-11 NOTE — LETTER
1/11/2019      RE: Abbey Omer  [address] [facility] [address]       Dear Colleague,    Thank you for the opportunity to participate in the care of your patient, Abbey Omer, at the [facility] AT [facility] at [facility]. Please see a copy of my visit note below.                  Cardiology Consultation      HPI:  This is a 68 year old patient with PMH HTN, HLD, and syncope in setting of dehydration here for new patient appointment in setting of RBBB discovered on EKG.     She has no david syncope history or bradycardia that she knows about. No LOC in past or unexplained falls. Has a h/o migranes which seem well controlled. Only had an episode in [employer] once of dizziness leading to diaphoresis and passing out for which she was sent to hospital and improved with fluids. She was told her neck veins by ultrasound were very small.     She has a h/o high cholesterol, stopped taking statin but she is unclear why. Her LDL was 167 and TC and TGs > 200. BP has been overall well controlled and is being managed by PCP.     On ROS she has some atypical chest pain at rest. Does not heavily exert herself and is not involved in an exercise regimen. Her CP is better with burping. However on climbing stairs she fatigues easy and gets short of breath. She feels her legs are swollen. No varicose veins, ulcerations, claudication. No orthopnea or PND.     PAST MEDICAL HISTORY  Past Medical History:   Diagnosis Date     Cancer of bladder (H)      Cataract 11/25/2011     Chronic nonallergic rhinitis 8/31/10 RAST     CKD (chronic kidney disease) stage 3, GFR 30-59 ml/min (H) 12/20/2010     DDD (degenerative disc disease), cervical 6/16/2011     Dependent edema      Depression, major      Depressive disorder In 2002 or 2003     History of blood transfusion ?     HTN (hypertension)      Hyperlipidemia      Hypothyroidism 10/12/2009      Migraine     resolved     Nasal septal deviation 8/11/2009     Obesity 5/17/2011     RICKEY (obstructive sleep apnea)      Osteoarthritis of shoulder region      Osteoporosis      Premature menopause      Renal cyst        CURRENT MEDICATIONS  Current Outpatient Medications   Medication Sig Dispense Refill     allopurinol (ZYLOPRIM) 100 MG tablet Take 1 tablet (100 mg) by mouth daily 90 tablet 3     B Complex Vitamins (VITAMIN B COMPLEX PO)        CALCIUM + D 600-200 MG-UNIT OR TABS 1 tablet twice a day       colchicine (COLCRYS) 0.6 MG tablet Take 1 tablet (0.6 mg) by mouth as needed (goute flare) 30 tablet 3     Cranberry 500 MG CAPS Take 1 capsule by mouth daily       EXCEDRIN TENSION HEADACHE 500-65 MG PO TABS 1 tablet twice daily as needed       FISH OIL 1200 MG OR CAPS 3 capsule daily       Flaxseed, Linseed, (FLAXSEED OIL PO) Take 1 Tablespoonful by mouth daily       fluticasone (FLONASE) 50 MCG/ACT nasal spray Spray 2 sprays into both nostrils daily 48 g 3     KRILL OIL PO Take 1 capsule by mouth daily       levalbuterol (XOPENEX HFA) 45 MCG/ACT inhaler INHALE 2 PUFFS INTO THE LUNGS EVERY 6 HOURS AS NEEDED FOR SHORTNESS OF BREATH OR DIFFICULT BREATHING OR WHEEZING 15 g 1     levothyroxine (SYNTHROID/LEVOTHROID) 88 MCG tablet TAKE 1 TABLET(88 MCG) BY MOUTH DAILY 90 tablet 3     lisinopril (PRINIVIL/ZESTRIL) 5 MG tablet Take 1 tablet (5 mg) by mouth daily 90 tablet 3     magnesium 500 MG TABS Take 1 tablet by mouth daily       MELATONIN PO        Multiple Vitamins-Minerals (MULTIPLE VITAMIN  S/WOMENS) TABS Take 2 tablets by mouth daily       polyethylene glycol (MIRALAX) powder Take 17 g (1 capful) by mouth 2 times daily as needed for constipation 510 g 1     ranitidine (ZANTAC) 300 MG tablet Take 1 tablet (300 mg) by mouth At Bedtime 90 tablet 3     traZODone (DESYREL) 50 MG tablet Take 1-2 tablets ( mg) by mouth nightly as needed for sleep 180 tablet 3     venlafaxine (EFFEXOR-XR) 150 MG 24 hr capsule 2  Capsules daily 180 capsule 1     zinc 50 MG TABS Take 1 tablet by mouth daily       order for DME Equipment ordered: Respironics Auto PAP Mask type: Nasal Settings: 9-15 cm H2O       STATIN NOT PRESCRIBED, INTENTIONAL, by Other route continuous prn Reported on 5/15/2017  0       PAST SURGICAL HISTORY:  Past Surgical History:   Procedure Laterality Date     ARTHROPLASTY SHOULDER Right 11/01/2017    Right total shoulder arthroplasty by Dr. Go at [facility]     ARTHROPLASTY SHOULDER Left 03/28/2018    Left total shoulder arthroplasty by Dr. Go at [facility]     CHOLECYSTECTOMY, LAPOROSCOPIC       COLONOSCOPY  ?    Upper & Lower     CYSTOSCOPY      bladder cancer     D & C       ENT SURGERY  first 1967/second ?    2 Rhynoplasties     HYSTERECTOMY, PAP NO LONGER INDICATED      BSO     NOSE SURGERY      septum deviation     PHACOEMULSIFICATION CLEAR CORNEA WITH STANDARD INTRAOCULAR LENS IMPLANT Left 9/24/2018    Procedure: PHACOEMULSIFICATION CLEAR CORNEA WITH STANDARD INTRAOCULAR LENS IMPLANT;  LEFT EYE PHACOEMULSIFICATION CLEAR CORNEA WITH STANDARD INTRAOCULAR LENS IMPLANT;  Surgeon: Ryne Pascal MD;  Location: [facility]     PHACOEMULSIFICATION WITH STANDARD INTRAOCULAR LENS IMPLANT Right 10/15/2018    Procedure: PHACOEMULSIFICATION WITH STANDARD INTRAOCULAR LENS IMPLANT ;  Surgeon: Ryne Pascal MD;  Location: [facility] OR       ALLERGIES     Allergies   Allergen Reactions     Albuterol Other (See Comments)     Very jittery and couldn't tolerate     Amlodipine Swelling and Other (See Comments)     Swelling- lower legs     Asa [Aspirin] GI Disturbance     Atenolol Fatigue     Atorvastatin Other (See Comments)     Swelling legs     Atorvastatin Calcium Swelling     Bupropion Unknown     Codeine Nausea     Colestid [Colestipol Hcl]      Crestor [Hmg-Coa-R Inhibitors] Other (See Comments)     Joint pain  Other reaction(s): Other - Describe In Comment Field  Joint  pain  Joint pain     Cymbalta      Visual hallucinations     Ezetimibe Other (See Comments)     Fatigue     Hydralazine Fatigue and Other (See Comments)     Fatigue     Lovastatin Cramps and Other (See Comments)     Cramps     Niacin      Unknown by patient     Paroxetine Other (See Comments)     Fatigue     Paxil [Paroxetine] Fatigue     Potassium GI Disturbance     Sulfa Drugs Fatigue     Sulfasalazine Other (See Comments)     Triamterene Unknown     Zetia [Ezetimibe] Fatigue     Zomig [Zolmitriptan] Other (See Comments)     Cant wake up       FAMILY HISTORY  Family History   Problem Relation Age of Onset     Diabetes Maternal Grandfather      Hypertension Maternal Grandfather      Diabetes Son      Heart Disease Father         CHF     Diabetes Mother      Depression Mother      Hyperlipidemia Mother      Anxiety Disorder Mother      Asthma Mother      Obesity Mother      Substance Abuse Brother      Diabetes Brother      Depression Daughter      Diabetes Maternal Uncle      Cerebrovascular Disease Maternal Uncle      Diabetes Son         after accident     Depression Daughter      Depression Son         after accident     Substance Abuse Brother         Not any longer     Thyroid Disease No family hx of      Glaucoma No family hx of      Macular Degeneration No family hx of      Cancer No family hx of        SOCIAL HISTORY  Social History     Socioeconomic History     Marital status:      Spouse name: Maggie     Number of children: 2     Years of education: 12     Highest education level: Not on file   Social Needs     Financial resource strain: Not on file     Food insecurity - worry: Not on file     Food insecurity - inability: Not on file     Transportation needs - medical: Not on file     Transportation needs - non-medical: Not on file   Occupational History     Occupation: collections for [employer]     Employer: [employer]   Tobacco Use     Smoking status: Former Smoker     Packs/day: 0.10      Years: 1.00     Pack years: 0.10     Types: Cigarettes     Start date: 1969     Last attempt to quit: 10/12/1969     Years since quittin.2     Smokeless tobacco: Never Used     Tobacco comment: Smoked, very little, for 2 mo, 49 years ago.   Substance and Sexual Activity     Alcohol use: Yes     Comment: Maybe 1 or 2 drinks a year     Drug use: No     Sexual activity: Not Currently     Partners: Female     Birth control/protection: Post-menopausal   Other Topics Concern     Parent/sibling w/ CABG, MI or angioplasty before 65F 55M? No   Social History Narrative    Son  2012.    Wife, Maggie       ROS:   Constitutional: No fever, chills, or sweats. No weight gain/loss   ENT: No visual disturbance, ear ache, epistaxis, sore throat  Allergies/Immunologic: Negative  Respiratory: No cough, hemoptysia  Cardiovascular: As per HPI  GI: No nausea, vomiting, hematemesis, melena, or hematochezia  : No urinary frequency, dysuria, or hematuria  Integument: Negative  Psychiatric: Negative  Neuro: Negative  Endocrinology: Negative   Musculoskeletal: Negative  Vascular: No walking impairment, claudication, ischemic rest pain or nonhealing wounds    EXAM:  /83 (BP Location: Left arm, Patient Position: Chair, Cuff Size: Adult Large)   Pulse 75   Wt 78.5 kg (173 lb)   SpO2 96%   BMI 33.79 kg/m     In general, the patient is a pleasant female in no apparent distress.    HEENT: NC/AT.  PERRLA.  EOMI.  Sclerae white, not injected.  Nares clear.  Pharynx without erythema or exudate.  Dentition intact.    Neck: No adenopathy.  No thyromegaly. Carotids +2/2 bilaterally without bruits.  No jugular venous distension.   Heart: RRR. Normal S1, S2 splits physiologically. No murmur, rub, click, or gallop. The PMI is in the 5th ICS in the midclavicular line. There is no heave.    Lungs: CTA.  No ronchi, wheezes, rales.  No dullness to percussion.   Abdomen: Soft, nontender, nondistended. No organomegaly. No AAA.  No  bruits.   Extremities: No clubbing, cyanosis, or edema.  No wounds. No varicose veins signs of chronic venous insufficiency.   Vascular: No bruits are noted.    Labs:  LIPID RESULTS:  Lab Results   Component Value Date    CHOL 265 (H) 12/18/2018    HDL 53 12/18/2018     (H) 12/18/2018    TRIG 227 (H) 12/18/2018    CHOLHDLRATIO 4.7 05/04/2015    NHDL 212 (H) 12/18/2018       LIVER ENZYME RESULTS:  Lab Results   Component Value Date    AST 49 (H) 03/12/2018    ALT 81 (H) 03/12/2018       CBC RESULTS:  Lab Results   Component Value Date    WBC 6.1 03/12/2018    RBC 4.83 03/12/2018    HGB 14.8 03/12/2018    HCT 45.6 03/12/2018    MCV 94 03/12/2018    MCH 30.6 03/12/2018    MCHC 32.5 03/12/2018    RDW 14.4 03/12/2018     03/12/2018       BMP RESULTS:  Lab Results   Component Value Date     12/18/2018    POTASSIUM 3.6 12/18/2018    CHLORIDE 103 12/18/2018    CO2 29 12/18/2018    ANIONGAP 6 12/18/2018    GLC 93 12/18/2018    BUN 16 12/18/2018    CR 1.05 (H) 12/18/2018    GFRESTIMATED 54 (L) 12/18/2018    GFRESTBLACK 62 12/18/2018    SAMARIA 9.5 12/18/2018        A1C RESULTS:  No results found for: A1C    Procedures:    EKG - RBBB, no Q waves.     Assessment and Plan:     68 year old with new RBBB discovered on EKG.     She has atypical chest pain and SOB on climbing stairs thus given her age, sex and symptoms it is reasonable to obtain a CT coronary angiogram for intermediate pre test probability for CAD. We discussed how RBBB may not indicate any underlying pathology, and can be a natural aging progression of conduction disease, alternatively can indicate underlying structural heart disease which we can work up with CT and echo. If the above studies are unremarkable, would consider PE work up as well but it is unusual to cause RBBB in the absence of right heart strain which is detected by echo. Her leg swelling was not significant and is more consistent with lipedema and less fluid-based therefore do not  think she needs LE duplex at this time.     Recommendations:  1) CT coronary angiogram  2) echocardiogram   3) Recommend starting a statin at next PCP visit (LDL goal < 130 mg/dL)  4) Goal BP < 130/90   5) Discussed mediterranean style of eating which provides optimal health and eating a low processed, low sugar diet    Can follow up in 3 months with me.     Ibeth Infante MD MSc  Division of Cardiology  [facility]

## 2019-01-11 NOTE — NURSING NOTE
Chief Complaint   Patient presents with     Abnormal Ekg     NEW patient referred by Sarah Vallejo NP for left bundle branch block. Pt repoerts SOB with exertion.       Initial /83 (BP Location: Left arm, Patient Position: Chair, Cuff Size: Adult Large)   Pulse 75   Wt 78.5 kg (173 lb)   SpO2 96%   BMI 33.79 kg/m   Estimated body mass index is 33.79 kg/m  as calculated from the following:    Height as of 12/27/18: 1.524 m (5').    Weight as of this encounter: 78.5 kg (173 lb)..  BP completed using cuff size: jose daniel Gutierrez MA

## 2019-01-14 ENCOUNTER — ANCILLARY PROCEDURE (OUTPATIENT)
Dept: CARDIOLOGY | Facility: CLINIC | Age: 69
End: 2019-01-14
Payer: MEDICARE

## 2019-01-14 ENCOUNTER — OFFICE VISIT (OUTPATIENT)
Dept: FAMILY MEDICINE | Facility: CLINIC | Age: 69
End: 2019-01-14
Payer: MEDICARE

## 2019-01-14 VITALS
BODY MASS INDEX: 33.98 KG/M2 | SYSTOLIC BLOOD PRESSURE: 136 MMHG | WEIGHT: 174 LBS | HEART RATE: 72 BPM | DIASTOLIC BLOOD PRESSURE: 84 MMHG | TEMPERATURE: 98.3 F

## 2019-01-14 DIAGNOSIS — R07.89 ATYPICAL CHEST PAIN: ICD-10-CM

## 2019-01-14 DIAGNOSIS — J30.2 SEASONAL ALLERGIC RHINITIS, UNSPECIFIED TRIGGER: ICD-10-CM

## 2019-01-14 DIAGNOSIS — R94.31 ABNORMAL ELECTROCARDIOGRAM: ICD-10-CM

## 2019-01-14 DIAGNOSIS — I10 HYPERTENSION GOAL BP (BLOOD PRESSURE) < 140/90: Primary | Chronic | ICD-10-CM

## 2019-01-14 PROCEDURE — 93306 TTE W/DOPPLER COMPLETE: CPT | Performed by: INTERNAL MEDICINE

## 2019-01-14 PROCEDURE — 40000264 ZZHC STATISTIC IV PUSH SINGLE INITIAL SUBSTANCE: Performed by: INTERNAL MEDICINE

## 2019-01-14 PROCEDURE — 99213 OFFICE O/P EST LOW 20 MIN: CPT | Performed by: NURSE PRACTITIONER

## 2019-01-14 RX ORDER — FLUTICASONE PROPIONATE 50 MCG
2 SPRAY, SUSPENSION (ML) NASAL DAILY
Qty: 48 G | Refills: 3 | Status: SHIPPED | OUTPATIENT
Start: 2019-01-14 | End: 2020-02-26

## 2019-01-14 RX ORDER — LOSARTAN POTASSIUM 50 MG/1
50 TABLET ORAL DAILY
Qty: 30 TABLET | Refills: 1 | Status: SHIPPED | OUTPATIENT
Start: 2019-01-14 | End: 2019-02-13

## 2019-01-14 RX ADMIN — Medication 4 ML: at 16:30

## 2019-01-14 NOTE — PROGRESS NOTES
SUBJECTIVE:   Abbey Omer is a 68 year old female who presents to clinic today for the following health issues:      Hypertension Follow-up      Outpatient blood pressures are being checked at home.  Results are running about 150s-160s/90s from 1/2/19 thru 1/8/19.  1/13/19 blood pressure was 131/74    Low Salt Diet: Normally she does except during Holidays.  Is doing better lately.      Amount of exercise or physical activity: none outside housework    Problems taking medications regularly: No    Medication side effects: none    Diet: regular (no restrictions)    Patient is going to start on a Mediterranean Diet soon.  Having a nagging cough since starting the lisinopril    Patient states she went to see cardiology but there are no is not available for my review today.  States she is getting an echo and a CT coronary angiogram.    Problem list and histories reviewed & adjusted, as indicated.  Additional history: as documented    Patient Active Problem List   Diagnosis     RICKEY (obstructive sleep apnea)     Hypothyroidism     Chronic nonallergic rhinitis     CKD (chronic kidney disease) stage 3, GFR 30-59 ml/min (H)     Hyperlipidemia with target LDL less than 100     Obesity     History of bladder cancer     Hypertension goal BP (blood pressure) < 140/90     Advance Care Planning     Dependent edema     Elevated LFTs     DDD (degenerative disc disease), cervical     Major depressive disorder, recurrent episode, mild degree (H)     Macular drusen     Hypertriglyceridemia     Allergic conjunctivitis     Pulmonary nodules     Acne     Combined form of age-related cataract, both eyes     Glaucoma suspect, bilateral     Drusen of macula of both eyes     Renal cyst, left     Discoid atelectasis     Acute gout involving toe of left foot, unspecified cause     Cough     Atypical chest pain     Abnormal electrocardiogram     Past Surgical History:   Procedure Laterality Date     ARTHROPLASTY SHOULDER Right  2017    Right total shoulder arthroplasty by Dr. Go at [facility]     ARTHROPLASTY SHOULDER Left 2018    Left total shoulder arthroplasty by Dr. Go at [facility]     CHOLECYSTECTOMY, LAPOROSCOPIC       COLONOSCOPY  ?    Upper & Lower     CYSTOSCOPY      bladder cancer     D & C       ENT SURGERY  first 1967/second ?    2 Rhynoplasties     HYSTERECTOMY, PAP NO LONGER INDICATED      BSO     NOSE SURGERY      septum deviation     PHACOEMULSIFICATION CLEAR CORNEA WITH STANDARD INTRAOCULAR LENS IMPLANT Left 2018    Procedure: PHACOEMULSIFICATION CLEAR CORNEA WITH STANDARD INTRAOCULAR LENS IMPLANT;  LEFT EYE PHACOEMULSIFICATION CLEAR CORNEA WITH STANDARD INTRAOCULAR LENS IMPLANT;  Surgeon: Ryne Pascal MD;  Location:  [facility]     PHACOEMULSIFICATION WITH STANDARD INTRAOCULAR LENS IMPLANT Right 10/15/2018    Procedure: PHACOEMULSIFICATION WITH STANDARD INTRAOCULAR LENS IMPLANT ;  Surgeon: Ryne Pascal MD;  Location:  OR       Social History     Tobacco Use     Smoking status: Former Smoker     Packs/day: 0.10     Years: 1.00     Pack years: 0.10     Types: Cigarettes     Start date: 1969     Last attempt to quit: 10/12/1969     Years since quittin.2     Smokeless tobacco: Never Used     Tobacco comment: Smoked, very little, for 2 mo, 49 years ago.   Substance Use Topics     Alcohol use: Yes     Comment: Maybe 1 or 2 drinks a year     Family History   Problem Relation Age of Onset     Diabetes Maternal Grandfather      Hypertension Maternal Grandfather      Diabetes Son      Heart Disease Father         CHF     Diabetes Mother      Depression Mother      Hyperlipidemia Mother      Anxiety Disorder Mother      Asthma Mother      Obesity Mother      Substance Abuse Brother      Diabetes Brother      Depression Daughter      Diabetes Maternal Uncle      Cerebrovascular Disease Maternal Uncle      Diabetes Son         after accident      Depression Daughter      Depression Son         after accident     Substance Abuse Brother         Not any longer     Thyroid Disease No family hx of      Glaucoma No family hx of      Macular Degeneration No family hx of      Cancer No family hx of          Current Outpatient Medications   Medication Sig Dispense Refill     allopurinol (ZYLOPRIM) 100 MG tablet Take 1 tablet (100 mg) by mouth daily 90 tablet 3     B Complex Vitamins (VITAMIN B COMPLEX PO)        CALCIUM + D 600-200 MG-UNIT OR TABS 1 tablet twice a day       colchicine (COLCRYS) 0.6 MG tablet Take 1 tablet (0.6 mg) by mouth as needed (goute flare) 30 tablet 3     Cranberry 500 MG CAPS Take 1 capsule by mouth daily       EXCEDRIN TENSION HEADACHE 500-65 MG PO TABS 1 tablet twice daily as needed       FISH OIL 1200 MG OR CAPS 3 capsule daily       Flaxseed, Linseed, (FLAXSEED OIL PO) Take 1 Tablespoonful by mouth daily       fluticasone (FLONASE) 50 MCG/ACT nasal spray Spray 2 sprays into both nostrils daily 48 g 3     levalbuterol (XOPENEX HFA) 45 MCG/ACT inhaler INHALE 2 PUFFS INTO THE LUNGS EVERY 6 HOURS AS NEEDED FOR SHORTNESS OF BREATH OR DIFFICULT BREATHING OR WHEEZING 15 g 1     levothyroxine (SYNTHROID/LEVOTHROID) 88 MCG tablet TAKE 1 TABLET(88 MCG) BY MOUTH DAILY 90 tablet 3     Lisinopril 10 mg tablet Take 1 tablet by mouth daily       magnesium 500 MG TABS Take 1 tablet by mouth daily       MELATONIN PO        Multiple Vitamins-Minerals (MULTIPLE VITAMIN  S/WOMENS) TABS Take 2 tablets by mouth daily       order for DME Equipment ordered: Respironics Auto PAP Mask type: Nasal Settings: 9-15 cm H2O       polyethylene glycol (MIRALAX) powder Take 17 g (1 capful) by mouth 2 times daily as needed for constipation 510 g 1     ranitidine (ZANTAC) 300 MG tablet Take 1 tablet (300 mg) by mouth At Bedtime 90 tablet 3     STATIN NOT PRESCRIBED, INTENTIONAL, by Other route continuous prn Reported on 5/15/2017  0     traZODone (DESYREL) 50 MG tablet Take  1-2 tablets ( mg) by mouth nightly as needed for sleep 180 tablet 3     venlafaxine (EFFEXOR-XR) 150 MG 24 hr capsule 2 Capsules daily 180 capsule 1     zinc 50 MG TABS Take 1 tablet by mouth daily       Allergies   Allergen Reactions     Albuterol Other (See Comments)     Very jittery and couldn't tolerate     Amlodipine Swelling and Other (See Comments)     Swelling- lower legs     Asa [Aspirin] GI Disturbance     Atenolol Fatigue     Atorvastatin Other (See Comments)     Swelling legs     Atorvastatin Calcium Swelling     Bupropion Unknown     Codeine Nausea     Colestid [Colestipol Hcl]      Crestor [Hmg-Coa-R Inhibitors] Other (See Comments)     Joint pain  Other reaction(s): Other - Describe In Comment Field  Joint pain  Joint pain     Cymbalta      Visual hallucinations     Ezetimibe Other (See Comments)     Fatigue     Hydralazine Fatigue and Other (See Comments)     Fatigue     Lovastatin Cramps and Other (See Comments)     Cramps     Niacin      Unknown by patient     Paroxetine Other (See Comments)     Fatigue     Paxil [Paroxetine] Fatigue     Potassium GI Disturbance     Sulfa Drugs Fatigue     Sulfasalazine Other (See Comments)     Triamterene Unknown     Zetia [Ezetimibe] Fatigue     Zomig [Zolmitriptan] Other (See Comments)     Cant wake up     Lisinopril Cough     BP Readings from Last 3 Encounters:   01/14/19 136/84   01/11/19 149/83   12/27/18 144/72    Wt Readings from Last 3 Encounters:   01/14/19 78.9 kg (174 lb)   01/11/19 78.5 kg (173 lb)   12/27/18 79.8 kg (176 lb)                Reviewed and updated as needed this visit by clinical staff  Tobacco  Allergies  Meds  Problems  Med Hx  Surg Hx  Fam Hx  Soc Hx        Reviewed and updated as needed this visit by Provider  Tobacco  Allergies  Meds  Problems  Med Hx  Surg Hx  Fam Hx         ROS:  Constitutional, HEENT, cardiovascular, pulmonary, gi and gu systems are negative, except as otherwise noted.    OBJECTIVE:     BP  136/84 (BP Location: Right arm, Cuff Size: Adult Large)   Pulse 72   Temp 98.3  F (36.8  C) (Oral)   Wt 78.9 kg (174 lb)   BMI 33.98 kg/m    Body mass index is 33.98 kg/m .  GENERAL: healthy, alert, no distress and obese  RESP: lungs clear to auscultation - no rales, rhonchi or wheezes.  Spontaneous dry cough.  CV: regular rate and rhythm, normal S1 S2, no S3 or S4, no murmur, click or rub, no peripheral edema and peripheral pulses strong      ASSESSMENT/PLAN:     1. Hypertension goal BP (blood pressure) < 140/90  BP today is stable but patient reports her BPs at home continue to be high.  -Stop lisinopril due to cough.  Start losartan (COZAAR) 50 MG tablet; Take 1 tablet (50 mg) by mouth daily  Dispense: 30 tablet; Refill: 1  -Patient plans to start a Mediterranean Diet.  -Continue to check home BPs.  -Follow-up in 2-3 weeks for a blood pressure check.    2. Seasonal allergic rhinitis, unspecified trigger  Chronic, stable, continue current treatment.  - fluticasone (FLONASE) 50 MCG/ACT nasal spray; Spray 2 sprays into both nostrils daily  Dispense: 48 g; Refill: 3    Return in about 3 weeks (around 2/4/2019) for BP Recheck.    Sarah Vallejo NP  [facility]

## 2019-01-15 PROBLEM — R05.9 COUGH: Status: ACTIVE | Noted: 2018-12-28

## 2019-01-15 ASSESSMENT — ASTHMA QUESTIONNAIRES: ACT_TOTALSCORE: 20

## 2019-01-22 ENCOUNTER — E-VISIT (OUTPATIENT)
Dept: FAMILY MEDICINE | Facility: CLINIC | Age: 69
End: 2019-01-22
Payer: MEDICARE

## 2019-01-22 ENCOUNTER — OFFICE VISIT (OUTPATIENT)
Dept: UROLOGY | Facility: CLINIC | Age: 69
End: 2019-01-22
Payer: MEDICARE

## 2019-01-22 DIAGNOSIS — Z85.51 HISTORY OF BLADDER CANCER: Primary | ICD-10-CM

## 2019-01-22 DIAGNOSIS — I10 HYPERTENSION GOAL BP (BLOOD PRESSURE) < 140/90: Chronic | ICD-10-CM

## 2019-01-22 PROCEDURE — 52000 CYSTOURETHROSCOPY: CPT | Performed by: UROLOGY

## 2019-01-22 NOTE — PROGRESS NOTES
S: Abbey Omer is a 68 year old female returns for bladder cancer surveillance.    she has history of bladder cancer many years ago without any recurrence.    She received 0 courses of BCG therapy.    Patient is draped and prepped.  Flexible cystoscopy placed under direct vision.      Cysto:  The anterior urethra is normal     In the bladder there is normal mucosa.    Assessment/Plan:  (Z85.51) History of bladder cancer  (primary encounter diagnosis)  Comment: no recurrence   Plan: Cystoscopy        In one year

## 2019-01-25 ENCOUNTER — TELEPHONE (OUTPATIENT)
Dept: FAMILY MEDICINE | Facility: CLINIC | Age: 69
End: 2019-01-25

## 2019-01-25 ENCOUNTER — OFFICE VISIT (OUTPATIENT)
Dept: FAMILY MEDICINE | Facility: CLINIC | Age: 69
End: 2019-01-25
Payer: MEDICARE

## 2019-01-25 VITALS
TEMPERATURE: 98.2 F | BODY MASS INDEX: 34 KG/M2 | HEART RATE: 76 BPM | SYSTOLIC BLOOD PRESSURE: 144 MMHG | HEIGHT: 60 IN | DIASTOLIC BLOOD PRESSURE: 94 MMHG | WEIGHT: 173.2 LBS

## 2019-01-25 DIAGNOSIS — L70.8 OTHER ACNE: ICD-10-CM

## 2019-01-25 DIAGNOSIS — F33.1 MAJOR DEPRESSIVE DISORDER, RECURRENT EPISODE, MODERATE (H): ICD-10-CM

## 2019-01-25 DIAGNOSIS — M10.9 ACUTE GOUT INVOLVING TOE OF LEFT FOOT, UNSPECIFIED CAUSE: Chronic | ICD-10-CM

## 2019-01-25 DIAGNOSIS — E78.5 HYPERLIPIDEMIA LDL GOAL <130: ICD-10-CM

## 2019-01-25 DIAGNOSIS — I10 HYPERTENSION GOAL BP (BLOOD PRESSURE) < 140/90: Primary | Chronic | ICD-10-CM

## 2019-01-25 DIAGNOSIS — R05.9 COUGH: Primary | ICD-10-CM

## 2019-01-25 PROCEDURE — 99214 OFFICE O/P EST MOD 30 MIN: CPT | Performed by: NURSE PRACTITIONER

## 2019-01-25 RX ORDER — PRAVASTATIN SODIUM 20 MG
20 TABLET ORAL EVERY EVENING
Qty: 30 TABLET | Refills: 11 | Status: SHIPPED | OUTPATIENT
Start: 2019-01-25 | End: 2019-01-31 | Stop reason: SINTOL

## 2019-01-25 RX ORDER — VENLAFAXINE HYDROCHLORIDE 150 MG/1
CAPSULE, EXTENDED RELEASE ORAL
Qty: 180 CAPSULE | Refills: 1 | Status: CANCELLED | OUTPATIENT
Start: 2019-01-25

## 2019-01-25 RX ORDER — LOSARTAN POTASSIUM 100 MG/1
100 TABLET ORAL DAILY
Qty: 90 TABLET | Refills: 3 | Status: SHIPPED | OUTPATIENT
Start: 2019-01-25 | End: 2019-10-07

## 2019-01-25 RX ORDER — LOSARTAN POTASSIUM 50 MG/1
50 TABLET ORAL DAILY
Qty: 30 TABLET | Refills: 1 | Status: CANCELLED | OUTPATIENT
Start: 2019-01-25

## 2019-01-25 ASSESSMENT — ANXIETY QUESTIONNAIRES
6. BECOMING EASILY ANNOYED OR IRRITABLE: NEARLY EVERY DAY
2. NOT BEING ABLE TO STOP OR CONTROL WORRYING: NEARLY EVERY DAY
3. WORRYING TOO MUCH ABOUT DIFFERENT THINGS: NEARLY EVERY DAY
5. BEING SO RESTLESS THAT IT IS HARD TO SIT STILL: NEARLY EVERY DAY
1. FEELING NERVOUS, ANXIOUS, OR ON EDGE: NEARLY EVERY DAY
GAD7 TOTAL SCORE: 21
IF YOU CHECKED OFF ANY PROBLEMS ON THIS QUESTIONNAIRE, HOW DIFFICULT HAVE THESE PROBLEMS MADE IT FOR YOU TO DO YOUR WORK, TAKE CARE OF THINGS AT HOME, OR GET ALONG WITH OTHER PEOPLE: EXTREMELY DIFFICULT
7. FEELING AFRAID AS IF SOMETHING AWFUL MIGHT HAPPEN: NEARLY EVERY DAY

## 2019-01-25 ASSESSMENT — PATIENT HEALTH QUESTIONNAIRE - PHQ9
5. POOR APPETITE OR OVEREATING: NEARLY EVERY DAY
SUM OF ALL RESPONSES TO PHQ QUESTIONS 1-9: 22

## 2019-01-25 ASSESSMENT — MIFFLIN-ST. JEOR: SCORE: 1232.13

## 2019-01-25 NOTE — TELEPHONE ENCOUNTER
Patient/family was instructed to return call to [facility] RN directly on the RN Call back line at [phone].     Ping Cheatham RN

## 2019-01-25 NOTE — Clinical Note
Martín Jones,Just a heads up that this patient of mine is coming to you to discuss Mediterranean diet, she also has chronic kidney disease and wants to review nutrition regarding that also.Sarah

## 2019-01-25 NOTE — PATIENT INSTRUCTIONS
Start the Pravastatin 20 MG  Schedule with Karen in Nutrition  Continue losartan 100 mg.  Take 2 tablets of losartan 50 mg until you finish your bottle, then change to the 1 tablet of losartan 100 mg.

## 2019-01-25 NOTE — PROGRESS NOTES
SUBJECTIVE:   Abbey Omer is a 68 year old female who presents to clinic today for the following health issues:    Hyperlipidemia Follow-Up      Rate your low fat/cholesterol diet?: not monitoring fat    Taking statin?  No-- but here to talk about starting medication     Other lipid medications/supplements?:  Fish oil/Omega 3,  without side effects  She has tried multiple statins in the past with intolerances:  Atorvastatin- swelling in legs  Lovastatin- cramps  Crestor- joint pain    Hypertension Follow-up      Outpatient blood pressures are being checked at home.  Results are 169/107, 172/100, 171/90, 156/86, 152/85.    Low Salt Diet: not monitoring salt  She did increase the Losartan to 100 MG a couple days ago after reaching out to provider through e-visit and is feeling better.    Depression and Anxiety Follow-Up    Status since last visit: Worse    Other associated symptoms: more angry- but thinks it's more related to issues with BP    Complicating factors:     Significant life event: No     Current substance abuse: None    PHQ 8/11/2017 1/24/2018 7/20/2018   PHQ-9 Total Score 0 0 0   Q9: Suicide Ideation Not at all Not at all Not at all     CHRISTIANO-7 SCORE 1/25/2017 1/24/2018 7/20/2018   Total Score - - -   Total Score 2 1 0   Total Score - - -     PHQ-9  English  PHQ-9   Any Language  CHRISTIANO-7  Suicide Assessment Five-step Evaluation and Treatment (SAFE-T)  Depression more severe for 2 months.  Very angry.  Feels like she started getting worse the past 2 months over the holidays.  Since increasing the Losartan she feels like her mood has improved the past couple of days.  Participates at the [employer] [place].  Uses meditation.  In the past her mood would get bad for a few days but would then get better.  She feels like in the past couple months her mood has been worse and she is not quite sure why.  She is taking and tolerating the Effexor.  She had previously tried Prozac but that caused her to  have severe drowsiness.  She is not currently in therapy but has done this in the past.      Amount of exercise or physical activity: None    Problems taking medications regularly: No    Medication side effects: none    Diet: regular (no restrictions)    She was referred to nutritionist last month but has not scheduled.    Cough:  Nagging new cough.  Mostly dry.  Gets a sensation in the back of the throat and gets episodes of coughing.  Mucous at times.  She is using Flonase.  She is trying to get a refill of the Xopenex inhaler from her pharmacy.  This provider had referred her to pulmonary in the past but she never went because her cough in the past went away.  It was thought that she might have vocal cord dysfunction or cough variant asthma.  She had a chest x-ray 7/18/18 that was negative.  She has not had any recent spirometry.  She does not have any history of asthma or other lung disease.    Problem list and histories reviewed & adjusted, as indicated.  Additional history: as documented    Patient Active Problem List   Diagnosis     RICKEY (obstructive sleep apnea)     Hypothyroidism     Chronic nonallergic rhinitis     CKD (chronic kidney disease) stage 3, GFR 30-59 ml/min (H)     Hyperlipidemia LDL goal <130     Obesity     History of bladder cancer     Hypertension goal BP (blood pressure) < 140/90     Advance Care Planning     Dependent edema     Elevated LFTs     DDD (degenerative disc disease), cervical     Major depressive disorder, recurrent episode, mild degree (H)     Macular drusen     Hypertriglyceridemia     Allergic conjunctivitis     Pulmonary nodules     Acne     Combined form of age-related cataract, both eyes     Glaucoma suspect, bilateral     Drusen of macula of both eyes     Renal cyst, left     Discoid atelectasis     Acute gout involving toe of left foot, unspecified cause     Cough     Atypical chest pain     Abnormal electrocardiogram     Past Surgical History:   Procedure Laterality  Date     ARTHROPLASTY SHOULDER Right 2017    Right total shoulder arthroplasty by Dr. Go at [facility]     ARTHROPLASTY SHOULDER Left 2018    Left total shoulder arthroplasty by Dr. Go at [facility]     CHOLECYSTECTOMY, LAPOROSCOPIC       COLONOSCOPY  ?    Upper & Lower     CYSTOSCOPY      bladder cancer     D & C       ENT SURGERY  first 1967/second ?    2 Rhynoplasties     HYSTERECTOMY, PAP NO LONGER INDICATED      BSO     NOSE SURGERY      septum deviation     PHACOEMULSIFICATION CLEAR CORNEA WITH STANDARD INTRAOCULAR LENS IMPLANT Left 2018    Procedure: PHACOEMULSIFICATION CLEAR CORNEA WITH STANDARD INTRAOCULAR LENS IMPLANT;  LEFT EYE PHACOEMULSIFICATION CLEAR CORNEA WITH STANDARD INTRAOCULAR LENS IMPLANT;  Surgeon: Ryne Pascal MD;  Location:  [facility]     PHACOEMULSIFICATION WITH STANDARD INTRAOCULAR LENS IMPLANT Right 10/15/2018    Procedure: PHACOEMULSIFICATION WITH STANDARD INTRAOCULAR LENS IMPLANT ;  Surgeon: Ryne Pascal MD;  Location:  OR       Social History     Tobacco Use     Smoking status: Former Smoker     Packs/day: 0.10     Years: 1.00     Pack years: 0.10     Types: Cigarettes     Start date: 1969     Last attempt to quit: 10/12/1969     Years since quittin.3     Smokeless tobacco: Never Used     Tobacco comment: Smoked, very little, for 2 mo, 49 years ago.   Substance Use Topics     Alcohol use: Yes     Comment: Maybe 1 or 2 drinks a year     Family History   Problem Relation Age of Onset     Diabetes Maternal Grandfather      Hypertension Maternal Grandfather      Diabetes Son      Heart Disease Father         CHF     Diabetes Mother      Depression Mother      Hyperlipidemia Mother      Anxiety Disorder Mother      Asthma Mother      Obesity Mother      Substance Abuse Brother      Diabetes Brother      Depression Daughter      Diabetes Maternal Uncle      Cerebrovascular Disease Maternal Uncle       Diabetes Son         after accident     Depression Daughter      Depression Son         after accident     Substance Abuse Brother         Not any longer     Thyroid Disease No family hx of      Glaucoma No family hx of      Macular Degeneration No family hx of      Cancer No family hx of          Current Outpatient Medications   Medication Sig Dispense Refill     allopurinol (ZYLOPRIM) 100 MG tablet Take 1 tablet (100 mg) by mouth daily 90 tablet 3     B Complex Vitamins (VITAMIN B COMPLEX PO)        CALCIUM + D 600-200 MG-UNIT OR TABS 1 tablet twice a day       colchicine (COLCRYS) 0.6 MG tablet Take 1 tablet (0.6 mg) by mouth as needed (goute flare) 30 tablet 3     Cranberry 500 MG CAPS Take 1 capsule by mouth daily       EXCEDRIN TENSION HEADACHE 500-65 MG PO TABS 1 tablet twice daily as needed       FISH OIL 1200 MG OR CAPS 3 capsule daily       Flaxseed, Linseed, (FLAXSEED OIL PO) Take 1 Tablespoonful by mouth daily       fluticasone (FLONASE) 50 MCG/ACT nasal spray Spray 2 sprays into both nostrils daily 48 g 3     levalbuterol (XOPENEX HFA) 45 MCG/ACT inhaler INHALE 2 PUFFS INTO THE LUNGS EVERY 6 HOURS AS NEEDED FOR SHORTNESS OF BREATH OR DIFFICULT BREATHING OR WHEEZING 15 g 1     levothyroxine (SYNTHROID/LEVOTHROID) 88 MCG tablet TAKE 1 TABLET(88 MCG) BY MOUTH DAILY 90 tablet 3            losartan (COZAAR) 50 MG tablet Take 1 tablet (50 mg) by mouth daily (Patient taking differently: Take 100 mg by mouth daily ) 30 tablet 1     magnesium 500 MG TABS Take 1 tablet by mouth daily       MELATONIN PO        Multiple Vitamins-Minerals (MULTIPLE VITAMIN  S/WOMENS) TABS Take 2 tablets by mouth daily       order for DME Equipment being ordered: blood pressure monitor 1 each 0     order for DME Equipment ordered: Respironics Auto PAP Mask type: Nasal Settings: 9-15 cm H2O              ranitidine (ZANTAC) 300 MG tablet Take 1 tablet (300 mg) by mouth At Bedtime 90 tablet 3     STATIN NOT PRESCRIBED, INTENTIONAL, by  Other route continuous prn Reported on 5/15/2017  0     traZODone (DESYREL) 50 MG tablet Take 1-2 tablets ( mg) by mouth nightly as needed for sleep 180 tablet 3     venlafaxine (EFFEXOR-XR) 150 MG 24 hr capsule 2 Capsules daily 180 capsule 1     zinc 50 MG TABS Take 1 tablet by mouth daily       Allergies   Allergen Reactions     Cymbalta      Visual hallucinations     Zomig [Zolmitriptan] Other (See Comments)     Can't wake up     Colestid [Colestipol Hcl]      Albuterol Other (See Comments)     Very jittery and couldn't tolerate     Amlodipine Other (See Comments) and Swelling     Swelling- lower legs     Asa [Aspirin] GI Disturbance     Atenolol Fatigue     Atorvastatin Other (See Comments)     Swelling legs     Bupropion Unknown     Codeine Nausea     Crestor [Hmg-Coa-R Inhibitors] Other (See Comments)     Joint pain     Ezetimibe Other (See Comments)     Fatigue     Hydralazine Other (See Comments) and Fatigue     Fatigue     Lisinopril Cough     Lovastatin Other (See Comments) and Cramps     Cramps     Niacin      Unknown by patient     Paroxetine Other (See Comments)     Fatigue     Paxil [Paroxetine] Fatigue     Potassium GI Disturbance     Sulfa Drugs Fatigue     Sulfasalazine Other (See Comments)     Triamterene Unknown     Zetia [Ezetimibe] Fatigue     BP Readings from Last 3 Encounters:   01/25/19 (!) 144/94   01/14/19 136/84   01/11/19 149/83    Wt Readings from Last 3 Encounters:   01/25/19 78.6 kg (173 lb 3.2 oz)   01/14/19 78.9 kg (174 lb)   01/11/19 78.5 kg (173 lb)                    Reviewed and updated as needed this visit by clinical staff  Tobacco  Allergies  Meds  Problems  Med Hx  Surg Hx  Fam Hx  Soc Hx        Reviewed and updated as needed this visit by Provider  Tobacco  Allergies  Meds  Problems  Med Hx  Surg Hx  Fam Hx         ROS:  Constitutional, HEENT, cardiovascular, pulmonary, gi and gu systems are negative, except as otherwise noted.    OBJECTIVE:     BP (!)  144/94   Pulse 76   Temp 98.2  F (36.8  C) (Oral)   Ht 1.524 m (5')   Wt 78.6 kg (173 lb 3.2 oz)   BMI 33.83 kg/m    Body mass index is 33.83 kg/m .  GENERAL: healthy, alert, no distress and obese  HENT: oropharynx clear and oral mucous membranes moist  RESP: lungs clear to auscultation - no rales, rhonchi or wheezes  CV: regular rate and rhythm, normal S1 S2, no S3 or S4, no murmur, click or rub, no peripheral edema and peripheral pulses strong  PSYCH: mentation appears normal, tangential, affect normal/bright, anxious, judgement and insight intact and appearance well groomed      ASSESSMENT/PLAN:     1. Hypertension goal BP (blood pressure) < 140/90  Chronic, uncontrolled.    - losartan (COZAAR) 100 MG tablet; Take 1 tablet (100 mg) by mouth daily  Dispense: 90 tablet; Refill: 3  - order for DME; Equipment being ordered: blood pressure monitor  Dispense: 1 each; Refill: 0  Patient's home wrist blood pressure monitor today was 12 point systolically higher compared to manual blood pressure in clinic today.  I gave patient a prescription for a new blood pressure monitor and recommended she get the upper arm cough monitor.  Continue to monitor home BPs.  Patient will continue to take TWO losartan 50 mg tablets until she runs out and then she will  the losartan 100 mg tablet and take 1 daily.  -Patient will schedule appointment with nutritionist to discussed Mediterranean diet and diet appropriate for chronic kidney disease.  - Follow-up BP in 3 weeks, will check BMP.    2. Hyperlipidemia LDL goal <130  Chronic, not controlled.  She has tried multiple statins in the past with intolerances:  Atorvastatin- swelling in legs  Lovastatin- cramps  Crestor- joint pain    -Recommend starting low-dose pravastatin (PRAVACHOL) 20 MG tablet; Take 1 tablet (20 mg) by mouth every evening  Dispense: 30 tablet; Refill: 11  -Recheck fasting cholesterol in 6-8 weeks.    3. Major depressive disorder, recurrent episode,  moderate (H)  Worsening.    - Patient will continue the Effexor for now and I recommended that she goes to psychiatry for consult on medication management.  - MENTAL HEALTH REFERRAL  - Adult; Psychiatry and Medication Management; Psychiatry; [facility]: [facility] [phone].  Medication management & future refills will be returned to G PCP upon completion of evaluation; We juma...    Return for schedule appt with Karen/nutrition.  F/u with Sarah for BP in 3 weeks.    Sarah Vallejo, NP  [facility]

## 2019-01-25 NOTE — PATIENT INSTRUCTIONS
Patient Education     Metabolic Syndrome: Lowering Your Blood Pressure    Metabolic syndrome is a set of five health factors that can lead to serious health problems. The factors greatly increase your risk for diabetes, heart attack, or stroke. High blood pressure (hypertension) is one of the factors of metabolic syndrome.  What is high blood pressure?  High blood pressure is when the force of blood against the inside of your blood vessels is higher than it should be. This makes your heart work harder. And it may damage your blood vessels. High blood pressure means a level of 130/80 mm Hg or more.  Your healthcare provider will usually check your blood pressure each time you have an office visit. Having a high reading at one office visit does not mean that you have high blood pressure. High blood pressure means that your blood pressure is high over a period of time. That is why your healthcare provider checks it at each office visit. He or she can then look at the pattern of your blood pressure. Try to arrive early enough to your appointment so you can rest before your blood pressure is taken. Avoid caffeine and smoking before your pressure is measured.   Making lifestyle changes  Lifestyle changes can help lower your blood pressure. These changes can include:    Losing weight. Even a small amount of weight loss can lower your blood pressure. As you slowly lose weight, you may see your blood pressure gradually get lower.    Quitting smoking. The nicotine in tobacco raises blood pressure. Smoking also increases the risk for other heart and blood vessel diseases, many cancers, and most lung conditions. The first step is to set a quit date. Talk with your healthcare provider about ways to quit smoking. There are programs and medicines that can help.    Eating healthy. Eat plenty of fruits and vegetables. Eat fat-free or low-fat dairy foods, and drink less alcohol.    Eating less salt (sodium). Salt can raise blood  pressure. Try salt-free seasoning, or herbs and spices. Choose low-salt or no-salt snacks, deli meats, and canned foods.    Being more physically active. Physical activity can help lower blood pressure. Get at least 30 minutes of activity on most days. If you are not very active, talk with your healthcare provider before you get started. He or she can help you figure out what is best for you.    Managing stress. Stress can raise blood pressure. Talk with your healthcare provider about lowering your stress level. He or she may advise relaxation methods, a counselor, health , or class.  Taking medicine  Your healthcare provider may also prescribe medicine to help lower your blood pressure. The medicine will help lessen the strain on your heart and help to protect your blood vessels. If you lose weight, you may be able to take less medicine. Or you may no longer need to take it.  Date Last Reviewed: 7/1/2016 2000-2018 The "[employer]". [address] [place] [address], [place] [id number]. All rights reserved. This information is not intended as a substitute for professional medical care. Always follow your healthcare professional's instructions.

## 2019-01-25 NOTE — TELEPHONE ENCOUNTER
Reason for Call:  Other prescription    Detailed comments: Amy from [facility] stated that Levalbuterol is backed ordered and wondering  if pcp can do an alternate medication. Please advise.     Phone Number Patient can be reached at: Other phone number:  [phone]    Best Time: Anytime    Can we leave a detailed message on this number? YES    Call taken on 1/25/2019 at 1:35 PM by Brisa Robles

## 2019-01-25 NOTE — TELEPHONE ENCOUNTER
Please let patient know about the back order.  It would be helpful to know what is the expected length of time of the back order.    I pended an Albuterol inhaler that can be signed if she is willing to use.  She had an intolerance to Albuterol in the past so I am unsure if she wants to try this.    Sarah Vallejo, DNP, APRN, CNP

## 2019-01-26 ASSESSMENT — ANXIETY QUESTIONNAIRES: GAD7 TOTAL SCORE: 21

## 2019-01-30 RX ORDER — ALBUTEROL SULFATE 90 UG/1
2 AEROSOL, METERED RESPIRATORY (INHALATION) EVERY 6 HOURS PRN
Qty: 8.5 G | Refills: 0 | OUTPATIENT
Start: 2019-01-30 | End: 2020-01-30

## 2019-01-30 NOTE — TELEPHONE ENCOUNTER
Routing back to provider she does not want to try the albuterol she is aware of the back order and will wait.  Adrianna Silveira,Clinic Rn  [place]

## 2019-02-01 ENCOUNTER — HOSPITAL ENCOUNTER (OUTPATIENT)
Dept: CT IMAGING | Facility: CLINIC | Age: 69
Discharge: HOME | End: 2019-02-01
Attending: NURSE PRACTITIONER | Admitting: NURSE PRACTITIONER
Payer: MEDICARE

## 2019-02-01 VITALS — HEART RATE: 63 BPM | SYSTOLIC BLOOD PRESSURE: 148 MMHG | DIASTOLIC BLOOD PRESSURE: 88 MMHG

## 2019-02-01 DIAGNOSIS — R94.31 ABNORMAL ELECTROCARDIOGRAM: ICD-10-CM

## 2019-02-01 DIAGNOSIS — R07.89 ATYPICAL CHEST PAIN: ICD-10-CM

## 2019-02-01 LAB
CREAT BLD-MCNC: 1.1 MG/DL (ref 0.52–1.04)
GFR SERPL CREATININE-BSD FRML MDRD: 49 ML/MIN/{1.73_M2}

## 2019-02-01 PROCEDURE — A9270 NON-COVERED ITEM OR SERVICE: HCPCS | Mod: GY | Performed by: INTERNAL MEDICINE

## 2019-02-01 PROCEDURE — 75574 CT ANGIO HRT W/3D IMAGE: CPT

## 2019-02-01 PROCEDURE — 0502T CT FFR: CPT

## 2019-02-01 PROCEDURE — 82565 ASSAY OF CREATININE: CPT

## 2019-02-01 PROCEDURE — 25000128 H RX IP 250 OP 636: Performed by: INTERNAL MEDICINE

## 2019-02-01 PROCEDURE — 0503T: CPT

## 2019-02-01 PROCEDURE — 75574 CT ANGIO HRT W/3D IMAGE: CPT | Mod: 26 | Performed by: INTERNAL MEDICINE

## 2019-02-01 PROCEDURE — 0504T CT FFR: CPT | Performed by: INTERNAL MEDICINE

## 2019-02-01 PROCEDURE — 25000132 ZZH RX MED GY IP 250 OP 250 PS 637: Mod: GY | Performed by: INTERNAL MEDICINE

## 2019-02-01 PROCEDURE — 40000556 ZZH STATISTIC PERIPHERAL IV START W US GUIDANCE

## 2019-02-01 RX ORDER — METOPROLOL TARTRATE 50 MG
50-100 TABLET ORAL
Status: COMPLETED | OUTPATIENT
Start: 2019-02-01 | End: 2019-02-01

## 2019-02-01 RX ORDER — NITROGLYCERIN 0.4 MG/1
.4-.8 TABLET SUBLINGUAL
Status: DISCONTINUED | OUTPATIENT
Start: 2019-02-01 | End: 2019-02-02 | Stop reason: HOSPADM

## 2019-02-01 RX ORDER — METOPROLOL TARTRATE 1 MG/ML
5-15 INJECTION, SOLUTION INTRAVENOUS
Status: DISCONTINUED | OUTPATIENT
Start: 2019-02-01 | End: 2019-02-02 | Stop reason: HOSPADM

## 2019-02-01 RX ORDER — IOPAMIDOL 755 MG/ML
120 INJECTION, SOLUTION INTRAVASCULAR ONCE
Status: COMPLETED | OUTPATIENT
Start: 2019-02-01 | End: 2019-02-01

## 2019-02-01 RX ORDER — ACYCLOVIR 200 MG/1
0-1 CAPSULE ORAL
Status: DISCONTINUED | OUTPATIENT
Start: 2019-02-01 | End: 2019-02-02 | Stop reason: HOSPADM

## 2019-02-01 RX ADMIN — IOPAMIDOL 110 ML: 755 INJECTION, SOLUTION INTRAVENOUS at 13:32

## 2019-02-01 RX ADMIN — NITROGLYCERIN 0.8 MG: 0.4 TABLET SUBLINGUAL at 13:32

## 2019-02-01 RX ADMIN — METOPROLOL TARTRATE 100 MG: 100 TABLET, FILM COATED ORAL at 12:13

## 2019-02-01 NOTE — PROGRESS NOTES
Pt arrived for CTA. Test, meds and side effects reviewed with pt. Resting HR 60's; given 100 mg PO Metoprolol per verbal order. Administered 0.8mg SL nitro on CTA table per order. CTA completed; tolerated procedure well. Post monitoring completed and VSS. D/C instructions reviewed with pt whom verbalized understanding of need to increase PO fluids today. D/C to [facility] waiting room accompanied by staff.

## 2019-02-13 ENCOUNTER — OFFICE VISIT (OUTPATIENT)
Dept: FAMILY MEDICINE | Facility: CLINIC | Age: 69
End: 2019-02-13
Payer: MEDICARE

## 2019-02-13 ENCOUNTER — OFFICE VISIT (OUTPATIENT)
Dept: NUTRITION | Facility: CLINIC | Age: 69
End: 2019-02-13
Payer: MEDICARE

## 2019-02-13 VITALS
BODY MASS INDEX: 34.44 KG/M2 | DIASTOLIC BLOOD PRESSURE: 68 MMHG | OXYGEN SATURATION: 97 % | SYSTOLIC BLOOD PRESSURE: 144 MMHG | RESPIRATION RATE: 16 BRPM | TEMPERATURE: 97.5 F | HEART RATE: 68 BPM | HEIGHT: 60 IN | WEIGHT: 175.4 LBS

## 2019-02-13 DIAGNOSIS — E78.5 HYPERLIPIDEMIA LDL GOAL <130: Chronic | ICD-10-CM

## 2019-02-13 DIAGNOSIS — N18.30 CKD (CHRONIC KIDNEY DISEASE) STAGE 3, GFR 30-59 ML/MIN (H): Chronic | ICD-10-CM

## 2019-02-13 DIAGNOSIS — I10 HYPERTENSION GOAL BP (BLOOD PRESSURE) < 140/90: Primary | Chronic | ICD-10-CM

## 2019-02-13 DIAGNOSIS — E66.9 OBESITY: Primary | Chronic | ICD-10-CM

## 2019-02-13 LAB — HGB BLD-MCNC: 14.9 G/DL (ref 11.7–15.7)

## 2019-02-13 PROCEDURE — 99207 ZZC NO CHARGE LOS: CPT

## 2019-02-13 PROCEDURE — 36415 COLL VENOUS BLD VENIPUNCTURE: CPT | Performed by: NURSE PRACTITIONER

## 2019-02-13 PROCEDURE — 99213 OFFICE O/P EST LOW 20 MIN: CPT | Performed by: NURSE PRACTITIONER

## 2019-02-13 PROCEDURE — 97802 MEDICAL NUTRITION INDIV IN: CPT

## 2019-02-13 PROCEDURE — 85018 HEMOGLOBIN: CPT | Performed by: NURSE PRACTITIONER

## 2019-02-13 PROCEDURE — 80053 COMPREHEN METABOLIC PANEL: CPT | Performed by: NURSE PRACTITIONER

## 2019-02-13 ASSESSMENT — PAIN SCALES - GENERAL: PAINLEVEL: NO PAIN (0)

## 2019-02-13 ASSESSMENT — MIFFLIN-ST. JEOR: SCORE: 1237.11

## 2019-02-13 NOTE — PROGRESS NOTES
"  SUBJECTIVE:   Abbey Omer is a 69 year old female who presents to clinic today for the following health issues:      Hypertension Follow-up      Outpatient blood pressures are being checked at home.  Results are 140's to 170's over 70's to 100's but trending down over time    Low Salt Diet: low salt      Amount of exercise or physical activity: None    Problems taking medications regularly: No    Medication side effects: lisinopril - dry cough     Diet: low salt    Has allergies all year round that causes \"the wet cough.\"  She states she also has a different kind of cough.    Pravastatin caused bad indigestion and throat swelling.    She states her mood is much better today she feels like her depression is stable at this point and she does not feel like she needs to go to psychiatry.    Problem list and histories reviewed & adjusted, as indicated.  Additional history: as documented    Patient Active Problem List   Diagnosis     RICKEY (obstructive sleep apnea)     Hypothyroidism     Chronic nonallergic rhinitis     CKD (chronic kidney disease) stage 3, GFR 30-59 ml/min (H)     Hyperlipidemia LDL goal <130     Obesity     History of bladder cancer     Hypertension goal BP (blood pressure) < 140/90     Advance Care Planning     Dependent edema     Elevated LFTs     DDD (degenerative disc disease), cervical     Major depressive disorder, recurrent episode, mild degree (H)     Macular drusen     Hypertriglyceridemia     Allergic conjunctivitis     Pulmonary nodules     Acne     Combined form of age-related cataract, both eyes     Glaucoma suspect, bilateral     Drusen of macula of both eyes     Renal cyst, left     Discoid atelectasis     Acute gout involving toe of left foot, unspecified cause     Cough     Atypical chest pain     Abnormal electrocardiogram     Past Surgical History:   Procedure Laterality Date     ARTHROPLASTY SHOULDER Right 11/01/2017    Right total shoulder arthroplasty by Dr. Go at " [facility]     ARTHROPLASTY SHOULDER Left 2018    Left total shoulder arthroplasty by Dr. Go at [facility]     CHOLECYSTECTOMY, LAPOROSCOPIC       COLONOSCOPY  ?    Upper & Lower     CYSTOSCOPY      bladder cancer     D & C       ENT SURGERY  first 1967/second ?    2 Rhynoplasties     HYSTERECTOMY, PAP NO LONGER INDICATED      BSO     NOSE SURGERY      septum deviation     PHACOEMULSIFICATION CLEAR CORNEA WITH STANDARD INTRAOCULAR LENS IMPLANT Left 2018    Procedure: PHACOEMULSIFICATION CLEAR CORNEA WITH STANDARD INTRAOCULAR LENS IMPLANT;  LEFT EYE PHACOEMULSIFICATION CLEAR CORNEA WITH STANDARD INTRAOCULAR LENS IMPLANT;  Surgeon: Ryne Pascal MD;  Location:  [facility]     PHACOEMULSIFICATION WITH STANDARD INTRAOCULAR LENS IMPLANT Right 10/15/2018    Procedure: PHACOEMULSIFICATION WITH STANDARD INTRAOCULAR LENS IMPLANT ;  Surgeon: Ryne Pascal MD;  Location:  [facility]       Social History     Tobacco Use     Smoking status: Former Smoker     Packs/day: 0.10     Years: 1.00     Pack years: 0.10     Types: Cigarettes     Start date: 1969     Last attempt to quit: 10/12/1969     Years since quittin.3     Smokeless tobacco: Never Used     Tobacco comment: Smoked, very little, for 2 mo, 49 years ago.   Substance Use Topics     Alcohol use: Yes     Comment: Maybe 1 or 2 drinks a year     Family History   Problem Relation Age of Onset     Diabetes Maternal Grandfather      Hypertension Maternal Grandfather      Diabetes Son      Heart Disease Father         CHF     Diabetes Mother      Depression Mother      Hyperlipidemia Mother      Anxiety Disorder Mother      Asthma Mother      Obesity Mother      Substance Abuse Brother      Diabetes Brother      Depression Daughter      Diabetes Maternal Uncle      Cerebrovascular Disease Maternal Uncle      Diabetes Son         after accident     Depression Daughter      Depression Son         after accident      Substance Abuse Brother         Not any longer     Thyroid Disease No family hx of      Glaucoma No family hx of      Macular Degeneration No family hx of      Cancer No family hx of          Current Outpatient Medications   Medication Sig Dispense Refill     allopurinol (ZYLOPRIM) 100 MG tablet Take 1 tablet (100 mg) by mouth daily 90 tablet 3     B Complex Vitamins (VITAMIN B COMPLEX PO)        CALCIUM + D 600-200 MG-UNIT OR TABS 1 tablet twice a day       colchicine (COLCRYS) 0.6 MG tablet Take 1 tablet (0.6 mg) by mouth as needed (goute flare) 30 tablet 3     Cranberry 500 MG CAPS Take 1 capsule by mouth daily       EXCEDRIN TENSION HEADACHE 500-65 MG PO TABS 1 tablet twice daily as needed       FISH OIL 1200 MG OR CAPS 3 capsule daily       Flaxseed, Linseed, (FLAXSEED OIL PO) Take 1 Tablespoonful by mouth daily       fluticasone (FLONASE) 50 MCG/ACT nasal spray Spray 2 sprays into both nostrils daily 48 g 3     levalbuterol (XOPENEX HFA) 45 MCG/ACT inhaler INHALE 2 PUFFS INTO THE LUNGS EVERY 6 HOURS AS NEEDED FOR SHORTNESS OF BREATH OR DIFFICULT BREATHING OR WHEEZING 15 g 1     levothyroxine (SYNTHROID/LEVOTHROID) 88 MCG tablet TAKE 1 TABLET(88 MCG) BY MOUTH DAILY 90 tablet 3     losartan (COZAAR) 100 MG tablet Take 1 tablet (100 mg) by mouth daily 90 tablet 3     magnesium 500 MG TABS Take 1 tablet by mouth daily       MELATONIN PO        Multiple Vitamins-Minerals (MULTIPLE VITAMIN  S/WOMENS) TABS Take 2 tablets by mouth daily       order for DME Equipment being ordered: blood pressure monitor 1 each 0     order for DME Equipment ordered: Respironics Auto PAP Mask type: Nasal Settings: 9-15 cm H2O       ranitidine (ZANTAC) 300 MG tablet Take 1 tablet (300 mg) by mouth At Bedtime 90 tablet 3     STATIN NOT PRESCRIBED, INTENTIONAL, by Other route continuous prn Reported on 5/15/2017  0     traZODone (DESYREL) 50 MG tablet Take 1-2 tablets ( mg) by mouth nightly as needed for sleep 180 tablet 3      venlafaxine (EFFEXOR-XR) 150 MG 24 hr capsule 2 Capsules daily 180 capsule 1     zinc 50 MG TABS Take 1 tablet by mouth daily       Allergies   Allergen Reactions     Cymbalta      Visual hallucinations     Zomig [Zolmitriptan] Other (See Comments)     Can't wake up     Colestid [Colestipol Hcl]      Albuterol Other (See Comments)     Very jittery and couldn't tolerate     Amlodipine Other (See Comments) and Swelling     Swelling- lower legs     Asa [Aspirin] GI Disturbance     Atenolol Fatigue     Atorvastatin Other (See Comments)     Swelling legs     Bupropion Unknown     Codeine Nausea     Crestor [Hmg-Coa-R Inhibitors] Other (See Comments)     Joint pain     Ezetimibe Other (See Comments)     Fatigue     Hydralazine Other (See Comments) and Fatigue     Fatigue     Lisinopril Cough     Lovastatin Other (See Comments) and Cramps     Cramps     Niacin      Unknown by patient     Paroxetine Other (See Comments)     Fatigue     Paxil [Paroxetine] Fatigue     Potassium GI Disturbance     Sulfa Drugs Fatigue     Sulfasalazine Other (See Comments)     Triamterene Unknown     Zetia [Ezetimibe] Fatigue     BP Readings from Last 3 Encounters:   02/13/19 144/68   02/01/19 148/88   01/25/19 (!) 144/94    Wt Readings from Last 3 Encounters:   02/13/19 79.6 kg (175 lb 6.4 oz)   01/25/19 78.6 kg (173 lb 3.2 oz)   01/14/19 78.9 kg (174 lb)                    Reviewed and updated as needed this visit by clinical staff  Tobacco  Allergies  Meds  Problems  Med Hx  Surg Hx  Fam Hx       Reviewed and updated as needed this visit by Provider  Tobacco  Allergies  Meds  Problems  Med Hx  Surg Hx  Fam Hx         ROS:  Constitutional, HEENT, cardiovascular, pulmonary, gi and gu systems are negative, except as otherwise noted.    OBJECTIVE:     /68 (BP Location: Right arm, Patient Position: Chair, Cuff Size: Adult Large)   Pulse 68   Temp 97.5  F (36.4  C) (Oral)   Resp 16   Ht 1.524 m (5')   Wt 79.6 kg (175 lb  6.4 oz)   SpO2 97%   BMI 34.26 kg/m    Body mass index is 34.26 kg/m .  GENERAL: healthy, alert, no distress and obese  RESP: lungs clear to auscultation - no rales, rhonchi or wheezes  CV: regular rate and rhythm, normal S1 S2, no S3 or S4, no murmur, click or rub, no peripheral edema and peripheral pulses strong  PSYCH: mentation appears normal, affect normal/bright    Diagnostic Test Results:  Results for orders placed or performed in visit on 02/13/19   Hemoglobin   Result Value Ref Range    Hemoglobin 14.9 11.7 - 15.7 g/dL   Comprehensive metabolic panel (BMP + Alb, Alk Phos, ALT, AST, Total. Bili, TP)   Result Value Ref Range    Sodium 139 133 - 144 mmol/L    Potassium 4.0 3.4 - 5.3 mmol/L    Chloride 105 94 - 109 mmol/L    Carbon Dioxide 26 20 - 32 mmol/L    Anion Gap 8 3 - 14 mmol/L    Glucose 84 70 - 99 mg/dL    Urea Nitrogen 25 7 - 30 mg/dL    Creatinine 1.09 (H) 0.52 - 1.04 mg/dL    GFR Estimate 51 (L) >60 mL/min/[1.73_m2]    GFR Estimate If Black 60 (L) >60 mL/min/[1.73_m2]    Calcium 9.1 8.5 - 10.1 mg/dL    Bilirubin Total 0.4 0.2 - 1.3 mg/dL    Albumin 4.0 3.4 - 5.0 g/dL    Protein Total 7.7 6.8 - 8.8 g/dL    Alkaline Phosphatase 153 (H) 40 - 150 U/L    ALT 49 0 - 50 U/L    AST 33 0 - 45 U/L       ASSESSMENT/PLAN:     1. Hypertension goal BP (blood pressure) < 140/90  Improving but not quite at goal.  - Patient not willing to add another agent today and wants to work on her nutrition.  - Continue Losartan 100 mg.  - Keep monitoring BP at home and patient will return if her BPs remain high.  - Comprehensive metabolic panel (BMP + Alb, Alk Phos, ALT, AST, Total. Bili, TP)    2. CKD (chronic kidney disease) stage 3, GFR 30-59 ml/min (H)  - Hemoglobin check due to above.    Return in about 8 weeks (around 4/10/2019) for follow up.     Sarah Vallejo NP  [facility]

## 2019-02-13 NOTE — PROGRESS NOTES
Medical Nutrition Therapy  Visit Type:Initial assessment and intervention    Abbey Omer presents today for MNT and education related to weight management and chronic kidney disease.   She is accompanied by spouse, Maggie.     ASSESSMENT:   Patient comments/concerns relating to nutrition: Wants to prevent diabetes and get back on track. Says did Medifast in the past, which helped with weight loss about 5 years ago and lost 20 lbs.  Says had her wedding so wanted to lose weight for it.  Says middle month she had a lot of family reunions so not follow closely.  Says ate their products- not big fan of their food other than did like the shakes.  Enjoyed eating small meals throughout the day.  Problem: the cost.      Says she did well after that but had issues with her arms- had spurs and arthritis and both needed to be replaced 3 years ago.  Says struggled with holidays- ate more candy at night and was sedentary after the rehab.  She is trying to move more but limited but this week has been better.     Feels knows how to eat.  Was suggested Mediterranean diet but says not sure about it.  Says she needs to balance calcium and phosphorus.  Says she is not on any diet restrictions other than cutting back on sugar.       Says tries to avoid white food- potatoes, pasta or rice.  Says she was told she needs protein.  Says Mediterranean diet had a lot of raw veggies.  Not always like the taste of them.     NUTRITION HISTORY:  Wakes: 7am  Breakfast: 9am: tea and 1-2 slices toast (English muffin toast) OR oatmeal bread   AM: none  Lunch: 2pm: Goulash (pasta and hamburger) OR soup and sandwich OR steak with potato or salad/cauliflower OR fruit and yogurt or veggies in olive oil OR salad and sandwich OR eggs in clarified butter OR meat or peaches with cottage cheese OR chicken with baked potato  Dinner: 5-6pm: none OR oatmeal, almond milk, flax seed, amrik seed and nuts  Snacks: candy   Bed: 11pm   Beverages: V-8 1x/day,  [place] milk 0-1x/day, Hot chocolate on occasion, made with water, Tea 1x/day and Water all day    Misses meals? Yes- dinner  Eats out:  1-4 meals/per week     Previous diet education:  No     Food allergies/intolerances: milk/dairy; shellfish for gout    Dislikes: raw veggies    Diet is high in: calories when eating sweets  Diet is low in: calcium, fiber, fruits and vegetables    EXERCISE: Sporatic or irregular- has been wanting and trying to walk around more this week.     SOCIO/ECONOMIC:   Lives with: spouse    MEDICATIONS:  Current Outpatient Medications   Medication     allopurinol (ZYLOPRIM) 100 MG tablet     B Complex Vitamins (VITAMIN B COMPLEX PO)     CALCIUM + D 600-200 MG-UNIT OR TABS     colchicine (COLCRYS) 0.6 MG tablet     Cranberry 500 MG CAPS     EXCEDRIN TENSION HEADACHE 500-65 MG PO TABS     FISH OIL 1200 MG OR CAPS     Flaxseed, Linseed, (FLAXSEED OIL PO)     fluticasone (FLONASE) 50 MCG/ACT nasal spray     levalbuterol (XOPENEX HFA) 45 MCG/ACT inhaler     levothyroxine (SYNTHROID/LEVOTHROID) 88 MCG tablet     losartan (COZAAR) 100 MG tablet     magnesium 500 MG TABS     MELATONIN PO     Multiple Vitamins-Minerals (MULTIPLE VITAMIN  S/WOMENS) TABS     order for DME     order for DME     ranitidine (ZANTAC) 300 MG tablet     STATIN NOT PRESCRIBED, INTENTIONAL,     traZODone (DESYREL) 50 MG tablet     venlafaxine (EFFEXOR-XR) 150 MG 24 hr capsule     zinc 50 MG TABS     No current facility-administered medications for this visit.      Facility-Administered Medications Ordered in Other Visits   Medication     sodium chloride (PF) 0.9% PF flush 10 mL       LABS:  Last Basic Metabolic Panel:  Lab Results   Component Value Date     12/18/2018      Lab Results   Component Value Date    POTASSIUM 3.6 12/18/2018     Lab Results   Component Value Date    CHLORIDE 103 12/18/2018     Lab Results   Component Value Date    SAMARIA 9.5 12/18/2018     Lab Results   Component Value Date    CO2 29 12/18/2018      Lab Results   Component Value Date    BUN 16 12/18/2018     Lab Results   Component Value Date    CR 1.05 12/18/2018     Lab Results   Component Value Date    GLC 93 12/18/2018     Recent Labs   Lab Test 12/18/18  1115 11/09/17  0851  05/04/15  0927 05/28/14  1316   CHOL 265* 174   < > 217* 225*   HDL 53 54   < > 46* 36*   * 98   < > 112 119   TRIG 227* 109   < > 295* 351*   CHOLHDLRATIO  --   --   --  4.7 6.2*    < > = values in this interval not displayed.     Last Renal Panel:  Sodium   Date Value Ref Range Status   12/18/2018 138 133 - 144 mmol/L Final     Potassium   Date Value Ref Range Status   12/18/2018 3.6 3.4 - 5.3 mmol/L Final     Chloride   Date Value Ref Range Status   12/18/2018 103 94 - 109 mmol/L Final     Carbon Dioxide   Date Value Ref Range Status   12/18/2018 29 20 - 32 mmol/L Final     Anion Gap   Date Value Ref Range Status   12/18/2018 6 3 - 14 mmol/L Final     Glucose   Date Value Ref Range Status   12/18/2018 93 70 - 99 mg/dL Final     Urea Nitrogen   Date Value Ref Range Status   12/18/2018 16 7 - 30 mg/dL Final     Creatinine   Date Value Ref Range Status   12/18/2018 1.05 (H) 0.52 - 1.04 mg/dL Final     GFR Estimate   Date Value Ref Range Status   02/01/2019 49 (L) >60 mL/min/[1.73_m2] Final     Calcium   Date Value Ref Range Status   12/18/2018 9.5 8.5 - 10.1 mg/dL Final     Phosphorus   Date Value Ref Range Status   12/18/2010 4.6 (H) 2.5 - 4.5 mg/dL Final     Albumin   Date Value Ref Range Status   03/12/2018 4.2 3.4 - 5.0 g/dL Final         ANTHROPOMETRICS:  Vitals: There were no vitals taken for this visit.  Estimated body mass index is 34.26 kg/m  as calculated from the following:    Height as of an earlier encounter on 2/13/19: 1.524 m (5').    Weight as of an earlier encounter on 2/13/19: 79.6 kg (175 lb 6.4 oz).      Wt Readings from Last 5 Encounters:   02/13/19 79.6 kg (175 lb 6.4 oz)   01/25/19 78.6 kg (173 lb 3.2 oz)   01/14/19 78.9 kg (174 lb)   01/11/19 78.5 kg  (173 lb)   12/27/18 79.8 kg (176 lb)       Weight Change: gained 2.2 lbs in the past month    ESTIMATED KCAL REQUIREMENTS:  1480 kcal per day    NUTRITION DIAGNOSIS: Overweight/Obesity related to higher sweets intake and limited physical activity as evidenced by diet discussion and BMI >30    NUTRITION INTERVENTION:  Nutrition Prescription:   Protein Intake: 63 grams/day (0.8 gm/kg CBW)  Saturated Fat Intake: <10 grams/day  Sodium Intake: <2000 mg/day  Education given to support: general nutrition guidelines, weight reduction, consistent meals, sodium, fat modification, fiber, behavior modification, portion control and heart healthy diet  Education Materials Provided: Label Reading, Eating Right for Kidney Health: Tips for People with CKD and General, Healthful Mediterranean Nutrition Therapy  Motivational Interviewing    Discussion: Patient only eats 2 main meals a day plus breakfast and indicates she is struggling with controlling sweets/snacking at night.  Informed her body typically needs food every 4-6 hours and since she notes she feels better controlled in night eating when she drinks a shake at 5:30-6pm, recommended she consume something for dinner daily at this time.  Discussed healthy dinner ideas such as cottage cheese and peaches, continuing to drink the shake or nuts and grapes.  She is agreeable to this suggestion.    She brought in a list with high phosphorus foods but says she was not told to restrict- had seen something mentioned in a MyChart education handout.  Did not see any phosphorus labs and discussed that for CKD, starting recommendations are limiting protein and sodium.  More restrictive guidelines occur to watch Phosphorus and Potassium if labs are off.  Advised her that unless instructed to cut back, probably does not need to worry about phosphorus restrictions at this time.  Handout provided does indicate which are high potassium and phosphorus foods if needs to be limited in the future.       Discussed importance of increasing activity when trying to reduce portions to try to make weight loss easier and more consistent.  Patient had a good week and is hoping to increase her walking and number of daily steps.  Suggested if struggling to try meal recording and discussed helpful apps to track nutrition information.  Requested she come back with a 5-day (or longer) food record at follow up so have more insight on meal ideas and portions.  Pt verbalized understanding of concepts discussed and recommendations provided.       PATIENT'S BEHAVIOR CHANGE GOALS:   See Patient Instructions for patient stated behavior change goals. AVS was printed and given to patient at today's appointment.    MONITOR / EVALUATE:  RD will monitor/evaluate:  Food / Beverage / Nutrient intake   Weight change    FOLLOW-UP:  Call RD with questions/concerns.   Follow-up appointment scheduled on 4/17/19.     Karen Richardson RD, LD, CDE   Time spent in minutes: 60 minutes  Encounter: Individual

## 2019-02-13 NOTE — PATIENT INSTRUCTIONS
Goals:    1. Plan in a healthy dinner    -cottage cheese and peaches OR shake    2. Watch protein (no more than 3 oz at lunch meal), sodium (<2000 mg) and saturated fat (<10gm)    3. Work on building activity    4. Could try meal recording if struggling- [employer], [employer], [employer] apps all help track meals and nutrients    FOLLOW UP:  Wednesday, April 17th at 10 am at [facility]    Karen Richardson RD, LD, CDE   [phone]

## 2019-02-14 LAB
ALBUMIN SERPL-MCNC: 4 G/DL (ref 3.4–5)
ALP SERPL-CCNC: 153 U/L (ref 40–150)
ALT SERPL W P-5'-P-CCNC: 49 U/L (ref 0–50)
ANION GAP SERPL CALCULATED.3IONS-SCNC: 8 MMOL/L (ref 3–14)
AST SERPL W P-5'-P-CCNC: 33 U/L (ref 0–45)
BILIRUB SERPL-MCNC: 0.4 MG/DL (ref 0.2–1.3)
BUN SERPL-MCNC: 25 MG/DL (ref 7–30)
CALCIUM SERPL-MCNC: 9.1 MG/DL (ref 8.5–10.1)
CHLORIDE SERPL-SCNC: 105 MMOL/L (ref 94–109)
CO2 SERPL-SCNC: 26 MMOL/L (ref 20–32)
CREAT SERPL-MCNC: 1.09 MG/DL (ref 0.52–1.04)
GFR SERPL CREATININE-BSD FRML MDRD: 51 ML/MIN/{1.73_M2}
GLUCOSE SERPL-MCNC: 84 MG/DL (ref 70–99)
POTASSIUM SERPL-SCNC: 4 MMOL/L (ref 3.4–5.3)
PROT SERPL-MCNC: 7.7 G/DL (ref 6.8–8.8)
SODIUM SERPL-SCNC: 139 MMOL/L (ref 133–144)

## 2019-02-19 DIAGNOSIS — R93.1 ABNORMAL CT SCAN OF HEART: Primary | ICD-10-CM

## 2019-02-19 DIAGNOSIS — R06.09 DOE (DYSPNEA ON EXERTION): ICD-10-CM

## 2019-02-19 DIAGNOSIS — R07.9 CHEST PAIN: ICD-10-CM

## 2019-02-19 RX ORDER — LIDOCAINE 40 MG/G
CREAM TOPICAL
Status: CANCELLED | OUTPATIENT
Start: 2019-02-19

## 2019-02-19 RX ORDER — SODIUM CHLORIDE 9 MG/ML
INJECTION, SOLUTION INTRAVENOUS CONTINUOUS
Status: CANCELLED | OUTPATIENT
Start: 2019-02-19

## 2019-02-20 ENCOUNTER — MYC MEDICAL ADVICE (OUTPATIENT)
Dept: CARDIOLOGY | Facility: CLINIC | Age: 69
End: 2019-02-20

## 2019-02-20 NOTE — PROGRESS NOTES
Left Heart Catheterization: Patient plans on picking up Coronary Angiogram and Angioplasty: A Patient's Guide booklet tomorrow 2/21. Patient was instructed regarding left heart catheterization, including discussion of the indication, procedure, preparation, intra-procedural steps, and recovery at home. Patient demonstrated understanding of this information and agreed to call with further questions or concerns.    Med Reconcile: Reviewed and verified all current medications with the patient. The updated medication list was printed and given to the patient.    Patient stated she understood all health information given and agreed to call with further questions or concerns.    Patient is scheduled for coronary angiogram 2/22/19 arrival 12:30 pm.  [employer] message sent with instructions.    Alejandra Velazquez RN  Care Coordinator  [facility]  [phone]

## 2019-02-21 NOTE — TELEPHONE ENCOUNTER
Patient came to clinic today to pick-up coronary angiogram folder ( see previous notes). Patient doesn't have any more questions. Writer asked patient to call clinic if having any questions or concerns later.            Dana Lancaster RN

## 2019-02-22 ENCOUNTER — HOSPITAL ENCOUNTER (OUTPATIENT)
Facility: CLINIC | Age: 69
Discharge: HOME | End: 2019-02-22
Attending: INTERNAL MEDICINE | Admitting: INTERNAL MEDICINE
Payer: MEDICARE

## 2019-02-22 ENCOUNTER — APPOINTMENT (OUTPATIENT)
Dept: LAB | Facility: CLINIC | Age: 69
End: 2019-02-22
Attending: INTERNAL MEDICINE
Payer: MEDICARE

## 2019-02-22 ENCOUNTER — APPOINTMENT (OUTPATIENT)
Dept: MEDSURG UNIT | Facility: CLINIC | Age: 69
End: 2019-02-22
Attending: INTERNAL MEDICINE
Payer: MEDICARE

## 2019-02-22 VITALS
SYSTOLIC BLOOD PRESSURE: 143 MMHG | RESPIRATION RATE: 20 BRPM | DIASTOLIC BLOOD PRESSURE: 90 MMHG | OXYGEN SATURATION: 94 % | TEMPERATURE: 97.8 F

## 2019-02-22 DIAGNOSIS — R06.09 DOE (DYSPNEA ON EXERTION): ICD-10-CM

## 2019-02-22 DIAGNOSIS — R07.9 CHEST PAIN: ICD-10-CM

## 2019-02-22 DIAGNOSIS — Z98.890 S/P CORONARY ANGIOGRAM: ICD-10-CM

## 2019-02-22 DIAGNOSIS — R93.1 ABNORMAL CT SCAN OF HEART: ICD-10-CM

## 2019-02-22 DIAGNOSIS — I25.118 CORONARY ARTERY DISEASE OF NATIVE ARTERY OF NATIVE HEART WITH STABLE ANGINA PECTORIS (H): Primary | ICD-10-CM

## 2019-02-22 LAB
ANION GAP SERPL CALCULATED.3IONS-SCNC: 8 MMOL/L (ref 3–14)
BUN SERPL-MCNC: 23 MG/DL (ref 7–30)
CALCIUM SERPL-MCNC: 9 MG/DL (ref 8.5–10.1)
CHLORIDE SERPL-SCNC: 103 MMOL/L (ref 94–109)
CO2 SERPL-SCNC: 27 MMOL/L (ref 20–32)
CREAT SERPL-MCNC: 1.06 MG/DL (ref 0.52–1.04)
ERYTHROCYTE [DISTWIDTH] IN BLOOD BY AUTOMATED COUNT: 13.3 % (ref 10–15)
GFR SERPL CREATININE-BSD FRML MDRD: 53 ML/MIN/{1.73_M2}
GLUCOSE SERPL-MCNC: 90 MG/DL (ref 70–99)
HCT VFR BLD AUTO: 46.3 % (ref 35–47)
HGB BLD-MCNC: 15 G/DL (ref 11.7–15.7)
KCT BLD-ACNC: 294 SEC (ref 75–150)
KCT BLD-ACNC: 302 SEC (ref 75–150)
MCH RBC QN AUTO: 30.6 PG (ref 26.5–33)
MCHC RBC AUTO-ENTMCNC: 32.4 G/DL (ref 31.5–36.5)
MCV RBC AUTO: 95 FL (ref 78–100)
PLATELET # BLD AUTO: 233 10E9/L (ref 150–450)
POTASSIUM SERPL-SCNC: 4 MMOL/L (ref 3.4–5.3)
RBC # BLD AUTO: 4.9 10E12/L (ref 3.8–5.2)
SODIUM SERPL-SCNC: 138 MMOL/L (ref 133–144)
WBC # BLD AUTO: 5.8 10E9/L (ref 4–11)

## 2019-02-22 PROCEDURE — 93454 CORONARY ARTERY ANGIO S&I: CPT | Mod: 26 | Performed by: INTERNAL MEDICINE

## 2019-02-22 PROCEDURE — 85027 COMPLETE CBC AUTOMATED: CPT | Performed by: INTERNAL MEDICINE

## 2019-02-22 PROCEDURE — C9601 PERC DRUG-EL COR STENT BRAN: HCPCS | Performed by: INTERNAL MEDICINE

## 2019-02-22 PROCEDURE — 25000125 ZZHC RX 250: Performed by: INTERNAL MEDICINE

## 2019-02-22 PROCEDURE — 93572 IV DOP VEL&/PRESS C FLO EA: CPT | Mod: 26 | Performed by: INTERNAL MEDICINE

## 2019-02-22 PROCEDURE — 93005 ELECTROCARDIOGRAM TRACING: CPT

## 2019-02-22 PROCEDURE — 93571 IV DOP VEL&/PRESS C FLO 1ST: CPT | Mod: 26 | Performed by: INTERNAL MEDICINE

## 2019-02-22 PROCEDURE — C9600 PERC DRUG-EL COR STENT SING: HCPCS | Mod: LC | Performed by: INTERNAL MEDICINE

## 2019-02-22 PROCEDURE — 80048 BASIC METABOLIC PNL TOTAL CA: CPT | Performed by: INTERNAL MEDICINE

## 2019-02-22 PROCEDURE — 93572 IV DOP VEL&/PRESS C FLO EA: CPT | Performed by: INTERNAL MEDICINE

## 2019-02-22 PROCEDURE — 93454 CORONARY ARTERY ANGIO S&I: CPT | Performed by: INTERNAL MEDICINE

## 2019-02-22 PROCEDURE — 93010 ELECTROCARDIOGRAM REPORT: CPT | Performed by: INTERNAL MEDICINE

## 2019-02-22 PROCEDURE — 25000132 ZZH RX MED GY IP 250 OP 250 PS 637: Mod: GY | Performed by: INTERNAL MEDICINE

## 2019-02-22 PROCEDURE — A9270 NON-COVERED ITEM OR SERVICE: HCPCS | Mod: GY | Performed by: INTERNAL MEDICINE

## 2019-02-22 PROCEDURE — 40000556 ZZH STATISTIC PERIPHERAL IV START W US GUIDANCE

## 2019-02-22 PROCEDURE — 25000132 ZZH RX MED GY IP 250 OP 250 PS 637: Mod: GY | Performed by: NURSE PRACTITIONER

## 2019-02-22 PROCEDURE — A9270 NON-COVERED ITEM OR SERVICE: HCPCS | Mod: GY | Performed by: NURSE PRACTITIONER

## 2019-02-22 PROCEDURE — 36415 COLL VENOUS BLD VENIPUNCTURE: CPT | Performed by: INTERNAL MEDICINE

## 2019-02-22 PROCEDURE — 40000065 ZZH STATISTIC EKG NON-CHARGEABLE

## 2019-02-22 PROCEDURE — 99152 MOD SED SAME PHYS/QHP 5/>YRS: CPT | Performed by: INTERNAL MEDICINE

## 2019-02-22 PROCEDURE — C1769 GUIDE WIRE: HCPCS | Performed by: INTERNAL MEDICINE

## 2019-02-22 PROCEDURE — C1874 STENT, COATED/COV W/DEL SYS: HCPCS | Performed by: INTERNAL MEDICINE

## 2019-02-22 PROCEDURE — 85347 COAGULATION TIME ACTIVATED: CPT | Mod: 91

## 2019-02-22 PROCEDURE — 25000128 H RX IP 250 OP 636: Performed by: INTERNAL MEDICINE

## 2019-02-22 PROCEDURE — 27210794 ZZH OR GENERAL SUPPLY STERILE: Performed by: INTERNAL MEDICINE

## 2019-02-22 PROCEDURE — C1894 INTRO/SHEATH, NON-LASER: HCPCS | Performed by: INTERNAL MEDICINE

## 2019-02-22 PROCEDURE — 99153 MOD SED SAME PHYS/QHP EA: CPT | Performed by: INTERNAL MEDICINE

## 2019-02-22 PROCEDURE — C1887 CATHETER, GUIDING: HCPCS | Performed by: INTERNAL MEDICINE

## 2019-02-22 PROCEDURE — 93571 IV DOP VEL&/PRESS C FLO 1ST: CPT | Mod: 52 | Performed by: INTERNAL MEDICINE

## 2019-02-22 PROCEDURE — C1725 CATH, TRANSLUMIN NON-LASER: HCPCS | Performed by: INTERNAL MEDICINE

## 2019-02-22 PROCEDURE — 40000172 ZZH STATISTIC PROCEDURE PREP ONLY

## 2019-02-22 PROCEDURE — 92928 PRQ TCAT PLMT NTRAC ST 1 LES: CPT | Mod: LC | Performed by: INTERNAL MEDICINE

## 2019-02-22 DEVICE — STENT SYNERGY DRUG ELUTING 2.75X20MM  H7493926020270: Type: IMPLANTABLE DEVICE | Status: FUNCTIONAL

## 2019-02-22 RX ORDER — CLOPIDOGREL BISULFATE 75 MG/1
75 TABLET ORAL ONCE
Status: CANCELLED | OUTPATIENT
Start: 2019-02-23 | End: 2019-02-23

## 2019-02-22 RX ORDER — ROSUVASTATIN CALCIUM 20 MG/1
20 TABLET, COATED ORAL DAILY
Status: DISCONTINUED | OUTPATIENT
Start: 2019-02-22 | End: 2019-02-22 | Stop reason: HOSPADM

## 2019-02-22 RX ORDER — FLUMAZENIL 0.1 MG/ML
0.2 INJECTION, SOLUTION INTRAVENOUS
Status: CANCELLED | OUTPATIENT
Start: 2019-02-22 | End: 2019-02-23

## 2019-02-22 RX ORDER — LIDOCAINE 40 MG/G
CREAM TOPICAL
Status: DISCONTINUED | OUTPATIENT
Start: 2019-02-22 | End: 2019-02-22 | Stop reason: HOSPADM

## 2019-02-22 RX ORDER — NOREPINEPHRINE BITARTRATE/D5W 16MG/250ML
.03-.4 PLASTIC BAG, INJECTION (ML) INTRAVENOUS CONTINUOUS PRN
Status: DISCONTINUED | OUTPATIENT
Start: 2019-02-22 | End: 2019-02-22 | Stop reason: HOSPADM

## 2019-02-22 RX ORDER — ROSUVASTATIN CALCIUM 20 MG/1
20 TABLET, COATED ORAL DAILY
Qty: 30 TABLET | Refills: 0 | Status: ON HOLD | OUTPATIENT
Start: 2019-02-22 | End: 2019-03-18

## 2019-02-22 RX ORDER — ONDANSETRON 4 MG/1
4 TABLET, ORALLY DISINTEGRATING ORAL EVERY 6 HOURS PRN
Status: CANCELLED | OUTPATIENT
Start: 2019-02-22

## 2019-02-22 RX ORDER — TIROFIBAN HYDROCHLORIDE 50 UG/ML
0.07 INJECTION INTRAVENOUS CONTINUOUS PRN
Status: DISCONTINUED | OUTPATIENT
Start: 2019-02-22 | End: 2019-02-22 | Stop reason: HOSPADM

## 2019-02-22 RX ORDER — NITROGLYCERIN 5 MG/ML
VIAL (ML) INTRAVENOUS
Status: DISCONTINUED | OUTPATIENT
Start: 2019-02-22 | End: 2019-02-22 | Stop reason: HOSPADM

## 2019-02-22 RX ORDER — ONDANSETRON 2 MG/ML
4 INJECTION INTRAMUSCULAR; INTRAVENOUS EVERY 6 HOURS PRN
Status: DISCONTINUED | OUTPATIENT
Start: 2019-02-22 | End: 2019-02-22 | Stop reason: HOSPADM

## 2019-02-22 RX ORDER — ATROPINE SULFATE 0.1 MG/ML
0.5 INJECTION INTRAVENOUS EVERY 5 MIN PRN
Status: CANCELLED | OUTPATIENT
Start: 2019-02-22 | End: 2019-02-23

## 2019-02-22 RX ORDER — HEPARIN SODIUM 1000 [USP'U]/ML
INJECTION, SOLUTION INTRAVENOUS; SUBCUTANEOUS
Status: DISCONTINUED | OUTPATIENT
Start: 2019-02-22 | End: 2019-02-22 | Stop reason: HOSPADM

## 2019-02-22 RX ORDER — METOPROLOL SUCCINATE 25 MG/1
25 TABLET, EXTENDED RELEASE ORAL DAILY
Status: CANCELLED | OUTPATIENT
Start: 2019-02-22

## 2019-02-22 RX ORDER — ASPIRIN 81 MG/1
81 TABLET ORAL DAILY
Status: DISCONTINUED | OUTPATIENT
Start: 2019-02-22 | End: 2019-02-22 | Stop reason: HOSPADM

## 2019-02-22 RX ORDER — FENTANYL CITRATE 50 UG/ML
25-50 INJECTION, SOLUTION INTRAMUSCULAR; INTRAVENOUS
Status: DISCONTINUED | OUTPATIENT
Start: 2019-02-22 | End: 2019-02-22 | Stop reason: HOSPADM

## 2019-02-22 RX ORDER — NITROGLYCERIN 0.4 MG/1
0.4 TABLET SUBLINGUAL EVERY 5 MIN PRN
Status: DISCONTINUED | OUTPATIENT
Start: 2019-02-22 | End: 2019-02-22 | Stop reason: HOSPADM

## 2019-02-22 RX ORDER — ACETAMINOPHEN 325 MG/1
650 TABLET ORAL EVERY 4 HOURS PRN
Status: CANCELLED | OUTPATIENT
Start: 2019-02-22

## 2019-02-22 RX ORDER — NALOXONE HYDROCHLORIDE 0.4 MG/ML
.1-.4 INJECTION, SOLUTION INTRAMUSCULAR; INTRAVENOUS; SUBCUTANEOUS
Status: CANCELLED | OUTPATIENT
Start: 2019-02-22

## 2019-02-22 RX ORDER — SODIUM CHLORIDE 9 MG/ML
INJECTION, SOLUTION INTRAVENOUS CONTINUOUS
Status: DISCONTINUED | OUTPATIENT
Start: 2019-02-22 | End: 2019-02-22 | Stop reason: HOSPADM

## 2019-02-22 RX ORDER — ROSUVASTATIN CALCIUM 20 MG/1
20 TABLET, COATED ORAL DAILY
Qty: 30 TABLET | Refills: 0 | Status: CANCELLED | OUTPATIENT
Start: 2019-02-22 | End: 2019-03-24

## 2019-02-22 RX ORDER — ARGATROBAN 1 MG/ML
350 INJECTION, SOLUTION INTRAVENOUS
Status: DISCONTINUED | OUTPATIENT
Start: 2019-02-22 | End: 2019-02-22 | Stop reason: HOSPADM

## 2019-02-22 RX ORDER — NALOXONE HYDROCHLORIDE 0.4 MG/ML
.1-.4 INJECTION, SOLUTION INTRAMUSCULAR; INTRAVENOUS; SUBCUTANEOUS
Status: DISCONTINUED | OUTPATIENT
Start: 2019-02-22 | End: 2019-02-22

## 2019-02-22 RX ORDER — ONDANSETRON 2 MG/ML
4 INJECTION INTRAMUSCULAR; INTRAVENOUS EVERY 6 HOURS PRN
Status: CANCELLED | OUTPATIENT
Start: 2019-02-22

## 2019-02-22 RX ORDER — EPTIFIBATIDE 2 MG/ML
180 INJECTION, SOLUTION INTRAVENOUS EVERY 10 MIN PRN
Status: DISCONTINUED | OUTPATIENT
Start: 2019-02-22 | End: 2019-02-22 | Stop reason: HOSPADM

## 2019-02-22 RX ORDER — DOBUTAMINE HYDROCHLORIDE 200 MG/100ML
2-20 INJECTION INTRAVENOUS CONTINUOUS PRN
Status: DISCONTINUED | OUTPATIENT
Start: 2019-02-22 | End: 2019-02-22 | Stop reason: HOSPADM

## 2019-02-22 RX ORDER — METOPROLOL SUCCINATE 25 MG/1
25 TABLET, EXTENDED RELEASE ORAL DAILY
Status: DISCONTINUED | OUTPATIENT
Start: 2019-02-22 | End: 2019-02-22 | Stop reason: HOSPADM

## 2019-02-22 RX ORDER — DOPAMINE HYDROCHLORIDE 160 MG/100ML
2-20 INJECTION, SOLUTION INTRAVENOUS CONTINUOUS PRN
Status: DISCONTINUED | OUTPATIENT
Start: 2019-02-22 | End: 2019-02-22 | Stop reason: HOSPADM

## 2019-02-22 RX ORDER — NITROGLYCERIN 0.4 MG/1
0.4 TABLET SUBLINGUAL EVERY 5 MIN PRN
Status: CANCELLED | OUTPATIENT
Start: 2019-02-22

## 2019-02-22 RX ORDER — FENTANYL CITRATE 50 UG/ML
25-50 INJECTION, SOLUTION INTRAMUSCULAR; INTRAVENOUS
Status: CANCELLED | OUTPATIENT
Start: 2019-02-22 | End: 2019-02-23

## 2019-02-22 RX ORDER — EPTIFIBATIDE 2 MG/ML
1 INJECTION, SOLUTION INTRAVENOUS CONTINUOUS PRN
Status: DISCONTINUED | OUTPATIENT
Start: 2019-02-22 | End: 2019-02-22 | Stop reason: HOSPADM

## 2019-02-22 RX ORDER — LIDOCAINE 40 MG/G
CREAM TOPICAL
Status: CANCELLED | OUTPATIENT
Start: 2019-02-22

## 2019-02-22 RX ORDER — ONDANSETRON 4 MG/1
4 TABLET, ORALLY DISINTEGRATING ORAL EVERY 6 HOURS PRN
Status: DISCONTINUED | OUTPATIENT
Start: 2019-02-22 | End: 2019-02-22 | Stop reason: HOSPADM

## 2019-02-22 RX ORDER — NALOXONE HYDROCHLORIDE 0.4 MG/ML
.2-.4 INJECTION, SOLUTION INTRAMUSCULAR; INTRAVENOUS; SUBCUTANEOUS
Status: CANCELLED | OUTPATIENT
Start: 2019-02-22 | End: 2019-02-23

## 2019-02-22 RX ORDER — METOPROLOL SUCCINATE 25 MG/1
25 TABLET, EXTENDED RELEASE ORAL DAILY
Qty: 30 TABLET | Refills: 0 | Status: SHIPPED | OUTPATIENT
Start: 2019-02-22 | End: 2019-02-25

## 2019-02-22 RX ORDER — NITROGLYCERIN 20 MG/100ML
.07-2 INJECTION INTRAVENOUS CONTINUOUS PRN
Status: DISCONTINUED | OUTPATIENT
Start: 2019-02-22 | End: 2019-02-22 | Stop reason: HOSPADM

## 2019-02-22 RX ORDER — METOPROLOL SUCCINATE 25 MG/1
25 TABLET, EXTENDED RELEASE ORAL DAILY
Qty: 30 TABLET | Refills: 0 | Status: CANCELLED | OUTPATIENT
Start: 2019-02-22 | End: 2019-03-24

## 2019-02-22 RX ORDER — DOBUTAMINE HYDROCHLORIDE 200 MG/100ML
5-40 INJECTION INTRAVENOUS CONTINUOUS PRN
Status: DISCONTINUED | OUTPATIENT
Start: 2019-02-22 | End: 2019-02-22 | Stop reason: HOSPADM

## 2019-02-22 RX ORDER — SODIUM CHLORIDE 9 MG/ML
INJECTION, SOLUTION INTRAVENOUS CONTINUOUS
Status: CANCELLED | OUTPATIENT
Start: 2019-02-22 | End: 2019-02-22

## 2019-02-22 RX ORDER — ARGATROBAN 1 MG/ML
150 INJECTION, SOLUTION INTRAVENOUS
Status: DISCONTINUED | OUTPATIENT
Start: 2019-02-22 | End: 2019-02-22 | Stop reason: HOSPADM

## 2019-02-22 RX ORDER — ROSUVASTATIN CALCIUM 20 MG/1
20 TABLET, COATED ORAL DAILY
Status: CANCELLED | OUTPATIENT
Start: 2019-02-22

## 2019-02-22 RX ORDER — ATROPINE SULFATE 0.1 MG/ML
0.5 INJECTION INTRAVENOUS EVERY 5 MIN PRN
Status: DISCONTINUED | OUTPATIENT
Start: 2019-02-22 | End: 2019-02-22 | Stop reason: HOSPADM

## 2019-02-22 RX ORDER — FENTANYL CITRATE 50 UG/ML
INJECTION, SOLUTION INTRAMUSCULAR; INTRAVENOUS
Status: DISCONTINUED | OUTPATIENT
Start: 2019-02-22 | End: 2019-02-22 | Stop reason: HOSPADM

## 2019-02-22 RX ORDER — ACETAMINOPHEN 325 MG/1
650 TABLET ORAL EVERY 4 HOURS PRN
Status: DISCONTINUED | OUTPATIENT
Start: 2019-02-22 | End: 2019-02-22 | Stop reason: HOSPADM

## 2019-02-22 RX ORDER — FLUMAZENIL 0.1 MG/ML
0.2 INJECTION, SOLUTION INTRAVENOUS
Status: DISCONTINUED | OUTPATIENT
Start: 2019-02-22 | End: 2019-02-22 | Stop reason: HOSPADM

## 2019-02-22 RX ORDER — ASPIRIN 325 MG
TABLET ORAL
Status: DISCONTINUED | OUTPATIENT
Start: 2019-02-22 | End: 2019-02-22 | Stop reason: HOSPADM

## 2019-02-22 RX ORDER — IOPAMIDOL 755 MG/ML
INJECTION, SOLUTION INTRAVASCULAR
Status: DISCONTINUED | OUTPATIENT
Start: 2019-02-22 | End: 2019-02-22 | Stop reason: HOSPADM

## 2019-02-22 RX ORDER — NALOXONE HYDROCHLORIDE 0.4 MG/ML
.2-.4 INJECTION, SOLUTION INTRAMUSCULAR; INTRAVENOUS; SUBCUTANEOUS
Status: DISCONTINUED | OUTPATIENT
Start: 2019-02-22 | End: 2019-02-22 | Stop reason: HOSPADM

## 2019-02-22 RX ORDER — CLOPIDOGREL BISULFATE 75 MG/1
75 TABLET ORAL DAILY
Qty: 90 TABLET | Refills: 3 | Status: CANCELLED | OUTPATIENT
Start: 2019-02-23 | End: 2020-02-23

## 2019-02-22 RX ORDER — ASPIRIN 81 MG/1
81 TABLET ORAL DAILY
Status: CANCELLED | OUTPATIENT
Start: 2019-02-22

## 2019-02-22 RX ORDER — ADENOSINE 3 MG/ML
INJECTION, SOLUTION INTRAVENOUS
Status: DISCONTINUED | OUTPATIENT
Start: 2019-02-22 | End: 2019-02-22 | Stop reason: HOSPADM

## 2019-02-22 RX ADMIN — ASPIRIN 81 MG: 81 TABLET, COATED ORAL at 18:02

## 2019-02-22 RX ADMIN — FENTANYL CITRATE 50 MCG: 50 INJECTION, SOLUTION INTRAMUSCULAR; INTRAVENOUS at 14:58

## 2019-02-22 NOTE — DISCHARGE INSTRUCTIONS
Going Home after Coronary Angioplasty or Stent Placement     FOR 24 HOURS:         Relax and take it easy.         Drink plenty of fluids.         Do NOT make any important or legal decisions.         Do NOT drive or operate machines at home or at work.         Do NOT drink alcohol.     PROCEDURE SITE:  Care of wrist or arm site:         It is normal to have soreness at the puncture site and mild tingling in your hand for up to 3 days.           Remove the Band-Aid after 24 hours. If there is minor oozing, apply another Band-aid and remove it after 12 hours.          Do NOT take a bath, or use a hot tub or pool for the next 48 hours. You may shower.          It is normal to have a small bruise.  There should not be a lump at the site.         Do not scrub the site.         Do not use lotion or powder near the puncture site for 3 days.         For 2 days, do not use your hand or arm to support your weight (such as rising from a chair) or bend your wrist (such as lifting a garage door).         For 2 days, do not lift more than 5 pounds or exercise your arm (tennis, golf or bowling).    If you start bleeding from the site in your arm: Sit down and press firmly on the site with your fingers for 10 minutes. Call your doctor as soon as you can.    Call 911 right away if you have bleeding that is heavy or does not stop.      MEDICATIONS:      1. You have begun Brilinta (ticagrelor), do not stop taking it until you talk to your heart doctor (cardiologist). This medication is essential for your stents. Do not stop it without speaking first to your heart doctor or their nurse- this includes if you have concerns about side effects or cost. Also, if another health provider tells you to stop it, let them know they need to discuss it with your heart doctor.   2.If you are on metformin (Glucophage), do not restart it until you have blood tests (within 2 to 3 days after discharge). When your doctor tells you it is safe, you may  restart the metformin.   3. If you have not been continued on other medications you were previously taking at home it is probably for reason though please discuss your concerns with any of your doctors.     CARDIAC REHAB:  After the initial healing process of the procedure site, we recommend cardiac rehabilitation for all heart attack and stent patients. Cardiac rehabilitation will help you:  - Rebuild stamina, strength and balance.  - Learn how to participate in activities safely, as well as help you regain confidence to do so.  - Return to activities of daily living and leisure.  You should receive a call from them within the next week, but if you do not or you would like to call you can contact their central scheduling at [phone]    DIET:  We recommend a diet low in saturated fat, trans fat and cholesterol. In addition it will be helpful to be cautious of sodium intake, sugar and carbohydrates. Try to increase the amount of lean meats you eat like fish and chicken, but avoid frying; and reduce the amount of red meat you eat. Eat more fresh fruits and vegetables and try to avoid canned and processed food. Please reference the handouts you received for more specific information.    OTHER INFORMATION:  1. Consider having your family members learn CPR if they do not know it already.  2. If you are a smoker, quitting smoking will be one of the most important things you can do for yourself. There are nicotine replacement options or medications they might be able to be prescribed. Please discuss this with your doctors. Consider calling the [employer] at [phone] ([phone]) as they can offer ongoing support after discharge.    CALL YOUR DOCTOR IF:  -You have a large or growing lump/bump around the procedure site  -The site is red, swollen, hot, tender or has drainage  -You have hives, a rash or unusual itching  -You have increasing or worsening shortness of breath or chest pain    FOLLOW UP:  We prefer you to follow  up with your primary care provider within one week. In order to reduce the amount of contrast you (and your kidneys) received we opted to perform a staged PCI procedure on you.  This means you still have an area in your heart we need to stent. We will also arrange for you to be seen back in the cath lab for a planned PCI (percutaneous coronary intervention, stent) in 2-3 weeks.  Our  will call you with this time.  Please do not start Cardiac rehab until after this 2nd angiogram.     Should you need to contact us:  Cardiology clinic for scheduling or triage nurse questions/concerns:  [phone]

## 2019-02-22 NOTE — PROGRESS NOTES
Arrived from home for a CORS.  Very anxious about procedure.  VSS.  Denies pain.  PIV placed via vascular access.  Family at bedside.  Ready for procedure.

## 2019-02-23 NOTE — PROGRESS NOTES
Brief Post-Procedure Note    Status post coronary angiography, FFR, and PCI via the left radial artery.  Two vessel obstructive CAD of the RCA and OM1.  Moderate disease of the LAD which is not flow-limiting by FFR.  Successful PCI of the OM1 with a Synergy drug eluting stent.    Plan  Bedrest per protocol and discharge later today.  Begin aspirin and ticagrelor.  Return in 2-4 weeks for staged PCI of the RCA.    Full note to follow    Carlo Pack MD  Interventional Cardiology Fellow  [phone]

## 2019-02-25 ENCOUNTER — TELEPHONE (OUTPATIENT)
Dept: CARDIOLOGY | Facility: CLINIC | Age: 69
End: 2019-02-25

## 2019-02-25 DIAGNOSIS — R04.0 EPISTAXIS: Primary | ICD-10-CM

## 2019-02-25 DIAGNOSIS — Z98.890 S/P CORONARY ANGIOGRAM: ICD-10-CM

## 2019-02-25 DIAGNOSIS — I25.118 CORONARY ARTERY DISEASE OF NATIVE ARTERY OF NATIVE HEART WITH STABLE ANGINA PECTORIS (H): ICD-10-CM

## 2019-02-25 LAB
INTERPRETATION ECG - MUSE: NORMAL
INTERPRETATION ECG - MUSE: NORMAL

## 2019-02-25 RX ORDER — METOPROLOL SUCCINATE 25 MG/1
25 TABLET, EXTENDED RELEASE ORAL DAILY
Qty: 30 TABLET | Refills: 2 | Status: SHIPPED | OUTPATIENT
Start: 2019-02-25 | End: 2019-03-22

## 2019-02-25 NOTE — TELEPHONE ENCOUNTER
Discharge follow up post PCI:      What does the site look like?wrist bruised but healing with no increased pain  Review: Care of wrist / arm site/ femoral site  It is normal to have soreness and or bruising at the puncture site and mild tingling in your hand for up to 3 days. The site should be flat and dry.                        Wrist    For 2 days, do not use your hand or arm to support your weight (such as rising from a chair) or bend your wrist (such as lifting a garage door).                               For 2 days, do not lift more than 5 pounds or exercise your arm (tennis, golf or bowling).  Groin     No heavy lifting (10 pounds) for 5-7 days.        If you start bleeding from the site in your arm:  Denies any bleeding from site  Sit down and press firmly on the site with your fingers for 10 minutes. Call your doctor as soon as you can.  If the bleeding stops, sit still and keep your wrist straight for 2 hours.  Do NOT take a bath, or use a hot tub or pool for at least 3 days. You may shower.     Call your doctor if:    You have a large or growing hard lump around the site.    The site is red, swollen, hot or tender.    Blood or fluid is draining from the site.    You have chills or a fever greater than 101 F (38 C).    Your leg or arm feels numb or cool.    You have hives, a rash or unusual itching.     Are you having any pain?     How is your activity tolerance?  feels great, completing daily activities  With no problem  For 2 days, do NOT have sex or do any heavy exercise.  Do you have someone at home to assist you with your daily activities?      Review Medications: Dual antiplatelet therapy  If you have started taking Plavix do not stop taking it until you talk to your heart doctor (cardiologist). triple anticoagulation  therapy for 30 days, then discontinue the aspirin  If you have stopped any other medicines, check with your nurse or provider about when to restart them.  Review Nitroglycerin  instructions, take one tablet under the tongue for chest pain, if there is no relief take another one 5 minutes apart. If you still have no relief from the chest pain, call 911.                            Have you scheduled with Cardiac Rehab? not at this time      FOLLOW UP  Do you have a follow up appointment with your provider?   What other discharge instructions do you have?     Are you to get labs, procedures or tests before you see your provider?      Patient is needing additional PCI-hospital is to contact patient to schedule.  Advised patient to call clinic if she has not heard back from hospital          CONTACT INFORMATION  Please feel free to call us with any other questions or symptoms that are concerning for you at [phone] if it is after 4:30 in the afternoon, or a weekend please call [phone] and ask for the on call specialist.  We want to do everything we can to help prevent you needing to return to the ED, so please do not hesitate to call us.

## 2019-02-25 NOTE — TELEPHONE ENCOUNTER
----- Message from Ana Solis RN sent at 2/22/2019  3:41 PM CST -----  Regarding: Patient  Hi all,   Pt was in today for cor angio via radial. She had OM PCI. Will need to return for additional stenting which our  is working on. ASA and ticagrelor are new.   Thank you,   Ana

## 2019-02-25 NOTE — TELEPHONE ENCOUNTER
Patient is complaining of a nose bleed that started 1 hour ago but has slowed down and she wants to know if she is to continue to take aspirin or tigrelor.      Reviewed with Dr. Infante:  Patient needs to continue aspirin and tigrelor and this is very important due to where the stents are placed.  If she experiences nose bleed that does not stop after 20 minutes then she is to go to urgent care to get it to stop. If it is under control patient needs ENT referral.    Also, patient is to take metoprolol 25 mg daily.  rx resent to local pharmacy.    Patient states the nose bleed has stopped.  Patient is agreeable with the referral to ENT.  Referral placed and number given to schedule.    Alejandra Velazquez RN  Care Coordinator  [facility]  [phone]

## 2019-02-25 NOTE — PROCEDURES
PROCEDURES PERFORMED:   1. Selective left and right coronary angiography.  2. Invasive hemodynamic assessment of the ramus intermedius and LAD  3. Percutaneous coronary intervention of the OM1.    PHYSICIANS:  1. Attending Interventional Cardiology Staff: Marito Arriaga MD, PhD, Zeus Cordero MD  2. Interventional Cardiology Fellow: Carlo Pack MD     INDICATION:  Abbey Omer is a 69 year old female with known coronary artery disease by coronary CTA and risk factors of age, HTN, and HLD who presents on an elective outpatient basis for coronary angiography to evaluate atypical chest pain and severe CAD by CCTA.      DESCRIPTION:  1. Consent obtained with discussion of risks.  All questions were answered.  2. Sterile prep and procedure.  3. Location: left radial artery  4. Access: Local anesthetic with lidocaine.  A micropuncture (21 g) needle with ultrasound guidance was used to establish vascular access using a modified Seldinger technique.  5. Sheath: 6Fr slender  6. Catheters: 6Fr JL4 , 6Fr JR4, 6Fr XB3.5 guide.  7. Fluoroscopy time of 13.3 min.  8. Estimated blood loss of <5 mL.  9. See below for procedure details.    MEDICATIONS:  1. Contrast 122 mL IV.  2. Conscious sedation with fentanyl 100 mcg and midazolam 2 mg.  3. Heparin, nicardipine, and nitroglycerin intra-arterial for radial artery spasm prophylaxis.  4. Heparin administered to achieve a goal ACT > 250 sec.   5. Nitroglycerin intracoronary.  6. Ticagrelor 180 mg po.    SEDATION START TIME: 1423   SEDATION END TIME: 1527  TOTAL SEDATION TIME: 64 min   Heart rate, BP, respiration, oxygen saturation and patient responses were monitored throughout the procedure with the assistance of the RN under my supervision.    CORONARY ANGIOGRAM:   1. Both coronary arteries arise from their respective cusps.  2. Right dominant.     3. Left main is a large caliber vessel and trifurcates into an LAD, ramus intermedius, and LCx. LM is without  angiographic evidence of disease.   4. LAD is a large caliber vessel, supplies the entire apex (type 3), and gives rise to septal perforators, small caliber D1, and medium caliber D2. Proximal LAD has a 60% stenosis, mid LAD has a 30% stenosis, and D1 has an ostial 50% stenosis.  5. Left circumflex is a large caliber vessel and gives rise to medium caliber OM1, small caliber OM2, and small caliber OM3. Proximal LCx has 30% stenosis, mid LCx has 20% stenosis, and OM1 has a 90% stenosis.  6. Ramus intermedius is a medium caliber vessel with a 40% stenosis proximally.  7. RCA is a medium caliber vessel and supplies a PDA and gives rise to PL branches. The proximal RCA has a 99% stenosis followed by 60-70% disease extending into the mid vessel. The remainder of the vessel has mild diffuse disease. There is KRISTEL 2 flow antegrade with left-to-right collaterals filling retrograde to the mid RCA.    FUNCTIONAL ASSESSMENT:  Adequate anticoagulation was was obtained. A [employer] Verrata wire was advanced distal to the ramus intermedius lesion.  Hyperemia was induced with intracoronary adenosine and the FFR was measured. The Verrata wire was then advanced beyond the proximal LAD lesion and hyperemia was again induced with intracoroanry adenosine and the FFR was measured.  Ramus intermedius FFR of 0.99.  Mid LAD FFR of 0.86.    PERCUTANEOUS CORONARY INTERVENTION:  1. OM1 Lesion:  A 6Fr XB3.5 guide catheter was positioned at the ostium of the LM.Heparin was administered to achieve a goal ACT > 250 sec. A Runthrough wire was advanced across the OM1 lesion and positioned in the distal OM1.  A 2.5x12mm balloon was used to pre-dilate the lesion.  A [id number] drug eluting stent was successfully deployed across the OM1 lesion with inflation to 16 will. Final angiography showed no evidence of perforation or dissection with residual stenosis of 0% and KRISTEL 3 flow.  No complications.    COMPLICATIONS:  1. None    SUMMARY:   1.  Atypical chest pain and abnormal coronary CT angiogram  2. Two vessel obstructive coronary artery disease of the OM1 and RCA without left main involvement.   3. Moderate coronary artery disease of the proximal LAD and ramus intermedius which are not flow-limiting by FFR (0.86 and 0.99, respectively).  4. Successful PCI of the OM1 with a [id number] Synergy drug eluting stent.  5. Radial artery sheath removed and hemostasis achieved with a TR band.    PLAN:   1. Aspirin 81 mg po daily lifelong.  2. Ticagrelor 90 mg po bid for at least 1 year uninterrupted.  3. Bedrest per protocol.  4. Discharge today per protocol.    5. Return in 3-4 weeks for staged PCI of the RCA.  6. Continued medical management and lifestyle modification for cardiovascular risk factor optimization.     The attending interventional cardiologist was present for the entire procedure.    Findings discussed with the primary outpatient cardiology attending Dr. Infante.    See CVIS report for final draft.      Carlo Pack MD  Interventional Cardiology Fellow      I was present for the entire procedure.     Zeus Cordero M.D.  Interventional Cardiology  [facility]  Pager: [phone]

## 2019-03-01 ENCOUNTER — OFFICE VISIT (OUTPATIENT)
Dept: FAMILY MEDICINE | Facility: CLINIC | Age: 69
End: 2019-03-01
Payer: MEDICARE

## 2019-03-01 VITALS
WEIGHT: 173 LBS | DIASTOLIC BLOOD PRESSURE: 98 MMHG | OXYGEN SATURATION: 97 % | SYSTOLIC BLOOD PRESSURE: 140 MMHG | HEIGHT: 60 IN | BODY MASS INDEX: 33.96 KG/M2 | HEART RATE: 57 BPM | TEMPERATURE: 97.9 F

## 2019-03-01 DIAGNOSIS — E78.5 HYPERLIPIDEMIA LDL GOAL <130: Chronic | ICD-10-CM

## 2019-03-01 DIAGNOSIS — I25.10 CORONARY ARTERY DISEASE INVOLVING NATIVE CORONARY ARTERY OF NATIVE HEART WITHOUT ANGINA PECTORIS: Primary | ICD-10-CM

## 2019-03-01 DIAGNOSIS — Z12.11 SCREEN FOR COLON CANCER: ICD-10-CM

## 2019-03-01 DIAGNOSIS — I10 HYPERTENSION GOAL BP (BLOOD PRESSURE) < 140/90: Chronic | ICD-10-CM

## 2019-03-01 PROCEDURE — 99214 OFFICE O/P EST MOD 30 MIN: CPT | Performed by: NURSE PRACTITIONER

## 2019-03-01 RX ORDER — SIMVASTATIN 20 MG
20 TABLET ORAL AT BEDTIME
Qty: 30 TABLET | Refills: 11 | Status: SHIPPED | OUTPATIENT
Start: 2019-03-01 | End: 2019-04-05

## 2019-03-01 ASSESSMENT — MIFFLIN-ST. JEOR: SCORE: 1226.22

## 2019-03-01 NOTE — PATIENT INSTRUCTIONS
Start the Simvastatin 20 mg at bedtime    [facility]     Discharged by : Svitlana Recinos CMA      If you have any questions regarding your visit please contact your care team:     Team Gold                Clinic Hours Telephone Number     Dr. Percy Vallejo, CNP   7am-7pm  Monday - Thursday   7am-5pm  Fridays  [phone]   (Appointment scheduling available 24/7)     RN Line  [phone] option 2     Urgent Care - [facility] [place] [facility] - 11am-9pm Monday-Friday Saturday-Sunday- 9am-5pm     [place] -   5pm-9pm Monday-Friday Saturday-Sunday- 9am-5pm    [phone] - [place]    [phone] - [place]     For a Price Quote for your services, please call our Consumer Price Line at [phone].     What options do I have for visits at the clinic other than the traditional office visit?     To expand how we care for you, many of our providers are utilizing electronic visits (e-visits) and telephone visits, when medically appropriate, for interactions with their patients rather than a visit in the clinic. We also offer nurse visits for many medical concerns. Just like any other service, we will bill your insurance company for this type of visit based on time spent on the phone with your provider. Not all insurance companies cover these visits. Please check with your medical insurance if this type of visit is covered. You will be responsible for any charges that are not paid by your insurance.     E-visits via [employer]: generally incur a $45.00 fee.     Telephone visits:  Time spent on the phone: *charged based on time that is spent on the phone in increments of 10 minutes. Estimated cost:   5-10 mins $30.00   11-20 mins. $59.00   21-30 mins. $85.00       Use [employer] (secure email communication and access to your chart) to send your primary care provider a message or make an appointment. Ask someone on your Team how to sign up for  [employer].     As always, Thank you for trusting us with your health care needs!      [facility]:    Scheduling Appointments  [facility]  Call: [phone]    [facility]  Call: [phone]    [facility]  Call: [phone]    For Gastroenterology referrals   [facility]   Clinics and Surgery Center, 4th Floor   [address] [place] [address]   Appointments: [phone]    WHERE TO GO FOR CARE?    Clinic    Make an appointment if you:       Are sick (cold, cough, flu, sore throat, earache or in pain).       Have a small injury (sprain, small cut, burn or broken bone).       Need a physical exam, Pap smear, vaccine or prescription refill.       Have questions about your health or medicines.    To reach us:      Call [phone] ([phone]). Open 24 hours every day. (For counseling services, call [phone].)    Log into [employer] at [email or web address]. (Visit [email or web address] to create an account.) Hospital emergency room    An emergency is a serious or life- threatening problem that must be treated right away.    Call [phone] or get to the hospital if you have:      Very bad or sudden:            - Chest pain or pressure         - Bleeding         - Head or belly pain         - Dizziness or trouble seeing, walking or                          Speaking      Problems breathing      Blood in your vomit or you are coughing up blood      A major injury (knocked out, loss of a finger or limb, rape, broken bone protruding from skin)    A mental health crisis. (Or call the Mental Health Crisis line at 1-770.671.4408 or Suicide Prevention Hotline at 1-319.710.8976.)    Open 24 hours every day. You don't need an appointment.     Urgent care    Visit urgent care for sickness or small injuries when the clinic is closed. You don't need an appointment. To check hours or find an urgent care near you,  visit [email or web address]. Online care    Get online care from [facility] for more than 70 common problems, like colds, allergies and infections. Open 24 hours every day at:   [email or web address]   Need help deciding?    For advice about where to be seen, you may call your clinic and ask to speak with a nurse. We're here for you 24 hours every day.         If you are deaf or hard of hearing, please let us know. We provide many free services including sign language interpreters, oral interpreters, TTYs, telephone amplifiers, note takers and written materials.

## 2019-03-01 NOTE — PROGRESS NOTES
SUBJECTIVE:   Abbey Omer is a 69 year old female who presents to clinic today for the following health issues:    Needs to talk about Crestor- patient has had issues with taking Statins      Hospital Follow-up Visit:    Hospital/Nursing Home/IP Rehab Facility: [facility]  Date of Admission: 2/22/19  Date of Discharge: 2/22/19  Reason(s) for Admission: Stent placement            Problems taking medications regularly:  None       Medication changes since discharge: None       Problems adhering to non-medication therapy:  None    Summary of hospitalization:  Preliminary report of cardiac catheterization reviewed  Diagnostic Tests/Treatments reviewed.  Follow up needed: She is scheduled for another PCI 3/18/19 and Cardiology follow up 4/1/19.  She is scheduled with ENT 3/11/19 due to she has had nosebleeds since starting Ticagrelor and Aspirin.  Other Healthcare Providers Involved in Patient s Care:         Cardiology  Update since discharge: improved.  She has bruising on right upper arm and left lower arm due to her procedure.  Denies any chest pain, shortness of breath.    Post Discharge Medication Reconciliation: discharge medications reconciled and changed, per note/orders (see AVS).  Plan of care communicated with patient and partner     Coding guidelines for this visit:  Type of Medical   Decision Making Face-to-Face Visit       within 7 Days of discharge Face-to-Face Visit        within 14 days of discharge   Moderate Complexity [id number]   High Complexity [id number]          Cardiac cath showed two vessel obstructive coronary artery disease of the OM1 and RCA without left main involvement.  Successful PCI of the OM1 with drug eluting stent.  She was started on Aspirin 81 mg, Ticagrelor, Metoprolol, and Crestor.    States she is NOT taking Crestor due to tried this twice in the past with intolerances.  Atorvastatin- causes swelling legs  Lovastatin- causes cramps  Crestor-  causes joint pain  Pravastatin- causes throat to swell per pt      Problem list and histories reviewed & adjusted, as indicated.  Additional history: as documented    Patient Active Problem List   Diagnosis     RICKEY (obstructive sleep apnea)     Hypothyroidism     Chronic nonallergic rhinitis     CKD (chronic kidney disease) stage 3, GFR 30-59 ml/min (H)     Hyperlipidemia LDL goal <130     Obesity     History of bladder cancer     Hypertension goal BP (blood pressure) < 140/90     Advance Care Planning     Dependent edema     Elevated LFTs     DDD (degenerative disc disease), cervical     Major depressive disorder, recurrent episode, mild degree (H)     Macular drusen     Hypertriglyceridemia     Allergic conjunctivitis     Pulmonary nodules     Acne     Combined form of age-related cataract, both eyes     Glaucoma suspect, bilateral     Drusen of macula of both eyes     Renal cyst, left     Discoid atelectasis     Acute gout involving toe of left foot, unspecified cause     Cough     Atypical chest pain     Abnormal electrocardiogram     Abnormal CT scan of heart     Chest pain     PEARSON (dyspnea on exertion)     Status post coronary angiogram     Coronary artery disease involving native coronary artery of native heart without angina pectoris     Past Surgical History:   Procedure Laterality Date     ARTHROPLASTY SHOULDER Right 11/01/2017    Right total shoulder arthroplasty by Dr. Go at [facility]     ARTHROPLASTY SHOULDER Left 03/28/2018    Left total shoulder arthroplasty by Dr. Go at [facility]     CHOLECYSTECTOMY, LAPOROSCOPIC       COLONOSCOPY  ?    Upper & Lower     CV CORONARY ANGIOGRAM  02/22/2019     CYSTOSCOPY      bladder cancer     D & C       ENT SURGERY  first 1967/second ?    2 Rhynoplasties     HYSTERECTOMY, PAP NO LONGER INDICATED      BSO     NOSE SURGERY      septum deviation     PHACOEMULSIFICATION CLEAR CORNEA WITH STANDARD INTRAOCULAR LENS IMPLANT  Left 2018    Procedure: PHACOEMULSIFICATION CLEAR CORNEA WITH STANDARD INTRAOCULAR LENS IMPLANT;  LEFT EYE PHACOEMULSIFICATION CLEAR CORNEA WITH STANDARD INTRAOCULAR LENS IMPLANT;  Surgeon: Ryne Pascal MD;  Location:  [facility]     PHACOEMULSIFICATION WITH STANDARD INTRAOCULAR LENS IMPLANT Right 10/15/2018    Procedure: PHACOEMULSIFICATION WITH STANDARD INTRAOCULAR LENS IMPLANT ;  Surgeon: Ryne Pascal MD;  Location:  [facility]       Social History     Tobacco Use     Smoking status: Former Smoker     Packs/day: 0.10     Years: 1.00     Pack years: 0.10     Types: Cigarettes     Start date: 1969     Last attempt to quit: 10/12/1969     Years since quittin.4     Smokeless tobacco: Never Used     Tobacco comment: Smoked, very little, for 2 mo, 49 years ago.   Substance Use Topics     Alcohol use: Yes     Comment: Maybe 1 or 2 drinks a year     Family History   Problem Relation Age of Onset     Diabetes Maternal Grandfather      Hypertension Maternal Grandfather      Diabetes Son      Heart Disease Father         CHF     Diabetes Mother      Depression Mother      Hyperlipidemia Mother      Anxiety Disorder Mother      Asthma Mother      Obesity Mother      Substance Abuse Brother      Diabetes Brother      Depression Daughter      Diabetes Maternal Uncle      Cerebrovascular Disease Maternal Uncle      Diabetes Son         after accident     Depression Daughter      Depression Son         after accident     Substance Abuse Brother         Not any longer     Thyroid Disease No family hx of      Glaucoma No family hx of      Macular Degeneration No family hx of      Cancer No family hx of          Current Outpatient Medications   Medication Sig Dispense Refill     allopurinol (ZYLOPRIM) 100 MG tablet Take 1 tablet (100 mg) by mouth daily 90 tablet 3     aspirin (ASA) 81 MG EC tablet Take 1 tablet (81 mg) by mouth daily Start tomorrow morning. 30 tablet 0     B Complex Vitamins (VITAMIN B COMPLEX PO)         CALCIUM + D 600-200 MG-UNIT OR TABS 1 tablet twice a day       colchicine (COLCRYS) 0.6 MG tablet Take 1 tablet (0.6 mg) by mouth as needed (goute flare) 30 tablet 3     Cranberry 500 MG CAPS Take 1 capsule by mouth daily       EXCEDRIN TENSION HEADACHE 500-65 MG PO TABS 1 tablet twice daily as needed       FISH OIL 1200 MG OR CAPS 3 capsule daily       Flaxseed, Linseed, (FLAXSEED OIL PO) Take 1 Tablespoonful by mouth daily       fluticasone (FLONASE) 50 MCG/ACT nasal spray Spray 2 sprays into both nostrils daily 48 g 3     levalbuterol (XOPENEX HFA) 45 MCG/ACT inhaler INHALE 2 PUFFS INTO THE LUNGS EVERY 6 HOURS AS NEEDED FOR SHORTNESS OF BREATH OR DIFFICULT BREATHING OR WHEEZING 15 g 1     levothyroxine (SYNTHROID/LEVOTHROID) 88 MCG tablet TAKE 1 TABLET(88 MCG) BY MOUTH DAILY 90 tablet 3     losartan (COZAAR) 100 MG tablet Take 1 tablet (100 mg) by mouth daily 90 tablet 3     magnesium 500 MG TABS Take 1 tablet by mouth daily       MELATONIN PO        metoprolol succinate ER (TOPROL-XL) 25 MG 24 hr tablet Take 1 tablet (25 mg) by mouth daily 30 tablet 2     Multiple Vitamins-Minerals (MULTIPLE VITAMIN  S/WOMENS) TABS Take 2 tablets by mouth daily       order for DME Equipment being ordered: blood pressure monitor 1 each 0     order for DME Equipment ordered: Respironics Auto PAP Mask type: Nasal Settings: 9-15 cm H2O       ranitidine (ZANTAC) 300 MG tablet Take 1 tablet (300 mg) by mouth At Bedtime 90 tablet 3            STATIN NOT PRESCRIBED, INTENTIONAL, by Other route continuous prn Reported on 5/15/2017  0     ticagrelor (BRILINTA) 90 MG tablet Take 1 tablet (90 mg) by mouth 2 times daily Start tomorrow morning. 180 tablet 3     traZODone (DESYREL) 50 MG tablet Take 1-2 tablets ( mg) by mouth nightly as needed for sleep 180 tablet 3     venlafaxine (EFFEXOR-XR) 150 MG 24 hr capsule 2 Capsules daily 180 capsule 1     zinc 50 MG TABS Take 1 tablet by mouth daily       rosuvastatin (CRESTOR) 20 MG  tablet Take 1 tablet (20 mg) by mouth daily (Patient not taking: Reported on 3/1/2019) 30 tablet 0     Allergies   Allergen Reactions     Cymbalta      Visual hallucinations     Zomig [Zolmitriptan] Other (See Comments)     Can't wake up     Colestid [Colestipol Hcl]      Albuterol Other (See Comments)     Very jittery and couldn't tolerate     Amlodipine Other (See Comments) and Swelling     Swelling- lower legs     Asa [Aspirin] GI Disturbance     Atenolol Fatigue     Atorvastatin Other (See Comments)     Swelling legs     Bupropion Unknown     Codeine Nausea     Crestor [Hmg-Coa-R Inhibitors] Other (See Comments)     Joint pain     Ezetimibe Other (See Comments)     Fatigue     Hydralazine Other (See Comments) and Fatigue     Fatigue     Lisinopril Cough     Lovastatin Other (See Comments) and Cramps     Cramps     Niacin      Unknown by patient     Paroxetine Other (See Comments)     Fatigue     Paxil [Paroxetine] Fatigue     Potassium GI Disturbance     Sulfa Drugs Fatigue     Sulfasalazine Other (See Comments)     Triamterene Unknown     Zetia [Ezetimibe] Fatigue     BP Readings from Last 3 Encounters:   03/01/19 (!) 140/98   02/22/19 143/90   02/13/19 144/68    Wt Readings from Last 3 Encounters:   03/01/19 78.5 kg (173 lb)   02/13/19 79.6 kg (175 lb 6.4 oz)   01/25/19 78.6 kg (173 lb 3.2 oz)                    Reviewed and updated as needed this visit by clinical staff  Tobacco  Allergies  Meds  Problems  Med Hx  Surg Hx  Fam Hx  Soc Hx        Reviewed and updated as needed this visit by Provider  Tobacco  Allergies  Meds  Problems  Med Hx  Surg Hx  Fam Hx         ROS:  Constitutional, HEENT, cardiovascular, pulmonary, gi and gu systems are negative, except as otherwise noted.    OBJECTIVE:     BP (!) 140/98   Pulse 57   Temp 97.9  F (36.6  C) (Oral)   Ht 1.524 m (5')   Wt 78.5 kg (173 lb)   SpO2 97%   BMI 33.79 kg/m    Body mass index is 33.79 kg/m .  GENERAL: healthy, alert, no distress  and obese  RESP: lungs clear to auscultation - no rales, rhonchi or wheezes  CV: regular rate and rhythm, normal S1 S2, no S3 or S4, no murmur, click or rub      ASSESSMENT/PLAN:     1. Coronary artery disease involving native coronary artery of native heart without angina pectoris    - Patient is on Metoprolol, Aspirin, and Ticagrelor.  - She is intolerant to Crestor, Atorvastatin, Lovastatin, and Pravastatin and she is adamant that these do not work for her and is looking for an alternative.  I discussed that with her CAD that Atorvastatin or Crestor are the preferred statin for secondary prevention but she is not willing to try these.    She is willing to try a low dose simvastatin (ZOCOR) 20 MG tablet; Take 1 tablet (20 mg) by mouth At Bedtime  Dispense: 30 tablet; Refill: 11  - She has a PCI of the RCA scheduled for 3/18/19.  - She will see ENT 3/11/19 for nosebleeds she has had since starting blood thinners.  I advised her to use saline spray and Vaseline twice daily into the nostrils.  - She will follow up with Dr. Infante in Cardiology 4/1/19.    2. Hypertension goal BP (blood pressure) < 140/90  Uncontrolled.  - Continue Losartan 100 mg daily.  - Patient recently started Metoprolol 25 mg a few days ago.  Will continue to monitor and adjust dosing as needed.    3. Hyperlipidemia LDL goal <130  See above.    4. Screen for colon cancer    - Fecal colorectal cancer screen FIT - Future (S+30); Future    Return in about 4 weeks (around 3/29/2019) for routine follow up.    Sarah Vallejo, NP  [facility]

## 2019-03-02 PROCEDURE — 82274 ASSAY TEST FOR BLOOD FECAL: CPT | Performed by: NURSE PRACTITIONER

## 2019-03-05 ENCOUNTER — HOSPITAL ENCOUNTER (OUTPATIENT)
Facility: CLINIC | Age: 69
End: 2019-03-05

## 2019-03-05 DIAGNOSIS — I25.83 CORONARY ARTERY DISEASE DUE TO LIPID RICH PLAQUE: Primary | ICD-10-CM

## 2019-03-05 DIAGNOSIS — I25.10 CORONARY ARTERY DISEASE DUE TO LIPID RICH PLAQUE: Primary | ICD-10-CM

## 2019-03-09 LAB — HEMOCCULT STL QL IA: NEGATIVE

## 2019-03-11 ENCOUNTER — OFFICE VISIT (OUTPATIENT)
Dept: OTOLARYNGOLOGY | Facility: CLINIC | Age: 69
End: 2019-03-11
Payer: MEDICARE

## 2019-03-11 VITALS
OXYGEN SATURATION: 97 % | SYSTOLIC BLOOD PRESSURE: 173 MMHG | DIASTOLIC BLOOD PRESSURE: 84 MMHG | HEART RATE: 70 BPM | WEIGHT: 173 LBS | BODY MASS INDEX: 33.96 KG/M2 | RESPIRATION RATE: 12 BRPM | HEIGHT: 60 IN

## 2019-03-11 DIAGNOSIS — R04.0 EPISTAXIS: Primary | ICD-10-CM

## 2019-03-11 DIAGNOSIS — G47.33 OSA (OBSTRUCTIVE SLEEP APNEA): ICD-10-CM

## 2019-03-11 DIAGNOSIS — Z12.11 SCREEN FOR COLON CANCER: ICD-10-CM

## 2019-03-11 DIAGNOSIS — M26.19 RETROGNATHIA: ICD-10-CM

## 2019-03-11 PROCEDURE — 99214 OFFICE O/P EST MOD 30 MIN: CPT | Performed by: OTOLARYNGOLOGY

## 2019-03-11 ASSESSMENT — MIFFLIN-ST. JEOR: SCORE: 1226.22

## 2019-03-11 NOTE — LETTER
3/11/2019         RE: Abbey Omer  [address] [facility] [id number]        Dear Colleague,    Thank you for referring your patient, Abbey Omer, to the [facility]. Please see a copy of my visit note below.    History of Present Illness - Abbey Omer is a 69 year old female being seen for the first time at the consultation of [facility], Alejandra Velazquez for nose bleeds.  However, I have seen her before, years ago.    She tells me that she had a coronary stent placed, and therefore has had to be placed on ASA.  Unfortunately, she has had some nuisance nose bleeds, but nothing prolonged or severe.    Also, she does use CPAP.  She tells me that she really does not like it, and has a hard time tolerating the pressure.  She feels as though there is obstruction in the nose that makes tolerating it very difficult.  She also has used a nasal pillow which is more comfortable.    She has had two nasal surgeries previously, one rhinoplasty as a teenager, and another about twenty years.    Past Medical History -   Patient Active Problem List   Diagnosis     RICKEY (obstructive sleep apnea)     Hypothyroidism     Chronic nonallergic rhinitis     CKD (chronic kidney disease) stage 3, GFR 30-59 ml/min (H)     Hyperlipidemia LDL goal <130     Obesity     History of bladder cancer     Hypertension goal BP (blood pressure) < 140/90     Advance Care Planning     Dependent edema     Elevated LFTs     DDD (degenerative disc disease), cervical     Major depressive disorder, recurrent episode, mild degree (H)     Macular drusen     Hypertriglyceridemia     Allergic conjunctivitis     Pulmonary nodules     Acne     Combined form of age-related cataract, both eyes     Glaucoma suspect, bilateral     Drusen of macula of both eyes     Renal cyst, left     Discoid atelectasis     Acute gout involving toe of left foot, unspecified cause     Cough     Atypical chest pain     Abnormal  electrocardiogram     Abnormal CT scan of heart     Chest pain     PEARSON (dyspnea on exertion)     Status post coronary angiogram     Coronary artery disease involving native coronary artery of native heart without angina pectoris     Coronary artery disease due to lipid rich plaque       Current Medications -   Current Outpatient Medications:      allopurinol (ZYLOPRIM) 100 MG tablet, Take 1 tablet (100 mg) by mouth daily, Disp: 90 tablet, Rfl: 3     aspirin (ASA) 81 MG EC tablet, Take 1 tablet (81 mg) by mouth daily Start tomorrow morning., Disp: 30 tablet, Rfl: 0     B Complex Vitamins (VITAMIN B COMPLEX PO), , Disp: , Rfl:      CALCIUM + D 600-200 MG-UNIT OR TABS, 1 tablet twice a day, Disp: , Rfl:      colchicine (COLCRYS) 0.6 MG tablet, Take 1 tablet (0.6 mg) by mouth as needed (goute flare), Disp: 30 tablet, Rfl: 3     Cranberry 500 MG CAPS, Take 1 capsule by mouth daily, Disp: , Rfl:      EXCEDRIN TENSION HEADACHE 500-65 MG PO TABS, 1 tablet twice daily as needed, Disp: , Rfl:      FISH OIL 1200 MG OR CAPS, 3 capsule daily, Disp: , Rfl:      Flaxseed, Linseed, (FLAXSEED OIL PO), Take 1 Tablespoonful by mouth daily, Disp: , Rfl:      fluticasone (FLONASE) 50 MCG/ACT nasal spray, Spray 2 sprays into both nostrils daily, Disp: 48 g, Rfl: 3     levalbuterol (XOPENEX HFA) 45 MCG/ACT inhaler, INHALE 2 PUFFS INTO THE LUNGS EVERY 6 HOURS AS NEEDED FOR SHORTNESS OF BREATH OR DIFFICULT BREATHING OR WHEEZING, Disp: 15 g, Rfl: 1     levothyroxine (SYNTHROID/LEVOTHROID) 88 MCG tablet, TAKE 1 TABLET(88 MCG) BY MOUTH DAILY, Disp: 90 tablet, Rfl: 3     losartan (COZAAR) 100 MG tablet, Take 1 tablet (100 mg) by mouth daily, Disp: 90 tablet, Rfl: 3     magnesium 500 MG TABS, Take 1 tablet by mouth daily, Disp: , Rfl:      MELATONIN PO, , Disp: , Rfl:      metoprolol succinate ER (TOPROL-XL) 25 MG 24 hr tablet, Take 1 tablet (25 mg) by mouth daily, Disp: 30 tablet, Rfl: 2     Multiple Vitamins-Minerals (MULTIPLE VITAMIN   S/WOMENS) TABS, Take 2 tablets by mouth daily, Disp: , Rfl:      order for DME, Equipment being ordered: blood pressure monitor, Disp: 1 each, Rfl: 0     order for DME, Equipment ordered: Respironics Auto PAP Mask type: Nasal Settings: 9-15 cm H2O, Disp: , Rfl:      ranitidine (ZANTAC) 300 MG tablet, Take 1 tablet (300 mg) by mouth At Bedtime, Disp: 90 tablet, Rfl: 3     rosuvastatin (CRESTOR) 20 MG tablet, Take 1 tablet (20 mg) by mouth daily (Patient not taking: Reported on 3/1/2019), Disp: 30 tablet, Rfl: 0     simvastatin (ZOCOR) 20 MG tablet, Take 1 tablet (20 mg) by mouth At Bedtime, Disp: 30 tablet, Rfl: 11     STATIN NOT PRESCRIBED, INTENTIONAL,, by Other route continuous prn Reported on 5/15/2017, Disp: , Rfl: 0     ticagrelor (BRILINTA) 90 MG tablet, Take 1 tablet (90 mg) by mouth 2 times daily Start tomorrow morning., Disp: 180 tablet, Rfl: 3     traZODone (DESYREL) 50 MG tablet, Take 1-2 tablets ( mg) by mouth nightly as needed for sleep, Disp: 180 tablet, Rfl: 3     venlafaxine (EFFEXOR-XR) 150 MG 24 hr capsule, 2 Capsules daily, Disp: 180 capsule, Rfl: 1     zinc 50 MG TABS, Take 1 tablet by mouth daily, Disp: , Rfl:   No current facility-administered medications for this visit.     Facility-Administered Medications Ordered in Other Visits:      sodium chloride (PF) 0.9% PF flush 10 mL, 10 mL, Intravenous, Once, Ibeth Infante MD    Allergies -   Allergies   Allergen Reactions     Cymbalta      Visual hallucinations     Zomig [Zolmitriptan] Other (See Comments)     Can't wake up     Colestid [Colestipol Hcl]      Albuterol Other (See Comments)     Very jittery and couldn't tolerate     Amlodipine Other (See Comments) and Swelling     Swelling- lower legs     Asa [Aspirin] GI Disturbance     Atenolol Fatigue     Atorvastatin Other (See Comments)     Swelling legs     Bupropion Unknown     Codeine Nausea     Crestor [Hmg-Coa-R Inhibitors] Other (See Comments)     Joint pain     Ezetimibe Other  (See Comments)     Fatigue     Hydralazine Other (See Comments) and Fatigue     Fatigue     Lisinopril Cough     Lovastatin Other (See Comments) and Cramps     Cramps     Niacin      Unknown by patient     Paroxetine Other (See Comments)     Fatigue     Paxil [Paroxetine] Fatigue     Potassium GI Disturbance     Sulfa Drugs Fatigue     Sulfasalazine Other (See Comments)     Triamterene Unknown     Zetia [Ezetimibe] Fatigue       Social History -   Social History     Socioeconomic History     Marital status:      Spouse name: Maggie     Number of children: 2     Years of education: 12     Highest education level: Not on file   Occupational History     Occupation: collections for [employer]     Employer: [employer]   Social Needs     Financial resource strain: Not on file     Food insecurity:     Worry: Not on file     Inability: Not on file     Transportation needs:     Medical: Not on file     Non-medical: Not on file   Tobacco Use     Smoking status: Former Smoker     Packs/day: 0.10     Years: 1.00     Pack years: 0.10     Types: Cigarettes     Start date: 1969     Last attempt to quit: 10/12/1969     Years since quittin.4     Smokeless tobacco: Never Used     Tobacco comment: Smoked, very little, for 2 mo, 49 years ago.   Substance and Sexual Activity     Alcohol use: Yes     Comment: Maybe 1 or 2 drinks a year     Drug use: No     Sexual activity: Not Currently     Partners: Female     Birth control/protection: Post-menopausal   Lifestyle     Physical activity:     Days per week: Not on file     Minutes per session: Not on file     Stress: Not on file   Relationships     Social connections:     Talks on phone: Not on file     Gets together: Not on file     Attends Protestant service: Not on file     Active member of club or organization: Not on file     Attends meetings of clubs or organizations: Not on file     Relationship status: Not on file     Intimate partner violence:     Fear of  current or ex partner: Not on file     Emotionally abused: Not on file     Physically abused: Not on file     Forced sexual activity: Not on file   Other Topics Concern     Parent/sibling w/ CABG, MI or angioplasty before 65F 55M? No   Social History Narrative    Son  2012.    Wife, Maggie       Family History -   Family History   Problem Relation Age of Onset     Diabetes Maternal Grandfather      Hypertension Maternal Grandfather      Diabetes Son      Heart Disease Father         CHF     Diabetes Mother      Depression Mother      Hyperlipidemia Mother      Anxiety Disorder Mother      Asthma Mother      Obesity Mother      Substance Abuse Brother      Diabetes Brother      Depression Daughter      Diabetes Maternal Uncle      Cerebrovascular Disease Maternal Uncle      Diabetes Son         after accident     Depression Daughter      Depression Son         after accident     Substance Abuse Brother         Not any longer     Thyroid Disease No family hx of      Glaucoma No family hx of      Macular Degeneration No family hx of      Cancer No family hx of        Review of Systems - As per HPI and PMHx, otherwise 10+ system review of the head and neck, and general constitution is negative.    Physical Exam  /84   Pulse 70   Resp 12   Ht 1.524 m (5')   Wt 78.5 kg (173 lb)   SpO2 97%   BMI 33.79 kg/m       General - The patient is well nourished and well developed, and appears to have good nutritional status.  Alert and oriented to person and place, answers questions and cooperates with examination appropriately.   Head and Face - Normocephalic and atraumatic, with no gross asymmetry noted of the contour of the facial features.  The facial nerve is intact, with strong symmetric movements.  Voice and Breathing - The patient was breathing comfortably without the use of accessory muscles. There was no wheezing, stridor, or stertor.  The patients voice was clear and strong, and had appropriate pitch  and quality.  Ears - The tympanic membranes are normal in appearance, bony landmarks are intact.  No retraction, perforation, or masses.  No fluid or purulence was seen in the external canal or the middle ear. No evidence of infection of the middle ear or external canal, cerumen was normal in appearance.  Eyes - Extraocular movements intact, and the pupils were reactive to light.  Sclera were not icteric or injected, conjunctiva were pink and moist.  Mouth - Examination of the oral cavity showed pink, healthy oral mucosa. No lesions or ulcerations noted.  The tongue was mobile and midline, and the dentition were in good condition.    Throat - The walls of the oropharynx were smooth, pink, moist, symmetric, and had no lesions or ulcerations.  The tonsillar pillars and soft palate were symmetric.  The uvula was midline on elevation.    Neck - Normal midline excursion of the laryngotracheal complex during swallowing.  Full range of motion on passive movement.  Palpation of the occipital, submental, submandibular, internal jugular chain, and supraclavicular nodes did not demonstrate any abnormal lymph nodes or masses.  The carotid pulse was palpable bilaterally.  Palpation of the thyroid was soft and smooth, with no nodules or goiter appreciated.  The trachea was mobile and midline.  Nose - External contour is symmetric, no gross deflection or scars.  Nasal mucosa is pink and moist with no abnormal mucus.  The septum was midline and non-obstructive, turbinates of normal size and position.  No polyps, masses, or purulence noted on examination.      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  I suggest moisture and humidity.    We spent the remainder of today's visit discussing the  etiology of RICKEY, and the fact that it is usually multifactorial.  I made it clear that a single procedure is unlikely to resolve the sleep apnea, and almost certainly CPAP will still be needed.  I find no evidence of other pathology of the upper aerodigestive tract.  And of course, I recommended weight loss as an important part of reducing the need for CPAP.  Finally I recommended a follow up visit to their sleep physician, for possible repeat sleep study and pressure titration.      Again, thank you for allowing me to participate in the care of your patient.        Sincerely,        Nilesh Ochoa MD

## 2019-03-11 NOTE — PATIENT INSTRUCTIONS
Scheduling Information  To schedule your CT/MRI scan, please contact [facility] at [phone] OR [facility] at [phone]    To schedule your Surgery, please contact our Specialty Schedulers at [phone]      ENT Clinic Locations Clinic Hours Telephone Number     [facility]ados  [address] [place] [address]   Monday:           1:00pm -- 5:00pm    Friday:              8:00am - 12:00pm   To schedule/reschedule an appointment with   Dr. Ochoa,   please contact our   Specialty Scheduling Department at:     [phone]       [facility]  [address] [place] [address] Tuesday:          8:00am -- 2:00pm         Urgent Care Locations Clinic Hours Telephone Numbers     [facility] Reed  [address] [place] [address]     Monday-Friday:     11:00am - 9:00pm    Saturday-Sunday:  9:00am - 5:00pm   [phone]     [facility]  [address] [place] [address]     Monday-Friday:      5:00pm - 9:00pm     Saturday-Sunday:  9:00am - 5:00pm   [phone]

## 2019-03-11 NOTE — PROGRESS NOTES
History of Present Illness - Abbey Omer is a 69 year old female being seen for the first time at the consultation of [facility], Alejandra Velazquez for nose bleeds.  However, I have seen her before, years ago.    She tells me that she had a coronary stent placed, and therefore has had to be placed on ASA.  Unfortunately, she has had some nuisance nose bleeds, but nothing prolonged or severe.    Also, she does use CPAP.  She tells me that she really does not like it, and has a hard time tolerating the pressure.  She feels as though there is obstruction in the nose that makes tolerating it very difficult.  She also has used a nasal pillow which is more comfortable.    She has had two nasal surgeries previously, one rhinoplasty as a teenager, and another about twenty years.    Past Medical History -   Patient Active Problem List   Diagnosis     RICKEY (obstructive sleep apnea)     Hypothyroidism     Chronic nonallergic rhinitis     CKD (chronic kidney disease) stage 3, GFR 30-59 ml/min (H)     Hyperlipidemia LDL goal <130     Obesity     History of bladder cancer     Hypertension goal BP (blood pressure) < 140/90     Advance Care Planning     Dependent edema     Elevated LFTs     DDD (degenerative disc disease), cervical     Major depressive disorder, recurrent episode, mild degree (H)     Macular drusen     Hypertriglyceridemia     Allergic conjunctivitis     Pulmonary nodules     Acne     Combined form of age-related cataract, both eyes     Glaucoma suspect, bilateral     Drusen of macula of both eyes     Renal cyst, left     Discoid atelectasis     Acute gout involving toe of left foot, unspecified cause     Cough     Atypical chest pain     Abnormal electrocardiogram     Abnormal CT scan of heart     Chest pain     PEARSON (dyspnea on exertion)     Status post coronary angiogram     Coronary artery disease involving native coronary artery of native heart without angina pectoris     Coronary artery disease due to lipid  rich plaque       Current Medications -   Current Outpatient Medications:      allopurinol (ZYLOPRIM) 100 MG tablet, Take 1 tablet (100 mg) by mouth daily, Disp: 90 tablet, Rfl: 3     aspirin (ASA) 81 MG EC tablet, Take 1 tablet (81 mg) by mouth daily Start tomorrow morning., Disp: 30 tablet, Rfl: 0     B Complex Vitamins (VITAMIN B COMPLEX PO), , Disp: , Rfl:      CALCIUM + D 600-200 MG-UNIT OR TABS, 1 tablet twice a day, Disp: , Rfl:      colchicine (COLCRYS) 0.6 MG tablet, Take 1 tablet (0.6 mg) by mouth as needed (goute flare), Disp: 30 tablet, Rfl: 3     Cranberry 500 MG CAPS, Take 1 capsule by mouth daily, Disp: , Rfl:      EXCEDRIN TENSION HEADACHE 500-65 MG PO TABS, 1 tablet twice daily as needed, Disp: , Rfl:      FISH OIL 1200 MG OR CAPS, 3 capsule daily, Disp: , Rfl:      Flaxseed, Linseed, (FLAXSEED OIL PO), Take 1 Tablespoonful by mouth daily, Disp: , Rfl:      fluticasone (FLONASE) 50 MCG/ACT nasal spray, Spray 2 sprays into both nostrils daily, Disp: 48 g, Rfl: 3     levalbuterol (XOPENEX HFA) 45 MCG/ACT inhaler, INHALE 2 PUFFS INTO THE LUNGS EVERY 6 HOURS AS NEEDED FOR SHORTNESS OF BREATH OR DIFFICULT BREATHING OR WHEEZING, Disp: 15 g, Rfl: 1     levothyroxine (SYNTHROID/LEVOTHROID) 88 MCG tablet, TAKE 1 TABLET(88 MCG) BY MOUTH DAILY, Disp: 90 tablet, Rfl: 3     losartan (COZAAR) 100 MG tablet, Take 1 tablet (100 mg) by mouth daily, Disp: 90 tablet, Rfl: 3     magnesium 500 MG TABS, Take 1 tablet by mouth daily, Disp: , Rfl:      MELATONIN PO, , Disp: , Rfl:      metoprolol succinate ER (TOPROL-XL) 25 MG 24 hr tablet, Take 1 tablet (25 mg) by mouth daily, Disp: 30 tablet, Rfl: 2     Multiple Vitamins-Minerals (MULTIPLE VITAMIN  S/WOMENS) TABS, Take 2 tablets by mouth daily, Disp: , Rfl:      order for DME, Equipment being ordered: blood pressure monitor, Disp: 1 each, Rfl: 0     order for DME, Equipment ordered: Respironics Auto PAP Mask type: Nasal Settings: 9-15 cm H2O, Disp: , Rfl:      ranitidine  (ZANTAC) 300 MG tablet, Take 1 tablet (300 mg) by mouth At Bedtime, Disp: 90 tablet, Rfl: 3     rosuvastatin (CRESTOR) 20 MG tablet, Take 1 tablet (20 mg) by mouth daily (Patient not taking: Reported on 3/1/2019), Disp: 30 tablet, Rfl: 0     simvastatin (ZOCOR) 20 MG tablet, Take 1 tablet (20 mg) by mouth At Bedtime, Disp: 30 tablet, Rfl: 11     STATIN NOT PRESCRIBED, INTENTIONAL,, by Other route continuous prn Reported on 5/15/2017, Disp: , Rfl: 0     ticagrelor (BRILINTA) 90 MG tablet, Take 1 tablet (90 mg) by mouth 2 times daily Start tomorrow morning., Disp: 180 tablet, Rfl: 3     traZODone (DESYREL) 50 MG tablet, Take 1-2 tablets ( mg) by mouth nightly as needed for sleep, Disp: 180 tablet, Rfl: 3     venlafaxine (EFFEXOR-XR) 150 MG 24 hr capsule, 2 Capsules daily, Disp: 180 capsule, Rfl: 1     zinc 50 MG TABS, Take 1 tablet by mouth daily, Disp: , Rfl:   No current facility-administered medications for this visit.     Facility-Administered Medications Ordered in Other Visits:      sodium chloride (PF) 0.9% PF flush 10 mL, 10 mL, Intravenous, Once, Ibeth Infante MD    Allergies -   Allergies   Allergen Reactions     Cymbalta      Visual hallucinations     Zomig [Zolmitriptan] Other (See Comments)     Can't wake up     Colestid [Colestipol Hcl]      Albuterol Other (See Comments)     Very jittery and couldn't tolerate     Amlodipine Other (See Comments) and Swelling     Swelling- lower legs     Asa [Aspirin] GI Disturbance     Atenolol Fatigue     Atorvastatin Other (See Comments)     Swelling legs     Bupropion Unknown     Codeine Nausea     Crestor [Hmg-Coa-R Inhibitors] Other (See Comments)     Joint pain     Ezetimibe Other (See Comments)     Fatigue     Hydralazine Other (See Comments) and Fatigue     Fatigue     Lisinopril Cough     Lovastatin Other (See Comments) and Cramps     Cramps     Niacin      Unknown by patient     Paroxetine Other (See Comments)     Fatigue     Paxil [Paroxetine]  Fatigue     Potassium GI Disturbance     Sulfa Drugs Fatigue     Sulfasalazine Other (See Comments)     Triamterene Unknown     Zetia [Ezetimibe] Fatigue       Social History -   Social History     Socioeconomic History     Marital status:      Spouse name: Maggie     Number of children: 2     Years of education: 12     Highest education level: Not on file   Occupational History     Occupation: collections for [employer]     Employer: [employer]   Social Needs     Financial resource strain: Not on file     Food insecurity:     Worry: Not on file     Inability: Not on file     Transportation needs:     Medical: Not on file     Non-medical: Not on file   Tobacco Use     Smoking status: Former Smoker     Packs/day: 0.10     Years: 1.00     Pack years: 0.10     Types: Cigarettes     Start date: 1969     Last attempt to quit: 10/12/1969     Years since quittin.4     Smokeless tobacco: Never Used     Tobacco comment: Smoked, very little, for 2 mo, 49 years ago.   Substance and Sexual Activity     Alcohol use: Yes     Comment: Maybe 1 or 2 drinks a year     Drug use: No     Sexual activity: Not Currently     Partners: Female     Birth control/protection: Post-menopausal   Lifestyle     Physical activity:     Days per week: Not on file     Minutes per session: Not on file     Stress: Not on file   Relationships     Social connections:     Talks on phone: Not on file     Gets together: Not on file     Attends Holiness service: Not on file     Active member of club or organization: Not on file     Attends meetings of clubs or organizations: Not on file     Relationship status: Not on file     Intimate partner violence:     Fear of current or ex partner: Not on file     Emotionally abused: Not on file     Physically abused: Not on file     Forced sexual activity: Not on file   Other Topics Concern     Parent/sibling w/ CABG, MI or angioplasty before 65F 55M? No   Social History Narrative    Son    2012.    Wife, Maggie       Family History -   Family History   Problem Relation Age of Onset     Diabetes Maternal Grandfather      Hypertension Maternal Grandfather      Diabetes Son      Heart Disease Father         CHF     Diabetes Mother      Depression Mother      Hyperlipidemia Mother      Anxiety Disorder Mother      Asthma Mother      Obesity Mother      Substance Abuse Brother      Diabetes Brother      Depression Daughter      Diabetes Maternal Uncle      Cerebrovascular Disease Maternal Uncle      Diabetes Son         after accident     Depression Daughter      Depression Son         after accident     Substance Abuse Brother         Not any longer     Thyroid Disease No family hx of      Glaucoma No family hx of      Macular Degeneration No family hx of      Cancer No family hx of        Review of Systems - As per HPI and PMHx, otherwise 10+ system review of the head and neck, and general constitution is negative.    Physical Exam  /84   Pulse 70   Resp 12   Ht 1.524 m (5')   Wt 78.5 kg (173 lb)   SpO2 97%   BMI 33.79 kg/m      General - The patient is well nourished and well developed, and appears to have good nutritional status.  Alert and oriented to person and place, answers questions and cooperates with examination appropriately.   Head and Face - Normocephalic and atraumatic, with no gross asymmetry noted of the contour of the facial features.  The facial nerve is intact, with strong symmetric movements.  Voice and Breathing - The patient was breathing comfortably without the use of accessory muscles. There was no wheezing, stridor, or stertor.  The patients voice was clear and strong, and had appropriate pitch and quality.  Ears - The tympanic membranes are normal in appearance, bony landmarks are intact.  No retraction, perforation, or masses.  No fluid or purulence was seen in the external canal or the middle ear. No evidence of infection of the middle ear or external canal, cerumen  was normal in appearance.  Eyes - Extraocular movements intact, and the pupils were reactive to light.  Sclera were not icteric or injected, conjunctiva were pink and moist.  Mouth - Examination of the oral cavity showed pink, healthy oral mucosa. No lesions or ulcerations noted.  The tongue was mobile and midline, and the dentition were in good condition.    Throat - The walls of the oropharynx were smooth, pink, moist, symmetric, and had no lesions or ulcerations.  The tonsillar pillars and soft palate were symmetric.  The uvula was midline on elevation.    Neck - Normal midline excursion of the laryngotracheal complex during swallowing.  Full range of motion on passive movement.  Palpation of the occipital, submental, submandibular, internal jugular chain, and supraclavicular nodes did not demonstrate any abnormal lymph nodes or masses.  The carotid pulse was palpable bilaterally.  Palpation of the thyroid was soft and smooth, with no nodules or goiter appreciated.  The trachea was mobile and midline.  Nose - External contour is symmetric, no gross deflection or scars.  Nasal mucosa is pink and moist with no abnormal mucus.  The septum was midline and non-obstructive, turbinates of normal size and position.  No polyps, masses, or purulence noted on examination.      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  I suggest moisture and humidity.    We spent the remainder of today's visit discussing the etiology of RICKEY, and the fact that it is usually multifactorial.  I made it clear that a single procedure is unlikely to resolve the sleep apnea, and almost certainly CPAP will still be needed.  I find no evidence of other pathology of the upper aerodigestive tract.  And of  course, I recommended weight loss as an important part of reducing the need for CPAP.  Finally I recommended a follow up visit to their sleep physician, for possible repeat sleep study and pressure titration.

## 2019-03-18 ENCOUNTER — HOSPITAL ENCOUNTER (OUTPATIENT)
Facility: CLINIC | Age: 69
Discharge: HOME | End: 2019-03-18
Attending: INTERNAL MEDICINE | Admitting: INTERNAL MEDICINE
Payer: MEDICARE

## 2019-03-18 ENCOUNTER — APPOINTMENT (OUTPATIENT)
Dept: MEDSURG UNIT | Facility: CLINIC | Age: 69
End: 2019-03-18
Attending: INTERNAL MEDICINE
Payer: MEDICARE

## 2019-03-18 ENCOUNTER — APPOINTMENT (OUTPATIENT)
Dept: LAB | Facility: CLINIC | Age: 69
End: 2019-03-18
Attending: INTERNAL MEDICINE
Payer: MEDICARE

## 2019-03-18 VITALS
OXYGEN SATURATION: 95 % | HEART RATE: 64 BPM | DIASTOLIC BLOOD PRESSURE: 74 MMHG | RESPIRATION RATE: 20 BRPM | SYSTOLIC BLOOD PRESSURE: 180 MMHG | TEMPERATURE: 98.4 F

## 2019-03-18 DIAGNOSIS — Z98.61 POSTSURGICAL PERCUTANEOUS TRANSLUMINAL CORONARY ANGIOPLASTY STATUS: ICD-10-CM

## 2019-03-18 DIAGNOSIS — I25.83 CORONARY ARTERY DISEASE DUE TO LIPID RICH PLAQUE: ICD-10-CM

## 2019-03-18 DIAGNOSIS — I25.10 CORONARY ARTERY DISEASE DUE TO LIPID RICH PLAQUE: ICD-10-CM

## 2019-03-18 LAB
ANION GAP SERPL CALCULATED.3IONS-SCNC: 8 MMOL/L (ref 3–14)
BUN SERPL-MCNC: 20 MG/DL (ref 7–30)
CALCIUM SERPL-MCNC: 8.5 MG/DL (ref 8.5–10.1)
CHLORIDE SERPL-SCNC: 110 MMOL/L (ref 94–109)
CO2 SERPL-SCNC: 23 MMOL/L (ref 20–32)
CREAT SERPL-MCNC: 1.05 MG/DL (ref 0.52–1.04)
ERYTHROCYTE [DISTWIDTH] IN BLOOD BY AUTOMATED COUNT: 13.1 % (ref 10–15)
GFR SERPL CREATININE-BSD FRML MDRD: 54 ML/MIN/{1.73_M2}
GLUCOSE SERPL-MCNC: 103 MG/DL (ref 70–99)
HCT VFR BLD AUTO: 44.2 % (ref 35–47)
HGB BLD-MCNC: 14.5 G/DL (ref 11.7–15.7)
KCT BLD-ACNC: 379 SEC (ref 75–150)
KCT BLD-ACNC: 513 SEC (ref 75–150)
MCH RBC QN AUTO: 30.7 PG (ref 26.5–33)
MCHC RBC AUTO-ENTMCNC: 32.8 G/DL (ref 31.5–36.5)
MCV RBC AUTO: 94 FL (ref 78–100)
PLATELET # BLD AUTO: 253 10E9/L (ref 150–450)
POTASSIUM SERPL-SCNC: 4.1 MMOL/L (ref 3.4–5.3)
RBC # BLD AUTO: 4.72 10E12/L (ref 3.8–5.2)
SODIUM SERPL-SCNC: 141 MMOL/L (ref 133–144)
WBC # BLD AUTO: 6.2 10E9/L (ref 4–11)

## 2019-03-18 PROCEDURE — 80048 BASIC METABOLIC PNL TOTAL CA: CPT | Performed by: INTERNAL MEDICINE

## 2019-03-18 PROCEDURE — C1725 CATH, TRANSLUMIN NON-LASER: HCPCS | Performed by: INTERNAL MEDICINE

## 2019-03-18 PROCEDURE — 25000128 H RX IP 250 OP 636: Performed by: INTERNAL MEDICINE

## 2019-03-18 PROCEDURE — 40000065 ZZH STATISTIC EKG NON-CHARGEABLE

## 2019-03-18 PROCEDURE — 99153 MOD SED SAME PHYS/QHP EA: CPT | Performed by: INTERNAL MEDICINE

## 2019-03-18 PROCEDURE — 25000132 ZZH RX MED GY IP 250 OP 250 PS 637: Mod: GY | Performed by: INTERNAL MEDICINE

## 2019-03-18 PROCEDURE — 93572 IV DOP VEL&/PRESS C FLO EA: CPT | Mod: 26 | Performed by: INTERNAL MEDICINE

## 2019-03-18 PROCEDURE — 25000125 ZZHC RX 250: Performed by: INTERNAL MEDICINE

## 2019-03-18 PROCEDURE — 93571 IV DOP VEL&/PRESS C FLO 1ST: CPT | Mod: 26 | Performed by: INTERNAL MEDICINE

## 2019-03-18 PROCEDURE — 92928 PRQ TCAT PLMT NTRAC ST 1 LES: CPT | Mod: RC | Performed by: INTERNAL MEDICINE

## 2019-03-18 PROCEDURE — 40000556 ZZH STATISTIC PERIPHERAL IV START W US GUIDANCE

## 2019-03-18 PROCEDURE — 36415 COLL VENOUS BLD VENIPUNCTURE: CPT | Performed by: INTERNAL MEDICINE

## 2019-03-18 PROCEDURE — 85347 COAGULATION TIME ACTIVATED: CPT

## 2019-03-18 PROCEDURE — 85027 COMPLETE CBC AUTOMATED: CPT | Performed by: INTERNAL MEDICINE

## 2019-03-18 PROCEDURE — 27210794 ZZH OR GENERAL SUPPLY STERILE: Performed by: INTERNAL MEDICINE

## 2019-03-18 PROCEDURE — 99152 MOD SED SAME PHYS/QHP 5/>YRS: CPT | Performed by: INTERNAL MEDICINE

## 2019-03-18 PROCEDURE — A9270 NON-COVERED ITEM OR SERVICE: HCPCS | Mod: GY | Performed by: INTERNAL MEDICINE

## 2019-03-18 PROCEDURE — C1894 INTRO/SHEATH, NON-LASER: HCPCS | Performed by: INTERNAL MEDICINE

## 2019-03-18 PROCEDURE — 93005 ELECTROCARDIOGRAM TRACING: CPT

## 2019-03-18 PROCEDURE — C1887 CATHETER, GUIDING: HCPCS | Performed by: INTERNAL MEDICINE

## 2019-03-18 PROCEDURE — 93571 IV DOP VEL&/PRESS C FLO 1ST: CPT | Performed by: INTERNAL MEDICINE

## 2019-03-18 PROCEDURE — C9600 PERC DRUG-EL COR STENT SING: HCPCS | Performed by: INTERNAL MEDICINE

## 2019-03-18 PROCEDURE — C1769 GUIDE WIRE: HCPCS | Performed by: INTERNAL MEDICINE

## 2019-03-18 PROCEDURE — 40000172 ZZH STATISTIC PROCEDURE PREP ONLY

## 2019-03-18 PROCEDURE — C1874 STENT, COATED/COV W/DEL SYS: HCPCS | Performed by: INTERNAL MEDICINE

## 2019-03-18 PROCEDURE — 93010 ELECTROCARDIOGRAM REPORT: CPT | Mod: 59 | Performed by: INTERNAL MEDICINE

## 2019-03-18 DEVICE — STENT SYNERGY DRUG ELUTING 2.50X38MM  H7493926038250: Type: IMPLANTABLE DEVICE | Status: FUNCTIONAL

## 2019-03-18 DEVICE — STENT SYNERGY DRUG ELUTING 2.50X24MM  H7493926024250: Type: IMPLANTABLE DEVICE | Status: FUNCTIONAL

## 2019-03-18 RX ORDER — FLUMAZENIL 0.1 MG/ML
0.2 INJECTION, SOLUTION INTRAVENOUS
Status: DISCONTINUED | OUTPATIENT
Start: 2019-03-18 | End: 2019-03-19 | Stop reason: HOSPADM

## 2019-03-18 RX ORDER — SODIUM CHLORIDE 9 MG/ML
INJECTION, SOLUTION INTRAVENOUS CONTINUOUS
Status: DISCONTINUED | OUTPATIENT
Start: 2019-03-18 | End: 2019-03-18 | Stop reason: HOSPADM

## 2019-03-18 RX ORDER — SODIUM CHLORIDE 9 MG/ML
INJECTION, SOLUTION INTRAVENOUS CONTINUOUS
Status: DISCONTINUED | OUTPATIENT
Start: 2019-03-18 | End: 2019-03-19 | Stop reason: HOSPADM

## 2019-03-18 RX ORDER — LIDOCAINE 40 MG/G
CREAM TOPICAL
Status: DISCONTINUED | OUTPATIENT
Start: 2019-03-18 | End: 2019-03-18 | Stop reason: HOSPADM

## 2019-03-18 RX ORDER — ATROPINE SULFATE 0.1 MG/ML
0.5 INJECTION INTRAVENOUS EVERY 5 MIN PRN
Status: DISCONTINUED | OUTPATIENT
Start: 2019-03-18 | End: 2019-03-19 | Stop reason: HOSPADM

## 2019-03-18 RX ORDER — NITROGLYCERIN 5 MG/ML
VIAL (ML) INTRAVENOUS
Status: DISCONTINUED | OUTPATIENT
Start: 2019-03-18 | End: 2019-03-18 | Stop reason: HOSPADM

## 2019-03-18 RX ORDER — ONDANSETRON 2 MG/ML
4 INJECTION INTRAMUSCULAR; INTRAVENOUS EVERY 6 HOURS PRN
Status: DISCONTINUED | OUTPATIENT
Start: 2019-03-18 | End: 2019-03-19 | Stop reason: HOSPADM

## 2019-03-18 RX ORDER — NITROGLYCERIN 0.4 MG/1
0.4 TABLET SUBLINGUAL EVERY 5 MIN PRN
Status: DISCONTINUED | OUTPATIENT
Start: 2019-03-18 | End: 2019-03-19 | Stop reason: HOSPADM

## 2019-03-18 RX ORDER — IOPAMIDOL 755 MG/ML
INJECTION, SOLUTION INTRAVASCULAR
Status: DISCONTINUED | OUTPATIENT
Start: 2019-03-18 | End: 2019-03-18 | Stop reason: HOSPADM

## 2019-03-18 RX ORDER — HEPARIN SODIUM 1000 [USP'U]/ML
INJECTION, SOLUTION INTRAVENOUS; SUBCUTANEOUS
Status: DISCONTINUED | OUTPATIENT
Start: 2019-03-18 | End: 2019-03-18 | Stop reason: HOSPADM

## 2019-03-18 RX ORDER — FENTANYL CITRATE 50 UG/ML
INJECTION, SOLUTION INTRAMUSCULAR; INTRAVENOUS
Status: DISCONTINUED | OUTPATIENT
Start: 2019-03-18 | End: 2019-03-18 | Stop reason: HOSPADM

## 2019-03-18 RX ORDER — ONDANSETRON 4 MG/1
4 TABLET, ORALLY DISINTEGRATING ORAL EVERY 6 HOURS PRN
Status: DISCONTINUED | OUTPATIENT
Start: 2019-03-18 | End: 2019-03-19 | Stop reason: HOSPADM

## 2019-03-18 RX ORDER — ADENOSINE 3 MG/ML
INJECTION, SOLUTION INTRAVENOUS
Status: DISCONTINUED | OUTPATIENT
Start: 2019-03-18 | End: 2019-03-18 | Stop reason: HOSPADM

## 2019-03-18 RX ORDER — NALOXONE HYDROCHLORIDE 0.4 MG/ML
.2-.4 INJECTION, SOLUTION INTRAMUSCULAR; INTRAVENOUS; SUBCUTANEOUS
Status: DISCONTINUED | OUTPATIENT
Start: 2019-03-18 | End: 2019-03-19 | Stop reason: HOSPADM

## 2019-03-18 RX ORDER — LIDOCAINE 40 MG/G
CREAM TOPICAL
Status: DISCONTINUED | OUTPATIENT
Start: 2019-03-18 | End: 2019-03-19 | Stop reason: HOSPADM

## 2019-03-18 RX ORDER — NICARDIPINE HYDROCHLORIDE 2.5 MG/ML
INJECTION INTRAVENOUS
Status: DISCONTINUED | OUTPATIENT
Start: 2019-03-18 | End: 2019-03-18 | Stop reason: HOSPADM

## 2019-03-18 RX ORDER — NALOXONE HYDROCHLORIDE 0.4 MG/ML
.1-.4 INJECTION, SOLUTION INTRAMUSCULAR; INTRAVENOUS; SUBCUTANEOUS
Status: DISCONTINUED | OUTPATIENT
Start: 2019-03-18 | End: 2019-03-18

## 2019-03-18 RX ORDER — ACETAMINOPHEN 325 MG/1
325-650 TABLET ORAL EVERY 4 HOURS PRN
Status: DISCONTINUED | OUTPATIENT
Start: 2019-03-18 | End: 2019-03-19 | Stop reason: HOSPADM

## 2019-03-18 RX ADMIN — Medication: at 19:42

## 2019-03-18 RX ADMIN — ASPIRIN 325 MG: 325 TABLET, DELAYED RELEASE ORAL at 12:34

## 2019-03-18 NOTE — Clinical Note
Stent deployed in the middle right coronary artery. Max pressure = 14 will. Total duration = 8 seconds.

## 2019-03-18 NOTE — Clinical Note
The first balloon was inserted into the right coronary artery and middle right coronary artery.  Max pressure = 16 will. Total duration = 6 seconds.     Max pressure = 16 will. Total duration = 6 seconds.    Balloon reinflated a second time: Max pressure = 16 will. Total duration = 6 seconds.  Balloon reinflated a third time: Max pressure = 16 will. Total duration = 6 seconds.

## 2019-03-18 NOTE — Clinical Note
Potential access sites were evaluated for patency using ultrasound.   The right radial artery was selected. Access was obtained under with Sonosite guidance using a micropuncture 21 guage needle with direct visualization of needle entry.

## 2019-03-18 NOTE — PROGRESS NOTES
Patient prepped for cors/PCI procedure.  Denies pain.  Family with her.  H & P current.  H & P complete.  Labs current.

## 2019-03-18 NOTE — Clinical Note
Stent deployed in the proximal right coronary artery. Max pressure = 14 will. Total duration = 10 seconds.

## 2019-03-19 ENCOUNTER — TELEPHONE (OUTPATIENT)
Dept: CARDIOLOGY | Facility: CLINIC | Age: 69
End: 2019-03-19

## 2019-03-19 LAB
INTERPRETATION ECG - MUSE: NORMAL
INTERPRETATION ECG - MUSE: NORMAL

## 2019-03-19 NOTE — PROCEDURES
PRELIMINARY CARDIAC CATH REPORT:     PROCEDURES PERFORMED:   Coronary Angiography  Physiologic Assessment (FFR)  Percutaneous Coronary Intervention    PHYSICIANS:  Attending Physician: Zeus Cordero MD  Interventional Cardiology Fellow: None  Cardiology Fellow: Modesto Khan MD    INDICATION:  Abbey Omer is a 69 year old female with known coronary artery disease by coronary CTA and risk factors of age, HTN, and HLD who presents on an elective outpatient basis for staged PCI of the RCA following coronary angiography with PCI done on 2/22/2019.     DESCRIPTION:  1. Consent obtained with discussion of risks.  All questions were answered.  2. Sterile prep and procedure.  3. Location with Sheaths:   RT Radial Arterial: A standard 18 guage needle with Sonosite guidance was used to establish vascular access.  Access was successful.  6 Fr  10 cm [short]   4. Access: Local anesthetic with lidocaine.    5. Diagnostic Catheters:   6Fr JL4  6. Guiding Catheters:  6 Fr  XB LF 3.5 GC  7. Estimated blood loss: < 5 ml    MEDICATIONS:  The procedure was performed under conscious sedation for 80 minutes from 507 to 627 pm.  The patient was assessed immediately before the first sedation medication was administered.  Midazolam 4 mg and Fentanyl 200 mcg were administered.  Heart rate, BP, respiration, oxygen saturation and patient responses were monitored throughout the procedure with the assistance of the RN under my supervision.  >> IA Nicardipine and IA NTG were administered to prophylax against radial artery spasm.  >> Antiplatelet Therapy: Ticagrelor 180 mg     Procedures:    CORONARY ANGIOGRAM:   1. Both coronary arteries arise from their respective cusps.  2. Dominance: Right  3. The LM is without angiographic evidence of disease and trifurcates into an LAD, ramus intermedius and LCx.  4. LAD: Type 3 [LAD supplies the entire apex].. The LAD gives rise to septal perforators, small D1 and D2.  The pLAD has a 60-70%  stenosis, mLAD has a 30% stenosis, D1 has a 50% stenosis, and the remainder of the vessel and its branches have minor luminal irregularities.   5. LCX gives rise to OM1 and OM2 vessels, and the vessel and its branches are patent with minor luminal irregularities  6. RCA gives rise to PL branches and supplies PDA. The pRCA has a subtotal >90% stenosis, mRCA has a 70% stenosis, dRCA has a 25% stenosis.        FUNCTIONAL ASSESSMENT:  Adequate anticoagulation was was obtained with IV UFH. Hyperemia was induced with IC Adenosine (80, 80, 100 mcg).  The FFR of the LAD at peak hyperemia was 0.86. The FFR of the ramus intermedius was 0.97.     PERCUTANEOUS CORONARY INTERVENTION:   Lesion #1:  RCA: proximal and mid  A 6 Fr  XB LF 3.5 GC was positioned at the ostium of the RCA.  >> IV UFH was administered to achieve anticoagulation.    A Whisper wire was advanced across the RCA lesion and positioned in the distal RCA.  A 2x20mm Emerge balloon was used to pre-dilate the lesion.  A 2.5x38mm Synergy drug eluting stent was successfully deployed across the proxima-mid RCA lesion with inflation to 14 will. An additional 2.5 x 24 mm stent to 14 will was used on the proximal lesion. Final angiography showed no evidence of perforation or dissection with residual stenosis of 0% and KRISTEL 3 flow.  No complications.    Sheath Removal:  The sheath in the Right radial artery was removed using a TR band.     Contrast: Isovue, 170 ml     Fluoroscopy Time: 25.4 min    COMPLICATIONS:  1. None    SUMMARY:   Successful PCI to the proximal-mid RCA lesion with SHELBI placement (2.5x38mm Synergy, 2.5 x 24 mm Synergy). FFR of the LAD and Ramus intermedius was performed and was negative (0.86, 0.97, respectively).    PLAN:   >> ASA 81 mg qd and Ticagrelor 90 mg BID  >> Bedrest per protocol.  >> Continued medical management and lifestyle modification for cardiovascular risk factor optimization.   >>. Discharge today per protocol    The attending  interventional cardiologist was present and supervised all critical aspects the procedure.    See CVIS report for final draft.    Modesto Khan MD    Cardiology Fellow    Dr. Gil MD   Cardiology Staff        This is a preliminary report. Please see CVIS for the final report.   I was present for the entire procedure.     Zeus Cordero MD.  Interventional Cardiology

## 2019-03-19 NOTE — PROGRESS NOTES
R TR band removed. Site is CDI, primapore dressing placed over puncture site. Pt voided, walked, and ate.

## 2019-03-19 NOTE — TELEPHONE ENCOUNTER
Discharge follow up post PCI:      What does the site look like?  Review: Care of wrist / arm site/ femoral site  It is normal to have soreness and or bruising at the puncture site and mild tingling in your hand for up to 3 days. The site should be flat and dry.                        Wrist    For 2 days, do not use your hand or arm to support your weight (such as rising from a chair) or bend your wrist (such as lifting a garage door).                               For 2 days, do not lift more than 5 pounds or exercise your arm (tennis, golf or bowling).  Groin     No heavy lifting (10 pounds) for 5-7 days.        If you start bleeding from the site in your arm:  Sit down and press firmly on the site with your fingers for 10 minutes. Call your doctor as soon as you can.  If the bleeding stops, sit still and keep your wrist straight for 2 hours.  Do NOT take a bath, or use a hot tub or pool for at least 3 days. You may shower.     Call your doctor if:    You have a large or growing hard lump around the site.    The site is red, swollen, hot or tender.    Blood or fluid is draining from the site.    You have chills or a fever greater than 101 F (38 C).    Your leg or arm feels numb or cool.    You have hives, a rash or unusual itching.     Are you having any pain?  good     How is your activity tolerance?  ok -feels tired and lightheaded.  Advised patient to stay hydrated and rest.  Blood pressure rechecked while on the phone 118/58  For 2 days, do NOT have sex or do any heavy exercise.  Do you have someone at home to assist you with your daily activities?      Review Medications: Dual antiplatelet therapy  If you have started taking Plavix do not stop taking it until you talk to your heart doctor (cardiologist). triple anticoagulation  therapy for 30 days, then discontinue the aspirin  If you have stopped any other medicines, check with your nurse or provider about when to restart them.  Review Nitroglycerin  instructions, take one tablet under the tongue for chest pain, if there is no relief take another one 5 minutes apart. If you still have no relief from the chest pain, call 911.                            Have you scheduled with Cardiac Rehab?       FOLLOW UP  Do you have a follow up appointment with your provider? Dr. Infante 4/5/19  Cardiology referral placed                CONTACT INFORMATION  Please feel free to call us with any other questions or symptoms that are concerning for you at [phone] if it is after 4:30 in the afternoon, or a weekend please call [phone] and ask for the on call specialist.  We want to do everything we can to help prevent you needing to return to the ED, so please do not hesitate to call us.

## 2019-03-19 NOTE — TELEPHONE ENCOUNTER
----- Message from Ana Solis RN sent at 3/18/2019  6:29 PM CDT -----  Regarding: Patient  Martín Roberts,   Pt was in today for planned PCI of RCA via right radial. FFR of LAD and ramus was also repeated - neither significant so no PCI of those vessels. Continue ASA and ticagrelor.   Thanks,   Ana

## 2019-03-19 NOTE — PROGRESS NOTES
/74   Pulse 64   Temp 98.4  F (36.9  C) (Oral)   Resp 20   SpO2 95%   Patient's condition and vital Signs are stable/WNL.  Discharge instructions reviewed with patient and questions answered. Patient verbalizes understanding. IV removed. Pain under control.  Patient is tolerating regular diet and denies any N/V. Patient to be discharged to home via her wife. Patient has all belongings.

## 2019-03-22 DIAGNOSIS — Z98.890 S/P CORONARY ANGIOGRAM: ICD-10-CM

## 2019-03-22 DIAGNOSIS — I25.118 CORONARY ARTERY DISEASE OF NATIVE ARTERY OF NATIVE HEART WITH STABLE ANGINA PECTORIS (H): ICD-10-CM

## 2019-03-22 RX ORDER — METOPROLOL SUCCINATE 25 MG/1
25 TABLET, EXTENDED RELEASE ORAL DAILY
Qty: 90 TABLET | Refills: 0 | Status: SHIPPED | OUTPATIENT
Start: 2019-03-22 | End: 2019-06-27

## 2019-03-22 NOTE — TELEPHONE ENCOUNTER
[facility] Call Center    Phone Message    May a detailed message be left on voicemail: yes    Reason for Call: Medication Refill Request    Has the patient contacted the pharmacy for the refill? Yes   Name of medication being requested: metoprolol succinate ER (TOPROL-XL) 25 MG 24 hr tablet  Provider who prescribed the medication: Dr. Infante  Pharmacy: [facility] [address]  Date medication is needed: ASAP        Action Taken: Message routed to:  [facility] ([facility]): Cardiology

## 2019-03-22 NOTE — TELEPHONE ENCOUNTER
metoprolol succinate ER (TOPROL-XL) 25 MG 24 hr tablet  Last Written Prescription Date:  2/25/19  Last Fill Quantity: 30,   # refills: 2  Last Office Visit : 1/11/19  Future Office visit:  4/5/19      WO changed  90 day to pharmacy per  SARINA Infante/ JUNIE Adame RN    Routed because:  Bp > 140/90

## 2019-04-01 ENCOUNTER — OFFICE VISIT (OUTPATIENT)
Dept: FAMILY MEDICINE | Facility: CLINIC | Age: 69
End: 2019-04-01
Payer: MEDICARE

## 2019-04-01 VITALS
DIASTOLIC BLOOD PRESSURE: 80 MMHG | HEIGHT: 60 IN | SYSTOLIC BLOOD PRESSURE: 138 MMHG | OXYGEN SATURATION: 98 % | WEIGHT: 173 LBS | TEMPERATURE: 97.5 F | HEART RATE: 76 BPM | BODY MASS INDEX: 33.96 KG/M2

## 2019-04-01 DIAGNOSIS — I25.10 CORONARY ARTERY DISEASE INVOLVING NATIVE CORONARY ARTERY OF NATIVE HEART WITHOUT ANGINA PECTORIS: ICD-10-CM

## 2019-04-01 DIAGNOSIS — E78.2 MIXED HYPERLIPIDEMIA: ICD-10-CM

## 2019-04-01 DIAGNOSIS — E03.4 HYPOTHYROIDISM DUE TO ACQUIRED ATROPHY OF THYROID: Primary | Chronic | ICD-10-CM

## 2019-04-01 DIAGNOSIS — I10 HYPERTENSION GOAL BP (BLOOD PRESSURE) < 140/90: Chronic | ICD-10-CM

## 2019-04-01 PROCEDURE — 99214 OFFICE O/P EST MOD 30 MIN: CPT | Performed by: NURSE PRACTITIONER

## 2019-04-01 PROCEDURE — 36415 COLL VENOUS BLD VENIPUNCTURE: CPT | Performed by: NURSE PRACTITIONER

## 2019-04-01 PROCEDURE — 80061 LIPID PANEL: CPT | Performed by: NURSE PRACTITIONER

## 2019-04-01 ASSESSMENT — MIFFLIN-ST. JEOR: SCORE: 1226.22

## 2019-04-01 NOTE — PATIENT INSTRUCTIONS
[facility]     Discharged by : Elisha Santizo MA    Paper scripts provided to patient : no     If you have any questions regarding your visit please contact your care team:     Team Gold                Clinic Hours Telephone Number     Dr. Percy Vallejo, [facility]   7am-7pm  Monday - Thursday   7am-5pm  Fridays  [phone]   (Appointment scheduling available 24/7)     RN Line  [phone] option 2     [facility] [place] [facility] 11am-9pm Monday-Friday Saturday-Sunday- 9am-5pm     [place] -   5pm-9pm Monday-Friday Saturday-Sunday- 9am-5pm    [phone] - [place]    [phone] - [place]     For a Price Quote for your services, please call our [employer] Price Line at [phone].     What options do I have for visits at the clinic other than the traditional office visit?     To expand how we care for you, many of our providers are utilizing electronic visits (e-visits) and telephone visits, when medically appropriate, for interactions with their patients rather than a visit in the clinic. We also offer nurse visits for many medical concerns. Just like any other service, we will bill your insurance company for this type of visit based on time spent on the phone with your provider. Not all insurance companies cover these visits. Please check with your medical insurance if this type of visit is covered. You will be responsible for any charges that are not paid by your insurance.     E-visits via [employer]: generally incur a $45.00 fee.     Telephone visits:  Time spent on the phone: *charged based on time that is spent on the phone in increments of 10 minutes. Estimated cost:   5-10 mins $30.00   11-20 mins. $59.00   21-30 mins. $85.00       Use tabulatet (secure email communication and access to your chart) to send your primary care provider a message or make an appointment. Ask someone on your Team how to sign up for tabulatet.      As always, Thank you for trusting us with your health care needs!      [facility]:    Scheduling Appointments  Pamela Guillen [facility]  Call: [phone]    [facility]  Call: [phone]    [facility]  Call: [phone]    For Gastroenterology referrals   [facility]   Clinics and Surgery Center, 4th Floor   [address] [place] [address]   Appointments: [phone]    WHERE TO GO FOR CARE?    Clinic    Make an appointment if you:       Are sick (cold, cough, flu, sore throat, earache or in pain).       Have a small injury (sprain, small cut, burn or broken bone).       Need a physical exam, Pap smear, vaccine or prescription refill.       Have questions about your health or medicines.    To reach us:      Call [phone] ([phone]). Open 24 hours every day. (For counseling services, call [phone].)    Log into e[employer] at [employer]. (Visit [email or web address] to create an account.) Hospital emergency room    An emergency is a serious or life- threatening problem that must be treated right away.    Call 390 or get to the hospital if you have:      Very bad or sudden:            - Chest pain or pressure         - Bleeding         - Head or belly pain         - Dizziness or trouble seeing, walking or                          Speaking      Problems breathing      Blood in your vomit or you are coughing up blood      A major injury (knocked out, loss of a finger or limb, rape, broken bone protruding from skin)    A mental health crisis. (Or call the Mental Health Crisis line at 1-290.747.7111 or Suicide Prevention Hotline at 1-759.912.1345.)    Open 24 hours every day. You don't need an appointment.     Urgent care    Visit urgent care for sickness or small injuries when the clinic is closed. You don't need an appointment. To check hours or find an urgent care near you, visit  [email or web address]. Online care    Get online care from [facility] for more than 70 common problems, like colds, allergies and infections. Open 24 hours every day at:   [email or web address]   Need help deciding?    For advice about where to be seen, you may call your clinic and ask to speak with a nurse. We're here for you 24 hours every day.         If you are deaf or hard of hearing, please let us know. We provide many free services including sign language interpreters, oral interpreters, TTYs, telephone amplifiers, note takers and written materials.

## 2019-04-01 NOTE — PROGRESS NOTES
SUBJECTIVE:   Abbey Omer is a 69 year old female who presents to clinic today for the following health issues:    Patient is here post op. Percutaneous transluminal coronary angioplasty.   Patient does not voice any other concerns for today.     Hypothyroidism Follow-up      Since last visit, patient describes the following symptoms: Weight stable, no hair loss, no skin changes, no constipation, no loose stools    Hyperlipidemia: She is taking and tolerating the simvastatin 20 mg without adverse side effects.  Patient did meet with a nutritionist and feels like this was helpful.  She admits she is not eating healthy lately, but does want to make some positive changes.      Hypertension Follow-up      Outpatient blood pressures are being checked at home.  Results are 130-140's / 70-80's.    Low Salt Diet: not monitoring salt  She continues to take losartan 100 mg daily and is on metoprolol 25 mg in the evening.  She continues to take aspirin and Brilinta as prescribed.  She has a follow-up with her cardiologist in 4 days.  She says she is feeling better.    She states she is sleeping better.  She denies any bleeding.  She is no longer having nosebleeds.  She denies blood in the urine or stool.  She denies chest pain and shortness of breath.  She does have an occasional cough which she attributes to her environmental allergies.    Problem list and histories reviewed & adjusted, as indicated.  Additional history: as documented    Patient Active Problem List   Diagnosis     RICKEY (obstructive sleep apnea)     Hypothyroidism     Chronic nonallergic rhinitis     CKD (chronic kidney disease) stage 3, GFR 30-59 ml/min (H)     Hyperlipidemia LDL goal <130     Obesity     History of bladder cancer     Hypertension goal BP (blood pressure) < 140/90     Advance Care Planning     Dependent edema     Elevated LFTs     DDD (degenerative disc disease), cervical     Major depressive disorder, recurrent episode, mild degree  (H)     Macular drusen     Hypertriglyceridemia     Allergic conjunctivitis     Pulmonary nodules     Acne     Combined form of age-related cataract, both eyes     Glaucoma suspect, bilateral     Drusen of macula of both eyes     Renal cyst, left     Discoid atelectasis     Acute gout involving toe of left foot, unspecified cause     Cough     Atypical chest pain     Abnormal electrocardiogram     Abnormal CT scan of heart     Chest pain     PEARSON (dyspnea on exertion)     Status post coronary angiogram     Coronary artery disease involving native coronary artery of native heart without angina pectoris     Coronary artery disease due to lipid rich plaque     Epistaxis     Retrognathia     CAD S/P percutaneous coronary angioplasty     Past Surgical History:   Procedure Laterality Date     ARTHROPLASTY SHOULDER Right 11/01/2017    Right total shoulder arthroplasty by Dr. Go at [facility]     ARTHROPLASTY SHOULDER Left 03/28/2018    Left total shoulder arthroplasty by Dr. Go at [facility]     CHOLECYSTECTOMY, LAPOROSCOPIC       COLONOSCOPY  ?    Upper & Lower     CV CORONARY ANGIOGRAM  02/22/2019     CYSTOSCOPY      bladder cancer     D & C       ENT SURGERY  first 1967/second ?    2 Rhynoplasties     HYSTERECTOMY, PAP NO LONGER INDICATED      BSO     NOSE SURGERY      septum deviation     PHACOEMULSIFICATION CLEAR CORNEA WITH STANDARD INTRAOCULAR LENS IMPLANT Left 9/24/2018    Procedure: PHACOEMULSIFICATION CLEAR CORNEA WITH STANDARD INTRAOCULAR LENS IMPLANT;  LEFT EYE PHACOEMULSIFICATION CLEAR CORNEA WITH STANDARD INTRAOCULAR LENS IMPLANT;  Surgeon: Ryne Pascal MD;  Location: [facility]     PHACOEMULSIFICATION WITH STANDARD INTRAOCULAR LENS IMPLANT Right 10/15/2018    Procedure: PHACOEMULSIFICATION WITH STANDARD INTRAOCULAR LENS IMPLANT ;  Surgeon: Ryne Pascal MD;  Location:  OR       Social History     Tobacco Use     Smoking status: Former Smoker      Packs/day: 0.10     Years: 1.00     Pack years: 0.10     Types: Cigarettes     Start date: 1969     Last attempt to quit: 10/12/1969     Years since quittin.5     Smokeless tobacco: Never Used     Tobacco comment: Smoked, very little, for 2 mo, 49 years ago.   Substance Use Topics     Alcohol use: Yes     Comment: Maybe 1 or 2 drinks a year     Family History   Problem Relation Age of Onset     Diabetes Maternal Grandfather      Hypertension Maternal Grandfather      Diabetes Son      Heart Disease Father         CHF     Diabetes Mother      Depression Mother      Hyperlipidemia Mother      Anxiety Disorder Mother      Asthma Mother      Obesity Mother      Substance Abuse Brother      Diabetes Brother      Depression Daughter      Diabetes Maternal Uncle      Cerebrovascular Disease Maternal Uncle      Diabetes Son         after accident     Depression Daughter      Depression Son         after accident     Substance Abuse Brother         Not any longer     Thyroid Disease No family hx of      Glaucoma No family hx of      Macular Degeneration No family hx of      Cancer No family hx of          Current Outpatient Medications   Medication Sig Dispense Refill     Aspirin    allopurinol (ZYLOPRIM) 100 MG tablet Daily    Take 1 tablet (100 mg) by mouth daily       B Complex Vitamins (VITAMIN B COMPLEX PO)        CALCIUM + D 600-200 MG-UNIT OR TABS 1 tablet twice a day       colchicine (COLCRYS) 0.6 MG tablet Take 1 tablet (0.6 mg) by mouth as needed (goute flare) 30 tablet 3     Cranberry 500 MG CAPS Take 1 capsule by mouth daily       EXCEDRIN TENSION HEADACHE 500-65 MG PO TABS 1 tablet twice daily as needed       FISH OIL 1200 MG OR CAPS 3 capsule daily       Flaxseed, Linseed, (FLAXSEED OIL PO) Take 1 Tablespoonful by mouth daily       fluticasone (FLONASE) 50 MCG/ACT nasal spray Spray 2 sprays into both nostrils daily 48 g 3     levalbuterol (XOPENEX HFA) 45 MCG/ACT inhaler INHALE 2 PUFFS INTO THE LUNGS  EVERY 6 HOURS AS NEEDED FOR SHORTNESS OF BREATH OR DIFFICULT BREATHING OR WHEEZING 15 g 1     levothyroxine (SYNTHROID/LEVOTHROID) 88 MCG tablet TAKE 1 TABLET(88 MCG) BY MOUTH DAILY 90 tablet 3     losartan (COZAAR) 100 MG tablet Take 1 tablet (100 mg) by mouth daily 90 tablet 3     magnesium 500 MG TABS Take 1 tablet by mouth daily       MELATONIN PO        metoprolol succinate ER (TOPROL-XL) 25 MG 24 hr tablet Take 1 tablet (25 mg) by mouth daily 90 tablet 0     Multiple Vitamins-Minerals (MULTIPLE VITAMIN  S/WOMENS) TABS Take 2 tablets by mouth daily       order for DME Equipment being ordered: blood pressure monitor 1 each 0     order for DME Equipment ordered: Respironics Auto PAP Mask type: Nasal Settings: 9-15 cm H2O       ranitidine (ZANTAC) 300 MG tablet Take 1 tablet (300 mg) by mouth At Bedtime 90 tablet 3     simvastatin (ZOCOR) 20 MG tablet Take 1 tablet (20 mg) by mouth At Bedtime 30 tablet 11     ticagrelor (BRILINTA) 90 MG tablet Take 1 tablet (90 mg) by mouth 2 times daily Start tomorrow morning. 180 tablet 3     traZODone (DESYREL) 50 MG tablet Take 1-2 tablets ( mg) by mouth nightly as needed for sleep 180 tablet 3     venlafaxine (EFFEXOR-XR) 150 MG 24 hr capsule 2 Capsules daily 180 capsule 1     zinc 50 MG TABS Take 1 tablet by mouth daily       Allergies   Allergen Reactions     Cymbalta      Visual hallucinations     Zomig [Zolmitriptan] Other (See Comments)     Can't wake up     Colestid [Colestipol Hcl]      Albuterol Other (See Comments)     Very jittery and couldn't tolerate     Amlodipine Other (See Comments) and Swelling     Swelling- lower legs     Asa [Aspirin] GI Disturbance     Atenolol Fatigue     Atorvastatin Other (See Comments)     Swelling legs     Bupropion Unknown     Codeine Nausea     Crestor [Hmg-Coa-R Inhibitors] Other (See Comments)     Joint pain     Ezetimibe Other (See Comments)     Fatigue     Hydralazine Other (See Comments) and Fatigue     Fatigue      Lisinopril Cough     Lovastatin Other (See Comments) and Cramps     Cramps     Niacin      Unknown by patient     Paroxetine Other (See Comments)     Fatigue     Paxil [Paroxetine] Fatigue     Potassium GI Disturbance     Sulfa Drugs Fatigue     Sulfasalazine Other (See Comments)     Triamterene Unknown     Zetia [Ezetimibe] Fatigue     BP Readings from Last 3 Encounters:   04/01/19 138/80   03/18/19 180/74   03/11/19 173/84    Wt Readings from Last 3 Encounters:   04/01/19 78.5 kg (173 lb)   03/11/19 78.5 kg (173 lb)   03/01/19 78.5 kg (173 lb)                    Reviewed and updated as needed this visit by clinical staff  Tobacco  Allergies  Meds  Problems  Med Hx  Surg Hx  Fam Hx  Soc Hx        Reviewed and updated as needed this visit by Provider  Tobacco  Allergies  Meds  Problems  Med Hx  Surg Hx  Fam Hx         ROS:  Constitutional, HEENT, cardiovascular, pulmonary, gi and gu systems are negative, except as otherwise noted.    OBJECTIVE:     /80   Pulse 76   Temp 97.5  F (36.4  C) (Oral)   Ht 1.524 m (5')   Wt 78.5 kg (173 lb)   SpO2 98%   BMI 33.79 kg/m    Body mass index is 33.79 kg/m .  GENERAL: healthy, alert, no distress and obese  RESP: lungs clear to auscultation - no rales, rhonchi or wheezes  CV: regular rate and rhythm, normal S1 S2, no S3 or S4, no murmur, click or rub, no peripheral edema and peripheral pulses strong  PSYCH: mentation appears normal, affect normal/bright, anxious, judgement and insight intact and appearance well groomed      ASSESSMENT/PLAN:     1. Hypothyroidism due to acquired atrophy of thyroid  Chronic, stable, continue current treatment.    2. Hypertension goal BP (blood pressure) < 140/90  Chronic, stable, continue current treatment.    3. Mixed hyperlipidemia  Chronic, continue current treatment.  Recheck fasting cholesterol today and will adjust simvastatin dosage if needed.  She was started on a lower dose of the simvastatin due to history of  multiple intolerances to multiple statins in the past.  - Lipid panel reflex to direct LDL Fasting    4. Coronary artery disease involving native coronary artery of native heart without angina pectoris  Chronic, stable, continue current treatment.  -She will follow-up with her cardiologist in 4 days as scheduled.      Return in about 3 months (around 7/1/2019) for Routine Visit for chronic disease management.    Sarah Vallejo NP  [facility]

## 2019-04-02 ENCOUNTER — HOSPITAL ENCOUNTER (OUTPATIENT)
Dept: CARDIAC REHAB | Facility: CLINIC | Age: 69
End: 2019-04-02
Attending: NURSE PRACTITIONER
Payer: MEDICARE

## 2019-04-02 DIAGNOSIS — I25.10 CORONARY ARTERY DISEASE DUE TO LIPID RICH PLAQUE: ICD-10-CM

## 2019-04-02 DIAGNOSIS — I25.83 CORONARY ARTERY DISEASE DUE TO LIPID RICH PLAQUE: ICD-10-CM

## 2019-04-02 LAB
CHOLEST SERPL-MCNC: 129 MG/DL
HDLC SERPL-MCNC: 46 MG/DL
LDLC SERPL CALC-MCNC: 37 MG/DL
NONHDLC SERPL-MCNC: 83 MG/DL
TRIGL SERPL-MCNC: 231 MG/DL

## 2019-04-02 PROCEDURE — 40000116 ZZH STATISTIC OP CR VISIT: Performed by: OCCUPATIONAL THERAPIST

## 2019-04-02 PROCEDURE — 93797 PHYS/QHP OP CAR RHAB WO ECG: CPT | Mod: XU | Performed by: OCCUPATIONAL THERAPIST

## 2019-04-02 PROCEDURE — 40000575 ZZH STATISTIC OP CARDIAC VISIT #2: Performed by: OCCUPATIONAL THERAPIST

## 2019-04-02 PROCEDURE — 93798 PHYS/QHP OP CAR RHAB W/ECG: CPT | Performed by: OCCUPATIONAL THERAPIST

## 2019-04-03 ENCOUNTER — HOSPITAL ENCOUNTER (OUTPATIENT)
Dept: CARDIAC REHAB | Facility: CLINIC | Age: 69
End: 2019-04-03
Attending: INTERNAL MEDICINE
Payer: MEDICARE

## 2019-04-03 PROCEDURE — 93797 PHYS/QHP OP CAR RHAB WO ECG: CPT

## 2019-04-03 PROCEDURE — 40000575 ZZH STATISTIC OP CARDIAC VISIT #2

## 2019-04-03 PROCEDURE — 93798 PHYS/QHP OP CAR RHAB W/ECG: CPT

## 2019-04-03 PROCEDURE — 40000116 ZZH STATISTIC OP CR VISIT

## 2019-04-05 ENCOUNTER — OFFICE VISIT (OUTPATIENT)
Dept: CARDIOLOGY | Facility: CLINIC | Age: 69
End: 2019-04-05
Payer: MEDICARE

## 2019-04-05 VITALS
DIASTOLIC BLOOD PRESSURE: 76 MMHG | BODY MASS INDEX: 33.59 KG/M2 | SYSTOLIC BLOOD PRESSURE: 125 MMHG | HEART RATE: 77 BPM | WEIGHT: 172 LBS | OXYGEN SATURATION: 97 %

## 2019-04-05 DIAGNOSIS — I25.10 CORONARY ARTERY DISEASE INVOLVING NATIVE CORONARY ARTERY OF NATIVE HEART WITHOUT ANGINA PECTORIS: ICD-10-CM

## 2019-04-05 PROCEDURE — 99214 OFFICE O/P EST MOD 30 MIN: CPT | Performed by: INTERNAL MEDICINE

## 2019-04-05 RX ORDER — SIMVASTATIN 40 MG
40 TABLET ORAL AT BEDTIME
Qty: 90 TABLET | Refills: 3 | Status: SHIPPED | OUTPATIENT
Start: 2019-04-05 | End: 2019-10-07

## 2019-04-05 RX ORDER — SIMVASTATIN 20 MG
40 TABLET ORAL AT BEDTIME
Qty: 30 TABLET | Refills: 11 | Status: SHIPPED | OUTPATIENT
Start: 2019-04-05 | End: 2019-04-05

## 2019-04-05 NOTE — LETTER
4/5/2019      RE: Abbey Omer  [address] [facility] [address]       Dear Colleague,    Thank you for the opportunity to participate in the care of your patient, Abbey Omer, at the [facility] AT [facility]. Please see a copy of my visit note below.             Cardiology Consultation      HPI:    This is a 68 year old patient with PMH HTN, HLD, and syncope in setting of dehydration here for new patient appointment in setting of RBBB discovered on EKG.   She has no david syncope history or bradycardia that she knows about. No LOC in past or unexplained falls. Has a h/o migranes which seem well controlled. Only had an episode in [place] once of dizziness leading to diaphoresis and passing out for which she was sent to hospital and improved with fluids. She was told her neck veins by ultrasound were very small. She has a h/o high cholesterol, stopped taking statin but she is unclear why. Her LDL was 167 and TC and TGs > 200. BP has been overall well controlled and is being managed by PCP.     Prior History:    On ROS she had some atypical chest pain at rest. Does not heavily exert herself and is not involved in an exercise regimen. Her CP is better with burping. However on climbing stairs she fatigues easy and gets short of breath. She feels her legs are swollen. No varicose veins, ulcerations, claudication. No orthopnea or PND.     We obtained a coronary CT and suprisingly found severe stenoses of the LCX and RCA with moderate LAD stenoses (FFR 0.86). She underwent semi urgent coronary angiogram where she had staged PCI with SHELBI - first to the OM/LCX and then to the prox-mid RCA by radial approach 2/2019-3/2019. She was referred to cardiac rehab which she just started and is excited about. She denies significant SOB but was mildly with low O2. She has been tolerating DAPT well with no bleeding issues.  Does not report side effects from ticagrelor. Is asking about increasing statin dose in the meantime.LDL still elevated.          PAST MEDICAL HISTORY  Past Medical History:   Diagnosis Date     Cancer of bladder (H)      Cataract 11/25/2011     Chronic nonallergic rhinitis 8/31/10 RAST     CKD (chronic kidney disease) stage 3, GFR 30-59 ml/min (H) 12/20/2010     Coronary artery disease involving native coronary artery of native heart without angina pectoris 3/1/2019     DDD (degenerative disc disease), cervical 6/16/2011     Dependent edema      Depression, major      Depressive disorder In 2002 or 2003     History of blood transfusion ?     HTN (hypertension)      Hyperlipidemia      Hypothyroidism 10/12/2009     Migraine     resolved     Nasal septal deviation 8/11/2009     Obesity 5/17/2011     RICKEY (obstructive sleep apnea)      Osteoarthritis of shoulder region      Osteoporosis      Premature menopause      Renal cyst        CURRENT MEDICATIONS  Current Outpatient Medications   Medication Sig Dispense Refill     allopurinol (ZYLOPRIM) 100 MG tablet Take 1 tablet (100 mg) by mouth daily 90 tablet 3     B Complex Vitamins (VITAMIN B COMPLEX PO)        CALCIUM + D 600-200 MG-UNIT OR TABS 1 tablet twice a day       colchicine (COLCRYS) 0.6 MG tablet Take 1 tablet (0.6 mg) by mouth as needed (goute flare) 30 tablet 3     Cranberry 500 MG CAPS Take 1 capsule by mouth daily       EXCEDRIN TENSION HEADACHE 500-65 MG PO TABS 1 tablet twice daily as needed       FISH OIL 1200 MG OR CAPS 3 capsule daily       Flaxseed, Linseed, (FLAXSEED OIL PO) Take 1 Tablespoonful by mouth daily       fluticasone (FLONASE) 50 MCG/ACT nasal spray Spray 2 sprays into both nostrils daily 48 g 3     levalbuterol (XOPENEX HFA) 45 MCG/ACT inhaler INHALE 2 PUFFS INTO THE LUNGS EVERY 6 HOURS AS NEEDED FOR SHORTNESS OF BREATH OR DIFFICULT BREATHING OR WHEEZING 15 g 1     levothyroxine (SYNTHROID/LEVOTHROID) 88 MCG tablet TAKE 1 TABLET(88 MCG)  BY MOUTH DAILY 90 tablet 3     losartan (COZAAR) 100 MG tablet Take 1 tablet (100 mg) by mouth daily 90 tablet 3     magnesium 500 MG TABS Take 1 tablet by mouth daily       MELATONIN PO        metoprolol succinate ER (TOPROL-XL) 25 MG 24 hr tablet Take 1 tablet (25 mg) by mouth daily 90 tablet 0     Multiple Vitamins-Minerals (MULTIPLE VITAMIN  S/WOMENS) TABS Take 2 tablets by mouth daily       ranitidine (ZANTAC) 300 MG tablet Take 1 tablet (300 mg) by mouth At Bedtime 90 tablet 3     simvastatin (ZOCOR) 20 MG tablet Take 1 tablet (20 mg) by mouth At Bedtime 30 tablet 11     ticagrelor (BRILINTA) 90 MG tablet Take 1 tablet (90 mg) by mouth 2 times daily Start tomorrow morning. 180 tablet 3     traZODone (DESYREL) 50 MG tablet Take 1-2 tablets ( mg) by mouth nightly as needed for sleep 180 tablet 3     venlafaxine (EFFEXOR-XR) 150 MG 24 hr capsule 2 Capsules daily 180 capsule 1     zinc 50 MG TABS Take 1 tablet by mouth daily       order for DME Equipment being ordered: blood pressure monitor 1 each 0     order for DME Equipment ordered: Respironics Auto PAP Mask type: Nasal Settings: 9-15 cm H2O         PAST SURGICAL HISTORY:  Past Surgical History:   Procedure Laterality Date     ARTHROPLASTY SHOULDER Right 11/01/2017    Right total shoulder arthroplasty by Dr. Go at [facility]     ARTHROPLASTY SHOULDER Left 03/28/2018    Left total shoulder arthroplasty by Dr. Go at [facility]     CHOLECYSTECTOMY, LAPOROSCOPIC       COLONOSCOPY  ?    Upper & Lower     CV CORONARY ANGIOGRAM  02/22/2019     CV CORONARY ANGIOGRAM N/A 2/22/2019    Procedure: 1330 CORS;  Surgeon: Marito Arriaga MD;  Location:  HEART CARDIAC CATH LAB     CV HEART CATHETERIZATION WITH POSSIBLE INTERVENTION N/A 3/18/2019    Procedure: STAGED PCI-JALEN CARABALLO;  Surgeon: Zeus Cordero MD;  Location:  HEART CARDIAC CATH LAB     CYSTOSCOPY      bladder cancer     D & C       ENT SURGERY   first 1967/second ?    2 Rhynoplasties     HYSTERECTOMY, PAP NO LONGER INDICATED      BSO     NOSE SURGERY      septum deviation     PHACOEMULSIFICATION CLEAR CORNEA WITH STANDARD INTRAOCULAR LENS IMPLANT Left 9/24/2018    Procedure: PHACOEMULSIFICATION CLEAR CORNEA WITH STANDARD INTRAOCULAR LENS IMPLANT;  LEFT EYE PHACOEMULSIFICATION CLEAR CORNEA WITH STANDARD INTRAOCULAR LENS IMPLANT;  Surgeon: Ryne Pascal MD;  Location:  [facility]     PHACOEMULSIFICATION WITH STANDARD INTRAOCULAR LENS IMPLANT Right 10/15/2018    Procedure: PHACOEMULSIFICATION WITH STANDARD INTRAOCULAR LENS IMPLANT ;  Surgeon: Ryne Pascal MD;  Location: [facility] OR       ALLERGIES     Allergies   Allergen Reactions     Cymbalta      Visual hallucinations     Zomig [Zolmitriptan] Other (See Comments)     Can't wake up     Colestid [Colestipol Hcl]      Albuterol Other (See Comments)     Very jittery and couldn't tolerate     Amlodipine Other (See Comments) and Swelling     Swelling- lower legs     Asa [Aspirin] GI Disturbance     Atenolol Fatigue     Atorvastatin Other (See Comments)     Swelling legs     Bupropion Unknown     Codeine Nausea     Crestor [Hmg-Coa-R Inhibitors] Other (See Comments)     Joint pain     Ezetimibe Other (See Comments)     Fatigue     Hydralazine Other (See Comments) and Fatigue     Fatigue     Lisinopril Cough     Lovastatin Other (See Comments) and Cramps     Cramps     Niacin      Unknown by patient     Paroxetine Other (See Comments)     Fatigue     Paxil [Paroxetine] Fatigue     Potassium GI Disturbance     Sulfa Drugs Fatigue     Sulfasalazine Other (See Comments)     Triamterene Unknown     Zetia [Ezetimibe] Fatigue       FAMILY HISTORY  Family History   Problem Relation Age of Onset     Diabetes Maternal Grandfather      Hypertension Maternal Grandfather      Diabetes Son      Heart Disease Father         CHF     Diabetes Mother      Depression Mother      Hyperlipidemia Mother      Anxiety Disorder Mother       Asthma Mother      Obesity Mother      Substance Abuse Brother      Diabetes Brother      Depression Daughter      Diabetes Maternal Uncle      Cerebrovascular Disease Maternal Uncle      Diabetes Son         after accident     Depression Daughter      Depression Son         after accident     Substance Abuse Brother         Not any longer     Thyroid Disease No family hx of      Glaucoma No family hx of      Macular Degeneration No family hx of      Cancer No family hx of        SOCIAL HISTORY  Social History     Socioeconomic History     Marital status:      Spouse name: Maggie     Number of children: 2     Years of education: 12     Highest education level: Not on file   Occupational History     Occupation: collections for [employer]     Employer: [employer]   Social Needs     Financial resource strain: Not on file     Food insecurity:     Worry: Not on file     Inability: Not on file     Transportation needs:     Medical: Not on file     Non-medical: Not on file   Tobacco Use     Smoking status: Former Smoker     Packs/day: 0.10     Years: 1.00     Pack years: 0.10     Types: Cigarettes     Start date: 1969     Last attempt to quit: 10/12/1969     Years since quittin.5     Smokeless tobacco: Never Used     Tobacco comment: Smoked, very little, for 2 mo, 49 years ago.   Substance and Sexual Activity     Alcohol use: Yes     Comment: Maybe 1 or 2 drinks a year     Drug use: No     Sexual activity: Not Currently     Partners: Female     Birth control/protection: Post-menopausal   Lifestyle     Physical activity:     Days per week: Not on file     Minutes per session: Not on file     Stress: Not on file   Relationships     Social connections:     Talks on phone: Not on file     Gets together: Not on file     Attends Restorationism service: Not on file     Active member of club or organization: Not on file     Attends meetings of clubs or organizations: Not on file     Relationship status: Not on  file     Intimate partner violence:     Fear of current or ex partner: Not on file     Emotionally abused: Not on file     Physically abused: Not on file     Forced sexual activity: Not on file   Other Topics Concern     Parent/sibling w/ CABG, MI or angioplasty before 65F 55M? No   Social History Narrative    Son  2012.    Wife, Maggie       ROS:   Constitutional: No fever, chills, or sweats. No weight gain/loss   ENT: No visual disturbance, ear ache, epistaxis, sore throat  Allergies/Immunologic: Negative  Respiratory: No cough, hemoptysia  Cardiovascular: As per HPI  GI: No nausea, vomiting, hematemesis, melena, or hematochezia  : No urinary frequency, dysuria, or hematuria  Integument: Negative  Psychiatric: Negative  Neuro: Negative  Endocrinology: Negative   Musculoskeletal: Negative  Vascular: No walking impairment, claudication, ischemic rest pain or nonhealing wounds    EXAM:  /76 (BP Location: Left arm, Patient Position: Chair, Cuff Size: Adult Large)   Pulse 77   Wt 78 kg (172 lb)   SpO2 93%   BMI 33.59 kg/m     In general, the patient is a pleasant female in no apparent distress.    HEENT: NC/AT.  PERRLA.  EOMI.  Sclerae white, not injected.  Nares clear.  Pharynx without erythema or exudate.  Dentition intact.    Neck: No adenopathy.  No thyromegaly. Carotids +2/2 bilaterally without bruits.  No jugular venous distension.   Heart: RRR. Normal S1, S2 splits physiologically. No murmur, rub, click, or gallop. The PMI is in the 5th ICS in the midclavicular line. There is no heave.    Lungs: CTA.  No ronchi, wheezes, rales.  No dullness to percussion.   Abdomen: Soft, nontender, nondistended. No organomegaly. No AAA.  No bruits.   Extremities: No clubbing, cyanosis, or edema.  No wounds. No varicose veins signs of chronic venous insufficiency.   Vascular: No bruits are noted.    Labs:  LIPID RESULTS:  Lab Results   Component Value Date    CHOL 129 2019    HDL 46 (L) 2019     LDL 37 04/01/2019    TRIG 231 (H) 04/01/2019    CHOLHDLRATIO 4.7 05/04/2015    NHDL 83 04/01/2019       LIVER ENZYME RESULTS:  Lab Results   Component Value Date    AST 33 02/13/2019    ALT 49 02/13/2019       CBC RESULTS:  Lab Results   Component Value Date    WBC 6.2 03/18/2019    RBC 4.72 03/18/2019    HGB 14.5 03/18/2019    HCT 44.2 03/18/2019    MCV 94 03/18/2019    MCH 30.7 03/18/2019    MCHC 32.8 03/18/2019    RDW 13.1 03/18/2019     03/18/2019       BMP RESULTS:  Lab Results   Component Value Date     03/18/2019    POTASSIUM 4.1 03/18/2019    CHLORIDE 110 (H) 03/18/2019    CO2 23 03/18/2019    ANIONGAP 8 03/18/2019     (H) 03/18/2019    BUN 20 03/18/2019    CR 1.05 (H) 03/18/2019    GFRESTIMATED 54 (L) 03/18/2019    GFRESTBLACK 62 03/18/2019    SAMARIA 8.5 03/18/2019        A1C RESULTS:  No results found for: A1C    Procedures:    EKG - RBBB, no Q waves.       Assessment and Plan:     68 year old with new RBBB discovered on EKG, newly diagnosed CAD (3V, RCA, OM/LCX and LAD) s/p SHELBI to RCA and OM/LCX. Moderate stenosis (FFR 0.86) remains within the prox LAD, medically managing. She is on DAPT with ticagrelor and aspirin and is enrolling in cardiac rehab. Has HTN, HLD which requires ongoing control.    Recommendations:    1) Continue DAPT for at least a year   4) Goal BP < 130/90   5) Discussed mediterranean style of eating which provides optimal health and eating a low processed, low sugar diet  4) Increase simva to 40 mg daily and repeat lipids in 6 months  5) Cardiac rehab  6) Stress test in 1 year (cardiac MRI) preferred to assess the LAD lesion given she was relatively asymptomatic from her other lesions and we will be unable to follow her from that standpoint   7) EKG at next visit    Can follow up in 6 months with me.     Ibeth Infante MD MSc

## 2019-04-05 NOTE — PATIENT INSTRUCTIONS
Thank you for coming to the [facility] [place]; please note the following instructions:    1. Continue dual antiplatelet therapy for at least one year  2. Increase simvastatin to 40 mg daily  3. Continue cardiac rehab: have them check oxygen levels after walking   4. Follow up with Dr. Infante in 6 months at the [facility]         If you have any questions regarding your visit please contact your care team:     Cardiology  Telephone Number   Alejandra H., RN  Amairani RUFF, RN   Katiana PRINGLE, [place] HSI[facility] MA   [phone]    *After hours: [phone]   For scheduling appts:     [phone] or    [phone] (select option 1)    *After hours: [phone]     For the Device Clinic (Pacemakers and ICD's)  RN's :  Krissy Leger   During business hours: [phone]    *After business hours:  [phone] (select option 4)      Normal test result notifications will be released via [employer] or mailed within 7 business days.  All other test results, will be communicated via telephone once reviewed by your cardiologist.    If you need a medication refill please contact your pharmacy.  Please allow 3 business days for your refill to be completed.    As always, thank you for trusting us with your health care needs!  \

## 2019-04-05 NOTE — PROGRESS NOTES
Cardiology Consultation      HPI:    This is a 68 year old patient with PMH HTN, HLD, and syncope in setting of dehydration here for new patient appointment in setting of RBBB discovered on EKG.   She has no david syncope history or bradycardia that she knows about. No LOC in past or unexplained falls. Has a h/o migranes which seem well controlled. Only had an episode in [employer] once of dizziness leading to diaphoresis and passing out for which she was sent to hospital and improved with fluids. She was told her neck veins by ultrasound were very small. She has a h/o high cholesterol, stopped taking statin but she is unclear why. Her LDL was 167 and TC and TGs > 200. BP has been overall well controlled and is being managed by PCP.     Prior History:    On ROS she had some atypical chest pain at rest. Does not heavily exert herself and is not involved in an exercise regimen. Her CP is better with burping. However on climbing stairs she fatigues easy and gets short of breath. She feels her legs are swollen. No varicose veins, ulcerations, claudication. No orthopnea or PND.     We obtained a coronary CT and suprisingly found severe stenoses of the LCX and RCA with moderate LAD stenoses (FFR 0.86). She underwent semi urgent coronary angiogram where she had staged PCI with SHELBI - first to the OM/LCX and then to the prox-mid RCA by radial approach 2/2019-3/2019. She was referred to cardiac rehab which she just started and is excited about. She denies significant SOB but was mildly with low O2. She has been tolerating DAPT well with no bleeding issues. Does not report side effects from ticagrelor. Is asking about increasing statin dose in the meantime.LDL still elevated.          PAST MEDICAL HISTORY  Past Medical History:   Diagnosis Date     Cancer of bladder (H)      Cataract 11/25/2011     Chronic nonallergic rhinitis 8/31/10 RAST     CKD (chronic kidney disease) stage 3, GFR 30-59 ml/min (H) 12/20/2010      Coronary artery disease involving native coronary artery of native heart without angina pectoris 3/1/2019     DDD (degenerative disc disease), cervical 6/16/2011     Dependent edema      Depression, major      Depressive disorder In 2002 or 2003     History of blood transfusion ?     HTN (hypertension)      Hyperlipidemia      Hypothyroidism 10/12/2009     Migraine     resolved     Nasal septal deviation 8/11/2009     Obesity 5/17/2011     RICKEY (obstructive sleep apnea)      Osteoarthritis of shoulder region      Osteoporosis      Premature menopause      Renal cyst        CURRENT MEDICATIONS  Current Outpatient Medications   Medication Sig Dispense Refill     allopurinol (ZYLOPRIM) 100 MG tablet Take 1 tablet (100 mg) by mouth daily 90 tablet 3     B Complex Vitamins (VITAMIN B COMPLEX PO)        CALCIUM + D 600-200 MG-UNIT OR TABS 1 tablet twice a day       colchicine (COLCRYS) 0.6 MG tablet Take 1 tablet (0.6 mg) by mouth as needed (goute flare) 30 tablet 3     Cranberry 500 MG CAPS Take 1 capsule by mouth daily       EXCEDRIN TENSION HEADACHE 500-65 MG PO TABS 1 tablet twice daily as needed       FISH OIL 1200 MG OR CAPS 3 capsule daily       Flaxseed, Linseed, (FLAXSEED OIL PO) Take 1 Tablespoonful by mouth daily       fluticasone (FLONASE) 50 MCG/ACT nasal spray Spray 2 sprays into both nostrils daily 48 g 3     levalbuterol (XOPENEX HFA) 45 MCG/ACT inhaler INHALE 2 PUFFS INTO THE LUNGS EVERY 6 HOURS AS NEEDED FOR SHORTNESS OF BREATH OR DIFFICULT BREATHING OR WHEEZING 15 g 1     levothyroxine (SYNTHROID/LEVOTHROID) 88 MCG tablet TAKE 1 TABLET(88 MCG) BY MOUTH DAILY 90 tablet 3     losartan (COZAAR) 100 MG tablet Take 1 tablet (100 mg) by mouth daily 90 tablet 3     magnesium 500 MG TABS Take 1 tablet by mouth daily       MELATONIN PO        metoprolol succinate ER (TOPROL-XL) 25 MG 24 hr tablet Take 1 tablet (25 mg) by mouth daily 90 tablet 0     Multiple Vitamins-Minerals (MULTIPLE VITAMIN  S/WOMENS) TABS Take  2 tablets by mouth daily       ranitidine (ZANTAC) 300 MG tablet Take 1 tablet (300 mg) by mouth At Bedtime 90 tablet 3     simvastatin (ZOCOR) 20 MG tablet Take 1 tablet (20 mg) by mouth At Bedtime 30 tablet 11     ticagrelor (BRILINTA) 90 MG tablet Take 1 tablet (90 mg) by mouth 2 times daily Start tomorrow morning. 180 tablet 3     traZODone (DESYREL) 50 MG tablet Take 1-2 tablets ( mg) by mouth nightly as needed for sleep 180 tablet 3     venlafaxine (EFFEXOR-XR) 150 MG 24 hr capsule 2 Capsules daily 180 capsule 1     zinc 50 MG TABS Take 1 tablet by mouth daily       order for DME Equipment being ordered: blood pressure monitor 1 each 0     order for DME Equipment ordered: Respironics Auto PAP Mask type: Nasal Settings: 9-15 cm H2O         PAST SURGICAL HISTORY:  Past Surgical History:   Procedure Laterality Date     ARTHROPLASTY SHOULDER Right 11/01/2017    Right total shoulder arthroplasty by Dr. Go at [facility]     ARTHROPLASTY SHOULDER Left 03/28/2018    Left total shoulder arthroplasty by Dr. Go at [facility]     CHOLECYSTECTOMY, LAPOROSCOPIC       COLONOSCOPY  ?    Upper & Lower     CV CORONARY ANGIOGRAM  02/22/2019     CV CORONARY ANGIOGRAM N/A 2/22/2019    Procedure: 1330 CORS;  Surgeon: Marito Arriaga MD;  Location:  [facility]     CV HEART CATHETERIZATION WITH POSSIBLE INTERVENTION N/A 3/18/2019    Procedure: STAGED PCI-JALEN CARABALLO;  Surgeon: Zeus Cordero MD;  [facility]     CYSTOSCOPY      bladder cancer     D & C       ENT SURGERY  first 1967/second ?    2 Rhynoplasties     HYSTERECTOMY, PAP NO LONGER INDICATED      BSO     NOSE SURGERY      septum deviation     PHACOEMULSIFICATION CLEAR CORNEA WITH STANDARD INTRAOCULAR LENS IMPLANT Left 9/24/2018    Procedure: PHACOEMULSIFICATION CLEAR CORNEA WITH STANDARD INTRAOCULAR LENS IMPLANT;  LEFT EYE PHACOEMULSIFICATION CLEAR CORNEA WITH STANDARD  INTRAOCULAR LENS IMPLANT;  Surgeon: Ryne Pascal MD;  Location:  [facility]     PHACOEMULSIFICATION WITH STANDARD INTRAOCULAR LENS IMPLANT Right 10/15/2018    Procedure: PHACOEMULSIFICATION WITH STANDARD INTRAOCULAR LENS IMPLANT ;  Surgeon: Ryne Pascal MD;  Location: [facility]       ALLERGIES     Allergies   Allergen Reactions     Cymbalta      Visual hallucinations     Zomig [Zolmitriptan] Other (See Comments)     Can't wake up     Colestid [Colestipol Hcl]      Albuterol Other (See Comments)     Very jittery and couldn't tolerate     Amlodipine Other (See Comments) and Swelling     Swelling- lower legs     Asa [Aspirin] GI Disturbance     Atenolol Fatigue     Atorvastatin Other (See Comments)     Swelling legs     Bupropion Unknown     Codeine Nausea     Crestor [Hmg-Coa-R Inhibitors] Other (See Comments)     Joint pain     Ezetimibe Other (See Comments)     Fatigue     Hydralazine Other (See Comments) and Fatigue     Fatigue     Lisinopril Cough     Lovastatin Other (See Comments) and Cramps     Cramps     Niacin      Unknown by patient     Paroxetine Other (See Comments)     Fatigue     Paxil [Paroxetine] Fatigue     Potassium GI Disturbance     Sulfa Drugs Fatigue     Sulfasalazine Other (See Comments)     Triamterene Unknown     Zetia [Ezetimibe] Fatigue       FAMILY HISTORY  Family History   Problem Relation Age of Onset     Diabetes Maternal Grandfather      Hypertension Maternal Grandfather      Diabetes Son      Heart Disease Father         CHF     Diabetes Mother      Depression Mother      Hyperlipidemia Mother      Anxiety Disorder Mother      Asthma Mother      Obesity Mother      Substance Abuse Brother      Diabetes Brother      Depression Daughter      Diabetes Maternal Uncle      Cerebrovascular Disease Maternal Uncle      Diabetes Son         after accident     Depression Daughter      Depression Son         after accident     Substance Abuse Brother         Not any longer     Thyroid Disease  No family hx of      Glaucoma No family hx of      Macular Degeneration No family hx of      Cancer No family hx of        SOCIAL HISTORY  Social History     Socioeconomic History     Marital status:      Spouse name: Maggie     Number of children: 2     Years of education: 12     Highest education level: Not on file   Occupational History     Occupation: collections for [employer]     Employer: [employer]   Social Needs     Financial resource strain: Not on file     Food insecurity:     Worry: Not on file     Inability: Not on file     Transportation needs:     Medical: Not on file     Non-medical: Not on file   Tobacco Use     Smoking status: Former Smoker     Packs/day: 0.10     Years: 1.00     Pack years: 0.10     Types: Cigarettes     Start date: 1969     Last attempt to quit: 10/12/1969     Years since quittin.5     Smokeless tobacco: Never Used     Tobacco comment: Smoked, very little, for 2 mo, 49 years ago.   Substance and Sexual Activity     Alcohol use: Yes     Comment: Maybe 1 or 2 drinks a year     Drug use: No     Sexual activity: Not Currently     Partners: Female     Birth control/protection: Post-menopausal   Lifestyle     Physical activity:     Days per week: Not on file     Minutes per session: Not on file     Stress: Not on file   Relationships     Social connections:     Talks on phone: Not on file     Gets together: Not on file     Attends Buddhism service: Not on file     Active member of club or organization: Not on file     Attends meetings of clubs or organizations: Not on file     Relationship status: Not on file     Intimate partner violence:     Fear of current or ex partner: Not on file     Emotionally abused: Not on file     Physically abused: Not on file     Forced sexual activity: Not on file   Other Topics Concern     Parent/sibling w/ CABG, MI or angioplasty before 65F 55M? No   Social History Narrative    Son  2012.    Wife, Maggie       ROS:    Constitutional: No fever, chills, or sweats. No weight gain/loss   ENT: No visual disturbance, ear ache, epistaxis, sore throat  Allergies/Immunologic: Negative  Respiratory: No cough, hemoptysia  Cardiovascular: As per HPI  GI: No nausea, vomiting, hematemesis, melena, or hematochezia  : No urinary frequency, dysuria, or hematuria  Integument: Negative  Psychiatric: Negative  Neuro: Negative  Endocrinology: Negative   Musculoskeletal: Negative  Vascular: No walking impairment, claudication, ischemic rest pain or nonhealing wounds    EXAM:  /76 (BP Location: Left arm, Patient Position: Chair, Cuff Size: Adult Large)   Pulse 77   Wt 78 kg (172 lb)   SpO2 93%   BMI 33.59 kg/m    In general, the patient is a pleasant female in no apparent distress.    HEENT: NC/AT.  PERRLA.  EOMI.  Sclerae white, not injected.  Nares clear.  Pharynx without erythema or exudate.  Dentition intact.    Neck: No adenopathy.  No thyromegaly. Carotids +2/2 bilaterally without bruits.  No jugular venous distension.   Heart: RRR. Normal S1, S2 splits physiologically. No murmur, rub, click, or gallop. The PMI is in the 5th ICS in the midclavicular line. There is no heave.    Lungs: CTA.  No ronchi, wheezes, rales.  No dullness to percussion.   Abdomen: Soft, nontender, nondistended. No organomegaly. No AAA.  No bruits.   Extremities: No clubbing, cyanosis, or edema.  No wounds. No varicose veins signs of chronic venous insufficiency.   Vascular: No bruits are noted.    Labs:  LIPID RESULTS:  Lab Results   Component Value Date    CHOL 129 04/01/2019    HDL 46 (L) 04/01/2019    LDL 37 04/01/2019    TRIG 231 (H) 04/01/2019    CHOLHDLRATIO 4.7 05/04/2015    NHDL 83 04/01/2019       LIVER ENZYME RESULTS:  Lab Results   Component Value Date    AST 33 02/13/2019    ALT 49 02/13/2019       CBC RESULTS:  Lab Results   Component Value Date    WBC 6.2 03/18/2019    RBC 4.72 03/18/2019    HGB 14.5 03/18/2019    HCT 44.2 03/18/2019    MCV 94  03/18/2019    MCH 30.7 03/18/2019    MCHC 32.8 03/18/2019    RDW 13.1 03/18/2019     03/18/2019       BMP RESULTS:  Lab Results   Component Value Date     03/18/2019    POTASSIUM 4.1 03/18/2019    CHLORIDE 110 (H) 03/18/2019    CO2 23 03/18/2019    ANIONGAP 8 03/18/2019     (H) 03/18/2019    BUN 20 03/18/2019    CR 1.05 (H) 03/18/2019    GFRESTIMATED 54 (L) 03/18/2019    GFRESTBLACK 62 03/18/2019    SAMARIA 8.5 03/18/2019        A1C RESULTS:  No results found for: A1C    Procedures:    EKG - RBBB, no Q waves.       Assessment and Plan:     68 year old with new RBBB discovered on EKG, newly diagnosed CAD (3V, RCA, OM/LCX and LAD) s/p SHELBI to RCA and OM/LCX. Moderate stenosis (FFR 0.86) remains within the prox LAD, medically managing. She is on DAPT with ticagrelor and aspirin and is enrolling in cardiac rehab. Has HTN, HLD which requires ongoing control.    Recommendations:    1) Continue DAPT for at least a year   4) Goal BP < 130/90   5) Discussed mediterranean style of eating which provides optimal health and eating a low processed, low sugar diet  4) Increase simva to 40 mg daily and repeat lipids in 6 months  5) Cardiac rehab  6) Stress test in 1 year (cardiac MRI) preferred to assess the LAD lesion given she was relatively asymptomatic from her other lesions and we will be unable to follow her from that standpoint   7) EKG at next visit    Can follow up in 6 months with me.       Ibeth Infante MD MSc  Division of Cardiology  [facility]

## 2019-04-05 NOTE — NURSING NOTE
Chief Complaint   Patient presents with     RECHECK     OV with Dr. Ibeth Infante for 3 month follow up(1/11/19) for CP/ Pt had COR/PCI done 3/18/19 and 2/22/19 (maria esther). Pt reports a little dizziness and SOB.       Initial /76 (BP Location: Left arm, Patient Position: Chair, Cuff Size: Adult Large)   Pulse 77   Wt 78 kg (172 lb)   SpO2 93%   BMI 33.59 kg/m   Estimated body mass index is 33.59 kg/m  as calculated from the following:    Height as of 4/1/19: 1.524 m (5').    Weight as of this encounter: 78 kg (172 lb)..  BP completed using cuff size: jose daniel Gutierrez MA

## 2019-04-10 ENCOUNTER — HOSPITAL ENCOUNTER (OUTPATIENT)
Dept: CARDIAC REHAB | Facility: CLINIC | Age: 69
End: 2019-04-10
Attending: INTERNAL MEDICINE
Payer: MEDICARE

## 2019-04-10 PROCEDURE — 40000116 ZZH STATISTIC OP CR VISIT

## 2019-04-10 PROCEDURE — 93798 PHYS/QHP OP CAR RHAB W/ECG: CPT

## 2019-04-17 ENCOUNTER — HOSPITAL ENCOUNTER (OUTPATIENT)
Dept: CARDIAC REHAB | Facility: CLINIC | Age: 69
End: 2019-04-17
Attending: INTERNAL MEDICINE
Payer: MEDICARE

## 2019-04-17 PROCEDURE — 93798 PHYS/QHP OP CAR RHAB W/ECG: CPT

## 2019-04-17 PROCEDURE — 40000116 ZZH STATISTIC OP CR VISIT

## 2019-04-19 ENCOUNTER — HOSPITAL ENCOUNTER (OUTPATIENT)
Dept: CARDIAC REHAB | Facility: CLINIC | Age: 69
End: 2019-04-19
Attending: INTERNAL MEDICINE
Payer: MEDICARE

## 2019-04-19 PROCEDURE — 40000116 ZZH STATISTIC OP CR VISIT

## 2019-04-19 PROCEDURE — 93798 PHYS/QHP OP CAR RHAB W/ECG: CPT

## 2019-04-22 ENCOUNTER — HOSPITAL ENCOUNTER (OUTPATIENT)
Dept: CARDIAC REHAB | Facility: CLINIC | Age: 69
End: 2019-04-22
Attending: INTERNAL MEDICINE
Payer: MEDICARE

## 2019-04-22 PROCEDURE — 93798 PHYS/QHP OP CAR RHAB W/ECG: CPT

## 2019-04-22 PROCEDURE — 40000116 ZZH STATISTIC OP CR VISIT

## 2019-04-24 ENCOUNTER — HOSPITAL ENCOUNTER (OUTPATIENT)
Dept: CARDIAC REHAB | Facility: CLINIC | Age: 69
End: 2019-04-24
Attending: INTERNAL MEDICINE
Payer: MEDICARE

## 2019-04-24 PROCEDURE — 93798 PHYS/QHP OP CAR RHAB W/ECG: CPT | Performed by: REHABILITATION PRACTITIONER

## 2019-04-24 PROCEDURE — 40000116 ZZH STATISTIC OP CR VISIT: Performed by: REHABILITATION PRACTITIONER

## 2019-04-29 ENCOUNTER — HOSPITAL ENCOUNTER (OUTPATIENT)
Dept: CARDIAC REHAB | Facility: CLINIC | Age: 69
End: 2019-04-29
Attending: INTERNAL MEDICINE
Payer: MEDICARE

## 2019-04-29 PROCEDURE — 93798 PHYS/QHP OP CAR RHAB W/ECG: CPT

## 2019-04-29 PROCEDURE — 40000116 ZZH STATISTIC OP CR VISIT

## 2019-05-01 ENCOUNTER — HOSPITAL ENCOUNTER (OUTPATIENT)
Dept: CARDIAC REHAB | Facility: CLINIC | Age: 69
End: 2019-05-01
Attending: INTERNAL MEDICINE
Payer: MEDICARE

## 2019-05-01 PROCEDURE — 93798 PHYS/QHP OP CAR RHAB W/ECG: CPT | Performed by: REHABILITATION PRACTITIONER

## 2019-05-01 PROCEDURE — 40000116 ZZH STATISTIC OP CR VISIT: Performed by: REHABILITATION PRACTITIONER

## 2019-05-03 ENCOUNTER — HOSPITAL ENCOUNTER (OUTPATIENT)
Dept: CARDIAC REHAB | Facility: CLINIC | Age: 69
End: 2019-05-03
Attending: INTERNAL MEDICINE
Payer: MEDICARE

## 2019-05-03 PROCEDURE — 40000116 ZZH STATISTIC OP CR VISIT

## 2019-05-03 PROCEDURE — 93798 PHYS/QHP OP CAR RHAB W/ECG: CPT

## 2019-05-06 ENCOUNTER — HOSPITAL ENCOUNTER (OUTPATIENT)
Dept: CARDIAC REHAB | Facility: CLINIC | Age: 69
End: 2019-05-06
Attending: INTERNAL MEDICINE
Payer: MEDICARE

## 2019-05-06 PROCEDURE — 40000116 ZZH STATISTIC OP CR VISIT

## 2019-05-06 PROCEDURE — 93798 PHYS/QHP OP CAR RHAB W/ECG: CPT

## 2019-05-08 ENCOUNTER — HOSPITAL ENCOUNTER (OUTPATIENT)
Dept: CARDIAC REHAB | Facility: CLINIC | Age: 69
End: 2019-05-08
Attending: INTERNAL MEDICINE
Payer: MEDICARE

## 2019-05-08 PROCEDURE — 93798 PHYS/QHP OP CAR RHAB W/ECG: CPT | Performed by: REHABILITATION PRACTITIONER

## 2019-05-08 PROCEDURE — 40000244 ZZH STATISTIC VISIT PULM REHAB: Performed by: REHABILITATION PRACTITIONER

## 2019-05-08 PROCEDURE — 40000116 ZZH STATISTIC OP CR VISIT: Performed by: REHABILITATION PRACTITIONER

## 2019-05-08 PROCEDURE — 93797 PHYS/QHP OP CAR RHAB WO ECG: CPT | Performed by: REHABILITATION PRACTITIONER

## 2019-05-10 ENCOUNTER — HOSPITAL ENCOUNTER (OUTPATIENT)
Dept: CARDIAC REHAB | Facility: CLINIC | Age: 69
End: 2019-05-10
Attending: INTERNAL MEDICINE
Payer: MEDICARE

## 2019-05-10 PROCEDURE — 93798 PHYS/QHP OP CAR RHAB W/ECG: CPT

## 2019-05-10 PROCEDURE — 40000116 ZZH STATISTIC OP CR VISIT

## 2019-05-15 ENCOUNTER — HOSPITAL ENCOUNTER (OUTPATIENT)
Dept: CARDIAC REHAB | Facility: CLINIC | Age: 69
End: 2019-05-15
Attending: INTERNAL MEDICINE
Payer: MEDICARE

## 2019-05-15 PROCEDURE — 40000116 ZZH STATISTIC OP CR VISIT

## 2019-05-15 PROCEDURE — 93798 PHYS/QHP OP CAR RHAB W/ECG: CPT

## 2019-05-17 ENCOUNTER — HOSPITAL ENCOUNTER (OUTPATIENT)
Dept: CARDIAC REHAB | Facility: CLINIC | Age: 69
End: 2019-05-17
Attending: INTERNAL MEDICINE
Payer: MEDICARE

## 2019-05-17 PROCEDURE — 93798 PHYS/QHP OP CAR RHAB W/ECG: CPT

## 2019-05-17 PROCEDURE — 40000116 ZZH STATISTIC OP CR VISIT

## 2019-05-20 ENCOUNTER — HOSPITAL ENCOUNTER (OUTPATIENT)
Dept: CARDIAC REHAB | Facility: CLINIC | Age: 69
End: 2019-05-20
Attending: INTERNAL MEDICINE
Payer: MEDICARE

## 2019-05-20 PROCEDURE — 93798 PHYS/QHP OP CAR RHAB W/ECG: CPT

## 2019-05-20 PROCEDURE — 93797 PHYS/QHP OP CAR RHAB WO ECG: CPT | Mod: XU

## 2019-05-20 PROCEDURE — 40000116 ZZH STATISTIC OP CR VISIT

## 2019-05-20 PROCEDURE — 40000575 ZZH STATISTIC OP CARDIAC VISIT #2

## 2019-05-22 ENCOUNTER — HOSPITAL ENCOUNTER (OUTPATIENT)
Dept: CARDIAC REHAB | Facility: CLINIC | Age: 69
End: 2019-05-22
Attending: INTERNAL MEDICINE
Payer: MEDICARE

## 2019-05-22 PROCEDURE — 40000116 ZZH STATISTIC OP CR VISIT

## 2019-05-22 PROCEDURE — 93798 PHYS/QHP OP CAR RHAB W/ECG: CPT

## 2019-05-29 ENCOUNTER — HOSPITAL ENCOUNTER (OUTPATIENT)
Dept: CARDIAC REHAB | Facility: CLINIC | Age: 69
End: 2019-05-29
Attending: INTERNAL MEDICINE
Payer: MEDICARE

## 2019-05-29 PROCEDURE — 40000575 ZZH STATISTIC OP CARDIAC VISIT #2: Performed by: REHABILITATION PRACTITIONER

## 2019-05-29 PROCEDURE — 40000116 ZZH STATISTIC OP CR VISIT: Performed by: REHABILITATION PRACTITIONER

## 2019-05-29 PROCEDURE — 93797 PHYS/QHP OP CAR RHAB WO ECG: CPT | Mod: XU | Performed by: REHABILITATION PRACTITIONER

## 2019-05-29 PROCEDURE — 93798 PHYS/QHP OP CAR RHAB W/ECG: CPT | Performed by: REHABILITATION PRACTITIONER

## 2019-06-03 ENCOUNTER — HOSPITAL ENCOUNTER (OUTPATIENT)
Dept: CARDIAC REHAB | Facility: CLINIC | Age: 69
End: 2019-06-03
Attending: INTERNAL MEDICINE
Payer: MEDICARE

## 2019-06-03 PROCEDURE — 40000575 ZZH STATISTIC OP CARDIAC VISIT #2

## 2019-06-03 PROCEDURE — 93797 PHYS/QHP OP CAR RHAB WO ECG: CPT | Mod: XU

## 2019-06-03 PROCEDURE — 40000116 ZZH STATISTIC OP CR VISIT: Performed by: OCCUPATIONAL THERAPIST

## 2019-06-03 PROCEDURE — 93798 PHYS/QHP OP CAR RHAB W/ECG: CPT | Performed by: OCCUPATIONAL THERAPIST

## 2019-06-10 ENCOUNTER — HOSPITAL ENCOUNTER (OUTPATIENT)
Dept: CARDIAC REHAB | Facility: CLINIC | Age: 69
End: 2019-06-10
Attending: INTERNAL MEDICINE
Payer: MEDICARE

## 2019-06-10 PROCEDURE — 40000575 ZZH STATISTIC OP CARDIAC VISIT #2

## 2019-06-10 PROCEDURE — 93798 PHYS/QHP OP CAR RHAB W/ECG: CPT

## 2019-06-10 PROCEDURE — 40000116 ZZH STATISTIC OP CR VISIT

## 2019-06-10 PROCEDURE — 93797 PHYS/QHP OP CAR RHAB WO ECG: CPT

## 2019-06-11 ENCOUNTER — ANCILLARY PROCEDURE (OUTPATIENT)
Dept: MAMMOGRAPHY | Facility: CLINIC | Age: 69
End: 2019-06-11
Payer: MEDICARE

## 2019-06-11 DIAGNOSIS — Z12.31 VISIT FOR SCREENING MAMMOGRAM: ICD-10-CM

## 2019-06-11 PROCEDURE — 77067 SCR MAMMO BI INCL CAD: CPT | Mod: TC

## 2019-06-18 NOTE — TELEPHONE ENCOUNTER
Called [facility]- spoke with Dayron. He stated that there is nothing on file for this patient. [facility] has been having  issues for the past 3 weeks- so if they were suppose to forward PA to [facility]- they did not get it. He stated that I would need to call [facility] back and have them re-send the PA.    Called [facility] and spoke with Jennifer- she stated she would have to send me to their determination coverage dept- if there is no response within 72hours they do have to forward PA's to [facility]- so not sure why [facility] did not receive PA.   Chucky stated that PA was FedEx to [facility] on 2/16/17- they do not send anything by fax. It could just be that [facility] is backed up and hasn't received it- they advised to give it a few more days and then call [facility] again.    Mariajose Ac MA     Department of Orthopedic Surgery   Progress Note      Follow-Up: MRI left elbow    Subjective:     Continues to remain symptomatic. Developed some ecchymosis in the medial forearm      Objective:     @IPVITALS(24)@    Review of Systems  Pertinent items are noted in HPI  Denies fever, chills, confusion, bowel/bladder active change. Review of systems reviewed from Patient History Form dated on June 18, 2019 and available in the patient's chart under the Media tab. Exam: Unchanged      Imaging: Careful review of his high-quality MRI demonstrates a complete retracted tear of the distal biceps tendon. The tendon is flipped up to the musculotendinous junction. Fluid in the sheath. Office Procedures:      Assessment:     Full-thickness retracted tear of the distal biceps tendon. .     Plan:      1: We had a good discussion about this diagnosis. He continues to work as a  requires lifting and supination to do his job. He desires surgical reconstruction to maximize his elbow supination and flexion strength and to repair the injury. Informed consent was discussed with the patient. This included a detailed description of the procedure. Risks, benefits and alternatives specific to this diagnosis and procedure were outlined. Standard surgical risks of anesthesia, bleeding, nerve damage, infection, need for further surgeries, disability and death also outlined. Verbal confirmation of informed consent was obtained. Signature obtained by the staff.     Follow-Up Visit: Postoperatively            [address] [facility]  Shoulder and Sports Medicine Surgery

## 2019-06-21 ENCOUNTER — HOSPITAL ENCOUNTER (OUTPATIENT)
Dept: CARDIAC REHAB | Facility: CLINIC | Age: 69
End: 2019-06-21
Attending: INTERNAL MEDICINE
Payer: MEDICARE

## 2019-06-21 PROCEDURE — 93798 PHYS/QHP OP CAR RHAB W/ECG: CPT

## 2019-06-21 PROCEDURE — 40000116 ZZH STATISTIC OP CR VISIT

## 2019-06-26 ENCOUNTER — HOSPITAL ENCOUNTER (OUTPATIENT)
Dept: CARDIAC REHAB | Facility: CLINIC | Age: 69
End: 2019-06-26
Attending: INTERNAL MEDICINE
Payer: MEDICARE

## 2019-06-26 PROCEDURE — 40000116 ZZH STATISTIC OP CR VISIT: Performed by: REHABILITATION PRACTITIONER

## 2019-06-26 PROCEDURE — 93797 PHYS/QHP OP CAR RHAB WO ECG: CPT | Performed by: REHABILITATION PRACTITIONER

## 2019-06-26 PROCEDURE — 40000575 ZZH STATISTIC OP CARDIAC VISIT #2: Performed by: REHABILITATION PRACTITIONER

## 2019-06-26 PROCEDURE — 93798 PHYS/QHP OP CAR RHAB W/ECG: CPT | Performed by: REHABILITATION PRACTITIONER

## 2019-06-27 DIAGNOSIS — I25.118 CORONARY ARTERY DISEASE OF NATIVE ARTERY OF NATIVE HEART WITH STABLE ANGINA PECTORIS (H): ICD-10-CM

## 2019-06-27 DIAGNOSIS — Z98.890 S/P CORONARY ANGIOGRAM: ICD-10-CM

## 2019-06-27 RX ORDER — METOPROLOL SUCCINATE 25 MG/1
25 TABLET, EXTENDED RELEASE ORAL DAILY
Qty: 90 TABLET | Refills: 0 | Status: SHIPPED | OUTPATIENT
Start: 2019-06-27 | End: 2019-09-24

## 2019-06-27 NOTE — TELEPHONE ENCOUNTER
"Prescription approved per [facility] Refill Protocol.    Requested Prescriptions   Pending Prescriptions Disp Refills     metoprolol succinate ER (TOPROL-XL) 25 MG 24 hr tablet 90 tablet 0     Sig: Take 1 tablet (25 mg) by mouth daily       Beta-Blockers Protocol Passed - 6/27/2019  5:15 PM        Passed - Blood pressure under 140/90 in past 12 months     BP Readings from Last 3 Encounters:   04/05/19 125/76   04/01/19 138/80   03/18/19 180/74                 Passed - Patient is age 6 or older        Passed - Recent (12 mo) or future (30 days) visit within the authorizing provider's specialty     Patient had office visit in the last 12 months or has a visit in the next 30 days with authorizing provider or within the authorizing provider's specialty.  See \"Patient Info\" tab in inbasket, or \"Choose Columns\" in Meds & Orders section of the refill encounter.              Passed - Medication is active on med list      Kit RUSSEL Holly    "

## 2019-06-27 NOTE — TELEPHONE ENCOUNTER
Reason for Call:  Other prescription    Detailed comments: Patient is in need of a refill for the metoprolol succinate ER (TOPROL-XL) 25mg and has only one left and needs this as soon as possible. Patient would like a call back to confirm this.    Phone Number Patient can be reached at: Cell number on file:    Telephone Information:   Mobile [phone]       Best Time: Right away    Can we leave a detailed message on this number? YES    Call taken on 6/27/2019 at 4:17 PM by Daniel Velasquez

## 2019-07-03 ENCOUNTER — HOSPITAL ENCOUNTER (OUTPATIENT)
Dept: CARDIAC REHAB | Facility: CLINIC | Age: 69
End: 2019-07-03
Attending: INTERNAL MEDICINE
Payer: MEDICARE

## 2019-07-03 PROCEDURE — 40000116 ZZH STATISTIC OP CR VISIT

## 2019-07-03 PROCEDURE — 93798 PHYS/QHP OP CAR RHAB W/ECG: CPT

## 2019-07-11 ENCOUNTER — HOSPITAL ENCOUNTER (OUTPATIENT)
Dept: CARDIAC REHAB | Facility: CLINIC | Age: 69
End: 2019-07-11
Attending: INTERNAL MEDICINE
Payer: MEDICARE

## 2019-07-11 PROCEDURE — 93797 PHYS/QHP OP CAR RHAB WO ECG: CPT

## 2019-07-11 PROCEDURE — 93798 PHYS/QHP OP CAR RHAB W/ECG: CPT

## 2019-07-11 PROCEDURE — 40000116 ZZH STATISTIC OP CR VISIT

## 2019-07-11 PROCEDURE — 40000575 ZZH STATISTIC OP CARDIAC VISIT #2

## 2019-07-29 ENCOUNTER — MYC REFILL (OUTPATIENT)
Dept: FAMILY MEDICINE | Facility: CLINIC | Age: 69
End: 2019-07-29

## 2019-07-29 DIAGNOSIS — F33.0 MAJOR DEPRESSIVE DISORDER, RECURRENT EPISODE, MILD DEGREE (H): Chronic | ICD-10-CM

## 2019-07-31 NOTE — TELEPHONE ENCOUNTER
"Requested Prescriptions   Pending Prescriptions Disp Refills     venlafaxine (EFFEXOR-XR) 150 MG 24 hr capsule  Last Written Prescription Date:  2018  Last Fill Quantity: 180 capsule,  # refills: 1   Last office visit: 2019 with prescribing provider:  IRWIN Vallejo   Future Office Visit:     180 capsule 1     Si Capsules daily       Serotonin-Norepinephrine Reuptake Inhibitors  Failed - 2019  9:47 AM        Failed - PHQ-9 score of less than 5 in past 6 months     Please review last PHQ-9 score.           Failed - Normal serum creatinine on file in past 12 months     Recent Labs   Lab Test 19  1119  19  1303   CR 1.05*   < >  --    CREAT  --   --  1.1*    < > = values in this interval not displayed.             Passed - Blood pressure under 140/90 in past 12 months     BP Readings from Last 3 Encounters:   19 125/76   19 138/80   19 180/74             Passed - Medication is active on med list        Passed - Patient is age 18 or older        Passed - No active pregnancy on record        Passed - No positive pregnancy test in past 12 months        Passed - Recent (6 mo) or future (30 days) visit within the authorizing provider's specialty     Patient had office visit in the last 6 months or has a visit in the next 30 days with authorizing provider or within the authorizing provider's specialty.  See \"Patient Info\" tab in inbasket, or \"Choose Columns\" in Meds & Orders section of the refill encounter.              "

## 2019-08-01 RX ORDER — VENLAFAXINE HYDROCHLORIDE 150 MG/1
CAPSULE, EXTENDED RELEASE ORAL
Qty: 180 CAPSULE | Refills: 0 | Status: SHIPPED | OUTPATIENT
Start: 2019-08-01 | End: 2019-10-07

## 2019-09-09 ENCOUNTER — TELEPHONE (OUTPATIENT)
Dept: FAMILY MEDICINE | Facility: CLINIC | Age: 69
End: 2019-09-09

## 2019-09-09 NOTE — TELEPHONE ENCOUNTER
Panel Management Review      Patient has the following on her problem list:     Depression / Dysthymia review    Measure:  Needs PHQ-9 score of 4 or less during index window.  Administer PHQ-9 and if score is 5 or more, send encounter to provider for next steps.    5 - 7 month window range: 5/26-9/23    PHQ-9 SCORE 1/24/2018 7/20/2018 1/25/2019   PHQ-9 Total Score - - -   PHQ-9 Total Score MyChart - - -   PHQ-9 Total Score 0 0 22       If PHQ-9 recheck is 5 or more, route to provider for next steps.    Patient is due for:  PHQ9 and DAP    Asthma review     ACT Total Scores 1/14/2019   ACT TOTAL SCORE -   ASTHMA ER VISITS -   ASTHMA HOSPITALIZATIONS -   ACT TOTAL SCORE (Goal Greater than or Equal to 20) 20   In the past 12 months, how many times did you visit the emergency room for your asthma without being admitted to the hospital? 0   In the past 12 months, how many times were you hospitalized overnight because of your asthma? 0      1. Is Asthma diagnosis on the Problem List? Yes    2. Is Asthma listed on Health Maintenance? Yes    3. Patient is due for:  ACT      Composite cancer screening  Chart review shows that this patient is due/due soon for the following None  Summary:    Patient is due/failing the following:   ACT and PHQ9    Action needed:   Patient needs to do ACT. and Patient needs to do PHQ9.    Type of outreach:    Sent [employer] message.    Questions for provider review:    None                                                                                                                                    Sherri Servin,        Chart routed to Care Team .

## 2019-09-09 NOTE — LETTER
September 17, 2019      Abbey Omer  [address] [facility] [address]            Dear Abbey,    We have tried to contact you about your health, but have been unable to reach you.  Please call us as soon as possible so we can provide you with the best care possible.  We will continue to check in with you throughout the year to complete these items of care, if you are not able to complete these items at this time.  If you would like to complete the missing items for your care, please contact us at [phone].    We recommend the following:  - Complete and return the attached ASTHMA CONTROL TEST in the envelope provided.  If your total score is 19 or less or you have been to the ER or urgent care for your asthma, then please schedule an asthma followup appointment.  - Please complete the enclosed PHQ9 and mail back to the clinic in the envelope provided. We care about you and want to make sure you get the best care possible. If at any time you feel unsafe or have concerns about safety of others please take  immediate action by calling [phone], for Mental Health Crisis phone support 24 hours a day, 365 days per year. As always, you can also go the your local Emergency Room, or call 911 if you have immediate safety concerns.      Sincerely,     Your Care Team

## 2019-09-17 NOTE — TELEPHONE ENCOUNTER
Panel Management Review      Patient has the following on her problem list: see below      Composite cancer screening  Chart review shows that this patient is due/due soon for the following see below  Summary:    Patient is due/failing the following:   See below    Action needed:   See below    Type of outreach:    Sent letter.    Questions for provider review:    None                                                                                                                                    Nydia Ordoñez, Certified Medical Assistant (AAMA)      Chart routed to closed .

## 2019-09-24 DIAGNOSIS — I25.118 CORONARY ARTERY DISEASE OF NATIVE ARTERY OF NATIVE HEART WITH STABLE ANGINA PECTORIS (H): ICD-10-CM

## 2019-09-24 DIAGNOSIS — Z98.890 S/P CORONARY ANGIOGRAM: ICD-10-CM

## 2019-09-24 RX ORDER — METOPROLOL SUCCINATE 25 MG/1
25 TABLET, EXTENDED RELEASE ORAL DAILY
Qty: 90 TABLET | Refills: 0 | Status: SHIPPED | OUTPATIENT
Start: 2019-09-24 | End: 2019-10-07

## 2019-09-24 ASSESSMENT — ANXIETY QUESTIONNAIRES
5. BEING SO RESTLESS THAT IT IS HARD TO SIT STILL: NOT AT ALL
7. FEELING AFRAID AS IF SOMETHING AWFUL MIGHT HAPPEN: NOT AT ALL
1. FEELING NERVOUS, ANXIOUS, OR ON EDGE: NOT AT ALL
2. NOT BEING ABLE TO STOP OR CONTROL WORRYING: NOT AT ALL
IF YOU CHECKED OFF ANY PROBLEMS ON THIS QUESTIONNAIRE, HOW DIFFICULT HAVE THESE PROBLEMS MADE IT FOR YOU TO DO YOUR WORK, TAKE CARE OF THINGS AT HOME, OR GET ALONG WITH OTHER PEOPLE: NOT DIFFICULT AT ALL
GAD7 TOTAL SCORE: 2
6. BECOMING EASILY ANNOYED OR IRRITABLE: SEVERAL DAYS
3. WORRYING TOO MUCH ABOUT DIFFERENT THINGS: SEVERAL DAYS

## 2019-09-24 ASSESSMENT — PATIENT HEALTH QUESTIONNAIRE - PHQ9
5. POOR APPETITE OR OVEREATING: NOT AT ALL
SUM OF ALL RESPONSES TO PHQ QUESTIONS 1-9: 5

## 2019-09-24 NOTE — TELEPHONE ENCOUNTER
"Requested Prescriptions   Pending Prescriptions Disp Refills     metoprolol succinate ER (TOPROL-XL) 25 MG 24 hr tablet  Last Written Prescription Date:  6/27/2019  Last Fill Quantity: 90 tabs,  # refills: 0   Last office visit: 4/1/2019 with prescribing provider:  Genevieve   Future Office Visit:     90 tablet 0     Sig: Take 1 tablet (25 mg) by mouth daily       Beta-Blockers Protocol Passed - 9/24/2019  9:33 AM        Passed - Blood pressure under 140/90 in past 12 months     BP Readings from Last 3 Encounters:   04/05/19 125/76   04/01/19 138/80   03/18/19 180/74                 Passed - Patient is age 6 or older        Passed - Recent (12 mo) or future (30 days) visit within the authorizing provider's specialty     Patient had office visit in the last 12 months or has a visit in the next 30 days with authorizing provider or within the authorizing provider's specialty.  See \"Patient Info\" tab in inbasket, or \"Choose Columns\" in Meds & Orders section of the refill encounter.              Passed - Medication is active on med list          "

## 2019-09-24 NOTE — TELEPHONE ENCOUNTER
Prescription approved per [facility] Refill Protocol.    Pineda Holly RN

## 2019-09-24 NOTE — TELEPHONE ENCOUNTER
Patient returned ACT and PHQ-9. Results entered into [facility]    Mariajose Ac MA

## 2019-09-25 ASSESSMENT — ANXIETY QUESTIONNAIRES: GAD7 TOTAL SCORE: 2

## 2019-09-25 ASSESSMENT — ASTHMA QUESTIONNAIRES: ACT_TOTALSCORE: 22

## 2019-09-26 ENCOUNTER — DOCUMENTATION ONLY (OUTPATIENT)
Dept: LAB | Facility: CLINIC | Age: 69
End: 2019-09-26

## 2019-09-26 DIAGNOSIS — I10 HYPERTENSION GOAL BP (BLOOD PRESSURE) < 140/90: Chronic | ICD-10-CM

## 2019-09-26 DIAGNOSIS — N18.30 CKD (CHRONIC KIDNEY DISEASE) STAGE 3, GFR 30-59 ML/MIN (H): Chronic | ICD-10-CM

## 2019-09-26 DIAGNOSIS — E03.4 HYPOTHYROIDISM DUE TO ACQUIRED ATROPHY OF THYROID: Primary | Chronic | ICD-10-CM

## 2019-09-26 DIAGNOSIS — E78.5 HYPERLIPIDEMIA LDL GOAL <130: Chronic | ICD-10-CM

## 2019-09-26 NOTE — PROGRESS NOTES
Pt has fasting lab appointment prior to physical.  Please place future orders or advise patient.    Thanks,  [facility]

## 2019-09-30 DIAGNOSIS — N18.30 CKD (CHRONIC KIDNEY DISEASE) STAGE 3, GFR 30-59 ML/MIN (H): Chronic | ICD-10-CM

## 2019-09-30 DIAGNOSIS — E03.4 HYPOTHYROIDISM DUE TO ACQUIRED ATROPHY OF THYROID: Chronic | ICD-10-CM

## 2019-09-30 DIAGNOSIS — I10 HYPERTENSION GOAL BP (BLOOD PRESSURE) < 140/90: Chronic | ICD-10-CM

## 2019-09-30 DIAGNOSIS — E78.5 HYPERLIPIDEMIA LDL GOAL <130: Chronic | ICD-10-CM

## 2019-09-30 PROCEDURE — 84443 ASSAY THYROID STIM HORMONE: CPT | Performed by: NURSE PRACTITIONER

## 2019-09-30 PROCEDURE — 80061 LIPID PANEL: CPT | Performed by: NURSE PRACTITIONER

## 2019-09-30 PROCEDURE — 80053 COMPREHEN METABOLIC PANEL: CPT | Performed by: NURSE PRACTITIONER

## 2019-09-30 PROCEDURE — 36415 COLL VENOUS BLD VENIPUNCTURE: CPT | Performed by: NURSE PRACTITIONER

## 2019-10-01 LAB
ALBUMIN SERPL-MCNC: 3.7 G/DL (ref 3.4–5)
ALP SERPL-CCNC: 118 U/L (ref 40–150)
ALT SERPL W P-5'-P-CCNC: 51 U/L (ref 0–50)
ANION GAP SERPL CALCULATED.3IONS-SCNC: 5 MMOL/L (ref 3–14)
AST SERPL W P-5'-P-CCNC: 31 U/L (ref 0–45)
BILIRUB SERPL-MCNC: 0.3 MG/DL (ref 0.2–1.3)
BUN SERPL-MCNC: 17 MG/DL (ref 7–30)
CALCIUM SERPL-MCNC: 8.8 MG/DL (ref 8.5–10.1)
CHLORIDE SERPL-SCNC: 110 MMOL/L (ref 94–109)
CHOLEST SERPL-MCNC: 136 MG/DL
CO2 SERPL-SCNC: 25 MMOL/L (ref 20–32)
CREAT SERPL-MCNC: 1.01 MG/DL (ref 0.52–1.04)
GFR SERPL CREATININE-BSD FRML MDRD: 56 ML/MIN/{1.73_M2}
GLUCOSE SERPL-MCNC: 105 MG/DL (ref 70–99)
HDLC SERPL-MCNC: 52 MG/DL
LDLC SERPL CALC-MCNC: 42 MG/DL
NONHDLC SERPL-MCNC: 84 MG/DL
POTASSIUM SERPL-SCNC: 4.5 MMOL/L (ref 3.4–5.3)
PROT SERPL-MCNC: 6.9 G/DL (ref 6.8–8.8)
SODIUM SERPL-SCNC: 140 MMOL/L (ref 133–144)
TRIGL SERPL-MCNC: 211 MG/DL
TSH SERPL DL<=0.005 MIU/L-ACNC: 1.17 MU/L (ref 0.4–4)

## 2019-10-07 ENCOUNTER — OFFICE VISIT (OUTPATIENT)
Dept: FAMILY MEDICINE | Facility: CLINIC | Age: 69
End: 2019-10-07
Payer: MEDICARE

## 2019-10-07 VITALS
SYSTOLIC BLOOD PRESSURE: 138 MMHG | WEIGHT: 174.8 LBS | HEART RATE: 60 BPM | HEIGHT: 60 IN | BODY MASS INDEX: 34.32 KG/M2 | OXYGEN SATURATION: 93 % | TEMPERATURE: 98 F | DIASTOLIC BLOOD PRESSURE: 80 MMHG

## 2019-10-07 DIAGNOSIS — E03.4 HYPOTHYROIDISM DUE TO ACQUIRED ATROPHY OF THYROID: Chronic | ICD-10-CM

## 2019-10-07 DIAGNOSIS — N18.30 CKD (CHRONIC KIDNEY DISEASE) STAGE 3, GFR 30-59 ML/MIN (H): Chronic | ICD-10-CM

## 2019-10-07 DIAGNOSIS — J31.0 CHRONIC NONALLERGIC RHINITIS: Chronic | ICD-10-CM

## 2019-10-07 DIAGNOSIS — E78.5 HYPERLIPIDEMIA LDL GOAL <130: Chronic | ICD-10-CM

## 2019-10-07 DIAGNOSIS — Z23 NEED FOR INFLUENZA VACCINATION: ICD-10-CM

## 2019-10-07 DIAGNOSIS — I10 HYPERTENSION GOAL BP (BLOOD PRESSURE) < 140/90: Chronic | ICD-10-CM

## 2019-10-07 DIAGNOSIS — F33.0 MAJOR DEPRESSIVE DISORDER, RECURRENT EPISODE, MILD DEGREE (H): Chronic | ICD-10-CM

## 2019-10-07 DIAGNOSIS — I25.10 CORONARY ARTERY DISEASE INVOLVING NATIVE CORONARY ARTERY OF NATIVE HEART WITHOUT ANGINA PECTORIS: Chronic | ICD-10-CM

## 2019-10-07 DIAGNOSIS — M1A.9XX0 CHRONIC GOUT INVOLVING TOE WITHOUT TOPHUS, UNSPECIFIED CAUSE, UNSPECIFIED LATERALITY: ICD-10-CM

## 2019-10-07 DIAGNOSIS — G47.00 INSOMNIA, UNSPECIFIED TYPE: ICD-10-CM

## 2019-10-07 DIAGNOSIS — R05.9 COUGH: Primary | ICD-10-CM

## 2019-10-07 PROCEDURE — 90662 IIV NO PRSV INCREASED AG IM: CPT | Performed by: NURSE PRACTITIONER

## 2019-10-07 PROCEDURE — G0008 ADMIN INFLUENZA VIRUS VAC: HCPCS | Performed by: NURSE PRACTITIONER

## 2019-10-07 PROCEDURE — 99214 OFFICE O/P EST MOD 30 MIN: CPT | Mod: 25 | Performed by: NURSE PRACTITIONER

## 2019-10-07 RX ORDER — METOPROLOL SUCCINATE 25 MG/1
25 TABLET, EXTENDED RELEASE ORAL DAILY
Qty: 90 TABLET | Refills: 3 | Status: SHIPPED | OUTPATIENT
Start: 2019-10-07 | End: 2020-10-23

## 2019-10-07 RX ORDER — METOPROLOL SUCCINATE 25 MG/1
25 TABLET, EXTENDED RELEASE ORAL DAILY
Qty: 90 TABLET | Refills: 0 | Status: SHIPPED | OUTPATIENT
Start: 2019-10-07 | End: 2019-10-07

## 2019-10-07 RX ORDER — VENLAFAXINE HYDROCHLORIDE 150 MG/1
CAPSULE, EXTENDED RELEASE ORAL
Qty: 180 CAPSULE | Refills: 0 | Status: SHIPPED | OUTPATIENT
Start: 2019-10-07 | End: 2019-10-07

## 2019-10-07 RX ORDER — SIMVASTATIN 40 MG
40 TABLET ORAL AT BEDTIME
Qty: 90 TABLET | Refills: 3 | Status: SHIPPED | OUTPATIENT
Start: 2019-10-07 | End: 2020-01-13 | Stop reason: DRUGHIGH

## 2019-10-07 RX ORDER — MONTELUKAST SODIUM 10 MG/1
10 TABLET ORAL AT BEDTIME
Qty: 90 TABLET | Refills: 3 | Status: SHIPPED | OUTPATIENT
Start: 2019-10-07 | End: 2020-10-05

## 2019-10-07 RX ORDER — LOSARTAN POTASSIUM 100 MG/1
100 TABLET ORAL DAILY
Qty: 90 TABLET | Refills: 3 | Status: SHIPPED | OUTPATIENT
Start: 2019-10-07 | End: 2020-05-18

## 2019-10-07 RX ORDER — LEVOTHYROXINE SODIUM 88 UG/1
TABLET ORAL
Qty: 90 TABLET | Refills: 3 | Status: SHIPPED | OUTPATIENT
Start: 2019-10-07 | End: 2020-10-23

## 2019-10-07 RX ORDER — VENLAFAXINE HYDROCHLORIDE 150 MG/1
CAPSULE, EXTENDED RELEASE ORAL
Qty: 180 CAPSULE | Refills: 3 | Status: SHIPPED | OUTPATIENT
Start: 2019-10-07 | End: 2019-10-29

## 2019-10-07 RX ORDER — ALLOPURINOL 100 MG/1
100 TABLET ORAL DAILY
Qty: 90 TABLET | Refills: 3 | Status: SHIPPED | OUTPATIENT
Start: 2019-10-07 | End: 2020-10-23

## 2019-10-07 RX ORDER — TRAZODONE HYDROCHLORIDE 50 MG/1
50-100 TABLET, FILM COATED ORAL
Qty: 180 TABLET | Refills: 3 | Status: SHIPPED | OUTPATIENT
Start: 2019-10-07 | End: 2020-10-20

## 2019-10-07 ASSESSMENT — MIFFLIN-ST. JEOR: SCORE: 1234.39

## 2019-10-07 NOTE — PATIENT INSTRUCTIONS
[facility]     Discharged by : Carrol Yanez CMA      If you have any questions regarding your visit please contact your care team:     Team Annika              Clinic Hours Telephone Number     Dr. Percy Vallejo, KELSEY   7am-7pm  Monday - Thursday   7am-5pm  Fridays  [phone]   (Appointment scheduling available 24/7)     RN Line  [phone] option 2     Urgent Care - [place] - 11am-9pm Monday-Friday Saturday-Sunday- 9am-5pm     [place] -   5pm-9pm Monday-Friday Saturday-Sunday- 9am-5pm    [phone] - [place]    [phone] - [place]     For a Price Quote for your services, please call our psicofxp Price Line at [phone].     What options do I have for visits at the clinic other than the traditional office visit?     To expand how we care for you, many of our providers are utilizing electronic visits (e-visits) and telephone visits, when medically appropriate, for interactions with their patients rather than a visit in the clinic. We also offer nurse visits for many medical concerns. Just like any other service, we will bill your insurance company for this type of visit based on time spent on the phone with your provider. Not all insurance companies cover these visits. Please check with your medical insurance if this type of visit is covered. You will be responsible for any charges that are not paid by your insurance.     E-visits via [employer] [place]: generally incur a $45.00 fee.     Telephone visits:  Time spent on the phone: *charged based on time that is spent on the phone in increments of 10 minutes. Estimated cost:   5-10 mins $30.00   11-20 mins. $59.00   21-30 mins. $85.00       Use [employer] (secure email communication and access to your chart) to send your primary care provider a message or make an appointment. Ask someone on your Team how to sign up for [employer].     As always, Thank you for trusting us  with your health care needs!      [facility]:    Scheduling Appointments  [facility]  Call: [phone]    [facility]  Call: [phone]    [facility]  Call: [phone]    For Gastroenterology referrals   [facility]enter[facility]   Clinics and Surgery Center, 4th Floor   [address] [place] [address]   Appointments: [phone]    WHERE TO GO FOR CARE?    Clinic    Make an appointment if you:       Are sick (cold, cough, flu, sore throat, earache or in pain).       Have a small injury (sprain, small cut, burn or broken bone).       Need a physical exam, Pap smear, vaccine or prescription refill.       Have questions about your health or medicines.    To reach us:      Call [phone] ([phone]). Open 24 hours every day. (For counseling services, call [phone].)    Log into [employer] at [facility]. (Visit [email or web address] to create an account.) Hospital emergency room    An emergency is a serious or life- threatening problem that must be treated right away.    Call 335 or get to the hospital if you have:      Very bad or sudden:            - Chest pain or pressure         - Bleeding         - Head or belly pain         - Dizziness or trouble seeing, walking or                          Speaking      Problems breathing      Blood in your vomit or you are coughing up blood      A major injury (knocked out, loss of a finger or limb, rape, broken bone protruding from skin)    A mental health crisis. (Or call the Mental Health Crisis line at 1-823.322.5698 or Suicide Prevention Hotline at 1-444.299.2797.)    Open 24 hours every day. You don't need an appointment.     Urgent care    Visit urgent care for sickness or small injuries when the clinic is closed. You don't need an appointment. To check hours or find an urgent care near you, visit [email or web address]. Online care    Get online  care from [facility] for more than 70 common problems, like colds, allergies and infections. Open 24 hours every day at:   [email or web address]   Need help deciding?    For advice about where to be seen, you may call your clinic and ask to speak with a nurse. We're here for you 24 hours every day.         If you are deaf or hard of hearing, please let us know. We provide many free services including sign language interpreters, oral interpreters, TTYs, telephone amplifiers, note takers and written materials.

## 2019-10-07 NOTE — PROGRESS NOTES
"Subjective     Abbey Omer is a 69 year old female who presents to clinic today for the following health issues:    HPI     Hypertension Follow-up      Do you check your blood pressure regularly outside of the clinic? No - has not checked recently    Are you following a low salt diet? Yes    Are your blood pressures ever more than 140 on the top number (systolic) OR more   than 90 on the bottom number (diastolic), for example 140/90? No     Depression and Anxiety Follow-Up    How are you doing with your depression since your last visit?  Ups and downs    How are you doing with your anxiety since your last visit?  Ups and downs    Are you having other symptoms that might be associated with depression or anxiety? Yes:   thompson, sleep is up and down    Have you had a significant life event? OTHER:  Weather triggers it     Do you have any concerns with your use of alcohol or other drugs? No    Social History     Tobacco Use     Smoking status: Former Smoker     Packs/day: 0.10     Years: 1.00     Pack years: 0.10     Types: Cigarettes     Start date: 1969     Last attempt to quit: 10/12/1969     Years since quittin.0     Smokeless tobacco: Never Used     Tobacco comment: Smoked, very little, for 2 mo, 49 years ago.   Substance Use Topics     Alcohol use: Yes     Comment: Maybe 1 or 2 drinks a year     Drug use: No     PHQ 2018   PHQ-9 Total Score 0 22 5   Q9: Thoughts of better off dead/self-harm past 2 weeks Not at all Not at all Not at all     CHRISTIANO-7 SCORE 2018   Total Score - - -   Total Score 0 21 2   Total Score - - -           Suicide Assessment Five-step Evaluation and Treatment (SAFE-T)    Cough:  \"The wet cough\" produces white mucous phlegm.  Bother's other people when she is out at the restaurant.  The first part of September she got sick with a virus.  She has been improving but the cough has been lingering.  Acts up with allergies.  H/o " "allergies \"to everything\" including pollens, trees, dust.  She is taking cetirizine and Flonase, but admits that the Flonase is not consistent.   She does not take the rescue inhaler because she cannot afford it.      Patient Active Problem List   Diagnosis     RICKEY (obstructive sleep apnea)     Hypothyroidism     Chronic nonallergic rhinitis     CKD (chronic kidney disease) stage 3, GFR 30-59 ml/min (H)     Hyperlipidemia LDL goal <130     Obesity     History of bladder cancer     Hypertension goal BP (blood pressure) < 140/90     Advance Care Planning     Dependent edema     Elevated LFTs     DDD (degenerative disc disease), cervical     Major depressive disorder, recurrent episode, mild degree (H)     Macular drusen     Hypertriglyceridemia     Allergic conjunctivitis     Pulmonary nodules     Acne     Combined form of age-related cataract, both eyes     Glaucoma suspect, bilateral     Drusen of macula of both eyes     Renal cyst, left     Discoid atelectasis     Acute gout involving toe of left foot, unspecified cause     Cough     Abnormal electrocardiogram     PEARSON (dyspnea on exertion)     Coronary artery disease involving native coronary artery of native heart without angina pectoris     Epistaxis     Retrognathia     CAD S/P percutaneous coronary angioplasty     Past Surgical History:   Procedure Laterality Date     ARTHROPLASTY SHOULDER Right 11/01/2017    Right total shoulder arthroplasty by Dr. Go at [facility]     ARTHROPLASTY SHOULDER Left 03/28/2018    Left total shoulder arthroplasty by Dr. Go at [facility]     CHOLECYSTECTOMY, LAPOROSCOPIC       COLONOSCOPY  ?    Upper & Lower     CV CORONARY ANGIOGRAM  02/22/2019     CV CORONARY ANGIOGRAM N/A 2/22/2019    Procedure: 1330 CORS;  Surgeon: Marito Arriaga MD;  Location:  [facility]     CV HEART CATHETERIZATION WITH POSSIBLE INTERVENTION N/A 3/18/2019    Procedure: STAGED PCI-JALEN CARABALLO; "  Surgeon: Zeus Cordero MD;  Location:  [facility]ATH [facility]     CYSTOSCOPY      bladder cancer     D & C       ENT SURGERY  first /second ?    2 Rhynoplasties     HYSTERECTOMY, PAP NO LONGER INDICATED      BSO     NOSE SURGERY      septum deviation     PHACOEMULSIFICATION CLEAR CORNEA WITH STANDARD INTRAOCULAR LENS IMPLANT Left 2018    Procedure: PHACOEMULSIFICATION CLEAR CORNEA WITH STANDARD INTRAOCULAR LENS IMPLANT;  LEFT EYE PHACOEMULSIFICATION CLEAR CORNEA WITH STANDARD INTRAOCULAR LENS IMPLANT;  Surgeon: Ryne Pascal MD;  Location:  [facility]     PHACOEMULSIFICATION WITH STANDARD INTRAOCULAR LENS IMPLANT Right 10/15/2018    Procedure: PHACOEMULSIFICATION WITH STANDARD INTRAOCULAR LENS IMPLANT ;  Surgeon: Ryne Pascal MD;  Location:  OR       Social History     Tobacco Use     Smoking status: Former Smoker     Packs/day: 0.10     Years: 1.00     Pack years: 0.10     Types: Cigarettes     Start date: 1969     Last attempt to quit: 10/12/1969     Years since quittin.0     Smokeless tobacco: Never Used     Tobacco comment: Smoked, very little, for 2 mo, 49 years ago.   Substance Use Topics     Alcohol use: Yes     Comment: Maybe 1 or 2 drinks a year     Family History   Problem Relation Age of Onset     Diabetes Maternal Grandfather      Hypertension Maternal Grandfather      Diabetes Son      Heart Disease Father         CHF     Diabetes Mother      Depression Mother      Hyperlipidemia Mother      Anxiety Disorder Mother      Asthma Mother      Obesity Mother      Substance Abuse Brother      Diabetes Brother      Depression Daughter      Diabetes Maternal Uncle      Cerebrovascular Disease Maternal Uncle      Diabetes Son         after accident     Depression Daughter      Depression Son         after accident     Substance Abuse Brother         Not any longer     Thyroid Disease No family hx of      Glaucoma No family hx of      Macular Degeneration No family hx of       Cancer No family hx of          Current Outpatient Medications   Medication Sig Dispense Refill     allopurinol (ZYLOPRIM) 100 MG tablet Take 1 tablet (100 mg) by mouth daily 90 tablet 3     B Complex Vitamins (VITAMIN B COMPLEX PO)        CALCIUM + D 600-200 MG-UNIT OR TABS 1 tablet twice a day       colchicine (COLCRYS) 0.6 MG tablet Take 1 tablet (0.6 mg) by mouth as needed (goute flare) 30 tablet 3     Cranberry 500 MG CAPS Take 1 capsule by mouth daily       EXCEDRIN TENSION HEADACHE 500-65 MG PO TABS 1 tablet twice daily as needed       FISH OIL 1200 MG OR CAPS 3 capsule daily       Flaxseed, Linseed, (FLAXSEED OIL PO) Take 1 Tablespoonful by mouth daily       fluticasone (FLONASE) 50 MCG/ACT nasal spray Spray 2 sprays into both nostrils daily 48 g 3     levalbuterol (XOPENEX HFA) 45 MCG/ACT inhaler INHALE 2 PUFFS INTO THE LUNGS EVERY 6 HOURS AS NEEDED FOR SHORTNESS OF BREATH OR DIFFICULT BREATHING OR WHEEZING  Not using due to high cost 15 g 1     levothyroxine (SYNTHROID/LEVOTHROID) 88 MCG tablet TAKE 1 TABLET(88 MCG) BY MOUTH DAILY 90 tablet 3     losartan (COZAAR) 100 MG tablet Take 1 tablet (100 mg) by mouth daily 90 tablet 3     magnesium 500 MG TABS Take 1 tablet by mouth daily       MELATONIN PO        metoprolol succinate ER (TOPROL-XL) 25 MG 24 hr tablet Take 1 tablet (25 mg) by mouth daily 90 tablet 3            Multiple Vitamins-Minerals (MULTIPLE VITAMIN  S/WOMENS) TABS Take 2 tablets by mouth daily       order for DME Equipment ordered: Respironics Auto PAP Mask type: Nasal Settings: 9-15 cm H2O       ranitidine (ZANTAC) 300 MG tablet Take 1 tablet (300 mg) by mouth At Bedtime 90 tablet 3     simvastatin (ZOCOR) 40 MG tablet Take 1 tablet (40 mg) by mouth At Bedtime 90 tablet 3     ticagrelor (BRILINTA) 90 MG tablet Take 1 tablet (90 mg) by mouth 2 times daily Start tomorrow morning. 180 tablet 3     traZODone (DESYREL) 50 MG tablet Take 1-2 tablets ( mg) by mouth nightly as needed for  sleep 180 tablet 3     venlafaxine (EFFEXOR-XR) 150 MG 24 hr capsule 2 Capsules daily 180 capsule 3     zinc 50 MG TABS Take 1 tablet by mouth daily       Allergies   Allergen Reactions     Cymbalta      Visual hallucinations     Zomig [Zolmitriptan] Other (See Comments)     Can't wake up     Colestid [Colestipol Hcl]      Albuterol Other (See Comments)     Very jittery and couldn't tolerate     Amlodipine Other (See Comments) and Swelling     Swelling- lower legs     Asa [Aspirin] GI Disturbance     Atenolol Fatigue     Atorvastatin Other (See Comments)     Swelling legs     Bupropion Unknown     Codeine Nausea     Crestor [Hmg-Coa-R Inhibitors] Other (See Comments)     Joint pain     Ezetimibe Other (See Comments)     Fatigue     Hydralazine Other (See Comments) and Fatigue     Fatigue     Lisinopril Cough     Lovastatin Other (See Comments) and Cramps     Cramps     Niacin      Unknown by patient     Paroxetine Other (See Comments)     Fatigue     Paxil [Paroxetine] Fatigue     Potassium GI Disturbance     Sulfa Drugs Fatigue     Sulfasalazine Other (See Comments)     Triamterene Unknown     Zetia [Ezetimibe] Fatigue     BP Readings from Last 3 Encounters:   10/07/19 138/80   04/05/19 125/76   04/01/19 138/80    Wt Readings from Last 3 Encounters:   10/07/19 79.3 kg (174 lb 12.8 oz)   04/05/19 78 kg (172 lb)   04/01/19 78.5 kg (173 lb)                    Reviewed and updated as needed this visit by Provider  Allergies  Meds  Problems  Med Hx  Surg Hx         Review of Systems   ROS COMP: Constitutional, HEENT, cardiovascular, pulmonary, gi and gu systems are negative, except as otherwise noted.      Objective    /80 (BP Location: Right arm, Patient Position: Sitting, Cuff Size: Adult Large)   Pulse 60   Temp 98  F (36.7  C) (Oral)   Ht 1.524 m (5')   Wt 79.3 kg (174 lb 12.8 oz)   SpO2 93%   BMI 34.14 kg/m    Body mass index is 34.14 kg/m .  Physical Exam   GENERAL: healthy, alert, no distress and  obese  EYES: Eyes grossly normal to inspection, PERRL and conjunctivae and sclerae normal  HENT: ear canals and TM's normal, nose and mouth without ulcers or lesions  NECK: no adenopathy  RESP: lungs clear to auscultation - no rales, rhonchi or wheezes.  No wheezing with forced expiration  CV: regular rate and rhythm, normal S1 S2, no S3 or S4, no murmur, click or rub  PSYCH: mentation appears normal, affect flat, judgement and insight intact and appearance well groomed    Diagnostic Test Results:  Labs reviewed in Epic  Results for orders placed or performed in visit on 09/30/19   **TSH with free T4 reflex FUTURE anytime   Result Value Ref Range    TSH 1.17 0.40 - 4.00 mU/L   **Comprehensive metabolic panel FUTURE anytime   Result Value Ref Range    Sodium 140 133 - 144 mmol/L    Potassium 4.5 3.4 - 5.3 mmol/L    Chloride 110 (H) 94 - 109 mmol/L    Carbon Dioxide 25 20 - 32 mmol/L    Anion Gap 5 3 - 14 mmol/L    Glucose 105 (H) 70 - 99 mg/dL    Urea Nitrogen 17 7 - 30 mg/dL    Creatinine 1.01 0.52 - 1.04 mg/dL    GFR Estimate 56 (L) >60 mL/min/[1.73_m2]    GFR Estimate If Black 65 >60 mL/min/[1.73_m2]    Calcium 8.8 8.5 - 10.1 mg/dL    Bilirubin Total 0.3 0.2 - 1.3 mg/dL    Albumin 3.7 3.4 - 5.0 g/dL    Protein Total 6.9 6.8 - 8.8 g/dL    Alkaline Phosphatase 118 40 - 150 U/L    ALT 51 (H) 0 - 50 U/L    AST 31 0 - 45 U/L   Lipid panel reflex to direct LDL Fasting   Result Value Ref Range    Cholesterol 136 <200 mg/dL    Triglycerides 211 (H) <150 mg/dL    HDL Cholesterol 52 >49 mg/dL    LDL Cholesterol Calculated 42 <100 mg/dL    Non HDL Cholesterol 84 <130 mg/dL           Assessment & Plan     1. Cough  She recently was sick with a virus 1 month ago and has been having a lingering cough.  Differentials: Allergic cough, postviral cough, asthma  -Patient cannot afford the rescue inhaler so is not using.  - Recommend starting montelukast (SINGULAIR) 10 MG tablet; Take 1 tablet (10 mg) by mouth At Bedtime  Dispense:  90 tablet; Refill: 3  Discussed with patient the indication and use of medication(s), risks/benefits, and potential adverse side effects.  Patient/guardian verbalized understanding and agreement with the plan.  -Advised patient to follow-up if her cough is not improving.  Would consider spirometry and allergy/asthma consult.    2. Chronic nonallergic rhinitis  See #1  - montelukast (SINGULAIR) 10 MG tablet; Take 1 tablet (10 mg) by mouth At Bedtime  Dispense: 90 tablet; Refill: 3    3. Major depressive disorder, recurrent episode, mild degree (H)  Chronic, stable, continue current treatment.     - Therapeutic listening provided.  - venlafaxine (EFFEXOR-XR) 150 MG 24 hr capsule; 2 Capsules daily  Dispense: 180 capsule; Refill: 3    4. Need for influenza vaccination    - INFLUENZA (HIGH DOSE) 3 VALENT VACCINE [87942]  - ADMIN INFLUENZA  (For MEDICARE Patients ONLY) []    5. Hypothyroidism due to acquired atrophy of thyroid  Chronic, stable, continue current treatment.  - levothyroxine (SYNTHROID/LEVOTHROID) 88 MCG tablet; TAKE 1 TABLET(88 MCG) BY MOUTH DAILY  Dispense: 90 tablet; Refill: 3    6. Insomnia, unspecified type  Chronic, stable, continue current treatment.  - traZODone (DESYREL) 50 MG tablet; Take 1-2 tablets ( mg) by mouth nightly as needed for sleep  Dispense: 180 tablet; Refill: 3    7. Hypertension goal BP (blood pressure) < 140/90  Chronic, stable, continue current treatment.  - metoprolol succinate ER (TOPROL-XL) 25 MG 24 hr tablet; Take 1 tablet (25 mg) by mouth daily  Dispense: 90 tablet; Refill: 3  - losartan (COZAAR) 100 MG tablet; Take 1 tablet (100 mg) by mouth daily  Dispense: 90 tablet; Refill: 3    8. Chronic gout involving toe without tophus, unspecified cause, unspecified laterality  Chronic, stable, continue current treatment.  - allopurinol (ZYLOPRIM) 100 MG tablet; Take 1 tablet (100 mg) by mouth daily  Dispense: 90 tablet; Refill: 3  -Patient uses colchicine as needed for  gout flares.  She typically only needs 1 or 2 doses and this is not frequent.    9. Hyperlipidemia LDL goal <130  Chronic, stable, continue current treatment.  - simvastatin (ZOCOR) 40 MG tablet; Take 1 tablet (40 mg) by mouth At Bedtime  Dispense: 90 tablet; Refill: 3    10. CKD (chronic kidney disease) stage 3, GFR 30-59 ml/min (H)  Chronic, stable, continue current treatment.    11. Coronary artery disease involving native coronary artery of native heart without angina pectoris  Chronic, stable, continue current treatment.  No new symptoms today.  - simvastatin (ZOCOR) 40 MG tablet; Take 1 tablet (40 mg) by mouth At Bedtime  Dispense: 90 tablet; Refill: 3  -Patient has follow-up with her cardiologist in January 2020.     BMI:   Estimated body mass index is 34.14 kg/m  as calculated from the following:    Height as of this encounter: 1.524 m (5').    Weight as of this encounter: 79.3 kg (174 lb 12.8 oz).       Return in about 4 weeks (around 11/4/2019) for if symptoms worsen or fail to improve.    Sarah Vallejo, NP  [facility]

## 2019-10-29 ENCOUNTER — TELEPHONE (OUTPATIENT)
Dept: FAMILY MEDICINE | Facility: CLINIC | Age: 69
End: 2019-10-29

## 2019-10-29 ENCOUNTER — MYC MEDICAL ADVICE (OUTPATIENT)
Dept: FAMILY MEDICINE | Facility: CLINIC | Age: 69
End: 2019-10-29

## 2019-10-29 DIAGNOSIS — F33.0 MAJOR DEPRESSIVE DISORDER, RECURRENT EPISODE, MILD DEGREE (H): Chronic | ICD-10-CM

## 2019-10-29 DIAGNOSIS — H10.13 ALLERGIC CONJUNCTIVITIS OF BOTH EYES: Primary | ICD-10-CM

## 2019-10-29 DIAGNOSIS — R05.9 COUGH: ICD-10-CM

## 2019-10-29 DIAGNOSIS — J31.0 CHRONIC NONALLERGIC RHINITIS: Chronic | ICD-10-CM

## 2019-10-29 RX ORDER — VENLAFAXINE HYDROCHLORIDE 150 MG/1
CAPSULE, EXTENDED RELEASE ORAL
Qty: 180 CAPSULE | Refills: 3 | Status: SHIPPED | OUTPATIENT
Start: 2019-10-29 | End: 2020-10-23

## 2019-10-29 RX ORDER — CALCIUM CARBONATE/VITAMIN D3 600 MG-20
TABLET ORAL
COMMUNITY
Start: 2019-10-29 | End: 2019-10-29

## 2019-10-29 NOTE — TELEPHONE ENCOUNTER
Please see [employer] message below.  Bee Pollen added to her medication list.  Thank you,  Chandrika Kaplan RN

## 2019-10-29 NOTE — TELEPHONE ENCOUNTER
I sent patient Thanhhart message thanking her for the update.  And offering her a referral to allergy/asthma clinic if she is concerned about her cough.  If she is not concerned at this time, then okay to just keep monitoring.    I pended referral incase patient would like to go to allergy/asthma clinic.  Okay to sign if she is agreeable.    Sarah Vallejo, DNP, APRN, CNP

## 2019-10-29 NOTE — TELEPHONE ENCOUNTER
Reason for Call:  Medication or medication refill:    Do you use a [facility]?  Name of the pharmacy and phone number for the current request:  [facility], [address] [facility] [phone]    Name of the medication requested: venlafaxine (EFFEXOR-XR)    Other request: Patient stated she did contact her pharmacy but they have not heard back from Sarah Vallejo yet. Patient stated she is out of medication. Please call if any other questions.     Can we leave a detailed message on this number? YES    Phone number patient can be reached at: Home number on file [phone] (home)    Best Time: Anytime    Call taken on 10/29/2019 at 8:53 AM by Sharmin Aceves

## 2019-10-29 NOTE — TELEPHONE ENCOUNTER
Pharmacy states that they don't have the current refill on file.  Resent prescription written on 10/7/19.  Chandrika Kaplan RN

## 2019-11-11 ENCOUNTER — OFFICE VISIT (OUTPATIENT)
Dept: URGENT CARE | Facility: URGENT CARE | Age: 69
End: 2019-11-11
Payer: MEDICARE

## 2019-11-11 VITALS
OXYGEN SATURATION: 97 % | WEIGHT: 177.2 LBS | DIASTOLIC BLOOD PRESSURE: 78 MMHG | SYSTOLIC BLOOD PRESSURE: 159 MMHG | BODY MASS INDEX: 34.61 KG/M2 | RESPIRATION RATE: 18 BRPM | TEMPERATURE: 98 F | HEART RATE: 83 BPM

## 2019-11-11 DIAGNOSIS — S00.411A ABRASION OF RIGHT EAR CANAL, INITIAL ENCOUNTER: Primary | ICD-10-CM

## 2019-11-11 PROCEDURE — 99213 OFFICE O/P EST LOW 20 MIN: CPT | Performed by: FAMILY MEDICINE

## 2019-11-11 ASSESSMENT — ENCOUNTER SYMPTOMS
EYE REDNESS: 0
VOMITING: 0
CONSTIPATION: 0
NAUSEA: 0
DYSURIA: 0
ABDOMINAL PAIN: 0
FEVER: 0
EYE PAIN: 0
CHILLS: 0
HEADACHES: 0
SORE THROAT: 0
EYE ITCHING: 1
EYE DISCHARGE: 0
PALPITATIONS: 0
WOUND: 0
DIARRHEA: 1
RHINORRHEA: 0
COUGH: 1
BLOOD IN STOOL: 0
SHORTNESS OF BREATH: 0

## 2019-11-11 NOTE — PROGRESS NOTES
SUBJECTIVE:   Abbey Omer is a 69 year old female presenting with a chief complaint of   Chief Complaint   Patient presents with     Ear Problem     bleeding from right ear started this morning        She is an established patient of [place].    Right ear pain or throbbing with scant bleeding from the ear.   She does have a chronic hx of cough that she attributes to allergy       Review of Systems   Constitutional: Negative for chills and fever.   HENT: Positive for ear pain. Negative for congestion, rhinorrhea and sore throat.    Eyes: Positive for itching (cataract repair last year ). Negative for pain, discharge and redness.   Respiratory: Positive for cough (3 years or productive cough and was tx recently with allergy medcine as singulair). Negative for shortness of breath.    Cardiovascular: Negative for chest pain and palpitations.        Hx of stent x 3 over the past year    Gastrointestinal: Positive for diarrhea (rarely with cetain foods. fatty foods or fried foods. ). Negative for abdominal pain, blood in stool, constipation, nausea and vomiting.   Genitourinary: Negative for dysuria.   Skin: Negative for rash and wound.   Allergic/Immunologic: Positive for environmental allergies (chemical or pefume sensitivity, pollen and dust, grasses and trees ).   Neurological: Negative for headaches.   Psychiatric/Behavioral: Negative for self-injury.        Anxiety and depression and controlled with effexor     Past Surgical History:   Procedure Laterality Date     ARTHROPLASTY SHOULDER Right 11/01/2017    Right total shoulder arthroplasty by Dr. Go at [facility]     ARTHROPLASTY SHOULDER Left 03/28/2018    Left total shoulder arthroplasty by Dr. Go at [facility]     CHOLECYSTECTOMY, LAPOROSCOPIC       COLONOSCOPY  ?    Upper & Lower     CV CORONARY ANGIOGRAM  02/22/2019     CV CORONARY ANGIOGRAM N/A 2/22/2019    Procedure: 1330 CORS;  Surgeon: Marito Arriaga  MD JACINDA;  Location:  [facility]     CV HEART CATHETERIZATION WITH POSSIBLE INTERVENTION N/A 3/18/2019    Procedure: STAGED PCI-JALEN MUST;  Surgeon: Zeus Cordero MD;  Location:  [facility]     CYSTOSCOPY      bladder cancer     D & C       ENT SURGERY  first 1967/second ?    2 Rhynoplasties     HYSTERECTOMY, PAP NO LONGER INDICATED      BSO     NOSE SURGERY      septum deviation     PHACOEMULSIFICATION CLEAR CORNEA WITH STANDARD INTRAOCULAR LENS IMPLANT Left 9/24/2018    Procedure: PHACOEMULSIFICATION CLEAR CORNEA WITH STANDARD INTRAOCULAR LENS IMPLANT;  LEFT EYE PHACOEMULSIFICATION CLEAR CORNEA WITH STANDARD INTRAOCULAR LENS IMPLANT;  Surgeon: Ryne Pascal MD;  Location:  [facility]     PHACOEMULSIFICATION WITH STANDARD INTRAOCULAR LENS IMPLANT Right 10/15/2018    Procedure: PHACOEMULSIFICATION WITH STANDARD INTRAOCULAR LENS IMPLANT ;  Surgeon: Ryne Pascal MD;  Location: [facility]       Past Medical History:   Diagnosis Date     Cancer of bladder (H)      Cataract 11/25/2011     Chronic nonallergic rhinitis 8/31/10 RAST     CKD (chronic kidney disease) stage 3, GFR 30-59 ml/min (H) 12/20/2010     Coronary artery disease involving native coronary artery of native heart without angina pectoris 3/1/2019     DDD (degenerative disc disease), cervical 6/16/2011     Dependent edema      Depression, major      Depressive disorder In 2002 or 2003     History of blood transfusion ?     HTN (hypertension)      Hyperlipidemia      Hypothyroidism 10/12/2009     Migraine     resolved     Nasal septal deviation 8/11/2009     Obesity 5/17/2011     RICKEY (obstructive sleep apnea)      Osteoarthritis of shoulder region      Osteoporosis      Premature menopause      Renal cyst      Family History   Problem Relation Age of Onset     Diabetes Maternal Grandfather      Hypertension Maternal Grandfather      Diabetes Son      Heart Disease Father         CHF     Diabetes Mother      Depression  Mother      Hyperlipidemia Mother      Anxiety Disorder Mother      Asthma Mother      Obesity Mother      Substance Abuse Brother      Diabetes Brother      Depression Daughter      Diabetes Maternal Uncle      Cerebrovascular Disease Maternal Uncle      Diabetes Son         after accident     Depression Daughter      Depression Son         after accident     Substance Abuse Brother         Not any longer     Thyroid Disease No family hx of      Glaucoma No family hx of      Macular Degeneration No family hx of      Cancer No family hx of      Current Outpatient Medications   Medication Sig Dispense Refill     allopurinol (ZYLOPRIM) 100 MG tablet Take 1 tablet (100 mg) by mouth daily 90 tablet 3     B Complex Vitamins (VITAMIN B COMPLEX PO)        BEE POLLEN PO Bee Pollen, unprocessed, raw, natural, no preservatives       CALCIUM + D 600-200 MG-UNIT OR TABS 1 tablet twice a day       colchicine (COLCRYS) 0.6 MG tablet Take 1 tablet (0.6 mg) by mouth as needed (goute flare) 30 tablet 3     Cranberry 500 MG CAPS Take 1 capsule by mouth daily       EXCEDRIN TENSION HEADACHE 500-65 MG PO TABS 1 tablet twice daily as needed       FISH OIL 1200 MG OR CAPS 3 capsule daily       Flaxseed, Linseed, (FLAXSEED OIL PO) Take 1 Tablespoonful by mouth daily       fluticasone (FLONASE) 50 MCG/ACT nasal spray Spray 2 sprays into both nostrils daily 48 g 3     levalbuterol (XOPENEX HFA) 45 MCG/ACT inhaler INHALE 2 PUFFS INTO THE LUNGS EVERY 6 HOURS AS NEEDED FOR SHORTNESS OF BREATH OR DIFFICULT BREATHING OR WHEEZING 15 g 1     levothyroxine (SYNTHROID/LEVOTHROID) 88 MCG tablet TAKE 1 TABLET(88 MCG) BY MOUTH DAILY 90 tablet 3     losartan (COZAAR) 100 MG tablet Take 1 tablet (100 mg) by mouth daily 90 tablet 3     magnesium 500 MG TABS Take 1 tablet by mouth daily       MELATONIN PO        metoprolol succinate ER (TOPROL-XL) 25 MG 24 hr tablet Take 1 tablet (25 mg) by mouth daily 90 tablet 3     montelukast (SINGULAIR) 10 MG tablet  Take 1 tablet (10 mg) by mouth At Bedtime 90 tablet 3     Multiple Vitamins-Minerals (MULTIPLE VITAMIN  S/WOMENS) TABS Take 2 tablets by mouth daily       order for DME Equipment ordered: Respironics Auto PAP Mask type: Nasal Settings: 9-15 cm H2O       ranitidine (ZANTAC) 300 MG tablet Take 1 tablet (300 mg) by mouth At Bedtime 90 tablet 3     simvastatin (ZOCOR) 40 MG tablet Take 1 tablet (40 mg) by mouth At Bedtime 90 tablet 3     ticagrelor (BRILINTA) 90 MG tablet Take 1 tablet (90 mg) by mouth 2 times daily Start tomorrow morning. 180 tablet 3     traZODone (DESYREL) 50 MG tablet Take 1-2 tablets ( mg) by mouth nightly as needed for sleep 180 tablet 3     venlafaxine (EFFEXOR-XR) 150 MG 24 hr capsule 2 Capsules daily 180 capsule 3     zinc 50 MG TABS Take 1 tablet by mouth daily       Social History     Tobacco Use     Smoking status: Former Smoker     Packs/day: 0.10     Years: 1.00     Pack years: 0.10     Types: Cigarettes     Start date: 1969     Last attempt to quit: 10/12/1969     Years since quittin.1     Smokeless tobacco: Never Used     Tobacco comment: Smoked, very little, for 2 mo, 49 years ago.   Substance Use Topics     Alcohol use: Yes     Comment: Maybe 1 or 2 drinks a year       OBJECTIVE  BP (!) 159/78 (BP Location: Left arm, Patient Position: Sitting, Cuff Size: Adult Large)   Pulse 83   Temp 98  F (36.7  C) (Oral)   Resp 18   Wt 80.4 kg (177 lb 3.2 oz)   SpO2 97%   BMI 34.61 kg/m      Physical Exam  HENT:      Head: Normocephalic.      Right Ear: Tympanic membrane normal. Tenderness present. Tympanic membrane is not erythematous.      Left Ear: Tympanic membrane normal. Tympanic membrane is not erythematous.      Ears:      Comments: Noted mild superficial abrasion right external ear canal without sign of secondary infection   Neck:      Musculoskeletal: Normal range of motion.   Cardiovascular:      Rate and Rhythm: Normal rate.   Pulmonary:      Effort: Pulmonary  effort is normal.   Musculoskeletal: Normal range of motion.   Skin:     Capillary Refill: Capillary refill takes less than 2 seconds.   Neurological:      Mental Status: She is alert.         ASSESSMENT:    ICD-10-CM    1. Abrasion of right ear canal, initial encounter S00.411A       PLAN:  Mentioned topical lidocaine OTC to a q tip gently if increasing or worsening pain.   Follow-up if persisting or worsening   Advised to avoid scratching or qtip use the next 5-7 days and advise gentle use only.   Patient educational/instructional material provided including reasons for follow-up   The patient indicates understanding of these issues and agrees with the plan.

## 2019-12-03 ENCOUNTER — DOCUMENTATION ONLY (OUTPATIENT)
Dept: CARE COORDINATION | Facility: CLINIC | Age: 69
End: 2019-12-03

## 2020-01-01 ENCOUNTER — MYC REFILL (OUTPATIENT)
Dept: FAMILY MEDICINE | Facility: CLINIC | Age: 70
End: 2020-01-01

## 2020-01-01 DIAGNOSIS — G47.00 INSOMNIA, UNSPECIFIED TYPE: ICD-10-CM

## 2020-01-01 DIAGNOSIS — J31.0 CHRONIC NONALLERGIC RHINITIS: Chronic | ICD-10-CM

## 2020-01-01 DIAGNOSIS — R05.9 COUGH: ICD-10-CM

## 2020-01-01 RX ORDER — TRAZODONE HYDROCHLORIDE 50 MG/1
50-100 TABLET, FILM COATED ORAL
Qty: 180 TABLET | Refills: 3 | Status: CANCELLED | OUTPATIENT
Start: 2020-01-01

## 2020-01-01 RX ORDER — MONTELUKAST SODIUM 10 MG/1
10 TABLET ORAL AT BEDTIME
Qty: 90 TABLET | Refills: 3 | Status: CANCELLED | OUTPATIENT
Start: 2020-01-01

## 2020-01-02 NOTE — TELEPHONE ENCOUNTER
"Requested Prescriptions   Pending Prescriptions Disp Refills     traZODone (DESYREL) 50 MG tablet  Last Written Prescription Date:  10/7/2019  Last Fill Quantity: 180 tablet,  # refills: 3   Last office visit: 10/7/2019 with prescribing provider:  IRWIN Vallejo   Future Office Visit:   Next 5 appointments (look out 90 days)    Jan 21, 2020  9:00 AM CST  Return Visit with Omar West MD, [facility]  [address] [address]  [phone]        180 tablet 3     Sig: Take 1-2 tablets ( mg) by mouth nightly as needed for sleep       Serotonin Modulators Passed - 1/2/2020  7:12 AM        Passed - Recent (12 mo) or future (30 days) visit within the authorizing provider's specialty     Patient has had an office visit with the authorizing provider or a provider within the authorizing providers department within the previous 12 mos or has a future within next 30 days. See \"Patient Info\" tab in inbasket, or \"Choose Columns\" in Meds & Orders section of the refill encounter.              Passed - Medication is active on med list        Passed - Patient is age 18 or older        Passed - No active pregnancy on record        Passed - No positive pregnancy test in past 12 months                montelukast (SINGULAIR) 10 MG tablet  Last Written Prescription Date:  10/7/2019  Last Fill Quantity: 90 tablet,  # refills: 3   Last office visit: 10/7/2019 with prescribing provider:  IRWIN Vallejo   Future Office Visit:   Next 5 appointments (look out 90 days)    Jan 21, 2020  9:00 AM CST  Return Visit with Omar West MD, [facility]  [facility] ([address] [address]  [phone]        90 tablet 3     Sig: Take 1 tablet (10 mg) by mouth At Bedtime       Leukotriene Inhibitors Protocol Passed - 1/2/2020  7:12 AM        Passed - Patient is age 12 or older     If " "patient is under 16, ok to refill using age based dosing.           Passed - Recent (12 mo) or future (30 days) visit within the authorizing provider's specialty     Patient has had an office visit with the authorizing provider or a provider within the authorizing providers department within the previous 12 mos or has a future within next 30 days. See \"Patient Info\" tab in inbasket, or \"Choose Columns\" in Meds & Orders section of the refill encounter.              Passed - Medication is active on med list        "

## 2020-01-03 NOTE — TELEPHONE ENCOUNTER
90 day supply with 3 refill sent 10/7/19. Refill request too soon. Refused. Patient notified via [employer].     Berenice Robins, RN, BSN, PHN  [facility]: [place]

## 2020-01-13 ENCOUNTER — OFFICE VISIT (OUTPATIENT)
Dept: CARDIOLOGY | Facility: CLINIC | Age: 70
End: 2020-01-13
Payer: MEDICARE

## 2020-01-13 VITALS
WEIGHT: 178.7 LBS | BODY MASS INDEX: 35.08 KG/M2 | HEART RATE: 66 BPM | DIASTOLIC BLOOD PRESSURE: 88 MMHG | SYSTOLIC BLOOD PRESSURE: 170 MMHG | HEIGHT: 60 IN

## 2020-01-13 DIAGNOSIS — I25.10 CORONARY ARTERY DISEASE INVOLVING NATIVE CORONARY ARTERY OF NATIVE HEART WITHOUT ANGINA PECTORIS: Primary | ICD-10-CM

## 2020-01-13 DIAGNOSIS — I25.118 CORONARY ARTERY DISEASE OF NATIVE ARTERY OF NATIVE HEART WITH STABLE ANGINA PECTORIS (H): ICD-10-CM

## 2020-01-13 DIAGNOSIS — R07.89 ATYPICAL CHEST PAIN: ICD-10-CM

## 2020-01-13 DIAGNOSIS — Z98.890 S/P CORONARY ANGIOGRAM: ICD-10-CM

## 2020-01-13 PROCEDURE — 99215 OFFICE O/P EST HI 40 MIN: CPT | Performed by: INTERNAL MEDICINE

## 2020-01-13 RX ORDER — SIMVASTATIN 20 MG
20 TABLET ORAL AT BEDTIME
COMMUNITY
End: 2020-01-24

## 2020-01-13 ASSESSMENT — MIFFLIN-ST. JEOR: SCORE: 1252.08

## 2020-01-13 NOTE — LETTER
"1/13/2020    Sarah Vallejo, NP  [address] [facility] [address]    RE: Abbey Omer       Dear Colleague,    I had the pleasure of seeing Abbey IRWIN Omer in the [facility].            HPI:      This is a 69 year old patient with PMH HTN, HLD, and syncope in setting of dehydration here for follow up with me. I initially saw her last year in setting of RBBB discovered on EKG.     Prior History:     She has no david syncope history or bradycardia that she knows about. No LOC in past or unexplained falls. Has a h/o migranes which seem well controlled. Only had an episode in [employer] once of dizziness leading to diaphoresis and passing out for which she was sent to hospital and improved with fluids. She was told her neck veins by ultrasound were very small. She has a h/o high cholesterol, stopped taking statin but she is unclear why. Her LDL was 167 and TC and TGs > 200. BP has been overall well controlled and is being managed by PCP. On ROS she had some atypical chest pain at rest. Does not heavily exert herself and is not involved in an exercise regimen. Her CP is better with burping. However on climbing stairs she fatigues easy and gets short of breath. She feels her legs are swollen. No varicose veins, ulcerations, claudication. No orthopnea or PND.     We obtained a coronary CT and suprisingly found severe stenoses of the LCX and RCA with moderate Ramus and LAD stenoses (FFR 0.86). She underwent coronary angiogram where she had staged PCI with SHELBI - first to the OM/LCX and then to the prox-mid RCA by radial approach 2/2019-3/2019.     Interval History:     She completed cardiac rehab. She has been tolerating DAPT well with no bleeding issues. Does not report side effects from ticagrelor. Statin was initiated and attempt at uptitration however LDL came back low at goal with 40 mg daily. No chest pain today. She did have one episode of \"burping\" which was " similar to prior episode but not frequently like she had before. No SOB, mild ankle edema. She reports BP high today due to traffic getting here, usually well controlled.       PAST MEDICAL HISTORY  Past Medical History:   Diagnosis Date     Cancer of bladder (H)      Cataract 11/25/2011     Chronic nonallergic rhinitis 8/31/10 RAST     CKD (chronic kidney disease) stage 3, GFR 30-59 ml/min (H) 12/20/2010     Coronary artery disease involving native coronary artery of native heart without angina pectoris 3/1/2019     DDD (degenerative disc disease), cervical 6/16/2011     Dependent edema      Depression, major      Depressive disorder In 2002 or 2003     History of blood transfusion ?     HTN (hypertension)      Hyperlipidemia      Hypothyroidism 10/12/2009     Migraine     resolved     Nasal septal deviation 8/11/2009     Obesity 5/17/2011     RICKEY (obstructive sleep apnea)      Osteoarthritis of shoulder region      Osteoporosis      Premature menopause      Renal cyst        CURRENT MEDICATIONS  Current Outpatient Medications   Medication Sig Dispense Refill     allopurinol (ZYLOPRIM) 100 MG tablet Take 1 tablet (100 mg) by mouth daily 90 tablet 3     B Complex Vitamins (VITAMIN B COMPLEX PO)        BEE POLLEN PO Bee Pollen, unprocessed, raw, natural, no preservatives       CALCIUM + D 600-200 MG-UNIT OR TABS 1 tablet twice a day       colchicine (COLCRYS) 0.6 MG tablet Take 1 tablet (0.6 mg) by mouth as needed (goute flare) 30 tablet 3     Cranberry 500 MG CAPS Take 1 capsule by mouth daily       EXCEDRIN TENSION HEADACHE 500-65 MG PO TABS 1 tablet twice daily as needed       FISH OIL 1200 MG OR CAPS 3 capsule daily       Flaxseed, Linseed, (FLAXSEED OIL PO) Take 1 Tablespoonful by mouth daily       fluticasone (FLONASE) 50 MCG/ACT nasal spray Spray 2 sprays into both nostrils daily 48 g 3     levalbuterol (XOPENEX HFA) 45 MCG/ACT inhaler INHALE 2 PUFFS INTO THE LUNGS EVERY 6 HOURS AS NEEDED FOR SHORTNESS OF  BREATH OR DIFFICULT BREATHING OR WHEEZING 15 g 1     levothyroxine (SYNTHROID/LEVOTHROID) 88 MCG tablet TAKE 1 TABLET(88 MCG) BY MOUTH DAILY 90 tablet 3     losartan (COZAAR) 100 MG tablet Take 1 tablet (100 mg) by mouth daily 90 tablet 3     magnesium 500 MG TABS Take 1 tablet by mouth daily       MELATONIN PO        metoprolol succinate ER (TOPROL-XL) 25 MG 24 hr tablet Take 1 tablet (25 mg) by mouth daily 90 tablet 3     montelukast (SINGULAIR) 10 MG tablet Take 1 tablet (10 mg) by mouth At Bedtime 90 tablet 3     Multiple Vitamins-Minerals (MULTIPLE VITAMIN  S/WOMENS) TABS Take 2 tablets by mouth daily       order for DME Equipment ordered: Respironics Auto PAP Mask type: Nasal Settings: 9-15 cm H2O       ranitidine (ZANTAC) 300 MG tablet Take 1 tablet (300 mg) by mouth At Bedtime 90 tablet 3     simvastatin (ZOCOR) 20 MG tablet Take 20 mg by mouth At Bedtime       ticagrelor (BRILINTA) 90 MG tablet Take 1 tablet (90 mg) by mouth 2 times daily Start tomorrow morning. 180 tablet 3     traZODone (DESYREL) 50 MG tablet Take 1-2 tablets ( mg) by mouth nightly as needed for sleep 180 tablet 3     venlafaxine (EFFEXOR-XR) 150 MG 24 hr capsule 2 Capsules daily 180 capsule 3     zinc 50 MG TABS Take 1 tablet by mouth daily         PAST SURGICAL HISTORY:  Past Surgical History:   Procedure Laterality Date     ARTHROPLASTY SHOULDER Right 11/01/2017    Right total shoulder arthroplasty by Dr. Go at [facility]     ARTHROPLASTY SHOULDER Left 03/28/2018    Left total shoulder arthroplasty by Dr. Go at [facility]     CHOLECYSTECTOMY, LAPOROSCOPIC       COLONOSCOPY  ?    Upper & Lower     CV CORONARY ANGIOGRAM  02/22/2019     CV CORONARY ANGIOGRAM N/A 2/22/2019    Procedure: 1330 CORS;  Surgeon: Marito Arriaga MD;  Location:  HEART CARDIAC CATH LAB     CV HEART CATHETERIZATION WITH POSSIBLE INTERVENTION N/A 3/18/2019    Procedure: STAGED PCI-JALEN CARABALLO;  Surgeon:  Zeus Cordero MD;  Location:  HEART CARDI[facility] CATH LAB     CYSTOSCOPY      bladder cancer     D & C       ENT SURGERY  first 1967/second ?    2 Rhynoplasties     HYSTERECTOMY, PAP NO LONGER INDICATED      BSO     NOSE SURGERY      septum deviation     PHACOEMULSIFICATION CLEAR CORNEA WITH STANDARD INTRAOCULAR LENS IMPLANT Left 9/24/2018    Procedure: PHACOEMULSIFICATION CLEAR CORNEA WITH STANDARD INTRAOCULAR LENS IMPLANT;  LEFT EYE PHACOEMULSIFICATION CLEAR CORNEA WITH STANDARD INTRAOCULAR LENS IMPLANT;  Surgeon: Ryne Pascal MD;  Location:  [facility]     PHACOEMULSIFICATION WITH STANDARD INTRAOCULAR LENS IMPLANT Right 10/15/2018    Procedure: PHACOEMULSIFICATION WITH STANDARD INTRAOCULAR LENS IMPLANT ;  Surgeon: Ryne Pascal MD;  Location: [facility] OR       ALLERGIES     Allergies   Allergen Reactions     Cymbalta      Visual hallucinations     Zomig [Zolmitriptan] Other (See Comments)     Can't wake up     Colestid [Colestipol Hcl]      Albuterol Other (See Comments)     Very jittery and couldn't tolerate     Amlodipine Other (See Comments) and Swelling     Swelling- lower legs     Asa [Aspirin] GI Disturbance     Atenolol Fatigue     Atorvastatin Other (See Comments)     Swelling legs     Bupropion Unknown     Codeine Nausea     Crestor [Hmg-Coa-R Inhibitors] Other (See Comments)     Joint pain     Ezetimibe Other (See Comments)     Fatigue     Hydralazine Other (See Comments) and Fatigue     Fatigue     Lisinopril Cough     Lovastatin Other (See Comments) and Cramps     Cramps     Niacin      Unknown by patient     Paroxetine Other (See Comments)     Fatigue     Paxil [Paroxetine] Fatigue     Potassium GI Disturbance     Sulfa Drugs Fatigue     Sulfasalazine Other (See Comments)     Triamterene Unknown     Zetia [Ezetimibe] Fatigue       FAMILY HISTORY  Family History   Problem Relation Age of Onset     Diabetes Maternal Grandfather      Hypertension Maternal Grandfather      Diabetes Son      Heart  Disease Father         CHF     Diabetes Mother      Depression Mother      Hyperlipidemia Mother      Anxiety Disorder Mother      Asthma Mother      Obesity Mother      Substance Abuse Brother      Diabetes Brother      Depression Daughter      Diabetes Maternal Uncle      Cerebrovascular Disease Maternal Uncle      Diabetes Son         after accident     Depression Daughter      Depression Son         after accident     Substance Abuse Brother         Not any longer     Thyroid Disease No family hx of      Glaucoma No family hx of      Macular Degeneration No family hx of      Cancer No family hx of        SOCIAL HISTORY  Social History     Socioeconomic History     Marital status:      Spouse name: Maggie     Number of children: 2     Years of education: 12     Highest education level: Not on file   Occupational History     Occupation: collections for [employer]     Employer: [employer]   Social Needs     Financial resource strain: Not on file     Food insecurity:     Worry: Not on file     Inability: Not on file     Transportation needs:     Medical: Not on file     Non-medical: Not on file   Tobacco Use     Smoking status: Former Smoker     Packs/day: 0.10     Years: 1.00     Pack years: 0.10     Types: Cigarettes     Start date: 1969     Last attempt to quit: 10/12/1969     Years since quittin.2     Smokeless tobacco: Never Used     Tobacco comment: Smoked, very little, for 2 mo, 49 years ago.   Substance and Sexual Activity     Alcohol use: Yes     Comment: Maybe 1 or 2 drinks a year     Drug use: No     Sexual activity: Not Currently     Partners: Female     Birth control/protection: Post-menopausal   Lifestyle     Physical activity:     Days per week: Not on file     Minutes per session: Not on file     Stress: Not on file   Relationships     Social connections:     Talks on phone: Not on file     Gets together: Not on file     Attends Hoahaoism service: Not on file     Active member  of club or organization: Not on file     Attends meetings of clubs or organizations: Not on file     Relationship status: Not on file     Intimate partner violence:     Fear of current or ex partner: Not on file     Emotionally abused: Not on file     Physically abused: Not on file     Forced sexual activity: Not on file   Other Topics Concern     Parent/sibling w/ CABG, MI or angioplasty before 65F 55M? No   Social History Narrative    Son  2012.    Wife, Maggie       ROS:   Constitutional: No fever, chills, or sweats. No weight gain/loss   ENT: No visual disturbance, ear ache, epistaxis, sore throat  Allergies/Immunologic: Negative  Respiratory: No cough, hemoptysia  Cardiovascular: As per HPI  GI: No nausea, vomiting, hematemesis, melena, or hematochezia  : No urinary frequency, dysuria, or hematuria  Integument: Negative  Psychiatric: Negative  Neuro: Negative  Endocrinology: Negative   Musculoskeletal: Negative  Vascular: No walking impairment, claudication, ischemic rest pain or nonhealing wounds    EXAM:  BP (!) 170/88   Pulse 66   Ht 1.524 m (5')   Wt 81.1 kg (178 lb 11.2 oz)   BMI 34.90 kg/m     In general, the patient is a pleasant female in no apparent distress.    HEENT: NC/AT.  PERRLA.  EOMI.  Sclerae white, not injected.  Nares clear.  Pharynx without erythema or exudate.  Dentition intact.    Neck: No adenopathy.  No thyromegaly. Carotids +2/2 bilaterally without bruits.  No jugular venous distension.   Heart: RRR. Normal S1, S2 splits physiologically. No murmur, rub, click, or gallop. The PMI is in the 5th ICS in the midclavicular line. There is no heave.    Lungs: CTA.  No ronchi, wheezes, rales.  No dullness to percussion.   Abdomen: Soft, nontender, nondistended. No organomegaly. No AAA.  No bruits.   Extremities: No clubbing, cyanosis, or edema.  No wounds. No varicose veins signs of chronic venous insufficiency.   Vascular: No bruits are noted.    Labs:  LIPID RESULTS:  Lab Results    Component Value Date    CHOL 136 09/30/2019    HDL 52 09/30/2019    LDL 42 09/30/2019    TRIG 211 (H) 09/30/2019    CHOLHDLRATIO 4.7 05/04/2015    NHDL 84 09/30/2019       LIVER ENZYME RESULTS:  Lab Results   Component Value Date    AST 31 09/30/2019    ALT 51 (H) 09/30/2019       CBC RESULTS:  Lab Results   Component Value Date    WBC 6.2 03/18/2019    RBC 4.72 03/18/2019    HGB 14.5 03/18/2019    HCT 44.2 03/18/2019    MCV 94 03/18/2019    MCH 30.7 03/18/2019    MCHC 32.8 03/18/2019    RDW 13.1 03/18/2019     03/18/2019       BMP RESULTS:  Lab Results   Component Value Date     09/30/2019    POTASSIUM 4.5 09/30/2019    CHLORIDE 110 (H) 09/30/2019    CO2 25 09/30/2019    ANIONGAP 5 09/30/2019     (H) 09/30/2019    BUN 17 09/30/2019    CR 1.01 09/30/2019    GFRESTIMATED 56 (L) 09/30/2019    GFRESTBLACK 65 09/30/2019    SAMARIA 8.8 09/30/2019        A1C RESULTS:  No results found for: A1C    Procedures:    EKG - RBBB, no Q waves.     Coronary Angiography          Assessment and Plan:     69 year old with new RBBB discovered on EKG, newly diagnosed CAD (3V, RCA, OM/LCX and LAD) s/p SHELBI to RCA and OM/LCX. Moderate stenosis (FFR 0.86) remains within the prox LAD, medically managing however she is having recurrence of her atypical symptoms (Gerd/Burping). She is on DAPT with ticagrelor and aspirin and has completed cardiac rehab. Has HTN, HLD which requires ongoing control. LDL is at goal today. BP usually is controlled minus outlier today with traffic.    Recommendations:    1) Continue DAPT for now  4) Goal BP < 130/90   5) Discussed mediterranean style of eating which provides optimal health and eating a low processed, low sugar diet  4) Keep simvastatin at 40 mg daily  5) Cardiac rehab completed  6) Nuclear stress test for LAD territory lesion   7) Swelling likely CVI, addressed compression stockings.       Ibeth Infante MD MSc  [facility]     Thank you for allowing me to participate in  the care of your patient.      Sincerely,     Ibeth Infante MD     [facility] [place]    cc:   Referred Self, MD  No address on file

## 2020-01-13 NOTE — LETTER
"1/13/2020    Sarah Vallejo, NP  [address] [facility] [address]    RE: Abbey Omer       Dear Colleague,    I had the pleasure of seeing Abbey IRWIN Omer in the [facility].            HPI:      This is a 69 year old patient with PMH HTN, HLD, and syncope in setting of dehydration here for follow up with me. I initially saw her last year in setting of RBBB discovered on EKG.     Prior History:     She has no david syncope history or bradycardia that she knows about. No LOC in past or unexplained falls. Has a h/o migranes which seem well controlled. Only had an episode in [place] once of dizziness leading to diaphoresis and passing out for which she was sent to hospital and improved with fluids. She was told her neck veins by ultrasound were very small. She has a h/o high cholesterol, stopped taking statin but she is unclear why. Her LDL was 167 and TC and TGs > 200. BP has been overall well controlled and is being managed by PCP. On ROS she had some atypical chest pain at rest. Does not heavily exert herself and is not involved in an exercise regimen. Her CP is better with burping. However on climbing stairs she fatigues easy and gets short of breath. She feels her legs are swollen. No varicose veins, ulcerations, claudication. No orthopnea or PND.     We obtained a coronary CT and suprisingly found severe stenoses of the LCX and RCA with moderate Ramus and LAD stenoses (FFR 0.86). She underwent coronary angiogram where she had staged PCI with SHELBI - first to the OM/LCX and then to the prox-mid RCA by radial approach 2/2019-3/2019.     Interval History:     She completed cardiac rehab. She has been tolerating DAPT well with no bleeding issues. Does not report side effects from ticagrelor. Statin was initiated and attempt at uptitration however LDL came back low at goal with 40 mg daily. No chest pain today. She did have one episode of \"burping\" which was " similar to prior episode but not frequently like she had before. No SOB, mild ankle edema. She reports BP high today due to traffic getting here, usually well controlled.       PAST MEDICAL HISTORY  Past Medical History:   Diagnosis Date     Cancer of bladder (H)      Cataract 11/25/2011     Chronic nonallergic rhinitis 8/31/10 RAST     CKD (chronic kidney disease) stage 3, GFR 30-59 ml/min (H) 12/20/2010     Coronary artery disease involving native coronary artery of native heart without angina pectoris 3/1/2019     DDD (degenerative disc disease), cervical 6/16/2011     Dependent edema      Depression, major      Depressive disorder In 2002 or 2003     History of blood transfusion ?     HTN (hypertension)      Hyperlipidemia      Hypothyroidism 10/12/2009     Migraine     resolved     Nasal septal deviation 8/11/2009     Obesity 5/17/2011     RICKEY (obstructive sleep apnea)      Osteoarthritis of shoulder region      Osteoporosis      Premature menopause      Renal cyst        CURRENT MEDICATIONS  Current Outpatient Medications   Medication Sig Dispense Refill     allopurinol (ZYLOPRIM) 100 MG tablet Take 1 tablet (100 mg) by mouth daily 90 tablet 3     B Complex Vitamins (VITAMIN B COMPLEX PO)        BEE POLLEN PO Bee Pollen, unprocessed, raw, natural, no preservatives       CALCIUM + D 600-200 MG-UNIT OR TABS 1 tablet twice a day       colchicine (COLCRYS) 0.6 MG tablet Take 1 tablet (0.6 mg) by mouth as needed (goute flare) 30 tablet 3     Cranberry 500 MG CAPS Take 1 capsule by mouth daily       EXCEDRIN TENSION HEADACHE 500-65 MG PO TABS 1 tablet twice daily as needed       FISH OIL 1200 MG OR CAPS 3 capsule daily       Flaxseed, Linseed, (FLAXSEED OIL PO) Take 1 Tablespoonful by mouth daily       fluticasone (FLONASE) 50 MCG/ACT nasal spray Spray 2 sprays into both nostrils daily 48 g 3     levalbuterol (XOPENEX HFA) 45 MCG/ACT inhaler INHALE 2 PUFFS INTO THE LUNGS EVERY 6 HOURS AS NEEDED FOR SHORTNESS OF  BREATH OR DIFFICULT BREATHING OR WHEEZING 15 g 1     levothyroxine (SYNTHROID/LEVOTHROID) 88 MCG tablet TAKE 1 TABLET(88 MCG) BY MOUTH DAILY 90 tablet 3     losartan (COZAAR) 100 MG tablet Take 1 tablet (100 mg) by mouth daily 90 tablet 3     magnesium 500 MG TABS Take 1 tablet by mouth daily       MELATONIN PO        metoprolol succinate ER (TOPROL-XL) 25 MG 24 hr tablet Take 1 tablet (25 mg) by mouth daily 90 tablet 3     montelukast (SINGULAIR) 10 MG tablet Take 1 tablet (10 mg) by mouth At Bedtime 90 tablet 3     Multiple Vitamins-Minerals (MULTIPLE VITAMIN  S/WOMENS) TABS Take 2 tablets by mouth daily       order for DME Equipment ordered: Respironics Auto PAP Mask type: Nasal Settings: 9-15 cm H2O       ranitidine (ZANTAC) 300 MG tablet Take 1 tablet (300 mg) by mouth At Bedtime 90 tablet 3     simvastatin (ZOCOR) 20 MG tablet Take 20 mg by mouth At Bedtime       ticagrelor (BRILINTA) 90 MG tablet Take 1 tablet (90 mg) by mouth 2 times daily Start tomorrow morning. 180 tablet 3     traZODone (DESYREL) 50 MG tablet Take 1-2 tablets ( mg) by mouth nightly as needed for sleep 180 tablet 3     venlafaxine (EFFEXOR-XR) 150 MG 24 hr capsule 2 Capsules daily 180 capsule 3     zinc 50 MG TABS Take 1 tablet by mouth daily         PAST SURGICAL HISTORY:  Past Surgical History:   Procedure Laterality Date     ARTHROPLASTY SHOULDER Right 11/01/2017    Right total shoulder arthroplasty by Dr. Go at [facility]     ARTHROPLASTY SHOULDER Left 03/28/2018    Left total shoulder arthroplasty by Dr. Go at [facility]     CHOLECYSTECTOMY, LAPOROSCOPIC       COLONOSCOPY  ?    Upper & Lower     CV CORONARY ANGIOGRAM  02/22/2019     CV CORONARY ANGIOGRAM N/A 2/22/2019    Procedure: 1330 CORS;  Surgeon: Marito Arriaga MD;  Location:  HEART [facility]ARD[facility] CATH LAB     CV HEART CATHETERIZATION WITH POSSIBLE INTERVENTION N/A 3/18/2019    Procedure: STAGED PCI-JALEN CARABALLO;  Surgeon:  Zeus Cordero MD;  Location:  HEART [facility]ARD[facility] CATH LAB     CYSTOSCOPY      bladder cancer     D & C       ENT SURGERY  first 1967/second ?    2 Rhynoplasties     HYSTERECTOMY, PAP NO LONGER INDICATED      BSO     NOSE SURGERY      septum deviation     PHACOEMULSIFICATION CLEAR CORNEA WITH STANDARD INTRAOCULAR LENS IMPLANT Left 9/24/2018    Procedure: PHACOEMULSIFICATION CLEAR CORNEA WITH STANDARD INTRAOCULAR LENS IMPLANT;  LEFT EYE PHACOEMULSIFICATION CLEAR CORNEA WITH STANDARD INTRAOCULAR LENS IMPLANT;  Surgeon: Ryne Pascal MD;  Location:  [facility]     PHACOEMULSIFICATION WITH STANDARD INTRAOCULAR LENS IMPLANT Right 10/15/2018    Procedure: PHACOEMULSIFICATION WITH STANDARD INTRAOCULAR LENS IMPLANT ;  Surgeon: Ryne Pascal MD;  Location: [facility] OR       ALLERGIES     Allergies   Allergen Reactions     Cymbalta      Visual hallucinations     Zomig [Zolmitriptan] Other (See Comments)     Can't wake up     Colestid [Colestipol Hcl]      Albuterol Other (See Comments)     Very jittery and couldn't tolerate     Amlodipine Other (See Comments) and Swelling     Swelling- lower legs     Asa [Aspirin] GI Disturbance     Atenolol Fatigue     Atorvastatin Other (See Comments)     Swelling legs     Bupropion Unknown     Codeine Nausea     Crestor [Hmg-Coa-R Inhibitors] Other (See Comments)     Joint pain     Ezetimibe Other (See Comments)     Fatigue     Hydralazine Other (See Comments) and Fatigue     Fatigue     Lisinopril Cough     Lovastatin Other (See Comments) and Cramps     Cramps     Niacin      Unknown by patient     Paroxetine Other (See Comments)     Fatigue     Paxil [Paroxetine] Fatigue     Potassium GI Disturbance     Sulfa Drugs Fatigue     Sulfasalazine Other (See Comments)     Triamterene Unknown     Zetia [Ezetimibe] Fatigue       FAMILY HISTORY  Family History   Problem Relation Age of Onset     Diabetes Maternal Grandfather      Hypertension Maternal Grandfather      Diabetes Son      Heart  Disease Father         CHF     Diabetes Mother      Depression Mother      Hyperlipidemia Mother      Anxiety Disorder Mother      Asthma Mother      Obesity Mother      Substance Abuse Brother      Diabetes Brother      Depression Daughter      Diabetes Maternal Uncle      Cerebrovascular Disease Maternal Uncle      Diabetes Son         after accident     Depression Daughter      Depression Son         after accident     Substance Abuse Brother         Not any longer     Thyroid Disease No family hx of      Glaucoma No family hx of      Macular Degeneration No family hx of      Cancer No family hx of        SOCIAL HISTORY  Social History     Socioeconomic History     Marital status:      Spouse name: Maggie     Number of children: 2     Years of education: 12     Highest education level: Not on file   Occupational History     Occupation: collections for [employer]     Employer: [employer]   Social Needs     Financial resource strain: Not on file     Food insecurity:     Worry: Not on file     Inability: Not on file     Transportation needs:     Medical: Not on file     Non-medical: Not on file   Tobacco Use     Smoking status: Former Smoker     Packs/day: 0.10     Years: 1.00     Pack years: 0.10     Types: Cigarettes     Start date: 1969     Last attempt to quit: 10/12/1969     Years since quittin.2     Smokeless tobacco: Never Used     Tobacco comment: Smoked, very little, for 2 mo, 49 years ago.   Substance and Sexual Activity     Alcohol use: Yes     Comment: Maybe 1 or 2 drinks a year     Drug use: No     Sexual activity: Not Currently     Partners: Female     Birth control/protection: Post-menopausal   Lifestyle     Physical activity:     Days per week: Not on file     Minutes per session: Not on file     Stress: Not on file   Relationships     Social connections:     Talks on phone: Not on file     Gets together: Not on file     Attends Christian service: Not on file     Active member  of club or organization: Not on file     Attends meetings of clubs or organizations: Not on file     Relationship status: Not on file     Intimate partner violence:     Fear of current or ex partner: Not on file     Emotionally abused: Not on file     Physically abused: Not on file     Forced sexual activity: Not on file   Other Topics Concern     Parent/sibling w/ CABG, MI or angioplasty before 65F 55M? No   Social History Narrative    Son  2012.    Wife, Maggie       ROS:   Constitutional: No fever, chills, or sweats. No weight gain/loss   ENT: No visual disturbance, ear ache, epistaxis, sore throat  Allergies/Immunologic: Negative  Respiratory: No cough, hemoptysia  Cardiovascular: As per HPI  GI: No nausea, vomiting, hematemesis, melena, or hematochezia  : No urinary frequency, dysuria, or hematuria  Integument: Negative  Psychiatric: Negative  Neuro: Negative  Endocrinology: Negative   Musculoskeletal: Negative  Vascular: No walking impairment, claudication, ischemic rest pain or nonhealing wounds    EXAM:  BP (!) 170/88   Pulse 66   Ht 1.524 m (5')   Wt 81.1 kg (178 lb 11.2 oz)   BMI 34.90 kg/m     In general, the patient is a pleasant female in no apparent distress.    HEENT: NC/AT.  PERRLA.  EOMI.  Sclerae white, not injected.  Nares clear.  Pharynx without erythema or exudate.  Dentition intact.    Neck: No adenopathy.  No thyromegaly. Carotids +2/2 bilaterally without bruits.  No jugular venous distension.   Heart: RRR. Normal S1, S2 splits physiologically. No murmur, rub, click, or gallop. The PMI is in the 5th ICS in the midclavicular line. There is no heave.    Lungs: CTA.  No ronchi, wheezes, rales.  No dullness to percussion.   Abdomen: Soft, nontender, nondistended. No organomegaly. No AAA.  No bruits.   Extremities: No clubbing, cyanosis, or edema.  No wounds. No varicose veins signs of chronic venous insufficiency.   Vascular: No bruits are noted.    Labs:  LIPID RESULTS:  Lab Results    Component Value Date    CHOL 136 09/30/2019    HDL 52 09/30/2019    LDL 42 09/30/2019    TRIG 211 (H) 09/30/2019    CHOLHDLRATIO 4.7 05/04/2015    NHDL 84 09/30/2019     LIVER ENZYME RESULTS:  Lab Results   Component Value Date    AST 31 09/30/2019    ALT 51 (H) 09/30/2019       CBC RESULTS:  Lab Results   Component Value Date    WBC 6.2 03/18/2019    RBC 4.72 03/18/2019    HGB 14.5 03/18/2019    HCT 44.2 03/18/2019    MCV 94 03/18/2019    MCH 30.7 03/18/2019    MCHC 32.8 03/18/2019    RDW 13.1 03/18/2019     03/18/2019     BMP RESULTS:  Lab Results   Component Value Date     09/30/2019    POTASSIUM 4.5 09/30/2019    CHLORIDE 110 (H) 09/30/2019    CO2 25 09/30/2019    ANIONGAP 5 09/30/2019     (H) 09/30/2019    BUN 17 09/30/2019    CR 1.01 09/30/2019    GFRESTIMATED 56 (L) 09/30/2019    GFRESTBLACK 65 09/30/2019    SAMARIA 8.8 09/30/2019        A1C RESULTS:  No results found for: A1C    Procedures:    EKG - RBBB, no Q waves.     Coronary Angiography          Assessment and Plan:     69 year old with new RBBB discovered on EKG, newly diagnosed CAD (3V, RCA, OM/LCX and LAD) s/p SHELBI to RCA and OM/LCX. Moderate stenosis (FFR 0.86) remains within the prox LAD, medically managing however she is having recurrence of her atypical symptoms (Gerd/Burping). She is on DAPT with ticagrelor and aspirin and has completed cardiac rehab. Has HTN, HLD which requires ongoing control. LDL is at goal today. BP usually is controlled minus outlier today with traffic.    Recommendations:    1) Continue DAPT for now  4) Goal BP < 130/90   5) Discussed mediterranean style of eating which provides optimal health and eating a low processed, low sugar diet  4) Keep simvastatin at 40 mg daily  5) Cardiac rehab completed  6) Nuclear stress test for LAD territory lesion   7) Swelling likely CVI, addressed compression stockings.       Ibeth Infante MD MSc  [facility]     Thank you for allowing me to participate in the  care of your patient.    Sincerely,     Ibeth Infante MD     [facility]

## 2020-01-13 NOTE — PROGRESS NOTES
"        HPI:      This is a 69 year old patient with PMH HTN, HLD, and syncope in setting of dehydration here for follow up with me. I initially saw her last year in setting of RBBB discovered on EKG.     Prior History:     She has no david syncope history or bradycardia that she knows about. No LOC in past or unexplained falls. Has a h/o migranes which seem well controlled. Only had an episode in [facility] once of dizziness leading to diaphoresis and passing out for which she was sent to hospital and improved with fluids. She was told her neck veins by ultrasound were very small. She has a h/o high cholesterol, stopped taking statin but she is unclear why. Her LDL was 167 and TC and TGs > 200. BP has been overall well controlled and is being managed by PCP. On ROS she had some atypical chest pain at rest. Does not heavily exert herself and is not involved in an exercise regimen. Her CP is better with burping. However on climbing stairs she fatigues easy and gets short of breath. She feels her legs are swollen. No varicose veins, ulcerations, claudication. No orthopnea or PND.     We obtained a coronary CT and suprisingly found severe stenoses of the LCX and RCA with moderate Ramus and LAD stenoses (FFR 0.86). She underwent coronary angiogram where she had staged PCI with SHELBI - first to the OM/LCX and then to the prox-mid RCA by radial approach 2/2019-3/2019.     Interval History:     She completed cardiac rehab. She has been tolerating DAPT well with no bleeding issues. Does not report side effects from ticagrelor. Statin was initiated and attempt at uptitration however LDL came back low at goal with 40 mg daily. No chest pain today. She did have one episode of \"burping\" which was similar to prior episode but not frequently like she had before. No SOB, mild ankle edema. She reports BP high today due to traffic getting here, usually well controlled.       PAST MEDICAL HISTORY  Past Medical History:   Diagnosis Date     " Cancer of bladder (H)      Cataract 11/25/2011     Chronic nonallergic rhinitis 8/31/10 RAST     CKD (chronic kidney disease) stage 3, GFR 30-59 ml/min (H) 12/20/2010     Coronary artery disease involving native coronary artery of native heart without angina pectoris 3/1/2019     DDD (degenerative disc disease), cervical 6/16/2011     Dependent edema      Depression, major      Depressive disorder In 2002 or 2003     History of blood transfusion ?     HTN (hypertension)      Hyperlipidemia      Hypothyroidism 10/12/2009     Migraine     resolved     Nasal septal deviation 8/11/2009     Obesity 5/17/2011     RICKEY (obstructive sleep apnea)      Osteoarthritis of shoulder region      Osteoporosis      Premature menopause      Renal cyst        CURRENT MEDICATIONS  Current Outpatient Medications   Medication Sig Dispense Refill     allopurinol (ZYLOPRIM) 100 MG tablet Take 1 tablet (100 mg) by mouth daily 90 tablet 3     B Complex Vitamins (VITAMIN B COMPLEX PO)        BEE POLLEN PO Bee Pollen, unprocessed, raw, natural, no preservatives       CALCIUM + D 600-200 MG-UNIT OR TABS 1 tablet twice a day       colchicine (COLCRYS) 0.6 MG tablet Take 1 tablet (0.6 mg) by mouth as needed (goute flare) 30 tablet 3     Cranberry 500 MG CAPS Take 1 capsule by mouth daily       EXCEDRIN TENSION HEADACHE 500-65 MG PO TABS 1 tablet twice daily as needed       FISH OIL 1200 MG OR CAPS 3 capsule daily       Flaxseed, Linseed, (FLAXSEED OIL PO) Take 1 Tablespoonful by mouth daily       fluticasone (FLONASE) 50 MCG/ACT nasal spray Spray 2 sprays into both nostrils daily 48 g 3     levalbuterol (XOPENEX HFA) 45 MCG/ACT inhaler INHALE 2 PUFFS INTO THE LUNGS EVERY 6 HOURS AS NEEDED FOR SHORTNESS OF BREATH OR DIFFICULT BREATHING OR WHEEZING 15 g 1     levothyroxine (SYNTHROID/LEVOTHROID) 88 MCG tablet TAKE 1 TABLET(88 MCG) BY MOUTH DAILY 90 tablet 3     losartan (COZAAR) 100 MG tablet Take 1 tablet (100 mg) by mouth daily 90 tablet 3      magnesium 500 MG TABS Take 1 tablet by mouth daily       MELATONIN PO        metoprolol succinate ER (TOPROL-XL) 25 MG 24 hr tablet Take 1 tablet (25 mg) by mouth daily 90 tablet 3     montelukast (SINGULAIR) 10 MG tablet Take 1 tablet (10 mg) by mouth At Bedtime 90 tablet 3     Multiple Vitamins-Minerals (MULTIPLE VITAMIN  S/WOMENS) TABS Take 2 tablets by mouth daily       order for DME Equipment ordered: Respironics Auto PAP Mask type: Nasal Settings: 9-15 cm H2O       ranitidine (ZANTAC) 300 MG tablet Take 1 tablet (300 mg) by mouth At Bedtime 90 tablet 3     simvastatin (ZOCOR) 20 MG tablet Take 20 mg by mouth At Bedtime       ticagrelor (BRILINTA) 90 MG tablet Take 1 tablet (90 mg) by mouth 2 times daily Start tomorrow morning. 180 tablet 3     traZODone (DESYREL) 50 MG tablet Take 1-2 tablets ( mg) by mouth nightly as needed for sleep 180 tablet 3     venlafaxine (EFFEXOR-XR) 150 MG 24 hr capsule 2 Capsules daily 180 capsule 3     zinc 50 MG TABS Take 1 tablet by mouth daily         PAST SURGICAL HISTORY:  Past Surgical History:   Procedure Laterality Date     ARTHROPLASTY SHOULDER Right 11/01/2017    Right total shoulder arthroplasty by Dr. Go at [facility]     ARTHROPLASTY SHOULDER Left 03/28/2018    Left total shoulder arthroplasty by Dr. Go at [facility]     CHOLECYSTECTOMY, LAPOROSCOPIC       COLONOSCOPY  ?    Upper & Lower     CV CORONARY ANGIOGRAM  02/22/2019     CV CORONARY ANGIOGRAM N/A 2/22/2019    Procedure: 1330 CORS;  Surgeon: Marito Arriaga MD;  Location:  [facility]     CV HEART CATHETERIZATION WITH POSSIBLE INTERVENTION N/A 3/18/2019    Procedure: STAGED PCI-JALEN CARABALLO;  Surgeon: Zeus Cordero MD;  [facility]     CYSTOSCOPY      bladder cancer     D & C       ENT SURGERY  first 1967/second ?    2 Rhynoplasties     HYSTERECTOMY, PAP NO LONGER INDICATED      BSO     NOSE SURGERY      septum  deviation     PHACOEMULSIFICATION CLEAR CORNEA WITH STANDARD INTRAOCULAR LENS IMPLANT Left 9/24/2018    Procedure: PHACOEMULSIFICATION CLEAR CORNEA WITH STANDARD INTRAOCULAR LENS IMPLANT;  LEFT EYE PHACOEMULSIFICATION CLEAR CORNEA WITH STANDARD INTRAOCULAR LENS IMPLANT;  Surgeon: Ryne Pascal MD;  Location:  [facility]     PHACOEMULSIFICATION WITH STANDARD INTRAOCULAR LENS IMPLANT Right 10/15/2018    Procedure: PHACOEMULSIFICATION WITH STANDARD INTRAOCULAR LENS IMPLANT ;  Surgeon: Ryne Pascal MD;  Location: [facility]       ALLERGIES     Allergies   Allergen Reactions     Cymbalta      Visual hallucinations     Zomig [Zolmitriptan] Other (See Comments)     Can't wake up     Colestid [Colestipol Hcl]      Albuterol Other (See Comments)     Very jittery and couldn't tolerate     Amlodipine Other (See Comments) and Swelling     Swelling- lower legs     Asa [Aspirin] GI Disturbance     Atenolol Fatigue     Atorvastatin Other (See Comments)     Swelling legs     Bupropion Unknown     Codeine Nausea     Crestor [Hmg-Coa-R Inhibitors] Other (See Comments)     Joint pain     Ezetimibe Other (See Comments)     Fatigue     Hydralazine Other (See Comments) and Fatigue     Fatigue     Lisinopril Cough     Lovastatin Other (See Comments) and Cramps     Cramps     Niacin      Unknown by patient     Paroxetine Other (See Comments)     Fatigue     Paxil [Paroxetine] Fatigue     Potassium GI Disturbance     Sulfa Drugs Fatigue     Sulfasalazine Other (See Comments)     Triamterene Unknown     Zetia [Ezetimibe] Fatigue       FAMILY HISTORY  Family History   Problem Relation Age of Onset     Diabetes Maternal Grandfather      Hypertension Maternal Grandfather      Diabetes Son      Heart Disease Father         CHF     Diabetes Mother      Depression Mother      Hyperlipidemia Mother      Anxiety Disorder Mother      Asthma Mother      Obesity Mother      Substance Abuse Brother      Diabetes Brother      Depression Daughter       Diabetes Maternal Uncle      Cerebrovascular Disease Maternal Uncle      Diabetes Son         after accident     Depression Daughter      Depression Son         after accident     Substance Abuse Brother         Not any longer     Thyroid Disease No family hx of      Glaucoma No family hx of      Macular Degeneration No family hx of      Cancer No family hx of        SOCIAL HISTORY  Social History     Socioeconomic History     Marital status:      Spouse name: Maggie     Number of children: 2     Years of education: 12     Highest education level: Not on file   Occupational History     Occupation: collections for [employer]     Employer: [employer]   Social Needs     Financial resource strain: Not on file     Food insecurity:     Worry: Not on file     Inability: Not on file     Transportation needs:     Medical: Not on file     Non-medical: Not on file   Tobacco Use     Smoking status: Former Smoker     Packs/day: 0.10     Years: 1.00     Pack years: 0.10     Types: Cigarettes     Start date: 1969     Last attempt to quit: 10/12/1969     Years since quittin.2     Smokeless tobacco: Never Used     Tobacco comment: Smoked, very little, for 2 mo, 49 years ago.   Substance and Sexual Activity     Alcohol use: Yes     Comment: Maybe 1 or 2 drinks a year     Drug use: No     Sexual activity: Not Currently     Partners: Female     Birth control/protection: Post-menopausal   Lifestyle     Physical activity:     Days per week: Not on file     Minutes per session: Not on file     Stress: Not on file   Relationships     Social connections:     Talks on phone: Not on file     Gets together: Not on file     Attends Buddhist service: Not on file     Active member of club or organization: Not on file     Attends meetings of clubs or organizations: Not on file     Relationship status: Not on file     Intimate partner violence:     Fear of current or ex partner: Not on file     Emotionally abused: Not on file      Physically abused: Not on file     Forced sexual activity: Not on file   Other Topics Concern     Parent/sibling w/ CABG, MI or angioplasty before 65F 55M? No   Social History Narrative    Son  2012.    Wife, Maggie       ROS:   Constitutional: No fever, chills, or sweats. No weight gain/loss   ENT: No visual disturbance, ear ache, epistaxis, sore throat  Allergies/Immunologic: Negative  Respiratory: No cough, hemoptysia  Cardiovascular: As per HPI  GI: No nausea, vomiting, hematemesis, melena, or hematochezia  : No urinary frequency, dysuria, or hematuria  Integument: Negative  Psychiatric: Negative  Neuro: Negative  Endocrinology: Negative   Musculoskeletal: Negative  Vascular: No walking impairment, claudication, ischemic rest pain or nonhealing wounds    EXAM:  BP (!) 170/88   Pulse 66   Ht 1.524 m (5')   Wt 81.1 kg (178 lb 11.2 oz)   BMI 34.90 kg/m    In general, the patient is a pleasant female in no apparent distress.    HEENT: NC/AT.  PERRLA.  EOMI.  Sclerae white, not injected.  Nares clear.  Pharynx without erythema or exudate.  Dentition intact.    Neck: No adenopathy.  No thyromegaly. Carotids +2/2 bilaterally without bruits.  No jugular venous distension.   Heart: RRR. Normal S1, S2 splits physiologically. No murmur, rub, click, or gallop. The PMI is in the 5th ICS in the midclavicular line. There is no heave.    Lungs: CTA.  No ronchi, wheezes, rales.  No dullness to percussion.   Abdomen: Soft, nontender, nondistended. No organomegaly. No AAA.  No bruits.   Extremities: No clubbing, cyanosis, or edema.  No wounds. No varicose veins signs of chronic venous insufficiency.   Vascular: No bruits are noted.    Labs:  LIPID RESULTS:  Lab Results   Component Value Date    CHOL 136 2019    HDL 52 2019    LDL 42 2019    TRIG 211 (H) 2019    CHOLHDLRATIO 4.7 2015    NHDL 84 2019       LIVER ENZYME RESULTS:  Lab Results   Component Value Date    AST 31  09/30/2019    ALT 51 (H) 09/30/2019       CBC RESULTS:  Lab Results   Component Value Date    WBC 6.2 03/18/2019    RBC 4.72 03/18/2019    HGB 14.5 03/18/2019    HCT 44.2 03/18/2019    MCV 94 03/18/2019    MCH 30.7 03/18/2019    MCHC 32.8 03/18/2019    RDW 13.1 03/18/2019     03/18/2019       BMP RESULTS:  Lab Results   Component Value Date     09/30/2019    POTASSIUM 4.5 09/30/2019    CHLORIDE 110 (H) 09/30/2019    CO2 25 09/30/2019    ANIONGAP 5 09/30/2019     (H) 09/30/2019    BUN 17 09/30/2019    CR 1.01 09/30/2019    GFRESTIMATED 56 (L) 09/30/2019    GFRESTBLACK 65 09/30/2019    SAMARIA 8.8 09/30/2019        A1C RESULTS:  No results found for: A1C    Procedures:    EKG - RBBB, no Q waves.     Coronary Angiography          Assessment and Plan:     69 year old with new RBBB discovered on EKG, newly diagnosed CAD (3V, RCA, OM/LCX and LAD) s/p SHELBI to RCA and OM/LCX. Moderate stenosis (FFR 0.86) remains within the prox LAD, medically managing however she is having recurrence of her atypical symptoms (Gerd/Burping). She is on DAPT with ticagrelor and aspirin and has completed cardiac rehab. Has HTN, HLD which requires ongoing control. LDL is at goal today. BP usually is controlled minus outlier today with traffic.    Recommendations:    1) Continue DAPT for now  4) Goal BP < 130/90   5) Discussed mediterranean style of eating which provides optimal health and eating a low processed, low sugar diet  4) Keep simvastatin at 40 mg daily  5) Cardiac rehab completed  6) Nuclear stress test for LAD territory lesion   7) Swelling likely CVI, addressed compression stockings.       Ibeth Infante MD MSc  [facility]

## 2020-01-13 NOTE — PATIENT INSTRUCTIONS
1. Continue ticagrelor and aspirin  2. Follow up in 6 months with Dr. Infante at [facility]  3. Nuclear stress test at [facility] at your convenience   4. Please call if BP > 130/90 and we can adjust medications

## 2020-01-17 ENCOUNTER — HOSPITAL ENCOUNTER (OUTPATIENT)
Dept: NUCLEAR MEDICINE | Facility: CLINIC | Age: 70
Setting detail: NUCLEAR MEDICINE
End: 2020-01-17
Attending: INTERNAL MEDICINE
Payer: MEDICARE

## 2020-01-17 ENCOUNTER — HOSPITAL ENCOUNTER (OUTPATIENT)
Dept: CARDIOLOGY | Facility: CLINIC | Age: 70
Discharge: HOME | End: 2020-01-17
Attending: INTERNAL MEDICINE | Admitting: INTERNAL MEDICINE
Payer: MEDICARE

## 2020-01-17 DIAGNOSIS — I25.10 CORONARY ARTERY DISEASE INVOLVING NATIVE CORONARY ARTERY OF NATIVE HEART WITHOUT ANGINA PECTORIS: ICD-10-CM

## 2020-01-17 LAB
CV STRESS MAX HR HE: 78
RATE PRESSURE PRODUCT: NORMAL
STRESS ECHO BASELINE DIASTOLIC HE: 78
STRESS ECHO BASELINE HR: 58
STRESS ECHO BASELINE SYSTOLIC BP: 148
STRESS ECHO CALCULATED PERCENT HR: 52 %
STRESS ECHO LAST STRESS DIASTOLIC BP: 86
STRESS ECHO LAST STRESS SYSTOLIC BP: 139
STRESS ECHO TARGET HR: 150

## 2020-01-17 PROCEDURE — 78452 HT MUSCLE IMAGE SPECT MULT: CPT

## 2020-01-17 PROCEDURE — 34300033 ZZH RX 343: Performed by: INTERNAL MEDICINE

## 2020-01-17 PROCEDURE — 25000128 H RX IP 250 OP 636: Performed by: INTERNAL MEDICINE

## 2020-01-17 PROCEDURE — A9502 TC99M TETROFOSMIN: HCPCS | Performed by: INTERNAL MEDICINE

## 2020-01-17 PROCEDURE — 93017 CV STRESS TEST TRACING ONLY: CPT

## 2020-01-17 PROCEDURE — 93016 CV STRESS TEST SUPVJ ONLY: CPT | Performed by: INTERNAL MEDICINE

## 2020-01-17 RX ORDER — AMINOPHYLLINE 25 MG/ML
50-100 INJECTION, SOLUTION INTRAVENOUS
Status: DISCONTINUED | OUTPATIENT
Start: 2020-01-17 | End: 2020-01-18 | Stop reason: HOSPADM

## 2020-01-17 RX ORDER — REGADENOSON 0.08 MG/ML
0.4 INJECTION, SOLUTION INTRAVENOUS ONCE
Status: COMPLETED | OUTPATIENT
Start: 2020-01-17 | End: 2020-01-17

## 2020-01-17 RX ORDER — ACYCLOVIR 200 MG/1
0-1 CAPSULE ORAL
Status: DISCONTINUED | OUTPATIENT
Start: 2020-01-17 | End: 2020-01-18 | Stop reason: HOSPADM

## 2020-01-17 RX ADMIN — REGADENOSON 0.4 MG: 0.08 INJECTION, SOLUTION INTRAVENOUS at 13:50

## 2020-01-17 RX ADMIN — TETROFOSMIN 10.3 MCI.: 1.38 INJECTION, POWDER, LYOPHILIZED, FOR SOLUTION INTRAVENOUS at 12:40

## 2020-01-17 RX ADMIN — TETROFOSMIN 40 MCI.: 1.38 INJECTION, POWDER, LYOPHILIZED, FOR SOLUTION INTRAVENOUS at 13:41

## 2020-01-17 NOTE — PROGRESS NOTES
Pt here for Lexiscan nuclear stress test.  Medication and side effects reviewed with patient. Lung sounds clear to auscultation bilaterally. Denied caffeine use. Patient tolerated Lexiscan dose without any adverse reactions. VSS. Monitored post injection and then taken to the [facility] waiting room and instructed to wait there for nuclear medicine tech for follow up imaging.

## 2020-01-21 ENCOUNTER — OFFICE VISIT (OUTPATIENT)
Dept: UROLOGY | Facility: CLINIC | Age: 70
End: 2020-01-21
Payer: MEDICARE

## 2020-01-21 DIAGNOSIS — Z85.51 HISTORY OF BLADDER CANCER: Primary | ICD-10-CM

## 2020-01-21 PROCEDURE — 52000 CYSTOURETHROSCOPY: CPT | Performed by: UROLOGY

## 2020-01-21 NOTE — PROGRESS NOTES
S: Abbey Omer is a 69 year old female returns for bladder cancer surveillance.    she has history of bladder cancer many years ago without any recurrence.    She received 0 courses of BCG therapy.    Patient is draped and prepped.  Flexible cystoscopy placed under direct vision.      Cysto:  The anterior urethra is normal     In the bladder there is normal mucosa.    Assessment/Plan:  (Z85.51) History of bladder cancer  (primary encounter diagnosis)  Comment: no recurrence   Plan: Cystoscopy        In one year

## 2020-01-22 ENCOUNTER — TELEPHONE (OUTPATIENT)
Dept: CARDIOLOGY | Facility: CLINIC | Age: 70
End: 2020-01-22

## 2020-01-22 ENCOUNTER — MYC REFILL (OUTPATIENT)
Dept: FAMILY MEDICINE | Facility: CLINIC | Age: 70
End: 2020-01-22

## 2020-01-22 DIAGNOSIS — I10 HYPERTENSION GOAL BP (BLOOD PRESSURE) < 140/90: Chronic | ICD-10-CM

## 2020-01-22 NOTE — TELEPHONE ENCOUNTER
"Requested Prescriptions   Pending Prescriptions Disp Refills     losartan (COZAAR) 100 MG tablet  Last Written Prescription Date:  10/7/2019  Last Fill Quantity: 90 tabs,  # refills: 3   Last office visit: 10/7/2019 with prescribing provider:  Genevieve   Future Office Visit:   90 tablet 3     Sig: Take 1 tablet (100 mg) by mouth daily       Angiotensin-II Receptors Failed - 1/22/2020 10:52 AM        Failed - Last blood pressure under 140/90 in past 12 months     BP Readings from Last 3 Encounters:   01/13/20 (!) 170/88   11/11/19 (!) 159/78   10/07/19 138/80                 Passed - Recent (12 mo) or future (30 days) visit within the authorizing provider's specialty     Patient has had an office visit with the authorizing provider or a provider within the authorizing providers department within the previous 12 mos or has a future within next 30 days. See \"Patient Info\" tab in inbasket, or \"Choose Columns\" in Meds & Orders section of the refill encounter.              Passed - Medication is active on med list        Passed - Patient is age 18 or older        Passed - No active pregnancy on record        Passed - Normal serum creatinine on file in past 12 months     Recent Labs   Lab Test 09/30/19  1012  02/01/19  1303   CR 1.01   < >  --    CREAT  --   --  1.1*    < > = values in this interval not displayed.             Passed - Normal serum potassium on file in past 12 months     Recent Labs   Lab Test 09/30/19  1012   POTASSIUM 4.5                    Passed - No positive pregnancy test in past 12 months         "

## 2020-01-22 NOTE — TELEPHONE ENCOUNTER
NM lexiscan stress test results noted from 1/17/20:       The nuclear stress test is negative for inducible myocardial ischemia or infarction.     Left ventricular function is normal.     There is no prior study for comparison.    Patient updated with normal results via [employer].

## 2020-01-23 ENCOUNTER — MYC MEDICAL ADVICE (OUTPATIENT)
Dept: CARDIOLOGY | Facility: CLINIC | Age: 70
End: 2020-01-23

## 2020-01-23 DIAGNOSIS — I25.10 CORONARY ARTERY DISEASE INVOLVING NATIVE CORONARY ARTERY OF NATIVE HEART WITHOUT ANGINA PECTORIS: Primary | ICD-10-CM

## 2020-01-23 NOTE — TELEPHONE ENCOUNTER
[employer] message received from patient:    Dr Infante, When I saw you  on 1/13/2020 you adjusted my Simvastatin to 20MG because we were thinking that is what it was.  Actually it has always been 40MG and that is what it says on my perscription bottle.  I am wondering if you want to change that back to 40MG or because my stress test, done on 1/17/2020, was good keep it at 20MG?   Thank you     Patient had NM lexiscan on 1/17/20 which showed no evidence of ischemia (patient was updated via [employer] with the results). Patient's last FLP was on 9/30/19. Will route to Dr. Infante for review.

## 2020-01-24 RX ORDER — LOSARTAN POTASSIUM 100 MG/1
100 TABLET ORAL DAILY
Qty: 90 TABLET | Refills: 3 | OUTPATIENT
Start: 2020-01-24

## 2020-01-24 RX ORDER — SIMVASTATIN 40 MG
40 TABLET ORAL AT BEDTIME
Qty: 90 TABLET | Refills: 3 | Status: SHIPPED | OUTPATIENT
Start: 2020-01-24 | End: 2021-04-23

## 2020-01-24 NOTE — TELEPHONE ENCOUNTER
Ibeth Infante MD  You 3 hours ago (4:02 AM)      We can change back to 40 mg then, and have a repeat lab at the 6 month point around March/April.     Ibeth Hart comment      Order placed for FLP in 6 months. Sent patient [employer] message back with Dr. Infante's response. Rx for 40 mg simvastatin escripted to patient's preferred pharmacy.

## 2020-01-24 NOTE — TELEPHONE ENCOUNTER
90 day supply with 3 refill sent 10/7/19. Refill request too soon. Refused.     Berenice Robins, RN, BSN, PHN  [facility]: [place]

## 2020-01-27 ENCOUNTER — RESEARCH ENCOUNTER (OUTPATIENT)
Dept: NEUROLOGY | Facility: CLINIC | Age: 70
End: 2020-01-27

## 2020-02-03 ENCOUNTER — TELEPHONE (OUTPATIENT)
Dept: PHARMACY | Facility: CLINIC | Age: 70
End: 2020-02-03

## 2020-02-03 NOTE — TELEPHONE ENCOUNTER
Called patient to f/u on BP as part of mGlide study.       Home BP's are running 153/88 on average, with a max of 169/97 and a min of 142/81.  Patient is taking losartan 100 mg daily in the morning and metoprolol succinate 25 mg in the evening.     Patient is not meeting BP goal of <140/90, also looks like she is not transmitting BP since 1/27/20. Will send email to Nia today regarding transmission issue.      Will f/u on patient BP readings in 1 week.       Ga Dunn, PharmD

## 2020-02-10 ENCOUNTER — TELEPHONE (OUTPATIENT)
Dept: PHARMACY | Facility: CLINIC | Age: 70
End: 2020-02-10

## 2020-02-10 NOTE — TELEPHONE ENCOUNTER
Called patient to f/u on BP as part of mGlide study.       Home BP's are running 143/81 on average, with a max of 157/92 and a min of 134/73.  Patient is taking losartan 100 mg daily in the morning and metoprolol succinate 25 mg in the evening.     Patient is not meeting BP goal of <140/90, also looks like she is not transmitting BP since 2/3/20. Will send email to Nia today regarding transmission issue. Had patient verify that the Wifi and Bluetooth are turned on with the mobile device today.     Will f/u on patient BP readings in 1 week.       Ga Dunn, PharmD

## 2020-02-17 ENCOUNTER — TELEPHONE (OUTPATIENT)
Dept: PHARMACY | Facility: CLINIC | Age: 70
End: 2020-02-17

## 2020-02-20 ENCOUNTER — TELEPHONE (OUTPATIENT)
Dept: FAMILY MEDICINE | Facility: CLINIC | Age: 70
End: 2020-02-20

## 2020-02-20 ENCOUNTER — MYC REFILL (OUTPATIENT)
Dept: FAMILY MEDICINE | Facility: CLINIC | Age: 70
End: 2020-02-20

## 2020-02-20 DIAGNOSIS — E03.4 HYPOTHYROIDISM DUE TO ACQUIRED ATROPHY OF THYROID: Chronic | ICD-10-CM

## 2020-02-20 DIAGNOSIS — I25.118 CORONARY ARTERY DISEASE OF NATIVE ARTERY OF NATIVE HEART WITH STABLE ANGINA PECTORIS (H): ICD-10-CM

## 2020-02-20 DIAGNOSIS — Z98.890 S/P CORONARY ANGIOGRAM: ICD-10-CM

## 2020-02-20 DIAGNOSIS — M1A.9XX0 CHRONIC GOUT INVOLVING TOE WITHOUT TOPHUS, UNSPECIFIED CAUSE, UNSPECIFIED LATERALITY: ICD-10-CM

## 2020-02-20 RX ORDER — ALLOPURINOL 100 MG/1
100 TABLET ORAL DAILY
Qty: 90 TABLET | Refills: 3 | Status: CANCELLED | OUTPATIENT
Start: 2020-02-20

## 2020-02-20 RX ORDER — LEVOTHYROXINE SODIUM 88 UG/1
TABLET ORAL
Qty: 90 TABLET | Refills: 3 | Status: CANCELLED | OUTPATIENT
Start: 2020-02-20

## 2020-02-20 NOTE — TELEPHONE ENCOUNTER
Please route refill request to Cardiology as Ticagrelor prescription is being managed by Cardiology, Dr. Infante.    Sarah Vallejo, DNP, APRN, CNP

## 2020-02-20 NOTE — TELEPHONE ENCOUNTER
Routing refill request to provider for review/approval because:  Labs out of range:  ALT     John Arzola RN

## 2020-02-20 NOTE — TELEPHONE ENCOUNTER
"Last Written Prescription Date:  2/23/19  Last Fill Quantity: 180,  # refills: 3   Last office visit: 10/7/2019 with prescribing provider:     Future Office Visit:   Next 5 appointments (look out 90 days)    Feb 26, 2020 10:00 AM CST  PHYSICAL with Sarah Vallejo NP  [facility] [address] [place] [id number] [phone]           Requested Prescriptions   Pending Prescriptions Disp Refills     ticagrelor 90 MG PO tablet 180 tablet 3     Sig: Take 1 tablet (90 mg) by mouth 2 times daily Start tomorrow morning.       Platelet Inhibitors Failed - 2/20/2020 11:52 AM        Failed - Normal ALT on file in past 12 months     Recent Labs   Lab Test 09/30/19  1012   ALT 51*             Passed - Normal HGB on file in past 12 months     Recent Labs   Lab Test 03/18/19  1119   HGB 14.5               Passed - Normal AST on file in past 12 months     Recent Labs   Lab Test 09/30/19  1012   AST 31             Passed - Normal Platelets on file in past 12 months     Recent Labs   Lab Test 03/18/19  1119                  Passed - Recent (12 mo) or future (30 days) visit within the authorizing provider's specialty     Patient has had an office visit with the authorizing provider or a provider within the authorizing providers department within the previous 12 mos or has a future within next 30 days. See \"Patient Info\" tab in inbasket, or \"Choose Columns\" in Meds & Orders section of the refill encounter.              Passed - Medication is active on med list        Passed - Patient is age 18 or older        Passed - No active pregnancy on record        Passed - Normal serum creatinine on file in past 12 months     Recent Labs   Lab Test 09/30/19  1012  02/01/19  1303   CR 1.01   < >  --    CREAT  --   --  1.1*    < > = values in this interval not displayed.             Passed - No positive pregnancy test in past 12 months          "

## 2020-02-20 NOTE — TELEPHONE ENCOUNTER
"Requested Prescriptions   Pending Prescriptions Disp Refills     levothyroxine 88 MCG PO tablet  This medication may not be due for a refill.  Last Written Prescription Date:  10/7/2019  Last Fill Quantity: 90 tablet,  # refills: 3   Last office visit: 10/7/2019 with prescribing provider:  IRWIN Vallejo   Future Office Visit:   Next 5 appointments (look out 90 days)    Feb 26, 2020 10:00 AM CST  PHYSICAL with Sarah Vallejo NP  [facility] ([address] [id number] [phone]            90 tablet 3     Sig: TAKE 1 TABLET(88 MCG) BY MOUTH DAILY       Thyroid Protocol Passed - 2/20/2020 10:43 AM        Passed - Patient is 12 years or older        Passed - Recent (12 mo) or future (30 days) visit within the authorizing provider's specialty     Patient has had an office visit with the authorizing provider or a provider within the authorizing providers department within the previous 12 mos or has a future within next 30 days. See \"Patient Info\" tab in inbasket, or \"Choose Columns\" in Meds & Orders section of the refill encounter.              Passed - Medication is active on med list        Passed - Normal TSH on file in past 12 months     Recent Labs   Lab Test 09/30/19  1012   TSH 1.17              Passed - No active pregnancy on record     If patient is pregnant or has had a positive pregnancy test, please check TSH.          Passed - No positive pregnancy test in past 12 months     If patient is pregnant or has had a positive pregnancy test, please check TSH.                  allopurinol 100 MG PO tablet  This medication may not be due for a refill.    Last Written Prescription Date:  10/7/2019  Last Fill Quantity: 90 tablet,  # refills: 3   Last office visit: 10/7/2019 with prescribing provider:  IRWIN Vallejo   Future Office Visit:   Next 5 appointments (look out 90 days)    Feb 26, 2020 10:00 AM CST  PHYSICAL with Sarah Vallejo, " "NP  [facility] [address] [address] [address] [phone]          90 tablet 3     Sig: Take 1 tablet (100 mg) by mouth daily       Gout Agents Protocol Failed - 2/20/2020 10:43 AM        Failed - Has Uric Acid on file in past 12 months and value is less than 6     Recent Labs   Lab Test 03/12/18  1037   URIC 5.3     If level is 6mg/dL or greater, ok to refill one time and refer to provider.           Passed - CBC on file in past 12 months     Recent Labs   Lab Test 03/18/19  1119   WBC 6.2   RBC 4.72   HGB 14.5   HCT 44.2                    Passed - ALT on file in past 12 months     Recent Labs   Lab Test 09/30/19  1012   ALT 51*             Passed - Recent (12 mo) or future (30 days) visit within the authorizing provider's specialty     Patient has had an office visit with the authorizing provider or a provider within the authorizing providers department within the previous 12 mos or has a future within next 30 days. See \"Patient Info\" tab in inbasket, or \"Choose Columns\" in Meds & Orders section of the refill encounter.              Passed - Medication is active on med list        Passed - Patient is age 18 or older        Passed - No active pregnancy on record        Passed - Normal serum creatinine on file in the past 12 months     Recent Labs   Lab Test 09/30/19  1012  02/01/19  1303   CR 1.01   < >  --    CREAT  --   --  1.1*    < > = values in this interval not displayed.             Passed - No positive pregnancy test in past year        "

## 2020-02-21 NOTE — TELEPHONE ENCOUNTER
RN replied to patient via Mychart. See message for details.     Berenice Robins RN, BSN, PHN  [facility]: [place]

## 2020-02-24 ENCOUNTER — HEALTH MAINTENANCE LETTER (OUTPATIENT)
Age: 70
End: 2020-02-24

## 2020-02-24 DIAGNOSIS — Z53.9 ERRONEOUS ENCOUNTER--DISREGARD: Primary | ICD-10-CM

## 2020-02-24 DIAGNOSIS — I25.10 CORONARY ARTERY DISEASE INVOLVING NATIVE CORONARY ARTERY OF NATIVE HEART WITHOUT ANGINA PECTORIS: ICD-10-CM

## 2020-02-24 LAB
CHOLEST SERPL-MCNC: 145 MG/DL
HDLC SERPL-MCNC: 44 MG/DL
LDLC SERPL CALC-MCNC: 50 MG/DL
NONHDLC SERPL-MCNC: 101 MG/DL
TRIGL SERPL-MCNC: 253 MG/DL

## 2020-02-24 PROCEDURE — 80061 LIPID PANEL: CPT | Performed by: INTERNAL MEDICINE

## 2020-02-24 PROCEDURE — 36415 COLL VENOUS BLD VENIPUNCTURE: CPT | Performed by: INTERNAL MEDICINE

## 2020-02-25 ASSESSMENT — ENCOUNTER SYMPTOMS
FREQUENCY: 0
BREAST MASS: 0
FEVER: 0
CHILLS: 0
DIARRHEA: 1
EYE PAIN: 1
HEARTBURN: 0
DIZZINESS: 0
PARESTHESIAS: 0
PALPITATIONS: 0
HEMATURIA: 0
ABDOMINAL PAIN: 0
WEAKNESS: 1
MYALGIAS: 1
ARTHRALGIAS: 1
NERVOUS/ANXIOUS: 1
HEADACHES: 1
COUGH: 1
DYSURIA: 0
SHORTNESS OF BREATH: 0
CONSTIPATION: 0
JOINT SWELLING: 0
NAUSEA: 1
SORE THROAT: 1
HEMATOCHEZIA: 0

## 2020-02-25 ASSESSMENT — ACTIVITIES OF DAILY LIVING (ADL): CURRENT_FUNCTION: NO ASSISTANCE NEEDED

## 2020-02-26 ENCOUNTER — OFFICE VISIT (OUTPATIENT)
Dept: FAMILY MEDICINE | Facility: CLINIC | Age: 70
End: 2020-02-26
Payer: MEDICARE

## 2020-02-26 ENCOUNTER — TELEPHONE (OUTPATIENT)
Dept: FAMILY MEDICINE | Facility: CLINIC | Age: 70
End: 2020-02-26

## 2020-02-26 VITALS
HEART RATE: 68 BPM | WEIGHT: 172 LBS | BODY MASS INDEX: 33.77 KG/M2 | DIASTOLIC BLOOD PRESSURE: 82 MMHG | SYSTOLIC BLOOD PRESSURE: 136 MMHG | TEMPERATURE: 97.7 F | HEIGHT: 60 IN

## 2020-02-26 DIAGNOSIS — E78.5 HYPERLIPIDEMIA LDL GOAL <130: Chronic | ICD-10-CM

## 2020-02-26 DIAGNOSIS — J30.2 SEASONAL ALLERGIC RHINITIS, UNSPECIFIED TRIGGER: ICD-10-CM

## 2020-02-26 DIAGNOSIS — Z00.00 ENCOUNTER FOR MEDICARE ANNUAL WELLNESS EXAM: Primary | ICD-10-CM

## 2020-02-26 DIAGNOSIS — K21.9 GASTROESOPHAGEAL REFLUX DISEASE WITHOUT ESOPHAGITIS: ICD-10-CM

## 2020-02-26 DIAGNOSIS — I25.10 CORONARY ARTERY DISEASE INVOLVING NATIVE CORONARY ARTERY OF NATIVE HEART WITHOUT ANGINA PECTORIS: Chronic | ICD-10-CM

## 2020-02-26 DIAGNOSIS — J31.0 CHRONIC NONALLERGIC RHINITIS: Chronic | ICD-10-CM

## 2020-02-26 DIAGNOSIS — E03.4 HYPOTHYROIDISM DUE TO ACQUIRED ATROPHY OF THYROID: Chronic | ICD-10-CM

## 2020-02-26 DIAGNOSIS — M1A.9XX0 CHRONIC GOUT INVOLVING TOE WITHOUT TOPHUS, UNSPECIFIED CAUSE, UNSPECIFIED LATERALITY: ICD-10-CM

## 2020-02-26 DIAGNOSIS — I10 HYPERTENSION GOAL BP (BLOOD PRESSURE) < 140/90: Chronic | ICD-10-CM

## 2020-02-26 DIAGNOSIS — N18.30 CKD (CHRONIC KIDNEY DISEASE) STAGE 3, GFR 30-59 ML/MIN (H): Chronic | ICD-10-CM

## 2020-02-26 DIAGNOSIS — F33.0 MAJOR DEPRESSIVE DISORDER, RECURRENT EPISODE, MILD DEGREE (H): Chronic | ICD-10-CM

## 2020-02-26 DIAGNOSIS — R05.9 COUGH: ICD-10-CM

## 2020-02-26 PROCEDURE — 36415 COLL VENOUS BLD VENIPUNCTURE: CPT | Performed by: NURSE PRACTITIONER

## 2020-02-26 PROCEDURE — 82043 UR ALBUMIN QUANTITATIVE: CPT | Performed by: NURSE PRACTITIONER

## 2020-02-26 PROCEDURE — 80048 BASIC METABOLIC PNL TOTAL CA: CPT | Performed by: NURSE PRACTITIONER

## 2020-02-26 PROCEDURE — G0439 PPPS, SUBSEQ VISIT: HCPCS | Performed by: NURSE PRACTITIONER

## 2020-02-26 RX ORDER — COLCHICINE 0.6 MG/1
0.6 TABLET ORAL PRN
Qty: 30 TABLET | Refills: 3 | Status: SHIPPED | OUTPATIENT
Start: 2020-02-26 | End: 2021-08-31

## 2020-02-26 RX ORDER — FLUTICASONE PROPIONATE 50 MCG
2 SPRAY, SUSPENSION (ML) NASAL DAILY
Qty: 48 G | Refills: 3 | Status: SHIPPED | OUTPATIENT
Start: 2020-02-26 | End: 2021-08-31

## 2020-02-26 RX ORDER — LEVALBUTEROL TARTRATE 45 UG/1
AEROSOL, METERED ORAL
Qty: 15 G | Refills: 1 | Status: SHIPPED | OUTPATIENT
Start: 2020-02-26 | End: 2021-08-31

## 2020-02-26 ASSESSMENT — ENCOUNTER SYMPTOMS
PARESTHESIAS: 0
CONSTIPATION: 0
NAUSEA: 1
ABDOMINAL PAIN: 0
HEADACHES: 1
FEVER: 0
ARTHRALGIAS: 1
DIARRHEA: 1
NERVOUS/ANXIOUS: 1
HEARTBURN: 0
FREQUENCY: 0
SORE THROAT: 1
EYE PAIN: 1
BREAST MASS: 0
HEMATURIA: 0
DYSURIA: 0
WEAKNESS: 1
MYALGIAS: 1
DIZZINESS: 0
HEMATOCHEZIA: 0
PALPITATIONS: 0
SHORTNESS OF BREATH: 0
COUGH: 1
JOINT SWELLING: 0
CHILLS: 0

## 2020-02-26 ASSESSMENT — PATIENT HEALTH QUESTIONNAIRE - PHQ9
5. POOR APPETITE OR OVEREATING: SEVERAL DAYS
SUM OF ALL RESPONSES TO PHQ QUESTIONS 1-9: 1

## 2020-02-26 ASSESSMENT — ACTIVITIES OF DAILY LIVING (ADL): CURRENT_FUNCTION: NO ASSISTANCE NEEDED

## 2020-02-26 ASSESSMENT — MIFFLIN-ST. JEOR: SCORE: 1216.69

## 2020-02-26 ASSESSMENT — ANXIETY QUESTIONNAIRES
5. BEING SO RESTLESS THAT IT IS HARD TO SIT STILL: NOT AT ALL
1. FEELING NERVOUS, ANXIOUS, OR ON EDGE: NOT AT ALL
3. WORRYING TOO MUCH ABOUT DIFFERENT THINGS: NOT AT ALL
IF YOU CHECKED OFF ANY PROBLEMS ON THIS QUESTIONNAIRE, HOW DIFFICULT HAVE THESE PROBLEMS MADE IT FOR YOU TO DO YOUR WORK, TAKE CARE OF THINGS AT HOME, OR GET ALONG WITH OTHER PEOPLE: NOT DIFFICULT AT ALL
2. NOT BEING ABLE TO STOP OR CONTROL WORRYING: NOT AT ALL
7. FEELING AFRAID AS IF SOMETHING AWFUL MIGHT HAPPEN: NOT AT ALL
GAD7 TOTAL SCORE: 2
6. BECOMING EASILY ANNOYED OR IRRITABLE: SEVERAL DAYS

## 2020-02-26 NOTE — PROGRESS NOTES
"SUBJECTIVE:   Abbey Omer is a 70 year old female who presents for Preventive Visit.    Are you in the first 12 months of your Medicare coverage?  No    Healthy Habits:     In general, how would you rate your overall health?  Good    Frequency of exercise:  2-3 days/week    Duration of exercise:  Less than 15 minutes    Do you usually eat at least 4 servings of fruit and vegetables a day, include whole grains    & fiber and avoid regularly eating high fat or \"junk\" foods?  No    Taking medications regularly:  Yes    Medication side effects:  None    Ability to successfully perform activities of daily living:  No assistance needed    Home Safety:  No safety concerns identified    Hearing Impairment:  Difficulty following a conversation in a noisy restaurant or crowded room, feel that people are mumbling or not speaking clearly, need to ask people to speak up or repeat themselves and difficulty understanding soft or whispered speech    In the past 6 months, have you been bothered by leaking of urine? Yes    In general, how would you rate your overall mental or emotional health?  Good      PHQ-2 Total Score: 0    Additional concerns today:  No    Do you feel safe in your environment? Yes    Have you ever done Advance Care Planning? (For example, a Health Directive, POLST, or a discussion with a medical provider or your loved ones about your wishes): Yes, advance care planning is on file.      Fall risk  Fallen 2 or more times in the past year?: No  Any fall with injury in the past year?: No    Cognitive Screening   1) Repeat 3 items (Leader, Season, Table)    2) Clock draw: NORMAL  3) 3 item recall: Recalls 3 objects  Results: NORMAL clock, 3 items recalled: COGNITIVE IMPAIRMENT LESS LIKELY    Mini-CogTM Copyright ONEL Benavides. Licensed by the author for use in [employer]; reprinted with permission (indira@.[facility]). All rights reserved.      Do you have sleep apnea, excessive snoring or daytime " drowsiness?: yes - sleep apnea     Reviewed and updated as needed this visit by clinical staff  Tobacco  Allergies  Meds  Problems  Med Hx  Surg Hx  Fam Hx         Reviewed and updated as needed this visit by Provider  Tobacco  Allergies  Meds  Problems  Med Hx  Surg Hx  Fam Hx        Social History     Tobacco Use     Smoking status: Former Smoker     Packs/day: 0.10     Years: 1.00     Pack years: 0.10     Types: Cigarettes     Start date: 1969     Last attempt to quit: 10/12/1969     Years since quittin.4     Smokeless tobacco: Never Used     Tobacco comment: Smoked, very little, for 2 mo, 49 years ago.   Substance Use Topics     Alcohol use: Yes     Comment: Maybe 1 or 2 drinks a year     If you drink alcohol do you typically have >3 drinks per day or >7 drinks per week? No    No flowsheet data found.        Hyperlipidemia Follow-Up      Are you regularly taking any medication or supplement to lower your cholesterol?   Yes- Simvastatin     Are you having muscle aches or other side effects that you think could be caused by your cholesterol lowering medication?  Yes- at times     Hypertension Follow-up      Do you check your blood pressure regularly outside of the clinic? Yes     Are you following a low salt diet? Yes    Are your blood pressures ever more than 140 on the top number (systolic) OR more   than 90 on the bottom number (diastolic), for example 140/90? Yes    Depression Followup     How are you doing with your depression since your last visit? Mood swings, ups and downs     Are you having other symptoms that might be associated with depression? Yes:  mood swings     Have you had a significant life event?  No     Are you feeling anxious or having panic attacks?   No, once in a while a little nervous     Do you have any concerns with your use of alcohol or other drugs? No    Social History     Tobacco Use     Smoking status: Former Smoker     Packs/day: 0.10     Years: 1.00      Pack years: 0.10     Types: Cigarettes     Start date: 1969     Last attempt to quit: 10/12/1969     Years since quittin.4     Smokeless tobacco: Never Used     Tobacco comment: Smoked, very little, for 2 mo, 49 years ago.   Substance Use Topics     Alcohol use: Yes     Comment: Maybe 1 or 2 drinks a year     Drug use: No     PHQ 2019   PHQ-9 Total Score 22 5 1   Q9: Thoughts of better off dead/self-harm past 2 weeks Not at all Not at all Not at all     CHRISTIANO-7 SCORE 2019   Total Score - - -   Total Score 21 2 2   Total Score - - -     Suicide Assessment Five-step Evaluation and Treatment (SAFE-T)      Current providers sharing in care for this patient include:   Patient Care Team:  Sarah Vallejo NP as PCP - General (Nurse Practitioner - Family)  Sarah Vallejo NP as Assigned PCP  Diane Dunn [facility] as Pharmacist (Pharmacist)  Nydia Ramírez [facility] as Pharmacist (Pharmacist)    The following health maintenance items are reviewed in Epic and correct as of today:  Health Maintenance   Topic Date Due     ZOSTER IMMUNIZATION (2 of 3) 2014     EYE EXAM  2019     MEDICARE ANNUAL WELLNESS VISIT  2019     MICROALBUMIN  2019     FALL RISK ASSESSMENT  2019     ASTHMA ACTION PLAN  2019     FIT  2020     ASTHMA CONTROL TEST  2020     PHQ-9  2020     BMP  2020     ALT  2020     TSH W/FREE T4 REFLEX  2020     LIPID  2021     ADVANCE CARE PLANNING  2021     MAMMO SCREENING  2021     DTAP/TDAP/TD IMMUNIZATION (3 - Td) 2023     DEXA  Completed     HEPATITIS C SCREENING  Completed     DEPRESSION ACTION PLAN  Completed     INFLUENZA VACCINE  Completed     PNEUMOCOCCAL IMMUNIZATION 65+ LOW/MEDIUM RISK  Completed     IPV IMMUNIZATION  Aged Out     MENINGITIS IMMUNIZATION  Aged Out     BP Readings from Last 3 Encounters:   20 136/82   20 (!) 170/88    11/11/19 (!) 159/78    Wt Readings from Last 3 Encounters:   02/26/20 78 kg (172 lb)   01/13/20 81.1 kg (178 lb 11.2 oz)   11/11/19 80.4 kg (177 lb 3.2 oz)                  Patient Active Problem List   Diagnosis     RICKEY (obstructive sleep apnea)     Hypothyroidism     Chronic nonallergic rhinitis     CKD (chronic kidney disease) stage 3, GFR 30-59 ml/min (H)     Hyperlipidemia LDL goal <130     Obesity     History of bladder cancer     Hypertension goal BP (blood pressure) < 140/90     Advance Care Planning     Dependent edema     Elevated LFTs     DDD (degenerative disc disease), cervical     Major depressive disorder, recurrent episode, mild degree (H)     Macular drusen     Hypertriglyceridemia     Allergic conjunctivitis     Pulmonary nodules     Acne     Combined form of age-related cataract, both eyes     Glaucoma suspect, bilateral     Drusen of macula of both eyes     Renal cyst, left     Discoid atelectasis     Acute gout involving toe of left foot, unspecified cause     Cough     Abnormal electrocardiogram     PEARSON (dyspnea on exertion)     Coronary artery disease involving native coronary artery of native heart without angina pectoris     Epistaxis     Retrognathia     CAD S/P percutaneous coronary angioplasty     Past Surgical History:   Procedure Laterality Date     ARTHROPLASTY SHOULDER Right 11/01/2017    Right total shoulder arthroplasty by Dr. Go at [facility]     ARTHROPLASTY SHOULDER Left 03/28/2018    Left total shoulder arthroplasty by Dr. Go at [facility]     CHOLECYSTECTOMY, LAPOROSCOPIC       COLONOSCOPY  ?    Upper & Lower     CV CORONARY ANGIOGRAM  02/22/2019     CV CORONARY ANGIOGRAM N/A 2/22/2019    Procedure: 1330 CORS;  Surgeon: Marito Arriaga MD;  [facility]     CV HEART CATHETERIZATION WITH POSSIBLE INTERVENTION N/A 3/18/2019    Procedure: STAGED PCI-JALEN CARABALLO;  Surgeon: Zeus Cordero MD;   Location:  [facility]     CYSTOSCOPY      bladder cancer     D & C       ENT SURGERY  first /second ?    2 Rhynoplasties     HYSTERECTOMY, PAP NO LONGER INDICATED      BSO     NOSE SURGERY      septum deviation     PHACOEMULSIFICATION CLEAR CORNEA WITH STANDARD INTRAOCULAR LENS IMPLANT Left 2018    Procedure: PHACOEMULSIFICATION CLEAR CORNEA WITH STANDARD INTRAOCULAR LENS IMPLANT;  LEFT EYE PHACOEMULSIFICATION CLEAR CORNEA WITH STANDARD INTRAOCULAR LENS IMPLANT;  Surgeon: Ryne Pascal MD;  Location:  [facility]     PHACOEMULSIFICATION WITH STANDARD INTRAOCULAR LENS IMPLANT Right 10/15/2018    Procedure: PHACOEMULSIFICATION WITH STANDARD INTRAOCULAR LENS IMPLANT ;  Surgeon: Ryne Pascal MD;  Location: [facility]       Social History     Tobacco Use     Smoking status: Former Smoker     Packs/day: 0.10     Years: 1.00     Pack years: 0.10     Types: Cigarettes     Start date: 1969     Last attempt to quit: 10/12/1969     Years since quittin.4     Smokeless tobacco: Never Used     Tobacco comment: Smoked, very little, for 2 mo, 49 years ago.   Substance Use Topics     Alcohol use: Yes     Comment: Maybe 1 or 2 drinks a year     Family History   Problem Relation Age of Onset     Diabetes Maternal Grandfather      Hypertension Maternal Grandfather      Diabetes Son      Heart Disease Father         CHF     Diabetes Mother      Depression Mother      Hyperlipidemia Mother      Anxiety Disorder Mother      Asthma Mother      Obesity Mother      Substance Abuse Brother         Not any longer     Diabetes Brother      Depression Daughter      Diabetes Maternal Uncle      Cerebrovascular Disease Maternal Uncle      Diabetes Son         after accident     Depression Daughter      Depression Son         after accident     Substance Abuse Brother         Not any longer     Diabetes Sister      Substance Abuse Sister      Diabetes Brother      Thyroid Disease No family hx of      Glaucoma No  family hx of      Macular Degeneration No family hx of      Cancer No family hx of          Current Outpatient Medications   Medication Sig Dispense Refill     allopurinol (ZYLOPRIM) 100 MG tablet Take 1 tablet (100 mg) by mouth daily 90 tablet 3     B Complex Vitamins (VITAMIN B COMPLEX PO)        BEE POLLEN PO Bee Pollen, unprocessed, raw, natural, no preservatives       CALCIUM + D 600-200 MG-UNIT OR TABS 1 tablet twice a day       colchicine (COLCRYS) 0.6 MG tablet Take 1 tablet (0.6 mg) by mouth as needed (goute flare) 30 tablet 3     Cranberry 500 MG CAPS Take 1 capsule by mouth daily       EXCEDRIN TENSION HEADACHE 500-65 MG PO TABS 1 tablet twice daily as needed       FISH OIL 1200 MG OR CAPS 3 capsule daily       Flaxseed, Linseed, (FLAXSEED OIL PO) Take 1 Tablespoonful by mouth daily       fluticasone (FLONASE) 50 MCG/ACT nasal spray Spray 2 sprays into both nostrils daily 48 g 3     levalbuterol (XOPENEX HFA) 45 MCG/ACT inhaler INHALE 2 PUFFS INTO THE LUNGS EVERY 6 HOURS AS NEEDED FOR SHORTNESS OF BREATH OR DIFFICULT BREATHING OR WHEEZING 15 g 1     levothyroxine (SYNTHROID/LEVOTHROID) 88 MCG tablet TAKE 1 TABLET(88 MCG) BY MOUTH DAILY 90 tablet 3     losartan (COZAAR) 100 MG tablet Take 1 tablet (100 mg) by mouth daily 90 tablet 3     magnesium 500 MG TABS Take 1 tablet by mouth daily       MELATONIN PO        metoprolol succinate ER (TOPROL-XL) 25 MG 24 hr tablet Take 1 tablet (25 mg) by mouth daily 90 tablet 3     montelukast (SINGULAIR) 10 MG tablet Take 1 tablet (10 mg) by mouth At Bedtime 90 tablet 3     Multiple Vitamins-Minerals (MULTIPLE VITAMIN  S/WOMENS) TABS Take 2 tablets by mouth daily       order for DME Equipment ordered: Respironics Auto PAP Mask type: Nasal Settings: 9-15 cm H2O       simvastatin (ZOCOR) 40 MG tablet Take 1 tablet (40 mg) by mouth At Bedtime 90 tablet 3     ticagrelor (BRILINTA) 90 MG tablet Take 1 tablet (90 mg) by mouth 2 times daily 180 tablet 3     traZODone  (DESYREL) 50 MG tablet Take 1-2 tablets ( mg) by mouth nightly as needed for sleep 180 tablet 3     venlafaxine (EFFEXOR-XR) 150 MG 24 hr capsule 2 Capsules daily 180 capsule 3     zinc 50 MG TABS Take 1 tablet by mouth daily     Ranitidine      Allergies   Allergen Reactions     Cymbalta      Visual hallucinations     Zomig [Zolmitriptan] Other (See Comments)     Can't wake up     Colestid [Colestipol Hcl]      Albuterol Other (See Comments)     Very jittery and couldn't tolerate     Amlodipine Other (See Comments) and Swelling     Swelling- lower legs     Asa [Aspirin] GI Disturbance     Atenolol Fatigue     Atorvastatin Other (See Comments)     Swelling legs     Bupropion Unknown     Codeine Nausea     Crestor [Hmg-Coa-R Inhibitors] Other (See Comments)     Joint pain     Ezetimibe Other (See Comments)     Fatigue     Hydralazine Other (See Comments) and Fatigue     Fatigue     Lisinopril Cough     Lovastatin Other (See Comments) and Cramps     Cramps     Niacin      Unknown by patient     Paroxetine Other (See Comments)     Fatigue     Paxil [Paroxetine] Fatigue     Potassium GI Disturbance     Sulfa Drugs Fatigue     Sulfasalazine Other (See Comments)     Triamterene Unknown     Zetia [Ezetimibe] Fatigue       Review of Systems   Constitutional: Negative for chills and fever.   HENT: Positive for congestion, ear pain, hearing loss and sore throat.    Eyes: Positive for pain. Negative for visual disturbance.   Respiratory: Positive for cough. Negative for shortness of breath.    Cardiovascular: Negative for chest pain, palpitations and peripheral edema.   Gastrointestinal: Positive for diarrhea and nausea. Negative for abdominal pain, constipation, heartburn and hematochezia.   Breasts:  Negative for tenderness, breast mass and discharge.   Genitourinary: Positive for urgency. Negative for dysuria, frequency, genital sores, hematuria, pelvic pain, vaginal bleeding and vaginal discharge.   Musculoskeletal:  Positive for arthralgias and myalgias. Negative for joint swelling.   Skin: Negative for rash.   Neurological: Positive for weakness and headaches. Negative for dizziness and paresthesias.   Psychiatric/Behavioral: Positive for mood changes. The patient is nervous/anxious.    especially when she drinks col water or breath in cold air she coughs.  Gout:  Some flares in her toes.      OBJECTIVE:   /82   Pulse 68   Temp 97.7  F (36.5  C) (Oral)   Ht 1.524 m (5')   Wt 78 kg (172 lb)   BMI 33.59 kg/m   Estimated body mass index is 33.59 kg/m  as calculated from the following:    Height as of this encounter: 1.524 m (5').    Weight as of this encounter: 78 kg (172 lb).  Physical Exam  GENERAL: healthy, alert, no distress and obese  EYES: Eyes grossly normal to inspection, PERRL and conjunctivae and sclerae normal  HENT: ear canals and TM's normal, nose and mouth without ulcers or lesions  NECK: no adenopathy, no asymmetry, masses, or scars and thyroid normal to palpation  RESP: lungs clear to auscultation - no rales, rhonchi or wheezes  BREAST: normal without masses, tenderness or nipple discharge and no palpable axillary masses or adenopathy  CV: regular rate and rhythm, normal S1 S2, no S3 or S4, no murmur, click or rub, no peripheral edema and peripheral pulses strong  ABDOMEN: soft, nontender, no hepatosplenomegaly, no masses and bowel sounds normal   (female): deferred  MS: no gross musculoskeletal defects noted, no edema  Extremities: Approximate 1 cm subcutaneous nodule posterior right calf.  There is no swelling or erythema of bilateral lower extremities.  SKIN: no suspicious lesions or rashes  NEURO: Normal strength and tone, mentation intact and speech normal  PSYCH: mentation appears normal, affect normal/bright, judgement and insight intact and appearance well groomed      ASSESSMENT / PLAN:   1. Encounter for Medicare annual wellness exam    2. Seasonal allergic rhinitis, unspecified  trigger  Chronic, stable, continue current treatment.   - fluticasone (FLONASE) 50 MCG/ACT nasal spray; Spray 2 sprays into both nostrils daily  Dispense: 48 g; Refill: 3    3. Gastroesophageal reflux disease without esophagitis  Chronic, stable.  Recommend discontinuing ranitidine due to recall of product.  May use Tums as needed if symptoms flare.    4. Hypertension goal BP (blood pressure) < 140/90  Chronic, stable, continue current treatment.  - Albumin Random Urine Quantitative with Creat Ratio  - Basic metabolic panel  (Ca, Cl, CO2, Creat, Gluc, K, Na, BUN)    5. Chronic gout involving toe without tophus, unspecified cause, unspecified laterality  Chronic, stable, continue current treatment.   -Continue allopurinol for preventative treatment.  - colchicine (COLCRYS) 0.6 MG tablet; Take 1 tablet (0.6 mg) by mouth as needed (goute flare)  Dispense: 30 tablet; Refill: 3    6. Cough  No current issues.  - levalbuterol (XOPENEX HFA) 45 MCG/ACT inhaler; INHALE 2 PUFFS INTO THE LUNGS EVERY 6 HOURS AS NEEDED FOR SHORTNESS OF BREATH OR DIFFICULT BREATHING OR WHEEZING  Dispense: 15 g; Refill: 1    7. Chronic nonallergic rhinitis  Chronic, stable, continue current treatment.     8. Hypothyroidism due to acquired atrophy of thyroid  Chronic, stable, continue current treatment.     9. Hyperlipidemia LDL goal <130  Chronic, stable, continue current treatment.    10. Coronary artery disease involving native coronary artery of native heart without angina pectoris  Chronic, stable, continue current treatment.  Patient is followed by cardiology.    11. CKD (chronic kidney disease) stage 3, GFR 30-59 ml/min (H)  Chronic, stable, continue current treatment.     12. Major depressive disorder, recurrent episode, mild degree (H)  Chronic, stable, continue current treatment.       COUNSELING:  Reviewed preventive health counseling, as reflected in patient instructions       Regular exercise       Healthy diet/nutrition       Colon  cancer screening    Estimated body mass index is 33.59 kg/m  as calculated from the following:    Height as of this encounter: 1.524 m (5').    Weight as of this encounter: 78 kg (172 lb).    Weight management plan: Discussed healthy diet and exercise guidelines     reports that she quit smoking about 50 years ago. Her smoking use included cigarettes. She started smoking about 50 years ago. She has a 0.10 pack-year smoking history. She has never used smokeless tobacco.      Appropriate preventive services were discussed with this patient, including applicable screening as appropriate for cardiovascular disease, diabetes, osteopenia/osteoporosis, and glaucoma.  As appropriate for age/gender, discussed screening for colorectal cancer, prostate cancer, breast cancer, and cervical cancer. Checklist reviewing preventive services available has been given to the patient.    Reviewed patients plan of care and provided an AVS. The Intermediate Care Plan ( asthma action plan, low back pain action plan, and migraine action plan) for Abbey meets the Care Plan requirement. This Care Plan has been established and reviewed with the Patient and significant other.    Counseling Resources:  ATP IV Guidelines  Pooled Cohorts Equation Calculator  Breast Cancer Risk Calculator  FRAX Risk Assessment  ICSI Preventive Guidelines  Dietary Guidelines for Americans, 2010  Make My plate's MyPlate  ASA Prophylaxis  Lung CA Screening    Sarah Vallejo NP  [facility]    Identified Health Risks:

## 2020-02-26 NOTE — PATIENT INSTRUCTIONS
Patient Education   Personalized Prevention Plan  You are due for the preventive services outlined below.  Your care team is available to assist you in scheduling these services.  If you have already completed any of these items, please share that information with your care team to update in your medical record.  Health Maintenance Due   Topic Date Due     Zoster (Shingles) Vaccine (2 of 3) 07/23/2014     Eye Exam  11/16/2019     Annual Wellness Visit  12/27/2019     Kidney Microalbumin Urine Test  12/27/2019     FALL RISK ASSESSMENT  12/27/2019     Asthma Action Plan - yearly  12/28/2019     FIT Test  03/02/2020     Asthma Control Test  03/09/2020     Depression Assessment  03/09/2020        Patient Education     Kegel Exercises  What are Kegel exercises?  Kegels are exercises that tighten and release the pelvic muscles. These muscles wrap around both the anus and urethra (the tube that carries urine out of the body).  To find these muscles, try to stop and start the flow of urine while using the toilet.  Kegel exercises may:    Give you greater bladder control (stop or prevent urine from leaking).    Give you greater bowel control.    Increase pleasure during sex.    Ease childbirth for pregnant women.    Help men regain bladder control after prostate cancer treatment.  How can I test my muscle strength?  As you urinate (pass water), try to stop your stream of urine: Tighten the muscle around your urethra. If you can stop the stream, then you have good muscle control.  To maintain good control, you need to exercise these muscles regularly.  If you cannot stop your stream, Kegels can help you improve your muscle control.  How do I do Kegel exercises?  1. Squeeze and lift the muscles around the urethra. (You should feel the muscles lift near the urethra or tighten in your rectum.)  2. Hold them tight as you count to 5 or 10.  3. Then, slowly relax these muscles as you count to 5 or 10.  4. Repeat five times.  Do  these exercises three times a day: Five times in the morning, five times in the afternoon and five times at night.  Where to exercise  You may do the exercises anywhere and anytime. For instance:    At red traffic lights.    During TV commercials.    During coffee breaks.    While waiting for the bus.    At the grocery store.    When brushing your teeth.    During sex (for women).  Common mistakes  Don't use the muscles in your stomach, legs or buttocks. Your leg and buttock muscles should not move during these exercises.  To check your stomach muscles, put your hand on your stomach when you do your Kegels. If you feel your stomach move, then you are using the wrong muscles.  Can these exercises hurt me?  No, these exercises cannot harm you in any way. You should find them relaxing and easy.  Back or stomach pain may mean you are using your stomach muscles.  If you get headaches and your chest muscles are tense, you are likely holding your breath. Try to breathe normally during your Kegels.  When will I notice a change?  If you have weak bladder control, you will notice a change after four to six weeks of daily exercise. After three months, you will see an even bigger difference.  Make these exercises a habit: Tighten the muscles when you walk, before you cough, as you stand up and on the way to the bathroom.  For informational purposes only. Not to replace the advice of your health care provider. Copyright   1981, 2009 [employer]. All rights reserved. [place] 507709 - REV 06/16.  For informational purposes only. Not to replace the advice of your health care provider.  Copyright   2018 [employer]. All rights reserved.         I ordered the Cologuard stool test for colon cancer screening and you will receive a call about this from the company who will do the test.  You can ask your insurance about coverage for the screening Cologuard if you have concerns about coverage.    Urine test  today for routine microalbumin check due to your kidney disease

## 2020-02-26 NOTE — TELEPHONE ENCOUNTER
cologuard form faxed.    Thank you,  Theresa HERCULES  [facility]  Team Annika Coordinator

## 2020-02-27 LAB
ANION GAP SERPL CALCULATED.3IONS-SCNC: 5 MMOL/L (ref 3–14)
BUN SERPL-MCNC: 27 MG/DL (ref 7–30)
CALCIUM SERPL-MCNC: 8.9 MG/DL (ref 8.5–10.1)
CHLORIDE SERPL-SCNC: 111 MMOL/L (ref 94–109)
CO2 SERPL-SCNC: 23 MMOL/L (ref 20–32)
CREAT SERPL-MCNC: 0.92 MG/DL (ref 0.52–1.04)
CREAT UR-MCNC: 136 MG/DL
GFR SERPL CREATININE-BSD FRML MDRD: 62 ML/MIN/{1.73_M2}
GLUCOSE SERPL-MCNC: 98 MG/DL (ref 70–99)
MICROALBUMIN UR-MCNC: 1070 MG/L
MICROALBUMIN/CREAT UR: 786.76 MG/G CR (ref 0–25)
POTASSIUM SERPL-SCNC: 4.3 MMOL/L (ref 3.4–5.3)
SODIUM SERPL-SCNC: 139 MMOL/L (ref 133–144)

## 2020-02-27 ASSESSMENT — ANXIETY QUESTIONNAIRES: GAD7 TOTAL SCORE: 2

## 2020-02-27 ASSESSMENT — ASTHMA QUESTIONNAIRES: ACT_TOTALSCORE: 23

## 2020-03-02 ENCOUNTER — TELEPHONE (OUTPATIENT)
Dept: PHARMACY | Facility: CLINIC | Age: 70
End: 2020-03-02

## 2020-03-02 DIAGNOSIS — I10 HYPERTENSION GOAL BP (BLOOD PRESSURE) < 140/90: Primary | Chronic | ICD-10-CM

## 2020-03-02 RX ORDER — HYDROCHLOROTHIAZIDE 12.5 MG/1
12.5 TABLET ORAL EVERY MORNING
Qty: 30 TABLET | Refills: 1 | Status: SHIPPED | OUTPATIENT
Start: 2020-03-02 | End: 2020-03-18

## 2020-03-02 NOTE — TELEPHONE ENCOUNTER
Called patient to f/u on BP as part of mGlide study.       Home BP's are running 156/82 on average, with a max of 180/87 and a min of 127/76.   Patient is taking losartan 100 mg daily in the morning and metoprolol succinate 25 mg in the evening and reports excellent adherence. Tolerating medications well.   Allergies to amlodipine - lower leg swelling, atenolol - fatigue, hydralazine - fatigue, lisinopril - cough, and triamterene - unknown reaction, sulfa - fatigue.      Systolic Diastolic Heart Rate Date Time  180 87 67 Mar 2, 2020, 07:22am  127 80 70 Mar 1, 2020, 12:11pm  169 85 66 Feb 29, 2020, 08:10am  142 84 66 Feb 28, 2020, 10:34am  159 81 59 Feb 27, 2020, 07:33am  163 79 60 Feb 26, 2020, 07:33am  148 82 68 Feb 25, 2020, 12:59pm  153 76 63 Feb 25, 2020, 08:19am  141 82 60 Feb 24, 2020, 06:38am    Patient is not meeting BP goal of <140/90. With taking into consideration patients side effect of sulfa drugs was fatigue and patients currently well controlled gout, acceptable to start low dose thiazide diuretic, hydrochlorothiazide 12.5 mg daily in the morning.  Patient agreed to changes and orders placed per CPA with Dr. Vallejo. Follow-up labs should include BMP in 2 weeks.       Will f/u on patient BP readings in 1 week.       Ga Dunn, PharmD

## 2020-03-05 ENCOUNTER — TRANSFERRED RECORDS (OUTPATIENT)
Dept: HEALTH INFORMATION MANAGEMENT | Facility: CLINIC | Age: 70
End: 2020-03-05

## 2020-03-05 LAB — COLOGUARD-ABSTRACT: NEGATIVE

## 2020-03-16 ENCOUNTER — TELEPHONE (OUTPATIENT)
Dept: PHARMACY | Facility: CLINIC | Age: 70
End: 2020-03-16

## 2020-03-16 ENCOUNTER — MYC MEDICAL ADVICE (OUTPATIENT)
Dept: FAMILY MEDICINE | Facility: CLINIC | Age: 70
End: 2020-03-16

## 2020-03-16 DIAGNOSIS — I10 HYPERTENSION GOAL BP (BLOOD PRESSURE) < 140/90: Chronic | ICD-10-CM

## 2020-03-16 NOTE — TELEPHONE ENCOUNTER
Called patient to f/u on BP as part of mGlide study.  Unable to reach, left VM and [facility]     Home BP's are running 129/73 on average, with a max of 141/83 and a min of 110/60.   Patient is taking losartan 100 mg daily in the morning, hydrochlorothiazide 12.5 mg in the morning, and metoprolol succinate 25 mg in the evening and reports excellent adherence. Tolerating medications well.   Allergies to amlodipine - lower leg swelling, atenolol - fatigue, hydralazine - fatigue, lisinopril - cough, and triamterene - unknown reaction, sulfa - fatigue.      Systolic Diastolic Heart Rate Date Time  131 74 57 Mar 15, 2020, 06:56am  138 70 64 Mar 14, 2020, 09:42am  110 62 62 Mar 13, 2020, 09:58am  113 60 60 Mar 12, 2020, 07:56am  138 83 67 Mar 11, 2020, 09:33am  131 77 60 Mar 10, 2020, 08:45am  141 82 67 Mar 9, 2020, 07:46am    Patient is meeting BP goal of <140/90, no changes recommended. Hydrochlorothiazide was initiated on 3/2, due for BMP monitoring this week.     Will f/u on patient BP readings in 1 week.       Ga Dunn, PharmD

## 2020-03-17 DIAGNOSIS — I10 HYPERTENSION GOAL BP (BLOOD PRESSURE) < 140/90: Chronic | ICD-10-CM

## 2020-03-17 PROCEDURE — 80048 BASIC METABOLIC PNL TOTAL CA: CPT | Performed by: NURSE PRACTITIONER

## 2020-03-17 PROCEDURE — 36415 COLL VENOUS BLD VENIPUNCTURE: CPT | Performed by: NURSE PRACTITIONER

## 2020-03-17 NOTE — TELEPHONE ENCOUNTER
Team abstraction/quality    Please see patient's mychart note. She reports that the colonoscopy / colon cancer screening has been done but it isn't satisfied in the system.    Thanks.  Michele Rivas MPAS, PA-C

## 2020-03-18 LAB
ANION GAP SERPL CALCULATED.3IONS-SCNC: 3 MMOL/L (ref 3–14)
BUN SERPL-MCNC: 22 MG/DL (ref 7–30)
CALCIUM SERPL-MCNC: 8.7 MG/DL (ref 8.5–10.1)
CHLORIDE SERPL-SCNC: 106 MMOL/L (ref 94–109)
CO2 SERPL-SCNC: 29 MMOL/L (ref 20–32)
CREAT SERPL-MCNC: 1.1 MG/DL (ref 0.52–1.04)
GFR SERPL CREATININE-BSD FRML MDRD: 50 ML/MIN/{1.73_M2}
GLUCOSE SERPL-MCNC: 104 MG/DL (ref 70–99)
POTASSIUM SERPL-SCNC: 4.3 MMOL/L (ref 3.4–5.3)
SODIUM SERPL-SCNC: 138 MMOL/L (ref 133–144)

## 2020-03-18 RX ORDER — HYDROCHLOROTHIAZIDE 12.5 MG/1
12.5 TABLET ORAL EVERY MORNING
Qty: 90 TABLET | Refills: 1 | Status: SHIPPED | OUTPATIENT
Start: 2020-03-18 | End: 2020-06-22

## 2020-03-18 NOTE — TELEPHONE ENCOUNTER
Update: Called patient today with lab results. Patient reports no side effects.     Creatinine increased from previously but still WNL. Recommend rechecking SCr in 3-6 months. K+ is stable. Informed patient to reach out with any questions or concerns, otherwise continue with the current medications. Sent refill of hydrochlorothiazide today.    Diane Dunn, PharmD  Medication Therapy Management Pharmacist

## 2020-03-18 NOTE — TELEPHONE ENCOUNTER
Reviewed below messages.  2/26/20 telephone encounter reviewed which stated cologuard form was FAX'ed.    Will follow up when I am back in the office to look for Cologuard results.    Sarah Vallejo, DNP, APRN, CNP g

## 2020-03-31 ENCOUNTER — TELEPHONE (OUTPATIENT)
Dept: FAMILY MEDICINE | Facility: CLINIC | Age: 70
End: 2020-03-31

## 2020-04-01 NOTE — TELEPHONE ENCOUNTER
Received cologuard result from 3/5/20 that is negative/normal.  Placed to be scanned into patient record.    Sarah Vallejo DNP, APRN, CNP

## 2020-04-06 ENCOUNTER — TELEPHONE (OUTPATIENT)
Dept: PHARMACY | Facility: CLINIC | Age: 70
End: 2020-04-06

## 2020-04-06 NOTE — TELEPHONE ENCOUNTER
Called patient to f/u on BP as part of mGlide study.       Home BP's are running 132/72 on average, with a max of 154/80 and a min of 114/63.   Patient is taking losartan 100 mg daily in the morning, hydrochlorothiazide 12.5 mg in the morning, and metoprolol succinate 25 mg in the evening and reports excellent adherence. Tolerating medications well. Unsure why her BP was elevated on 4/1, no missed doses.   Allergies to amlodipine - lower leg swelling, atenolol - fatigue, hydralazine - fatigue, lisinopril - cough, and triamterene - unknown reaction, sulfa - fatigue.      Systolic Diastolic Heart Rate Date Time  133 74 60 Apr 5, 2020, 10:26am  124 72 59 Apr 4, 2020, 12:11pm  137 73 55 Apr 3, 2020, 07:05am  128 68 76 Apr 2, 2020, 07:42am  154 80 60 Apr 1, 2020, 07:34am  114 63 63 Mar 31, 2020, 07:23am    Patient is meeting BP goal of <140/90, no changes recommended.     Will f/u on patient BP readings in 1 week.       Ga Dunn, PharmD

## 2020-05-18 ENCOUNTER — TELEPHONE (OUTPATIENT)
Dept: PHARMACY | Facility: CLINIC | Age: 70
End: 2020-05-18

## 2020-05-18 DIAGNOSIS — I10 HYPERTENSION GOAL BP (BLOOD PRESSURE) < 140/90: Chronic | ICD-10-CM

## 2020-05-18 RX ORDER — LOSARTAN POTASSIUM 100 MG/1
50 TABLET ORAL 2 TIMES DAILY
Qty: 90 TABLET | Refills: 3
Start: 2020-05-18 | End: 2020-10-27

## 2020-05-18 NOTE — TELEPHONE ENCOUNTER
Called patient to f/u on BP as part of mGlide study.       Home BP's are running 141/75 on average, with a max of 163/83 and a min of 120/66.   Patient is taking losartan 100 mg daily in the morning, hydrochlorothiazide 12.5 mg in the morning, and metoprolol succinate 25 mg in the evening and reports excellent adherence. Tolerating medications well.   Allergies to amlodipine - lower leg swelling, atenolol - fatigue, hydralazine - fatigue, lisinopril - cough, and triamterene - unknown reaction, sulfa - fatigue.      Systolic Diastolic Heart Rate Date Time  163 78 62 May 17, 2020, 07:19am  129 69 72 May 16, 2020, 06:47am  163 78 66 May 15, 2020, 08:50am  144 77 65 May 14, 2020, 07:03am  120 74 66 May 13, 2020, 07:05am  144 66 58 May 12, 2020, 07:27am  122 83 55 May 11, 2020, 07:28am  147 80 63 May 11, 2020, 07:25am  138 82 62 May 11, 2020, 07:20am    Patient is not meeting BP goal of <140/90. Recommended splitting her losartan tablet in half with a twice daily administration for extended coverage, patient agreeable. Patient to take losartan 100 mg - 1/2 tablet in the AM and 1/2 tablet in the PM. Additionally, recommended patient recheck her BP after 10 minutes if first reading is elevated.     Will f/u on patient BP readings in 1 week.       Ga Dunn, PharmD

## 2020-05-25 ENCOUNTER — TELEPHONE (OUTPATIENT)
Dept: PHARMACY | Facility: CLINIC | Age: 70
End: 2020-05-25

## 2020-05-25 NOTE — TELEPHONE ENCOUNTER
Called patient to f/u on BP as part of mGlide study.       Home BP's are running 128/72 on average, with a max of 141/84 and a min of 111/58.   Patient is taking losartan 100 mg - 1/2 tablet AM and 1/2 tablet PM, hydrochlorothiazide 12.5 mg in the morning, and metoprolol succinate 25 mg in the evening and reports excellent adherence. Tolerating medications well, though does endorse slight dizziness, states that this doesn't affect her daily activities.   Allergies to amlodipine - lower leg swelling, atenolol - fatigue, hydralazine - fatigue, lisinopril - cough, and triamterene - unknown reaction, sulfa - fatigue.      Systolic Diastolic Heart Rate Date Time  129 84 67 May 25, 2020, 07:40am  132 82 68 May 24, 2020, 07:20am  166 69 65 May 24, 2020, 07:18am  111 58 62 May 23, 2020, 07:56am  141 58 61 May 22, 2020, 07:48am  129 83 84 May 21, 2020, 07:40am  128 60 66 May 20, 2020, 07:15am  124 79 68 May 19, 2020, 09:07am  133 69 64 May 18, 2020, 08:59am  141 77 59 May 18, 2020, 08:58am  171 78 66 May 18, 2020, 08:56am    Patient is meeting BP goal of <140/90. No changes recommended today.     Will f/u on patient BP readings in 1-2 weeks.       Ga Dunn, PharmD

## 2020-06-22 ENCOUNTER — TELEPHONE (OUTPATIENT)
Dept: PHARMACY | Facility: CLINIC | Age: 70
End: 2020-06-22

## 2020-06-22 DIAGNOSIS — I10 HYPERTENSION GOAL BP (BLOOD PRESSURE) < 140/90: Chronic | ICD-10-CM

## 2020-06-22 RX ORDER — HYDROCHLOROTHIAZIDE 12.5 MG/1
12.5 TABLET ORAL EVERY MORNING
Qty: 90 TABLET | Refills: 1 | Status: SHIPPED | OUTPATIENT
Start: 2020-06-22 | End: 2021-03-22

## 2020-06-22 NOTE — TELEPHONE ENCOUNTER
Called patient to f/u on BP as part of mGlide study.       Home BP's are running 124/71 on average, with a max of 135/88 and a min of 105/60.  Patient is taking losartan 100 mg - 1/2 tablet AM and 1/2 tablet PM, hydrochlorothiazide 12.5 mg in the morning, and metoprolol succinate 25 mg in the evening and reports excellent adherence. Tolerating medications well, though does endorse slight dizziness, states that this doesn't affect her daily activities.   Allergies to amlodipine - lower leg swelling, atenolol - fatigue, hydralazine - fatigue, lisinopril - cough, and triamterene - unknown reaction, sulfa - fatigue.      Syst Diast HR Date Time  105 60 64 Jun 22, 2020, 07:35am  112 65 61 Jun 21, 2020, 07:08am  129 82 68 Jun 20, 2020, 07:22am  135 69 63 Jun 19, 2020, 07:04am  149 79 71 Jun 19, 2020, 07:03am  131 62 61 Jun 18, 2020, 07:03am  128 88 71 Jun 17, 2020, 07:06am  127 70 72 Jun 16, 2020, 07:42am  117 63 75 Edmar 15, 2020, 07:27am    Patient is meeting BP goal of <140/90. No changes recommended today. Last Creatinine and potassium were within normal limits though creatinine was elevated from previously, would recommend follow up labs in 1-3 months, routing to PCP. Sent refill for hydrochlorothiazide rx today.      Ref. Range 3/17/2020 11:24   Creatinine Latest Ref Range: 0.52 - 1.04 mg/dL 1.10 (H)      Ref. Range 3/17/2020 11:24   Potassium Latest Ref Range: 3.4 - 5.3 mmol/L 4.3     Will f/u on patient BP readings in 1-2 weeks.       Ga Dunn, PharmD

## 2020-06-23 ENCOUNTER — VIRTUAL VISIT (OUTPATIENT)
Dept: CARDIOLOGY | Facility: CLINIC | Age: 70
End: 2020-06-23
Attending: INTERNAL MEDICINE
Payer: MEDICARE

## 2020-06-23 VITALS
WEIGHT: 174.8 LBS | DIASTOLIC BLOOD PRESSURE: 67 MMHG | BODY MASS INDEX: 34.32 KG/M2 | SYSTOLIC BLOOD PRESSURE: 127 MMHG | HEIGHT: 60 IN | HEART RATE: 59 BPM

## 2020-06-23 DIAGNOSIS — E78.5 HYPERLIPIDEMIA LDL GOAL <130: ICD-10-CM

## 2020-06-23 DIAGNOSIS — Z98.890 S/P CORONARY ANGIOGRAM: ICD-10-CM

## 2020-06-23 DIAGNOSIS — E78.1 HYPERTRIGLYCERIDEMIA: ICD-10-CM

## 2020-06-23 DIAGNOSIS — I25.10 CORONARY ARTERY DISEASE INVOLVING NATIVE CORONARY ARTERY OF NATIVE HEART WITHOUT ANGINA PECTORIS: Primary | ICD-10-CM

## 2020-06-23 PROCEDURE — 99442 ZZC PHYSICIAN TELEPHONE EVALUATION 11-20 MIN: CPT | Performed by: INTERNAL MEDICINE

## 2020-06-23 ASSESSMENT — MIFFLIN-ST. JEOR: SCORE: 1229.39

## 2020-06-23 NOTE — LETTER
6/23/2020    Sarah Vallejo, NP  [address] [place] [address]    RE: Abbey Nuñezs       Dear Colleague,    I had the pleasure of seeing Abbey Omer in the [facility].    Abbey Omer is a 70 year old female who is being evaluated via a billable telephone visit.      PROVIDER NOTES:     This is a 70 year old patient with PMH HTN, HLD, and syncope in setting of dehydration here for follow up with me. I initially saw her last year in setting of RBBB discovered on EKG.      Prior History:      She has no david syncope history or bradycardia that she knows about. No LOC in past or unexplained falls. Has a h/o migranes which seem well controlled. Only had an episode in [employer] once of dizziness leading to diaphoresis and passing out for which she was sent to hospital and improved with fluids. She was told her neck veins by ultrasound were very small. She has a h/o high cholesterol, stopped taking statin but she is unclear why. Her LDL was 167 and TC and TGs > 200. BP has been overall well controlled and is being managed by PCP. On ROS she had some atypical chest pain at rest. Does not heavily exert herself and is not involved in an exercise regimen. Her CP is better with burping. However on climbing stairs she fatigues easy and gets short of breath. She feels her legs are swollen. No varicose veins, ulcerations, claudication. No orthopnea or PND.      We obtained a coronary CT and suprisingly found severe stenoses of the LCX and RCA with moderate Ramus and LAD stenoses (FFR 0.86). She underwent coronary angiogram where she had staged PCI with SHELBI - first to the OM/LCX and then to the prox-mid RCA by radial approach 2/2019-3/2019.      Interval History:      She completed cardiac rehab. She has been tolerating DAPT well with no bleeding issues. Does not report side effects from ticagrelor. Statin was initiated. No chest pain. No SOB unless heavy stair  climbing, mild ankle edema. Has home cuff happy with. Since January enrolled in a research study through [facility] which gave her a BP cuff. Tolerating medications ok, no issues. On DAPT, hydrochlorothiazide, losartan, metoprolol. Denies dizziness or lightheadedness.    ASSESSMENT/PLAN:    #CAD - asymptomatic. Doing well. Will continue aspirin 81 mg daily, can stop brilinta.   #HTN - controlled, continue hydrochlorothiazide, losartan, metoprolol  #HLD - controlled, lipid panel per PCP. Continue simvastatin. Goal LDL < 70 mg/dL.     6 month follow up in office.    Ibeth Infante MD [facility]     Total time 12 minutes.       Orders this Visit:  No orders of the defined types were placed in this encounter.    Orders Placed This Encounter   Medications     aspirin (ASA) 81 MG EC tablet     Sig: Take 81 mg by mouth daily     COLLAGEN PO     Sig: Take by mouth daily powder     Multiple Vitamins-Minerals (HAIR SKIN AND NAILS FORMULA PO)     Sig: Take by mouth daily Two gummies     Medications Discontinued During This Encounter   Medication Reason     CALCIUM + D 600-200 MG-UNIT OR TABS Stopped by Patient     Cranberry 500 MG CAPS Stopped by Patient     FISH OIL 1200 MG OR CAPS Stopped by Patient     magnesium 500 MG TABS Stopped by Patient     Multiple Vitamins-Minerals (MULTIPLE VITAMIN  S/WOMENS) TABS Alternate therapy         Encounter Diagnoses   Name Primary?     Coronary artery disease involving native coronary artery of native heart without angina pectoris Yes     Hypertriglyceridemia      Hyperlipidemia LDL goal <130      S/P coronary angiogram        CURRENT MEDICATIONS:  Current Outpatient Medications   Medication Sig Dispense Refill     allopurinol (ZYLOPRIM) 100 MG tablet Take 1 tablet (100 mg) by mouth daily 90 tablet 3     aspirin (ASA) 81 MG EC tablet Take 81 mg by mouth daily       B Complex Vitamins (VITAMIN B COMPLEX PO) Take 1,250 mg by mouth        BEE POLLEN PO Bee Pollen, unprocessed, raw,  natural, no preservatives       colchicine (COLCRYS) 0.6 MG tablet Take 1 tablet (0.6 mg) by mouth as needed (goute flare) 30 tablet 3     COLLAGEN PO Take by mouth daily powder       EXCEDRIN TENSION HEADACHE 500-65 MG PO TABS 1 tablet twice daily as needed       Flaxseed, Linseed, (FLAXSEED OIL PO) Take 1 Tablespoonful by mouth daily       fluticasone (FLONASE) 50 MCG/ACT nasal spray Spray 2 sprays into both nostrils daily 48 g 3     hydrochlorothiazide (HYDRODIURIL) 12.5 MG tablet Take 1 tablet (12.5 mg) by mouth every morning See Rx notes please! 90 tablet 1     levalbuterol (XOPENEX HFA) 45 MCG/ACT inhaler INHALE 2 PUFFS INTO THE LUNGS EVERY 6 HOURS AS NEEDED FOR SHORTNESS OF BREATH OR DIFFICULT BREATHING OR WHEEZING 15 g 1     levothyroxine (SYNTHROID/LEVOTHROID) 88 MCG tablet TAKE 1 TABLET(88 MCG) BY MOUTH DAILY 90 tablet 3     losartan (COZAAR) 100 MG tablet Take 0.5 tablets (50 mg) by mouth 2 times daily 90 tablet 3     MELATONIN PO        metoprolol succinate ER (TOPROL-XL) 25 MG 24 hr tablet Take 1 tablet (25 mg) by mouth daily 90 tablet 3     montelukast (SINGULAIR) 10 MG tablet Take 1 tablet (10 mg) by mouth At Bedtime 90 tablet 3     Multiple Vitamins-Minerals (HAIR SKIN AND NAILS FORMULA PO) Take by mouth daily Two gummies       simvastatin (ZOCOR) 40 MG tablet Take 1 tablet (40 mg) by mouth At Bedtime 90 tablet 3     ticagrelor (BRILINTA) 90 MG tablet Take 1 tablet (90 mg) by mouth 2 times daily 180 tablet 3     traZODone (DESYREL) 50 MG tablet Take 1-2 tablets ( mg) by mouth nightly as needed for sleep 180 tablet 3     venlafaxine (EFFEXOR-XR) 150 MG 24 hr capsule 2 Capsules daily 180 capsule 3     zinc 50 MG TABS Take 1 tablet by mouth daily       order for DME Equipment ordered: Respironics Auto PAP Mask type: Nasal Settings: 9-15 cm H2O         ALLERGIES     Allergies   Allergen Reactions     Cymbalta      Visual hallucinations     Zomig [Zolmitriptan] Other (See Comments)     Can't wake up      Colestid [Colestipol Hcl]      Albuterol Other (See Comments)     Very jittery and couldn't tolerate     Amlodipine Other (See Comments) and Swelling     Swelling- lower legs     Asa [Aspirin] GI Disturbance     Atenolol Fatigue     Atorvastatin Other (See Comments)     Swelling legs     Bupropion Unknown     Codeine Nausea     Crestor [Hmg-Coa-R Inhibitors] Other (See Comments)     Joint pain     Ezetimibe Other (See Comments)     Fatigue     Hydralazine Other (See Comments) and Fatigue     Fatigue     Lisinopril Cough     Lovastatin Other (See Comments) and Cramps     Cramps     Niacin      Unknown by patient     Paroxetine Other (See Comments)     Fatigue     Paxil [Paroxetine] Fatigue     Potassium GI Disturbance     Sulfa Drugs Fatigue     Sulfasalazine Other (See Comments)     Triamterene Unknown     Zetia [Ezetimibe] Fatigue       PAST MEDICAL, SURGICAL, FAMILY, SOCIAL HISTORY:  History was reviewed and updated as needed, see medical record.    Review of Systems:  A 12-point review of systems was completed, see medical record for detailed review of systems information.    Physical Exam:  Vitals: /67   Pulse 59   Ht 1.524 m (5')   Wt 79.3 kg (174 lb 12.8 oz)   BMI 34.14 kg/m            Recent Lab Results:  LIPID RESULTS:  Lab Results   Component Value Date    CHOL 145 02/24/2020    HDL 44 (L) 02/24/2020    LDL 50 02/24/2020    TRIG 253 (H) 02/24/2020    CHOLHDLRATIO 4.7 05/04/2015       LIVER ENZYME RESULTS:  Lab Results   Component Value Date    AST 31 09/30/2019    ALT 51 (H) 09/30/2019       CBC RESULTS:  Lab Results   Component Value Date    WBC 6.2 03/18/2019    RBC 4.72 03/18/2019    HGB 14.5 03/18/2019    HCT 44.2 03/18/2019    MCV 94 03/18/2019    MCH 30.7 03/18/2019    MCHC 32.8 03/18/2019    RDW 13.1 03/18/2019     03/18/2019       BMP RESULTS:  Lab Results   Component Value Date     03/17/2020    POTASSIUM 4.3 03/17/2020    CHLORIDE 106 03/17/2020    CO2 29 03/17/2020     ANIONGAP 3 03/17/2020     (H) 03/17/2020    BUN 22 03/17/2020    CR 1.10 (H) 03/17/2020    GFRESTIMATED 50 (L) 03/17/2020    GFRESTBLACK 58 (L) 03/17/2020    SAMARIA 8.7 03/17/2020        A1C RESULTS:  No results found for: A1C    INR RESULTS:  Lab Results   Component Value Date    INR 1.00 07/28/2011       Thank you for allowing me to participate in the care of your patient.    Sincerely,     Ibeth Infante MD     [facility]

## 2020-06-23 NOTE — PATIENT INSTRUCTIONS
1. Can now stop Brilinta (ticagrelor) and continue aspirin indefinitely   2. Continue rest of blood pressure medications at current doses  3. Yearly cholesterol panel  4. 6 month follow up with Dr. Infante

## 2020-06-23 NOTE — PROGRESS NOTES
"    Abbey Omer is a 70 year old female who is being evaluated via a billable telephone visit.      The patient has been notified of following:     \"This telephone visit will be conducted via a call between you and your physician/provider. We have found that certain health care needs can be provided without the need for a physical exam.  This service lets us provide the care you need with a short phone conversation.  If a prescription is necessary we can send it directly to your pharmacy.  If lab work is needed we can place an order for that and you can then stop by our lab to have the test done at a later time.    Telephone visits are billed at different rates depending on your insurance coverage. During this emergency period, for some insurers they may be billed the same as an in-person visit.  Please reach out to your insurance provider with any questions.    If during the course of the call the physician/provider feels a telephone visit is not appropriate, you will not be charged for this service.\"    Patient has given verbal consent for Telephone visit?  Yes    What phone number would you like to be contacted at? [phone]    How would you like to obtain your AVS? MyChart     Review Of Systems  Skin: NEGATIVE  Eyes:Ears/Nose/Throat: wears reading glasses  Respiratory: sleep apnea but she stopped using CPAP  Cardiovascular:mild lightheaded ness that goes away quickly  Gastrointestinal: stomach feels bad lately and is relieved with Dania [place]  Genitourinary:NEGATIVE  Musculoskeletal: arthritis  Neurologic: NEGATIVE  Psychiatric: occ. Anxiety, treated depression  Hematologic/Lymphatic/Immunologic: deiry intolerence  Endocrine:  Thyroid disorder  Vitals today are:  /67  Pulse 59  Weight  174.6  INDIA Cheng      Telephone number of patient: [phone]    PROVIDER NOTES:     This is a 70 year old patient with PMH HTN, HLD, and syncope in setting of dehydration here for follow up with " me. I initially saw her last year in setting of RBBB discovered on EKG.      Prior History:      She has no david syncope history or bradycardia that she knows about. No LOC in past or unexplained falls. Has a h/o migranes which seem well controlled. Only had an episode in [employer] once of dizziness leading to diaphoresis and passing out for which she was sent to hospital and improved with fluids. She was told her neck veins by ultrasound were very small. She has a h/o high cholesterol, stopped taking statin but she is unclear why. Her LDL was 167 and TC and TGs > 200. BP has been overall well controlled and is being managed by PCP. On ROS she had some atypical chest pain at rest. Does not heavily exert herself and is not involved in an exercise regimen. Her CP is better with burping. However on climbing stairs she fatigues easy and gets short of breath. She feels her legs are swollen. No varicose veins, ulcerations, claudication. No orthopnea or PND.      We obtained a coronary CT and suprisingly found severe stenoses of the LCX and RCA with moderate Ramus and LAD stenoses (FFR 0.86). She underwent coronary angiogram where she had staged PCI with SHELBI - first to the OM/LCX and then to the prox-mid RCA by radial approach 2/2019-3/2019.      Interval History:      She completed cardiac rehab. She has been tolerating DAPT well with no bleeding issues. Does not report side effects from ticagrelor. Statin was initiated. No chest pain. No SOB unless heavy stair climbing, mild ankle edema. Has home cuff happy with. Since January enrolled in a research study through [facility] which gave her a BP cuff. Tolerating medications ok, no issues. On DAPT, hydrochlorothiazide, losartan, metoprolol. Denies dizziness or lightheadedness.    ASSESSMENT/PLAN:    #CAD - asymptomatic. Doing well. Will continue aspirin 81 mg daily, can stop brilinta.   #HTN - controlled, continue hydrochlorothiazide, losartan, metoprolol  #HLD - controlled, lipid  panel per PCP. Continue simvastatin. Goal LDL < 70 mg/dL.     6 month follow up in office.    Ibeth Infante MD [facility]     Total time 12 minutes.       Orders this Visit:  No orders of the defined types were placed in this encounter.    Orders Placed This Encounter   Medications     aspirin (ASA) 81 MG EC tablet     Sig: Take 81 mg by mouth daily     COLLAGEN PO     Sig: Take by mouth daily powder     Multiple Vitamins-Minerals (HAIR SKIN AND NAILS FORMULA PO)     Sig: Take by mouth daily Two gummies     Medications Discontinued During This Encounter   Medication Reason     CALCIUM + D 600-200 MG-UNIT OR TABS Stopped by Patient     Cranberry 500 MG CAPS Stopped by Patient     FISH OIL 1200 MG OR CAPS Stopped by Patient     magnesium 500 MG TABS Stopped by Patient     Multiple Vitamins-Minerals (MULTIPLE VITAMIN  S/WOMENS) TABS Alternate therapy         Encounter Diagnoses   Name Primary?     Coronary artery disease involving native coronary artery of native heart without angina pectoris Yes     Hypertriglyceridemia      Hyperlipidemia LDL goal <130      S/P coronary angiogram        CURRENT MEDICATIONS:  Current Outpatient Medications   Medication Sig Dispense Refill     allopurinol (ZYLOPRIM) 100 MG tablet Take 1 tablet (100 mg) by mouth daily 90 tablet 3     aspirin (ASA) 81 MG EC tablet Take 81 mg by mouth daily       B Complex Vitamins (VITAMIN B COMPLEX PO) Take 1,250 mg by mouth        BEE POLLEN PO Bee Pollen, unprocessed, raw, natural, no preservatives       colchicine (COLCRYS) 0.6 MG tablet Take 1 tablet (0.6 mg) by mouth as needed (goute flare) 30 tablet 3     COLLAGEN PO Take by mouth daily powder       EXCEDRIN TENSION HEADACHE 500-65 MG PO TABS 1 tablet twice daily as needed       Flaxseed, Linseed, (FLAXSEED OIL PO) Take 1 Tablespoonful by mouth daily       fluticasone (FLONASE) 50 MCG/ACT nasal spray Spray 2 sprays into both nostrils daily 48 g 3     hydrochlorothiazide  (HYDRODIURIL) 12.5 MG tablet Take 1 tablet (12.5 mg) by mouth every morning See Rx notes please! 90 tablet 1     levalbuterol (XOPENEX HFA) 45 MCG/ACT inhaler INHALE 2 PUFFS INTO THE LUNGS EVERY 6 HOURS AS NEEDED FOR SHORTNESS OF BREATH OR DIFFICULT BREATHING OR WHEEZING 15 g 1     levothyroxine (SYNTHROID/LEVOTHROID) 88 MCG tablet TAKE 1 TABLET(88 MCG) BY MOUTH DAILY 90 tablet 3     losartan (COZAAR) 100 MG tablet Take 0.5 tablets (50 mg) by mouth 2 times daily 90 tablet 3     MELATONIN PO        metoprolol succinate ER (TOPROL-XL) 25 MG 24 hr tablet Take 1 tablet (25 mg) by mouth daily 90 tablet 3     montelukast (SINGULAIR) 10 MG tablet Take 1 tablet (10 mg) by mouth At Bedtime 90 tablet 3     Multiple Vitamins-Minerals (HAIR SKIN AND NAILS FORMULA PO) Take by mouth daily Two gummies       simvastatin (ZOCOR) 40 MG tablet Take 1 tablet (40 mg) by mouth At Bedtime 90 tablet 3     ticagrelor (BRILINTA) 90 MG tablet Take 1 tablet (90 mg) by mouth 2 times daily 180 tablet 3     traZODone (DESYREL) 50 MG tablet Take 1-2 tablets ( mg) by mouth nightly as needed for sleep 180 tablet 3     venlafaxine (EFFEXOR-XR) 150 MG 24 hr capsule 2 Capsules daily 180 capsule 3     zinc 50 MG TABS Take 1 tablet by mouth daily       order for DME Equipment ordered: Respironics Auto PAP Mask type: Nasal Settings: 9-15 cm H2O         ALLERGIES     Allergies   Allergen Reactions     Cymbalta      Visual hallucinations     Zomig [Zolmitriptan] Other (See Comments)     Can't wake up     Colestid [Colestipol Hcl]      Albuterol Other (See Comments)     Very jittery and couldn't tolerate     Amlodipine Other (See Comments) and Swelling     Swelling- lower legs     Asa [Aspirin] GI Disturbance     Atenolol Fatigue     Atorvastatin Other (See Comments)     Swelling legs     Bupropion Unknown     Codeine Nausea     Crestor [Hmg-Coa-R Inhibitors] Other (See Comments)     Joint pain     Ezetimibe Other (See Comments)     Fatigue      Hydralazine Other (See Comments) and Fatigue     Fatigue     Lisinopril Cough     Lovastatin Other (See Comments) and Cramps     Cramps     Niacin      Unknown by patient     Paroxetine Other (See Comments)     Fatigue     Paxil [Paroxetine] Fatigue     Potassium GI Disturbance     Sulfa Drugs Fatigue     Sulfasalazine Other (See Comments)     Triamterene Unknown     Zetia [Ezetimibe] Fatigue       PAST MEDICAL, SURGICAL, FAMILY, SOCIAL HISTORY:  History was reviewed and updated as needed, see medical record.    Review of Systems:  A 12-point review of systems was completed, see medical record for detailed review of systems information.    Physical Exam:  Vitals: /67   Pulse 59   Ht 1.524 m (5')   Wt 79.3 kg (174 lb 12.8 oz)   BMI 34.14 kg/m            Recent Lab Results:  LIPID RESULTS:  Lab Results   Component Value Date    CHOL 145 02/24/2020    HDL 44 (L) 02/24/2020    LDL 50 02/24/2020    TRIG 253 (H) 02/24/2020    CHOLHDLRATIO 4.7 05/04/2015       LIVER ENZYME RESULTS:  Lab Results   Component Value Date    AST 31 09/30/2019    ALT 51 (H) 09/30/2019       CBC RESULTS:  Lab Results   Component Value Date    WBC 6.2 03/18/2019    RBC 4.72 03/18/2019    HGB 14.5 03/18/2019    HCT 44.2 03/18/2019    MCV 94 03/18/2019    MCH 30.7 03/18/2019    MCHC 32.8 03/18/2019    RDW 13.1 03/18/2019     03/18/2019       BMP RESULTS:  Lab Results   Component Value Date     03/17/2020    POTASSIUM 4.3 03/17/2020    CHLORIDE 106 03/17/2020    CO2 29 03/17/2020    ANIONGAP 3 03/17/2020     (H) 03/17/2020    BUN 22 03/17/2020    CR 1.10 (H) 03/17/2020    GFRESTIMATED 50 (L) 03/17/2020    GFRESTBLACK 58 (L) 03/17/2020    SAMARIA 8.7 03/17/2020        A1C RESULTS:  No results found for: A1C    INR RESULTS:  Lab Results   Component Value Date    INR 1.00 07/28/2011           CC  Ibeth Infante MD  [address] [place] [id number]

## 2020-07-13 ENCOUNTER — TELEPHONE (OUTPATIENT)
Dept: PHARMACY | Facility: CLINIC | Age: 70
End: 2020-07-13

## 2020-07-13 NOTE — TELEPHONE ENCOUNTER
Called patient to f/u on BP as part of [facility] study.       Home BP's are running 117/68 on average, with a max of 126/78 and a min of 100/53.  Patient is taking losartan 100 mg - 1/2 tablet AM and 1/2 tablet PM, hydrochlorothiazide 12.5 mg in the morning, and metoprolol succinate 25 mg in the evening and reports excellent adherence. Tolerating medications well, no concerns.   Allergies to amlodipine - lower leg swelling, atenolol - fatigue, hydralazine - fatigue, lisinopril - cough, and triamterene - unknown reaction, sulfa - fatigue.     Patient is meeting BP goal of <140/90. No changes recommended today. Last Creatinine and potassium were within normal limits, PCP previously recommended follow up labs at 6 months, September 2020.      Ref. Range 3/17/2020 11:24   Creatinine Latest Ref Range: 0.52 - 1.04 mg/dL 1.10 (H)      Ref. Range 3/17/2020 11:24   Potassium Latest Ref Range: 3.4 - 5.3 mmol/L 4.3     Will f/u on patient BP readings in 1-2 weeks.       Ga Dunn, PharmD      Syst Diast HR Date Time  [phone] 58 Jul 11, 2020, 08:20am  141 70 57 Jul 11, 2020, 08:14am  144 82 62 Jul 11, 2020, 08:10am  120 72 56 Jul 10, 2020, 06:46am  116 62 62 Jul 9, 2020, 07:35am  100 53 63 Jul 8, 2020, 07:36am  125 72 64 Jul 7, 2020, 07:10am  117 69 60 Jul 6, 2020, 07:03am

## 2020-07-16 ENCOUNTER — TELEPHONE (OUTPATIENT)
Dept: PHARMACY | Facility: CLINIC | Age: 70
End: 2020-07-16

## 2020-07-16 NOTE — TELEPHONE ENCOUNTER
Patient called and spoke with Melissa today with [facility] HTN study regarding increased dizziness over the last few days. She has been pretty steady for the most part but slightly off balance if standing too quickly.   Patient states that she has not changed her medications at all, but states that she has changed her diet and exercise regimen over the last month, more vegetables and more walking. States that she tries to be good with staying hydrated but knows that she could work on this, especially with the recent heat. She has lost about 4 pounds.     Recommend increased water intake, no changes to medications at this time but if symptoms continue would recommend dose decrease, likely of losartan. Will follow up with patient next Monday.     iDane Dunn, PharmD  Medication Therapy Management Pharmacist    Systolic Diastolic Heart Rate Date Time  [phone] 59 Jul 16, 2020, 08:05am  106 66 58 Jul 15, 2020, 08:18am  103 62 64 Jul 14, 2020, 08:10am  120 49 66 Jul 13, 2020, 08:28am  117 63 60 Jul 12, 2020, 07:58am  126 78 58 Jul 11, 2020, 08:20am  141 70 57 Jul 11, 2020, 08:14am  144 82 62 Jul 11, 2020, 08:10am  120 72 56 Jul 10, 2020, 06:46am

## 2020-07-20 ENCOUNTER — TELEPHONE (OUTPATIENT)
Dept: PHARMACY | Facility: CLINIC | Age: 70
End: 2020-07-20

## 2020-07-20 NOTE — TELEPHONE ENCOUNTER
Called patient to f/u on BP as part of [employer] study.       Home BP's are running 103/65 on average, with a max of 124/69 and a min of 87/57.  Patient is taking losartan 100 mg - 1/2 tablet AM and 1/2 tablet PM, hydrochlorothiazide 12.5 mg in the morning, and metoprolol succinate 25 mg in the evening and reports excellent adherence.   Patient reports working in a garden outside on 7/17 and 7/18 and doing much more physical labor than usual and not drinking as much water as she needs to be, since then has been drinking more water. This did not prevent her from doing any of her daily activities and since then the dizziness has been getting better.     Patient is meeting BP goal of <140/90, and less than 130/80 on many occasions. Discussed staying hydrated, especially in the increased heat. Also suggested a dose decrease of BP medication due to dizziness, patient declines and prefers to continue current medication regimen and follow up in 1 week.      Ga Dunn, PharmD      Syst Diast HR Date Time  105 67 53 Jul 20, 2020, 08:23am  90 57 64 Jul 19, 2020, 08:18am  87 65 53 Jul 18, 2020, 08:00am  88 54 55 Jul 18, 2020, 07:55am  91 58 63 Jul 18, 2020, 07:53am  104 66 65 Jul 17, 2020, 09:23am  124 69 59 Jul 16, 2020, 08:05am  106 66 58 Jul 15, 2020, 08:18am  103 62 64 Jul 14, 2020, 08:10am  120 49 66 Jul 13, 2020, 08:28am

## 2020-07-27 ENCOUNTER — TELEPHONE (OUTPATIENT)
Dept: PHARMACY | Facility: CLINIC | Age: 70
End: 2020-07-27

## 2020-07-27 NOTE — TELEPHONE ENCOUNTER
Called patient to f/u on BP as part of [facility] study.       Home BP's are running 115/66 on average, with a max of 140/73 and a min of 107/60.  Patient is taking losartan 100 mg - 1/2 tablet AM and 1/2 tablet PM, hydrochlorothiazide 12.5 mg in the morning, and metoprolol succinate 25 mg in the evening and reports excellent adherence.   States that she has been drinking more water in the last week. She denies any dizzy eepisodes as she was experiencing last week.     Patient is meeting BP goal of <140/90, and less than 130/80 on many occasions. No changes today.     Will follow up in 1-2 weeks.      Ga Dunn, PharmD      Syst Diast HR Date Time  108 64 56 Jul 27, 2020, 08:15am  107 65 61 Jul 26, 2020, 08:43am  110 64 67 Jul 25, 2020, 08:03am  115 65 67 Jul 24, 2020, 08:18am  140 71 62 Jul 23, 2020, 07:43am  115 73 64 Jul 22, 2020, 07:51am  112 60 63 Jul 21, 2020, 08:49am  105 67 53 Jul 20, 2020, 08:23am

## 2020-10-02 DIAGNOSIS — R05.9 COUGH: ICD-10-CM

## 2020-10-02 DIAGNOSIS — J31.0 CHRONIC NONALLERGIC RHINITIS: Chronic | ICD-10-CM

## 2020-10-05 RX ORDER — MONTELUKAST SODIUM 10 MG/1
10 TABLET ORAL AT BEDTIME
Qty: 90 TABLET | Refills: 3 | Status: SHIPPED | OUTPATIENT
Start: 2020-10-05 | End: 2021-08-31

## 2020-10-05 NOTE — TELEPHONE ENCOUNTER
Patient is due for 6 month follow up for chronic disease management.  As well as due for lab visit.    Okay for virtual visit if she prefers that.  Please let her know.    Sarah Vallejo, DNP, APRN, CNP

## 2020-10-07 NOTE — TELEPHONE ENCOUNTER
[facility] [employer] message sent.    Thank you!    Clotilde LLOYD  [facility] Referral Rep

## 2020-10-12 ENCOUNTER — IMMUNIZATION (OUTPATIENT)
Dept: NURSING | Facility: CLINIC | Age: 70
End: 2020-10-12
Payer: MEDICARE

## 2020-10-12 DIAGNOSIS — Z23 NEED FOR PROPHYLACTIC VACCINATION AND INOCULATION AGAINST INFLUENZA: Primary | ICD-10-CM

## 2020-10-12 PROCEDURE — G0008 ADMIN INFLUENZA VIRUS VAC: HCPCS

## 2020-10-12 PROCEDURE — 90662 IIV NO PRSV INCREASED AG IM: CPT

## 2020-10-12 PROCEDURE — 99207 PR NO CHARGE NURSE ONLY: CPT

## 2020-10-19 DIAGNOSIS — G47.00 INSOMNIA, UNSPECIFIED TYPE: ICD-10-CM

## 2020-10-19 NOTE — TELEPHONE ENCOUNTER
Patient contacted and scheduled.    Thank you,  Theresa HERCULES  [facility]  Team Annika Coordinator

## 2020-10-20 RX ORDER — TRAZODONE HYDROCHLORIDE 50 MG/1
50-100 TABLET, FILM COATED ORAL
Qty: 180 TABLET | Refills: 2 | Status: SHIPPED | OUTPATIENT
Start: 2020-10-20 | End: 2021-08-10

## 2020-10-23 ENCOUNTER — VIRTUAL VISIT (OUTPATIENT)
Dept: FAMILY MEDICINE | Facility: CLINIC | Age: 70
End: 2020-10-23
Payer: MEDICARE

## 2020-10-23 DIAGNOSIS — E03.4 HYPOTHYROIDISM DUE TO ACQUIRED ATROPHY OF THYROID: Chronic | ICD-10-CM

## 2020-10-23 DIAGNOSIS — F33.0 MAJOR DEPRESSIVE DISORDER, RECURRENT EPISODE, MILD DEGREE (H): Primary | Chronic | ICD-10-CM

## 2020-10-23 DIAGNOSIS — E78.5 HYPERLIPIDEMIA LDL GOAL <130: Chronic | ICD-10-CM

## 2020-10-23 DIAGNOSIS — N18.31 STAGE 3A CHRONIC KIDNEY DISEASE (H): Chronic | ICD-10-CM

## 2020-10-23 DIAGNOSIS — M1A.9XX0 CHRONIC GOUT INVOLVING TOE WITHOUT TOPHUS, UNSPECIFIED CAUSE, UNSPECIFIED LATERALITY: ICD-10-CM

## 2020-10-23 DIAGNOSIS — I10 HYPERTENSION GOAL BP (BLOOD PRESSURE) < 140/90: Chronic | ICD-10-CM

## 2020-10-23 PROBLEM — M10.9 ACUTE GOUT INVOLVING TOE OF LEFT FOOT, UNSPECIFIED CAUSE: Chronic | Status: RESOLVED | Noted: 2017-08-11 | Resolved: 2020-10-23

## 2020-10-23 PROCEDURE — 99443 PR PHYSICIAN TELEPHONE EVALUATION 21-30 MIN: CPT | Performed by: NURSE PRACTITIONER

## 2020-10-23 RX ORDER — METOPROLOL SUCCINATE 25 MG/1
25 TABLET, EXTENDED RELEASE ORAL DAILY
Qty: 90 TABLET | Refills: 3 | Status: SHIPPED | OUTPATIENT
Start: 2020-10-23 | End: 2021-08-31

## 2020-10-23 RX ORDER — LEVOTHYROXINE SODIUM 88 UG/1
TABLET ORAL
Qty: 90 TABLET | Refills: 3 | Status: SHIPPED | OUTPATIENT
Start: 2020-10-23 | End: 2021-08-31

## 2020-10-23 RX ORDER — VENLAFAXINE HYDROCHLORIDE 150 MG/1
CAPSULE, EXTENDED RELEASE ORAL
Qty: 180 CAPSULE | Refills: 3 | Status: SHIPPED | OUTPATIENT
Start: 2020-10-23 | End: 2021-09-06

## 2020-10-23 RX ORDER — ALLOPURINOL 100 MG/1
100 TABLET ORAL DAILY
Qty: 90 TABLET | Refills: 3 | Status: SHIPPED | OUTPATIENT
Start: 2020-10-23 | End: 2021-08-31

## 2020-10-23 ASSESSMENT — PATIENT HEALTH QUESTIONNAIRE - PHQ9: SUM OF ALL RESPONSES TO PHQ QUESTIONS 1-9: 17

## 2020-10-23 NOTE — PROGRESS NOTES
"Abbey Omer is a 70 year old female who is being evaluated via a billable telephone visit.      The patient has been notified of following:     \"This telephone visit will be conducted via a call between you and your physician/provider. We have found that certain health care needs can be provided without the need for a physical exam.  This service lets us provide the care you need with a short phone conversation.  If a prescription is necessary we can send it directly to your pharmacy.  If lab work is needed we can place an order for that and you can then stop by our lab to have the test done at a later time.    Telephone visits are billed at different rates depending on your insurance coverage. During this emergency period, for some insurers they may be billed the same as an in-person visit.  Please reach out to your insurance provider with any questions.    If during the course of the call the physician/provider feels a telephone visit is not appropriate, you will not be charged for this service.\"    Patient has given verbal consent for Telephone visit?  Yes    What phone number would you like to be contacted at? [phone]    How would you like to obtain your AVS? Mail a copy    Subjective     Abbey Omer is a 70 year old female who presents via phone visit today for the following health issues:    HPI     Hyperlipidemia Follow-Up      Are you regularly taking any medication or supplement to lower your cholesterol?   Yes- Simvastatin    Are you having muscle aches or other side effects that you think could be caused by your cholesterol lowering medication?  Yes- muscle aches    Hypertension Follow-up      Do you check your blood pressure regularly outside of the clinic? Yes, this morning 109/67    Are you following a low salt diet? No    Are your blood pressures ever more than 140 on the top number (systolic) OR more   than 90 on the bottom number (diastolic), for example 140/90? No    Depression " Followup    How are you doing with your depression since your last visit? Worsened     Are you having other symptoms that might be associated with depression? Yes:  feeling down, patient says her emotions go up and down    Have you had a significant life event?  OTHER: Covid pandemic    Are you feeling anxious or having panic attacks?   Yes:  some anxiety    Do you have any concerns with your use of alcohol or other drugs? No    Social History     Tobacco Use     Smoking status: Former Smoker     Packs/day: 0.10     Years: 1.00     Pack years: 0.10     Types: Cigarettes     Start date: 1969     Quit date: 10/12/1969     Years since quittin.0     Smokeless tobacco: Never Used     Tobacco comment: Smoked, very little, for 2 mo, 49 years ago.   Substance Use Topics     Alcohol use: Yes     Comment: Maybe 1 or 2 drinks a year     Drug use: No     PHQ 2019 2020 10/23/2020   PHQ-9 Total Score 5 1 17   Q9: Thoughts of better off dead/self-harm past 2 weeks Not at all Not at all Not at all     CHRISTIANO-7 SCORE 2019   Total Score - - -   Total Score 21 2 2   Total Score - - -     Suicide Assessment Five-step Evaluation and Treatment (SAFE-T)    Hypothyroidism Follow-up      Since last visit, patient describes the following symptoms: Weight stable, no hair loss, no skin changes, no constipation, no loose stools, tremors, anxiety and depression      How many servings of fruits and vegetables do you eat daily?  0-1, veggie smoothie    On average, how many sweetened beverages do you drink each day (Examples: soda, juice, sweet tea, etc.  Do NOT count diet or artificially sweetened beverages)?   0    How many days per week do you exercise enough to make your heart beat faster? 3 or less    How many minutes a day do you exercise enough to make your heart beat faster? 30 - 60    How many days per week do you miss taking your medication? 0    Patient is wanting to discuss from taking Bee  pollen and switching to honey (raw honey, organic, local).  Concerned about the sugar content because sugar will make her break out with acne.  When she had cut out all sugar in the past her acne went away and was able to wean off Spironolactone.    Mood as been fluctuating more, feeling more down lately.  Goes to 2 churches on Zoom- one that is local here and one that is located in [place].  With the Bible reading she feels like her answers are more attacked by others and she takes this personally.    Allergies:  Takes over the counter allergy medications.  Has not been using Flonase.    Gout:  No recent gout flares.  Continues with Allopurinol 100 mg daily and has Colchicine on hand if needed for flares.  If she eats shellfish she will feel some gout pain in her toes.    Review of Systems   Constitutional, HEENT, cardiovascular, pulmonary, gi and gu systems are negative, except as otherwise noted.       Objective          Vitals:  No vitals were obtained today due to virtual visit.    healthy, alert and no distress  PSYCH: Alert and oriented times 3; coherent speech, normal   rate and volume, able to articulate logical thoughts, able   to abstract reason, no tangential thoughts, no hallucinations   or delusions  Her affect is normal  RESP: No cough, no audible wheezing, able to talk in full sentences  Remainder of exam unable to be completed due to telephone visits          Assessment/Plan:    Assessment & Plan     Major depressive disorder, recurrent episode, mild degree (H)  Chronic, stable, continue current treatment.  - Therapeutic listening provided.  Discussed therapy and patient has done this in the past that was effective.  She states she may be travelling to [place] in December 2020 through March 2021.  She has a good support system.  - venlafaxine (EFFEXOR-XR) 150 MG 24 hr capsule; 2 Capsules by mouth daily    Hyperlipidemia LDL goal <130  Chronic, stable, continue current treatment.   - Lipid panel  reflex to direct LDL Fasting; Future    Hypertension goal BP (blood pressure) < 140/90  Chronic, stable, continue current treatment.   - **Comprehensive metabolic panel FUTURE anytime; Future  - metoprolol succinate ER (TOPROL-XL) 25 MG 24 hr tablet; Take 1 tablet (25 mg) by mouth daily    Hypothyroidism due to acquired atrophy of thyroid  Chronic, stable, continue current treatment.  She is due for labs and will schedule lab appointment.  - **TSH with free T4 reflex FUTURE anytime; Future  - levothyroxine (SYNTHROID/LEVOTHROID) 88 MCG tablet; TAKE 1 TABLET(88 MCG) BY MOUTH DAILY    Chronic gout involving toe without tophus, unspecified cause, unspecified laterality  Chronic, stable, continue current treatment.  She has Colchicine on hand for flares.  Briefly discussed may consider weaning off Allopurinol at some point; will hold off for now.  - allopurinol (ZYLOPRIM) 100 MG tablet; Take 1 tablet (100 mg) by mouth daily  - Uric acid; Future    Stage 3a chronic kidney disease  - CMP     BMI:   Estimated body mass index is 34.14 kg/m  as calculated from the following:    Height as of 6/23/20: 1.524 m (5').    Weight as of 6/23/20: 79.3 kg (174 lb 12.8 oz).            Return in about 1 week (around 10/30/2020) for Lab Work (fasting).    Sarah Vallejo NP  [facility]    Phone call duration:  34 minutes with review of chart, review of patient concerns and review of ongoing plans.     Telephone visit (rather than a office visit) done today due to COVID-19 pandemic.

## 2020-10-26 DIAGNOSIS — I10 HYPERTENSION GOAL BP (BLOOD PRESSURE) < 140/90: Chronic | ICD-10-CM

## 2020-10-26 NOTE — TELEPHONE ENCOUNTER
Routing refill request to provider for review/approval because:  Appears last refill on 5/ 18/20 did not print, needs to be resent.  Creatinine   Date Value Ref Range Status   03/17/2020 1.10 (H) 0.52 - 1.04 mg/dL Final       Berenice Robins, RN, BSN, PHN  [facility]

## 2020-10-27 RX ORDER — LOSARTAN POTASSIUM 100 MG/1
50 TABLET ORAL 2 TIMES DAILY
Qty: 90 TABLET | Refills: 3 | Status: SHIPPED | OUTPATIENT
Start: 2020-10-27 | End: 2021-08-31

## 2020-10-27 NOTE — TELEPHONE ENCOUNTER
Reason for Call:  Other / Inquire    Detailed comments: Patient called and stated she has been going back and forth with pharmacy and clinic about this request for several days.  Patient also stated she is out of medication and she cannot be without her blood pressure med.  Patient would appreciate this refill as soon as possible.    Phone Number Patient can be reached at: Home number on file [phone] (home)    Best Time: ASAp    Can we leave a detailed message on this number? YES    Call taken on 10/27/2020 at 2:20 PM by Vilma Shaffer

## 2020-11-09 DIAGNOSIS — E78.5 HYPERLIPIDEMIA LDL GOAL <130: Chronic | ICD-10-CM

## 2020-11-09 DIAGNOSIS — M1A.9XX0 CHRONIC GOUT INVOLVING TOE WITHOUT TOPHUS, UNSPECIFIED CAUSE, UNSPECIFIED LATERALITY: ICD-10-CM

## 2020-11-09 DIAGNOSIS — E03.4 HYPOTHYROIDISM DUE TO ACQUIRED ATROPHY OF THYROID: Chronic | ICD-10-CM

## 2020-11-09 DIAGNOSIS — I10 HYPERTENSION GOAL BP (BLOOD PRESSURE) < 140/90: Chronic | ICD-10-CM

## 2020-11-09 PROCEDURE — 84439 ASSAY OF FREE THYROXINE: CPT | Performed by: NURSE PRACTITIONER

## 2020-11-09 PROCEDURE — 80053 COMPREHEN METABOLIC PANEL: CPT | Performed by: NURSE PRACTITIONER

## 2020-11-09 PROCEDURE — 84550 ASSAY OF BLOOD/URIC ACID: CPT | Performed by: NURSE PRACTITIONER

## 2020-11-09 PROCEDURE — 80061 LIPID PANEL: CPT | Performed by: NURSE PRACTITIONER

## 2020-11-09 PROCEDURE — 84443 ASSAY THYROID STIM HORMONE: CPT | Performed by: NURSE PRACTITIONER

## 2020-11-09 PROCEDURE — 36415 COLL VENOUS BLD VENIPUNCTURE: CPT | Performed by: NURSE PRACTITIONER

## 2020-11-10 LAB
ALBUMIN SERPL-MCNC: 3.8 G/DL (ref 3.4–5)
ALP SERPL-CCNC: 103 U/L (ref 40–150)
ALT SERPL W P-5'-P-CCNC: 100 U/L (ref 0–50)
ANION GAP SERPL CALCULATED.3IONS-SCNC: 3 MMOL/L (ref 3–14)
AST SERPL W P-5'-P-CCNC: 83 U/L (ref 0–45)
BILIRUB SERPL-MCNC: 0.4 MG/DL (ref 0.2–1.3)
BUN SERPL-MCNC: 32 MG/DL (ref 7–30)
CALCIUM SERPL-MCNC: 8.9 MG/DL (ref 8.5–10.1)
CHLORIDE SERPL-SCNC: 102 MMOL/L (ref 94–109)
CHOLEST SERPL-MCNC: 110 MG/DL
CO2 SERPL-SCNC: 33 MMOL/L (ref 20–32)
CREAT SERPL-MCNC: 1.26 MG/DL (ref 0.52–1.04)
GFR SERPL CREATININE-BSD FRML MDRD: 43 ML/MIN/{1.73_M2}
GLUCOSE SERPL-MCNC: 111 MG/DL (ref 70–99)
HDLC SERPL-MCNC: 49 MG/DL
LDLC SERPL CALC-MCNC: 26 MG/DL
NONHDLC SERPL-MCNC: 61 MG/DL
POTASSIUM SERPL-SCNC: 4.7 MMOL/L (ref 3.4–5.3)
PROT SERPL-MCNC: 7.3 G/DL (ref 6.8–8.8)
SODIUM SERPL-SCNC: 138 MMOL/L (ref 133–144)
T4 FREE SERPL-MCNC: 1.01 NG/DL (ref 0.76–1.46)
TRIGL SERPL-MCNC: 177 MG/DL
TSH SERPL DL<=0.005 MIU/L-ACNC: 6.6 MU/L (ref 0.4–4)
URATE SERPL-MCNC: 5.1 MG/DL (ref 2.6–6)

## 2021-01-27 ENCOUNTER — MYC MEDICAL ADVICE (OUTPATIENT)
Dept: FAMILY MEDICINE | Facility: CLINIC | Age: 71
End: 2021-01-27

## 2021-01-27 NOTE — TELEPHONE ENCOUNTER
RN replied to patient via [facility]. See message for details.     Berenice Robins RN, BSN, PHN  [facility]

## 2021-03-02 ENCOUNTER — IMMUNIZATION (OUTPATIENT)
Dept: FAMILY MEDICINE | Facility: CLINIC | Age: 71
End: 2021-03-02
Payer: MEDICARE

## 2021-03-02 PROCEDURE — 0001A PR COVID VAC PFIZER DIL RECON 30 MCG/0.3 ML IM: CPT

## 2021-03-02 PROCEDURE — 91300 PR COVID VAC PFIZER DIL RECON 30 MCG/0.3 ML IM: CPT

## 2021-03-22 ENCOUNTER — TELEPHONE (OUTPATIENT)
Dept: FAMILY MEDICINE | Facility: CLINIC | Age: 71
End: 2021-03-22

## 2021-03-22 DIAGNOSIS — I10 HYPERTENSION GOAL BP (BLOOD PRESSURE) < 140/90: Chronic | ICD-10-CM

## 2021-03-22 RX ORDER — HYDROCHLOROTHIAZIDE 12.5 MG/1
12.5 TABLET ORAL EVERY MORNING
Qty: 90 TABLET | Refills: 0 | Status: SHIPPED | OUTPATIENT
Start: 2021-03-22 | End: 2021-06-23

## 2021-03-22 NOTE — TELEPHONE ENCOUNTER
Routing refill request to provider for review/approval because:  Labs not current:  Serum creatinine    Pending Prescriptions:                       Disp   Refills    hydrochlorothiazide (HYDRODIURIL) 12.5 MG *90 tab*1        Sig: Take 1 tablet (12.5 mg) by mouth every morning See Rx           notes please!      Patient has 4 tablets left      Savitamegan Moraes RN

## 2021-03-23 ENCOUNTER — IMMUNIZATION (OUTPATIENT)
Dept: FAMILY MEDICINE | Facility: CLINIC | Age: 71
End: 2021-03-23
Attending: FAMILY MEDICINE
Payer: MEDICARE

## 2021-03-23 PROCEDURE — 91300 PR COVID VAC PFIZER DIL RECON 30 MCG/0.3 ML IM: CPT

## 2021-03-23 PROCEDURE — 0002A PR COVID VAC PFIZER DIL RECON 30 MCG/0.3 ML IM: CPT

## 2021-04-17 ENCOUNTER — HEALTH MAINTENANCE LETTER (OUTPATIENT)
Age: 71
End: 2021-04-17

## 2021-04-23 DIAGNOSIS — I25.10 CORONARY ARTERY DISEASE INVOLVING NATIVE CORONARY ARTERY OF NATIVE HEART WITHOUT ANGINA PECTORIS: ICD-10-CM

## 2021-04-23 RX ORDER — SIMVASTATIN 40 MG
40 TABLET ORAL AT BEDTIME
Qty: 90 TABLET | Refills: 0 | Status: SHIPPED | OUTPATIENT
Start: 2021-04-23 | End: 2021-07-19

## 2021-04-23 RX ORDER — SIMVASTATIN 40 MG
40 TABLET ORAL AT BEDTIME
Qty: 90 TABLET | Refills: 0 | OUTPATIENT
Start: 2021-04-23

## 2021-06-15 ENCOUNTER — OFFICE VISIT (OUTPATIENT)
Dept: CARDIOLOGY | Facility: CLINIC | Age: 71
End: 2021-06-15
Attending: INTERNAL MEDICINE
Payer: MEDICARE

## 2021-06-15 VITALS
OXYGEN SATURATION: 93 % | DIASTOLIC BLOOD PRESSURE: 62 MMHG | HEART RATE: 70 BPM | SYSTOLIC BLOOD PRESSURE: 123 MMHG | BODY MASS INDEX: 36.71 KG/M2 | WEIGHT: 187 LBS | HEIGHT: 60 IN

## 2021-06-15 DIAGNOSIS — I25.10 CORONARY ARTERY DISEASE INVOLVING NATIVE CORONARY ARTERY OF NATIVE HEART WITHOUT ANGINA PECTORIS: ICD-10-CM

## 2021-06-15 PROCEDURE — 99213 OFFICE O/P EST LOW 20 MIN: CPT | Mod: 25 | Performed by: INTERNAL MEDICINE

## 2021-06-15 PROCEDURE — 93000 ELECTROCARDIOGRAM COMPLETE: CPT | Performed by: INTERNAL MEDICINE

## 2021-06-15 ASSESSMENT — MIFFLIN-ST. JEOR: SCORE: 1279.73

## 2021-06-15 NOTE — LETTER
6/15/2021    Sarah Vallejo, NP  [address] [facility] [address]    RE: Abbey Omer       Dear Colleague,    I had the pleasure of seeing Abbey IRWIN Omer in the [facility].    HPI:     This is a pleasant 71 year old patient with PMH HTN, HLD, and syncope in setting of dehydration here for follow up with me. I initially saw her last year in setting of RBBB discovered on EKG.      Prior History:      She has no david syncope history or bradycardia that she knows about. No LOC in past or unexplained falls. Has a h/o migranes which seem well controlled. Only had an episode in [place] once of dizziness leading to diaphoresis and passing out for which she was sent to hospital and improved with fluids. She was told her neck veins by ultrasound were very small. However she had bifasicular block on EKG.    BP has been overall well controlled and is being managed by PCP. On ROS she had some atypical chest pain at rest. Does not heavily exert herself and is not involved in an exercise regimen. Her CP is better with burping. However on climbing stairs she fatigues easy and gets short of breath. She feels her legs are swollen. No varicose veins, ulcerations, claudication. No orthopnea or PND.      We obtained a coronary CT and suprisingly found severe stenoses of the LCX and RCA with moderate Ramus and LAD stenoses (FFR 0.86). She underwent coronary angiogram where she had staged PCI with SHELBI - first to the OM/LCX and then to the prox-mid RCA by radial approach 2/2019-3/2019.     She completed cardiac rehab. We stopped ticagrelor and continued aspirin at one year. Statin was initiated. Since January enrolled in a research study through [facility] which gave her a BP cuff and she remains on hydrochlorothiazide, losartan, metoprolol. Denies dizziness or lightheadedness.     ASSESSMENT/PLAN:     #CAD - asymptomatic. Doing well. Will continue aspirin 81 mg  daily, statin, beta blocker. Echocardiogram in one year.  #HTN - controlled, continue hydrochlorothiazide, losartan, metoprolol  #HLD - controlled, lipid panel per PCP. Continue simvastatin. Goal LDL < 70 mg/dL. Lipids are UTD.  #EKG today for bifasicular block (RBBB, LAFB) - will consider ziopatch in future to make sure no pauses, EKG with every follow up visit    One year follow up with me.     Ibeth Infante MD MSC  [facility]      PAST MEDICAL HISTORY  Past Medical History:   Diagnosis Date     Cancer of bladder (H)      Cataract 11/25/2011     Chronic nonallergic rhinitis 8/31/10 RAST     CKD (chronic kidney disease) stage 3, GFR 30-59 ml/min 12/20/2010     Coronary artery disease involving native coronary artery of native heart without angina pectoris 3/1/2019     DDD (degenerative disc disease), cervical 6/16/2011     Dependent edema      Depression, major      Depressive disorder In 2002 or 2003     Glaucoma suspect, bilateral      History of blood transfusion ?     HTN (hypertension)      Hyperlipidemia      Hypothyroidism 10/12/2009     Migraine     resolved     Nasal septal deviation 8/11/2009     Obesity 5/17/2011     RICKEY (obstructive sleep apnea)      Osteoarthritis of shoulder region      Osteoporosis      Premature menopause      Renal cyst        CURRENT MEDICATIONS  Current Outpatient Medications   Medication Sig Dispense Refill     aspirin (ASA) 81 MG EC tablet Take 81 mg by mouth daily       BEE POLLEN PO Bee Pollen, unprocessed, raw, natural, no preservatives       colchicine (COLCRYS) 0.6 MG tablet Take 1 tablet (0.6 mg) by mouth as needed (goute flare) 30 tablet 3     EXCEDRIN TENSION HEADACHE 500-65 MG PO TABS 1 tablet twice daily as needed       Flaxseed, Linseed, (FLAXSEED OIL PO) Take 1 Tablespoonful by mouth daily       fluticasone (FLONASE) 50 MCG/ACT nasal spray Spray 2 sprays into both nostrils daily 48 g 3     hydrochlorothiazide (HYDRODIURIL) 12.5 MG tablet Take 1 tablet  (12.5 mg) by mouth every morning 90 tablet 0     levalbuterol (XOPENEX HFA) 45 MCG/ACT inhaler INHALE 2 PUFFS INTO THE LUNGS EVERY 6 HOURS AS NEEDED FOR SHORTNESS OF BREATH OR DIFFICULT BREATHING OR WHEEZING 15 g 1     levothyroxine (SYNTHROID/LEVOTHROID) 88 MCG tablet TAKE 1 TABLET(88 MCG) BY MOUTH DAILY 90 tablet 3     losartan (COZAAR) 100 MG tablet Take 0.5 tablets (50 mg) by mouth 2 times daily 90 tablet 3     metoprolol succinate ER (TOPROL-XL) 25 MG 24 hr tablet Take 1 tablet (25 mg) by mouth daily 90 tablet 3     montelukast (SINGULAIR) 10 MG tablet Take 1 tablet (10 mg) by mouth At Bedtime 90 tablet 3     order for DME Equipment ordered: Respironics Auto PAP Mask type: Nasal Settings: 9-15 cm H2O       simvastatin (ZOCOR) 40 MG tablet Take 1 tablet (40 mg) by mouth At Bedtime 90 tablet 0     traZODone (DESYREL) 50 MG tablet Take 1-2 tablets ( mg) by mouth nightly as needed for sleep 180 tablet 2     venlafaxine (EFFEXOR-XR) 150 MG 24 hr capsule 2 Capsules by mouth daily 180 capsule 3     allopurinol (ZYLOPRIM) 100 MG tablet Take 1 tablet (100 mg) by mouth daily (Patient not taking: Reported on 6/15/2021) 90 tablet 3     B Complex Vitamins (VITAMIN B COMPLEX PO) Take 1,250 mg by mouth        COLLAGEN PO Take by mouth daily powder       MELATONIN PO        Multiple Vitamins-Minerals (HAIR SKIN AND NAILS FORMULA PO) Take by mouth daily Two gummies       zinc 50 MG TABS Take 1 tablet by mouth daily         PAST SURGICAL HISTORY:  Past Surgical History:   Procedure Laterality Date     ARTHROPLASTY SHOULDER Right 11/01/2017    Right total shoulder arthroplasty by Dr. Go at [facility]     ARTHROPLASTY SHOULDER Left 03/28/2018    Left total shoulder arthroplasty by Dr. oG at [facility]     CHOLECYSTECTOMY, LAPOROSCOPIC       COLONOSCOPY  ?    Upper & Lower     CV CORONARY ANGIOGRAM  02/22/2019     CV CORONARY ANGIOGRAM N/A 2/22/2019    Procedure: 1330 CORS;   Surgeon: Marito Arriaga MD;  Location:  HEART CARDIAC CATH LAB     CV HEART CATHETERIZATION WITH POSSIBLE INTERVENTION N/A 3/18/2019    Procedure: STAGED PCI-JALEN CARABALLO;  Surgeon: Zeus Cordero MD;  Location:  HEART CARDIAC CATH LAB     CYSTOSCOPY      bladder cancer     D & C       ENT SURGERY  first 1967/second ?    2 Rhynoplasties     HYSTERECTOMY, PAP NO LONGER INDICATED      BSO     NOSE SURGERY      septum deviation     PHACOEMULSIFICATION CLEAR CORNEA WITH STANDARD INTRAOCULAR LENS IMPLANT Left 9/24/2018    Procedure: PHACOEMULSIFICATION CLEAR CORNEA WITH STANDARD INTRAOCULAR LENS IMPLANT;  LEFT EYE PHACOEMULSIFICATION CLEAR CORNEA WITH STANDARD INTRAOCULAR LENS IMPLANT;  Surgeon: Ryne Pascal MD;  Location:  [facility]     PHACOEMULSIFICATION WITH STANDARD INTRAOCULAR LENS IMPLANT Right 10/15/2018    Procedure: PHACOEMULSIFICATION WITH STANDARD INTRAOCULAR LENS IMPLANT ;  Surgeon: Ryne Pascal MD;  Location: [facility] OR       ALLERGIES     Allergies   Allergen Reactions     Cymbalta      Visual hallucinations     Zomig [Zolmitriptan] Other (See Comments)     Can't wake up     Colestid [Colestipol Hcl]      Albuterol Other (See Comments)     Very jittery and couldn't tolerate     Amlodipine Other (See Comments) and Swelling     Swelling- lower legs     Asa [Aspirin] GI Disturbance     Atenolol Fatigue     Atorvastatin Other (See Comments)     Swelling legs     Bupropion Unknown     Codeine Nausea     Crestor [Hmg-Coa-R Inhibitors] Other (See Comments)     Joint pain     Ezetimibe Other (See Comments)     Fatigue     Hydralazine Other (See Comments) and Fatigue     Fatigue     Lisinopril Cough     Lovastatin Other (See Comments) and Cramps     Cramps     Niacin      Unknown by patient     Paroxetine Other (See Comments)     Fatigue     Paxil [Paroxetine] Fatigue     Potassium GI Disturbance     Sulfa Drugs Fatigue     Sulfasalazine Other (See Comments)     Triamterene Unknown     Zetia  [Ezetimibe] Fatigue       FAMILY HISTORY  Family History   Problem Relation Age of Onset     Diabetes Maternal Grandfather      Hypertension Maternal Grandfather      Diabetes Son      Heart Disease Father         CHF     Diabetes Mother      Depression Mother      Hyperlipidemia Mother      Anxiety Disorder Mother      Asthma Mother      Obesity Mother      Substance Abuse Brother         Not any longer     Diabetes Brother      Depression Daughter      Diabetes Maternal Uncle      Cerebrovascular Disease Maternal Uncle      Diabetes Son         after accident     Depression Daughter      Depression Son         after accident     Substance Abuse Brother         Not any longer     Diabetes Sister      Substance Abuse Sister      Diabetes Brother      Thyroid Disease No family hx of      Glaucoma No family hx of      Macular Degeneration No family hx of      Cancer No family hx of          SOCIAL HISTORY  Social History     Socioeconomic History     Marital status:      Spouse name: Maggie     Number of children: 2     Years of education: 12     Highest education level: Not on file   Occupational History     Occupation: collections for [employer]     Employer: [employer]   Social Needs     Financial resource strain: Not on file     Food insecurity     Worry: Not on file     Inability: Not on file     Transportation needs     Medical: Not on file     Non-medical: Not on file   Tobacco Use     Smoking status: Former Smoker     Packs/day: 0.10     Years: 1.00     Pack years: 0.10     Types: Cigarettes     Start date: 1969     Quit date: 10/12/1969     Years since quittin.7     Smokeless tobacco: Never Used     Tobacco comment: Smoked, very little, for 2 mo, 49 years ago.   Substance and Sexual Activity     Alcohol use: Yes     Comment: Maybe 1 or 2 drinks a year     Drug use: No     Sexual activity: Not Currently     Partners: Female     Birth control/protection: Post-menopausal   Lifestyle      Physical activity     Days per week: Not on file     Minutes per session: Not on file     Stress: Not on file   Relationships     Social connections     Talks on phone: Not on file     Gets together: Not on file     Attends Latter-day service: Not on file     Active member of club or organization: Not on file     Attends meetings of clubs or organizations: Not on file     Relationship status: Not on file     Intimate partner violence     Fear of current or ex partner: Not on file     Emotionally abused: Not on file     Physically abused: Not on file     Forced sexual activity: Not on file   Other Topics Concern     Parent/sibling w/ CABG, MI or angioplasty before 65F 55M? No   Social History Narrative    Son  2012.    Wife, Maggie       EXAM:  /62   Pulse 70   Ht 1.524 m (5')   Wt 84.8 kg (187 lb)   SpO2 93%   BMI 36.52 kg/m    In general, the patient is a pleasant female in no apparent distress.    HEENT: NC/AT.  PERRLA.  EOMI.  Sclerae white, not injected.  Neck: No adenopathy.  No thyromegaly. Carotids +2/2 bilaterally without bruits.  No jugular venous distension.   Heart: RRR. Normal S1, S2 splits physiologically. No murmur, rub, click, or gallop.   Lungs: CTA.  No ronchi, wheezes, rales.   Abdomen: Soft, nontender, nondistended.   Extremities: No clubbing, cyanosis, or edema.    Vascular: No bruits are noted.    Labs:  LIPID RESULTS:  Lab Results   Component Value Date    CHOL 110 2020    HDL 49 (L) 2020    LDL 26 2020    TRIG 177 (H) 2020    CHOLHDLRATIO 4.7 2015    NHDL 61 2020       LIVER ENZYME RESULTS:  Lab Results   Component Value Date    AST 83 (H) 2020     (H) 2020       CBC RESULTS:  Lab Results   Component Value Date    WBC 6.2 2019    RBC 4.72 2019    HGB 14.5 2019    HCT 44.2 2019    MCV 94 2019    MCH 30.7 2019    MCHC 32.8 2019    RDW 13.1 2019     2019       BMP  RESULTS:  Lab Results   Component Value Date     11/09/2020    POTASSIUM 4.7 11/09/2020    CHLORIDE 102 11/09/2020    CO2 33 (H) 11/09/2020    ANIONGAP 3 11/09/2020     (H) 11/09/2020    BUN 32 (H) 11/09/2020    CR 1.26 (H) 11/09/2020    GFRESTIMATED 43 (L) 11/09/2020    GFRESTBLACK 49 (L) 11/09/2020    SAMARIA 8.9 11/09/2020        A1C RESULTS:  No results found for: A1C    Thank you for allowing me to participate in the care of your patient.      Sincerely,     Ibeth Infante MD     [facility]  cc:   Ibeth Infante MD  [address] [place] [id number]

## 2021-06-15 NOTE — PROGRESS NOTES
HPI:     This is a pleasant 71 year old patient with PMH HTN, HLD, and syncope in setting of dehydration here for follow up with me. I initially saw her last year in setting of RBBB discovered on EKG.      Prior History:      She has no david syncope history or bradycardia that she knows about. No LOC in past or unexplained falls. Has a h/o migranes which seem well controlled. Only had an episode in [place] once of dizziness leading to diaphoresis and passing out for which she was sent to hospital and improved with fluids. She was told her neck veins by ultrasound were very small. However she had bifasicular block on EKG.    BP has been overall well controlled and is being managed by PCP. On ROS she had some atypical chest pain at rest. Does not heavily exert herself and is not involved in an exercise regimen. Her CP is better with burping. However on climbing stairs she fatigues easy and gets short of breath. She feels her legs are swollen. No varicose veins, ulcerations, claudication. No orthopnea or PND.      We obtained a coronary CT and suprisingly found severe stenoses of the LCX and RCA with moderate Ramus and LAD stenoses (FFR 0.86). She underwent coronary angiogram where she had staged PCI with SHELBI - first to the OM/LCX and then to the prox-mid RCA by radial approach 2/2019-3/2019.     She completed cardiac rehab. We stopped ticagrelor and continued aspirin at one year. Statin was initiated. Since January enrolled in a research study through [facility] which gave her a BP cuff and she remains on hydrochlorothiazide, losartan, metoprolol. Denies dizziness or lightheadedness.     ASSESSMENT/PLAN:     #CAD - asymptomatic. Doing well. Will continue aspirin 81 mg daily, statin, beta blocker. Echocardiogram in one year.  #HTN - controlled, continue hydrochlorothiazide, losartan, metoprolol  #HLD - controlled, lipid panel per PCP. Continue simvastatin. Goal LDL < 70 mg/dL. Lipids are UTD.  #EKG today for bifasicular  block (RBBB, LAFB) - will consider ziopatch in future to make sure no pauses, EKG with every follow up visit    One year follow up with me.     Ibeth Infante MD MSC  [facility]      PAST MEDICAL HISTORY  Past Medical History:   Diagnosis Date     Cancer of bladder (H)      Cataract 11/25/2011     Chronic nonallergic rhinitis 8/31/10 RAST     CKD (chronic kidney disease) stage 3, GFR 30-59 ml/min 12/20/2010     Coronary artery disease involving native coronary artery of native heart without angina pectoris 3/1/2019     DDD (degenerative disc disease), cervical 6/16/2011     Dependent edema      Depression, major      Depressive disorder In 2002 or 2003     Glaucoma suspect, bilateral      History of blood transfusion ?     HTN (hypertension)      Hyperlipidemia      Hypothyroidism 10/12/2009     Migraine     resolved     Nasal septal deviation 8/11/2009     Obesity 5/17/2011     RICKEY (obstructive sleep apnea)      Osteoarthritis of shoulder region      Osteoporosis      Premature menopause      Renal cyst        CURRENT MEDICATIONS  Current Outpatient Medications   Medication Sig Dispense Refill     aspirin (ASA) 81 MG EC tablet Take 81 mg by mouth daily       BEE POLLEN PO Bee Pollen, unprocessed, raw, natural, no preservatives       colchicine (COLCRYS) 0.6 MG tablet Take 1 tablet (0.6 mg) by mouth as needed (goute flare) 30 tablet 3     EXCEDRIN TENSION HEADACHE 500-65 MG PO TABS 1 tablet twice daily as needed       Flaxseed, Linseed, (FLAXSEED OIL PO) Take 1 Tablespoonful by mouth daily       fluticasone (FLONASE) 50 MCG/ACT nasal spray Spray 2 sprays into both nostrils daily 48 g 3     hydrochlorothiazide (HYDRODIURIL) 12.5 MG tablet Take 1 tablet (12.5 mg) by mouth every morning 90 tablet 0     levalbuterol (XOPENEX HFA) 45 MCG/ACT inhaler INHALE 2 PUFFS INTO THE LUNGS EVERY 6 HOURS AS NEEDED FOR SHORTNESS OF BREATH OR DIFFICULT BREATHING OR WHEEZING 15 g 1     levothyroxine (SYNTHROID/LEVOTHROID)  88 MCG tablet TAKE 1 TABLET(88 MCG) BY MOUTH DAILY 90 tablet 3     losartan (COZAAR) 100 MG tablet Take 0.5 tablets (50 mg) by mouth 2 times daily 90 tablet 3     metoprolol succinate ER (TOPROL-XL) 25 MG 24 hr tablet Take 1 tablet (25 mg) by mouth daily 90 tablet 3     montelukast (SINGULAIR) 10 MG tablet Take 1 tablet (10 mg) by mouth At Bedtime 90 tablet 3     order for DME Equipment ordered: Respironics Auto PAP Mask type: Nasal Settings: 9-15 cm H2O       simvastatin (ZOCOR) 40 MG tablet Take 1 tablet (40 mg) by mouth At Bedtime 90 tablet 0     traZODone (DESYREL) 50 MG tablet Take 1-2 tablets ( mg) by mouth nightly as needed for sleep 180 tablet 2     venlafaxine (EFFEXOR-XR) 150 MG 24 hr capsule 2 Capsules by mouth daily 180 capsule 3     allopurinol (ZYLOPRIM) 100 MG tablet Take 1 tablet (100 mg) by mouth daily (Patient not taking: Reported on 6/15/2021) 90 tablet 3     B Complex Vitamins (VITAMIN B COMPLEX PO) Take 1,250 mg by mouth        COLLAGEN PO Take by mouth daily powder       MELATONIN PO        Multiple Vitamins-Minerals (HAIR SKIN AND NAILS FORMULA PO) Take by mouth daily Two gummies       zinc 50 MG TABS Take 1 tablet by mouth daily         PAST SURGICAL HISTORY:  Past Surgical History:   Procedure Laterality Date     ARTHROPLASTY SHOULDER Right 11/01/2017    Right total shoulder arthroplasty by Dr. Go at [facility]     ARTHROPLASTY SHOULDER Left 03/28/2018    Left total shoulder arthroplasty by Dr. Go at [facility]     CHOLECYSTECTOMY, LAPOROSCOPIC       COLONOSCOPY  ?    Upper & Lower     CV CORONARY ANGIOGRAM  02/22/2019     CV CORONARY ANGIOGRAM N/A 2/22/2019    Procedure: 1330 CORS;  Surgeon: Marito Arriaga MD;  Location:  HEART CARDIAC CATH LAB     CV HEART CATHETERIZATION WITH POSSIBLE INTERVENTION N/A 3/18/2019    Procedure: STAGED PCI-JALEN CARABALLO;  Surgeon: Zeus Cordero MD;  Location:  HEART CARDIAC CATH LAB      CYSTOSCOPY      bladder cancer     D & C       ENT SURGERY  first 1967/second ?    2 Rhynoplasties     HYSTERECTOMY, PAP NO LONGER INDICATED      BSO     NOSE SURGERY      septum deviation     PHACOEMULSIFICATION CLEAR CORNEA WITH STANDARD INTRAOCULAR LENS IMPLANT Left 9/24/2018    Procedure: PHACOEMULSIFICATION CLEAR CORNEA WITH STANDARD INTRAOCULAR LENS IMPLANT;  LEFT EYE PHACOEMULSIFICATION CLEAR CORNEA WITH STANDARD INTRAOCULAR LENS IMPLANT;  Surgeon: Ryne Pascal MD;  Location:  [facility]     PHACOEMULSIFICATION WITH STANDARD INTRAOCULAR LENS IMPLANT Right 10/15/2018    Procedure: PHACOEMULSIFICATION WITH STANDARD INTRAOCULAR LENS IMPLANT ;  Surgeon: Ryne Pascal MD;  Location: [facility] OR       ALLERGIES     Allergies   Allergen Reactions     Cymbalta      Visual hallucinations     Zomig [Zolmitriptan] Other (See Comments)     Can't wake up     Colestid [Colestipol Hcl]      Albuterol Other (See Comments)     Very jittery and couldn't tolerate     Amlodipine Other (See Comments) and Swelling     Swelling- lower legs     Asa [Aspirin] GI Disturbance     Atenolol Fatigue     Atorvastatin Other (See Comments)     Swelling legs     Bupropion Unknown     Codeine Nausea     Crestor [Hmg-Coa-R Inhibitors] Other (See Comments)     Joint pain     Ezetimibe Other (See Comments)     Fatigue     Hydralazine Other (See Comments) and Fatigue     Fatigue     Lisinopril Cough     Lovastatin Other (See Comments) and Cramps     Cramps     Niacin      Unknown by patient     Paroxetine Other (See Comments)     Fatigue     Paxil [Paroxetine] Fatigue     Potassium GI Disturbance     Sulfa Drugs Fatigue     Sulfasalazine Other (See Comments)     Triamterene Unknown     Zetia [Ezetimibe] Fatigue       FAMILY HISTORY  Family History   Problem Relation Age of Onset     Diabetes Maternal Grandfather      Hypertension Maternal Grandfather      Diabetes Son      Heart Disease Father         CHF     Diabetes Mother      Depression  Mother      Hyperlipidemia Mother      Anxiety Disorder Mother      Asthma Mother      Obesity Mother      Substance Abuse Brother         Not any longer     Diabetes Brother      Depression Daughter      Diabetes Maternal Uncle      Cerebrovascular Disease Maternal Uncle      Diabetes Son         after accident     Depression Daughter      Depression Son         after accident     Substance Abuse Brother         Not any longer     Diabetes Sister      Substance Abuse Sister      Diabetes Brother      Thyroid Disease No family hx of      Glaucoma No family hx of      Macular Degeneration No family hx of      Cancer No family hx of          SOCIAL HISTORY  Social History     Socioeconomic History     Marital status:      Spouse name: Maggie     Number of children: 2     Years of education: 12     Highest education level: Not on file   Occupational History     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   Substance and Sexual Activity     Alcohol use: Yes     Comment: Maybe 1 or 2 drinks a year     Drug use: No     Sexual activity: Not Currently     Partners: Female     Birth control/protection: Post-menopausal   Lifestyle     Physical activity     Days per week: Not on file     Minutes per session: Not on file     Stress: Not on file   Relationships     Social connections     Talks on phone: Not on file     Gets together: Not on file     Attends Church service: Not on file      Active member of club or organization: Not on file     Attends meetings of clubs or organizations: Not on file     Relationship status: Not on file     Intimate partner violence     Fear of current or ex partner: Not on file     Emotionally abused: Not on file     Physically abused: Not on file     Forced sexual activity: Not on file   Other Topics Concern     Parent/sibling w/ CABG, MI or angioplasty before 65F 55M? No   Social History Narrative    Son  2012.    Wife, Maggie       EXAM:  /62   Pulse 70   Ht 1.524 m (5')   Wt 84.8 kg (187 lb)   SpO2 93%   BMI 36.52 kg/m    In general, the patient is a pleasant female in no apparent distress.    HEENT: NC/AT.  PERRLA.  EOMI.  Sclerae white, not injected.  Neck: No adenopathy.  No thyromegaly. Carotids +2/2 bilaterally without bruits.  No jugular venous distension.   Heart: RRR. Normal S1, S2 splits physiologically. No murmur, rub, click, or gallop.   Lungs: CTA.  No ronchi, wheezes, rales.   Abdomen: Soft, nontender, nondistended.   Extremities: No clubbing, cyanosis, or edema.    Vascular: No bruits are noted.    Labs:  LIPID RESULTS:  Lab Results   Component Value Date    CHOL 110 2020    HDL 49 (L) 2020    LDL 26 2020    TRIG 177 (H) 2020    CHOLHDLRATIO 4.7 2015    NHDL 61 2020       LIVER ENZYME RESULTS:  Lab Results   Component Value Date    AST 83 (H) 2020     (H) 2020       CBC RESULTS:  Lab Results   Component Value Date    WBC 6.2 2019    RBC 4.72 2019    HGB 14.5 2019    HCT 44.2 2019    MCV 94 2019    MCH 30.7 2019    MCHC 32.8 2019    RDW 13.1 2019     2019       BMP RESULTS:  Lab Results   Component Value Date     2020    POTASSIUM 4.7 2020    CHLORIDE 102 2020    CO2 33 (H) 2020    ANIONGAP 3 2020     (H) 2020    BUN 32 (H) 2020    CR 1.26 (H) 2020     GFRESTIMATED 43 (L) 11/09/2020    GFRESTBLACK 49 (L) 11/09/2020    SAMARIA 8.9 11/09/2020        A1C RESULTS:  No results found for: A1C

## 2021-07-13 ENCOUNTER — ANCILLARY PROCEDURE (OUTPATIENT)
Dept: MAMMOGRAPHY | Facility: CLINIC | Age: 71
End: 2021-07-13
Payer: MEDICARE

## 2021-07-13 DIAGNOSIS — Z12.31 VISIT FOR SCREENING MAMMOGRAM: ICD-10-CM

## 2021-07-13 PROCEDURE — 77067 SCR MAMMO BI INCL CAD: CPT | Mod: TC | Performed by: RADIOLOGY

## 2021-07-19 DIAGNOSIS — I25.10 CORONARY ARTERY DISEASE INVOLVING NATIVE CORONARY ARTERY OF NATIVE HEART WITHOUT ANGINA PECTORIS: ICD-10-CM

## 2021-07-19 RX ORDER — SIMVASTATIN 40 MG
40 TABLET ORAL AT BEDTIME
Qty: 90 TABLET | Refills: 0 | OUTPATIENT
Start: 2021-07-19

## 2021-07-19 RX ORDER — SIMVASTATIN 40 MG
40 TABLET ORAL AT BEDTIME
Qty: 90 TABLET | Refills: 2 | Status: SHIPPED | OUTPATIENT
Start: 2021-07-19 | End: 2021-11-08

## 2021-08-31 ENCOUNTER — OFFICE VISIT (OUTPATIENT)
Dept: FAMILY MEDICINE | Facility: CLINIC | Age: 71
End: 2021-08-31
Payer: MEDICARE

## 2021-08-31 ENCOUNTER — ANCILLARY PROCEDURE (OUTPATIENT)
Dept: GENERAL RADIOLOGY | Facility: CLINIC | Age: 71
End: 2021-08-31
Attending: NURSE PRACTITIONER
Payer: MEDICARE

## 2021-08-31 ENCOUNTER — TELEPHONE (OUTPATIENT)
Dept: FAMILY MEDICINE | Facility: CLINIC | Age: 71
End: 2021-08-31

## 2021-08-31 VITALS
WEIGHT: 183.8 LBS | BODY MASS INDEX: 36.08 KG/M2 | TEMPERATURE: 97.9 F | SYSTOLIC BLOOD PRESSURE: 146 MMHG | HEART RATE: 60 BPM | HEIGHT: 60 IN | OXYGEN SATURATION: 96 % | RESPIRATION RATE: 15 BRPM | DIASTOLIC BLOOD PRESSURE: 78 MMHG

## 2021-08-31 DIAGNOSIS — I25.10 CAD S/P PERCUTANEOUS CORONARY ANGIOPLASTY: ICD-10-CM

## 2021-08-31 DIAGNOSIS — Z85.51 HISTORY OF BLADDER CANCER: Chronic | ICD-10-CM

## 2021-08-31 DIAGNOSIS — M50.30 DDD (DEGENERATIVE DISC DISEASE), CERVICAL: ICD-10-CM

## 2021-08-31 DIAGNOSIS — M1A.9XX0 CHRONIC GOUT INVOLVING TOE WITHOUT TOPHUS, UNSPECIFIED CAUSE, UNSPECIFIED LATERALITY: ICD-10-CM

## 2021-08-31 DIAGNOSIS — G89.29 CHRONIC BILATERAL LOW BACK PAIN WITHOUT SCIATICA: ICD-10-CM

## 2021-08-31 DIAGNOSIS — I10 HYPERTENSION GOAL BP (BLOOD PRESSURE) < 140/90: Chronic | ICD-10-CM

## 2021-08-31 DIAGNOSIS — F33.0 MAJOR DEPRESSIVE DISORDER, RECURRENT EPISODE, MILD DEGREE (H): Chronic | ICD-10-CM

## 2021-08-31 DIAGNOSIS — E78.1 HYPERTRIGLYCERIDEMIA: Chronic | ICD-10-CM

## 2021-08-31 DIAGNOSIS — R15.2 FECAL URGENCY: ICD-10-CM

## 2021-08-31 DIAGNOSIS — Z98.61 CAD S/P PERCUTANEOUS CORONARY ANGIOPLASTY: ICD-10-CM

## 2021-08-31 DIAGNOSIS — I25.10 CORONARY ARTERY DISEASE INVOLVING NATIVE CORONARY ARTERY OF NATIVE HEART WITHOUT ANGINA PECTORIS: Chronic | ICD-10-CM

## 2021-08-31 DIAGNOSIS — E66.01 MORBID OBESITY (H): ICD-10-CM

## 2021-08-31 DIAGNOSIS — M54.50 CHRONIC BILATERAL LOW BACK PAIN WITHOUT SCIATICA: ICD-10-CM

## 2021-08-31 DIAGNOSIS — R05.9 COUGH: ICD-10-CM

## 2021-08-31 DIAGNOSIS — N18.32 STAGE 3B CHRONIC KIDNEY DISEASE (H): Chronic | ICD-10-CM

## 2021-08-31 DIAGNOSIS — M79.674 PAIN OF TOE OF RIGHT FOOT: ICD-10-CM

## 2021-08-31 DIAGNOSIS — E03.4 HYPOTHYROIDISM DUE TO ACQUIRED ATROPHY OF THYROID: Chronic | ICD-10-CM

## 2021-08-31 DIAGNOSIS — Z00.00 ENCOUNTER FOR MEDICARE ANNUAL WELLNESS EXAM: Primary | ICD-10-CM

## 2021-08-31 DIAGNOSIS — J30.2 SEASONAL ALLERGIC RHINITIS, UNSPECIFIED TRIGGER: ICD-10-CM

## 2021-08-31 DIAGNOSIS — I25.118 CORONARY ARTERY DISEASE OF NATIVE ARTERY OF NATIVE HEART WITH STABLE ANGINA PECTORIS (H): ICD-10-CM

## 2021-08-31 DIAGNOSIS — G47.33 OSA (OBSTRUCTIVE SLEEP APNEA): Chronic | ICD-10-CM

## 2021-08-31 DIAGNOSIS — E78.5 HYPERLIPIDEMIA LDL GOAL <130: Chronic | ICD-10-CM

## 2021-08-31 DIAGNOSIS — R91.8 PULMONARY NODULES: ICD-10-CM

## 2021-08-31 DIAGNOSIS — J31.0 CHRONIC NONALLERGIC RHINITIS: Chronic | ICD-10-CM

## 2021-08-31 PROCEDURE — 80053 COMPREHEN METABOLIC PANEL: CPT | Performed by: NURSE PRACTITIONER

## 2021-08-31 PROCEDURE — 36415 COLL VENOUS BLD VENIPUNCTURE: CPT | Performed by: NURSE PRACTITIONER

## 2021-08-31 PROCEDURE — G0439 PPPS, SUBSEQ VISIT: HCPCS | Performed by: NURSE PRACTITIONER

## 2021-08-31 PROCEDURE — 73660 X-RAY EXAM OF TOE(S): CPT | Mod: RT | Performed by: RADIOLOGY

## 2021-08-31 PROCEDURE — 99214 OFFICE O/P EST MOD 30 MIN: CPT | Mod: 25 | Performed by: NURSE PRACTITIONER

## 2021-08-31 PROCEDURE — 72100 X-RAY EXAM L-S SPINE 2/3 VWS: CPT | Mod: FY | Performed by: RADIOLOGY

## 2021-08-31 PROCEDURE — 82043 UR ALBUMIN QUANTITATIVE: CPT | Performed by: NURSE PRACTITIONER

## 2021-08-31 PROCEDURE — 84550 ASSAY OF BLOOD/URIC ACID: CPT | Performed by: NURSE PRACTITIONER

## 2021-08-31 PROCEDURE — 80061 LIPID PANEL: CPT | Performed by: NURSE PRACTITIONER

## 2021-08-31 PROCEDURE — 84443 ASSAY THYROID STIM HORMONE: CPT | Performed by: NURSE PRACTITIONER

## 2021-08-31 RX ORDER — METOPROLOL SUCCINATE 25 MG/1
25 TABLET, EXTENDED RELEASE ORAL DAILY
Qty: 90 TABLET | Refills: 3 | Status: SHIPPED | OUTPATIENT
Start: 2021-08-31 | End: 2021-11-08

## 2021-08-31 RX ORDER — FLUTICASONE PROPIONATE 50 MCG
2 SPRAY, SUSPENSION (ML) NASAL DAILY
Qty: 48 G | Refills: 3 | Status: SHIPPED | OUTPATIENT
Start: 2021-08-31 | End: 2021-11-08

## 2021-08-31 RX ORDER — MONTELUKAST SODIUM 10 MG/1
10 TABLET ORAL AT BEDTIME
Qty: 90 TABLET | Refills: 3 | Status: SHIPPED | OUTPATIENT
Start: 2021-08-31 | End: 2021-11-08

## 2021-08-31 RX ORDER — ALLOPURINOL 100 MG/1
100 TABLET ORAL DAILY
Qty: 90 TABLET | Refills: 3 | Status: SHIPPED | OUTPATIENT
Start: 2021-08-31 | End: 2021-11-08

## 2021-08-31 RX ORDER — LEVALBUTEROL TARTRATE 45 UG/1
AEROSOL, METERED ORAL
Qty: 15 G | Refills: 1 | Status: SHIPPED | OUTPATIENT
Start: 2021-08-31 | End: 2021-11-08

## 2021-08-31 RX ORDER — LOSARTAN POTASSIUM 100 MG/1
50 TABLET ORAL 2 TIMES DAILY
Qty: 90 TABLET | Refills: 3 | Status: SHIPPED | OUTPATIENT
Start: 2021-08-31 | End: 2021-11-08

## 2021-08-31 RX ORDER — LEVOTHYROXINE SODIUM 88 UG/1
TABLET ORAL
Qty: 90 TABLET | Refills: 3 | Status: SHIPPED | OUTPATIENT
Start: 2021-08-31 | End: 2021-11-08

## 2021-08-31 RX ORDER — COLCHICINE 0.6 MG/1
TABLET ORAL
Qty: 30 TABLET | Refills: 3 | Status: SHIPPED | OUTPATIENT
Start: 2021-08-31 | End: 2021-09-01

## 2021-08-31 ASSESSMENT — ENCOUNTER SYMPTOMS
DIZZINESS: 0
PARESTHESIAS: 0
HEADACHES: 1
NAUSEA: 0
COUGH: 0
HEMATURIA: 0
JOINT SWELLING: 0
DIARRHEA: 0
CONSTIPATION: 0
PALPITATIONS: 0
DYSURIA: 0
EYE PAIN: 0
HEARTBURN: 0
BREAST MASS: 0
SHORTNESS OF BREATH: 0
NERVOUS/ANXIOUS: 0
HEMATOCHEZIA: 0
CHILLS: 0
FREQUENCY: 0
FEVER: 0
ARTHRALGIAS: 1
SORE THROAT: 0
MYALGIAS: 1
ABDOMINAL PAIN: 0
WEAKNESS: 1

## 2021-08-31 ASSESSMENT — MIFFLIN-ST. JEOR: SCORE: 1265.21

## 2021-08-31 ASSESSMENT — ACTIVITIES OF DAILY LIVING (ADL): CURRENT_FUNCTION: NO ASSISTANCE NEEDED

## 2021-08-31 NOTE — LETTER
August 31, 2021      Abbey Omer  [address] [facility] [address]        To Whom It May Concern,           Sincerely,       Sarah Vallejo, DNP, APRN, CNP

## 2021-08-31 NOTE — PATIENT INSTRUCTIONS
Patient Education   Personalized Prevention Plan  You are due for the preventive services outlined below.  Your care team is available to assist you in scheduling these services.  If you have already completed any of these items, please share that information with your care team to update in your medical record.  Health Maintenance Due   Topic Date Due     ANNUAL REVIEW OF  ORDERS  Never done     Zoster (Shingles) Vaccine (2 of 3) 2014     Eye Exam  2019     Annual Wellness Visit  2021     Kidney Microalbumin Urine Test  2021     FALL RISK ASSESSMENT  2021     Depression Assessment  2021     Basic Metabolic Panel  2021     Flu Vaccine (1) 2021         Go ahead and try stopping the Singulair (Monteleukast) and watch if your nasal symptoms or cough gets worse.  If not, then stay off it.    For the stool urgency try adding in Psyllium (Metamucil) daily to help with bulk forming of your stools.      Please call to schedule your chest CT scan to follow up on lung nodule.  [facility] (xray, mammogram, bone density, and ultrasound) [phone]

## 2021-08-31 NOTE — PROGRESS NOTES
"SUBJECTIVE:   Abbey Omer is a 71 year old female who presents for Preventive Visit.    Patient has been advised of split billing requirements and indicates understanding: Yes   Are you in the first 12 months of your Medicare coverage?  No    Healthy Habits:     In general, how would you rate your overall health?  Fair    Frequency of exercise:  None    Do you usually eat at least 4 servings of fruit and vegetables a day, include whole grains    & fiber and avoid regularly eating high fat or \"junk\" foods?  Yes    Taking medications regularly:  Yes    Medication side effects:  None    Ability to successfully perform activities of daily living:  No assistance needed    Home Safety:  No safety concerns identified    Hearing Impairment:  Difficulty understanding soft or whispered speech    In the past 6 months, have you been bothered by leaking of urine? Yes    In general, how would you rate your overall mental or emotional health?  Good      PHQ-2 Total Score: 0    Additional concerns today:  Yes    Back x-ray per chiropractor.  She has lumbar spine low back pain that radiates up to her shoulders.  She started seeing a chiropractor again a couple weeks ago and chiropractor recommended getting x-rays of lumbar spine.    Toe Pain:  Right little toe pain, started 6 weeks ago.  Painful, keeps it wrapped.  She injured her toe 6 weeks ago by hitting it at home, initially it was very swollen and bruised.  The swelling is improving.  But she continues to have pain and has to wrap the toe in order to prevent the pain.    Gout:  Not taking Allopurinol 100 mg.  Felt flare coming on toes so restarted for a period of time but now back off of the Allopurinol.    Unsure why she is taking singular, does not have history of allergies.  Does not feel like the Singulair helps her cough.    Her wife and her are selling their home.  Will be living in their RV and traveling.    CAD: She continues to follow with cardiology yearly and " is next due for follow-up 2022.    Do you feel safe in your environment? YES    Have you ever done Advance Care Planning? (For example, a Health Directive, POLST, or a discussion with a medical provider or your loved ones about your wishes): Yes, advance care planning is on file.      Fall risk  Fallen 2 or more times in the past year?: No  Any fall with injury in the past year?: No    Cognitive Screening   1) Repeat 3 items (Leader, Season, Table)      2) Clock draw: ABNORMAL    3) 3 item recall: Recalls 3 objects  Results: 3 items recalled: COGNITIVE IMPAIRMENT LESS LIKELY    Mini-CogTM Copyright S Makayla. Licensed by the author for use in [facility]; reprinted with permission (indira@[facility]). All rights reserved.      Do you have sleep apnea, excessive snoring or daytime drowsiness?: no    Reviewed and updated as needed this visit by clinical staff   Allergies    Med Hx  Surg Hx  Fam Hx          Reviewed and updated as needed this visit by Provider      Med Hx  Surg Hx  Fam Hx         Social History     Tobacco Use     Smoking status: Former Smoker     Packs/day: 0.10     Years: 1.00     Pack years: 0.10     Types: Cigarettes     Start date: 1969     Quit date: 10/12/1969     Years since quittin.9     Smokeless tobacco: Never Used     Tobacco comment: Smoked, very little, for 2 mo, 49 years ago.   Substance Use Topics     Alcohol use: Yes     Comment: Maybe 1 or 2 drinks a year     If you drink alcohol do you typically have >3 drinks per day or >7 drinks per week? No    Alcohol Use 2021   Prescreen: >3 drinks/day or >7 drinks/week? Not Applicable   Prescreen: >3 drinks/day or >7 drinks/week? -         Current providers sharing in care for this patient include:   Patient Care Team:  Sarah Vallejo NP as PCP - General (Nurse Practitioner - Family)  Sarah Vallejo NP as Assigned PCP  Diane Dunn, PharmD as Pharmacist (Pharmacist)  Nydia Ramírez, [facility] as  Pharmacist (Pharmacist)  Ibeth Infante MD as Assigned Heart and Vascular Provider    The following health maintenance items are reviewed in Epic and correct as of today:  Health Maintenance Due   Topic Date Due     ZOSTER IMMUNIZATION (2 of 3) 07/23/2014     EYE EXAM  11/16/2019     PHQ-9  04/23/2021     INFLUENZA VACCINE (1) 09/01/2021     BP Readings from Last 3 Encounters:   08/31/21 (!) 146/78   06/15/21 123/62   06/23/20 127/67    Wt Readings from Last 3 Encounters:   08/31/21 83.4 kg (183 lb 12.8 oz)   06/15/21 84.8 kg (187 lb)   06/23/20 79.3 kg (174 lb 12.8 oz)                  Patient Active Problem List   Diagnosis     RICKEY (obstructive sleep apnea)     Hypothyroidism     Chronic nonallergic rhinitis     CKD (chronic kidney disease) stage 3, GFR 30-59 ml/min (H)     Hyperlipidemia LDL goal <130     Morbid obesity (H)     History of bladder cancer     Hypertension goal BP (blood pressure) < 140/90     Advance Care Planning     Dependent edema     Elevated LFTs     DDD (degenerative disc disease), cervical     Major depressive disorder, recurrent episode, mild degree (H)     Macular drusen     Hypertriglyceridemia     Allergic conjunctivitis     Pulmonary nodules     Acne     Combined form of age-related cataract, both eyes     Glaucoma suspect, bilateral     Drusen of macula of both eyes     Renal cyst, left     Discoid atelectasis     Cough     Abnormal electrocardiogram     Coronary artery disease involving native coronary artery of native heart without angina pectoris     Epistaxis     Retrognathia     CAD S/P percutaneous coronary angioplasty     Chronic gout involving toe without tophus, unspecified cause, unspecified laterality     Coronary artery disease of native artery of native heart with stable angina pectoris (H)     Past Surgical History:   Procedure Laterality Date     ARTHROPLASTY SHOULDER Right 11/01/2017    Right total shoulder arthroplasty by Dr. Go at [facility]ospital     ARTHROPLASTY SHOULDER Left 2018    Left total shoulder arthroplasty by Dr. Go at [facility]     CHOLECYSTECTOMY, LAPOROSCOPIC       COLONOSCOPY  ?    Upper & Lower     CV CORONARY ANGIOGRAM  2019     CV CORONARY ANGIOGRAM N/A 2019    Procedure: 1330 CORS;  Surgeon: Marito Arriaga MD;  Location:  [facility]     CV HEART CATHETERIZATION WITH POSSIBLE INTERVENTION N/A 3/18/2019    Procedure: STAGED PCI-JALEN CARABALLO;  Surgeon: Zeus Cordero MD;  Location:  [facility]     CYSTOSCOPY      bladder cancer     D & C       ENT SURGERY  first /second ?    2 Rhynoplasties     HYSTERECTOMY, PAP NO LONGER INDICATED      BSO     NOSE SURGERY      septum deviation     PHACOEMULSIFICATION CLEAR CORNEA WITH STANDARD INTRAOCULAR LENS IMPLANT Left 2018    Procedure: PHACOEMULSIFICATION CLEAR CORNEA WITH STANDARD INTRAOCULAR LENS IMPLANT;  LEFT EYE PHACOEMULSIFICATION CLEAR CORNEA WITH STANDARD INTRAOCULAR LENS IMPLANT;  Surgeon: Ryne Pascal MD;  Location: [facility]     PHACOEMULSIFICATION WITH STANDARD INTRAOCULAR LENS IMPLANT Right 10/15/2018    Procedure: PHACOEMULSIFICATION WITH STANDARD INTRAOCULAR LENS IMPLANT ;  Surgeon: Ryne Pascal MD;  Location:  OR       Social History     Tobacco Use     Smoking status: Former Smoker     Packs/day: 0.10     Years: 1.00     Pack years: 0.10     Types: Cigarettes     Start date: 1969     Quit date: 10/12/1969     Years since quittin.9     Smokeless tobacco: Never Used     Tobacco comment: Smoked, very little, for 2 mo, 49 years ago.   Substance Use Topics     Alcohol use: Yes     Comment: Maybe 1 or 2 drinks a year     Family History   Problem Relation Age of Onset     Diabetes Maternal Grandfather      Hypertension Maternal Grandfather      Diabetes Son      Heart Disease Father         CHF     Diabetes Mother      Depression Mother      Hyperlipidemia Mother       Anxiety Disorder Mother      Asthma Mother      Obesity Mother      Substance Abuse Brother         Not any longer     Diabetes Brother      Depression Daughter      Diabetes Maternal Uncle      Cerebrovascular Disease Maternal Uncle      Diabetes Son         after accident     Depression Daughter      Depression Son         after accident     Substance Abuse Brother         Not any longer     Diabetes Sister      Substance Abuse Sister      Diabetes Brother      Thyroid Disease No family hx of      Glaucoma No family hx of      Macular Degeneration No family hx of      Cancer No family hx of          Current Outpatient Medications   Medication Sig Dispense Refill     allopurinol (ZYLOPRIM) 100 MG tablet Take 1 tablet (100 mg) by mouth daily  NOT TAKING 90 tablet 3     aspirin (ASA) 81 MG EC tablet Take 81 mg by mouth daily       B Complex Vitamins (VITAMIN B COMPLEX PO) Take 1,250 mg by mouth        BEE POLLEN PO Bee Pollen, unprocessed, raw, natural, no preservatives       colchicine (COLCRYS) 0.6 MG tablet Use as needed for gout flare 30 tablet 3     COLLAGEN PO Take by mouth daily powder       EXCEDRIN TENSION HEADACHE 500-65 MG PO TABS 1 tablet twice daily as needed       Flaxseed, Linseed, (FLAXSEED OIL PO) Take 1 Tablespoonful by mouth daily       fluticasone (FLONASE) 50 MCG/ACT nasal spray Spray 2 sprays into both nostrils daily 48 g 3     hydrochlorothiazide (HYDRODIURIL) 12.5 MG tablet Take 1 tablet (12.5 mg) by mouth every morning 90 tablet 1     levalbuterol (XOPENEX HFA) 45 MCG/ACT inhaler INHALE 2 PUFFS INTO THE LUNGS EVERY 6 HOURS AS NEEDED FOR SHORTNESS OF BREATH OR DIFFICULT BREATHING OR WHEEZING 15 g 1     levothyroxine (SYNTHROID/LEVOTHROID) 88 MCG tablet TAKE 1 TABLET(88 MCG) BY MOUTH DAILY 90 tablet 3     losartan (COZAAR) 100 MG tablet Take 0.5 tablets (50 mg) by mouth 2 times daily 90 tablet 3     MELATONIN PO        metoprolol succinate ER (TOPROL-XL) 25 MG 24 hr tablet Take 1 tablet (25 mg)  by mouth daily 90 tablet 3     montelukast (SINGULAIR) 10 MG tablet Take 1 tablet (10 mg) by mouth At Bedtime 90 tablet 3     Multiple Vitamins-Minerals (HAIR SKIN AND NAILS FORMULA PO) Take by mouth daily Two gummies       order for DME Equipment ordered: Respironics Auto PAP Mask type: Nasal Settings: 9-15 cm H2O       simvastatin (ZOCOR) 40 MG tablet Take 1 tablet (40 mg) by mouth At Bedtime 90 tablet 2     traZODone (DESYREL) 50 MG tablet Take 1-2 tablets ( mg) by mouth nightly as needed for sleep 180 tablet 2     venlafaxine (EFFEXOR-XR) 150 MG 24 hr capsule 2 Capsules by mouth daily 180 capsule 3     zinc 50 MG TABS Take 1 tablet by mouth daily       Allergies   Allergen Reactions     Cymbalta      Visual hallucinations     Zomig [Zolmitriptan] Other (See Comments)     Can't wake up     Colestid [Colestipol Hcl]      Albuterol Other (See Comments)     Very jittery and couldn't tolerate     Amlodipine Other (See Comments) and Swelling     Swelling- lower legs     Asa [Aspirin] GI Disturbance     Atenolol Fatigue     Atorvastatin Other (See Comments)     Swelling legs     Bupropion Unknown     Codeine Nausea     Crestor [Hmg-Coa-R Inhibitors] Other (See Comments)     Joint pain     Ezetimibe Other (See Comments)     Fatigue     Hydralazine Other (See Comments) and Fatigue     Fatigue     Lisinopril Cough     Lovastatin Other (See Comments) and Cramps     Cramps     Niacin      Unknown by patient     Paroxetine Other (See Comments)     Fatigue     Paxil [Paroxetine] Fatigue     Potassium GI Disturbance     Sulfa Drugs Fatigue     Sulfasalazine Other (See Comments)     Triamterene Unknown     Zetia [Ezetimibe] Fatigue         Review of Systems   Constitutional: Negative for chills and fever.   HENT: Negative for congestion, ear pain, hearing loss and sore throat.    Eyes: Negative for pain and visual disturbance.   Respiratory: Negative for cough and shortness of breath.    Cardiovascular: Negative for chest  pain, palpitations and peripheral edema.   Gastrointestinal: Negative for abdominal pain, constipation, diarrhea, heartburn, hematochezia and nausea.   Breasts:  Negative for tenderness, breast mass and discharge.   Genitourinary: Positive for urgency. Negative for dysuria, frequency, genital sores, hematuria, pelvic pain, vaginal bleeding and vaginal discharge.   Musculoskeletal: Positive for arthralgias and myalgias. Negative for joint swelling.   Skin: Negative for rash.   Neurological: Positive for weakness and headaches. Negative for dizziness and paresthesias.   Psychiatric/Behavioral: Positive for mood changes. The patient is not nervous/anxious.    Positive for fecal urgency.  She will get this especially when she goes out to restaurants.  This is distressing to her.  It has been happening for 2 years but worsening lately.  She takes flaxseed oil which she has been on for a long time.    OBJECTIVE:   BP (!) 146/78 (BP Location: Right arm)   Pulse 60   Temp 97.9  F (36.6  C) (Oral)   Resp 15   Ht 1.524 m (5')   Wt 83.4 kg (183 lb 12.8 oz)   SpO2 96%   BMI 35.90 kg/m   Estimated body mass index is 35.9 kg/m  as calculated from the following:    Height as of this encounter: 1.524 m (5').    Weight as of this encounter: 83.4 kg (183 lb 12.8 oz).  Physical Exam  GENERAL: healthy, alert, no distress and obese  EYES: Eyes grossly normal to inspection, PERRL and conjunctivae and sclerae normal  HENT: ear canals and TM's normal, nose and mouth without ulcers or lesions  NECK: no adenopathy, no asymmetry, masses, or scars and thyroid normal to palpation  RESP: lungs clear to auscultation - no rales, rhonchi or wheezes  BREAST: normal without masses, tenderness or nipple discharge and no palpable axillary masses or adenopathy  CV: regular rate and rhythm, normal S1 S2, no S3 or S4, no murmur, click or rub, no peripheral edema and peripheral pulses strong  ABDOMEN: soft, nontender, no hepatosplenomegaly, no  masses and bowel sounds normal   (female): deferred  MS: Lumbar spine tenderness paraspinal region.  Right foot-right fifth digit swelling and pain with palpation.  SKIN: no suspicious lesions or rashes  NEURO: Normal strength and tone, mentation intact and speech normal  PSYCH: mentation appears normal, affect normal/bright    Diagnostic Test Results:  Labs reviewed in Epic  Results for orders placed or performed in visit on 08/31/21   XR Toe Right G/E 2 Views     Status: None    Narrative    RIGHT TOE TWO OR MORE VIEWS   8/31/2021 11:44 AM     HISTORY:  Injury 6 weeks ago, still painful. Pain of toe of right  foot.    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 No significant displacement.    MIKE NAILS MD         SYSTEM ID:  [email or web address]   Results for orders placed or performed in visit on 08/31/21   XR Lumbar Spine 2/3 Views     Status: None    Narrative    LUMBAR SPINE TWO - THREE VIEWS   8/31/2021 11:43 AM     HISTORY: Low back pain chronic not getting better with chiropractor;  Chronic bilateral low back pain without sciatica.    COMPARISON: None.      Impression    IMPRESSION: No fracture is identified. Multilevel moderate  degenerative disease and facet arthropathy. Slight dextroconvex  curvature. Cholecystectomy clips.    SUELLEN SCANLON MD         SYSTEM ID:  [id number]   Results for orders placed or performed in visit on 08/31/21   Albumin Random Urine Quantitative with Creat Ratio     Status: Abnormal   Result Value Ref Range    Creatinine Urine mg/dL 114 mg/dL    Albumin Urine mg/L 325 mg/L    Albumin Urine mg/g Cr 285.09 (H) 0.00 - 25.00 mg/g Cr   TSH with free T4 reflex     Status: Normal   Result Value Ref Range    TSH 3.85 0.40 - 4.00 mU/L   Comprehensive metabolic panel (BMP + Alb, Alk Phos, ALT, AST, Total. Bili, TP)     Status: Abnormal   Result Value Ref Range    Sodium 136 133 - 144 mmol/L    Potassium  3.8 3.4 - 5.3 mmol/L    Chloride 105 94 - 109 mmol/L    Carbon Dioxide (CO2) 26 20 - 32 mmol/L    Anion Gap 5 3 - 14 mmol/L    Urea Nitrogen 23 7 - 30 mg/dL    Creatinine 1.17 (H) 0.52 - 1.04 mg/dL    Calcium 8.7 8.5 - 10.1 mg/dL    Glucose 112 (H) 70 - 99 mg/dL    Alkaline Phosphatase 94 40 - 150 U/L    AST 36 0 - 45 U/L    ALT 51 (H) 0 - 50 U/L    Protein Total 7.5 6.8 - 8.8 g/dL    Albumin 3.9 3.4 - 5.0 g/dL    Bilirubin Total 0.4 0.2 - 1.3 mg/dL    GFR Estimate 47 (L) >60 mL/min/1.73m2   Lipid panel reflex to direct LDL Fasting     Status: Abnormal   Result Value Ref Range    Cholesterol 138 <200 mg/dL    Triglycerides 199 (H) <150 mg/dL    Direct Measure HDL 49 (L) >=50 mg/dL    LDL Cholesterol Calculated 49 <=100 mg/dL    Non HDL Cholesterol 89 <130 mg/dL    Patient Fasting > 8hrs? Yes    Uric acid     Status: Abnormal   Result Value Ref Range    Uric Acid 8.4 (H) 2.6 - 6.0 mg/dL       ASSESSMENT / PLAN:   Abbey was seen today for physical and xray.    Diagnoses and all orders for this visit:    Encounter for Medicare annual wellness exam    Seasonal allergic rhinitis, unspecified trigger  -     fluticasone (FLONASE) 50 MCG/ACT nasal spray; Spray 2 sprays into both nostrils daily    Cough  -     levalbuterol (XOPENEX HFA) 45 MCG/ACT inhaler; INHALE 2 PUFFS INTO THE LUNGS EVERY 6 HOURS AS NEEDED FOR SHORTNESS OF BREATH OR DIFFICULT BREATHING OR WHEEZING  -     Stop montelukast (SINGULAIR) 10 MG tablet; Take 1 tablet (10 mg) by mouth At Bedtime    Hypothyroidism due to acquired atrophy of thyroid  Chronic, stable, continue current treatment.   -     levothyroxine (SYNTHROID/LEVOTHROID) 88 MCG tablet; TAKE 1 TABLET(88 MCG) BY MOUTH DAILY  -     TSH with free T4 reflex; Future  -     TSH with free T4 reflex    Hypertension goal BP (blood pressure) < 140/90  -     losartan (COZAAR) 100 MG tablet; Take 0.5 tablets (50 mg) by mouth 2 times daily  -     metoprolol succinate ER (TOPROL-XL) 25 MG 24 hr tablet; Take 1  tablet (25 mg) by mouth daily  -     Comprehensive metabolic panel (BMP + Alb, Alk Phos, ALT, AST, Total. Bili, TP); Future  -     Comprehensive metabolic panel (BMP + Alb, Alk Phos, ALT, AST, Total. Bili, TP)    Chronic nonallergic rhinitis  -     montelukast (SINGULAIR) 10 MG tablet; Take 1 tablet (10 mg) by mouth At Bedtime    RICKEY (obstructive sleep apnea)    Morbid obesity (H)    Hypertriglyceridemia   -     Lipid panel reflex to direct LDL Fasting; Future  -     Lipid panel reflex to direct LDL Fasting    Hyperlipidemia LDL goal <130  -     Lipid panel reflex to direct LDL Fasting; Future  -     Lipid panel reflex to direct LDL Fasting    Coronary artery disease involving native coronary artery of native heart without angina pectoris  -     Comprehensive metabolic panel (BMP + Alb, Alk Phos, ALT, AST, Total. Bili, TP); Future  -     Comprehensive metabolic panel (BMP + Alb, Alk Phos, ALT, AST, Total. Bili, TP)    CAD S/P percutaneous coronary angioplasty    DDD (degenerative disc disease), cervical    Chronic bilateral low back pain without sciatica  -     XR Lumbar Spine 2/3 Views; Future    Stage 3b chronic kidney disease  -     Albumin Random Urine Quantitative with Creat Ratio; Future  -     Comprehensive metabolic panel (BMP + Alb, Alk Phos, ALT, AST, Total. Bili, TP); Future  -     Albumin Random Urine Quantitative with Creat Ratio  -     Comprehensive metabolic panel (BMP + Alb, Alk Phos, ALT, AST, Total. Bili, TP)    Major depressive disorder, recurrent episode, mild degree (H)  Chronic, stable, continue current treatment.    History of bladder cancer    Coronary artery disease of native artery of native heart with stable angina pectoris (H)    Chronic gout involving toe without tophus, unspecified cause, unspecified laterality  -     Restart allopurinol (ZYLOPRIM) 100 MG tablet; Take 1 tablet (100 mg) by mouth daily  -     Discontinue: colchicine (COLCRYS) 0.6 MG tablet; Use as needed for gout flare  -      Uric acid; Future  -     Uric acid    Pain of toe of right foot  -     XR Toe Right G/E 2 Views; Future    Pulmonary nodules  -     CT Chest w/o Contrast; Future    Fecal urgency  Offered referral to GI for consultation but patient declines.    Other orders  -     REVIEW OF HEALTH MAINTENANCE PROTOCOL ORDERS        Patient has been advised of split billing requirements and indicates understanding: Yes  COUNSELING:  Reviewed preventive health counseling, as reflected in patient instructions       Regular exercise       Healthy diet/nutrition    Estimated body mass index is 35.9 kg/m  as calculated from the following:    Height as of this encounter: 1.524 m (5').    Weight as of this encounter: 83.4 kg (183 lb 12.8 oz).    Weight management plan: Discussed healthy diet and exercise guidelines    She reports that she quit smoking about 51 years ago. Her smoking use included cigarettes. She started smoking about 52 years ago. She has a 0.10 pack-year smoking history. She has never used smokeless tobacco.      Appropriate preventive services were discussed with this patient, including applicable screening as appropriate for cardiovascular disease, diabetes, osteopenia/osteoporosis, and glaucoma.  As appropriate for age/gender, discussed screening for colorectal cancer, prostate cancer, breast cancer, and cervical cancer. Checklist reviewing preventive services available has been given to the patient.    Reviewed patients plan of care and provided an AVS. The Intermediate Care Plan ( asthma action plan, low back pain action plan, and migraine action plan) for Abbey meets the Care Plan requirement. This Care Plan has been established and reviewed with the Patient.    Counseling Resources:  ATP IV Guidelines  Pooled Cohorts Equation Calculator  Breast Cancer Risk Calculator  Breast Cancer: Medication to Reduce Risk  FRAX Risk Assessment  ICSI Preventive Guidelines  Dietary Guidelines for Americans, 2010  USDA's  MyPlate  ASA Prophylaxis  Lung CA Screening    Sarah Vallejo NP  [facility]    Identified Health Risks:

## 2021-08-31 NOTE — TELEPHONE ENCOUNTER
[id number] faxed RX Image re: colchicine (COLCRYS) 0.6 MG tablet    Pharmacy Comments:  Instructions are missing.   Thanks!

## 2021-09-01 LAB
ALBUMIN SERPL-MCNC: 3.9 G/DL (ref 3.4–5)
ALP SERPL-CCNC: 94 U/L (ref 40–150)
ALT SERPL W P-5'-P-CCNC: 51 U/L (ref 0–50)
ANION GAP SERPL CALCULATED.3IONS-SCNC: 5 MMOL/L (ref 3–14)
AST SERPL W P-5'-P-CCNC: 36 U/L (ref 0–45)
BILIRUB SERPL-MCNC: 0.4 MG/DL (ref 0.2–1.3)
BUN SERPL-MCNC: 23 MG/DL (ref 7–30)
CALCIUM SERPL-MCNC: 8.7 MG/DL (ref 8.5–10.1)
CHLORIDE BLD-SCNC: 105 MMOL/L (ref 94–109)
CHOLEST SERPL-MCNC: 138 MG/DL
CO2 SERPL-SCNC: 26 MMOL/L (ref 20–32)
CREAT SERPL-MCNC: 1.17 MG/DL (ref 0.52–1.04)
CREAT UR-MCNC: 114 MG/DL
FASTING STATUS PATIENT QL REPORTED: YES
GFR SERPL CREATININE-BSD FRML MDRD: 47 ML/MIN/1.73M2
GLUCOSE BLD-MCNC: 112 MG/DL (ref 70–99)
HDLC SERPL-MCNC: 49 MG/DL
LDLC SERPL CALC-MCNC: 49 MG/DL
MICROALBUMIN UR-MCNC: 325 MG/L
MICROALBUMIN/CREAT UR: 285.09 MG/G CR (ref 0–25)
NONHDLC SERPL-MCNC: 89 MG/DL
POTASSIUM BLD-SCNC: 3.8 MMOL/L (ref 3.4–5.3)
PROT SERPL-MCNC: 7.5 G/DL (ref 6.8–8.8)
SODIUM SERPL-SCNC: 136 MMOL/L (ref 133–144)
TRIGL SERPL-MCNC: 199 MG/DL
TSH SERPL DL<=0.005 MIU/L-ACNC: 3.85 MU/L (ref 0.4–4)
URATE SERPL-MCNC: 8.4 MG/DL (ref 2.6–6)

## 2021-09-01 RX ORDER — COLCHICINE 0.6 MG/1
TABLET ORAL
Qty: 30 TABLET | Refills: 3 | Status: SHIPPED | OUTPATIENT
Start: 2021-09-01 | End: 2021-11-08

## 2021-09-06 PROBLEM — R06.09 DOE (DYSPNEA ON EXERTION): Status: RESOLVED | Noted: 2019-02-19 | Resolved: 2021-09-06

## 2021-09-06 RX ORDER — VENLAFAXINE HYDROCHLORIDE 150 MG/1
CAPSULE, EXTENDED RELEASE ORAL
Qty: 180 CAPSULE | Refills: 1 | Status: SHIPPED | OUTPATIENT
Start: 2021-09-06 | End: 2021-11-08

## 2021-09-06 RX ORDER — HYDROCHLOROTHIAZIDE 12.5 MG/1
12.5 TABLET ORAL EVERY MORNING
Qty: 90 TABLET | Refills: 3 | Status: SHIPPED | OUTPATIENT
Start: 2021-09-06 | End: 2021-11-08

## 2021-09-14 ENCOUNTER — ANCILLARY PROCEDURE (OUTPATIENT)
Dept: CT IMAGING | Facility: CLINIC | Age: 71
End: 2021-09-14
Attending: NURSE PRACTITIONER
Payer: MEDICARE

## 2021-09-14 DIAGNOSIS — R91.8 PULMONARY NODULES: ICD-10-CM

## 2021-09-14 PROCEDURE — G1004 CDSM NDSC: HCPCS | Mod: TC | Performed by: RADIOLOGY

## 2021-09-14 PROCEDURE — 71250 CT THORAX DX C-: CPT | Mod: TC | Performed by: RADIOLOGY

## 2021-09-21 ENCOUNTER — TELEPHONE (OUTPATIENT)
Dept: FAMILY MEDICINE | Facility: CLINIC | Age: 71
End: 2021-09-21

## 2021-09-21 NOTE — TELEPHONE ENCOUNTER
----- Message from Sarah Vallejo NP sent at 9/20/2021 10:38 AM CDT -----  I would like this patient to get in for a virtual visit to discuss her CT results.  (I sent her a [employer] message letting her know as well).  Please call her to help arrange virtual visit.    Sarah Vallejo DNP, APRN, CNP

## 2021-09-26 ENCOUNTER — HEALTH MAINTENANCE LETTER (OUTPATIENT)
Age: 71
End: 2021-09-26

## 2021-09-28 ENCOUNTER — VIRTUAL VISIT (OUTPATIENT)
Dept: FAMILY MEDICINE | Facility: CLINIC | Age: 71
End: 2021-09-28
Payer: MEDICARE

## 2021-09-28 DIAGNOSIS — N28.9 LESION OF LEFT NATIVE KIDNEY: Primary | ICD-10-CM

## 2021-09-28 DIAGNOSIS — N18.32 STAGE 3B CHRONIC KIDNEY DISEASE (H): Chronic | ICD-10-CM

## 2021-09-28 DIAGNOSIS — M1A.9XX0 CHRONIC GOUT INVOLVING TOE WITHOUT TOPHUS, UNSPECIFIED CAUSE, UNSPECIFIED LATERALITY: ICD-10-CM

## 2021-09-28 PROCEDURE — 99213 OFFICE O/P EST LOW 20 MIN: CPT | Mod: 95 | Performed by: NURSE PRACTITIONER

## 2021-09-28 NOTE — PATIENT INSTRUCTIONS
[facility]     Discharged by : Sarah Vallejo, LUIS, APRN, CNP   Paper scripts provided to patient : none     If you have any questions regarding your visit please contact your care team:     Team Annika              Clinic Hours Telephone Number     [address]   7am-7pm  Monday - Thursday   7am-5pm  Fridays  [phone]   (Appointment scheduling available 24/7)     RN Line  [phone] option 2     Urgent Care - [facility]da Reed and Alexis [facility]ti - 11am-9pm Monday-Friday Saturday-Sunday- 9am-5pm     Alexis -   5pm-9pm Monday-Friday Saturday-Sunday- 9am-5pm    [phone] - Zelda Reed    [phone] - Alexis     For a Price Quote for your services, please call our [email or web address] Price Line at [phone].     What options do I have for visits at the clinic other than the traditional office visit?     To expand how we care for you, many of our providers are utilizing electronic visits (e-visits) and telephone visits, when medically appropriate, for interactions with their patients rather than a visit in the clinic. We also offer nurse visits for many medical concerns. Just like any other service, we will bill your insurance company for this type of visit based on time spent on the phone with your provider. Not all insurance companies cover these visits. Please check with your medical insurance if this type of visit is covered. You will be responsible for any charges that are not paid by your insurance.     E-visits via Panviva: generally incur a $45.00 fee.     Telephone visits:  Time spent on the phone: *charged based on time that is spent on the phone in increments of 10 minutes. Estimated cost:   5-10 mins $30.00   11-20 mins. $59.00   21-30 mins. $85.00       Use [employer] (secure email communication and access to your chart) to send your primary care provider a message or make an appointment. Ask someone on your Team how to sign up  for Get[employer].     As always, Thank you for trusting us with your health care needs!      [facility]:    Scheduling Appointments  [place]  Call: [phone]    [facility]  Call: [phone]    [facility]  Call: [phone]    For Gastroenterology referrals   [facility]   Clinics and Surgery Center, 4th Floor   [address] [place] [address]   Appointments: [phone]    WHERE TO GO FOR CARE?    Clinic    Make an appointment if you:       Are sick (cold, cough, flu, sore throat, earache or in pain).       Have a small injury (sprain, small cut, burn or broken bone).       Need a physical exam, Pap smear, vaccine or prescription refill.       Have questions about your health or medicines.    To reach us:      Call [phone] ([phone]). Open 24 hours every day. (For counseling services, call [phone].)    Log into GetJob at [employer] [email or web address]. (Visit [email or web address] to create an account.) Hospital emergency room    An emergency is a serious or life- threatening problem that must be treated right away.    Call 216 or get to the hospital if you have:      Very bad or sudden:            - Chest pain or pressure         - Bleeding         - Head or belly pain         - Dizziness or trouble seeing, walking or                          Speaking      Problems breathing      Blood in your vomit or you are coughing up blood      A major injury (knocked out, loss of a finger or limb, rape, broken bone protruding from skin)    A mental health crisis. (Or call the Mental Health Crisis line at 1-680.952.1321 or Suicide Prevention Hotline at 1-380.360.6882.)    Open 24 hours every day. You don't need an appointment.     Urgent care    Visit urgent care for sickness or small injuries when the clinic is closed. You don't need an appointment. To check hours or find an urgent  care near you, visit [email or web address]. Online care    Get online care from [facility] for more than 70 common problems, like colds, allergies and infections. Open 24 hours every day at:   [email or web address]   Need help deciding?    For advice about where to be seen, you may call your clinic and ask to speak with a nurse. We're here for you 24 hours every day.         If you are deaf or hard of hearing, please let us know. We provide many free services including sign language interpreters, oral interpreters, TTYs, telephone amplifiers, note takers and written materials.

## 2021-09-28 NOTE — PROGRESS NOTES
Abbey is a 71 year old who is being evaluated via a billable video visit.      How would you like to obtain your AVS? MyChart  If the video visit is dropped, the invitation should be resent by: Send to e-mail at: [email or web address]  Will anyone else be joining your video visit? No      Video Start Time: 1 pm    Assessment & Plan     Lesion of left native kidney  Recommend MRI for follow up of left kidney lesion found on recent CT.  - MR Renal wo & w Contrast; Future    Chronic gout involving toe without tophus, unspecified cause, unspecified laterality  Recheck uric acid now that patient has been back on Allopurinol 100 mg for one month.  - Uric acid; Future    Stage 3b chronic kidney disease (H)  Chronic, stable.               Follow up in 3-6 months for recheck  Return in about 3 months (around 12/28/2021) for Routine Visit.    Sarah Vallejo NP  [facility]    Maria Isabel Potter is a 71 year old who presents for the following health issues     HPI     Discuss CT Results.    The toe pain resolved after restarting Allopurinol 100 mg.    Has an intermittent cough, sometimes with phlegm.  But she is not currently having cough, shortness of breath, or wheezing.  She has discontinued the Singular and did not have any change in how she feels.    Review of Systems   Constitutional, HEENT, cardiovascular, pulmonary, gi and gu systems are negative, except as otherwise noted.      Objective           Vitals:  No vitals were obtained today due to virtual visit.    Physical Exam   GENERAL: Healthy, alert and no distress  EYES: Eyes grossly normal to inspection.  No discharge or erythema, or obvious scleral/conjunctival abnormalities.  RESP: No audible wheeze, cough, or visible cyanosis.  No visible retractions or increased work of breathing.    SKIN: Visible skin clear. No significant rash, abnormal pigmentation or lesions.  NEURO: Cranial nerves grossly intact.  Mentation and speech  appropriate for age.  PSYCH: Mentation appears normal, affect normal/bright, judgement and insight intact, normal speech and appearance well-groomed.    CT CHEST WITHOUT CONTRAST 9/14/2021 12:29 PM     CLINICAL HISTORY: Lung nodule (pulmonary nodule). Follow up lung  nodule. Pulmonary nodules.     TECHNIQUE: CT chest without IV contrast. Multiplanar reformats were  obtained. Dose reduction techniques were used.  CONTRAST: None.     COMPARISON: CT chest 4/26/2016.     FINDINGS:   LUNGS AND PLEURA: Right lung base atelectasis. Stable elevated right  hemidiaphragm. No effusions. Stable anterior right mid chest nodule  that is 0.3 cm, series 4 image 116. Stable right fissural nodule  versus lymph node image 111. New subpleural small 0.4 cm nodule left  lower lobe image 126. New patchy mild groundglass opacities at the  bilateral lower lungs.     MEDIASTINUM/AXILLAE: No enlarged lymph node can be seen. No acute  mediastinal abnormality. No pericardial effusion.     CORONARY ARTERY CALCIFICATION: Severe.     UPPER ABDOMEN: No acute abnormality. Cholecystectomy. There is a new  indeterminant hyperdense focal nodular lesion along the left renal  pelvis region that is 1.1 cm, series 2 image 62.     MUSCULOSKELETAL: No aggressive process. Diffuse spine degenerative  change.                                                                      IMPRESSION:   1.  New patchy mild groundglass opacities at the bilateral lungs may  relate to an atypical infectious or inflammatory etiology. Further  workup is suggested.  2.  New small left lower lobe subpleural nodule. Other nodules are  stable. Consider surveillance imaging as clinically indicated. See  below for follow up imaging guidelines is appropriate.  3.  Indeterminant new mildly dense nodular lesion at the medial left  kidney. A solid mass remains in the differential. Recommend renal MRI  for further workup.     Recommendations for one or multiple incidental lung nodules <  6mm :    Low risk patients: No routine follow-up.    High risk patients: Optional follow-up CT at 12 months; if  unchanged, no further follow-up.     *Low Risk: Minimal or absent history of smoking or other known risk  factors.  *Nonsolid (ground-glass) or partly solid nodules may require longer  follow-up to exclude indolent adenocarcinoma.  *Recommendations based on Guidelines for the Management of Incidental  Pulmonary Nodules Detected at CT: From the Fleischner Society 2017,  Radiology 2017.     YECENIA FRAZIER MD             Video-Visit Details    Type of service:  Video Visit    Video End Time: 1:16 pm then had to disconnect the video.  I then called patient and spoke with her to complete visit.    Originating Location (pt. Location): Home    Distant Location (provider location):  [facility]     Platform used for Video Visit: "[employer]

## 2021-10-06 ENCOUNTER — ANCILLARY PROCEDURE (OUTPATIENT)
Dept: MRI IMAGING | Facility: CLINIC | Age: 71
End: 2021-10-06
Attending: NURSE PRACTITIONER
Payer: MEDICARE

## 2021-10-06 DIAGNOSIS — N28.9 LESION OF LEFT NATIVE KIDNEY: ICD-10-CM

## 2021-10-06 PROCEDURE — G1004 CDSM NDSC: HCPCS | Mod: TC | Performed by: RADIOLOGY

## 2021-10-06 PROCEDURE — A9585 GADOBUTROL INJECTION: HCPCS | Mod: JW | Performed by: RADIOLOGY

## 2021-10-06 PROCEDURE — 74183 MRI ABD W/O CNTR FLWD CNTR: CPT | Mod: TC | Performed by: RADIOLOGY

## 2021-10-06 RX ORDER — GADOBUTROL 604.72 MG/ML
10 INJECTION INTRAVENOUS ONCE
Status: COMPLETED | OUTPATIENT
Start: 2021-10-06 | End: 2021-10-06

## 2021-10-06 RX ADMIN — GADOBUTROL 8.5 ML: 604.72 INJECTION INTRAVENOUS at 12:45

## 2021-11-08 DIAGNOSIS — R05.9 COUGH: ICD-10-CM

## 2021-11-08 DIAGNOSIS — F33.0 MAJOR DEPRESSIVE DISORDER, RECURRENT EPISODE, MILD DEGREE (H): Chronic | ICD-10-CM

## 2021-11-08 DIAGNOSIS — M1A.9XX0 CHRONIC GOUT INVOLVING TOE WITHOUT TOPHUS, UNSPECIFIED CAUSE, UNSPECIFIED LATERALITY: ICD-10-CM

## 2021-11-08 DIAGNOSIS — G47.00 INSOMNIA, UNSPECIFIED TYPE: ICD-10-CM

## 2021-11-08 DIAGNOSIS — E03.4 HYPOTHYROIDISM DUE TO ACQUIRED ATROPHY OF THYROID: Chronic | ICD-10-CM

## 2021-11-08 DIAGNOSIS — I25.10 CORONARY ARTERY DISEASE INVOLVING NATIVE CORONARY ARTERY OF NATIVE HEART WITHOUT ANGINA PECTORIS: ICD-10-CM

## 2021-11-08 DIAGNOSIS — J31.0 CHRONIC NONALLERGIC RHINITIS: Chronic | ICD-10-CM

## 2021-11-08 DIAGNOSIS — I10 HYPERTENSION GOAL BP (BLOOD PRESSURE) < 140/90: Chronic | ICD-10-CM

## 2021-11-08 DIAGNOSIS — J30.2 SEASONAL ALLERGIC RHINITIS, UNSPECIFIED TRIGGER: ICD-10-CM

## 2021-11-08 RX ORDER — COLCHICINE 0.6 MG/1
TABLET ORAL
Qty: 30 TABLET | Refills: 1 | Status: SHIPPED | OUTPATIENT
Start: 2021-11-08 | End: 2022-05-17

## 2021-11-08 RX ORDER — SIMVASTATIN 40 MG
40 TABLET ORAL AT BEDTIME
Qty: 90 TABLET | Refills: 0 | Status: SHIPPED | OUTPATIENT
Start: 2021-11-08 | End: 2022-03-02

## 2021-11-08 RX ORDER — LEVALBUTEROL TARTRATE 45 UG/1
AEROSOL, METERED ORAL
Qty: 15 G | Refills: 1 | Status: SHIPPED | OUTPATIENT
Start: 2021-11-08 | End: 2022-05-17

## 2021-11-08 RX ORDER — ALLOPURINOL 100 MG/1
100 TABLET ORAL DAILY
Qty: 90 TABLET | Refills: 1 | Status: SHIPPED | OUTPATIENT
Start: 2021-11-08 | End: 2022-03-15

## 2021-11-08 RX ORDER — LOSARTAN POTASSIUM 100 MG/1
50 TABLET ORAL 2 TIMES DAILY
Qty: 90 TABLET | Refills: 1 | Status: SHIPPED | OUTPATIENT
Start: 2021-11-08 | End: 2022-05-17

## 2021-11-08 RX ORDER — VENLAFAXINE HYDROCHLORIDE 150 MG/1
CAPSULE, EXTENDED RELEASE ORAL
Qty: 180 CAPSULE | Refills: 1 | Status: SHIPPED | OUTPATIENT
Start: 2021-11-08 | End: 2022-05-17

## 2021-11-08 RX ORDER — TRAZODONE HYDROCHLORIDE 50 MG/1
50-100 TABLET, FILM COATED ORAL
Qty: 180 TABLET | Refills: 1 | Status: SHIPPED | OUTPATIENT
Start: 2021-11-08 | End: 2022-05-17

## 2021-11-08 RX ORDER — METOPROLOL SUCCINATE 25 MG/1
25 TABLET, EXTENDED RELEASE ORAL DAILY
Qty: 90 TABLET | Refills: 1 | Status: SHIPPED | OUTPATIENT
Start: 2021-11-08 | End: 2022-06-04

## 2021-11-08 RX ORDER — MONTELUKAST SODIUM 10 MG/1
10 TABLET ORAL AT BEDTIME
Qty: 90 TABLET | Refills: 1 | Status: SHIPPED | OUTPATIENT
Start: 2021-11-08 | End: 2022-03-15

## 2021-11-08 RX ORDER — LEVOTHYROXINE SODIUM 88 UG/1
TABLET ORAL
Qty: 90 TABLET | Refills: 1 | Status: SHIPPED | OUTPATIENT
Start: 2021-11-08 | End: 2021-11-10

## 2021-11-08 RX ORDER — HYDROCHLOROTHIAZIDE 12.5 MG/1
12.5 TABLET ORAL EVERY MORNING
Qty: 90 TABLET | Refills: 1 | Status: SHIPPED | OUTPATIENT
Start: 2021-11-08 | End: 2022-05-17

## 2021-11-08 RX ORDER — FLUTICASONE PROPIONATE 50 MCG
2 SPRAY, SUSPENSION (ML) NASAL DAILY
Qty: 48 G | Refills: 0 | Status: SHIPPED | OUTPATIENT
Start: 2021-11-08 | End: 2022-05-17

## 2021-11-08 NOTE — TELEPHONE ENCOUNTER
Refill request.   Would like written, as traveling after 11/14/2021.     All meds to fill, with writtenScripts

## 2021-11-08 NOTE — TELEPHONE ENCOUNTER
Prescriptions printed, signed, and was given to patient today.  She is travelling to [place] for the winter and requested printed prescriptions.    Sarah Vallejo, DNP, APRN, CNP

## 2021-11-10 ENCOUNTER — MYC MEDICAL ADVICE (OUTPATIENT)
Dept: FAMILY MEDICINE | Facility: CLINIC | Age: 71
End: 2021-11-10
Payer: MEDICARE

## 2021-11-10 DIAGNOSIS — E03.4 HYPOTHYROIDISM DUE TO ACQUIRED ATROPHY OF THYROID: Chronic | ICD-10-CM

## 2021-11-10 RX ORDER — LEVOTHYROXINE SODIUM 88 UG/1
TABLET ORAL
Qty: 90 TABLET | Refills: 0 | Status: SHIPPED | OUTPATIENT
Start: 2021-11-10 | End: 2022-05-17

## 2021-11-10 NOTE — TELEPHONE ENCOUNTER
Please see [employer] message.     Routed to PCP to review and advise.     Berenice Robins RN, BSN, PHN  [facility] [place]

## 2022-01-13 ENCOUNTER — TELEPHONE (OUTPATIENT)
Dept: FAMILY MEDICINE | Facility: CLINIC | Age: 72
End: 2022-01-13
Payer: MEDICARE

## 2022-01-13 NOTE — TELEPHONE ENCOUNTER
Pharmacy is calling to clarify the dosage of the medication that was sent today.     Venlafaxine ER    Please call to verify.     The strength is 100 mg capsules 2 capsules daily  Recommended dosage is 150mg daily    [phone] call pharmacy to clarify dosage    Catina Richardson/  [place]

## 2022-01-14 NOTE — TELEPHONE ENCOUNTER
Called pharmacy. Pharmacy closed. Left detailed message stating pt is to take 150mg 2 caps daily for total of 300mg. Pt has been on this dose since 2019.   Any further questions to call [facility] at [phone].    Rashmi Hudson RN

## 2022-03-01 DIAGNOSIS — I25.10 CORONARY ARTERY DISEASE INVOLVING NATIVE CORONARY ARTERY OF NATIVE HEART WITHOUT ANGINA PECTORIS: ICD-10-CM

## 2022-03-02 RX ORDER — SIMVASTATIN 40 MG
40 TABLET ORAL AT BEDTIME
Qty: 90 TABLET | Refills: 0 | Status: SHIPPED | OUTPATIENT
Start: 2022-03-02 | End: 2022-05-17

## 2022-03-02 NOTE — TELEPHONE ENCOUNTER
Prescription approved per [facility] Refill Protocol.    Yana Shanks RN   [facility]

## 2022-04-05 DIAGNOSIS — I10 HYPERTENSION GOAL BP (BLOOD PRESSURE) < 140/90: Chronic | ICD-10-CM

## 2022-04-05 NOTE — TELEPHONE ENCOUNTER
Attempted to contact patient on home and cell number, cell number just rang and rang unable to leave message, home number mailbox was full unable to leave message    Please advise patient she is due for an appointment and will need to have one scheduled in order to get refills on medication    Please assist in scheduling appt as advised     Danielle SYKES CMA

## 2022-04-05 NOTE — TELEPHONE ENCOUNTER
Routing to team sissy.    Please call patient to schedule follow up appointment.    Patient was to follow up in December.    Fili Robles, RN, BSN, PHN  [facility]

## 2022-04-07 NOTE — TELEPHONE ENCOUNTER
Tried to LM but mailbox is full. Will try in two days, then send letter.     Catina Weathers-  [facility]

## 2022-04-20 NOTE — H&P (VIEW-ONLY)
[address] [id number] [phone]  Dept: [phone]    PRE-OP EVALUATION:  Today's date: 2018    Abbey Omer (: 1950) presents for pre-operative evaluation assessment as requested by Dr. Pascal.  She requires evaluation and anesthesia risk assessment prior to undergoing surgery/procedure for treatment of Cataracts .    Primary Physician: Sarah Vallejo  Type of Anesthesia Anticipated: Combined Mac with topical    Patient has a Health Care Directive or Living Will:  YES     Preop Questions 2018   Who is doing your surgery? Dr Flood   What are you having done? Left eye Cataract   Date of Surgery/Procedure: 18   Facility or Hospital where procedure/surgery will be performed: [facility]   1.  Do you have a history of Heart attack, stroke, stent, coronary bypass surgery, or other heart surgery? No   2.  Do you ever have any pain or discomfort in your chest? No   3.  Do you have a history of  Heart Failure? No   4.   Are you troubled by shortness of breath when:  walking on a level surface, or up a slight hill, or at night? No   5.  Do you currently have a cold, bronchitis or other respiratory infection? No   6.  Do you have a cough, shortness of breath, or wheezing? No   7.  Do you sometimes get pains in the calves of your legs when you walk? No   8. Do you or anyone in your family have previous history of blood clots? No   9.  Do you or does anyone in your family have a serious bleeding problem such as prolonged bleeding following surgeries or cuts? No   10. Have you ever had problems with anemia or been told to take iron pills? No   11. Have you had any abnormal blood loss such as black, tarry or bloody stools, or abnormal vaginal bleeding? No   12. Have you ever had a blood transfusion? YES - years ago   13. Have you or any of your relatives ever had problems with anesthesia? No   14. Do you have sleep apnea,  excessive snoring or daytime drowsiness? YES - has obstructive sleep apnea and uses CPAP   15. Do you have any prosthetic heart valves? No   16. Do you have prosthetic joints? YES- shoulder replacements   17. Is there any chance that you may be pregnant? No         HPI:     HPI related to upcoming procedure: is getting cataract removal      See problem list for active medical problems.  Problems all longstanding and stable, except as noted/documented.  See ROS for pertinent symptoms related to these conditions.                                                                                                                                                          .    MEDICAL HISTORY:     Patient Active Problem List    Diagnosis Date Noted     Major depressive disorder, recurrent episode, mild degree (H) 08/03/2011     Priority: High     prior care with psychiatrist Dr. Ro  Has tried paxil in past.        Hypertension goal BP (blood pressure) < 140/90 06/01/2011     Priority: High     Intolerant to higher than 50 mg of metoprolol due to depressive effects       Hyperlipidemia with target LDL less than 100 05/17/2011     Priority: High     Has tried welchol, statins and developed side effects  Diagnosis updated by automated process. Provider to review and confirm.       CKD (chronic kidney disease) stage 3, GFR 30-59 ml/min 12/20/2010     Priority: High     3/13/18:  Creatinine 1.32, GFR 40, creatinine clearance 50.1 mL/Min    Tray consulted with [facility] 3/14/18.  Renal function has declined since addition of Allopurinol and Colchicine.    Renally cleared drug recommendations:  - Continue to avoid NSAIDs  - Allopurinol 100 mg dosage okay to continue  - Colchicine back off dosage.  3/12/18 patient changed to AS NEEDED usage.  - Ranitidine 300 mg/day change to 150 mg/day.  3/14/18 patient changed to 150 mg dosage.  - Spironolactone 100 mg/day consider decreasing to 50 mg.  - Effexor 300 mg/day may reduce  "total daily dose by 25-50% but this should be done last due to risk of destabilizing mental health       Hypothyroidism 10/12/2009     Priority: High     Acute gout involving toe of left foot, unspecified cause 08/11/2017     Priority: Medium     Discoid atelectasis 04/28/2016     Priority: Medium     4/26/16:  Chest CT - \"FINDINGS:   CHEST: Series 3 image 23, there is a peripheral indeterminate 0.3 cm  right mid lung nodule near the minor fissure which is stable since  6/3/2014. No acute pulmonary disease or additional nodules identified.  There is discoid atelectasis in the right lower lobe. Left lung is  clear. No mediastinal, hilar or axillary adenopathy. Cholecystectomy  clips. Elevated right hemidiaphragm.\"       Renal cyst, left 02/22/2016     Priority: Medium     Seen on abd/pelvic CT at [facility] 2/14/2016.  Recommend recheck CT in one year or do ultrasound.  In review of chart, renal cyst noted on ultrasound in 2011 and CT done at [facility] facility in 2014 showing stablility       Combined form of age-related cataract, both eyes 02/17/2016     Priority: Medium     Glaucoma suspect, bilateral 02/17/2016     Priority: Medium     Target IOP:   Max IOP :   Family history: No  Trauma history: No  OCT: 8/216 Normal  [place] visual field (HVF): 8/2016   Right eye - Reliable, Normal   Left eye - Reliable, Normal  Pachymetry: Average-thickish 553/564  C/D: 0.6/0.6  SLT:            Drusen of macula of both eyes 02/17/2016     Priority: Medium     Acne 05/20/2015     Priority: Medium     Major depressive disorder, recurrent episode, moderate (H) 06/20/2014     Priority: Medium     Pulmonary nodules 06/16/2014     Priority: Medium     4/26/16:  Chest CT - \"IMPRESSION: Stable 0.3 cm right midlung nodule since June 2014.\"       Allergic conjunctivitis 04/10/2013     Priority: Medium     Hypertriglyceridemia 03/01/2013     Priority: Medium     DDD (degenerative disc disease), cervical 06/16/2011     Priority: Medium     " "Elevated LFTs 06/15/2011     Priority: Medium     6/21/2011:  [facility] Ultrasound \"IMPRESSION:  1. Previous cholecystectomy.  2. Otherwise unremarkable ultrasound of the right upper quadrant.\"  2/14/16:  Abdominal CT through [facility] shows unremarkable liver       Dependent edema      Priority: Medium     History of bladder cancer 05/18/2011     Priority: Medium     Dr. David, [facility]       Obesity 05/17/2011     Priority: Medium     Chronic nonallergic rhinitis      Priority: Medium     Dr. Peoples, allergist       RICKEY (obstructive sleep apnea) 08/11/2009     Priority: Medium     Intolerant to CPAP    Split Night:  Sleep Study 2/01/2017 - (173.0 lbs) AHI 29.6, RDI 59.8, Supine AHI 39.4, REM AHI -, Low O2% 78.5%, Time Spent ?88% 14.4, Time Spent ?89% 30.6, CPAP was titrated to a pressure of 9 with an AHI 30. Time spent in REM supine at this pressure was - minutes.       Macular drusen 11/25/2011     Priority: Low     Advance Care Planning 06/15/2011     Priority: Low     Advance Care Planning 7/5/2016: Receipt of ACP document:  Received: Health Care Directive which was witnessed or notarized on 4/25/16.  Document previously scanned on 4/28/16. Also received Health Care Directive dated 3/18/16 which was valid and scanned on 4/14/16  Validation forms previously completed and sent to be scanned.  Code Status reflects choices in most recent ACP document.  Confirmed/documented designated decision maker(s).  Added by Kanchan Lazcano RN, Advance Care Planning Liaison.  Advance Care Planning 4/25/2016: ACP Facilitation Session:    Abbey GAYLE Kan presented for ACP Facilitation session at the clinic. She was accompanied by spouse. Honoring Choices information provided and resources reviewed. She currently wishes to complete an ACP document and needs further clarification and/or consideration prior to documenting choices.  She currently has the following questions or concerns about Advance Care Planning: none.  " Confirmed/documented legally designated decision maker(s). Code status reflects choices in most recent ACP document. Follow-up meeting: not needed/applicable.  document sent to be scanned after viewed for validation.Added by Adrianna Silveira   Advance Care Planning 4/13/2016: Receipt of ACP document:  Received: invalid HCD document not signed, witness/notary and missing pages.Document not previously scanned. Validation form completed indicating invalid document. Copy sent to client with information and facilitation resources. Validation form sent to be scanned as notation of invalid document received.  Confirmed/documented RN, System Director ACP-Honoring Choices  designated decision maker(s).  Added by Rebeca Manriquez  Advance Care Planning 6-15-11 Discussed advance care planning with patient; information given to patient to review. Karen Pro CMA        Past Medical History:   Diagnosis Date     Cancer of bladder (H)      Cataract 11/25/2011     Chronic nonallergic rhinitis 8/31/10 RAST     CKD (chronic kidney disease) stage 3, GFR 30-59 ml/min 12/20/2010     DDD (degenerative disc disease), cervical 6/16/2011     Dependent edema      Depression, major      Depressive disorder In 2002 or 2003     History of blood transfusion ?     HTN (hypertension)      Hyperlipidemia      Hypothyroidism 10/12/2009     Migraine     resolved     Nasal septal deviation 8/11/2009     Obesity 5/17/2011     RICKEY (obstructive sleep apnea)      Osteoarthritis of shoulder region      Osteoporosis      Premature menopause      Renal cyst      Past Surgical History:   Procedure Laterality Date     ARTHROPLASTY SHOULDER Right 11/01/2017    Right total shoulder arthroplasty by Dr. Go at [facility]     ARTHROPLASTY SHOULDER Left 03/28/2018    Left total shoulder arthroplasty by Dr. Go at [facility]     CHOLECYSTECTOMY, LAPOROSCOPIC       COLONOSCOPY  ?    Upper & Lower     CYSTOSCOPY       bladder cancer     D & C       ENT SURGERY  first 1967/second ?    2 Rhynoplasties     HYSTERECTOMY, PAP NO LONGER INDICATED      BSO     NOSE SURGERY      septum deviation     Current Outpatient Prescriptions   Medication Sig Dispense Refill     allopurinol (ZYLOPRIM) 100 MG tablet Take 1 tablet (100 mg) by mouth daily 90 tablet 1     B Complex Vitamins (VITAMIN B COMPLEX PO)        [START ON 9/23/2018] Bromfenac Sodium POWD 1 Bottle 4 times daily 1 drop four times a day in the operative eye starting 1 day before surgery. Use until the bottle runs out. 1 Bottle 1     CALCIUM + D 600-200 MG-UNIT OR TABS 1 tablet twice a day       colchicine (COLCRYS) 0.6 MG tablet Take 1 tablet (0.6 mg) by mouth daily (Patient taking differently: Take 0.6 mg by mouth as needed ) 90 tablet 3     Cranberry 500 MG CAPS Take 1 capsule by mouth daily       [START ON 9/23/2018] Dexamethasone Acetate POWD 1 Bottle 4 times daily 1 drop four times a day in the operative eye starting 1 day before surgery. Use until the bottle runs out. 1 Bottle 1     EXCEDRIN TENSION HEADACHE 500-65 MG PO TABS 1 tablet twice daily as needed       FISH OIL 1200 MG OR CAPS 3 capsule daily       Flaxseed, Linseed, (FLAXSEED OIL PO) Take 1 Tablespoonful by mouth daily       fluticasone (FLONASE) 50 MCG/ACT nasal spray Spray 2 sprays into both nostrils daily 48 g 3     KRILL OIL PO Take 1 capsule by mouth daily       levalbuterol (XOPENEX HFA) 45 MCG/ACT Inhaler INHALE 2 PUFFS INTO THE LUNGS EVERY 6 HOURS AS NEEDED FOR SHORTNESS OF BREATH OR DIFFICULT BREATHING OR WHEEZING 15 g 0     levothyroxine (SYNTHROID/LEVOTHROID) 88 MCG tablet TAKE 1 TABLET(88 MCG) BY MOUTH DAILY 90 tablet 0     magnesium 500 MG TABS Take 1 tablet by mouth daily       [START ON 9/23/2018] Moxifloxacin HCl POWD 1 Bottle 4 times daily 1 drop four times a day in the operative eye starting 1 day before surgery. Use until the bottle runs out. 1 Bottle 1     Multiple Vitamins-Minerals (MULTIPLE  VITAMIN  S/WOMENS) TABS Take 2 tablets by mouth daily       order for DME Equipment ordered: Respironics Auto PAP Mask type: Nasal Settings: 9-15 cm H2O       polyethylene glycol (MIRALAX) powder Take 17 g (1 capful) by mouth 2 times daily as needed for constipation 510 g 1     ranitidine (ZANTAC) 300 MG tablet Take 1 tablet (300 mg) by mouth At Bedtime 90 tablet 3     STATIN NOT PRESCRIBED, INTENTIONAL, by Other route continuous prn Reported on 5/15/2017  0     traZODone (DESYREL) 50 MG tablet Take 1-2 tablets ( mg) by mouth nightly as needed for sleep 180 tablet 1     venlafaxine (EFFEXOR-XR) 150 MG 24 hr capsule 2 Capsules daily 180 capsule 3     zinc 50 MG TABS Take 1 tablet by mouth daily              OTC products: None, except as noted above    Allergies   Allergen Reactions     Albuterol Other (See Comments)     Very jittery and couldn't tolerate     Amlodipine Swelling and Other (See Comments)     Swelling- lower legs     Asa [Aspirin] GI Disturbance     Atenolol Fatigue     Atenolol Other (See Comments)     Fatigue     Atorvastatin Other (See Comments)     Swelling legs     Atorvastatin Calcium Swelling     Bupropion Unknown     Codeine Nausea     Colestid [Colestipol Hcl]      Crestor [Hmg-Coa-R Inhibitors] Other (See Comments)     Joint pain  Other reaction(s): Other - Describe In Comment Field  Joint pain  Joint pain     Cymbalta      Visual hallucinations     Ezetimibe Other (See Comments)     Fatigue     Hydralazine Fatigue and Other (See Comments)     Fatigue     Lovastatin Cramps and Other (See Comments)     Cramps     Niacin      Unknown by patient     Paroxetine Other (See Comments)     Fatigue     Paxil [Paroxetine] Fatigue     Potassium GI Disturbance     Sulfa Drugs Fatigue     Sulfasalazine Other (See Comments)     Triamterene Unknown     Zetia [Ezetimibe] Fatigue     Zomig [Zolmitriptan] Other (See Comments)     Cant wake up      Latex Allergy: NO    Social History   Substance Use Topics      Smoking status: Former Smoker     Packs/day: 0.10     Years: 1.00     Types: Cigarettes     Start date: 8/12/1969     Quit date: 10/12/1969     Smokeless tobacco: Never Used      Comment: Smoked, very little, for 2 mo, 49 years ago.     Alcohol use Yes      Comment: Maybe 1 or 2 drinks a year     History   Drug Use No       REVIEW OF SYSTEMS:   Constitutional, HEENT, cardiovascular, pulmonary, gi and gu systems are negative, except as otherwise noted.    EXAM:   BP (!) 146/92 (BP Location: Right arm, Patient Position: Chair, Cuff Size: Adult Large)  Pulse 70  Temp 97.8  F (36.6  C) (Oral)  Ht 5' (1.524 m)  Wt 168 lb 3.2 oz (76.3 kg)  BMI 32.85 kg/m2    GENERAL APPEARANCE: healthy, alert and over weight     EYES: EOMI, PERRL     HENT: ear canals and TM's normal and nose and mouth without ulcers or lesions     NECK: no adenopathy, no asymmetry, masses, or scars and thyroid normal to palpation     RESP: lungs clear to auscultation - no rales, rhonchi or wheezes     CV: regular rates and rhythm, normal S1 S2, no S3 or S4 and no murmur, click or rub     PSYCH: mentation appears normal. and affect normal/bright     LYMPHATICS: No cervical adenopathy    DIAGNOSTICS:   No labs or EKG required for low risk surgery (cataract, skin procedure, breast biopsy, etc)    Recent Labs   Lab Test  04/02/18   1520  03/12/18   1037   10/27/17   1034   07/28/11   0916   HGB   --   14.8   --   14.3   < >  13.3   PLT   --   269   --   257   --   257   INR   --    --    --    --    --   1.00   NA  131*  136   < >  135   < >  139   POTASSIUM  3.8  4.4   < >  4.4   < >  4.1   CR  1.00  1.32*   < >  1.42*   < >  1.17*    < > = values in this interval not displayed.        IMPRESSION:   Reason for surgery/procedure: cataract  Diagnosis/reason for consult: pre op exam    The proposed surgical procedure is considered LOW risk.    REVISED CARDIAC RISK INDEX  The patient has the following serious cardiovascular risks for perioperative  complications such as (MI, PE, VFib and 3  AV Block):  No serious cardiac risks  INTERPRETATION: 0 risks: Class I (very low risk - 0.4% complication rate)    The patient has the following additional risks for perioperative complications:  No identified additional risks      ICD-10-CM    1. Preop general physical exam Z01.818    2. RICKEY (obstructive sleep apnea) G47.33    3. CKD (chronic kidney disease) stage 3, GFR 30-59 ml/min N18.3 BASIC METABOLIC PANEL   4. Cough R05 Patient has an intermittent cough (not currently) that comes and goes; sometimes triggered by laughing.  Had previously seen allergy clinic and spirometry was normal.  Will send for a consult with Pulmonary.  Differential considered cough variant asthma, vocal cord dysfunction  PULMONARY MEDICINE REFERRAL for consultation   5. Chronic nonallergic rhinitis J31.0    6. Chronic gout involving toe without tophus, unspecified cause, unspecified laterality [id number] allopurinol (ZYLOPRIM) 100 MG tablet   7. Need for prophylactic vaccination and inoculation against influenza Z23 FLU VACCINE, INCREASED ANTIGEN, PRESV FREE, AGE 65+ [[id number]]     Vaccine Administration, Initial [[id number]]       RECOMMENDATIONS:     --Patient is to take all scheduled medications on the day of surgery EXCEPT for modifications listed below.    APPROVAL GIVEN to proceed with proposed procedure, without further diagnostic evaluation       Signed Electronically by: Sarah Vallejo NP    Copy of this evaluation report is provided to requesting physician.    [place] Preop Guidelines    Revised Cardiac Risk Index   Detail Level: Detailed Depth Of Biopsy: dermis Was A Bandage Applied: Yes Size Of Lesion In Cm: 0.3 X Size Of Lesion In Cm: 0 Biopsy Type: H and E Biopsy Method: Acu-Razor Anesthesia Type: 2% lidocaine without epinephrine Anesthesia Volume In Cc (Will Not Render If 0): 0.5 Hemostasis: John's Wound Care: Petrolatum Dressing: bandage Destruction After The Procedure: No Type Of Destruction Used: Curettage Curettage Text: The wound bed was treated with curettage after the biopsy was performed. Cryotherapy Text: The wound bed was treated with cryotherapy after the biopsy was performed. Electrodesiccation Text: The wound bed was treated with electrodesiccation after the biopsy was performed. Electrodesiccation And Curettage Text: The wound bed was treated with electrodesiccation and curettage after the biopsy was performed. Silver Nitrate Text: The wound bed was treated with silver nitrate after the biopsy was performed. Lab: 6 Lab Facility: 3 Path Notes (To The Dermatopathologist): Please check margins Consent: Written consent was obtained and risks were reviewed including but not limited to scarring, infection, bleeding, scabbing, incomplete removal, nerve damage and allergy to anesthesia. Post-Care Instructions: I reviewed with the patient in detail post-care instructions. Patient is to keep the biopsy site dry overnight, and then apply bacitracin or Vaseline twice daily until healed. Patient will be notified of biopsy result; however instructed to call the office in 2 weeks if not contacted. Notification Instructions: Patient will be notified of biopsy results. However, patient instructed to call the office if not contacted within 2 weeks. Billing Type: Third-Party Bill Information: Selecting Yes will display possible errors in your note based on the variables you have selected. This validation is only offered as a suggestion for you. PLEASE NOTE THAT THE VALIDATION TEXT WILL BE REMOVED WHEN YOU FINALIZE YOUR NOTE. IF YOU WANT TO FAX A PRELIMINARY NOTE YOU WILL NEED TO TOGGLE THIS TO 'NO' IF YOU DO NOT WANT IT IN YOUR FAXED NOTE. Size Of Lesion In Cm: 1 Biopsy Method: Acu-Razor

## 2022-04-21 RX ORDER — METOPROLOL SUCCINATE 25 MG/1
25 TABLET, EXTENDED RELEASE ORAL DAILY
Qty: 90 TABLET | Refills: 1 | Status: CANCELLED | OUTPATIENT
Start: 2022-04-21

## 2022-05-17 ENCOUNTER — TELEPHONE (OUTPATIENT)
Dept: FAMILY MEDICINE | Facility: CLINIC | Age: 72
End: 2022-05-17
Payer: MEDICARE

## 2022-05-17 DIAGNOSIS — J31.0 CHRONIC NONALLERGIC RHINITIS: Chronic | ICD-10-CM

## 2022-05-17 DIAGNOSIS — N18.30 STAGE 3 CHRONIC KIDNEY DISEASE, UNSPECIFIED WHETHER STAGE 3A OR 3B CKD (H): Primary | ICD-10-CM

## 2022-05-17 DIAGNOSIS — J30.2 SEASONAL ALLERGIC RHINITIS, UNSPECIFIED TRIGGER: ICD-10-CM

## 2022-05-17 DIAGNOSIS — G47.00 INSOMNIA, UNSPECIFIED TYPE: ICD-10-CM

## 2022-05-17 DIAGNOSIS — I25.10 CORONARY ARTERY DISEASE INVOLVING NATIVE CORONARY ARTERY OF NATIVE HEART WITHOUT ANGINA PECTORIS: ICD-10-CM

## 2022-05-17 DIAGNOSIS — R05.9 COUGH: ICD-10-CM

## 2022-05-17 DIAGNOSIS — I10 HYPERTENSION GOAL BP (BLOOD PRESSURE) < 140/90: Chronic | ICD-10-CM

## 2022-05-17 DIAGNOSIS — E03.4 HYPOTHYROIDISM DUE TO ACQUIRED ATROPHY OF THYROID: Chronic | ICD-10-CM

## 2022-05-17 DIAGNOSIS — F33.0 MAJOR DEPRESSIVE DISORDER, RECURRENT EPISODE, MILD DEGREE (H): Chronic | ICD-10-CM

## 2022-05-17 DIAGNOSIS — M1A.9XX0 CHRONIC GOUT INVOLVING TOE WITHOUT TOPHUS, UNSPECIFIED CAUSE, UNSPECIFIED LATERALITY: ICD-10-CM

## 2022-05-17 RX ORDER — LEVOTHYROXINE SODIUM 88 UG/1
TABLET ORAL
Qty: 90 TABLET | Refills: 0 | Status: SHIPPED | OUTPATIENT
Start: 2022-05-17 | End: 2022-08-16

## 2022-05-17 RX ORDER — COLCHICINE 0.6 MG/1
TABLET ORAL
Qty: 30 TABLET | Refills: 0 | Status: SHIPPED | OUTPATIENT
Start: 2022-05-17 | End: 2022-05-24

## 2022-05-17 RX ORDER — LEVALBUTEROL TARTRATE 45 UG/1
AEROSOL, METERED ORAL
Qty: 15 G | Refills: 0 | Status: SHIPPED | OUTPATIENT
Start: 2022-05-17 | End: 2022-06-15

## 2022-05-17 RX ORDER — ALLOPURINOL 100 MG/1
100 TABLET ORAL DAILY
Qty: 90 TABLET | Refills: 0 | Status: SHIPPED | OUTPATIENT
Start: 2022-05-17 | End: 2022-09-16

## 2022-05-17 RX ORDER — LOSARTAN POTASSIUM 100 MG/1
50 TABLET ORAL 2 TIMES DAILY
Qty: 90 TABLET | Refills: 0 | Status: SHIPPED | OUTPATIENT
Start: 2022-05-17 | End: 2022-09-01

## 2022-05-17 RX ORDER — VENLAFAXINE HYDROCHLORIDE 150 MG/1
CAPSULE, EXTENDED RELEASE ORAL
Qty: 180 CAPSULE | Refills: 0 | Status: SHIPPED | OUTPATIENT
Start: 2022-05-17 | End: 2022-09-01

## 2022-05-17 RX ORDER — SIMVASTATIN 40 MG
40 TABLET ORAL AT BEDTIME
Qty: 90 TABLET | Refills: 0 | Status: SHIPPED | OUTPATIENT
Start: 2022-05-17 | End: 2022-08-29

## 2022-05-17 RX ORDER — FLUTICASONE PROPIONATE 50 MCG
2 SPRAY, SUSPENSION (ML) NASAL DAILY
Qty: 48 G | Refills: 0 | Status: SHIPPED | OUTPATIENT
Start: 2022-05-17 | End: 2022-06-15

## 2022-05-17 RX ORDER — TRAZODONE HYDROCHLORIDE 50 MG/1
50-100 TABLET, FILM COATED ORAL
Qty: 180 TABLET | Refills: 0 | Status: SHIPPED | OUTPATIENT
Start: 2022-05-17 | End: 2022-09-01

## 2022-05-17 RX ORDER — MONTELUKAST SODIUM 10 MG/1
10 TABLET ORAL AT BEDTIME
Qty: 90 TABLET | Refills: 0 | Status: SHIPPED | OUTPATIENT
Start: 2022-05-17 | End: 2022-09-16

## 2022-05-17 RX ORDER — HYDROCHLOROTHIAZIDE 12.5 MG/1
12.5 TABLET ORAL EVERY MORNING
Qty: 90 TABLET | Refills: 0 | Status: SHIPPED | OUTPATIENT
Start: 2022-05-17 | End: 2022-09-01

## 2022-05-17 NOTE — TELEPHONE ENCOUNTER
Patient called is back from [facility] she is wondering if she needs a blood draw? She needs her meds changed back to [facility]  Phone [phone].  Also can they get the 2 nd booster shot. Please call and advise.  Enma Condon,

## 2022-05-17 NOTE — TELEPHONE ENCOUNTER
Refills sent and labs ordered.    I also recommend a virtual visit for lab review/med check after she gets the labs completed.    Sarah Vallejo, DNP, APRN, CNP

## 2022-05-17 NOTE — TELEPHONE ENCOUNTER
Routing to PCP    All RX pended for 90 days      Patient out of Thyroid and needs ASAP    Patient needing fills on all medications    Written 6 moth supply was given in November for trip to [facility].      Is patient in need of lab work?    Appears patient due for BMP and CBC?  Pended.  Anything more?    Labs last drawn 8/2021     When calling to schedule lab appointment, please schedule for Covid booster    RN called and spoke with patient and clarified all      Rn called and spoke with patient.      Fili Robles, RN, BSN, PHN  [facility]

## 2022-05-17 NOTE — TELEPHONE ENCOUNTER
Called patient and let her know meds have been sent and labs ordered.  Helped her schedule lab appointment as well as video visit for follow up      Savita Moraes RN

## 2022-05-18 ENCOUNTER — LAB (OUTPATIENT)
Dept: LAB | Facility: CLINIC | Age: 72
End: 2022-05-18

## 2022-05-18 ENCOUNTER — IMMUNIZATION (OUTPATIENT)
Dept: NURSING | Facility: CLINIC | Age: 72
End: 2022-05-18
Payer: MEDICARE

## 2022-05-18 DIAGNOSIS — Z23 NEED FOR VACCINATION: Primary | ICD-10-CM

## 2022-05-18 DIAGNOSIS — N18.30 STAGE 3 CHRONIC KIDNEY DISEASE, UNSPECIFIED WHETHER STAGE 3A OR 3B CKD (H): ICD-10-CM

## 2022-05-18 DIAGNOSIS — M1A.9XX0 CHRONIC GOUT INVOLVING TOE WITHOUT TOPHUS, UNSPECIFIED CAUSE, UNSPECIFIED LATERALITY: ICD-10-CM

## 2022-05-18 LAB
BASOPHILS # BLD AUTO: 0.1 10E3/UL (ref 0–0.2)
BASOPHILS NFR BLD AUTO: 1 %
EOSINOPHIL # BLD AUTO: 0.6 10E3/UL (ref 0–0.7)
EOSINOPHIL NFR BLD AUTO: 7 %
ERYTHROCYTE [DISTWIDTH] IN BLOOD BY AUTOMATED COUNT: 13.3 % (ref 10–15)
HCT VFR BLD AUTO: 44.9 % (ref 35–47)
HGB BLD-MCNC: 14.6 G/DL (ref 11.7–15.7)
LYMPHOCYTES # BLD AUTO: 2.2 10E3/UL (ref 0.8–5.3)
LYMPHOCYTES NFR BLD AUTO: 26 %
MCH RBC QN AUTO: 31.7 PG (ref 26.5–33)
MCHC RBC AUTO-ENTMCNC: 32.5 G/DL (ref 31.5–36.5)
MCV RBC AUTO: 98 FL (ref 78–100)
MONOCYTES # BLD AUTO: 0.8 10E3/UL (ref 0–1.3)
MONOCYTES NFR BLD AUTO: 9 %
NEUTROPHILS # BLD AUTO: 4.7 10E3/UL (ref 1.6–8.3)
NEUTROPHILS NFR BLD AUTO: 57 %
PLATELET # BLD AUTO: 237 10E3/UL (ref 150–450)
RBC # BLD AUTO: 4.6 10E6/UL (ref 3.8–5.2)
WBC # BLD AUTO: 8.3 10E3/UL (ref 4–11)

## 2022-05-18 PROCEDURE — 0054A COVID-19,PF,PFIZER (12+ YRS): CPT

## 2022-05-18 PROCEDURE — 84550 ASSAY OF BLOOD/URIC ACID: CPT

## 2022-05-18 PROCEDURE — 85025 COMPLETE CBC W/AUTO DIFF WBC: CPT

## 2022-05-18 PROCEDURE — 36415 COLL VENOUS BLD VENIPUNCTURE: CPT

## 2022-05-18 PROCEDURE — 99207 PR NO CHARGE NURSE ONLY: CPT

## 2022-05-18 PROCEDURE — 91305 COVID-19,PF,PFIZER (12+ YRS): CPT

## 2022-05-18 PROCEDURE — 80053 COMPREHEN METABOLIC PANEL: CPT

## 2022-05-19 LAB
ALBUMIN SERPL-MCNC: 3.8 G/DL (ref 3.4–5)
ALP SERPL-CCNC: 86 U/L (ref 40–150)
ALT SERPL W P-5'-P-CCNC: 40 U/L (ref 0–50)
ANION GAP SERPL CALCULATED.3IONS-SCNC: 5 MMOL/L (ref 3–14)
AST SERPL W P-5'-P-CCNC: 29 U/L (ref 0–45)
BILIRUB SERPL-MCNC: 0.3 MG/DL (ref 0.2–1.3)
BUN SERPL-MCNC: 37 MG/DL (ref 7–30)
CALCIUM SERPL-MCNC: 9.5 MG/DL (ref 8.5–10.1)
CHLORIDE BLD-SCNC: 104 MMOL/L (ref 94–109)
CO2 SERPL-SCNC: 29 MMOL/L (ref 20–32)
CREAT SERPL-MCNC: 1.14 MG/DL (ref 0.52–1.04)
GFR SERPL CREATININE-BSD FRML MDRD: 51 ML/MIN/1.73M2
GLUCOSE BLD-MCNC: 106 MG/DL (ref 70–99)
POTASSIUM BLD-SCNC: 4.5 MMOL/L (ref 3.4–5.3)
PROT SERPL-MCNC: 7.3 G/DL (ref 6.8–8.8)
SODIUM SERPL-SCNC: 138 MMOL/L (ref 133–144)
URATE SERPL-MCNC: 6.8 MG/DL (ref 2.6–6)

## 2022-05-24 ENCOUNTER — VIRTUAL VISIT (OUTPATIENT)
Dept: FAMILY MEDICINE | Facility: CLINIC | Age: 72
End: 2022-05-24
Payer: MEDICARE

## 2022-05-24 DIAGNOSIS — I10 HYPERTENSION GOAL BP (BLOOD PRESSURE) < 140/90: Chronic | ICD-10-CM

## 2022-05-24 DIAGNOSIS — E78.5 HYPERLIPIDEMIA LDL GOAL <130: Primary | Chronic | ICD-10-CM

## 2022-05-24 DIAGNOSIS — I25.118 CORONARY ARTERY DISEASE OF NATIVE ARTERY OF NATIVE HEART WITH STABLE ANGINA PECTORIS (H): ICD-10-CM

## 2022-05-24 DIAGNOSIS — M1A.9XX0 CHRONIC GOUT INVOLVING TOE WITHOUT TOPHUS, UNSPECIFIED CAUSE, UNSPECIFIED LATERALITY: ICD-10-CM

## 2022-05-24 DIAGNOSIS — F33.0 MAJOR DEPRESSIVE DISORDER, RECURRENT EPISODE, MILD DEGREE (H): ICD-10-CM

## 2022-05-24 DIAGNOSIS — N18.32 STAGE 3B CHRONIC KIDNEY DISEASE (H): ICD-10-CM

## 2022-05-24 DIAGNOSIS — E66.01 MORBID OBESITY (H): ICD-10-CM

## 2022-05-24 PROCEDURE — 99214 OFFICE O/P EST MOD 30 MIN: CPT | Mod: 95 | Performed by: NURSE PRACTITIONER

## 2022-05-24 ASSESSMENT — PATIENT HEALTH QUESTIONNAIRE - PHQ9: SUM OF ALL RESPONSES TO PHQ QUESTIONS 1-9: 4

## 2022-05-24 NOTE — PROGRESS NOTES
Abbey is a 72 year old who is being evaluated via a billable video visit.      How would you like to obtain your AVS? MyChart  If the video visit is dropped, the invitation should be resent by: Text to cell phone: [phone]  Will anyone else be joining your video visit? No      Video Start Time: 2:20 pm    Mail paper prescription for Colchicine    Assessment & Plan     Major depressive disorder, recurrent episode, mild degree (H)  Chronic, stable, continue current treatment.     Morbid obesity (H)  Known issue that I take into account for their medical decisions, no current exacerbations or new concerns.     Coronary artery disease of native artery of native heart with stable angina pectoris (H)  Known issue that I take into account for their medical decisions, no current exacerbations or new concerns.     Stage 3b chronic kidney disease (H)  Chronic, stable, continue current treatment.  Recent labs reviewed and kidney function stable.    Hyperlipidemia LDL goal <130  Chronic, stable, continue current treatment.     Hypertension goal BP (blood pressure) < 140/90  Chronic, stable, continue current treatment.     Chronic gout involving toe without tophus, unspecified cause, unspecified laterality  Recent uric acid level still elevated but improved from previous.  Insurance coverage for Colchicine too expensive- told patient about [employer] for discount and will plan to mail her paper prescription of the Colchicine             BMI:   Estimated body mass index is 35.9 kg/m  as calculated from the following:    Height as of 8/31/21: 1.524 m (5').    Weight as of 8/31/21: 83.4 kg (183 lb 12.8 oz).           Return in about 3 months (around 9/1/2022) for Physical Exam.    Sarah Vallejo NP  [facility]    Maria Isabel Potter is a 72 year old who presents for the following health issues     HPI    Hyperlipidemia Follow-Up      Are you regularly taking any medication or supplement to  lower your cholesterol?   Yes- statin    Are you having muscle aches or other side effects that you think could be caused by your cholesterol lowering medication?  No    Hypertension Follow-up      Do you check your blood pressure regularly outside of the clinic? No     Are you following a low salt diet? Yes    Are your blood pressures ever more than 140 on the top number (systolic) OR more   than 90 on the bottom number (diastolic), for example 140/90? N/A    Depression Followup    How are you doing with your depression since your last visit? Goes up and down but good for the most part    Are you having other symptoms that might be associated with depression? No    Have you had a significant life event?  No     Are you feeling anxious or having panic attacks?   Yes:  once in a while    Do you have any concerns with your use of alcohol or other drugs? No    Social History     Tobacco Use     Smoking status: Former Smoker     Packs/day: 0.10     Years: 1.00     Pack years: 0.10     Types: Cigarettes     Start date: 1969     Quit date: 10/12/1969     Years since quittin.6     Smokeless tobacco: Never Used     Tobacco comment: Smoked, very little, for 2 mo, 49 years ago.   Substance Use Topics     Alcohol use: Yes     Comment: Maybe 1 or 2 drinks a year     Drug use: No     PHQ 2020 10/23/2020 2022   PHQ-9 Total Score 1 17 4   Q9: Thoughts of better off dead/self-harm past 2 weeks Not at all Not at all Not at all     CHRISTIANO-7 SCORE 2019   Total Score - - -   Total Score 21 2 2   Total Score - - -       Suicide Assessment Five-step Evaluation and Treatment (SAFE-T)      How many servings of fruits and vegetables do you eat daily?  0-1    On average, how many sweetened beverages do you drink each day (Examples: soda, juice, sweet tea, etc.  Do NOT count diet or artificially sweetened beverages)?   0    How many days per week do you exercise enough to make your heart beat faster?  0    How many minutes a day do you exercise enough to make your heart beat faster? 0    How many days per week do you miss taking your medication? 0    Recent labs from 5/18/22 reviewed.    She did not  Colchicine because it was too expensive.  Reviewed [employer] to get discount and she will pursue this.    Review of Systems   Constitutional, HEENT, cardiovascular, pulmonary, gi and gu systems are negative, except as otherwise noted.      Objective           Vitals:  No vitals were obtained today due to virtual visit.    Physical Exam   GENERAL: Healthy, alert and no distress  EYES: Eyes grossly normal to inspection.  No discharge or erythema, or obvious scleral/conjunctival abnormalities.  RESP: No audible wheeze, cough, or visible cyanosis.  No visible retractions or increased work of breathing.    SKIN: Visible skin clear. No significant rash, abnormal pigmentation or lesions.  NEURO: Cranial nerves grossly intact.  Mentation and speech appropriate for age.  PSYCH: Mentation appears normal, affect normal/bright, judgement and insight intact, normal speech and appearance well-groomed.    Lab on 05/18/2022   Component Date Value Ref Range Status     Uric Acid 05/18/2022 6.8 (A) 2.6 - 6.0 mg/dL Final     Sodium 05/18/2022 138  133 - 144 mmol/L Final     Potassium 05/18/2022 4.5  3.4 - 5.3 mmol/L Final     Chloride 05/18/2022 104  94 - 109 mmol/L Final     Carbon Dioxide (CO2) 05/18/2022 29  20 - 32 mmol/L Final     Anion Gap 05/18/2022 5  3 - 14 mmol/L Final     Urea Nitrogen 05/18/2022 37 (A) 7 - 30 mg/dL Final     Creatinine 05/18/2022 1.14 (A) 0.52 - 1.04 mg/dL Final     Calcium 05/18/2022 9.5  8.5 - 10.1 mg/dL Final     Glucose 05/18/2022 106 (A) 70 - 99 mg/dL Final     Alkaline Phosphatase 05/18/2022 86  40 - 150 U/L Final     AST 05/18/2022 29  0 - 45 U/L Final     ALT 05/18/2022 40  0 - 50 U/L Final     Protein Total 05/18/2022 7.3  6.8 - 8.8 g/dL Final     Albumin 05/18/2022 3.8  3.4 - 5.0 g/dL Final      Bilirubin Total 05/18/2022 0.3  0.2 - 1.3 mg/dL Final     GFR Estimate 05/18/2022 51 (A) >60 mL/min/1.73m2 Final    Effective December 21, 2021 eGFRcr in adults is calculated using the 2021 CKD-EPI creatinine equation which includes age and gender (Aleksandr ferrara al., [place], DOI: 10.1056/HRXGoj1162705)     WBC Count 05/18/2022 8.3  4.0 - 11.0 10e3/uL Final     RBC Count 05/18/2022 4.60  3.80 - 5.20 10e6/uL Final     Hemoglobin 05/18/2022 14.6  11.7 - 15.7 g/dL Final     Hematocrit 05/18/2022 44.9  35.0 - 47.0 % Final     MCV 05/18/2022 98  78 - 100 fL Final     MCH 05/18/2022 31.7  26.5 - 33.0 pg Final     MCHC 05/18/2022 32.5  31.5 - 36.5 g/dL Final     RDW 05/18/2022 13.3  10.0 - 15.0 % Final     Platelet Count 05/18/2022 237  150 - 450 10e3/uL Final     % Neutrophils 05/18/2022 57  % Final     % Lymphocytes 05/18/2022 26  % Final     % Monocytes 05/18/2022 9  % Final     % Eosinophils 05/18/2022 7  % Final     % Basophils 05/18/2022 1  % Final     Absolute Neutrophils 05/18/2022 4.7  1.6 - 8.3 10e3/uL Final     Absolute Lymphocytes 05/18/2022 2.2  0.8 - 5.3 10e3/uL Final     Absolute Monocytes 05/18/2022 0.8  0.0 - 1.3 10e3/uL Final     Absolute Eosinophils 05/18/2022 0.6  0.0 - 0.7 10e3/uL Final     Absolute Basophils 05/18/2022 0.1  0.0 - 0.2 10e3/uL Final               Video-Visit Details    Type of service:  Video Visit    Video End Time:2:42 PM    Originating Location (pt. Location): Home    Distant Location (provider location):  [facility]     Platform used for Video Visit: [employer]

## 2022-05-25 RX ORDER — COLCHICINE 0.6 MG/1
TABLET ORAL
Qty: 30 TABLET | Refills: 0 | Status: SHIPPED | OUTPATIENT
Start: 2022-05-25 | End: 2022-10-04

## 2022-05-26 ENCOUNTER — TELEPHONE (OUTPATIENT)
Dept: CARDIOLOGY | Facility: CLINIC | Age: 72
End: 2022-05-26
Payer: MEDICARE

## 2022-05-26 NOTE — TELEPHONE ENCOUNTER
Called patient and scheduled her for July 15th in [place]; patient is aware of [place] location.    FANY Malhotra

## 2022-05-26 NOTE — TELEPHONE ENCOUNTER
[facility] Call Center    Phone Message    May a detailed message be left on voicemail: yes     Reason for Call: Order(s): Other:   Reason for requested: Needs echo order extended to 7/15/22  Date needed: 5/27/22  Provider name: Dr. Infante    Please call pt back to schedule. Thank you. Pt wants it scheduled for 7/15/22      Action Taken: Message routed to:  Other: [place]    Travel Screening: Not Applicable

## 2022-06-03 DIAGNOSIS — I10 HYPERTENSION GOAL BP (BLOOD PRESSURE) < 140/90: Chronic | ICD-10-CM

## 2022-06-03 NOTE — TELEPHONE ENCOUNTER
Routing refill request to provider for review/approval because:  Blood pressure not under 140/90 in past 12 months  BP Readings from Last 3 Encounters:   08/31/21 (!) 146/78   06/15/21 123/62   06/23/20 127/67     Savita Guo RN

## 2022-06-04 RX ORDER — METOPROLOL SUCCINATE 25 MG/1
25 TABLET, EXTENDED RELEASE ORAL DAILY
Qty: 90 TABLET | Refills: 1 | Status: SHIPPED | OUTPATIENT
Start: 2022-06-04 | End: 2022-09-16

## 2022-06-15 DIAGNOSIS — R05.9 COUGH: ICD-10-CM

## 2022-06-15 DIAGNOSIS — J30.2 SEASONAL ALLERGIC RHINITIS, UNSPECIFIED TRIGGER: ICD-10-CM

## 2022-06-15 RX ORDER — FLUTICASONE PROPIONATE 50 MCG
2 SPRAY, SUSPENSION (ML) NASAL DAILY
Qty: 48 G | Refills: 1 | Status: SHIPPED | OUTPATIENT
Start: 2022-06-15

## 2022-06-15 RX ORDER — LEVALBUTEROL TARTRATE 45 UG/1
AEROSOL, METERED ORAL
Qty: 15 G | Refills: 0 | Status: SHIPPED | OUTPATIENT
Start: 2022-06-15 | End: 2023-10-06

## 2022-06-15 NOTE — TELEPHONE ENCOUNTER
Routing refill request to provider for review/approval because:    Allergy/contraindication    Rashmi Hudson RN

## 2022-07-15 ENCOUNTER — TELEPHONE (OUTPATIENT)
Dept: FAMILY MEDICINE | Facility: CLINIC | Age: 72
End: 2022-07-15

## 2022-07-15 ENCOUNTER — ANCILLARY PROCEDURE (OUTPATIENT)
Dept: CARDIOLOGY | Facility: CLINIC | Age: 72
End: 2022-07-15
Attending: INTERNAL MEDICINE
Payer: MEDICARE

## 2022-07-15 DIAGNOSIS — I25.10 CORONARY ARTERY DISEASE INVOLVING NATIVE CORONARY ARTERY OF NATIVE HEART WITHOUT ANGINA PECTORIS: ICD-10-CM

## 2022-07-15 LAB — LVEF ECHO: NORMAL

## 2022-07-15 PROCEDURE — 93306 TTE W/DOPPLER COMPLETE: CPT | Performed by: INTERNAL MEDICINE

## 2022-07-19 ENCOUNTER — OFFICE VISIT (OUTPATIENT)
Dept: UROLOGY | Facility: CLINIC | Age: 72
End: 2022-07-19
Payer: MEDICARE

## 2022-07-19 ENCOUNTER — OFFICE VISIT (OUTPATIENT)
Dept: OPHTHALMOLOGY | Facility: CLINIC | Age: 72
End: 2022-07-19
Payer: MEDICARE

## 2022-07-19 DIAGNOSIS — Z01.00 EXAMINATION OF EYES AND VISION: Primary | ICD-10-CM

## 2022-07-19 DIAGNOSIS — H52.13 MYOPIA OF BOTH EYES WITH REGULAR ASTIGMATISM AND PRESBYOPIA: ICD-10-CM

## 2022-07-19 DIAGNOSIS — H43.813 PVD (POSTERIOR VITREOUS DETACHMENT), BOTH EYES: ICD-10-CM

## 2022-07-19 DIAGNOSIS — Z96.1 PSEUDOPHAKIA: ICD-10-CM

## 2022-07-19 DIAGNOSIS — H52.4 MYOPIA OF BOTH EYES WITH REGULAR ASTIGMATISM AND PRESBYOPIA: ICD-10-CM

## 2022-07-19 DIAGNOSIS — Z85.51 HISTORY OF BLADDER CANCER: Primary | ICD-10-CM

## 2022-07-19 DIAGNOSIS — H52.223 MYOPIA OF BOTH EYES WITH REGULAR ASTIGMATISM AND PRESBYOPIA: ICD-10-CM

## 2022-07-19 DIAGNOSIS — H40.003 GLAUCOMA SUSPECT OF BOTH EYES: ICD-10-CM

## 2022-07-19 PROCEDURE — 92014 COMPRE OPH EXAM EST PT 1/>: CPT | Performed by: STUDENT IN AN ORGANIZED HEALTH CARE EDUCATION/TRAINING PROGRAM

## 2022-07-19 PROCEDURE — 52000 CYSTOURETHROSCOPY: CPT | Performed by: UROLOGY

## 2022-07-19 PROCEDURE — 92015 DETERMINE REFRACTIVE STATE: CPT | Mod: GY | Performed by: STUDENT IN AN ORGANIZED HEALTH CARE EDUCATION/TRAINING PROGRAM

## 2022-07-19 ASSESSMENT — REFRACTION_WEARINGRX
OD_CYLINDER: SPHERE
OS_CYLINDER: SPHERE
OS_SPHERE: +3.00
SPECS_TYPE: OTC READERS
OD_SPHERE: +3.00

## 2022-07-19 ASSESSMENT — VISUAL ACUITY
METHOD: SNELLEN - LINEAR
OS_SC: 20/25
OS_SC+: -1
OD_SC: 20/25

## 2022-07-19 ASSESSMENT — TONOMETRY
OS_IOP_MMHG: 19
OD_IOP_MMHG: 17
IOP_METHOD: APPLANATION

## 2022-07-19 ASSESSMENT — REFRACTION_MANIFEST
OD_SPHERE: -0.25
OD_CYLINDER: +1.00
OS_SPHERE: -0.75
OS_AXIS: 003
OS_ADD: +2.75
OS_CYLINDER: +1.50
OD_ADD: +2.75
OD_AXIS: 175

## 2022-07-19 ASSESSMENT — CONF VISUAL FIELD
METHOD: COUNTING FINGERS
OD_NORMAL: 1
OS_NORMAL: 1

## 2022-07-19 ASSESSMENT — CUP TO DISC RATIO
OD_RATIO: 0.4
OS_RATIO: 0.65

## 2022-07-19 ASSESSMENT — EXTERNAL EXAM - RIGHT EYE: OD_EXAM: NORMAL

## 2022-07-19 ASSESSMENT — SLIT LAMP EXAM - LIDS
COMMENTS: NORMAL
COMMENTS: NORMAL

## 2022-07-19 ASSESSMENT — EXTERNAL EXAM - LEFT EYE: OS_EXAM: NORMAL

## 2022-07-19 NOTE — PROGRESS NOTES
" Current Eye Medications:  Visine (for dry eyes) Once in a while both eyes. Takes another drop for dry eyes, doesn't know the name uses as needed both eyes.     Subjective:  Complete eye exam. Vision is doing well both eyes in distance. Uses OTC +3.00 for near and that works well. Bright sun sometimes causes eyes to feel tired and weak for whole life. No eye pain or discomfort in either eye.      Objective:  See Ophthalmology Exam.      Assessment:  Abbey Omer is a 72 year old female who presents with:   Encounter Diagnoses   Name Primary?     Examination of eyes and vision Yes     Pseudophakia - Both Eyes      Glaucoma suspect of both eyes Intraocular pressure 17/19 with asymmetric C:D.      PVD (posterior vitreous detachment), both eyes      Myopia of both eyes with regular astigmatism and presbyopia        Plan:  Continue over the counter readers or can fill glasses prescription given     Use artificial tears up to four times a day (like Refresh Optive, Systane Balance, TheraTears, or generic artificial tears are ok. Avoid \"get the red out\" drops).     Return for glaucoma testing in 6 months    Ryne Pascal MD  [phone]      "

## 2022-07-19 NOTE — PATIENT INSTRUCTIONS
"Continue over the counter readers or can fill glasses prescription given     Use artificial tears up to four times a day (like Refresh Optive, Systane Balance, TheraTears, or generic artificial tears are ok. Avoid \"get the red out\" drops).     Return for glaucoma testing in 6 months    Ryne Pascal MD  [phone]    "

## 2022-07-19 NOTE — LETTER
"    7/19/2022         RE: Abbey Omer  [address] [place] [address]        Dear Colleague,    Thank you for referring your patient, Abbey Omer, to the [facility]. Please see a copy of my visit note below.     Current Eye Medications:  Visine (for dry eyes) Once in a while both eyes. Takes another drop for dry eyes, doesn't know the name uses as needed both eyes.     Subjective:  Complete eye exam. Vision is doing well both eyes in distance. Uses OTC +3.00 for near and that works well. Bright sun sometimes causes eyes to feel tired and weak for whole life. No eye pain or discomfort in either eye.      Objective:  See Ophthalmology Exam.      Assessment:  Abbey Omer is a 72 year old female who presents with:   Encounter Diagnoses   Name Primary?     Examination of eyes and vision Yes     Pseudophakia - Both Eyes      Glaucoma suspect of both eyes Intraocular pressure 17/19 with asymmetric C:D.      PVD (posterior vitreous detachment), both eyes      Myopia of both eyes with regular astigmatism and presbyopia        Plan:  Continue over the counter readers or can fill glasses prescription given     Use artificial tears up to four times a day (like Refresh Optive, Systane Balance, TheraTears, or generic artificial tears are ok. Avoid \"get the red out\" drops).     Return for glaucoma testing in 6 months    Ryne Pascal MD  [phone]          Again, thank you for allowing me to participate in the care of your patient.        Sincerely,        Ryne Pascal MD    "

## 2022-07-19 NOTE — PROGRESS NOTES
S: Abbey Omer is a 72 year old female returns for bladder cancer surveillance.    she has history of bladder cancer many years ago without any recurrence.    She received 0 courses of BCG therapy.    Patient is draped and prepped.  Flexible cystoscopy placed under direct vision.      Cysto:  The anterior urethra is normal     In the bladder there is normal mucosa.    Assessment/Plan:  (Z85.51) History of bladder cancer  (primary encounter diagnosis)  Comment: no recurrence for many years  Plan: f/u as needed.

## 2022-08-18 ENCOUNTER — TELEPHONE (OUTPATIENT)
Dept: CARDIOLOGY | Facility: CLINIC | Age: 72
End: 2022-08-18

## 2022-08-18 NOTE — TELEPHONE ENCOUNTER
[place] Community Measures Blood Pressure [place] reviewed.  Patients recent blood pressure is outside of guideline parameters.  Called pt to review, no answer.  Left voicemail message asking patient to check their blood pressure using a home blood pressure cuff or by going to a [facility].  Patient instructed to then call [phone] (Katya) and leave a message with their name, date of birth, and blood pressure reading that was completed within the last 24 hours and where it was completed.  Will await call back for further review.    INDIA Cheng

## 2022-08-29 DIAGNOSIS — I25.10 CORONARY ARTERY DISEASE INVOLVING NATIVE CORONARY ARTERY OF NATIVE HEART WITHOUT ANGINA PECTORIS: ICD-10-CM

## 2022-08-31 DIAGNOSIS — F33.0 MAJOR DEPRESSIVE DISORDER, RECURRENT EPISODE, MILD DEGREE (H): Chronic | ICD-10-CM

## 2022-08-31 DIAGNOSIS — G47.00 INSOMNIA, UNSPECIFIED TYPE: ICD-10-CM

## 2022-08-31 DIAGNOSIS — I10 HYPERTENSION GOAL BP (BLOOD PRESSURE) < 140/90: Chronic | ICD-10-CM

## 2022-08-31 RX ORDER — SIMVASTATIN 40 MG
40 TABLET ORAL AT BEDTIME
Qty: 90 TABLET | Refills: 0 | Status: SHIPPED | OUTPATIENT
Start: 2022-08-31 | End: 2022-10-04

## 2022-09-02 RX ORDER — LOSARTAN POTASSIUM 100 MG/1
50 TABLET ORAL 2 TIMES DAILY
Qty: 90 TABLET | Refills: 0 | Status: SHIPPED | OUTPATIENT
Start: 2022-09-02 | End: 2022-09-29

## 2022-09-02 RX ORDER — VENLAFAXINE HYDROCHLORIDE 150 MG/1
CAPSULE, EXTENDED RELEASE ORAL
Qty: 180 CAPSULE | Refills: 0 | Status: SHIPPED | OUTPATIENT
Start: 2022-09-02 | End: 2022-10-04

## 2022-09-02 RX ORDER — TRAZODONE HYDROCHLORIDE 50 MG/1
50-100 TABLET, FILM COATED ORAL
Qty: 180 TABLET | Refills: 0 | Status: SHIPPED | OUTPATIENT
Start: 2022-09-02 | End: 2022-10-04

## 2022-09-02 RX ORDER — HYDROCHLOROTHIAZIDE 12.5 MG/1
12.5 TABLET ORAL EVERY MORNING
Qty: 90 TABLET | Refills: 0 | Status: SHIPPED | OUTPATIENT
Start: 2022-09-02 | End: 2022-09-29

## 2022-09-02 NOTE — TELEPHONE ENCOUNTER
"Routing refill request to provider for review/approval because:  Labs out of range:    Labs not current:    Patient needs to be seen because:      Medication pended for approval, 30 day supply with reminder.     Requested Prescriptions   Pending Prescriptions Disp Refills     losartan (COZAAR) 100 MG tablet 30 tablet 0     Sig: Take 0.5 tablets (50 mg) by mouth 2 times daily       Angiotensin-II Receptors Failed - 9/1/2022  9:14 PM        Failed - Last blood pressure under 140/90 in past 12 months     BP Readings from Last 3 Encounters:   08/31/21 (!) 146/78   06/15/21 123/62   06/23/20 127/67                 Failed - Normal serum creatinine on file in past 12 months     Recent Labs   Lab Test 05/18/22  1157 02/13/19  1413 02/01/19  1303   CR 1.14*   < >  --    CREAT  --   --  1.1*    < > = values in this interval not displayed.       Ok to refill medication if creatinine is low          Passed - Recent (12 mo) or future (30 days) visit within the authorizing provider's specialty     Patient has had an office visit with the authorizing provider or a provider within the authorizing providers department within the previous 12 mos or has a future within next 30 days. See \"Patient Info\" tab in inbasket, or \"Choose Columns\" in Meds & Orders section of the refill encounter.              Passed - Medication is active on med list        Passed - Patient is age 18 or older        Passed - No active pregnancy on record        Passed - Normal serum potassium on file in past 12 months     Recent Labs   Lab Test 05/18/22  1157   POTASSIUM 4.5                    Passed - No positive pregnancy test in past 12 months           traZODone (DESYREL) 50 MG tablet 60 tablet 0     Sig: Take 1-2 tablets ( mg) by mouth nightly as needed for sleep       Serotonin Modulators Passed - 9/1/2022  9:14 PM        Passed - Recent (12 mo) or future (30 days) visit within the authorizing provider's specialty     Patient has had an office visit " "with the authorizing provider or a provider within the authorizing providers department within the previous 12 mos or has a future within next 30 days. See \"Patient Info\" tab in inbasket, or \"Choose Columns\" in Meds & Orders section of the refill encounter.              Passed - Medication is active on med list        Passed - Patient is age 18 or older        Passed - No active pregnancy on record        Passed - No positive pregnancy test in past 12 months           venlafaxine (EFFEXOR XR) 150 MG 24 hr capsule 60 capsule 0     Si Capsules by mouth daily       Serotonin-Norepinephrine Reuptake Inhibitors  Failed - 2022  9:14 PM        Failed - Blood pressure under 140/90 in past 12 months     BP Readings from Last 3 Encounters:   21 (!) 146/78   06/15/21 123/62   20 127/67                 Failed - Normal serum creatinine on file in past 12 months     Recent Labs   Lab Test 22  1157 19  1413 19  1303   CR 1.14*   < >  --    CREAT  --   --  1.1*    < > = values in this interval not displayed.       Ok to refill medication if creatinine is low          Passed - PHQ-9 score of less than 5 in past 6 months     Please review last PHQ-9 score.           Passed - Medication is active on med list        Passed - Patient is age 18 or older        Passed - No active pregnancy on record        Passed - No positive pregnancy test in past 12 months        Passed - Recent (6 mo) or future (30 days) visit within the authorizing provider's specialty     Patient had office visit in the last 6 months or has a visit in the next 30 days with authorizing provider or within the authorizing provider's specialty.  See \"Patient Info\" tab in inbasket, or \"Choose Columns\" in Meds & Orders section of the refill encounter.               hydrochlorothiazide (HYDRODIURIL) 12.5 MG tablet 30 tablet 0     Sig: Take 1 tablet (12.5 mg) by mouth every morning       Diuretics (Including Combos) Protocol Failed - " "9/1/2022  9:14 PM        Failed - Blood pressure under 140/90 in past 12 months     BP Readings from Last 3 Encounters:   08/31/21 (!) 146/78   06/15/21 123/62   06/23/20 127/67                 Failed - Normal serum creatinine on file in past 12 months     Recent Labs   Lab Test 05/18/22  1157   CR 1.14*              Passed - Recent (12 mo) or future (30 days) visit within the authorizing provider's specialty     Patient has had an office visit with the authorizing provider or a provider within the authorizing providers department within the previous 12 mos or has a future within next 30 days. See \"Patient Info\" tab in inbasket, or \"Choose Columns\" in Meds & Orders section of the refill encounter.              Passed - Medication is active on med list        Passed - Patient is age 18 or older        Passed - No active pregancy on record        Passed - Normal serum potassium on file in past 12 months     Recent Labs   Lab Test 05/18/22  1157   POTASSIUM 4.5                    Passed - Normal serum sodium on file in past 12 months     Recent Labs   Lab Test 05/18/22  1157                 Passed - No positive pregnancy test in past 12 months                   "

## 2022-09-02 NOTE — TELEPHONE ENCOUNTER
Cori refill sent.    Please contact patient to schedule annual physical/labs.    Sarah Vallejo, LUIS, APRN, CNP

## 2022-09-07 ENCOUNTER — TELEPHONE (OUTPATIENT)
Dept: FAMILY MEDICINE | Facility: CLINIC | Age: 72
End: 2022-09-07

## 2022-09-07 NOTE — TELEPHONE ENCOUNTER
Per provider, inform patient of need for annual physical exam due.     My-chart message sent.      Svitlana Recinos CMA

## 2022-09-23 NOTE — TELEPHONE ENCOUNTER
Patient called, scheduled a physical for   Oct. 4, 2022 will arrive at 7:10am.    Svitlana Recinos CMA

## 2022-09-27 ENCOUNTER — OFFICE VISIT (OUTPATIENT)
Dept: OPHTHALMOLOGY | Facility: CLINIC | Age: 72
End: 2022-09-27
Payer: MEDICARE

## 2022-09-27 DIAGNOSIS — H16.223 KERATOCONJUNCTIVITIS SICCA OF BOTH EYES NOT SPECIFIED AS SJOGREN'S: ICD-10-CM

## 2022-09-27 DIAGNOSIS — Z96.1 PSEUDOPHAKIA: ICD-10-CM

## 2022-09-27 DIAGNOSIS — H40.003 GLAUCOMA SUSPECT OF BOTH EYES: Primary | ICD-10-CM

## 2022-09-27 DIAGNOSIS — H43.813 PVD (POSTERIOR VITREOUS DETACHMENT), BOTH EYES: ICD-10-CM

## 2022-09-27 PROCEDURE — 92133 CPTRZD OPH DX IMG PST SGM ON: CPT | Performed by: STUDENT IN AN ORGANIZED HEALTH CARE EDUCATION/TRAINING PROGRAM

## 2022-09-27 PROCEDURE — 92012 INTRM OPH EXAM EST PATIENT: CPT | Performed by: STUDENT IN AN ORGANIZED HEALTH CARE EDUCATION/TRAINING PROGRAM

## 2022-09-27 PROCEDURE — 92083 EXTENDED VISUAL FIELD XM: CPT | Performed by: STUDENT IN AN ORGANIZED HEALTH CARE EDUCATION/TRAINING PROGRAM

## 2022-09-27 RX ORDER — CYCLOSPORINE 0.5 MG/ML
1 EMULSION OPHTHALMIC 2 TIMES DAILY
Qty: 60 EACH | Refills: 11 | Status: SHIPPED | OUTPATIENT
Start: 2022-09-27 | End: 2022-10-03

## 2022-09-27 ASSESSMENT — TONOMETRY
OS_IOP_MMHG: 19
OD_IOP_MMHG: 18
IOP_METHOD: APPLANATION

## 2022-09-27 ASSESSMENT — REFRACTION_WEARINGRX
OD_CYLINDER: SPHERE
OS_CYLINDER: SPHERE
OS_SPHERE: +2.75
OD_SPHERE: +2.75
SPECS_TYPE: OTC READERS

## 2022-09-27 ASSESSMENT — VISUAL ACUITY
OD_SC+: -2
METHOD: SNELLEN - LINEAR
OS_SC: 20/25
CORRECTION_TYPE: GLASSES
OS_SC+: -2
OD_SC: 20/25

## 2022-09-27 ASSESSMENT — CUP TO DISC RATIO
OD_RATIO: 0.4
OS_RATIO: 0.65

## 2022-09-27 ASSESSMENT — EXTERNAL EXAM - RIGHT EYE: OD_EXAM: NORMAL

## 2022-09-27 ASSESSMENT — SLIT LAMP EXAM - LIDS
COMMENTS: NORMAL
COMMENTS: NORMAL

## 2022-09-27 ASSESSMENT — EXTERNAL EXAM - LEFT EYE: OS_EXAM: NORMAL

## 2022-09-27 NOTE — PATIENT INSTRUCTIONS
"Continue artificial tears up to four times a day as needed (Refresh Optive, Systane Balance, TheraTears, or generic artificial tears are ok. Avoid \"get the red out\" drops).     Ok to try Restasis twice a day in both eyes     Ryne Pascal MD  [phone]    "

## 2022-09-27 NOTE — PROGRESS NOTES
" Current Eye Medications:  Visine Once prn both eyes. Systane BID both eyes      Subjective:here for IOP/HVF and OCT for glaucoma suspect. Eyes are watering all the time, wondering if you can prescribe Restasis for her. (with wife Maggie today)     Objective:  See Ophthalmology Exam.       Assessment:  Abbey Omer is a 72 year old female who presents with:   Encounter Diagnoses   Name Primary?     Glaucoma suspect of both eyes Yes    Intraocular pressure 18/19 today with normal OCT both eyes. Continue monitoring.    OCT optic nerve: avg retinal nerve fiber layer 88/89; within normal limits both eyes.     Mercado visual field (HVF) 24-2: scattered loss L>R     Pseudophakia - Both Eyes      PVD (posterior vitreous detachment), both eyes      Keratoconjunctivitis sicca of both eyes not specified as Sjogren's        Plan:  Continue artificial tears up to four times a day as needed (Refresh Optive, Systane Balance, TheraTears, or generic artificial tears are ok. Avoid \"get the red out\" drops).     Ok to try Restasis twice a day in both eyes     Ryne Pascal MD  [phone]      "

## 2022-09-27 NOTE — LETTER
"    9/27/2022         RE: Abbey Omer  [address] [facility] [address]        Dear Colleague,    Thank you for referring your patient, Abbey Omer, to the [facility]. Please see a copy of my visit note below.     Current Eye Medications:  Visine Once prn both eyes. Systane BID both eyes      Subjective:here for IOP/HVF and OCT for glaucoma suspect. Eyes are watering all the time, wondering if you can prescribe Restasis for her. (with wife Maggie today)     Objective:  See Ophthalmology Exam.       Assessment:  Abbey Omer is a 72 year old female who presents with:   Encounter Diagnoses   Name Primary?     Glaucoma suspect of both eyes Yes    Intraocular pressure 18/19 today with normal OCT both eyes. Continue monitoring.    OCT optic nerve: avg retinal nerve fiber layer 88/89; within normal limits both eyes.     Mercado visual field (HVF) 24-2: scattered loss L>R     Pseudophakia - Both Eyes      PVD (posterior vitreous detachment), both eyes      Keratoconjunctivitis sicca of both eyes not specified as Sjogren's        Plan:  Continue artificial tears up to four times a day as needed (Refresh Optive, Systane Balance, TheraTears, or generic artificial tears are ok. Avoid \"get the red out\" drops).     Ok to try Restasis twice a day in both eyes     Ryne Pascal MD  [phone]          Again, thank you for allowing me to participate in the care of your patient.        Sincerely,        Ryne Pascal MD    "

## 2022-09-29 ENCOUNTER — HOSPITAL ENCOUNTER (OUTPATIENT)
Dept: CARDIOLOGY | Facility: CLINIC | Age: 72
Discharge: HOME | End: 2022-09-29
Attending: INTERNAL MEDICINE | Admitting: INTERNAL MEDICINE
Payer: MEDICARE

## 2022-09-29 ENCOUNTER — OFFICE VISIT (OUTPATIENT)
Dept: CARDIOLOGY | Facility: CLINIC | Age: 72
End: 2022-09-29
Payer: MEDICARE

## 2022-09-29 VITALS
WEIGHT: 180 LBS | HEIGHT: 60 IN | OXYGEN SATURATION: 94 % | SYSTOLIC BLOOD PRESSURE: 130 MMHG | BODY MASS INDEX: 35.34 KG/M2 | HEART RATE: 62 BPM | DIASTOLIC BLOOD PRESSURE: 80 MMHG

## 2022-09-29 DIAGNOSIS — I10 HYPERTENSION GOAL BP (BLOOD PRESSURE) < 140/90: Chronic | ICD-10-CM

## 2022-09-29 DIAGNOSIS — I25.10 CORONARY ARTERY DISEASE INVOLVING NATIVE CORONARY ARTERY OF NATIVE HEART WITHOUT ANGINA PECTORIS: ICD-10-CM

## 2022-09-29 PROCEDURE — 93010 ELECTROCARDIOGRAM REPORT: CPT | Performed by: INTERNAL MEDICINE

## 2022-09-29 PROCEDURE — 99213 OFFICE O/P EST LOW 20 MIN: CPT | Performed by: INTERNAL MEDICINE

## 2022-09-29 PROCEDURE — 93005 ELECTROCARDIOGRAM TRACING: CPT | Performed by: REHABILITATION PRACTITIONER

## 2022-09-29 RX ORDER — METOPROLOL SUCCINATE 25 MG/1
25 TABLET, EXTENDED RELEASE ORAL DAILY
Qty: 90 TABLET | Refills: 4 | Status: SHIPPED | OUTPATIENT
Start: 2022-09-29 | End: 2023-10-06

## 2022-09-29 RX ORDER — LOSARTAN POTASSIUM 100 MG/1
50 TABLET ORAL 2 TIMES DAILY
Qty: 90 TABLET | Refills: 4 | Status: SHIPPED | OUTPATIENT
Start: 2022-09-29 | End: 2023-01-04

## 2022-09-29 RX ORDER — HYDROCHLOROTHIAZIDE 12.5 MG/1
12.5 TABLET ORAL EVERY MORNING
Qty: 90 TABLET | Refills: 4 | Status: SHIPPED | OUTPATIENT
Start: 2022-09-29 | End: 2023-10-06

## 2022-09-29 NOTE — PATIENT INSTRUCTIONS
EKG with every visit   Check pulse with blood pressure cuff if ever any symptoms of BIFASCICULAR BLOCK (slowed conduction of electricity)   Follow up in one year

## 2022-09-29 NOTE — LETTER
9/29/2022    Sarah Vallejo, NP  [address] [facility] [address]    RE: Abbey Omer       Dear Colleague,     I had the pleasure of seeing Abbey IRWIN Nuñezs in the [facility].        HPI:       This is a pleasant 72 year old patient with PMH HTN, CAD s/p PCI, HLD, and syncope in setting of dehydration here for follow up with me. I initially saw her first in setting of RBBB discovered on EKG.      Prior History:      She has no david syncope history or bradycardia that she knows about. No LOC in past or unexplained falls. Has a h/o migranes which seem well controlled. Only had an episode in [employer] once of dizziness leading to diaphoresis and passing out for which she was sent to hospital and improved with fluids. She was told her neck veins by ultrasound were very small. However she had bifasicular block on EKG.     BP has been overall well controlled and is being managed by PCP. On ROS she had some atypical chest pain at rest. Does not heavily exert herself and is not involved in an exercise regimen. Her CP is better with burping. However on climbing stairs she fatigues easy and gets short of breath. She feels her legs are swollen. No varicose veins, ulcerations, claudication. No orthopnea or PND.      We obtained a coronary CT and suprisingly found severe stenoses of the LCX and RCA with moderate Ramus and LAD stenoses (FFR 0.86). She underwent coronary angiogram where she had staged PCI with SHELBI - first to the OM/LCX and then to the prox-mid RCA by radial approach 2/2019-3/2019.      She completed cardiac rehab. We stopped ticagrelor and continued aspirin at one year. Statin was initiated and lipids under controlled. She remains on hydrochlorothiazide, losartan, metoprolol. Denies dizziness or lightheadedness.    Living in [facility] now. Drove up to [place] to see me today. She is [place] to drive out west with her partner. She is overall without any symptoms. Her heart  rates have remained in control and no bradycardia noted. No syncope.     ASSESSMENT/PLAN:     #CAD - s/p PCI, asymptomatic. Doing well. Will continue aspirin 81 mg daily, statin, beta blocker. Echocardiogram reviewed and she has normal LV function.   #HTN - controlled, continue hydrochlorothiazide, losartan, metoprolol  #HLD - controlled, lipid panel per PCP. Continue simvastatin. Goal LDL < 70 mg/dL. Lipids are UTD.  #EKG today for bifasicular block (RBBB, LAFB) - will consider ziopatch in future to make sure no pauses, EKG with every follow up visit     One year follow up with me.      Ibeth Infante MD MSC  [facility]          PAST MEDICAL HISTORY  Past Medical History:   Diagnosis Date     Cancer of bladder (H)      Cataract 11/25/2011     Chronic nonallergic rhinitis 8/31/10 RAST     CKD (chronic kidney disease) stage 3, GFR 30-59 ml/min (H) 12/20/2010     Coronary artery disease involving native coronary artery of native heart without angina pectoris 3/1/2019     DDD (degenerative disc disease), cervical 6/16/2011     Dependent edema      Depression, major      Depressive disorder In 2002 or 2003     Glaucoma suspect, bilateral      History of blood transfusion ?     HTN (hypertension)      Hyperlipidemia      Hypothyroidism 10/12/2009     Migraine     resolved     Nasal septal deviation 8/11/2009     Obesity 5/17/2011     RICKEY (obstructive sleep apnea)      Osteoarthritis of shoulder region      Osteoporosis      Premature menopause      Renal cyst        CURRENT MEDICATIONS  Current Outpatient Medications   Medication Sig Dispense Refill     allopurinol (ZYLOPRIM) 100 MG tablet Take 1 tablet (100 mg) by mouth daily 90 tablet 0     aspirin (ASA) 81 MG EC tablet Take 81 mg by mouth daily       B Complex Vitamins (VITAMIN B COMPLEX PO) Take 1,250 mg by mouth        colchicine (COLCRYS) 0.6 MG tablet Take 0.6 mg by mouth 3 times daily on the first day of gout flare, then take 0.6 mg once or twice  daily until flare resolves 30 tablet 0     cycloSPORINE (RESTASIS) 0.05 % ophthalmic emulsion Place 1 drop into both eyes 2 times daily 60 each 11     EXCEDRIN TENSION HEADACHE 500-65 MG PO TABS 1 tablet twice daily as needed       Flaxseed, Linseed, (FLAXSEED OIL PO) Take 1 Tablespoonful by mouth daily       fluticasone (FLONASE) 50 MCG/ACT nasal spray Spray 2 sprays into both nostrils daily 48 g 1     hydrochlorothiazide (HYDRODIURIL) 12.5 MG tablet Take 1 tablet (12.5 mg) by mouth every morning 90 tablet 0     levalbuterol (XOPENEX HFA) 45 MCG/ACT inhaler INHALE 2 PUFFS INTO THE LUNGS EVERY 6 HOURS AS NEEDED FOR SHORTNESS OF BREATH OR DIFFICULT BREATHING OR WHEEZING 15 g 0     levothyroxine (SYNTHROID/LEVOTHROID) 88 MCG tablet TAKE 1 TABLET(88 MCG) BY MOUTH DAILY 90 tablet 0     losartan (COZAAR) 100 MG tablet Take 0.5 tablets (50 mg) by mouth 2 times daily 90 tablet 0     metoprolol succinate ER (TOPROL XL) 25 MG 24 hr tablet Take 1 tablet (25 mg) by mouth daily 90 tablet 0     montelukast (SINGULAIR) 10 MG tablet Take 1 tablet (10 mg) by mouth At Bedtime 90 tablet 0     polyethylene glycol-propylene glycol (SYSTANE ULTRA) 0.4-0.3 % SOLN ophthalmic solution Place 1 drop into both eyes 2 times daily       simvastatin (ZOCOR) 40 MG tablet Take 1 tablet (40 mg) by mouth At Bedtime +++NEED APPT/FASTING labs+++ 90 tablet 0     traZODone (DESYREL) 50 MG tablet Take 1-2 tablets ( mg) by mouth nightly as needed for sleep 180 tablet 0     venlafaxine (EFFEXOR XR) 150 MG 24 hr capsule 2 Capsules by mouth daily 180 capsule 0     zinc 50 MG TABS Take 1 tablet by mouth daily         PAST SURGICAL HISTORY:  Past Surgical History:   Procedure Laterality Date     ARTHROPLASTY SHOULDER Right 11/01/2017    Right total shoulder arthroplasty by Dr. Go at      ARTHROPLASTY SHOULDER Left 03/28/2018    Left total shoulder arthroplasty by Dr. Go at      CATARACT  IOL, RT/LT       CHOLECYSTECTOMY, LAPOROSCOPIC       COLONOSCOPY  ?    Upper & Lower     CV CORONARY ANGIOGRAM  02/22/2019     CV CORONARY ANGIOGRAM N/A 02/22/2019    Procedure: 1330 CORS;  Surgeon: Marito Arriaga MD;  Location:  HEART CARDIAC CATH LAB     CV HEART CATHETERIZATION WITH POSSIBLE INTERVENTION N/A 03/18/2019    Procedure: STAGED PCI-JALEN CARABALLO;  Surgeon: Zeus Cordero MD;  Location:  HEART CARDIAC CATH LAB     CYSTOSCOPY      bladder cancer     D & C       ENT SURGERY  first 1967/second ?    2 Rhynoplasties     HYSTERECTOMY, PAP NO LONGER INDICATED      BSO     NOSE SURGERY      septum deviation     PHACOEMULSIFICATION CLEAR CORNEA WITH STANDARD INTRAOCULAR LENS IMPLANT Left 09/24/2018    Procedure: PHACOEMULSIFICATION CLEAR CORNEA WITH STANDARD INTRAOCULAR LENS IMPLANT;  LEFT EYE PHACOEMULSIFICATION CLEAR CORNEA WITH STANDARD INTRAOCULAR LENS IMPLANT;  Surgeon: Ryne Pascal MD;  Location:  [facility]     PHACOEMULSIFICATION WITH STANDARD INTRAOCULAR LENS IMPLANT Right 10/15/2018    Procedure: PHACOEMULSIFICATION WITH STANDARD INTRAOCULAR LENS IMPLANT ;  Surgeon: Ryne Pascal MD;  Location: [facility] OR       ALLERGIES     Allergies   Allergen Reactions     Cymbalta      Visual hallucinations     Zolmitriptan Other (See Comments) and Dizziness     Can't wake up  Cant wake up     Aluminum Hydroxide Nausea     Atenolol Other (See Comments)     Fatigue  Fatigue     Codeine Nausea and Vomiting and Nausea     abd pain     Colestid [Colestipol Hcl]      Colestipol Unknown     Other Drug Allergy (See Comments) Other (See Comments)     Joint pain     Potassium Nausea     Albuterol Other (See Comments)     Very jittery and couldn't tolerate  Very jittery and couldn't tolerate     Amlodipine Other (See Comments) and Swelling     Swelling- lower legs     Asa [Aspirin] GI Disturbance     Atenolol Fatigue     Atorvastatin Other (See Comments)     Swelling legs  Swelling legs     Bupropion Unknown      Codeine Nausea     Crestor [Hmg-Coa-R Inhibitors] Other (See Comments)     Joint pain     Ezetimibe Other (See Comments)     Fatigue     Hydralazine Other (See Comments) and Fatigue     Fatigue     Lisinopril Cough     Lovastatin Other (See Comments) and Cramps     Cramps     Niacin      Unknown by patient  Other reaction(s): *Unknown  Unknown by patient     Paroxetine Other (See Comments)     Fatigue     Paxil [Paroxetine] Fatigue     Potassium GI Disturbance     Sulfa Drugs Fatigue and Nausea and Vomiting     Sulfasalazine Other (See Comments)     Triamterene Unknown     Other reaction(s): *Unknown       FAMILY HISTORY  Family History   Problem Relation Age of Onset     Diabetes Maternal Grandfather      Hypertension Maternal Grandfather      Diabetes Son      Heart Disease Father         CHF     Diabetes Mother      Depression Mother      Hyperlipidemia Mother      Anxiety Disorder Mother      Asthma Mother      Obesity Mother      Substance Abuse Brother         Not any longer     Diabetes Brother      Depression Daughter      Diabetes Maternal Uncle      Cerebrovascular Disease Maternal Uncle      Diabetes Son         after accident     Depression Daughter      Depression Son         after accident     Substance Abuse Brother         Not any longer     Diabetes Sister      Substance Abuse Sister      Diabetes Brother      Thyroid Disease No family hx of      Glaucoma No family hx of      Macular Degeneration No family hx of      Cancer No family hx of          SOCIAL HISTORY  Social History     Socioeconomic History     Marital status:      Spouse name: Maggie     Number of children: 2     Years of education: 12     Highest education level: Not on file   Occupational History     Occupation: collections for [employer]     Employer: [employer]   Tobacco Use     Smoking status: Former Smoker     Packs/day: 0.10     Years: 1.00     Pack years: 0.10     Types: Cigarettes     Start date: 8/12/1969      Quit date: 10/12/1969     Years since quittin.0     Smokeless tobacco: Never Used     Tobacco comment: Smoked, very little, for 2 mo, 49 years ago.   Substance and Sexual Activity     Alcohol use: Yes     Comment: Maybe 1 or 2 drinks a year     Drug use: No     Sexual activity: Not Currently     Partners: Female     Birth control/protection: Post-menopausal   Other Topics Concern     Parent/sibling w/ CABG, MI or angioplasty before 65F 55M? No   Social History Narrative    Son  2012.    Wife, Maggie     Social Determinants of Health     Financial Resource Strain: Not on file   Food Insecurity: Not on file   Transportation Needs: Not on file   Physical Activity: Not on file   Stress: Not on file   Social Connections: Not on file   Intimate Partner Violence: Not on file   Housing Stability: Not on file       ROS:   Constitutional: No fever, chills, or sweats. No weight gain/loss   ENT: No visual disturbance, ear ache, epistaxis, sore throat  Allergies/Immunologic: Negative  Respiratory: No cough, hemoptysia  Cardiovascular: As per HPI  GI: No nausea, vomiting, hematemesis, melena, or hematochezia  : No urinary frequency, dysuria, or hematuria  Integument: Negative  Psychiatric: Negative  Neuro: Negative  Endocrinology: Negative   Musculoskeletal: Negative  Vascular: No walking impairment, claudication, ischemic rest pain or nonhealing wounds    EXAM:  /80 (BP Location: Right arm, Patient Position: Sitting, Cuff Size: Adult Regular)   Pulse 62   Ht 1.524 m (5')   Wt 81.6 kg (180 lb)   SpO2 94%   BMI 35.15 kg/m    In general, the patient is a pleasant female in no apparent distress.    HEENT: NC/AT.  PERRLA.  EOMI.  Sclerae white, not injected.  Nares clear.  Pharynx without erythema or exudate.  Dentition intact.    Neck: No adenopathy.  No thyromegaly. Carotids +2/2 bilaterally without bruits.  No jugular venous distension.   Heart: RRR. Normal S1, S2 splits physiologically. No murmur, rub,  click, or gallop. The PMI is in the 5th ICS in the midclavicular line. There is no heave.    Lungs: CTA.  No ronchi, wheezes, rales.  No dullness to percussion.   Abdomen: Soft, nontender, nondistended. No organomegaly. No AAA.  No bruits.   Extremities: No clubbing, cyanosis, or edema.    Vascular: No bruits are noted.    Labs:  LIPID RESULTS:  Lab Results   Component Value Date    CHOL 138 08/31/2021    CHOL 110 11/09/2020    HDL 49 (L) 08/31/2021    HDL 49 (L) 11/09/2020    LDL 49 08/31/2021    LDL 26 11/09/2020    TRIG 199 (H) 08/31/2021    TRIG 177 (H) 11/09/2020    CHOLHDLRATIO 4.7 05/04/2015    NHDL 89 08/31/2021    NHDL 61 11/09/2020       LIVER ENZYME RESULTS:  Lab Results   Component Value Date    AST 29 05/18/2022    AST 83 (H) 11/09/2020    ALT 40 05/18/2022     (H) 11/09/2020       CBC RESULTS:  Lab Results   Component Value Date    WBC 8.3 05/18/2022    WBC 6.2 03/18/2019    RBC 4.60 05/18/2022    RBC 4.72 03/18/2019    HGB 14.6 05/18/2022    HGB 14.5 03/18/2019    HCT 44.9 05/18/2022    HCT 44.2 03/18/2019    MCV 98 05/18/2022    MCV 94 03/18/2019    MCH 31.7 05/18/2022    MCH 30.7 03/18/2019    MCHC 32.5 05/18/2022    MCHC 32.8 03/18/2019    RDW 13.3 05/18/2022    RDW 13.1 03/18/2019     05/18/2022     03/18/2019       BMP RESULTS:  Lab Results   Component Value Date     05/18/2022     11/09/2020    POTASSIUM 4.5 05/18/2022    POTASSIUM 4.7 11/09/2020    CHLORIDE 104 05/18/2022    CHLORIDE 102 11/09/2020    CO2 29 05/18/2022    CO2 33 (H) 11/09/2020    ANIONGAP 5 05/18/2022    ANIONGAP 3 11/09/2020     (H) 05/18/2022     (H) 11/09/2020    BUN 37 (H) 05/18/2022    BUN 32 (H) 11/09/2020    CR 1.14 (H) 05/18/2022    CR 1.26 (H) 11/09/2020    GFRESTIMATED 51 (L) 05/18/2022    GFRESTIMATED 43 (L) 11/09/2020    GFRESTBLACK 49 (L) 11/09/2020    SAMARIA 9.5 05/18/2022    SAMARIA 8.9 11/09/2020        A1C RESULTS:  No results found for: A1C    Thank you for allowing me  to participate in the care of your patient.      Sincerely,     Ibeth Infante MD     [facility]  cc:   Ibeth Infante MD  [address] [place] [id number]

## 2022-09-29 NOTE — PROGRESS NOTES
HPI:       This is a pleasant 72 year old patient with PMH HTN, CAD s/p PCI, HLD, and syncope in setting of dehydration here for follow up with me. I initially saw her first in setting of RBBB discovered on EKG.      Prior History:      She has no david syncope history or bradycardia that she knows about. No LOC in past or unexplained falls. Has a h/o migranes which seem well controlled. Only had an episode in [employer] once of dizziness leading to diaphoresis and passing out for which she was sent to hospital and improved with fluids. She was told her neck veins by ultrasound were very small. However she had bifasicular block on EKG.     BP has been overall well controlled and is being managed by PCP. On ROS she had some atypical chest pain at rest. Does not heavily exert herself and is not involved in an exercise regimen. Her CP is better with burping. However on climbing stairs she fatigues easy and gets short of breath. She feels her legs are swollen. No varicose veins, ulcerations, claudication. No orthopnea or PND.      We obtained a coronary CT and suprisingly found severe stenoses of the LCX and RCA with moderate Ramus and LAD stenoses (FFR 0.86). She underwent coronary angiogram where she had staged PCI with SHELBI - first to the OM/LCX and then to the prox-mid RCA by radial approach 2/2019-3/2019.      She completed cardiac rehab. We stopped ticagrelor and continued aspirin at one year. Statin was initiated and lipids under controlled. She remains on hydrochlorothiazide, losartan, metoprolol. Denies dizziness or lightheadedness.    Living in [facility] now. Drove up to [place] to see me today. She is [place] to drive out [place] with her [place]. She is overall without any symptoms. Her heart rates have remained in control and no bradycardia noted. No syncope.     ASSESSMENT/PLAN:     #CAD - s/p PCI, asymptomatic. Doing well. Will continue aspirin 81 mg daily, statin, beta blocker. Echocardiogram reviewed and  she has normal LV function.   #HTN - controlled, continue hydrochlorothiazide, losartan, metoprolol  #HLD - controlled, lipid panel per PCP. Continue simvastatin. Goal LDL < 70 mg/dL. Lipids are UTD.  #EKG today for bifasicular block (RBBB, LAFB) - will consider ziopatch in future to make sure no pauses, EKG with every follow up visit     One year follow up with me.      Ibeth Infante MD MSC  [facility]          PAST MEDICAL HISTORY  Past Medical History:   Diagnosis Date     Cancer of bladder (H)      Cataract 11/25/2011     Chronic nonallergic rhinitis 8/31/10 RAST     CKD (chronic kidney disease) stage 3, GFR 30-59 ml/min (H) 12/20/2010     Coronary artery disease involving native coronary artery of native heart without angina pectoris 3/1/2019     DDD (degenerative disc disease), cervical 6/16/2011     Dependent edema      Depression, major      Depressive disorder In 2002 or 2003     Glaucoma suspect, bilateral      History of blood transfusion ?     HTN (hypertension)      Hyperlipidemia      Hypothyroidism 10/12/2009     Migraine     resolved     Nasal septal deviation 8/11/2009     Obesity 5/17/2011     RICKEY (obstructive sleep apnea)      Osteoarthritis of shoulder region      Osteoporosis      Premature menopause      Renal cyst        CURRENT MEDICATIONS  Current Outpatient Medications   Medication Sig Dispense Refill     allopurinol (ZYLOPRIM) 100 MG tablet Take 1 tablet (100 mg) by mouth daily 90 tablet 0     aspirin (ASA) 81 MG EC tablet Take 81 mg by mouth daily       B Complex Vitamins (VITAMIN B COMPLEX PO) Take 1,250 mg by mouth        colchicine (COLCRYS) 0.6 MG tablet Take 0.6 mg by mouth 3 times daily on the first day of gout flare, then take 0.6 mg once or twice daily until flare resolves 30 tablet 0     cycloSPORINE (RESTASIS) 0.05 % ophthalmic emulsion Place 1 drop into both eyes 2 times daily 60 each 11     EXCEDRIN TENSION HEADACHE 500-65 MG PO TABS 1 tablet twice daily as needed        Flaxseed, Linseed, (FLAXSEED OIL PO) Take 1 Tablespoonful by mouth daily       fluticasone (FLONASE) 50 MCG/ACT nasal spray Spray 2 sprays into both nostrils daily 48 g 1     hydrochlorothiazide (HYDRODIURIL) 12.5 MG tablet Take 1 tablet (12.5 mg) by mouth every morning 90 tablet 0     levalbuterol (XOPENEX HFA) 45 MCG/ACT inhaler INHALE 2 PUFFS INTO THE LUNGS EVERY 6 HOURS AS NEEDED FOR SHORTNESS OF BREATH OR DIFFICULT BREATHING OR WHEEZING 15 g 0     levothyroxine (SYNTHROID/LEVOTHROID) 88 MCG tablet TAKE 1 TABLET(88 MCG) BY MOUTH DAILY 90 tablet 0     losartan (COZAAR) 100 MG tablet Take 0.5 tablets (50 mg) by mouth 2 times daily 90 tablet 0     metoprolol succinate ER (TOPROL XL) 25 MG 24 hr tablet Take 1 tablet (25 mg) by mouth daily 90 tablet 0     montelukast (SINGULAIR) 10 MG tablet Take 1 tablet (10 mg) by mouth At Bedtime 90 tablet 0     polyethylene glycol-propylene glycol (SYSTANE ULTRA) 0.4-0.3 % SOLN ophthalmic solution Place 1 drop into both eyes 2 times daily       simvastatin (ZOCOR) 40 MG tablet Take 1 tablet (40 mg) by mouth At Bedtime +++NEED APPT/FASTING labs+++ 90 tablet 0     traZODone (DESYREL) 50 MG tablet Take 1-2 tablets ( mg) by mouth nightly as needed for sleep 180 tablet 0     venlafaxine (EFFEXOR XR) 150 MG 24 hr capsule 2 Capsules by mouth daily 180 capsule 0     zinc 50 MG TABS Take 1 tablet by mouth daily         PAST SURGICAL HISTORY:  Past Surgical History:   Procedure Laterality Date     ARTHROPLASTY SHOULDER Right 11/01/2017    Right total shoulder arthroplasty by Dr. Go at [facility]     ARTHROPLASTY SHOULDER Left 03/28/2018    Left total shoulder arthroplasty by Dr. Go at [facility]     CATARACT IOL, RT/LT       CHOLECYSTECTOMY, LAPOROSCOPIC       COLONOSCOPY  ?    Upper & Lower     CV CORONARY ANGIOGRAM  02/22/2019     CV CORONARY ANGIOGRAM N/A 02/22/2019    Procedure: 1330 CORS;  Surgeon: Marito Arriaga MD;   Location:  [facility] LAB     CV HEART CATHETERIZATION WITH POSSIBLE INTERVENTION N/A 03/18/2019    Procedure: STAGED PCI-JALEN MUST;  Surgeon: Zeus Cordero MD;  Location:  HEART [facility]ARD[facility] CATH LAB     CYSTOSCOPY      bladder cancer     D & C       ENT SURGERY  first 1967/second ?    2 Rhynoplasties     HYSTERECTOMY, PAP NO LONGER INDICATED      BSO     NOSE SURGERY      septum deviation     PHACOEMULSIFICATION CLEAR CORNEA WITH STANDARD INTRAOCULAR LENS IMPLANT Left 09/24/2018    Procedure: PHACOEMULSIFICATION CLEAR CORNEA WITH STANDARD INTRAOCULAR LENS IMPLANT;  LEFT EYE PHACOEMULSIFICATION CLEAR CORNEA WITH STANDARD INTRAOCULAR LENS IMPLANT;  Surgeon: Ryne Pascal MD;  Location:  [facility]     PHACOEMULSIFICATION WITH STANDARD INTRAOCULAR LENS IMPLANT Right 10/15/2018    Procedure: PHACOEMULSIFICATION WITH STANDARD INTRAOCULAR LENS IMPLANT ;  Surgeon: Ryne Pascal MD;  Location: [facility] OR       ALLERGIES     Allergies   Allergen Reactions     Cymbalta      Visual hallucinations     Zolmitriptan Other (See Comments) and Dizziness     Can't wake up  Cant wake up     Aluminum Hydroxide Nausea     Atenolol Other (See Comments)     Fatigue  Fatigue     Codeine Nausea and Vomiting and Nausea     abd pain     Colestid [Colestipol Hcl]      Colestipol Unknown     Other Drug Allergy (See Comments) Other (See Comments)     Joint pain     Potassium Nausea     Albuterol Other (See Comments)     Very jittery and couldn't tolerate  Very jittery and couldn't tolerate     Amlodipine Other (See Comments) and Swelling     Swelling- lower legs     Asa [Aspirin] GI Disturbance     Atenolol Fatigue     Atorvastatin Other (See Comments)     Swelling legs  Swelling legs     Bupropion Unknown     Codeine Nausea     Crestor [Hmg-Coa-R Inhibitors] Other (See Comments)     Joint pain     Ezetimibe Other (See Comments)     Fatigue     Hydralazine Other (See Comments) and Fatigue     Fatigue     Lisinopril Cough      Lovastatin Other (See Comments) and Cramps     Cramps     Niacin      Unknown by patient  Other reaction(s): *Unknown  Unknown by patient     Paroxetine Other (See Comments)     Fatigue     Paxil [Paroxetine] Fatigue     Potassium GI Disturbance     Sulfa Drugs Fatigue and Nausea and Vomiting     Sulfasalazine Other (See Comments)     Triamterene Unknown     Other reaction(s): *Unknown       FAMILY HISTORY  Family History   Problem Relation Age of Onset     Diabetes Maternal Grandfather      Hypertension Maternal Grandfather      Diabetes Son      Heart Disease Father         CHF     Diabetes Mother      Depression Mother      Hyperlipidemia Mother      Anxiety Disorder Mother      Asthma Mother      Obesity Mother      Substance Abuse Brother         Not any longer     Diabetes Brother      Depression Daughter      Diabetes Maternal Uncle      Cerebrovascular Disease Maternal Uncle      Diabetes Son         after accident     Depression Daughter      Depression Son         after accident     Substance Abuse Brother         Not any longer     Diabetes Sister      Substance Abuse Sister      Diabetes Brother      Thyroid Disease No family hx of      Glaucoma No family hx of      Macular Degeneration No family hx of      Cancer No family hx of          SOCIAL HISTORY  Social History     Socioeconomic History     Marital status:      Spouse name: Maggie     Number of children: 2     Years of education: 12     Highest education level: Not on file   Occupational History     Occupation: collections for [employer]     Employer: [employer]   Tobacco Use     Smoking status: Former Smoker     Packs/day: 0.10     Years: 1.00     Pack years: 0.10     Types: Cigarettes     Start date: 1969     Quit date: 10/12/1969     Years since quittin.0     Smokeless tobacco: Never Used     Tobacco comment: Smoked, very little, for 2 mo, 49 years ago.   Substance and Sexual Activity     Alcohol use: Yes     Comment:  Maybe 1 or 2 drinks a year     Drug use: No     Sexual activity: Not Currently     Partners: Female     Birth control/protection: Post-menopausal   Other Topics Concern     Parent/sibling w/ CABG, MI or angioplasty before 65F 55M? No   Social History Narrative    Son  2012.    Wife, Maggie     Social Determinants of Health     Financial Resource Strain: Not on file   Food Insecurity: Not on file   Transportation Needs: Not on file   Physical Activity: Not on file   Stress: Not on file   Social Connections: Not on file   Intimate Partner Violence: Not on file   Housing Stability: Not on file       ROS:   Constitutional: No fever, chills, or sweats. No weight gain/loss   ENT: No visual disturbance, ear ache, epistaxis, sore throat  Allergies/Immunologic: Negative  Respiratory: No cough, hemoptysia  Cardiovascular: As per HPI  GI: No nausea, vomiting, hematemesis, melena, or hematochezia  : No urinary frequency, dysuria, or hematuria  Integument: Negative  Psychiatric: Negative  Neuro: Negative  Endocrinology: Negative   Musculoskeletal: Negative  Vascular: No walking impairment, claudication, ischemic rest pain or nonhealing wounds    EXAM:  /80 (BP Location: Right arm, Patient Position: Sitting, Cuff Size: Adult Regular)   Pulse 62   Ht 1.524 m (5')   Wt 81.6 kg (180 lb)   SpO2 94%   BMI 35.15 kg/m    In general, the patient is a pleasant female in no apparent distress.    HEENT: NC/AT.  PERRLA.  EOMI.  Sclerae white, not injected.  Nares clear.  Pharynx without erythema or exudate.  Dentition intact.    Neck: No adenopathy.  No thyromegaly. Carotids +2/2 bilaterally without bruits.  No jugular venous distension.   Heart: RRR. Normal S1, S2 splits physiologically. No murmur, rub, click, or gallop. The PMI is in the 5th ICS in the midclavicular line. There is no heave.    Lungs: CTA.  No ronchi, wheezes, rales.  No dullness to percussion.   Abdomen: Soft, nontender, nondistended. No organomegaly.  No AAA.  No bruits.   Extremities: No clubbing, cyanosis, or edema.    Vascular: No bruits are noted.    Labs:  LIPID RESULTS:  Lab Results   Component Value Date    CHOL 138 08/31/2021    CHOL 110 11/09/2020    HDL 49 (L) 08/31/2021    HDL 49 (L) 11/09/2020    LDL 49 08/31/2021    LDL 26 11/09/2020    TRIG 199 (H) 08/31/2021    TRIG 177 (H) 11/09/2020    CHOLHDLRATIO 4.7 05/04/2015    NHDL 89 08/31/2021    NHDL 61 11/09/2020       LIVER ENZYME RESULTS:  Lab Results   Component Value Date    AST 29 05/18/2022    AST 83 (H) 11/09/2020    ALT 40 05/18/2022     (H) 11/09/2020       CBC RESULTS:  Lab Results   Component Value Date    WBC 8.3 05/18/2022    WBC 6.2 03/18/2019    RBC 4.60 05/18/2022    RBC 4.72 03/18/2019    HGB 14.6 05/18/2022    HGB 14.5 03/18/2019    HCT 44.9 05/18/2022    HCT 44.2 03/18/2019    MCV 98 05/18/2022    MCV 94 03/18/2019    MCH 31.7 05/18/2022    MCH 30.7 03/18/2019    MCHC 32.5 05/18/2022    MCHC 32.8 03/18/2019    RDW 13.3 05/18/2022    RDW 13.1 03/18/2019     05/18/2022     03/18/2019       BMP RESULTS:  Lab Results   Component Value Date     05/18/2022     11/09/2020    POTASSIUM 4.5 05/18/2022    POTASSIUM 4.7 11/09/2020    CHLORIDE 104 05/18/2022    CHLORIDE 102 11/09/2020    CO2 29 05/18/2022    CO2 33 (H) 11/09/2020    ANIONGAP 5 05/18/2022    ANIONGAP 3 11/09/2020     (H) 05/18/2022     (H) 11/09/2020    BUN 37 (H) 05/18/2022    BUN 32 (H) 11/09/2020    CR 1.14 (H) 05/18/2022    CR 1.26 (H) 11/09/2020    GFRESTIMATED 51 (L) 05/18/2022    GFRESTIMATED 43 (L) 11/09/2020    GFRESTBLACK 49 (L) 11/09/2020    SAMARIA 9.5 05/18/2022    SAMARIA 8.9 11/09/2020        A1C RESULTS:  No results found for: A1C

## 2022-10-03 DIAGNOSIS — H16.223 KERATOCONJUNCTIVITIS SICCA OF BOTH EYES NOT SPECIFIED AS SJOGREN'S: Primary | ICD-10-CM

## 2022-10-04 ENCOUNTER — OFFICE VISIT (OUTPATIENT)
Dept: FAMILY MEDICINE | Facility: CLINIC | Age: 72
End: 2022-10-04
Payer: MEDICARE

## 2022-10-04 VITALS
BODY MASS INDEX: 25.91 KG/M2 | WEIGHT: 181 LBS | HEART RATE: 60 BPM | HEIGHT: 70 IN | SYSTOLIC BLOOD PRESSURE: 120 MMHG | TEMPERATURE: 97.6 F | OXYGEN SATURATION: 93 % | DIASTOLIC BLOOD PRESSURE: 60 MMHG | RESPIRATION RATE: 16 BRPM

## 2022-10-04 DIAGNOSIS — R05.9 COUGH, UNSPECIFIED TYPE: ICD-10-CM

## 2022-10-04 DIAGNOSIS — J31.0 CHRONIC NONALLERGIC RHINITIS: Chronic | ICD-10-CM

## 2022-10-04 DIAGNOSIS — G47.33 OSA (OBSTRUCTIVE SLEEP APNEA): Chronic | ICD-10-CM

## 2022-10-04 DIAGNOSIS — I25.118 CORONARY ARTERY DISEASE OF NATIVE ARTERY OF NATIVE HEART WITH STABLE ANGINA PECTORIS (H): ICD-10-CM

## 2022-10-04 DIAGNOSIS — Z13.220 SCREENING FOR HYPERLIPIDEMIA: ICD-10-CM

## 2022-10-04 DIAGNOSIS — E03.4 HYPOTHYROIDISM DUE TO ACQUIRED ATROPHY OF THYROID: Chronic | ICD-10-CM

## 2022-10-04 DIAGNOSIS — N18.31 STAGE 3A CHRONIC KIDNEY DISEASE (H): ICD-10-CM

## 2022-10-04 DIAGNOSIS — G47.00 INSOMNIA, UNSPECIFIED TYPE: ICD-10-CM

## 2022-10-04 DIAGNOSIS — F33.0 MAJOR DEPRESSIVE DISORDER, RECURRENT EPISODE, MILD DEGREE (H): Chronic | ICD-10-CM

## 2022-10-04 DIAGNOSIS — E66.01 MORBID OBESITY (H): ICD-10-CM

## 2022-10-04 DIAGNOSIS — Z00.00 ROUTINE HISTORY AND PHYSICAL EXAMINATION OF ADULT: Primary | ICD-10-CM

## 2022-10-04 DIAGNOSIS — E78.5 HYPERLIPIDEMIA LDL GOAL <130: Chronic | ICD-10-CM

## 2022-10-04 DIAGNOSIS — N28.9 LESION OF LEFT NATIVE KIDNEY: ICD-10-CM

## 2022-10-04 DIAGNOSIS — M1A.9XX0 CHRONIC GOUT INVOLVING TOE WITHOUT TOPHUS, UNSPECIFIED CAUSE, UNSPECIFIED LATERALITY: ICD-10-CM

## 2022-10-04 DIAGNOSIS — I25.10 CORONARY ARTERY DISEASE INVOLVING NATIVE CORONARY ARTERY OF NATIVE HEART WITHOUT ANGINA PECTORIS: Chronic | ICD-10-CM

## 2022-10-04 LAB
ANION GAP SERPL CALCULATED.3IONS-SCNC: 4 MMOL/L (ref 3–14)
BUN SERPL-MCNC: 36 MG/DL (ref 7–30)
CALCIUM SERPL-MCNC: 9.4 MG/DL (ref 8.5–10.1)
CHLORIDE BLD-SCNC: 105 MMOL/L (ref 94–109)
CHOLEST SERPL-MCNC: 168 MG/DL
CO2 SERPL-SCNC: 29 MMOL/L (ref 20–32)
CREAT SERPL-MCNC: 1.19 MG/DL (ref 0.52–1.04)
CREAT UR-MCNC: 123 MG/DL
FASTING STATUS PATIENT QL REPORTED: YES
GFR SERPL CREATININE-BSD FRML MDRD: 48 ML/MIN/1.73M2
GLUCOSE BLD-MCNC: 106 MG/DL (ref 70–99)
HDLC SERPL-MCNC: 56 MG/DL
LDLC SERPL CALC-MCNC: 55 MG/DL
MICROALBUMIN UR-MCNC: 480 MG/L
MICROALBUMIN/CREAT UR: 390.24 MG/G CR (ref 0–25)
NONHDLC SERPL-MCNC: 112 MG/DL
POTASSIUM BLD-SCNC: 3.9 MMOL/L (ref 3.4–5.3)
SODIUM SERPL-SCNC: 138 MMOL/L (ref 133–144)
TRIGL SERPL-MCNC: 284 MG/DL
TSH SERPL DL<=0.005 MIU/L-ACNC: 3.62 MU/L (ref 0.4–4)
URATE SERPL-MCNC: 7.2 MG/DL (ref 2.6–6)

## 2022-10-04 PROCEDURE — 80048 BASIC METABOLIC PNL TOTAL CA: CPT | Performed by: NURSE PRACTITIONER

## 2022-10-04 PROCEDURE — 82043 UR ALBUMIN QUANTITATIVE: CPT | Performed by: NURSE PRACTITIONER

## 2022-10-04 PROCEDURE — 36415 COLL VENOUS BLD VENIPUNCTURE: CPT | Performed by: NURSE PRACTITIONER

## 2022-10-04 PROCEDURE — G0439 PPPS, SUBSEQ VISIT: HCPCS | Performed by: NURSE PRACTITIONER

## 2022-10-04 PROCEDURE — 80061 LIPID PANEL: CPT | Performed by: NURSE PRACTITIONER

## 2022-10-04 PROCEDURE — 84443 ASSAY THYROID STIM HORMONE: CPT | Performed by: NURSE PRACTITIONER

## 2022-10-04 PROCEDURE — 99214 OFFICE O/P EST MOD 30 MIN: CPT | Mod: 25 | Performed by: NURSE PRACTITIONER

## 2022-10-04 PROCEDURE — 84550 ASSAY OF BLOOD/URIC ACID: CPT | Performed by: NURSE PRACTITIONER

## 2022-10-04 RX ORDER — TRAZODONE HYDROCHLORIDE 50 MG/1
50-100 TABLET, FILM COATED ORAL
Qty: 180 TABLET | Refills: 3 | Status: SHIPPED | OUTPATIENT
Start: 2022-10-04 | End: 2023-10-06

## 2022-10-04 RX ORDER — SIMVASTATIN 40 MG
40 TABLET ORAL AT BEDTIME
Qty: 90 TABLET | Refills: 3 | Status: SHIPPED | OUTPATIENT
Start: 2022-10-04 | End: 2023-10-06

## 2022-10-04 RX ORDER — COLCHICINE 0.6 MG/1
TABLET ORAL
Qty: 30 TABLET | Refills: 1 | Status: SHIPPED | OUTPATIENT
Start: 2022-10-04 | End: 2023-10-06

## 2022-10-04 RX ORDER — LEVOTHYROXINE SODIUM 88 UG/1
TABLET ORAL
Qty: 90 TABLET | Refills: 3 | Status: SHIPPED | OUTPATIENT
Start: 2022-10-04 | End: 2023-01-04

## 2022-10-04 RX ORDER — MONTELUKAST SODIUM 10 MG/1
10 TABLET ORAL AT BEDTIME
Qty: 90 TABLET | Refills: 3 | Status: SHIPPED | OUTPATIENT
Start: 2022-10-04 | End: 2023-12-27

## 2022-10-04 RX ORDER — VENLAFAXINE HYDROCHLORIDE 150 MG/1
CAPSULE, EXTENDED RELEASE ORAL
Qty: 180 CAPSULE | Refills: 3 | Status: SHIPPED | OUTPATIENT
Start: 2022-10-04 | End: 2023-10-06

## 2022-10-04 RX ORDER — ALLOPURINOL 100 MG/1
100 TABLET ORAL DAILY
Qty: 90 TABLET | Refills: 3 | Status: SHIPPED | OUTPATIENT
Start: 2022-10-04 | End: 2023-10-06

## 2022-10-04 ASSESSMENT — ACTIVITIES OF DAILY LIVING (ADL): CURRENT_FUNCTION: NO ASSISTANCE NEEDED

## 2022-10-04 ASSESSMENT — PAIN SCALES - GENERAL: PAINLEVEL: NO PAIN (0)

## 2022-10-04 NOTE — PROGRESS NOTES
"SUBJECTIVE:   Abbey is a 72 year old who presents for Preventive Visit.    Patient has been advised of split billing requirements and indicates understanding: Yes  Are you in the first 12 months of your Medicare coverage?  No    Healthy Habits:    In general, how would you rate your overall health?  Good    Frequency of exercise:  2-3 days/week    Duration of exercise:  30-45 minutes    Do you usually eat at least 4 servings of fruit and vegetables a day, include whole grains    & fiber and avoid regularly eating high fat or \"junk\" foods?  No    Taking medications regularly:  Yes    Barriers to taking medications:  Not applicable    Medication side effects:  Other (alg med casuing a cough )    Ability to successfully perform activities of daily living:  No assistance needed    Home Safety:  No safety concerns identified    Hearing Impairment:  No hearing concerns    In the past 6 months, have you been bothered by leaking of urine? Yes (from coughing )    In general, how would you rate your overall mental or emotional health?  Very good      PHQ-2 Total Score:    Additional concerns today:  No    She was feeling fatigued but added in prenatal supplement and feels so much better.  She will be travelling with her wife in their RV again, leaving soon and needs printed prescriptions today to take with her.    Do you feel safe in your environment? YES    Have you ever done Advance Care Planning? (For example, a Health Directive, POLST, or a discussion with a medical provider or your loved ones about your wishes): Yes, patient states has an Advance Care Planning document and will bring a copy to the clinic.      Fall risk  Fallen 2 or more times in the past year?: No  Any fall with injury in the past year?: No    Cognitive Screening   1) Repeat 3 items (Leader, Season, Table)      2) Clock draw: NORMAL  3) 3 item recall: Recalls 3 objects  Results: 3 items recalled: COGNITIVE IMPAIRMENT LESS LIKELY    Mini-CogTM " Copyright ONEL Benavides. Licensed by the author for use in [facility]; reprinted with permission ([email or web address]). All rights reserved.      Do you have sleep apnea, excessive snoring or daytime drowsiness?: snores       Some refills need to be written.       Reviewed and updated as needed this visit by clinical staff   Tobacco   Meds  Problems  Med Hx  Surg Hx             Reviewed and updated as needed this visit by Provider     Meds  Problems  Med Hx  Surg Hx            Social History     Tobacco Use     Smoking status: Former Smoker     Packs/day: 0.10     Years: 1.00     Pack years: 0.10     Types: Cigarettes     Start date: 1969     Quit date: 10/12/1969     Years since quittin.0     Smokeless tobacco: Never Used     Tobacco comment: Smoked, very little, for 2 mo, 49 years ago.   Substance Use Topics     Alcohol use: Yes     Comment: Maybe 1 or 2 drinks a year     If you drink alcohol do you typically have >3 drinks per day or >7 drinks per week? No    No flowsheet data found.      Current providers sharing in care for this patient include:   Patient Care Team:  Sarah Vallejo NP as PCP - General (Nurse Practitioner - Family)  Sarah Vallejo NP as Assigned PCP  Diane Dunn PharmD as Pharmacist (Pharmacist)  Nydia Ramírez [facility] as Pharmacist (Pharmacist)  Ibeth Infante MD as Assigned Heart and Vascular Provider  Omar West MD as Assigned Surgical Provider    The following health maintenance items are reviewed in Epic and correct as of today:  Health Maintenance   Topic Date Due     A1C  Never done     ZOSTER IMMUNIZATION (2 of 3) 2014     PHQ-9  2022     COLORECTAL CANCER SCREENING  2023     DTAP/TDAP/TD IMMUNIZATION (3 - Td or Tdap) 2023     BMP  2023     ALT  2023     HEMOGLOBIN  2023     EYE EXAM  2023     MEDICARE ANNUAL WELLNESS VISIT  10/04/2023     LIPID  10/04/2023     MICROALBUMIN  10/04/2023      TSH W/FREE T4 REFLEX  10/04/2023     ANNUAL REVIEW OF HM ORDERS  10/04/2023     FALL RISK ASSESSMENT  10/04/2023     MAMMO SCREENING  07/19/2024     ADVANCE CARE PLANNING  10/04/2027     DEXA  05/23/2031     HEPATITIS C SCREENING  Completed     DEPRESSION ACTION PLAN  Completed     INFLUENZA VACCINE  Completed     Pneumococcal Vaccine: 65+ Years  Completed     URINALYSIS  Completed     COVID-19 Vaccine  Completed     IPV IMMUNIZATION  Aged Out     MENINGITIS IMMUNIZATION  Aged Out     HEPATITIS B IMMUNIZATION  Aged Out     BP Readings from Last 3 Encounters:   10/04/22 120/60   09/29/22 130/80   08/31/21 (!) 146/78    Wt Readings from Last 3 Encounters:   10/04/22 82.1 kg (181 lb)   09/29/22 81.6 kg (180 lb)   08/31/21 83.4 kg (183 lb 12.8 oz)                  Patient Active Problem List   Diagnosis     RICKEY (obstructive sleep apnea)     Hypothyroidism     Chronic nonallergic rhinitis     CKD (chronic kidney disease) stage 3, GFR 30-59 ml/min (H)     Hyperlipidemia LDL goal <130     Morbid obesity (H)     History of bladder cancer     Hypertension goal BP (blood pressure) < 140/90     Advance Care Planning     Dependent edema     Elevated LFTs     DDD (degenerative disc disease), cervical     Major depressive disorder, recurrent episode, mild degree (H)     Macular drusen     Hypertriglyceridemia     Allergic conjunctivitis     Pulmonary nodules     Acne     Combined form of age-related cataract, both eyes     Glaucoma suspect, bilateral     Drusen of macula of both eyes     Renal cyst, left     Discoid atelectasis     Cough     Abnormal electrocardiogram     Coronary artery disease involving native coronary artery of native heart without angina pectoris     Epistaxis     Retrognathia     CAD S/P percutaneous coronary angioplasty     Chronic gout involving toe without tophus, unspecified cause, unspecified laterality     Lesion of left native kidney     Past Surgical History:   Procedure Laterality Date      ARTHROPLASTY SHOULDER Right 2017    Right total shoulder arthroplasty by Dr. Go at [facility]     ARTHROPLASTY SHOULDER Left 2018    Left total shoulder arthroplasty by Dr. Go at [facility]     CATARACT IOL, RT/LT       CHOLECYSTECTOMY, LAPOROSCOPIC       COLONOSCOPY  ?    Upper & Lower     CV CORONARY ANGIOGRAM  2019     CV CORONARY ANGIOGRAM N/A 2019    Procedure: 1330 CORS;  Surgeon: Marito Arriaga MD;  Location:  [facility]ARD[facility] CATH LAB     CV HEART CATHETERIZATION WITH POSSIBLE INTERVENTION N/A 2019    Procedure: STAGED PCI-VIRIDIANAAN MUST;  Surgeon: Zeus Cordero MD;  Location:  HEART CARDIAC CATH LAB     CYSTOSCOPY      bladder cancer     D & C       ENT SURGERY  first /second ?    2 Rhynoplasties     HYSTERECTOMY, PAP NO LONGER INDICATED      BSO     NOSE SURGERY      septum deviation     PHACOEMULSIFICATION CLEAR CORNEA WITH STANDARD INTRAOCULAR LENS IMPLANT Left 2018    Procedure: PHACOEMULSIFICATION CLEAR CORNEA WITH STANDARD INTRAOCULAR LENS IMPLANT;  LEFT EYE PHACOEMULSIFICATION CLEAR CORNEA WITH STANDARD INTRAOCULAR LENS IMPLANT;  Surgeon: Ryne Pascal MD;  Location:  [facility]     PHACOEMULSIFICATION WITH STANDARD INTRAOCULAR LENS IMPLANT Right 10/15/2018    Procedure: PHACOEMULSIFICATION WITH STANDARD INTRAOCULAR LENS IMPLANT ;  Surgeon: Ryne Pascal MD;  Location:  OR       Social History     Tobacco Use     Smoking status: Former Smoker     Packs/day: 0.10     Years: 1.00     Pack years: 0.10     Types: Cigarettes     Start date: 1969     Quit date: 10/12/1969     Years since quittin.0     Smokeless tobacco: Never Used     Tobacco comment: Smoked, very little, for 2 mo, 49 years ago.   Substance Use Topics     Alcohol use: Yes     Comment: Maybe 1 or 2 drinks a year     Family History   Problem Relation Age of Onset     Diabetes Maternal Grandfather      Hypertension Maternal  Grandfather      Diabetes Son      Heart Disease Father         CHF     Diabetes Mother      Depression Mother      Hyperlipidemia Mother      Anxiety Disorder Mother      Asthma Mother      Obesity Mother      Substance Abuse Brother         Not any longer     Diabetes Brother      Depression Daughter      Diabetes Maternal Uncle      Cerebrovascular Disease Maternal Uncle      Diabetes Son         after accident     Depression Daughter      Depression Son         after accident     Substance Abuse Brother         Not any longer     Diabetes Sister      Substance Abuse Sister      Diabetes Brother      Thyroid Disease No family hx of      Glaucoma No family hx of      Macular Degeneration No family hx of      Cancer No family hx of          Current Outpatient Medications   Medication Sig Dispense Refill     allopurinol (ZYLOPRIM) 100 MG tablet Take 1 tablet (100 mg) by mouth daily 90 tablet 3     aspirin (ASA) 81 MG [facility] tablet Take 81 mg by mouth daily       B Complex Vitamins (VITAMIN B COMPLEX PO) Take 1,250 mg by mouth        colchicine (COLCRYS) 0.6 MG tablet Take 0.6 mg by mouth 3 times daily on the first day of gout flare, then take 0.6 mg once or twice daily until flare resolves 30 tablet 1     EXCEDRIN TENSION HEADACHE 500-65 MG PO TABS 1 tablet twice daily as needed       Flaxseed, Linseed, (FLAXSEED OIL PO) Take 1 Tablespoonful by mouth daily       fluticasone (FLONASE) 50 MCG/ACT nasal spray Spray 2 sprays into both nostrils daily 48 g 1     hydrochlorothiazide (HYDRODIURIL) 12.5 MG tablet Take 1 tablet (12.5 mg) by mouth every morning 90 tablet 4     levalbuterol (XOPENEX HFA) 45 MCG/ACT inhaler INHALE 2 PUFFS INTO THE LUNGS EVERY 6 HOURS AS NEEDED FOR SHORTNESS OF BREATH OR DIFFICULT BREATHING OR WHEEZING 15 g 0     levothyroxine (SYNTHROID/LEVOTHROID) 88 MCG tablet TAKE 1 TABLET(88 MCG) BY MOUTH DAILY 90 tablet 3     lifitegrast (XIIDRA) 5 % opthalmic solution Place 1 drop into both eyes 2 times daily  60 each 11     losartan (COZAAR) 100 MG tablet Take 0.5 tablets (50 mg) by mouth 2 times daily 90 tablet 4     metoprolol succinate ER (TOPROL XL) 25 MG 24 hr tablet Take 1 tablet (25 mg) by mouth daily 90 tablet 4     montelukast (SINGULAIR) 10 MG tablet Take 1 tablet (10 mg) by mouth At Bedtime 90 tablet 3     polyethylene glycol-propylene glycol (SYSTANE ULTRA) 0.4-0.3 % SOLN ophthalmic solution Place 1 drop into both eyes 2 times daily       simvastatin (ZOCOR) 40 MG tablet Take 1 tablet (40 mg) by mouth At Bedtime 90 tablet 3     traZODone (DESYREL) 50 MG tablet Take 1-2 tablets ( mg) by mouth nightly as needed for sleep 180 tablet 3     venlafaxine (EFFEXOR XR) 150 MG 24 hr capsule 2 Capsules by mouth daily 180 capsule 3     zinc 50 MG TABS Take 1 tablet by mouth daily       Allergies   Allergen Reactions     Cymbalta      Visual hallucinations     Zolmitriptan Other (See Comments) and Dizziness     Can't wake up  Cant wake up     Aluminum Hydroxide Nausea     Atenolol Other (See Comments)     Fatigue  Fatigue     Codeine Nausea and Vomiting and Nausea     abd pain     Colestid [Colestipol Hcl]      Colestipol Unknown     Other Drug Allergy (See Comments) Other (See Comments)     Joint pain     Potassium Nausea     Albuterol Other (See Comments)     Very jittery and couldn't tolerate  Very jittery and couldn't tolerate     Amlodipine Other (See Comments) and Swelling     Swelling- lower legs     Asa [Aspirin] GI Disturbance     Atenolol Fatigue     Atorvastatin Other (See Comments)     Swelling legs  Swelling legs     Bupropion Unknown     Codeine Nausea     Crestor [Hmg-Coa-R Inhibitors] Other (See Comments)     Joint pain     Ezetimibe Other (See Comments)     Fatigue     Hydralazine Other (See Comments) and Fatigue     Fatigue     Lisinopril Cough     Lovastatin Other (See Comments) and Cramps     Cramps     Niacin      Unknown by patient  Other reaction(s): *Unknown  Unknown by patient     Paroxetine  "Other (See Comments)     Fatigue     Paxil [Paroxetine] Fatigue     Potassium GI Disturbance     Sulfa Drugs Fatigue and Nausea and Vomiting     Sulfasalazine Other (See Comments)     Triamterene Unknown     Other reaction(s): *Unknown       Review of Systems  Constitutional, HEENT, cardiovascular, pulmonary, GI, , musculoskeletal, neuro, skin, endocrine and psych systems are negative, except as otherwise noted.    OBJECTIVE:   /60 (BP Location: Right arm, Patient Position: Sitting, Cuff Size: Adult Large)   Pulse 60   Temp 97.6  F (36.4  C) (Oral)   Resp 16   Ht 1.778 m (5' 10\")   Wt 82.1 kg (181 lb)   SpO2 93%   BMI 25.97 kg/m   Estimated body mass index is 25.97 kg/m  as calculated from the following:    Height as of this encounter: 1.778 m (5' 10\").    Weight as of this encounter: 82.1 kg (181 lb).  Physical Exam  GENERAL APPEARANCE: healthy, alert and no distress  EYES: Eyes grossly normal to inspection, PERRL and conjunctivae and sclerae normal  HENT: ear canals and TM's normal, nose and mouth without ulcers or lesions, oropharynx clear and oral mucous membranes moist  NECK: no adenopathy, no asymmetry, masses, or scars and thyroid normal to palpation  RESP: lungs clear to auscultation - no rales, rhonchi or wheezes  BREAST: normal without masses, tenderness or nipple discharge and no palpable axillary masses or adenopathy  CV: regular rate and rhythm, normal S1 S2, no S3 or S4, no murmur, click or rub, no peripheral edema and peripheral pulses strong  ABDOMEN: soft, nontender, no hepatosplenomegaly, no masses and bowel sounds normal  SKIN: no suspicious lesions or rashes  NEURO: Normal strength and tone, sensory exam grossly normal, mentation intact and speech normal  PSYCH: mentation appears normal and affect normal/bright    Diagnostic Test Results:  Labs reviewed in Epic  Results for orders placed or performed in visit on 10/04/22   Basic metabolic panel  (Ca, Cl, CO2, Creat, Gluc, K, Na, " BUN)     Status: Abnormal   Result Value Ref Range    Sodium 138 133 - 144 mmol/L    Potassium 3.9 3.4 - 5.3 mmol/L    Chloride 105 94 - 109 mmol/L    Carbon Dioxide (CO2) 29 20 - 32 mmol/L    Anion Gap 4 3 - 14 mmol/L    Urea Nitrogen 36 (H) 7 - 30 mg/dL    Creatinine 1.19 (H) 0.52 - 1.04 mg/dL    Calcium 9.4 8.5 - 10.1 mg/dL    Glucose 106 (H) 70 - 99 mg/dL    GFR Estimate 48 (L) >60 mL/min/1.73m2   Lipid panel reflex to direct LDL Non-fasting     Status: Abnormal   Result Value Ref Range    Cholesterol 168 <200 mg/dL    Triglycerides 284 (H) <150 mg/dL    Direct Measure HDL 56 >=50 mg/dL    LDL Cholesterol Calculated 55 <=100 mg/dL    Non HDL Cholesterol 112 <130 mg/dL    Patient Fasting > 8hrs? Yes     Narrative    Cholesterol  Desirable:  <200 mg/dL    Triglycerides  Normal:  Less than 150 mg/dL  Borderline High:  150-199 mg/dL  High:  200-499 mg/dL  Very High:  Greater than or equal to 500 mg/dL    Direct Measure HDL  Female:  Greater than or equal to 50 mg/dL   Male:  Greater than or equal to 40 mg/dL    LDL Cholesterol  Desirable:  <100mg/dL  Above Desirable:  100-129 mg/dL   Borderline High:  130-159 mg/dL   High:  160-189 mg/dL   Very High:  >= 190 mg/dL    Non HDL Cholesterol  Desirable:  130 mg/dL  Above Desirable:  130-159 mg/dL  Borderline High:  160-189 mg/dL  High:  190-219 mg/dL  Very High:  Greater than or equal to 220 mg/dL   Albumin Random Urine Quantitative with Creat Ratio     Status: Abnormal   Result Value Ref Range    Creatinine Urine mg/dL 123 mg/dL    Albumin Urine mg/L 480 mg/L    Albumin Urine mg/g Cr 390.24 (H) 0.00 - 25.00 mg/g Cr   TSH WITH FREE T4 REFLEX     Status: Normal   Result Value Ref Range    TSH 3.62 0.40 - 4.00 mU/L   Uric acid     Status: Abnormal   Result Value Ref Range    Uric Acid 7.2 (H) 2.6 - 6.0 mg/dL       ASSESSMENT / PLAN:   Abbey was seen today for physical.    Diagnoses and all orders for this visit:    Routine history and physical examination of adult  -      REVIEW OF HEALTH MAINTENANCE PROTOCOL ORDERS    Coronary artery disease of native artery of native heart with stable angina pectoris (H)  Known issue that I take into account for their medical decisions, no current exacerbations or new concerns.  Continues to follow with Cardiology.  No concerning symptoms.    Chronic nonallergic rhinitis  Chronic, stable, continue current treatment.   -     montelukast (SINGULAIR) 10 MG tablet; Take 1 tablet (10 mg) by mouth At Bedtime    RICKEY (obstructive sleep apnea)  Known issue that I take into account for their medical decisions, no current exacerbations or new concerns.    Chronic gout involving toe without tophus, unspecified cause, unspecified laterality  Chronic, stable, continue current treatment.  Due to CKD I am reluctant to increasing the Allopurinol at this time.  -     allopurinol (ZYLOPRIM) 100 MG tablet; Take 1 tablet (100 mg) by mouth daily  -     colchicine (COLCRYS) 0.6 MG tablet; Take 0.6 mg by mouth 3 times daily on the first day of gout flare, then take 0.6 mg once or twice daily until flare resolves  -     Uric acid    Hyperlipidemia LDL goal <130  Known issue that I take into account for their medical decisions, no current exacerbations or new concerns.    Hypothyroidism due to acquired atrophy of thyroid  Chronic, stable, continue current treatment.   -     TSH WITH FREE T4 REFLEX; Future  -     levothyroxine (SYNTHROID/LEVOTHROID) 88 MCG tablet; TAKE 1 TABLET(88 MCG) BY MOUTH DAILY  -     TSH WITH FREE T4 REFLEX    Coronary artery disease involving native coronary artery of native heart without angina pectoris  -     simvastatin (ZOCOR) 40 MG tablet; Take 1 tablet (40 mg) by mouth At Bedtime    Stage 3a chronic kidney disease (H)  Recommend follow up with nephrology but give patient's upcoming travel this will not be able to be done until her return to [place].  -     Basic metabolic panel  (Ca, Cl, CO2, Creat, Gluc, K, Na, BUN)  -     Lipid panel  "reflex to direct LDL Non-fasting  -     Adult Nephrology  Referral; Future    Lesion of left native kidney  Due for follow up to check for stability  -     CT Renal Mass wo & w Contrast; Future      Cough, unspecified type  -     montelukast (SINGULAIR) 10 MG tablet; Take 1 tablet (10 mg) by mouth At Bedtime    Insomnia, unspecified type  Chronic, stable, continue current treatment.   -     traZODone (DESYREL) 50 MG tablet; Take 1-2 tablets ( mg) by mouth nightly as needed for sleep    Major depressive disorder, recurrent episode, mild degree (H)  Chronic, stable, continue current treatment.   -     venlafaxine (EFFEXOR XR) 150 MG 24 hr capsule; 2 Capsules by mouth daily    Other orders  -     Albumin Random Urine Quantitative with Creat Ratio        COUNSELING:  Reviewed preventive health counseling, as reflected in patient instructions       Regular exercise       Healthy diet/nutrition    Estimated body mass index is 25.97 kg/m  as calculated from the following:    Height as of this encounter: 1.778 m (5' 10\").    Weight as of this encounter: 82.1 kg (181 lb).        She reports that she quit smoking about 53 years ago. Her smoking use included cigarettes. She started smoking about 53 years ago. She has a 0.10 pack-year smoking history. She has never used smokeless tobacco.      Appropriate preventive services were discussed with this patient, including applicable screening as appropriate for cardiovascular disease, diabetes, osteopenia/osteoporosis, and glaucoma.  As appropriate for age/gender, discussed screening for colorectal cancer, prostate cancer, breast cancer, and cervical cancer. Checklist reviewing preventive services available has been given to the patient.    Reviewed patients plan of care and provided an AVS. The Intermediate Care Plan ( asthma action plan, low back pain action plan, and migraine action plan) for Abbey meets the Care Plan requirement. This Care Plan has been " established and reviewed with the Patient.    Counseling Resources:  ATP IV Guidelines  Pooled Cohorts Equation Calculator  Breast Cancer Risk Calculator  Breast Cancer: Medication to Reduce Risk  FRAX Risk Assessment  ICSI Preventive Guidelines  Dietary Guidelines for Americans, 2010  USDA's MyPlate  ASA Prophylaxis  Lung CA Screening    Sarah Vallejo NP  [facility]    Identified Health Risks:

## 2022-10-04 NOTE — PATIENT INSTRUCTIONS
[facility]     Discharged by : Sarah Vallejo, LUIS, APRN, CNP   Paper scripts provided to patient : none     If you have any questions regarding your visit please contact your care team:     Team Annika              Clinic Hours Telephone Number     [address]   7am-7pm  Monday - Thursday   7am-5pm  Fridays  [phone]   (Appointment scheduling available 24/7)     RN Line  [phone] option 2     Urgent Care - [place] [facility] and [place] - 11am-9pm Monday-Friday Saturday-Sunday- 9am-5pm     [place] -   5pm-9pm Monday-Friday Saturday-Sunday- 9am-5pm    [phone] - [place] [facility]    [phone] - [place]     For a Price Quote for your services, please call our [employer] Price Line at [phone].     What options do I have for visits at the clinic other than the traditional office visit?     To expand how we care for you, many of our providers are utilizing electronic visits (e-visits) and telephone visits, when medically appropriate, for interactions with their patients rather than a visit in the clinic. We also offer nurse visits for many medical concerns. Just like any other service, we will bill your insurance company for this type of visit based on time spent on the phone with your provider. Not all insurance companies cover these visits. Please check with your medical insurance if this type of visit is covered. You will be responsible for any charges that are not paid by your insurance.     E-visits via [employer]: generally incur a $45.00 fee.     Telephone visits:  Time spent on the phone: *charged based on time that is spent on the phone in increments of 10 minutes. Estimated cost:   5-10 mins $30.00   11-20 mins. $59.00   21-30 mins. $85.00       Use [employer] (secure email communication and access to your chart) to send your primary care provider a message or make an appointment. Ask someone on your Team how to sign up  for [employer].     As always, Thank you for trusting us with your health care needs!      [facility]:    Scheduling Appointments  [facility]  Call: [phone]    [facility]  Call: [phone]    [facility]  Call: [phone]    For Gastroenterology referrals   [facility]   Cl[facility] and [facility] Center, 4th Floor   [address] [place] [address]   Appointments: [phone]    WHERE TO GO FOR CARE?    Clinic    Make an appointment if you:     Are sick (cold, cough, flu, sore throat, earache or in pain).     Have a small injury (sprain, small cut, burn or broken bone).     Need a physical exam, Pap smear, vaccine or prescription refill.     Have questions about your health or medicines.    To reach us:    Call [phone] ([phone]). Open 24 hours every day. (For counseling services, call [phone].)  Log into [employer] at [employer]. (Visit [employer] to create an account.) Hospital emergency room    An emergency is a serious or life- threatening problem that must be treated right away.    Call [phone] or get to the hospital if you have:    Very bad or sudden:            - Chest pain or pressure         - Bleeding         - Head or belly pain         - Dizziness or trouble seeing, walking or                          Speaking    Problems breathing    Blood in your vomit or you are coughing up blood    A major injury (knocked out, loss of a finger or limb, rape, broken bone protruding from skin)  A mental health crisis. (Or call the Mental Health Crisis line at 1-332.223.7883 or Suicide Prevention Hotline at 1-421.580.4653.)    Open 24 hours every day. You don't need an appointment.     Urgent care    Visit urgent care for sickness or small injuries when the clinic is closed. You don't need an appointment. To check hours or find an urgent care near you, visit  [email or web address]. Online care    Get online care from [facility] for more than 70 common problems, like colds, allergies and infections. Open 24 hours every day at:   [email or web address]   Need help deciding?    For advice about where to be seen, you may call your clinic and ask to speak with a nurse. We're here for you 24 hours every day.         If you are deaf or hard of hearing, please let us know. We provide many free services including sign language interpreters, oral interpreters, TTYs, telephone amplifiers, note takers and written materials.            Patient Education   Personalized Prevention Plan  You are due for the preventive services outlined below.  Your care team is available to assist you in scheduling these services.  If you have already completed any of these items, please share that information with your care team to update in your medical record.  Health Maintenance Due   Topic Date Due    Zoster (Shingles) Vaccine (2 of 3) 07/23/2014    Cholesterol Lab  08/31/2022    Kidney Microalbumin Urine Test  08/31/2022    Thyroid Function Lab  08/31/2022    ANNUAL REVIEW OF HM ORDERS  08/31/2022       Understanding USDA MyPlate  The USDA has guidelines to help you make healthy food choices. These are called MyPlate. MyPlate shows the food groups that make up healthy meals using the image of a place setting. Before you eat, think about the healthiest choices for what to put on your plate or in your cup or bowl. To learn more about building a healthy plate, visit www.choosemyplate.gov.    The food groups  Fruits. Any fruit or 100% fruit juice counts as part of the Fruit Group. Fruits may be fresh, canned, frozen, or dried, and may be whole, cut-up, or pureed. Make 1/2 of your plate fruits and vegetables.  Vegetables. Any vegetable or 100% vegetable juice counts as a member of the Vegetable Group. Vegetables may be fresh, frozen, canned, or dried. They can be served raw or cooked and may be whole, cut-up, or  mashed. Make 1/2 of your plate fruits and vegetables.  Grains. All foods made from grains are part of the Grains Group. These include wheat, rice, oats, cornmeal, and barley. Grains are often used to make foods such as bread, pasta, oatmeal, cereal, tortillas, and grits. Grains should be no more than 1/4 of your plate. At least half of your grains should be whole grains.  Protein. This group includes meat, poultry, seafood, beans and peas, eggs, processed soy products (such as tofu), nuts (including nut butters), and seeds. Make protein choices no more than 1/4 of your plate. Meat and poultry choices should be lean or low fat.  Dairy. The Dairy Group includes all fluid milk products and foods made from milk that contain calcium, such as yogurt and cheese. (Foods that have little calcium, such as cream, butter, and cream cheese, are not part of this group.) Most dairy choices should be low-fat or fat-free.  Oils. Oils aren't a food group, but they do contain essential nutrients. However it's important to watch your intake of oils. These are fats that are liquid at room temperature. They include canola, corn, olive, soybean, vegetable, and sunflower oil. Foods that are mainly oil include mayonnaise, certain salad dressings, and soft margarines. You likely already get your daily oil allowance from the foods you eat.  Things to limit  Eating healthy also means limiting these things in your diet:     Salt (sodium). Many processed foods have a lot of sodium. To keep sodium intake down, eat fresh vegetables, meats, poultry, and seafood when possible. Purchase low-sodium, reduced-sodium, or no-salt-added food products at the store. And don't add salt to your meals at home. Instead, season them with herbs and spices such as dill, oregano, cumin, and paprika. Or try adding flavor with lemon or lime zest and juice.  Saturated fat. Saturated fats are most often found in animal products such as beef, pork, and chicken. They are  often solid at room temperature, such as butter. To reduce your saturated fat intake, choose leaner cuts of meat and poultry. And try healthier cooking methods such as grilling, broiling, roasting, or baking. For a simple lower-fat swap, use plain nonfat yogurt instead of mayonnaise when making potato salad or macaroni salad.  Added sugars. These are sugars added to foods. They are in foods such as ice cream, candy, soda, fruit drinks, sports drinks, energy drinks, cookies, pastries, jams, and syrups. Cut down on added sugars by sharing sweet treats with a family member or friend. You can also choose fruit for dessert, and drink water or other unsweetened beverages.     [employer] last reviewed this educational content on 6/1/2020 2000-2021 [employer]. All rights reserved. This information is not intended as a substitute for professional medical care. Always follow your healthcare professional's instructions.          Urinary Incontinence, Female (Adult)   Urinary incontinence means loss of bladder control. This problem affects many women, especially as they get older. If you have incontinence, you may be embarrassed to ask for help. But know that this problem can be treated.   Types of Incontinence  There are different types of incontinence. Two of the main types are described here. You can have more than one type.   Stress incontinence. With this type, urine leaks when pressure (stress) is put on the bladder. This may happen when you cough, sneeze, or laugh. Stress incontinence most often occurs because the pelvic floor muscles that support the bladder and urethra are weak. This can happen after pregnancy and vaginal childbirth or a hysterectomy. It can also be due to excess body weight or hormone changes.  Urge incontinence (also called overactive bladder). With this type, a sudden urge to urinate is felt often. This may happen even though there may not be much urine in the bladder. The need to  urinate often during the night is common. Urge incontinence most often occurs because of bladder spasms. This may be due to bladder irritation or infection. Damage to bladder nerves or pelvic muscles, constipation, and certain medicines can also lead to urge incontinence.  Treatment depends on the cause. Further evaluation is needed to find the type you have. This will likely include an exam and certain tests. Based on the results, you and your healthcare provider can then plan treatment. Until a diagnosis is made, the home care tips below can help ease symptoms.   Home care  Do pelvic floor muscle exercises, if they are prescribed. The pelvic floor muscles help support the bladder and urethra. Many women find that their symptoms improve when doing special exercises that strengthen these muscles. To do the exercises, contract the muscles you would use to stop your stream of urine. But do this when you re not urinating. Hold for 10 seconds, then relax. Repeat 10 to 20 times in a row, at least 3 times a day. Your healthcare provider may give you other instructions for how to do the exercises and how often.  Keep a bladder diary. This helps track how often and how much you urinate over a set period of time. Bring this diary with you to your next visit with the provider. The information can help your provider learn more about your bladder problem.  Lose weight, if advised to by your provider. Extra weight puts pressure on the bladder. Your provider can help you create a weight-loss plan that s right for you. This may include exercising more and making certain diet changes.  Don't have foods and drinks that may irritate the bladder. These can include alcohol and caffeinated drinks.  Quit smoking. Smoking and other tobacco use can lead to a long-term (chronic) cough that strains the pelvic floor muscles. Smoking may also damage the bladder and urethra. Talk with your provider about treatments or methods you can use to  quit smoking.  If drinking large amounts of fluid makes you have symptoms, you may be advised to limit your fluid intake. You may also be advised to drink most of your fluids during the day and to limit fluids at night.  If you re worried about urine leakage or accidents, you may wear absorbent pads to catch urine. Change the pads often. This helps reduce discomfort. It may also reduce the risk of skin or bladder infections.    Follow-up care  Follow up with your healthcare provider, or as directed. It may take some to find the right treatment for your problem. But healthy lifestyle changes can be made right away. These include such things as exercising on a regular basis, eating a healthy diet, losing weight (if needed), and quitting smoking. Your treatment plan may include special therapies or medicines. Certain procedures or surgery may also be options. Talk about any questions you have with your provider.   When to seek medical advice  Call the healthcare provider right away if any of these occur:  Fever of 100.4 F (38 C) or higher, or as directed by your provider  Bladder pain or fullness  Belly swelling  Nausea or vomiting  Back pain  Weakness, dizziness, or fainting  [employer] last reviewed this educational content on 1/1/2020 2000-2021 [employer]. All rights reserved. This information is not intended as a substitute for professional medical care. Always follow your healthcare professional's instructions.

## 2022-10-07 ENCOUNTER — ANCILLARY PROCEDURE (OUTPATIENT)
Dept: CT IMAGING | Facility: CLINIC | Age: 72
End: 2022-10-07
Attending: NURSE PRACTITIONER
Payer: MEDICARE

## 2022-10-07 DIAGNOSIS — N28.9 LESION OF LEFT NATIVE KIDNEY: ICD-10-CM

## 2022-10-07 PROCEDURE — G1010 CDSM STANSON: HCPCS | Mod: TC | Performed by: RADIOLOGY

## 2022-10-07 PROCEDURE — 74178 CT ABD&PLV WO CNTR FLWD CNTR: CPT | Mod: TC | Performed by: RADIOLOGY

## 2022-10-07 RX ORDER — IOPAMIDOL 755 MG/ML
500 INJECTION, SOLUTION INTRAVASCULAR ONCE
Status: COMPLETED | OUTPATIENT
Start: 2022-10-07 | End: 2022-10-07

## 2022-10-07 RX ADMIN — Medication 72 ML: at 10:11

## 2022-10-07 RX ADMIN — IOPAMIDOL 111 ML: 755 INJECTION, SOLUTION INTRAVASCULAR at 10:11

## 2022-10-10 ENCOUNTER — TELEPHONE (OUTPATIENT)
Dept: NEPHROLOGY | Facility: CLINIC | Age: 72
End: 2022-10-10

## 2022-10-10 ENCOUNTER — TELEPHONE (OUTPATIENT)
Dept: FAMILY MEDICINE | Facility: CLINIC | Age: 72
End: 2022-10-10

## 2022-10-10 DIAGNOSIS — N18.31 STAGE 3A CHRONIC KIDNEY DISEASE (H): Primary | Chronic | ICD-10-CM

## 2022-10-10 NOTE — TELEPHONE ENCOUNTER
[facility] Call Center    Phone Message    May a detailed message be left on voicemail: yes     Reason for Call: Order(s): New patient labs  Reason for requested: New patient appointment scheduled in [place] on 7/10/23 with Labs scheduled on 7/6/23  Date needed: 7/6/23  Provider name: Brody      Action Taken: Message routed to:  Other: [place] [facility]    Travel Screening: Not Applicable               Resident

## 2022-10-10 NOTE — TELEPHONE ENCOUNTER
That is fine if she is seen in Nov or Dec.    (But in her chart looks like she is scheduled for next year July 2023- if that is the case I would have her call Nephrology to be seen sooner than that.)    Sarah Vallejo, DNP, APRN, CNP

## 2022-10-10 NOTE — TELEPHONE ENCOUNTER
Routing to Sarah Vallejo - please see message below from patient and advise.    SELINA Mukherjee  [facility]

## 2022-10-10 NOTE — TELEPHONE ENCOUNTER
Patient called said she can not get into Nephrology till next month wondering if she can do that or even December not sure how urgent you think she needs to be seen leaving this week please advise.  Enma Burnett   [facility]

## 2022-10-11 ENCOUNTER — TELEPHONE (OUTPATIENT)
Dept: NEPHROLOGY | Facility: CLINIC | Age: 72
End: 2022-10-11

## 2022-10-11 NOTE — TELEPHONE ENCOUNTER
That is fine, I am aware of her winter travel plans and think that is fine to follow up in the Spring with nephrology.    Sarah Vallejo, DNP, APRN, CNP

## 2022-10-11 NOTE — TELEPHONE ENCOUNTER
Routing to PCP to review/advise    patient is leaving for out of state on Wednesday and will not be returning until next spring.      RN spoke to patient regarding above    Fili Robles RN, BSN, PHN  [facility]

## 2022-11-28 ENCOUNTER — TELEPHONE (OUTPATIENT)
Dept: FAMILY MEDICINE | Facility: CLINIC | Age: 72
End: 2022-11-28

## 2022-11-28 DIAGNOSIS — J31.0 CHRONIC NONALLERGIC RHINITIS: Chronic | ICD-10-CM

## 2022-11-28 DIAGNOSIS — R05.9 COUGH, UNSPECIFIED TYPE: ICD-10-CM

## 2022-11-28 DIAGNOSIS — M1A.9XX0 CHRONIC GOUT INVOLVING TOE WITHOUT TOPHUS, UNSPECIFIED CAUSE, UNSPECIFIED LATERALITY: ICD-10-CM

## 2022-11-28 DIAGNOSIS — I10 HYPERTENSION GOAL BP (BLOOD PRESSURE) < 140/90: Chronic | ICD-10-CM

## 2022-11-28 DIAGNOSIS — I25.10 CORONARY ARTERY DISEASE INVOLVING NATIVE CORONARY ARTERY OF NATIVE HEART WITHOUT ANGINA PECTORIS: Chronic | ICD-10-CM

## 2022-11-28 RX ORDER — MONTELUKAST SODIUM 10 MG/1
10 TABLET ORAL AT BEDTIME
Qty: 90 TABLET | Refills: 3 | Status: CANCELLED | OUTPATIENT
Start: 2022-11-28

## 2022-11-28 RX ORDER — METOPROLOL SUCCINATE 25 MG/1
25 TABLET, EXTENDED RELEASE ORAL DAILY
Qty: 90 TABLET | Refills: 4 | Status: CANCELLED | OUTPATIENT
Start: 2022-11-28

## 2022-11-28 RX ORDER — SIMVASTATIN 40 MG
40 TABLET ORAL AT BEDTIME
Qty: 90 TABLET | Refills: 3 | Status: CANCELLED | OUTPATIENT
Start: 2022-11-28

## 2022-11-28 RX ORDER — ALLOPURINOL 100 MG/1
100 TABLET ORAL DAILY
Qty: 90 TABLET | Refills: 3 | Status: CANCELLED | OUTPATIENT
Start: 2022-11-28

## 2022-11-28 NOTE — TELEPHONE ENCOUNTER
RN called pt.     Yes pt has scripts and will get refills in [place] where she is located at this time.     Alysa Cantu RN

## 2022-11-28 NOTE — TELEPHONE ENCOUNTER
Written prescriptions given at 10/4/22 appointment. Patient bring these to her pharmacy?    Thank you,  Melissa Ricketts RN

## 2023-01-04 DIAGNOSIS — I10 HYPERTENSION GOAL BP (BLOOD PRESSURE) < 140/90: Chronic | ICD-10-CM

## 2023-01-04 RX ORDER — LOSARTAN POTASSIUM 100 MG/1
50 TABLET ORAL 2 TIMES DAILY
Qty: 90 TABLET | Refills: 2 | Status: SHIPPED | OUTPATIENT
Start: 2023-01-04 | End: 2023-09-29

## 2023-04-23 ENCOUNTER — HEALTH MAINTENANCE LETTER (OUTPATIENT)
Age: 73
End: 2023-04-23

## 2023-07-01 NOTE — TELEPHONE ENCOUNTER
Call to patient regarding appointments, but appointments look to be scheduled for July not December. Left message. Provided call center if there are any other questions.  Krysten Clements LPN  Nephrology  [phone]    
[facility] Call Center    Phone Message    May a detailed message be left on voicemail: yes     Reason for Call: Pt scheduled Appt for 12/28 for when she is in town for Holiday, she doesn't plan to get in town until 12/23 so scheduled labs then. Pt is wondering if this is enough time before appt for lab to be processed and get results and if she needs to fast for appt. Please call pt to follow up. Thank you    Action Taken: Message routed to:  [facility] Neph    Travel Screening: Not Applicable                                                                      
For information on Fall & Injury Prevention, visit: [email or web address] OR  [email or web address] OR  https://www.cdc.gov/steadi/patient.html

## 2023-07-03 ENCOUNTER — LAB (OUTPATIENT)
Dept: LAB | Facility: CLINIC | Age: 73
End: 2023-07-03
Payer: MEDICARE

## 2023-07-03 DIAGNOSIS — N18.31 STAGE 3A CHRONIC KIDNEY DISEASE (H): ICD-10-CM

## 2023-07-03 DIAGNOSIS — R73.09 ABNORMAL GLUCOSE: ICD-10-CM

## 2023-07-03 DIAGNOSIS — N18.30 CKD (CHRONIC KIDNEY DISEASE) STAGE 3, GFR 30-59 ML/MIN (H): ICD-10-CM

## 2023-07-03 DIAGNOSIS — E66.01 MORBID OBESITY (H): ICD-10-CM

## 2023-07-03 DIAGNOSIS — E78.1 HYPERTRIGLYCERIDEMIA: ICD-10-CM

## 2023-07-03 LAB
ALBUMIN MFR UR ELPH: 89 MG/DL
ALBUMIN SERPL BCG-MCNC: 4 G/DL (ref 3.5–5.2)
ALBUMIN UR-MCNC: >=300 MG/DL
ANION GAP SERPL CALCULATED.3IONS-SCNC: 15 MMOL/L (ref 7–15)
APPEARANCE UR: CLEAR
BACTERIA #/AREA URNS HPF: ABNORMAL /HPF
BILIRUB UR QL STRIP: NEGATIVE
BUN SERPL-MCNC: 24.9 MG/DL (ref 8–23)
CALCIUM SERPL-MCNC: 9.8 MG/DL (ref 8.8–10.2)
CHLORIDE SERPL-SCNC: 99 MMOL/L (ref 98–107)
COLOR UR AUTO: YELLOW
CREAT SERPL-MCNC: 1.18 MG/DL (ref 0.51–0.95)
CREAT UR-MCNC: 184 MG/DL
DEPRECATED CALCIDIOL+CALCIFEROL SERPL-MC: 67 UG/L (ref 20–75)
DEPRECATED HCO3 PLAS-SCNC: 23 MMOL/L (ref 22–29)
ERYTHROCYTE [DISTWIDTH] IN BLOOD BY AUTOMATED COUNT: 13.8 % (ref 10–15)
GFR SERPL CREATININE-BSD FRML MDRD: 48 ML/MIN/1.73M2
GLUCOSE SERPL-MCNC: 110 MG/DL (ref 70–99)
GLUCOSE UR STRIP-MCNC: NEGATIVE MG/DL
HCT VFR BLD AUTO: 43 % (ref 35–47)
HGB BLD-MCNC: 13.9 G/DL (ref 11.7–15.7)
HGB UR QL STRIP: NEGATIVE
KETONES UR STRIP-MCNC: NEGATIVE MG/DL
LEUKOCYTE ESTERASE UR QL STRIP: ABNORMAL
MCH RBC QN AUTO: 30.5 PG (ref 26.5–33)
MCHC RBC AUTO-ENTMCNC: 32.3 G/DL (ref 31.5–36.5)
MCV RBC AUTO: 94 FL (ref 78–100)
NITRATE UR QL: NEGATIVE
PH UR STRIP: 5.5 [PH] (ref 5–7)
PHOSPHATE SERPL-MCNC: 4 MG/DL (ref 2.5–4.5)
PLATELET # BLD AUTO: 215 10E3/UL (ref 150–450)
POTASSIUM SERPL-SCNC: 4.3 MMOL/L (ref 3.4–5.3)
PROT/CREAT 24H UR: 0.48 MG/MG CR (ref 0–0.2)
PTH-INTACT SERPL-MCNC: 18 PG/ML (ref 15–65)
RBC # BLD AUTO: 4.56 10E6/UL (ref 3.8–5.2)
RBC #/AREA URNS AUTO: ABNORMAL /HPF
SODIUM SERPL-SCNC: 137 MMOL/L (ref 136–145)
SP GR UR STRIP: 1.02 (ref 1–1.03)
SQUAMOUS #/AREA URNS AUTO: ABNORMAL /LPF
UROBILINOGEN UR STRIP-ACNC: 0.2 E.U./DL
WBC # BLD AUTO: 7.2 10E3/UL (ref 4–11)
WBC #/AREA URNS AUTO: ABNORMAL /HPF

## 2023-07-03 PROCEDURE — 83970 ASSAY OF PARATHORMONE: CPT

## 2023-07-03 PROCEDURE — 82306 VITAMIN D 25 HYDROXY: CPT

## 2023-07-03 PROCEDURE — 84156 ASSAY OF PROTEIN URINE: CPT

## 2023-07-03 PROCEDURE — 85027 COMPLETE CBC AUTOMATED: CPT

## 2023-07-03 PROCEDURE — 80069 RENAL FUNCTION PANEL: CPT

## 2023-07-03 PROCEDURE — 36415 COLL VENOUS BLD VENIPUNCTURE: CPT

## 2023-07-03 PROCEDURE — 81001 URINALYSIS AUTO W/SCOPE: CPT

## 2023-07-05 ENCOUNTER — ANCILLARY ORDERS (OUTPATIENT)
Dept: FAMILY MEDICINE | Facility: CLINIC | Age: 73
End: 2023-07-05

## 2023-07-05 DIAGNOSIS — Z12.31 VISIT FOR SCREENING MAMMOGRAM: ICD-10-CM

## 2023-07-10 ENCOUNTER — VIRTUAL VISIT (OUTPATIENT)
Dept: NEPHROLOGY | Facility: CLINIC | Age: 73
End: 2023-07-10
Attending: NURSE PRACTITIONER
Payer: MEDICARE

## 2023-07-10 DIAGNOSIS — E03.9 HYPOTHYROIDISM, UNSPECIFIED TYPE: Primary | ICD-10-CM

## 2023-07-10 DIAGNOSIS — N18.31 STAGE 3A CHRONIC KIDNEY DISEASE (H): ICD-10-CM

## 2023-07-10 PROCEDURE — 99205 OFFICE O/P NEW HI 60 MIN: CPT | Mod: VID | Performed by: INTERNAL MEDICINE

## 2023-07-10 NOTE — LETTER
7/10/2023       RE: Abbey Omer  [address] [facility] [address]     Dear Colleague,    Thank you for referring your patient, Abbey Omer, to the [facility] at [facility]. Please see a copy of my visit note below.    Abbey is a 73 year old who is being evaluated via a billable video visit.      How would you like to obtain your AVS? MyChart  If the video visit is dropped, the invitation should be resent by: Text to cell phone: [phone]  Will anyone else be joining your video visit? Yes: Wife-Merly. How would they like to receive their invitation? Text to cell phone: [phone]    Video-Visit Details    Type of service:  Video Visit   Video Start Time: 210 pm  Video End Time:2:55 PM    Originating Location (pt. Location): Home  Distant Location (provider location):  On-site  Platform used for Video Visit: [employer]  >60 minutes spent on day of service in review of records and coordination of care.      Assessment and Plan:  73 year old female with history of hypertension, CAD s/p stents in 2019, bladder CA in 2015, renal mass, who presents for evaluation  # CKD stage 3a/b- her creatinine has fluctuated but has been elevated for over ten years. She had bladder cancer in 2015, CAD and stents in 2019 and her creatinine peaked at 1.6 in 2016, and now has been in 1.1-1.2 range.  She has had proteinuria for many years, also peaking at 1.5 grams albuminuria in 2018 and now down to 0.5 g/g cr protein on recent check.  She is overweight but not diabetic that she knows of . An A1C has been ordered  - she has no energy , and thus will check TSH, recommended follwoup with cardiology and I will check a few serologies.  - proteinuria may be due to hypertension, obesity, and also she has renal tumor which can cause membranous/ proteinuria.     # Hypertension: her blood pressure seems well controlled. Does not seem to have low readings,  mostly 120s range at home  - she has significant fatigue/ low energy, which she will followup on with cardiology and primary care.    # Anemia in chronic renal disease:   - Hgb: Stable  - Iron studies: Not checked recently    # Electrolytes:   - Potassium; level: Normal  - Bicarbonate; level: Normal    # Mineral Bone Disorder:   - Calcium; level is:  Normal  - Phosphorus; level is: Normal      Assessment and plan was discussed with patient and she voiced her understanding and agreement.    Consult:  Abbey Omer was seen in consultation at the request of Sarah Vallejo for CKD management.    Reason for Visit:  Abbey Omer is a 73 year old female with CKD from CAD/ nephrosclerosis, who presents for evaluation.    HPI:  73 year old female with history of hypertension, CAD s/p stents in 2019, bladder CA in 2015, renal mass, who presents for evaluation of CKD stage 3a/b.  Her creatinine has fluctuated but has been elevated for over ten years. She had bladder cancer in 2015 s/p TURBT without any chemo or other treatment, CAD and stents in 2019 and her creatinine peaked at 1.6 in 2016, and now has been in 1.2 range.  She feels OK overall but feels she has gained weight and has no drive or energy to do anything.  We discussed that it is not likely due to her kidney function, but to monitor blood pressure and heart rate, followup with cardiology, recheck TSH and go from there.  Her last BP was 146/77, taken by a nurse, other readings are 121-134 range.  She denies family history of kidney disease  She has a renal mass that is being monitored and will repeat imaging likely in October 2023    Renal History:   Kidney Disease and Medical Hx:  h/o HTN: Yes      ROS:   A comprehensive review of systems was obtained and negative, except as noted in the HPI or PMH.    Active Medical Problems:  Patient Active Problem List   Diagnosis    RICKEY (obstructive sleep apnea)    Hypothyroidism    Chronic nonallergic rhinitis     CKD (chronic kidney disease) stage 3, GFR 30-59 ml/min (H)    Hyperlipidemia LDL goal <130    Morbid obesity (H)    History of bladder cancer    Hypertension goal BP (blood pressure) < 140/90    Advance Care Planning    Dependent edema    Elevated LFTs    DDD (degenerative disc disease), cervical    Major depressive disorder, recurrent episode, mild degree (H)    Macular drusen    Hypertriglyceridemia    Allergic conjunctivitis    Pulmonary nodules    Acne    Combined form of age-related cataract, both eyes    Glaucoma suspect, bilateral    Drusen of macula of both eyes    Renal cyst, left    Discoid atelectasis    Cough    Abnormal electrocardiogram    Coronary artery disease involving native coronary artery of native heart without angina pectoris    Epistaxis    Retrognathia    CAD S/P percutaneous coronary angioplasty    Chronic gout involving toe without tophus, unspecified cause, unspecified laterality    Lesion of left native kidney     PMH:   Medical record was reviewed and PMH was discussed with patient and noted below.  Past Medical History:   Diagnosis Date    Cancer of bladder (H)     Cataract 11/25/2011    Chronic nonallergic rhinitis 8/31/10 [facility]    CKD (chronic kidney disease) stage 3, GFR 30-59 ml/min (H) 12/20/2010    Coronary artery disease involving native coronary artery of native heart without angina pectoris 3/1/2019    DDD (degenerative disc disease), cervical 6/16/2011    Dependent edema     Depression, major     Depressive disorder In 2002 or 2003    Glaucoma suspect, bilateral     History of blood transfusion ?    HTN (hypertension)     Hyperlipidemia     Hypothyroidism 10/12/2009    Migraine     resolved    Nasal septal deviation 8/11/2009    Obesity 5/17/2011    RICKEY (obstructive sleep apnea)     Osteoarthritis of shoulder region     Osteoporosis     Premature menopause     Renal cyst      PSH:   Past Surgical History:   Procedure Laterality Date    ARTHROPLASTY SHOULDER Right 11/01/2017     Right total shoulder arthroplasty by Dr. Go at [facility]    ARTHROPLASTY SHOULDER Left 03/28/2018    Left total shoulder arthroplasty by Dr. Go at [facility]    CATARACT IOL, RT/LT      CHOLECYSTECTOMY, LAPOROSCOPIC      COLONOSCOPY  ?    Upper & Lower    CV CORONARY ANGIOGRAM  02/22/2019    CV CORONARY ANGIOGRAM N/A 02/22/2019    Procedure: 1330 CORS;  Surgeon: Marito Arriaga MD;  Location: [facility]    CV HEART CATHETERIZATION WITH POSSIBLE INTERVENTION N/A 03/18/2019    Procedure: STAGED PCI-JALEN MUST;  Surgeon: Zeus Cordero MD;  Location: [facility]    CYSTOSCOPY      bladder cancer    D & C      ENT SURGERY  first 1967/second ?    2 Rhynoplasties    HYSTERECTOMY, PAP NO LONGER INDICATED      BSO    NOSE SURGERY      septum deviation    PHACOEMULSIFICATION CLEAR CORNEA WITH STANDARD INTRAOCULAR LENS IMPLANT Left 09/24/2018    Procedure: PHACOEMULSIFICATION CLEAR CORNEA WITH STANDARD INTRAOCULAR LENS IMPLANT;  LEFT EYE PHACOEMULSIFICATION CLEAR CORNEA WITH STANDARD INTRAOCULAR LENS IMPLANT;  Surgeon: Ryne Pascal MD;  Location:  [facility]    PHACOEMULSIFICATION WITH STANDARD INTRAOCULAR LENS IMPLANT Right 10/15/2018    Procedure: PHACOEMULSIFICATION WITH STANDARD INTRAOCULAR LENS IMPLANT ;  Surgeon: Ryne Pascal MD;  Location: [facility]       Family Hx:   Family History   Problem Relation Age of Onset    Diabetes Maternal Grandfather     Hypertension Maternal Grandfather     Diabetes Son     Heart Disease Father         CHF    Diabetes Mother     Depression Mother     Hyperlipidemia Mother     Anxiety Disorder Mother     Asthma Mother     Obesity Mother     Substance Abuse Brother         Not any longer    Diabetes Brother     Depression Daughter     Diabetes Maternal Uncle     Cerebrovascular Disease Maternal Uncle     Diabetes Son         after accident    Depression Daughter     Depression Son         after  accident    Substance Abuse Brother         Not any longer    Diabetes Sister     Substance Abuse Sister     Diabetes Brother     Thyroid Disease No family hx of     Glaucoma No family hx of     Macular Degeneration No family hx of     Cancer No family hx of      Personal Hx:   Social History     Tobacco Use    Smoking status: Former     Packs/day: 0.10     Years: 1.00     Pack years: 0.10     Types: Cigarettes     Start date: 1969     Quit date: 10/12/1969     Years since quittin.7    Smokeless tobacco: Never    Tobacco comments:     Smoked, very little, for 2 mo, 49 years ago.   Substance Use Topics    Alcohol use: Yes     Comment: Maybe 1 or 2 drinks a year       Allergies:  Allergies   Allergen Reactions    Duloxetine Hcl      Visual hallucinations    Zolmitriptan Other (See Comments) and Dizziness     Can't wake up  Cant wake up    Aluminum Hydroxide Nausea    Atenolol Other (See Comments)     Fatigue  Fatigue    Codeine Nausea and Vomiting and Nausea     abd pain    Colestid [Colestipol Hcl]     Colestipol Unknown    Other Drug Allergy (See Comments) Other (See Comments)     Joint pain    Potassium Nausea    Albuterol Other (See Comments)     Very jittery and couldn't tolerate  Very jittery and couldn't tolerate    Amlodipine Other (See Comments) and Swelling     Swelling- lower legs    Asa [Aspirin] GI Disturbance    Atenolol Fatigue    Atorvastatin Other (See Comments)     Swelling legs  Swelling legs    Bupropion Unknown    Crestor [Statins] Other (See Comments)     Joint pain    Ezetimibe Other (See Comments)     Fatigue    Hydralazine Other (See Comments) and Fatigue     Fatigue    Lisinopril Cough    Lovastatin Other (See Comments) and Cramps     Cramps    Morphine And Related Nausea    Niacin      Unknown by patient  Other reaction(s): *Unknown  Unknown by patient    Paroxetine Other (See Comments)     Fatigue    Paxil [Paroxetine] Fatigue    Potassium GI Disturbance    Sulfa Antibiotics  Fatigue and Nausea and Vomiting    Sulfasalazine Other (See Comments)    Triamterene Unknown     Other reaction(s): *Unknown       Medications:  Current Outpatient Medications   Medication Sig    allopurinol (ZYLOPRIM) 100 MG tablet Take 1 tablet (100 mg) by mouth daily    aspirin (ASA) 81 MG [facility] tablet Take 81 mg by mouth daily    B Complex Vitamins (VITAMIN B COMPLEX PO) Take 1,250 mg by mouth     colchicine (COLCRYS) 0.6 MG tablet Take 0.6 mg by mouth 3 times daily on the first day of gout flare, then take 0.6 mg once or twice daily until flare resolves    EXCEDRIN TENSION HEADACHE 500-65 MG PO TABS 1 tablet twice daily as needed    Flaxseed, Linseed, (FLAXSEED OIL PO) Take 1 Tablespoonful by mouth daily    fluticasone (FLONASE) 50 MCG/ACT nasal spray Spray 2 sprays into both nostrils daily    hydrochlorothiazide (HYDRODIURIL) 12.5 MG tablet Take 1 tablet (12.5 mg) by mouth every morning    levalbuterol (XOPENEX HFA) 45 MCG/ACT inhaler INHALE 2 PUFFS INTO THE LUNGS EVERY 6 HOURS AS NEEDED FOR SHORTNESS OF BREATH OR DIFFICULT BREATHING OR WHEEZING    levothyroxine (SYNTHROID/LEVOTHROID) 88 MCG tablet TAKE 1 TABLET(88 MCG) BY MOUTH DAILY    losartan (COZAAR) 100 MG tablet Take 0.5 tablets (50 mg) by mouth 2 times daily    metoprolol succinate ER (TOPROL XL) 25 MG 24 hr tablet Take 1 tablet (25 mg) by mouth daily    montelukast (SINGULAIR) 10 MG tablet Take 1 tablet (10 mg) by mouth At Bedtime    polyethylene glycol-propylene glycol (SYSTANE ULTRA) 0.4-0.3 % SOLN ophthalmic solution Place 1 drop into both eyes 2 times daily    simvastatin (ZOCOR) 40 MG tablet Take 1 tablet (40 mg) by mouth At Bedtime    traZODone (DESYREL) 50 MG tablet Take 1-2 tablets ( mg) by mouth nightly as needed for sleep    venlafaxine (EFFEXOR XR) 150 MG 24 hr capsule 2 Capsules by mouth daily    zinc 50 MG TABS Take 1 tablet by mouth daily     No current facility-administered medications for this visit.      Vitals:  There were no  vitals taken for this visit.    GENERAL: Healthy, alert and no distress  EYES: Eyes grossly normal to inspection.  No discharge or erythema, or obvious scleral/conjunctival abnormalities.  RESP: No audible wheeze, cough, or visible cyanosis.  No visible retractions or increased work of breathing.    SKIN: Visible skin clear. No significant rash, abnormal pigmentation or lesions.  NEURO: Cranial nerves grossly intact.  Mentation and speech appropriate for age.  PSYCH: Mentation appears normal, affect normal/bright, judgement and insight intact, normal speech and appearance well-groomed.      LABS:   CMP  Recent Labs   Lab Test 07/03/23  1008 10/04/22  0828 05/18/22  1157 08/31/21  1117 11/09/20  0953 03/17/20  1124 02/26/20  1059 09/30/19  1012    138 138 136 138 138 139 140   POTASSIUM 4.3 3.9 4.5 3.8 4.7 4.3 4.3 4.5   CHLORIDE 99 105 104 105 102 106 111* 110*   CO2 23 29 29 26 33* 29 23 25   ANIONGAP 15 4 5 5 3 3 5 5   * 106* 106* 112* 111* 104* 98 105*   BUN 24.9* 36* 37* 23 32* 22 27 17   CR 1.18* 1.19* 1.14* 1.17* 1.26* 1.10* 0.92 1.01   GFRESTIMATED 48* 48* 51* 47* 43* 50* 62 56*   GFRESTBLACK  --   --   --   --  49* 58* 72 65   SAMARIA 9.8 9.4 9.5 8.7 8.9 8.7 8.9 8.8     Recent Labs   Lab Test 05/18/22  1157 08/31/21  1117 11/09/20  0953 09/30/19  1012   BILITOTAL 0.3 0.4 0.4 0.3   ALKPHOS 86 94 103 118   ALT 40 51* 100* 51*   AST 29 36 83* 31     CBC  Recent Labs   Lab Test 07/03/23  1008 05/18/22  1157 03/18/19  1119 02/22/19  1131   HGB 13.9 14.6 14.5 15.0   WBC 7.2 8.3 6.2 5.8   RBC 4.56 4.60 4.72 4.90   HCT 43.0 44.9 44.2 46.3   MCV 94 98 94 95   MCH 30.5 31.7 30.7 30.6   MCHC 32.3 32.5 32.8 32.4   RDW 13.8 13.3 13.1 13.3    237 253 233     URINE STUDIES  Recent Labs   Lab Test 07/03/23  1008 10/27/17  1101   COLOR Yellow Yellow   APPEARANCE Clear Clear   URINEGLC Negative Negative   URINEBILI Negative Negative   URINEKETONE Negative Negative   SG 1.020 1.010   UBLD Negative Negative    URINEPH 5.5 6.0   PROTEIN >=300* Negative   UROBILINOGEN 0.2 0.2   NITRITE Negative Negative   LEUKEST Trace* Negative   RBCU 0-2  --    WBCU 0-5  --      No lab results found.  PTH  Recent Labs   Lab Test 07/03/23  1008   PTHI 18     IRON STUDIES  Recent Labs   Lab Test 02/01/17  1302   IRON 56      IRONSAT 17   CORRIE 176     FINDINGS:   LOWER CHEST: Elevated right hemidiaphragm. Mild linear atelectasis in  the right lung base. Coronary stents.     HEPATOBILIARY: No focal lesions of the liver. Cholecystectomy.     PANCREAS: Stable mild prominence of the main pancreatic duct measuring  up to 5 mm in the pancreatic head. No pancreatic masses or  inflammatory changes.     SPLEEN: Normal.     ADRENAL GLANDS: Normal.     KIDNEYS:   RIGHT: No calculi, significant mass or hydronephrosis. No abnormal  intrathecal enhancement or filling defects in the right renal  collecting system and visualized ureter.     LEFT: Stable size of the 1.8 x 1.5 x 2.1 cm exophytic hyperdense mass  at the lower pole of the left kidney, previously measuring 1.7 x 1.5 x  2.1 cm. There is possible borderline/equivocal enhancement (CT  attenuation on the noncontrast phase is 67 Hounsfield units and on the  postcontrast phases ranges from 74-79 Hounsfield units) in this mass  on the postcontrast images although this may be artifact due to the  endophytic location of the mass. No calculi or hydronephrosis. No  abnormal urothelial enhancement or filling defects in the renal  collecting system and visualized left ureter.     BOWEL: The visualized loops of small bowel and colon are normal in  caliber.     ADDITIONAL FINDINGS: No abdominal lymphadenopathy. No abdominal aortic  aneurysm.     MUSCULOSKELETAL: Mild degenerative changes in the spine. No suspicious  lesions in the bones.                                                                      IMPRESSION:   1.  Stable size of a 1.8 x 1.5 x 2.1 cm hyperdense mass at the lower  pole of the  left kidney, likely a cyst containing  hemorrhagic/proteinaceous material. There is equivocal/borderline  enhancement in the mass versus artifact due to pseudoenhancement due  to the endophytic location of the mass. A follow-up renal protocol CT  or MRI in one year can be considered.        FINDINGS:   LOWER CHEST: Elevated right hemidiaphragm. Mild linear atelectasis in  the right lung base. Coronary stents.     HEPATOBILIARY: No focal lesions of the liver. Cholecystectomy.     PANCREAS: Stable mild prominence of the main pancreatic duct measuring  up to 5 mm in the pancreatic head. No pancreatic masses or  inflammatory changes.     SPLEEN: Normal.     ADRENAL GLANDS: Normal.     KIDNEYS:   RIGHT: No calculi, significant mass or hydronephrosis. No abnormal  intrathecal enhancement or filling defects in the right renal  collecting system and visualized ureter.     LEFT: Stable size of the 1.8 x 1.5 x 2.1 cm exophytic hyperdense mass  at the lower pole of the left kidney, previously measuring 1.7 x 1.5 x  2.1 cm. There is possible borderline/equivocal enhancement (CT  attenuation on the noncontrast phase is 67 Hounsfield units and on the  postcontrast phases ranges from 74-79 Hounsfield units) in this mass  on the postcontrast images although this may be artifact due to the  endophytic location of the mass. No calculi or hydronephrosis. No  abnormal urothelial enhancement or filling defects in the renal  collecting system and visualized left ureter.     BOWEL: The visualized loops of small bowel and colon are normal in  caliber.     ADDITIONAL FINDINGS: No abdominal lymphadenopathy. No abdominal aortic  aneurysm.     MUSCULOSKELETAL: Mild degenerative changes in the spine. No suspicious  lesions in the bones.                                                                      IMPRESSION:   1.  Stable size of a 1.8 x 1.5 x 2.1 cm hyperdense mass at the lower  pole of the left kidney, likely a cyst  containing  hemorrhagic/proteinaceous material. There is equivocal/borderline  enhancement in the mass versus artifact due to pseudoenhancement due  to the endophytic location of the mass. A follow-up renal protocol CT  or MRI in one year can be considered.  Berenice Skinner MD      Again, thank you for allowing me to participate in the care of your patient.      Sincerely,    Berenice Skinner MD

## 2023-07-10 NOTE — PROGRESS NOTES
Abbey is a 73 year old who is being evaluated via a billable video visit.      How would you like to obtain your AVS? MyChart  If the video visit is dropped, the invitation should be resent by: Text to cell phone: [phone]  Will anyone else be joining your video visit? Yes: Wife-Merly. How would they like to receive their invitation? Text to cell phone: [phone]    Video-Visit Details    Type of service:  Video Visit   Video Start Time: 210 pm  Video End Time:2:55 PM    Originating Location (pt. Location): Home  Distant Location (provider location):  On-site  Platform used for Video Visit: [email or web address]  >60 minutes spent on day of service in review of records and coordination of care.      Assessment and Plan:  73 year old female with history of hypertension, CAD s/p stents in 2019, bladder CA in 2015, renal mass, who presents for evaluation  # CKD stage 3a/b- her creatinine has fluctuated but has been elevated for over ten years. She had bladder cancer in 2015, CAD and stents in 2019 and her creatinine peaked at 1.6 in 2016, and now has been in 1.1-1.2 range.  She has had proteinuria for many years, also peaking at 1.5 grams albuminuria in 2018 and now down to 0.5 g/g cr protein on recent check.  She is overweight but not diabetic that she knows of . An A1C has been ordered  - she has no energy , and thus will check TSH, recommended follwoup with cardiology and I will check a few serologies.  - proteinuria may be due to hypertension, obesity, and also she has renal tumor which can cause membranous/ proteinuria.     # Hypertension: her blood pressure seems well controlled. Does not seem to have low readings, mostly 120s range at home  - she has significant fatigue/ low energy, which she will followup on with cardiology and primary care.    # Anemia in chronic renal disease:   - Hgb: Stable  - Iron studies: Not checked recently    # Electrolytes:   - Potassium; level: Normal  - Bicarbonate; level: Normal    #  Mineral Bone Disorder:   - Calcium; level is:  Normal  - Phosphorus; level is: Normal      Assessment and plan was discussed with patient and she voiced her understanding and agreement.    Consult:  Abbey Omer was seen in consultation at the request of Sarah aVllejo for CKD management.    Reason for Visit:  Abbey Omer is a 73 year old female with CKD from CAD/ nephrosclerosis, who presents for evaluation.    HPI:  73 year old female with history of hypertension, CAD s/p stents in 2019, bladder CA in 2015, renal mass, who presents for evaluation of CKD stage 3a/b.  Her creatinine has fluctuated but has been elevated for over ten years. She had bladder cancer in 2015 s/p TURBT without any chemo or other treatment, CAD and stents in 2019 and her creatinine peaked at 1.6 in 2016, and now has been in 1.2 range.  She feels OK overall but feels she has gained weight and has no drive or energy to do anything.  We discussed that it is not likely due to her kidney function, but to monitor blood pressure and heart rate, followup with cardiology, recheck TSH and go from there.  Her last BP was 146/77, taken by a nurse, other readings are 121-134 range.  She denies family history of kidney disease  She has a renal mass that is being monitored and will repeat imaging likely in October 2023    Renal History:   Kidney Disease and Medical Hx:  h/o HTN: Yes      ROS:   A comprehensive review of systems was obtained and negative, except as noted in the HPI or PMH.    Active Medical Problems:  Patient Active Problem List   Diagnosis     RICKEY (obstructive sleep apnea)     Hypothyroidism     Chronic nonallergic rhinitis     CKD (chronic kidney disease) stage 3, GFR 30-59 ml/min (H)     Hyperlipidemia LDL goal <130     Morbid obesity (H)     History of bladder cancer     Hypertension goal BP (blood pressure) < 140/90     Advance Care Planning     Dependent edema     Elevated LFTs     DDD (degenerative disc disease),  cervical     Major depressive disorder, recurrent episode, mild degree (H)     Macular drusen     Hypertriglyceridemia     Allergic conjunctivitis     Pulmonary nodules     Acne     Combined form of age-related cataract, both eyes     Glaucoma suspect, bilateral     Drusen of macula of both eyes     Renal cyst, left     Discoid atelectasis     Cough     Abnormal electrocardiogram     Coronary artery disease involving native coronary artery of native heart without angina pectoris     Epistaxis     Retrognathia     CAD S/P percutaneous coronary angioplasty     Chronic gout involving toe without tophus, unspecified cause, unspecified laterality     Lesion of left native kidney     PMH:   Medical record was reviewed and PMH was discussed with patient and noted below.  Past Medical History:   Diagnosis Date     Cancer of bladder (H)      Cataract 11/25/2011     Chronic nonallergic rhinitis 8/31/10 RAST     CKD (chronic kidney disease) stage 3, GFR 30-59 ml/min (H) 12/20/2010     Coronary artery disease involving native coronary artery of native heart without angina pectoris 3/1/2019     DDD (degenerative disc disease), cervical 6/16/2011     Dependent edema      Depression, major      Depressive disorder In 2002 or 2003     Glaucoma suspect, bilateral      History of blood transfusion ?     HTN (hypertension)      Hyperlipidemia      Hypothyroidism 10/12/2009     Migraine     resolved     Nasal septal deviation 8/11/2009     Obesity 5/17/2011     RICKEY (obstructive sleep apnea)      Osteoarthritis of shoulder region      Osteoporosis      Premature menopause      Renal cyst      PSH:   Past Surgical History:   Procedure Laterality Date     ARTHROPLASTY SHOULDER Right 11/01/2017    Right total shoulder arthroplasty by Dr. Go at [facility]     ARTHROPLASTY SHOULDER Left 03/28/2018    Left total shoulder arthroplasty by Dr. Go at [facility]     CATARACT IOL, RT/LT        CHOLECYSTECTOMY, LAPOROSCOPIC       COLONOSCOPY  ?    Upper & Lower     CV CORONARY ANGIOGRAM  02/22/2019     CV CORONARY ANGIOGRAM N/A 02/22/2019    Procedure: 1330 CORS;  Surgeon: Marito Arriaga MD;  Location:  [facility]     CV HEART CATHETERIZATION WITH POSSIBLE INTERVENTION N/A 03/18/2019    Procedure: STAGED PCI-JALEN CARABALLO;  Surgeon: Zeus Cordero MD;  Location:  [facility]     CYSTOSCOPY      bladder cancer     D & C       ENT SURGERY  first 1967/second ?    2 Rhynoplasties     HYSTERECTOMY, PAP NO LONGER INDICATED      BSO     NOSE SURGERY      septum deviation     PHACOEMULSIFICATION CLEAR CORNEA WITH STANDARD INTRAOCULAR LENS IMPLANT Left 09/24/2018    Procedure: PHACOEMULSIFICATION CLEAR CORNEA WITH STANDARD INTRAOCULAR LENS IMPLANT;  LEFT EYE PHACOEMULSIFICATION CLEAR CORNEA WITH STANDARD INTRAOCULAR LENS IMPLANT;  Surgeon: Ryne Pascal MD;  Location:  [facility]     PHACOEMULSIFICATION WITH STANDARD INTRAOCULAR LENS IMPLANT Right 10/15/2018    Procedure: PHACOEMULSIFICATION WITH STANDARD INTRAOCULAR LENS IMPLANT ;  Surgeon: Ryne Pascal MD;  Location: [facility]       Family Hx:   Family History   Problem Relation Age of Onset     Diabetes Maternal Grandfather      Hypertension Maternal Grandfather      Diabetes Son      Heart Disease Father         CHF     Diabetes Mother      Depression Mother      Hyperlipidemia Mother      Anxiety Disorder Mother      Asthma Mother      Obesity Mother      Substance Abuse Brother         Not any longer     Diabetes Brother      Depression Daughter      Diabetes Maternal Uncle      Cerebrovascular Disease Maternal Uncle      Diabetes Son         after accident     Depression Daughter      Depression Son         after accident     Substance Abuse Brother         Not any longer     Diabetes Sister      Substance Abuse Sister      Diabetes Brother      Thyroid Disease No family hx of      Glaucoma No family hx of      Macular  Degeneration No family hx of      Cancer No family hx of      Personal Hx:   Social History     Tobacco Use     Smoking status: Former     Packs/day: 0.10     Years: 1.00     Pack years: 0.10     Types: Cigarettes     Start date: 1969     Quit date: 10/12/1969     Years since quittin.7     Smokeless tobacco: Never     Tobacco comments:     Smoked, very little, for 2 mo, 49 years ago.   Substance Use Topics     Alcohol use: Yes     Comment: Maybe 1 or 2 drinks a year       Allergies:  Allergies   Allergen Reactions     Duloxetine Hcl      Visual hallucinations     Zolmitriptan Other (See Comments) and Dizziness     Can't wake up  Cant wake up     Aluminum Hydroxide Nausea     Atenolol Other (See Comments)     Fatigue  Fatigue     Codeine Nausea and Vomiting and Nausea     abd pain     Colestid [Colestipol Hcl]      Colestipol Unknown     Other Drug Allergy (See Comments) Other (See Comments)     Joint pain     Potassium Nausea     Albuterol Other (See Comments)     Very jittery and couldn't tolerate  Very jittery and couldn't tolerate     Amlodipine Other (See Comments) and Swelling     Swelling- lower legs     Asa [Aspirin] GI Disturbance     Atenolol Fatigue     Atorvastatin Other (See Comments)     Swelling legs  Swelling legs     Bupropion Unknown     Crestor [Statins] Other (See Comments)     Joint pain     Ezetimibe Other (See Comments)     Fatigue     Hydralazine Other (See Comments) and Fatigue     Fatigue     Lisinopril Cough     Lovastatin Other (See Comments) and Cramps     Cramps     Morphine And Related Nausea     Niacin      Unknown by patient  Other reaction(s): *Unknown  Unknown by patient     Paroxetine Other (See Comments)     Fatigue     Paxil [Paroxetine] Fatigue     Potassium GI Disturbance     Sulfa Antibiotics Fatigue and Nausea and Vomiting     Sulfasalazine Other (See Comments)     Triamterene Unknown     Other reaction(s): *Unknown       Medications:  Current Outpatient Medications    Medication Sig     allopurinol (ZYLOPRIM) 100 MG tablet Take 1 tablet (100 mg) by mouth daily     aspirin (ASA) 81 MG [facility] tablet Take 81 mg by mouth daily     B Complex Vitamins (VITAMIN B COMPLEX PO) Take 1,250 mg by mouth      colchicine (COLCRYS) 0.6 MG tablet Take 0.6 mg by mouth 3 times daily on the first day of gout flare, then take 0.6 mg once or twice daily until flare resolves     EXCEDRIN TENSION HEADACHE 500-65 MG PO TABS 1 tablet twice daily as needed     Flaxseed, Linseed, (FLAXSEED OIL PO) Take 1 Tablespoonful by mouth daily     fluticasone (FLONASE) 50 MCG/ACT nasal spray Spray 2 sprays into both nostrils daily     hydrochlorothiazide (HYDRODIURIL) 12.5 MG tablet Take 1 tablet (12.5 mg) by mouth every morning     levalbuterol (XOPENEX HFA) 45 MCG/ACT inhaler INHALE 2 PUFFS INTO THE LUNGS EVERY 6 HOURS AS NEEDED FOR SHORTNESS OF BREATH OR DIFFICULT BREATHING OR WHEEZING     levothyroxine (SYNTHROID/LEVOTHROID) 88 MCG tablet TAKE 1 TABLET(88 MCG) BY MOUTH DAILY     losartan (COZAAR) 100 MG tablet Take 0.5 tablets (50 mg) by mouth 2 times daily     metoprolol succinate ER (TOPROL XL) 25 MG 24 hr tablet Take 1 tablet (25 mg) by mouth daily     montelukast (SINGULAIR) 10 MG tablet Take 1 tablet (10 mg) by mouth At Bedtime     polyethylene glycol-propylene glycol (SYSTANE ULTRA) 0.4-0.3 % SOLN ophthalmic solution Place 1 drop into both eyes 2 times daily     simvastatin (ZOCOR) 40 MG tablet Take 1 tablet (40 mg) by mouth At Bedtime     traZODone (DESYREL) 50 MG tablet Take 1-2 tablets ( mg) by mouth nightly as needed for sleep     venlafaxine (EFFEXOR XR) 150 MG 24 hr capsule 2 Capsules by mouth daily     zinc 50 MG TABS Take 1 tablet by mouth daily     No current facility-administered medications for this visit.      Vitals:  There were no vitals taken for this visit.    GENERAL: Healthy, alert and no distress  EYES: Eyes grossly normal to inspection.  No discharge or erythema, or obvious  scleral/conjunctival abnormalities.  RESP: No audible wheeze, cough, or visible cyanosis.  No visible retractions or increased work of breathing.    SKIN: Visible skin clear. No significant rash, abnormal pigmentation or lesions.  NEURO: Cranial nerves grossly intact.  Mentation and speech appropriate for age.  PSYCH: Mentation appears normal, affect normal/bright, judgement and insight intact, normal speech and appearance well-groomed.      LABS:   CMP  Recent Labs   Lab Test 07/03/23  1008 10/04/22  0828 05/18/22  1157 08/31/21  1117 11/09/20  0953 03/17/20  1124 02/26/20  1059 09/30/19  1012    138 138 136 138 138 139 140   POTASSIUM 4.3 3.9 4.5 3.8 4.7 4.3 4.3 4.5   CHLORIDE 99 105 104 105 102 106 111* 110*   CO2 23 29 29 26 33* 29 23 25   ANIONGAP 15 4 5 5 3 3 5 5   * 106* 106* 112* 111* 104* 98 105*   BUN 24.9* 36* 37* 23 32* 22 27 17   CR 1.18* 1.19* 1.14* 1.17* 1.26* 1.10* 0.92 1.01   GFRESTIMATED 48* 48* 51* 47* 43* 50* 62 56*   GFRESTBLACK  --   --   --   --  49* 58* 72 65   SAMARIA 9.8 9.4 9.5 8.7 8.9 8.7 8.9 8.8     Recent Labs   Lab Test 05/18/22  1157 08/31/21  1117 11/09/20  0953 09/30/19  1012   BILITOTAL 0.3 0.4 0.4 0.3   ALKPHOS 86 94 103 118   ALT 40 51* 100* 51*   AST 29 36 83* 31     CBC  Recent Labs   Lab Test 07/03/23  1008 05/18/22  1157 03/18/19  1119 02/22/19  1131   HGB 13.9 14.6 14.5 15.0   WBC 7.2 8.3 6.2 5.8   RBC 4.56 4.60 4.72 4.90   HCT 43.0 44.9 44.2 46.3   MCV 94 98 94 95   MCH 30.5 31.7 30.7 30.6   MCHC 32.3 32.5 32.8 32.4   RDW 13.8 13.3 13.1 13.3    237 253 233     URINE STUDIES  Recent Labs   Lab Test 07/03/23  1008 10/27/17  1101   COLOR Yellow Yellow   APPEARANCE Clear Clear   URINEGLC Negative Negative   URINEBILI Negative Negative   URINEKETONE Negative Negative   SG 1.020 1.010   UBLD Negative Negative   URINEPH 5.5 6.0   PROTEIN >=300* Negative   UROBILINOGEN 0.2 0.2   NITRITE Negative Negative   LEUKEST Trace* Negative   RBCU 0-2  --    WBCU 0-5  --       No lab results found.  PTH  Recent Labs   Lab Test 07/03/23  1008   PTHI 18     IRON STUDIES  Recent Labs   Lab Test 02/01/17  1302   IRON 56      IRONSAT 17   CORRIE 176     FINDINGS:   LOWER CHEST: Elevated right hemidiaphragm. Mild linear atelectasis in  the right lung base. Coronary stents.     HEPATOBILIARY: No focal lesions of the liver. Cholecystectomy.     PANCREAS: Stable mild prominence of the main pancreatic duct measuring  up to 5 mm in the pancreatic head. No pancreatic masses or  inflammatory changes.     SPLEEN: Normal.     ADRENAL GLANDS: Normal.     KIDNEYS:   RIGHT: No calculi, significant mass or hydronephrosis. No abnormal  intrathecal enhancement or filling defects in the right renal  collecting system and visualized ureter.     LEFT: Stable size of the 1.8 x 1.5 x 2.1 cm exophytic hyperdense mass  at the lower pole of the left kidney, previously measuring 1.7 x 1.5 x  2.1 cm. There is possible borderline/equivocal enhancement (CT  attenuation on the noncontrast phase is 67 Hounsfield units and on the  postcontrast phases ranges from 74-79 Hounsfield units) in this mass  on the postcontrast images although this may be artifact due to the  endophytic location of the mass. No calculi or hydronephrosis. No  abnormal urothelial enhancement or filling defects in the renal  collecting system and visualized left ureter.     BOWEL: The visualized loops of small bowel and colon are normal in  caliber.     ADDITIONAL FINDINGS: No abdominal lymphadenopathy. No abdominal aortic  aneurysm.     MUSCULOSKELETAL: Mild degenerative changes in the spine. No suspicious  lesions in the bones.                                                                      IMPRESSION:   1.  Stable size of a 1.8 x 1.5 x 2.1 cm hyperdense mass at the lower  pole of the left kidney, likely a cyst containing  hemorrhagic/proteinaceous material. There is equivocal/borderline  enhancement in the mass versus artifact due to  pseudoenhancement due  to the endophytic location of the mass. A follow-up renal protocol CT  or MRI in one year can be considered.        FINDINGS:   LOWER CHEST: Elevated right hemidiaphragm. Mild linear atelectasis in  the right lung base. Coronary stents.     HEPATOBILIARY: No focal lesions of the liver. Cholecystectomy.     PANCREAS: Stable mild prominence of the main pancreatic duct measuring  up to 5 mm in the pancreatic head. No pancreatic masses or  inflammatory changes.     SPLEEN: Normal.     ADRENAL GLANDS: Normal.     KIDNEYS:   RIGHT: No calculi, significant mass or hydronephrosis. No abnormal  intrathecal enhancement or filling defects in the right renal  collecting system and visualized ureter.     LEFT: Stable size of the 1.8 x 1.5 x 2.1 cm exophytic hyperdense mass  at the lower pole of the left kidney, previously measuring 1.7 x 1.5 x  2.1 cm. There is possible borderline/equivocal enhancement (CT  attenuation on the noncontrast phase is 67 Hounsfield units and on the  postcontrast phases ranges from 74-79 Hounsfield units) in this mass  on the postcontrast images although this may be artifact due to the  endophytic location of the mass. No calculi or hydronephrosis. No  abnormal urothelial enhancement or filling defects in the renal  collecting system and visualized left ureter.     BOWEL: The visualized loops of small bowel and colon are normal in  caliber.     ADDITIONAL FINDINGS: No abdominal lymphadenopathy. No abdominal aortic  aneurysm.     MUSCULOSKELETAL: Mild degenerative changes in the spine. No suspicious  lesions in the bones.                                                                      IMPRESSION:   1.  Stable size of a 1.8 x 1.5 x 2.1 cm hyperdense mass at the lower  pole of the left kidney, likely a cyst containing  hemorrhagic/proteinaceous material. There is equivocal/borderline  enhancement in the mass versus artifact due to pseudoenhancement due  to the endophytic  location of the mass. A follow-up renal protocol CT  or MRI in one year can be considered.  Berenice Skinenr MD

## 2023-07-20 NOTE — Clinical Note
FFR wire removed.   Griseofulvin Counseling:  I discussed with the patient the risks of griseofulvin including but not limited to photosensitivity, cytopenia, liver damage, nausea/vomiting and severe allergy.  The patient understands that this medication is best absorbed when taken with a fatty meal (e.g., ice cream or french fries).

## 2023-08-02 ENCOUNTER — ANCILLARY PROCEDURE (OUTPATIENT)
Dept: MAMMOGRAPHY | Facility: CLINIC | Age: 73
End: 2023-08-02
Attending: NURSE PRACTITIONER
Payer: MEDICARE

## 2023-08-02 ENCOUNTER — OFFICE VISIT (OUTPATIENT)
Dept: OPHTHALMOLOGY | Facility: CLINIC | Age: 73
End: 2023-08-02
Payer: MEDICARE

## 2023-08-02 DIAGNOSIS — Z12.31 VISIT FOR SCREENING MAMMOGRAM: ICD-10-CM

## 2023-08-02 DIAGNOSIS — H43.813 PVD (POSTERIOR VITREOUS DETACHMENT), BOTH EYES: ICD-10-CM

## 2023-08-02 DIAGNOSIS — H52.13 MYOPIA OF BOTH EYES WITH REGULAR ASTIGMATISM AND PRESBYOPIA: ICD-10-CM

## 2023-08-02 DIAGNOSIS — H40.003 GLAUCOMA SUSPECT OF BOTH EYES: Primary | ICD-10-CM

## 2023-08-02 DIAGNOSIS — H52.223 MYOPIA OF BOTH EYES WITH REGULAR ASTIGMATISM AND PRESBYOPIA: ICD-10-CM

## 2023-08-02 DIAGNOSIS — Z96.1 PSEUDOPHAKIA: ICD-10-CM

## 2023-08-02 DIAGNOSIS — H52.4 MYOPIA OF BOTH EYES WITH REGULAR ASTIGMATISM AND PRESBYOPIA: ICD-10-CM

## 2023-08-02 DIAGNOSIS — H16.223 KERATOCONJUNCTIVITIS SICCA OF BOTH EYES NOT SPECIFIED AS SJOGREN'S: ICD-10-CM

## 2023-08-02 PROCEDURE — 92014 COMPRE OPH EXAM EST PT 1/>: CPT | Performed by: STUDENT IN AN ORGANIZED HEALTH CARE EDUCATION/TRAINING PROGRAM

## 2023-08-02 PROCEDURE — 77063 BREAST TOMOSYNTHESIS BI: CPT | Mod: TC | Performed by: RADIOLOGY

## 2023-08-02 PROCEDURE — 77067 SCR MAMMO BI INCL CAD: CPT | Mod: TC | Performed by: RADIOLOGY

## 2023-08-02 ASSESSMENT — CONF VISUAL FIELD
METHOD: COUNTING FINGERS
OS_NORMAL: 1
OS_SUPERIOR_TEMPORAL_RESTRICTION: 0
OS_SUPERIOR_NASAL_RESTRICTION: 0
OS_INFERIOR_NASAL_RESTRICTION: 0
OD_SUPERIOR_TEMPORAL_RESTRICTION: 0
OD_SUPERIOR_NASAL_RESTRICTION: 0
OD_NORMAL: 1
OS_INFERIOR_TEMPORAL_RESTRICTION: 0
OD_INFERIOR_TEMPORAL_RESTRICTION: 0
OD_INFERIOR_NASAL_RESTRICTION: 0

## 2023-08-02 ASSESSMENT — REFRACTION_MANIFEST
OD_CYLINDER: +1.25
OD_ADD: +2.50
OD_SPHERE: PLANO
OS_CYLINDER: +1.75
OS_SPHERE: -1.00
OD_AXIS: 177
OS_AXIS: 180
OS_ADD: +2.50

## 2023-08-02 ASSESSMENT — TONOMETRY
OD_IOP_MMHG: 18
OS_IOP_MMHG: 17
IOP_METHOD: APPLANATION

## 2023-08-02 ASSESSMENT — SLIT LAMP EXAM - LIDS
COMMENTS: NORMAL
COMMENTS: NORMAL

## 2023-08-02 ASSESSMENT — VISUAL ACUITY
OD_SC: 20/25
OS_PH_SC: 20/30
OS_SC: 20/60
OD_SC+: -2
METHOD: SNELLEN - LINEAR

## 2023-08-02 ASSESSMENT — CUP TO DISC RATIO
OD_RATIO: 0.4
OS_RATIO: 0.65

## 2023-08-02 ASSESSMENT — EXTERNAL EXAM - RIGHT EYE: OD_EXAM: NORMAL

## 2023-08-02 ASSESSMENT — EXTERNAL EXAM - LEFT EYE: OS_EXAM: NORMAL

## 2023-08-02 NOTE — LETTER
"    8/2/2023         RE: Abbey Omer  [address] [facility] [address]        Dear Colleague,    Thank you for referring your patient, Abbey Omer, to the [facility]. Please see a copy of my visit note below.     Current Eye Medications:  systane - both eyes BID-QID     Subjective:  comprehensive eye exam - distance vision overall stable without correction, vision can fade out a little but becomes clear again with blinking, admits she is not consistent with systane. Near vision is stable with OTC readers.      Objective:  See Ophthalmology Exam.       Assessment:  Abbey Omer is a 73 year old female who presents with:   Encounter Diagnoses   Name Primary?     Glaucoma suspect of both eyes Intraocular pressure 18/17 today. Monitor.      Pseudophakia - Both Eyes      PVD (posterior vitreous detachment), both eyes      Keratoconjunctivitis sicca of both eyes not specified as Sjogren's      Myopia of both eyes with regular astigmatism and presbyopia        Plan:  Continue artificial tears up to four times a day as needed (Refresh Optive or Systane Balance. Avoid \"get the red out\" drops).    Continue over the counter readers or can fill glasses prescription given     Ryne Pascal MD  [phone]        Again, thank you for allowing me to participate in the care of your patient.        Sincerely,        Ryne Pascal MD  "

## 2023-08-02 NOTE — RESULT ENCOUNTER NOTE
Mammo results managed by the breast center. Results communicated to the patient by their office.   Acosta Courtney D.O.

## 2023-08-02 NOTE — PATIENT INSTRUCTIONS
"Continue artificial tears up to four times a day as needed (Refresh Optive or Systane Balance. Avoid \"get the red out\" drops).    Continue over the counter readers or can fill glasses prescription given     Ryne Pascal MD  [phone]    "

## 2023-08-02 NOTE — PROGRESS NOTES
" Current Eye Medications:  systane - both eyes BID-QID     Subjective:  comprehensive eye exam - distance vision overall stable without correction, vision can fade out a little but becomes clear again with blinking, admits she is not consistent with systane. Near vision is stable with OTC readers.      Objective:  See Ophthalmology Exam.       Assessment:  Abbey Omer is a 73 year old female who presents with:   Encounter Diagnoses   Name Primary?    Glaucoma suspect of both eyes Intraocular pressure 18/17 today. Monitor.     Pseudophakia - Both Eyes     PVD (posterior vitreous detachment), both eyes     Keratoconjunctivitis sicca of both eyes not specified as Sjogren's     Myopia of both eyes with regular astigmatism and presbyopia        Plan:  Continue artificial tears up to four times a day as needed (Refresh Optive or Systane Balance. Avoid \"get the red out\" drops).    Continue over the counter readers or can fill glasses prescription given     Ryne Pascal MD  [phone]      "

## 2023-08-03 ENCOUNTER — ANCILLARY ORDERS (OUTPATIENT)
Dept: FAMILY MEDICINE | Facility: CLINIC | Age: 73
End: 2023-08-03

## 2023-08-03 DIAGNOSIS — R92.8 ABNORMAL MAMMOGRAM: Primary | ICD-10-CM

## 2023-08-21 ENCOUNTER — ANCILLARY PROCEDURE (OUTPATIENT)
Dept: ULTRASOUND IMAGING | Facility: CLINIC | Age: 73
End: 2023-08-21
Attending: NURSE PRACTITIONER
Payer: MEDICARE

## 2023-08-21 ENCOUNTER — ANCILLARY ORDERS (OUTPATIENT)
Dept: FAMILY MEDICINE | Facility: CLINIC | Age: 73
End: 2023-08-21

## 2023-08-21 ENCOUNTER — ANCILLARY PROCEDURE (OUTPATIENT)
Dept: MAMMOGRAPHY | Facility: CLINIC | Age: 73
End: 2023-08-21
Attending: NURSE PRACTITIONER
Payer: MEDICARE

## 2023-08-21 DIAGNOSIS — R92.8 ABNORMAL MAMMOGRAM: Primary | ICD-10-CM

## 2023-08-21 DIAGNOSIS — R92.8 ABNORMAL MAMMOGRAM: ICD-10-CM

## 2023-08-21 PROCEDURE — G0279 TOMOSYNTHESIS, MAMMO: HCPCS

## 2023-08-21 PROCEDURE — 77065 DX MAMMO INCL CAD UNI: CPT | Mod: LT

## 2023-08-21 PROCEDURE — 76642 ULTRASOUND BREAST LIMITED: CPT | Mod: LT

## 2023-08-29 ENCOUNTER — LAB (OUTPATIENT)
Dept: LAB | Facility: CLINIC | Age: 73
End: 2023-08-29
Payer: MEDICARE

## 2023-08-29 DIAGNOSIS — E66.01 MORBID OBESITY (H): ICD-10-CM

## 2023-08-29 DIAGNOSIS — N18.31 STAGE 3A CHRONIC KIDNEY DISEASE (H): ICD-10-CM

## 2023-08-29 DIAGNOSIS — E03.9 HYPOTHYROIDISM, UNSPECIFIED TYPE: ICD-10-CM

## 2023-08-29 DIAGNOSIS — E78.1 HYPERTRIGLYCERIDEMIA: ICD-10-CM

## 2023-08-29 LAB
ALT SERPL W P-5'-P-CCNC: 29 U/L (ref 0–50)
CHOLEST SERPL-MCNC: 155 MG/DL
HBA1C MFR BLD: 6.2 % (ref 0–5.6)
HDLC SERPL-MCNC: 52 MG/DL
LDLC SERPL CALC-MCNC: 58 MG/DL
NONHDLC SERPL-MCNC: 103 MG/DL
TRIGL SERPL-MCNC: 224 MG/DL
TSH SERPL DL<=0.005 MIU/L-ACNC: 3.89 UIU/ML (ref 0.3–4.2)
URATE SERPL-MCNC: 6.7 MG/DL (ref 2.4–5.7)

## 2023-08-29 PROCEDURE — 80061 LIPID PANEL: CPT

## 2023-08-29 PROCEDURE — 86160 COMPLEMENT ANTIGEN: CPT

## 2023-08-29 PROCEDURE — 84460 ALANINE AMINO (ALT) (SGPT): CPT

## 2023-08-29 PROCEDURE — 84550 ASSAY OF BLOOD/URIC ACID: CPT

## 2023-08-29 PROCEDURE — 83036 HEMOGLOBIN GLYCOSYLATED A1C: CPT

## 2023-08-29 PROCEDURE — 84443 ASSAY THYROID STIM HORMONE: CPT

## 2023-08-29 PROCEDURE — 86038 ANTINUCLEAR ANTIBODIES: CPT

## 2023-08-29 PROCEDURE — 36415 COLL VENOUS BLD VENIPUNCTURE: CPT

## 2023-08-30 ENCOUNTER — ANCILLARY PROCEDURE (OUTPATIENT)
Dept: ULTRASOUND IMAGING | Facility: CLINIC | Age: 73
End: 2023-08-30
Attending: NURSE PRACTITIONER
Payer: MEDICARE

## 2023-08-30 ENCOUNTER — ANCILLARY PROCEDURE (OUTPATIENT)
Dept: MAMMOGRAPHY | Facility: CLINIC | Age: 73
End: 2023-08-30
Attending: NURSE PRACTITIONER
Payer: MEDICARE

## 2023-08-30 DIAGNOSIS — R92.8 ABNORMAL MAMMOGRAM: ICD-10-CM

## 2023-08-30 LAB
ANA SER QL IF: NEGATIVE
C3 SERPL-MCNC: 170 MG/DL (ref 81–157)
CREAT BLD-MCNC: 1.1 MG/DL (ref 0.5–1)
GFR SERPL CREATININE-BSD FRML MDRD: 52 ML/MIN/1.73M2

## 2023-08-30 PROCEDURE — 77066 DX MAMMO INCL CAD BI: CPT | Performed by: RADIOLOGY

## 2023-08-30 PROCEDURE — 88360 TUMOR IMMUNOHISTOCHEM/MANUAL: CPT | Performed by: STUDENT IN AN ORGANIZED HEALTH CARE EDUCATION/TRAINING PROGRAM

## 2023-08-30 PROCEDURE — 88305 TISSUE EXAM BY PATHOLOGIST: CPT | Performed by: STUDENT IN AN ORGANIZED HEALTH CARE EDUCATION/TRAINING PROGRAM

## 2023-08-30 PROCEDURE — 19083 BX BREAST 1ST LESION US IMAG: CPT | Mod: LT | Performed by: RADIOLOGY

## 2023-08-30 PROCEDURE — 82565 ASSAY OF CREATININE: CPT

## 2023-08-30 PROCEDURE — 88377 M/PHMTRC ALYS ISHQUANT/SEMIQ: CPT | Performed by: MEDICAL GENETICS

## 2023-08-30 PROCEDURE — G0279 TOMOSYNTHESIS, MAMMO: HCPCS | Performed by: RADIOLOGY

## 2023-08-30 RX ORDER — IOPAMIDOL 755 MG/ML
98 INJECTION, SOLUTION INTRAVASCULAR ONCE
Status: COMPLETED | OUTPATIENT
Start: 2023-08-30 | End: 2023-08-30

## 2023-08-30 RX ADMIN — IOPAMIDOL 98 ML: 755 INJECTION, SOLUTION INTRAVASCULAR at 09:12

## 2023-08-31 PROBLEM — R73.03 PREDIABETES: Status: ACTIVE | Noted: 2023-08-31

## 2023-09-01 ENCOUNTER — PATIENT OUTREACH (OUTPATIENT)
Dept: ONCOLOGY | Facility: CLINIC | Age: 73
End: 2023-09-01
Payer: COMMERCIAL

## 2023-09-01 ENCOUNTER — TELEPHONE (OUTPATIENT)
Dept: GENERAL RADIOLOGY | Facility: CLINIC | Age: 73
End: 2023-09-01
Payer: COMMERCIAL

## 2023-09-01 DIAGNOSIS — C50.912 INVASIVE DUCTAL CARCINOMA OF BREAST, FEMALE, LEFT (H): Primary | ICD-10-CM

## 2023-09-01 LAB
PATH REPORT.COMMENTS IMP SPEC: ABNORMAL
PATH REPORT.COMMENTS IMP SPEC: YES
PATH REPORT.FINAL DX SPEC: ABNORMAL
PATH REPORT.GROSS SPEC: ABNORMAL
PATH REPORT.MICROSCOPIC SPEC OTHER STN: ABNORMAL
PATH REPORT.RELEVANT HX SPEC: ABNORMAL
PATHOLOGY SYNOPTIC REPORT: ABNORMAL
PHOTO IMAGE: ABNORMAL

## 2023-09-01 NOTE — PROGRESS NOTES
New Patient Oncology Nurse Navigator Note     Referring provider: Sarah Vallejo NP      Referring Clinic/Organization: [facility]     Referred to (specialty:) Medical Oncology and Cancer Surgery      Date Referral Received: September 1, 2023     Evaluation for:  C50.912 (ICD-10-CM) - Invasive ductal carcinoma of breast, female, left (H)    Clinical History (per Nurse review of records provided):      Abbey had bilateral screening mammograms on 8/2/23 and a a possible asymmetry in the left breast in the upper outer quadrant, middle depth. Diagnostic breast imaging followed on 8/21 and additional mammogram views obtained to evaluate the possible focal asymmetry in the left breast upper outer quadrant at middle depth, which confirms an irregular indistinct mass at the approximate 1-2 o'clock position with mild associated architectural distortion. Process measures at least 3.5 cm.  On ultrasound, in the corresponding left breast 1:30 position 5 cm from the nipple there is a 2.1 x 1.4 x 1.5 cm irregular hypoechoic spiculated mass. Morphologically benign-appearing lymph nodes are noted in the left axilla.   Contrast-enhanced mammogram preceded breast biopsy on 8/30. Conventional mammogram redemonstrates the spiculated mass with associated architectural distortion in the left upper outer quadrant.There is mild background parenchymal enhancement. There is associated abnormal enhancement involving the above mass and distortion measuring up to 6.2 cm in greatest anteroposterior dimension.    8/30/23 -   LEFT breast, 1:30, 5 cm from nipple, mass, ultrasound guided core biopsy:  -INVASIVE BREAST CARCINOMA OF NO SPECIAL TYPE (INVASIVE DUCTAL CARCINOMA), Jose grade 2  -Invasive carcinoma is estrogen receptor positive, progesterone receptor positive and HER2 equivocal (score 2+) by immunohistochemistry (see biomarker reporting template below)  -HER2 FISH results will be reported separately  by cytogenetics  -See comment  Comment  Invasive carcinoma involves multiple tissue cores, and is 11 mm in greatest dimension in a single core. The Jose grade is 2 (tubule formation 3 + nuclear pleomorphism 2 + mitotic activity 1 = Jose score 6). There are up to 6 mitotic figures in 10 high power fields (field diameter 0.5 mm).    HER2 by FISH was ordered on 9/5.   HER2 by FISH subsequently reported as negative.    Records Location: See Bookmarked material     Records Needed: Breast imaging for past 5 years     9/1 16:30 - Telephoned and spoke with Abbey to assure her we are aware of referrals to cancer surgery and medical oncology. Writer will be back in touch with updates as soon as consults are identified. 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 Her permanent address is [place] but she is willing to go to any location for her care.     9/5 10:31 - Telephoned and spoke with Abbey. The HER2 by FISH will be approximately one more week. We discussed consult with Dr. Salcido at  on 9/13 at 1:00 and Dr. Vega (virtual) on 9/14 at 8:40am. Writer received referral, reviewed for appropriate plan, and call transferred to New Patient Scheduling for completion.

## 2023-09-01 NOTE — TELEPHONE ENCOUNTER
Spoke with patient regarding left breast biopsy results, which indicate invasive ductal carcinoma. Notified patient that the radiologist's recommendation is surgical and oncological consultation. Referral placed. Patient verbalized understanding of these results and all questions answered to her satisfaction.

## 2023-09-06 NOTE — TELEPHONE ENCOUNTER
RECORDS STATUS - BREAST    RECORDS REQUESTED FROM: Epic   DATE REQUESTED:   NOTES DETAILS STATUS   OFFICE NOTE from referring provider Epic 9/1/23: Sarah Vallejo NP   OPERATIVE REPORT [facility] 8/30/23: US BREAST BIOPSY CORE NEEDLE LEFT    MEDICATION LIST [facility]    LABS     PATHOLOGY REPORTS  (Tissue diagnosis, Stage, ER/WV percentage positive and intensity of staining, HER2 IHC, FISH, and all biopsies from breast and any distant metastasis)                 Report in Epic 8/30/23: [id number]    IMAGING (NEED IMAGES & REPORT)     MAMMO PACS 8/30/23-11/6/07   ULTRASOUND PACS 8/30/23: US Breast Bx  8/21/23: US Breast

## 2023-09-08 LAB — INTERPRETATION: NORMAL

## 2023-09-13 ENCOUNTER — PRE VISIT (OUTPATIENT)
Dept: ONCOLOGY | Facility: CLINIC | Age: 73
End: 2023-09-13

## 2023-09-13 ENCOUNTER — ONCOLOGY VISIT (OUTPATIENT)
Dept: ONCOLOGY | Facility: CLINIC | Age: 73
End: 2023-09-13
Attending: NURSE PRACTITIONER
Payer: MEDICARE

## 2023-09-13 VITALS
TEMPERATURE: 98 F | HEART RATE: 62 BPM | DIASTOLIC BLOOD PRESSURE: 84 MMHG | OXYGEN SATURATION: 93 % | WEIGHT: 185.9 LBS | SYSTOLIC BLOOD PRESSURE: 139 MMHG | BODY MASS INDEX: 26.67 KG/M2

## 2023-09-13 DIAGNOSIS — C50.912 INVASIVE DUCTAL CARCINOMA OF BREAST, FEMALE, LEFT (H): ICD-10-CM

## 2023-09-13 PROCEDURE — 99205 OFFICE O/P NEW HI 60 MIN: CPT | Performed by: INTERNAL MEDICINE

## 2023-09-13 ASSESSMENT — PAIN SCALES - GENERAL: PAINLEVEL: NO PAIN (0)

## 2023-09-13 NOTE — PROGRESS NOTES
[facility] Hematology / Oncology  Initial Visit / Consultation Note Sep 13, 2023  Name: Abbey Omer  :  1950  MRN:  [id number]    --------------------    Assessment / Plan:  Over the course of our visit, Maggie Potter and I reviewed the natural history of what appears to be an early stage, hormone positive, HER2 negative breast cancer affecting the left breast.  We reviewed her imaging to date as well as her pathology report.  We reviewed plans for surgical resection upfront, likely consisting of a lumpectomy with a sentinel lymph node biopsy if I had to guess with Dr. Vega was planning.  We reviewed that genetic testing could be considered with possibly mastectomy determined by that genetic testing.  She does have 1 living daughter.  From an adjuvant therapy standpoint, we reviewed the role of Oncotype testing to determine the role of adjuvant chemotherapy.  We will plan to send it but based upon her pathology and age and hormone status, we are hopeful it will be low enough not to warrant chemotherapy.  I did provide her with some scores indicative of her decision making and no chemo versus chemo versus no man's land.  If her pathology report is supportive of our clinical suspicion of early-stage breast cancer, she could potentially be considered for fast forward protocol looking at short course radiation potentially even omission of radiation if the pathology report is incredibly favorable and her Oncotype score is low.  We then turned our attention towards the use of adjuvant antiestrogen therapy for a minimum of 5 years, potentially 10 depending on the pathology report.  We reviewed the logistics of administration as well as potential side effects.  We will plan to see her back 3 to 4 weeks postoperatively to review her pathology report, review her Oncotype score and finalize an adjuvant treatment course.  They do winter in warmer climates.    Thank you kindly for this  consultation.  Mykel Salcido MD    --------------------    Interval History:  Abbey present for evaluation of breast cancer accompanied by her wife Maggie.  Screening breast mammogram revealed a possible asymmetry in the left breast in the upper outer quadrant, middle depth.  Diagnostic mammogram revealed a possible focal asymmetry in the left breast upper outer quadrant middle depth confirming an irregular indistinct mass at the 1 to 2 o'clock position with mild associated architectural distortion measuring at least 3.5 cm.  Targeted breast ultrasound at the corresponding left breast 1:30 position 5 cm from the nipple lesion revealed a 2.1 x 1.4 x 1.5 cm irregular hypoechoic spiculated mass with morphologically benign-appearing lymph nodes in the left axilla.  Breast biopsy revealed grade 2 invasive ductal carcinoma, estrogen receptor positive, progesterone receptor positive, HER2 indeterminate by IHC but negative by FISH.  She has no symptoms to bring up.    Abbey is generally healthy but does suffer from some nonalcoholic steatohepatitis, coronary artery disease status post stenting, gout, hypothyroidism on replacement, hypertension, depression, hyperlipidemia.    Family history is notable for prostate cancer in a maternal uncle as well as melanoma in her father.  Both of those individuals did well from a cancer standpoint following surgical resection.  There is also a second cousin who had colon cancer recently and underwent surgery.  There is no other breast, ovarian, pancreatic or uterine cancer in the family.    Abbey entered into menopause somewhere early in her 30s.  She did have 2 children.  She was on birth control for the most part in her 20s.  She was on estrogen replacement to help with menopausal symptoms between her 30s and 50s.    Abbey does have a history of bladder cancer about 17 years ago which sounds like it was very superficial and treated with tumor resection and she did not  require any adjuvant therapy or topical BCG therapy is following.    --------------------    Review of Systems:  10 point ROS negative except for that above.    Past Medical / Surgical History:  Past Medical History:   Diagnosis Date    Cancer of bladder (H)     Cataract 11/25/2011    Chronic nonallergic rhinitis 8/31/10 RAST    CKD (chronic kidney disease) stage 3, GFR 30-59 ml/min (H) 12/20/2010    Coronary artery disease involving native coronary artery of native heart without angina pectoris 03/01/2019    DDD (degenerative disc disease), cervical 06/16/2011    Dependent edema     Depression, major     Depressive disorder In 2002 or 2003    Glaucoma suspect, bilateral     History of blood transfusion ?    HTN (hypertension)     Hyperlipidemia     Hypothyroidism 10/12/2009    Migraine     resolved    Nasal septal deviation 08/11/2009    Obesity 05/17/2011    RICKEY (obstructive sleep apnea)     Osteoarthritis of shoulder region     Osteoporosis     Prediabetes     Premature menopause     Renal cyst      Past Surgical History:   Procedure Laterality Date    ARTHROPLASTY SHOULDER Right 11/01/2017    Right total shoulder arthroplasty by Dr. Go at [facility]    ARTHROPLASTY SHOULDER Left 03/28/2018    Left total shoulder arthroplasty by Dr. Go at [facility]    CATARACT IOL, RT/LT      CHOLECYSTECTOMY, LAPOROSCOPIC      COLONOSCOPY  ?    Upper & Lower    CV CORONARY ANGIOGRAM  02/22/2019    CV CORONARY ANGIOGRAM N/A 02/22/2019    Procedure: 1330 CORS;  Surgeon: Marito Arriaga MD;  Location:  HEART CARDIAC CATH LAB    CV HEART CATHETERIZATION WITH POSSIBLE INTERVENTION N/A 03/18/2019    Procedure: STAGED PCI-JALEN CARABALLO;  Surgeon: Zeus Cordero MD;  Location:  HEART CARDIAC CATH LAB    CYSTOSCOPY      bladder cancer    D & C      ENT SURGERY  first 1967/second ?    2 Rhynoplasties    HYSTERECTOMY, PAP NO LONGER INDICATED      BSO    NOSE SURGERY      septum  deviation    PHACOEMULSIFICATION CLEAR CORNEA WITH STANDARD INTRAOCULAR LENS IMPLANT Left 09/24/2018    Procedure: PHACOEMULSIFICATION CLEAR CORNEA WITH STANDARD INTRAOCULAR LENS IMPLANT;  LEFT EYE PHACOEMULSIFICATION CLEAR CORNEA WITH STANDARD INTRAOCULAR LENS IMPLANT;  Surgeon: Ryne Pascal MD;  Location:  [facility]    PHACOEMULSIFICATION WITH STANDARD INTRAOCULAR LENS IMPLANT Right 10/15/2018    Procedure: PHACOEMULSIFICATION WITH STANDARD INTRAOCULAR LENS IMPLANT ;  Surgeon: Ryne Pascal MD;  Location: [facility] OR     Patient Active Problem List   Diagnosis    RICKEY (obstructive sleep apnea)    Hypothyroidism    Chronic nonallergic rhinitis    CKD (chronic kidney disease) stage 3, GFR 30-59 ml/min (H)    Hyperlipidemia LDL goal <130    Morbid obesity (H)    History of bladder cancer    Hypertension goal BP (blood pressure) < 140/90    Advance Care Planning    Dependent edema    Elevated LFTs    DDD (degenerative disc disease), cervical    Major depressive disorder, recurrent episode, mild degree (H)    Macular drusen    Hypertriglyceridemia    Allergic conjunctivitis    Pulmonary nodules    Acne    Combined form of age-related cataract, both eyes    Glaucoma suspect, bilateral    Drusen of macula of both eyes    Renal cyst, left    Discoid atelectasis    Cough    Abnormal electrocardiogram    Coronary artery disease involving native coronary artery of native heart without angina pectoris    Epistaxis    Retrognathia    CAD S/P percutaneous coronary angioplasty    Chronic gout involving toe without tophus, unspecified cause, unspecified laterality    Lesion of left native kidney    Prediabetes       Family History:  Family History   Problem Relation Age of Onset    Diabetes Maternal Grandfather     Hypertension Maternal Grandfather     Diabetes Son     Heart Disease Father         CHF    Diabetes Mother     Depression Mother     Hyperlipidemia Mother     Anxiety Disorder Mother     Asthma Mother     Obesity  Mother     Substance Abuse Brother         Not any longer    Diabetes Brother     Depression Daughter     Diabetes Maternal Uncle     Cerebrovascular Disease Maternal Uncle     Diabetes Son         after accident    Depression Daughter     Depression Son         after accident    Substance Abuse Brother         Not any longer    Diabetes Sister     Substance Abuse Sister     Diabetes Brother     Thyroid Disease No family hx of     Glaucoma No family hx of     Macular Degeneration No family hx of     Cancer No family hx of        Social History:  Social History     Tobacco Use    Smoking status: Former     Packs/day: 0.10     Years: 1.00     Pack years: 0.10     Types: Cigarettes     Start date: 1969     Quit date: 10/12/1969     Years since quittin.9    Smokeless tobacco: Never    Tobacco comments:     Smoked, very little, for 2 mo, 49 years ago.   Substance Use Topics    Alcohol use: Yes     Comment: Maybe 1 or 2 drinks a year    Drug use: No       Medications / Allergies:  Reviewed in EMR.    --------------------    Physical Exam:  VS: /84 (BP Location: Right arm, Patient Position: Sitting, Cuff Size: Adult Regular)   Pulse 62   Temp 98  F (36.7  C) (Oral)   Wt 84.3 kg (185 lb 14.4 oz)   SpO2 93%   BMI 26.67 kg/m    GEN: Well appearing.  KAREN: No palpable adenopathy.    Labs / Imaging / Path:  Reviewed breast biopsy surgical pathology.  Reviewed mammogram and ultrasound w/ independent review.

## 2023-09-13 NOTE — LETTER
2023         RE: Abbey Omer  [address] [facility] [address]        Dear Colleague,    Thank you for referring your patient, Abbey Omer, to the [facility]. Please see a copy of my visit note below.    [facility] Hematology / Oncology  Initial Visit / Consultation Note Sep 13, 2023  Name: Abbey Omer  :  1950  MRN:  [id number]    --------------------    Assessment / Plan:  Over the course of our visit, AbbeyMaggie zamarripa and I reviewed the natural history of what appears to be an early stage, hormone positive, HER2 negative breast cancer affecting the left breast.  We reviewed her imaging to date as well as her pathology report.  We reviewed plans for surgical resection upfront, likely consisting of a lumpectomy with a sentinel lymph node biopsy if I had to guess with Dr. Vega was planning.  We reviewed that genetic testing could be considered with possibly mastectomy determined by that genetic testing.  She does have 1 living daughter.  From an adjuvant therapy standpoint, we reviewed the role of Oncotype testing to determine the role of adjuvant chemotherapy.  We will plan to send it but based upon her pathology and age and hormone status, we are hopeful it will be low enough not to warrant chemotherapy.  I did provide her with some scores indicative of her decision making and no chemo versus chemo versus no man's land.  If her pathology report is supportive of our clinical suspicion of early-stage breast cancer, she could potentially be considered for fast forward protocol looking at short course radiation potentially even omission of radiation if the pathology report is incredibly favorable and her Oncotype score is low.  We then turned our attention towards the use of adjuvant antiestrogen therapy for a minimum of 5 years, potentially 10 depending on the pathology report.  We reviewed the logistics of administration as well as  potential side effects.  We will plan to see her back 3 to 4 weeks postoperatively to review her pathology report, review her Oncotype score and finalize an adjuvant treatment course.  They do winter in warmer climates.    Thank you kindly for this consultation.  Mykel Salcido MD    --------------------    Interval History:  Abbey present for evaluation of breast cancer accompanied by her wife Maggie.  Screening breast mammogram revealed a possible asymmetry in the left breast in the upper outer quadrant, middle depth.  Diagnostic mammogram revealed a possible focal asymmetry in the left breast upper outer quadrant middle depth confirming an irregular indistinct mass at the 1 to 2 o'clock position with mild associated architectural distortion measuring at least 3.5 cm.  Targeted breast ultrasound at the corresponding left breast 1:30 position 5 cm from the nipple lesion revealed a 2.1 x 1.4 x 1.5 cm irregular hypoechoic spiculated mass with morphologically benign-appearing lymph nodes in the left axilla.  Breast biopsy revealed grade 2 invasive ductal carcinoma, estrogen receptor positive, progesterone receptor positive, HER2 indeterminate by IHC but negative by FISH.  She has no symptoms to bring up.    Abbey is generally healthy but does suffer from some nonalcoholic steatohepatitis, coronary artery disease status post stenting, gout, hypothyroidism on replacement, hypertension, depression, hyperlipidemia.    Family history is notable for prostate cancer in a maternal uncle as well as melanoma in her father.  Both of those individuals did well from a cancer standpoint following surgical resection.  There is also a second cousin who had colon cancer recently and underwent surgery.  There is no other breast, ovarian, pancreatic or uterine cancer in the family.    Abbey entered into menopause somewhere early in her 30s.  She did have 2 children.  She was on birth control for the most part in her 20s.   She was on estrogen replacement to help with menopausal symptoms between her 30s and 50s.    Abbey does have a history of bladder cancer about 17 years ago which sounds like it was very superficial and treated with tumor resection and she did not require any adjuvant therapy or topical BCG therapy is following.    --------------------    Review of Systems:  10 point ROS negative except for that above.    Past Medical / Surgical History:  Past Medical History:   Diagnosis Date     Cancer of bladder (H)      Cataract 11/25/2011     Chronic nonallergic rhinitis 8/31/10 RAST     CKD (chronic kidney disease) stage 3, GFR 30-59 ml/min (H) 12/20/2010     Coronary artery disease involving native coronary artery of native heart without angina pectoris 03/01/2019     DDD (degenerative disc disease), cervical 06/16/2011     Dependent edema      Depression, major      Depressive disorder In 2002 or 2003     Glaucoma suspect, bilateral      History of blood transfusion ?     HTN (hypertension)      Hyperlipidemia      Hypothyroidism 10/12/2009     Migraine     resolved     Nasal septal deviation 08/11/2009     Obesity 05/17/2011     RICKEY (obstructive sleep apnea)      Osteoarthritis of shoulder region      Osteoporosis      Prediabetes      Premature menopause      Renal cyst      Past Surgical History:   Procedure Laterality Date     ARTHROPLASTY SHOULDER Right 11/01/2017    Right total shoulder arthroplasty by Dr. Go at [facility]     ARTHROPLASTY SHOULDER Left 03/28/2018    Left total shoulder arthroplasty by Dr. Go at [facility]     CATARACT IOL, RT/LT       CHOLECYSTECTOMY, LAPOROSCOPIC       COLONOSCOPY  ?    Upper & Lower     CV CORONARY ANGIOGRAM  02/22/2019     CV CORONARY ANGIOGRAM N/A 02/22/2019    Procedure: 1330 CORS;  Surgeon: Marito Arriaga MD;  Location:  HEART CARDIAC CATH LAB     CV HEART CATHETERIZATION WITH POSSIBLE INTERVENTION N/A 03/18/2019     Procedure: STAGED PCI-JALEN MUST;  Surgeon: Zeus Cordero MD;  Location:  HEART CARDIAC CATH LAB     CYSTOSCOPY      bladder cancer     D & C       ENT SURGERY  first 1967/second ?    2 Rhynoplasties     HYSTERECTOMY, PAP NO LONGER INDICATED      BSO     NOSE SURGERY      septum deviation     PHACOEMULSIFICATION CLEAR CORNEA WITH STANDARD INTRAOCULAR LENS IMPLANT Left 09/24/2018    Procedure: PHACOEMULSIFICATION CLEAR CORNEA WITH STANDARD INTRAOCULAR LENS IMPLANT;  LEFT EYE PHACOEMULSIFICATION CLEAR CORNEA WITH STANDARD INTRAOCULAR LENS IMPLANT;  Surgeon: Ryne Pascal MD;  Location:  [facility]     PHACOEMULSIFICATION WITH STANDARD INTRAOCULAR LENS IMPLANT Right 10/15/2018    Procedure: PHACOEMULSIFICATION WITH STANDARD INTRAOCULAR LENS IMPLANT ;  Surgeon: Ryne Pascal MD;  Location: [facility] OR     Patient Active Problem List   Diagnosis     RICKEY (obstructive sleep apnea)     Hypothyroidism     Chronic nonallergic rhinitis     CKD (chronic kidney disease) stage 3, GFR 30-59 ml/min (H)     Hyperlipidemia LDL goal <130     Morbid obesity (H)     History of bladder cancer     Hypertension goal BP (blood pressure) < 140/90     Advance Care Planning     Dependent edema     Elevated LFTs     DDD (degenerative disc disease), cervical     Major depressive disorder, recurrent episode, mild degree (H)     Macular drusen     Hypertriglyceridemia     Allergic conjunctivitis     Pulmonary nodules     Acne     Combined form of age-related cataract, both eyes     Glaucoma suspect, bilateral     Drusen of macula of both eyes     Renal cyst, left     Discoid atelectasis     Cough     Abnormal electrocardiogram     Coronary artery disease involving native coronary artery of native heart without angina pectoris     Epistaxis     Retrognathia     CAD S/P percutaneous coronary angioplasty     Chronic gout involving toe without tophus, unspecified cause, unspecified laterality     Lesion of left native kidney      Prediabetes       Family History:  Family History   Problem Relation Age of Onset     Diabetes Maternal Grandfather      Hypertension Maternal Grandfather      Diabetes Son      Heart Disease Father         CHF     Diabetes Mother      Depression Mother      Hyperlipidemia Mother      Anxiety Disorder Mother      Asthma Mother      Obesity Mother      Substance Abuse Brother         Not any longer     Diabetes Brother      Depression Daughter      Diabetes Maternal Uncle      Cerebrovascular Disease Maternal Uncle      Diabetes Son         after accident     Depression Daughter      Depression Son         after accident     Substance Abuse Brother         Not any longer     Diabetes Sister      Substance Abuse Sister      Diabetes Brother      Thyroid Disease No family hx of      Glaucoma No family hx of      Macular Degeneration No family hx of      Cancer No family hx of        Social History:  Social History     Tobacco Use     Smoking status: Former     Packs/day: 0.10     Years: 1.00     Pack years: 0.10     Types: Cigarettes     Start date: 1969     Quit date: 10/12/1969     Years since quittin.9     Smokeless tobacco: Never     Tobacco comments:     Smoked, very little, for 2 mo, 49 years ago.   Substance Use Topics     Alcohol use: Yes     Comment: Maybe 1 or 2 drinks a year     Drug use: No       Medications / Allergies:  Reviewed in EMR.    --------------------    Physical Exam:  VS: /84 (BP Location: Right arm, Patient Position: Sitting, Cuff Size: Adult Regular)   Pulse 62   Temp 98  F (36.7  C) (Oral)   Wt 84.3 kg (185 lb 14.4 oz)   SpO2 93%   BMI 26.67 kg/m    GEN: Well appearing.  KAREN: No palpable adenopathy.    Labs / Imaging / Path:  Reviewed breast biopsy surgical pathology.  Reviewed mammogram and ultrasound w/ independent review.      Again, thank you for allowing me to participate in the care of your patient.        Sincerely,        Mykel Salcido MD

## 2023-09-13 NOTE — NURSING NOTE
"Oncology Rooming Note    September 13, 2023 1:03 PM   Abbey Omer is a 73 year old female who presents for:    Chief Complaint   Patient presents with    Oncology Clinic Visit     Initial Vitals: /84 (BP Location: Right arm, Patient Position: Sitting, Cuff Size: Adult Regular)   Pulse 62   Temp 98  F (36.7  C) (Oral)   Wt 84.3 kg (185 lb 14.4 oz)   SpO2 93%   BMI 26.67 kg/m   Estimated body mass index is 26.67 kg/m  as calculated from the following:    Height as of 10/4/22: 1.778 m (5' 10\").    Weight as of this encounter: 84.3 kg (185 lb 14.4 oz). Body surface area is 2.04 meters squared.  No Pain (0) Comment: Data Unavailable   No LMP recorded. Patient has had a hysterectomy.  Allergies reviewed: Yes  Medications reviewed: Yes    Medications: Medication refills not needed today.  Pharmacy name entered into [employer]:    [id number] - [facility] [address] AT COR[address] OF [address] [place] [address] PHARMACY 1629 - [facility] [address] [facility]  WRITTEN PRESCRIPTION REQUESTED  [id number]- CLOSED - [facility] [address] [place] [address] [place] [facility] [address] [facility] [address] [facility] [address] [place] [facility] [address] [facility]    Clinical concerns: New Patient.      Jerri Burkett RN              "

## 2023-09-13 NOTE — PATIENT INSTRUCTIONS
1) Oncotype after path review post-op.  2) [facility] 4 weeks post-op (date TBD) to review path, Oncotype and finalize adjuvant plan.    Mykel Salcido MD.

## 2023-09-14 ENCOUNTER — VIRTUAL VISIT (OUTPATIENT)
Dept: ONCOLOGY | Facility: CLINIC | Age: 73
End: 2023-09-14
Attending: NURSE PRACTITIONER
Payer: MEDICARE

## 2023-09-14 ENCOUNTER — ANCILLARY ORDERS (OUTPATIENT)
Dept: ONCOLOGY | Facility: CLINIC | Age: 73
End: 2023-09-14

## 2023-09-14 VITALS — BODY MASS INDEX: 26.48 KG/M2 | WEIGHT: 185 LBS | HEIGHT: 70 IN

## 2023-09-14 DIAGNOSIS — R92.8 OTHER ABNORMAL AND INCONCLUSIVE FINDINGS ON DIAGNOSTIC IMAGING OF BREAST: ICD-10-CM

## 2023-09-14 DIAGNOSIS — C50.912 INVASIVE DUCTAL CARCINOMA OF BREAST, FEMALE, LEFT (H): ICD-10-CM

## 2023-09-14 DIAGNOSIS — C50.912 INVASIVE DUCTAL CARCINOMA OF BREAST, FEMALE, LEFT (H): Primary | ICD-10-CM

## 2023-09-14 PROCEDURE — 99204 OFFICE O/P NEW MOD 45 MIN: CPT | Mod: VID | Performed by: SURGERY

## 2023-09-14 ASSESSMENT — PAIN SCALES - GENERAL: PAINLEVEL: NO PAIN (0)

## 2023-09-14 NOTE — NURSING NOTE
Is the patient currently in the state of [place]? YES    Visit mode:VIDEO    If the visit is dropped, the patient can be reconnected by: VIDEO VISIT: Text to cell phone:   Telephone Information:   Mobile [phone]       Will anyone else be joining the visit? NO    How would you like to obtain your AVS? MyChart    Are changes needed to the allergy or medication list? Pt stated no changes to allergies and Pt stated no med changes    Reason for visit: Consult    Rosette Spencer LPN

## 2023-09-14 NOTE — PROGRESS NOTES
The patient is a 73-year-old woman with a new diagnosis of a left breast cancer.  On August 2 she underwent a screening mammogram which demonstrated an asymmetry in her left breast.  On August 21 she underwent a diagnostic mammogram which demonstrated an asymmetry in her left breast measuring approximately 3.5 cm.  She also had a breast ultrasound which demonstrated a 2.1 cm mass.  By ultrasound her lymph nodes were normal.  On August 30 she underwent a contrast-enhanced mammography which demonstrated a spiculated mass in 6.2 cm of enhancement.  She underwent a biopsy which demonstrated grade 2 invasive ductal cancer that was ER positive TX positive and HER2/pavithra negative.  There was no mention of DCIS in the pathology report.  She denies any palpable breast lumps.  She denies any symptoms of metastatic disease.  She denies any previous breast problems.    Her past medical history is significant for coronary artery disease and obstructive sleep apnea.    Her family history is negative for breast cancer.    Impression left breast cancer    Plan: I talked to the patient today about the discrepancy in the size of this mass from 2.1 cm by ultrasound to 6.2 cm by contrast-enhanced mammography.  If she desires breast conservation then I have recommended an image guided needle biopsy of the enhancement noted on her contrast-enhanced tomography.  If that area is all malignant then she would likely need a mastectomy.  I did recommend a sentinel lymph node biopsy.  I informed her that endocrine therapy would be recommended but a decision regarding chemotherapy would not be made until after her surgery.  We will plan to obtain a another breast biopsy on her and then discussed the findings and her ultimate treatment.    The video started at 8:46 AM and ended at 9:05 AM.  The total visit time was 45 minutes which included reviewing her imaging the video visit and coordinating her care

## 2023-09-15 ENCOUNTER — PATIENT OUTREACH (OUTPATIENT)
Dept: ONCOLOGY | Facility: CLINIC | Age: 73
End: 2023-09-15
Payer: COMMERCIAL

## 2023-09-15 NOTE — PROGRESS NOTES
[facility]: Surgical Oncology Plan of Care Education Note                                     Discussion with Patient:                                                         Spoke with Abbey following her visit with Dr. Vega on 9/14/2023. Introduced self and explained role of RNCC. Abbey stated, after some consideration, she had decided not to proceed with the recommended biopsy and had decided to have a bilateral mastectomy.        Plan:                                                       Reviewed plan for bilateral mastectomy with sentinel lymph node biopsy at the [facility]/. Provided appropriate OR location map. Discussed need for H&P within 30 days of surgery. Abbey prefers to schedule with hier PCP. Reviewed shower instructions and provided/mailed written hand-out and bottle of surgical scrub.     A plan was made to review drain instructions and mastectomy care with Abbey after she receives the teaching material being mailed to her.     Informed patient they should get a call within the next three business days from our OR  with surgery date, H&P date and date of post-op visit with their surgeon. Writer answered all questions and concerns to the best of her ability and provided her contact information. Reviewed use of [employer] as alternative way to contact team.  Encouraged patient to contact with questions.         Corrie Pinto, RNCC  [facility]  Surgical Onolcogy      Approximately 15 minutes was spent in discussion with patient.

## 2023-09-18 ENCOUNTER — TELEPHONE (OUTPATIENT)
Dept: ONCOLOGY | Facility: CLINIC | Age: 73
End: 2023-09-18
Payer: COMMERCIAL

## 2023-09-18 PROBLEM — C50.912 INVASIVE DUCTAL CARCINOMA OF BREAST, FEMALE, LEFT (H): Status: ACTIVE | Noted: 2023-09-14

## 2023-09-18 PROBLEM — R92.8 OTHER ABNORMAL AND INCONCLUSIVE FINDINGS ON DIAGNOSTIC IMAGING OF BREAST: Status: ACTIVE | Noted: 2023-09-14

## 2023-09-18 NOTE — TELEPHONE ENCOUNTER
Scheduled surgery for 10/9 in [place] with Dr. Vega.    H&P with PCP within 30 days of the surgery date. Patient verbalized understanding H&P is needed.    Post-op scheduled for 10/27 with Dr. Vega in [place].    Surgery packet to be sent.    NM injection to be delivered to OR.     No further questions/concerns at this time.     Linda Monzon on 09/18/23 at 2:30 PM.  Senior Perioperative Coordinator   Ph: [phone]

## 2023-09-19 ENCOUNTER — TELEPHONE (OUTPATIENT)
Dept: MAMMOGRAPHY | Facility: CLINIC | Age: 73
End: 2023-09-19
Payer: COMMERCIAL

## 2023-09-21 ENCOUNTER — TELEPHONE (OUTPATIENT)
Dept: FAMILY MEDICINE | Facility: CLINIC | Age: 73
End: 2023-09-21
Payer: COMMERCIAL

## 2023-09-21 DIAGNOSIS — I25.10 CORONARY ARTERY DISEASE INVOLVING NATIVE CORONARY ARTERY OF NATIVE HEART WITHOUT ANGINA PECTORIS: Primary | Chronic | ICD-10-CM

## 2023-09-21 DIAGNOSIS — I25.10 CAD S/P PERCUTANEOUS CORONARY ANGIOPLASTY: ICD-10-CM

## 2023-09-21 DIAGNOSIS — Z98.61 CAD S/P PERCUTANEOUS CORONARY ANGIOPLASTY: ICD-10-CM

## 2023-09-21 DIAGNOSIS — R94.31 ABNORMAL ELECTROCARDIOGRAM: ICD-10-CM

## 2023-09-21 NOTE — TELEPHONE ENCOUNTER
RN received call from Ludmila with [facility].    Patient has EKG ordered from Cardiology in [place].    Ludmila was asking if patient could have EKG done at the [place] location.    RN advised patient has not been seen at [facility] so would need a visit prior to having EKG,    RN did advise patients PCP is at [place] so patient could come to [place] and have EKG when her PCP, Sarah Vallejo was in clinic so EKG could be reviewed prior to patient leaving the clinic.    Fili Robles, RN, BSN, PHN  [facility]

## 2023-09-28 DIAGNOSIS — I10 HYPERTENSION GOAL BP (BLOOD PRESSURE) < 140/90: Chronic | ICD-10-CM

## 2023-09-29 ENCOUNTER — HOSPITAL ENCOUNTER (OUTPATIENT)
Dept: CARDIOLOGY | Facility: CLINIC | Age: 73
Discharge: HOME | End: 2023-09-29
Attending: INTERNAL MEDICINE | Admitting: INTERNAL MEDICINE
Payer: MEDICARE

## 2023-09-29 DIAGNOSIS — I25.10 CAD S/P PERCUTANEOUS CORONARY ANGIOPLASTY: ICD-10-CM

## 2023-09-29 DIAGNOSIS — I10 HYPERTENSION GOAL BP (BLOOD PRESSURE) < 140/90: Chronic | ICD-10-CM

## 2023-09-29 DIAGNOSIS — Z98.61 CAD S/P PERCUTANEOUS CORONARY ANGIOPLASTY: ICD-10-CM

## 2023-09-29 DIAGNOSIS — R94.31 ABNORMAL ELECTROCARDIOGRAM: ICD-10-CM

## 2023-09-29 DIAGNOSIS — I25.10 CORONARY ARTERY DISEASE INVOLVING NATIVE CORONARY ARTERY OF NATIVE HEART WITHOUT ANGINA PECTORIS: Chronic | ICD-10-CM

## 2023-09-29 PROCEDURE — 99207 PR NO DOCUMENTATION ON VISIT: CPT | Performed by: INTERNAL MEDICINE

## 2023-09-29 PROCEDURE — 93005 ELECTROCARDIOGRAM TRACING: CPT

## 2023-09-29 RX ORDER — LOSARTAN POTASSIUM 100 MG/1
50 TABLET ORAL 2 TIMES DAILY
Qty: 90 TABLET | Refills: 0 | Status: SHIPPED | OUTPATIENT
Start: 2023-09-29 | End: 2024-01-16

## 2023-09-29 RX ORDER — LOSARTAN POTASSIUM 100 MG/1
50 TABLET ORAL 2 TIMES DAILY
Qty: 90 TABLET | Refills: 2 | OUTPATIENT
Start: 2023-09-29

## 2023-10-02 ENCOUNTER — VIRTUAL VISIT (OUTPATIENT)
Dept: CARDIOLOGY | Facility: CLINIC | Age: 73
End: 2023-10-02
Payer: MEDICARE

## 2023-10-02 DIAGNOSIS — E78.5 HYPERLIPIDEMIA LDL GOAL <130: Primary | Chronic | ICD-10-CM

## 2023-10-02 PROCEDURE — 99214 OFFICE O/P EST MOD 30 MIN: CPT | Mod: VID | Performed by: INTERNAL MEDICINE

## 2023-10-02 NOTE — LETTER
10/2/2023    Sarah Vallejo, NP  [address] [address]    RE: Abbey Omer       Dear Colleague,     I had the pleasure of seeing Abbey GAYLE Kan in the [facility].         Abbey is a 73 year old who is being evaluated via a billable video visit.      How would you like to obtain your AVS? MyChart  If the video visit is dropped, the invitation should be resent by: Text to cell phone: [phone]  Will anyone else be joining your video visit? No      PROVIDER NOTE:     This is a pleasant 73 year old patient with PMH HTN, CAD s/p PCI, HLD, and syncope in setting of dehydration here for follow up with me. I initially saw her first in setting of RBBB discovered on EKG.        We obtained a coronary CT and suprisingly found severe stenoses of the LCX and RCA with moderate Ramus and LAD stenoses (FFR 0.86). She underwent coronary angiogram where she had staged PCI with SHELBI - first to the OM/LCX and then to the prox-mid RCA by radial approach 2/2019-3/2019.      She completed cardiac rehab. We stopped ticagrelor and continued aspirin at one year. Statin was initiated and lipids under controlled. She remains on hydrochlorothiazide, losartan, metoprolol. Denies dizziness or lightheadedness.     Has breast cancer and having double mastectomy coming up. EKG without changes. Has RBBB. No chest pain, sob.     BP well controlled. Echocardiogram reviewed from last year and is normal. No need for chem and radiation thus far. If so will need echocardiogram baseline.    Today 139/75 mmHg.     ASSESSMENT/PLAN:     #CAD - s/p PCI, asymptomatic. Doing well. Will continue aspirin 81 mg daily, statin, beta blocker. Echocardiogram reviewed and she has normal LV function.   #HTN - controlled, continue hydrochlorothiazide, losartan, metoprolol  #HLD - controlled, lipid panel per PCP. Continue simvastatin. Goal LDL < 70 mg/dL. Lipids are UTD.  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 No syncope.  #No cardiac limitations to upcoming surgery, if she starts chemo in future will recommend a baseline echocardiogram      One year follow up with me.      Ibeth Infante MD MSC  [facility]    I personally reviewed patient's annual lab values today, in addition to last available echocardiogram and EKG images which included my own interpretation of results in order to formulate plan.     Total time spent on clinic patient visit is more than 35 minutes, which includes face-to-face patient evaluation, virtual physical assessment, review of imaging studies and laboratory data, counseling with patient, review of outside records, and documentation of patient encounter.     General:  no apparent distress, normal body habitus, sitting upright.  ENT/Mouth:  membranes moist, no nasal discharge.  Normal head shape, no apparent injury or laceration.  Eyes:  no scleral icterus, normal conjunctivae.  No observed jaundice.  Neck:  no apparent neck swelling.   Chest/Lungs:  No breathing difficulty while speaking.  No audible wheezing.  No cough during conversation.  Cardiovascular:  No obviously elevated jugular venous pressure.  No apparent edema bilaterally in LE.   Abdomen:  no obvious abdominal distention.   Extremities:  no apparent cyanosis.  Skin:  no xanthelasma.  No facial lacerations.  Neurologic:  Normal arm motion bilateral, no tremors.    Psychiatric:  Alert and oriented x3, calm demeanor    Current Outpatient Medications   Medication    allopurinol (ZYLOPRIM) 100 MG tablet    aspirin (ASA) 81 MG EC tablet    B Complex Vitamins (VITAMIN B COMPLEX PO)    colchicine (COLCRYS) 0.6 MG tablet    EXCEDRIN TENSION HEADACHE 500-65 MG PO TABS    Flaxseed, Linseed, (FLAXSEED OIL PO)    fluticasone (FLONASE) 50 MCG/ACT nasal spray    hydrochlorothiazide (HYDRODIURIL) 12.5 MG tablet    levalbuterol (XOPENEX HFA) 45 MCG/ACT inhaler    levothyroxine (SYNTHROID/LEVOTHROID) 88 MCG tablet     losartan (COZAAR) 100 MG tablet    metoprolol succinate ER (TOPROL XL) 25 MG 24 hr tablet    montelukast (SINGULAIR) 10 MG tablet    polyethylene glycol-propylene glycol (SYSTANE ULTRA) 0.4-0.3 % SOLN ophthalmic solution    simvastatin (ZOCOR) 40 MG tablet    traZODone (DESYREL) 50 MG tablet    venlafaxine (EFFEXOR XR) 150 MG 24 hr capsule    zinc 50 MG TABS     No current facility-administered medications for this visit.         Video-Visit Details    Type of service:  Video Visit   Video TIME: 11-11:30     Originating Location (pt. Location): Home    Distant Location (provider location):  Off-site  Platform used for Video Visit: Whit       Thank you for allowing me to participate in the care of your patient.      Sincerely,     Ibeth Infante MD     [facility]  cc:   No referring provider defined for this encounter.

## 2023-10-02 NOTE — PROGRESS NOTES
Abbey is a 73 year old who is being evaluated via a billable video visit.      How would you like to obtain your AVS? MyChart  If the video visit is dropped, the invitation should be resent by: Text to cell phone: [phone]  Will anyone else be joining your video visit? No      PROVIDER NOTE:     This is a pleasant 73 year old patient with PMH HTN, CAD s/p PCI, HLD, and syncope in setting of dehydration here for follow up with me. I initially saw her first in setting of RBBB discovered on EKG.        We obtained a coronary CT and suprisingly found severe stenoses of the LCX and RCA with moderate Ramus and LAD stenoses (FFR 0.86). She underwent coronary angiogram where she had staged PCI with SHELBI - first to the OM/LCX and then to the prox-mid RCA by radial approach 2/2019-3/2019.      She completed cardiac rehab. We stopped ticagrelor and continued aspirin at one year. Statin was initiated and lipids under controlled. She remains on hydrochlorothiazide, losartan, metoprolol. Denies dizziness or lightheadedness.     Has breast cancer and having double mastectomy coming up. EKG without changes. Has RBBB. No chest pain, sob.     BP well controlled. Echocardiogram reviewed from last year and is normal. No need for chem and radiation thus far. If so will need echocardiogram baseline.    Today 139/75 mmHg.     ASSESSMENT/PLAN:     #CAD - s/p PCI, asymptomatic. Doing well. Will continue aspirin 81 mg daily, statin, beta blocker. Echocardiogram reviewed and she has normal LV function.   #HTN - controlled, continue hydrochlorothiazide, losartan, metoprolol  #HLD - controlled, lipid panel per PCP. Continue simvastatin. Goal LDL < 70 mg/dL. Lipids are UTD.  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 No syncope.  #No cardiac limitations to upcoming surgery, if she starts chemo in future will recommend a baseline echocardiogram      One year follow up with me.      Ibeth Infante MD MSC  [facility]  [facility] Care    I personally reviewed patient's annual lab values today, in addition to last available echocardiogram and EKG images which included my own interpretation of results in order to formulate plan.     Total time spent on clinic patient visit is more than 35 minutes, which includes face-to-face patient evaluation, virtual physical assessment, review of imaging studies and laboratory data, counseling with patient, review of outside records, and documentation of patient encounter.     General:  no apparent distress, normal body habitus, sitting upright.  ENT/Mouth:  membranes moist, no nasal discharge.  Normal head shape, no apparent injury or laceration.  Eyes:  no scleral icterus, normal conjunctivae.  No observed jaundice.  Neck:  no apparent neck swelling.   Chest/Lungs:  No breathing difficulty while speaking.  No audible wheezing.  No cough during conversation.  Cardiovascular:  No obviously elevated jugular venous pressure.  No apparent edema bilaterally in LE.   Abdomen:  no obvious abdominal distention.   Extremities:  no apparent cyanosis.  Skin:  no xanthelasma.  No facial lacerations.  Neurologic:  Normal arm motion bilateral, no tremors.    Psychiatric:  Alert and oriented x3, calm demeanor    Current Outpatient Medications   Medication    allopurinol (ZYLOPRIM) 100 MG tablet    aspirin (ASA) 81 MG EC tablet    B Complex Vitamins (VITAMIN B COMPLEX PO)    colchicine (COLCRYS) 0.6 MG tablet    EXCEDRIN TENSION HEADACHE 500-65 MG PO TABS    Flaxseed, Linseed, (FLAXSEED OIL PO)    fluticasone (FLONASE) 50 MCG/ACT nasal spray    hydrochlorothiazide (HYDRODIURIL) 12.5 MG tablet    levalbuterol (XOPENEX HFA) 45 MCG/ACT inhaler    levothyroxine (SYNTHROID/LEVOTHROID) 88 MCG tablet    losartan (COZAAR) 100 MG tablet    metoprolol succinate ER (TOPROL XL) 25 MG 24 hr tablet    montelukast (SINGULAIR) 10 MG tablet    polyethylene glycol-propylene glycol (SYSTANE ULTRA) 0.4-0.3 % SOLN ophthalmic solution     simvastatin (ZOCOR) 40 MG tablet    traZODone (DESYREL) 50 MG tablet    venlafaxine (EFFEXOR XR) 150 MG 24 hr capsule    zinc 50 MG TABS     No current facility-administered medications for this visit.         Video-Visit Details    Type of service:  Video Visit   Video TIME: 11-11:30     Originating Location (pt. Location): Home    Distant Location (provider location):  Off-site  Platform used for Video Visit: Whit

## 2023-10-06 ENCOUNTER — ANESTHESIA EVENT (OUTPATIENT)
Dept: SURGERY | Facility: AMBULATORY SURGERY CENTER | Age: 73
End: 2023-10-06
Payer: MEDICARE

## 2023-10-06 ENCOUNTER — LAB (OUTPATIENT)
Dept: FAMILY MEDICINE | Facility: CLINIC | Age: 73
End: 2023-10-06

## 2023-10-06 ENCOUNTER — OFFICE VISIT (OUTPATIENT)
Dept: FAMILY MEDICINE | Facility: CLINIC | Age: 73
End: 2023-10-06
Payer: MEDICARE

## 2023-10-06 VITALS
WEIGHT: 187.8 LBS | HEIGHT: 60 IN | OXYGEN SATURATION: 95 % | BODY MASS INDEX: 36.87 KG/M2 | TEMPERATURE: 97.8 F | DIASTOLIC BLOOD PRESSURE: 82 MMHG | RESPIRATION RATE: 16 BRPM | SYSTOLIC BLOOD PRESSURE: 130 MMHG | HEART RATE: 64 BPM

## 2023-10-06 DIAGNOSIS — M1A.9XX0 CHRONIC GOUT INVOLVING TOE WITHOUT TOPHUS, UNSPECIFIED CAUSE, UNSPECIFIED LATERALITY: ICD-10-CM

## 2023-10-06 DIAGNOSIS — R05.9 COUGH, UNSPECIFIED TYPE: ICD-10-CM

## 2023-10-06 DIAGNOSIS — G47.00 INSOMNIA, UNSPECIFIED TYPE: ICD-10-CM

## 2023-10-06 DIAGNOSIS — N18.31 STAGE 3A CHRONIC KIDNEY DISEASE (H): ICD-10-CM

## 2023-10-06 DIAGNOSIS — Z12.11 SCREEN FOR COLON CANCER: ICD-10-CM

## 2023-10-06 DIAGNOSIS — I25.10 CORONARY ARTERY DISEASE INVOLVING NATIVE CORONARY ARTERY OF NATIVE HEART WITHOUT ANGINA PECTORIS: Chronic | ICD-10-CM

## 2023-10-06 DIAGNOSIS — E03.4 HYPOTHYROIDISM DUE TO ACQUIRED ATROPHY OF THYROID: Chronic | ICD-10-CM

## 2023-10-06 DIAGNOSIS — E66.01 MORBID OBESITY (H): ICD-10-CM

## 2023-10-06 DIAGNOSIS — G47.33 OSA (OBSTRUCTIVE SLEEP APNEA): Chronic | ICD-10-CM

## 2023-10-06 DIAGNOSIS — Z01.818 PRE-OP EXAM: Primary | ICD-10-CM

## 2023-10-06 DIAGNOSIS — C50.912 INVASIVE DUCTAL CARCINOMA OF BREAST, FEMALE, LEFT (H): ICD-10-CM

## 2023-10-06 DIAGNOSIS — I10 HYPERTENSION GOAL BP (BLOOD PRESSURE) < 140/90: Chronic | ICD-10-CM

## 2023-10-06 DIAGNOSIS — F33.0 MAJOR DEPRESSIVE DISORDER, RECURRENT EPISODE, MILD DEGREE (H): Chronic | ICD-10-CM

## 2023-10-06 DIAGNOSIS — N28.9 LESION OF LEFT NATIVE KIDNEY: ICD-10-CM

## 2023-10-06 LAB
ALBUMIN SERPL BCG-MCNC: 3.9 G/DL (ref 3.5–5.2)
ALP SERPL-CCNC: 90 U/L (ref 35–104)
ALT SERPL W P-5'-P-CCNC: 19 U/L (ref 0–50)
ANION GAP SERPL CALCULATED.3IONS-SCNC: 12 MMOL/L (ref 7–15)
AST SERPL W P-5'-P-CCNC: 45 U/L (ref 0–45)
BILIRUB SERPL-MCNC: 0.4 MG/DL
BUN SERPL-MCNC: 19.3 MG/DL (ref 8–23)
CALCIUM SERPL-MCNC: 9.4 MG/DL (ref 8.8–10.2)
CHLORIDE SERPL-SCNC: 103 MMOL/L (ref 98–107)
CREAT SERPL-MCNC: 1.05 MG/DL (ref 0.51–0.95)
CREAT UR-MCNC: 84.6 MG/DL
DEPRECATED HCO3 PLAS-SCNC: 23 MMOL/L (ref 22–29)
EGFRCR SERPLBLD CKD-EPI 2021: 55 ML/MIN/1.73M2
GLUCOSE SERPL-MCNC: 95 MG/DL (ref 70–99)
MICROALBUMIN UR-MCNC: 1476 MG/L
MICROALBUMIN/CREAT UR: 1744.68 MG/G CR (ref 0–25)
POTASSIUM SERPL-SCNC: 4.1 MMOL/L (ref 3.4–5.3)
PROT SERPL-MCNC: 6.9 G/DL (ref 6.4–8.3)
SODIUM SERPL-SCNC: 138 MMOL/L (ref 135–145)

## 2023-10-06 PROCEDURE — 82043 UR ALBUMIN QUANTITATIVE: CPT | Performed by: NURSE PRACTITIONER

## 2023-10-06 PROCEDURE — 82570 ASSAY OF URINE CREATININE: CPT | Performed by: NURSE PRACTITIONER

## 2023-10-06 PROCEDURE — 80053 COMPREHEN METABOLIC PANEL: CPT | Performed by: NURSE PRACTITIONER

## 2023-10-06 PROCEDURE — 99214 OFFICE O/P EST MOD 30 MIN: CPT | Performed by: NURSE PRACTITIONER

## 2023-10-06 PROCEDURE — 36415 COLL VENOUS BLD VENIPUNCTURE: CPT | Performed by: NURSE PRACTITIONER

## 2023-10-06 RX ORDER — ALLOPURINOL 100 MG/1
100 TABLET ORAL DAILY
Qty: 90 TABLET | Refills: 3 | Status: SHIPPED | OUTPATIENT
Start: 2023-10-06

## 2023-10-06 RX ORDER — COLCHICINE 0.6 MG/1
TABLET ORAL
Qty: 30 TABLET | Refills: 1 | Status: SHIPPED | OUTPATIENT
Start: 2023-10-06 | End: 2024-02-01

## 2023-10-06 RX ORDER — VENLAFAXINE HYDROCHLORIDE 150 MG/1
CAPSULE, EXTENDED RELEASE ORAL
Qty: 180 CAPSULE | Refills: 3 | Status: SHIPPED | OUTPATIENT
Start: 2023-10-06

## 2023-10-06 RX ORDER — HYDROCHLOROTHIAZIDE 12.5 MG/1
12.5 TABLET ORAL EVERY MORNING
Qty: 90 TABLET | Refills: 4 | Status: SHIPPED | OUTPATIENT
Start: 2023-10-06

## 2023-10-06 RX ORDER — METOPROLOL SUCCINATE 25 MG/1
25 TABLET, EXTENDED RELEASE ORAL DAILY
Qty: 90 TABLET | Refills: 4 | Status: SHIPPED | OUTPATIENT
Start: 2023-10-06

## 2023-10-06 RX ORDER — OXYCODONE HYDROCHLORIDE 5 MG/1
10 TABLET ORAL
Status: CANCELLED | OUTPATIENT
Start: 2023-10-06

## 2023-10-06 RX ORDER — ALBUTEROL SULFATE 90 UG/1
AEROSOL, METERED RESPIRATORY (INHALATION)
Refills: 0 | OUTPATIENT
Start: 2023-10-06

## 2023-10-06 RX ORDER — TRAZODONE HYDROCHLORIDE 50 MG/1
50-100 TABLET, FILM COATED ORAL
Qty: 180 TABLET | Refills: 3 | Status: SHIPPED | OUTPATIENT
Start: 2023-10-06

## 2023-10-06 RX ORDER — LEVALBUTEROL TARTRATE 45 UG/1
AEROSOL, METERED ORAL
Qty: 15 G | Refills: 0 | Status: SHIPPED | OUTPATIENT
Start: 2023-10-06

## 2023-10-06 RX ORDER — OXYCODONE HYDROCHLORIDE 5 MG/1
5 TABLET ORAL
Status: CANCELLED | OUTPATIENT
Start: 2023-10-06

## 2023-10-06 RX ORDER — SIMVASTATIN 40 MG
40 TABLET ORAL AT BEDTIME
Qty: 90 TABLET | Refills: 4 | Status: SHIPPED | OUTPATIENT
Start: 2023-10-06

## 2023-10-06 RX ORDER — LEVOTHYROXINE SODIUM 88 UG/1
TABLET ORAL
Qty: 90 TABLET | Refills: 4 | Status: SHIPPED | OUTPATIENT
Start: 2023-10-06

## 2023-10-06 ASSESSMENT — PATIENT HEALTH QUESTIONNAIRE - PHQ9
SUM OF ALL RESPONSES TO PHQ QUESTIONS 1-9: 9
10. IF YOU CHECKED OFF ANY PROBLEMS, HOW DIFFICULT HAVE THESE PROBLEMS MADE IT FOR YOU TO DO YOUR WORK, TAKE CARE OF THINGS AT HOME, OR GET ALONG WITH OTHER PEOPLE: NOT DIFFICULT AT ALL
SUM OF ALL RESPONSES TO PHQ QUESTIONS 1-9: 9

## 2023-10-06 ASSESSMENT — PAIN SCALES - GENERAL: PAINLEVEL: NO PAIN (0)

## 2023-10-06 NOTE — PATIENT INSTRUCTIONS
Pre-operation Instructions:    - Stop Aspirin and NSAIDS (Ibuprofen, Motrin, Advil, Aleve, Naproxen, Naprosyn) 7 days prior to the surgery/procedure or follow surgeon's direction.    - Stop all Vitamins and Herbal Supplements (including Vitamin E, Fish Oil, Omega 3 Fatty Acids, Ginseng, Gingko) 7 days prior to the surgery/procedure or follow surgeon's direction.    - Medications:  Hold Aspirin 7 days prior to your surgery.  hydrochlorothiazide and Losartan hold morning of surgery.    Continue your medications the morning of your surgery/procedure.    - If you become sick before your surgery/procedure (such as a fever, cough, congestion, or sore throat) you should call your primary care provider and surgeon.    - Unless given permission by your surgeon, do not eat or drink after midnight in the evening prior to your surgery/procedure.

## 2023-10-06 NOTE — PROGRESS NOTES
[address] [address]  Phone: [phone]  Primary Provider: Sarah Coleman  Pre-op Performing Provider: SARAH COLEMAN      PREOPERATIVE EVALUATION:  Today's date: 10/6/2023    Abbey is a 73 year old female who presents for a preoperative evaluation.      10/6/2023    11:44 AM   Additional Questions   Roomed by Catina JACKSON       Surgical Information:  Surgery/Procedure:   MASTECTOMY, BILATERAL, SIMPLE     BIOPSY, LYMPH NODE, SENTINEL       [place] [facility] [place] [facility] [place]   Mikey Mckeon MD      Surgery Date: 10/09/2023  Time of Surgery: 7:15am   Where patient plans to recover: At home with family  Fax number for surgical facility: Note does not need to be faxed, will be available electronically in [facility].    Anesthesia - General     EKG was done 9/29/2023    Assessment & Plan     The proposed surgical procedure is considered INTERMEDIATE risk.    Pre-op exam    - Comprehensive metabolic panel (BMP + Alb, Alk Phos, ALT, AST, Total. Bili, TP); Future  - Comprehensive metabolic panel (BMP + Alb, Alk Phos, ALT, AST, Total. Bili, TP)    Invasive ductal carcinoma of breast, female, left (H)  Reason for surgery    Coronary artery disease involving native coronary artery of native heart without angina pectoris  No current symptoms.  - simvastatin (ZOCOR) 40 MG tablet; Take 1 tablet (40 mg) by mouth at bedtime    Stage 3a chronic kidney disease (H)    - Albumin Random Urine Quantitative with Creat Ratio; Future  - Albumin Random Urine Quantitative with Creat Ratio    RICKEY (obstructive sleep apnea)  Known issue that I take into account for their medical decisions, no current exacerbations or new concerns.     Hypertension goal BP (blood pressure) < 140/90  Chronic, stable.  - metoprolol succinate ER (TOPROL XL) 25 MG 24 hr tablet; Take 1 tablet (25 mg) by mouth daily  - hydrochlorothiazide (HYDRODIURIL) 12.5 MG tablet; Take 1 tablet (12.5  mg) by mouth every morning    Hypothyroidism due to acquired atrophy of thyroid  Chronic, stable.  - levothyroxine (SYNTHROID/LEVOTHROID) 88 MCG tablet; TAKE 1 TABLET(88 MCG) BY MOUTH DAILY    Cough, unspecified type  No current symptoms, refill provided for as needed use.  - levalbuterol (XOPENEX HFA) 45 MCG/ACT inhaler; INHALE 2 PUFFS INTO THE LUNGS EVERY 6 HOURS AS NEEDED FOR SHORTNESS OF BREATH OR DIFFICULT BREATHING OR WHEEZING    Insomnia, unspecified type  Chronic, stable.  - traZODone (DESYREL) 50 MG tablet; Take 1-2 tablets ( mg) by mouth nightly as needed for sleep    Major depressive disorder, recurrent episode, mild degree (H24)  Chronic, stable.  - venlafaxine (EFFEXOR XR) 150 MG 24 hr capsule; 2 Capsules by mouth daily    Chronic gout involving toe without tophus, unspecified cause, unspecified laterality  Chronic, stable.  - allopurinol (ZYLOPRIM) 100 MG tablet; Take 1 tablet (100 mg) by mouth daily  - colchicine (COLCRYS) 0.6 MG tablet; Take 0.6 mg by mouth 3 times daily on the first day of gout flare, then take 0.6 mg once or twice daily until flare resolves    Lesion of left native kidney  She is due for follow-up renal mass protocol CT or MRI this month based on last imaging in 10/2022 and radiologist recommendation for 1 year follow-up.  - CT Renal Mass wo & w Contrast; Future    Screen for colon cancer    - COLOGUARD([email or web address])); Future    Morbid obesity (H)  Known issue that I take into account for their medical decisions, no current exacerbations or new concerns.            Risks and Recommendations:  The patient has the following additional risks and recommendations for perioperative complications:  Cardiovascular:   - H/o CAD- stable  Obstructive Sleep Apnea:   Monitor oxygenation status closely intraoperatively    Antiplatelet or Anticoagulation Medication Instructions:   - aspirin: Discontinue aspirin 7-10 days prior to procedure to reduce bleeding risk. It should be resumed  postoperatively.   Patient states she last took Aspirin 10/5/23 which is less than 7 days prior to surgery - will defer to surgeon to decide if acceptable to proceed with surgery on 10/9/23.  She is cleared from primary care standpoint.  Sent staff message to Dr. Vega and surgery team.    Additional Medication Instructions:   - ACE/ARB: HOLD on day of surgery (minimum 11 hours for general anesthesia).   - Beta Blockers: Continue taking on the day of surgery.   - Diuretics: HOLD on the day of surgery.   - Statins: Continue taking on the day of surgery.    - SSRIs, SNRIs, TCAs, Antipsychotics: Continue without modification.    - rescue Inhaler: Continue PRN. Bring to hospital on the day of surgery.    RECOMMENDATION:  APPROVAL GIVEN to proceed with proposed procedure, without further diagnostic evaluation.              Subjective       HPI related to upcoming procedure:           10/6/2023    11:35 AM   Preop Questions   1. Have you ever had a heart attack or stroke? No   2. Have you ever had surgery on your heart or blood vessels, such as a stent placement, a coronary artery bypass, or surgery on an artery in your head, neck, heart, or legs? YES - stents (3)   3. Do you have chest pain with activity? No   4. Do you have a history of  heart failure? No   5. Do you currently have a cold, bronchitis or symptoms of other infection? No   6. Do you have a cough, shortness of breath, or wheezing? YES -    7. Do you or anyone in your family have previous history of blood clots? No   8. Do you or does anyone in your family have a serious bleeding problem such as prolonged bleeding following surgeries or cuts? No   9. Have you ever had problems with anemia or been told to take iron pills? No   10. Have you had any abnormal blood loss such as black, tarry or bloody stools, or abnormal vaginal bleeding? No   11. Have you ever had a blood transfusion? YES - 25 or 30 yrs ago    11a. Have you ever had a transfusion reaction? No    12. Are you willing to have a blood transfusion if it is medically needed before, during, or after your surgery? Yes   13. Have you or any of your relatives ever had problems with anesthesia? No   14. Do you have sleep apnea, excessive snoring or daytime drowsiness? YES - apnea / snores     14a. Do you have a CPAP machine? No   15. Do you have any artifical heart valves or other implanted medical devices like a pacemaker, defibrillator, or continuous glucose monitor? No   16. Do you have artificial joints? YES - bilateral shoulders    17. Are you allergic to latex? No       Health Care Directive:  Patient has a Health Care Directive on file      Preoperative Review of :   reviewed - no record of controlled substances prescribed.      Status of Chronic Conditions:  See problem list for active medical problems.  Problems all longstanding and stable, except as noted/documented.  See ROS for pertinent symptoms related to these conditions.    Review of Systems  Constitutional, neuro, ENT, endocrine, pulmonary, cardiac, gastrointestinal, genitourinary, musculoskeletal, integument and psychiatric systems are negative, except as otherwise noted.    Patient Active Problem List    Diagnosis Date Noted    Major depressive disorder, recurrent episode, mild degree (H24) 08/03/2011     Priority: High     prior care with psychiatrist Dr. Ro  Has tried paxil in past.       Hypertension goal BP (blood pressure) < 140/90 06/01/2011     Priority: High     Intolerant to higher than 50 mg of metoprolol due to depressive effects  Intolerant to Amlodipine (swelling), Atenolol (fatigue), Hydralizine (fatigue), Triamterene (unknown), Lisinopril (cough)      Hyperlipidemia LDL goal <130 05/17/2011     Priority: High     Has tried welchol, statins and developed side effects      CKD (chronic kidney disease) stage 3, GFR 30-59 ml/min (H) 12/20/2010     Priority: High     Curbside consulted with [facility] 3/14/18.  Renal function  has declined since addition of Allopurinol and Colchicine.    Renally cleared drug recommendations:  - Continue to avoid NSAIDs  - Allopurinol 100 mg dosage okay to continue  - Colchicine back off dosage.  3/12/18 patient changed to AS NEEDED usage.  - Effexor 300 mg/day may reduce total daily dose by 25-50% but this should be done last due to risk of destabilizing mental health      Hypothyroidism 10/12/2009     Priority: High    Invasive ductal carcinoma of breast, female, left (H) 09/14/2023     Priority: Medium     Check 12.23 follow-up [place]       Other abnormal and inconclusive findings on diagnostic imaging of breast 09/14/2023     Priority: Medium    Prediabetes 08/31/2023     Priority: Medium    Lesion of left native kidney 09/14/2021     Priority: Medium     10/6/21:  MR Renal showed Left renal lower pole lesion is likely a cyst containing proteinaceous/hemorrhagic material. Evaluation is slightly limited by artifact on the subtraction images and as a precaution, follow-up renal mass protocol CT or MRI in 6 months to ensure stability.  10/7/22:  CT Renal Mass shows stability of L renal lesion.  There is equivocal/borderline enhancement in the mass versus artifact due to pseudoenhancement due to the endophytic location of the mass. A follow-up renal protocol CT or MRI in one year can be considered.      Chronic gout involving toe without tophus, unspecified cause, unspecified laterality 10/23/2020     Priority: Medium    CAD S/P percutaneous coronary angioplasty 03/18/2019     Priority: Medium    Epistaxis 03/11/2019     Priority: Medium    Retrognathia 03/11/2019     Priority: Medium    Coronary artery disease involving native coronary artery of native heart without angina pectoris 03/01/2019     Priority: Medium     Dr. Infante, Cardiology      Abnormal electrocardiogram 01/11/2019     Priority: Medium     68 year old with new RBBB discovered on EKG, newly diagnosed CAD (3V, RCA, OM/LCX and LAD) s/p SHELBI  "to RCA and OM/LCX. Moderate stenosis (FFR 0.86) remains within the prox LAD, medically managing.      Cough 12/28/2018     Priority: Medium     Intermittent cough that comes and goes; sometimes triggered by laughing.    8/31/10:  Previously seen by Dr. Ramila Peoples allergy clinic and spirometry was normal (see scanned document) and RAST testing negative.      9/13/18:  Pulmonary consult recommended.  Patient did not follow up with this.    Differential considered cough variant asthma, vocal cord dysfunction, allergic cough      Discoid atelectasis 04/28/2016     Priority: Medium     4/26/16:  Chest CT - \"FINDINGS:   CHEST: Series 3 image 23, there is a peripheral indeterminate 0.3 cm  right mid lung nodule near the minor fissure which is stable since  6/3/2014. No acute pulmonary disease or additional nodules identified.  There is discoid atelectasis in the right lower lobe. Left lung is  clear. No mediastinal, hilar or axillary adenopathy. Cholecystectomy  clips. Elevated right hemidiaphragm.\"      Renal cyst, left 02/22/2016     Priority: Medium     Seen on abd/pelvic CT at [facility] 2/14/2016.  Recommend recheck CT in one year or do ultrasound.  In review of chart, renal cyst noted on ultrasound in 2011 and CT done at [facility] facility in 2014 showing stablility      Combined form of age-related cataract, both eyes 02/17/2016     Priority: Medium    Glaucoma suspect, bilateral 02/17/2016     Priority: Medium     Target IOP:   Max IOP :   Family history: No  Trauma history: No  OCT: 8/216 Normal  [place] visual field (HVF): 8/2016   Right eye - Reliable, Normal   Left eye - Reliable, Normal  Pachymetry: Average-thickish 553/564  C/D: 0.6/0.6  SLT:           Drusen of macula of both eyes 02/17/2016     Priority: Medium    Acne 05/20/2015     Priority: Medium     Was on Spironolactone but d/c'ed due to CKD  Acne stopped flaring up once she eliminated sugar from her diet      Pulmonary nodules 06/16/2014     Priority: " "Medium     4/26/16:  Chest CT - \"Impression:  Stable 0.3 cm right midlung nodule since June 2014\"  9/14/21:  Chest CT - multiple small lung nodules 3 mm and 4 mm.  For low risk <6 mm nodules no follow up needed.      Allergic conjunctivitis 04/10/2013     Priority: Medium    Hypertriglyceridemia 03/01/2013     Priority: Medium    DDD (degenerative disc disease), cervical 06/16/2011     Priority: Medium    Elevated LFTs 06/15/2011     Priority: Medium     6/21/2011:  [facility] Ultrasound \"IMPRESSION:  1. Previous cholecystectomy.  2. Otherwise unremarkable ultrasound of the right upper quadrant.\"  2/14/16:  Abdominal CT through [facility] shows unremarkable liver      Dependent edema      Priority: Medium    History of bladder cancer 05/18/2011     Priority: Medium     Dr. West, [place] u[facility] yearly      Morbid obesity (H) 05/17/2011     Priority: Medium    Chronic nonallergic rhinitis      Priority: Medium     Dr. Peoples, allergist      RICKEY (obstructive sleep apnea) 08/11/2009     Priority: Medium     Intolerant to CPAP    Split Night:  Sleep Study 2/01/2017 - (173.0 lbs) AHI 29.6, RDI 59.8, Supine AHI 39.4, REM AHI -, Low O2% 78.5%, Time Spent ?88% 14.4, Time Spent ?89% 30.6, CPAP was titrated to a pressure of 9 with an AHI 30. Time spent in REM supine at this pressure was - minutes.      Macular drusen 11/25/2011     Priority: Low    Advance Care Planning 06/15/2011     Priority: Low     Advance Care Planning 7/5/2016: Receipt of ACP document:  Received: Health Care Directive which was witnessed or notarized on 4/25/16.  Document previously scanned on 4/28/16. Also received Health Care Directive dated 3/18/16 which was valid and scanned on 4/14/16  Validation forms previously completed and sent to be scanned.  Code Status reflects choices in most recent ACP document.  Confirmed/documented designated decision maker(s).  Added by Kanchan Lazcano RN, Advance Care Planning Liaison.  Advance Care Planning 4/25/2016: ACP " Facilitation Session:    Abbey Omer presented for ACP Facilitation session at the clinic. She was accompanied by spouse. Honoring Choices information provided and resources reviewed. She currently wishes to complete an ACP document and needs further clarification and/or consideration prior to documenting choices.  She currently has the following questions or concerns about Advance Care Planning: none.  Confirmed/documented legally designated decision maker(s). Code status reflects choices in most recent ACP document. Follow-up meeting: not needed/applicable.  document sent to be scanned after viewed for validation.Added by Adrianna Silveira   Advance Care Planning 4/13/2016: Receipt of ACP document:  Received: invalid HCD document not signed, witness/notary and missing pages.Document not previously scanned. Validation form completed indicating invalid document. Copy sent to client with information and facilitation resources. Validation form sent to be scanned as notation of invalid document received.  Confirmed/documented RN, System Director ACP-Honoring Choices  designated decision maker(s).  Added by Rebeca Manriquez  Advance Care Planning 6-15-11 Discussed advance care planning with patient; information given to patient to review. Karen Pro CMA        Past Medical History:   Diagnosis Date    Cancer of bladder (H)     Cataract 11/25/2011    Chronic nonallergic rhinitis 8/31/10 RAST    CKD (chronic kidney disease) stage 3, GFR 30-59 ml/min (H) 12/20/2010    Coronary artery disease involving native coronary artery of native heart without angina pectoris 03/01/2019    DDD (degenerative disc disease), cervical 06/16/2011    Dependent edema     Depression, major     Depressive disorder In 2002 or 2003    Glaucoma suspect, bilateral     History of blood transfusion ?    HTN (hypertension)     Hyperlipidemia     Hypothyroidism 10/12/2009    Migraine     resolved    Nasal septal deviation 08/11/2009     Obesity 05/17/2011    RICKEY (obstructive sleep apnea)     Osteoarthritis of shoulder region     Osteoporosis     Prediabetes     Premature menopause     Renal cyst      Past Surgical History:   Procedure Laterality Date    ARTHROPLASTY SHOULDER Right 11/01/2017    Right total shoulder arthroplasty by Dr. Go at [facility]    ARTHROPLASTY SHOULDER Left 03/28/2018    Left total shoulder arthroplasty by Dr. Go at [facility]    CATARACT IOL, RT/LT      CHOLECYSTECTOMY, LAPOROSCOPIC      COLONOSCOPY  ?    Upper & Lower    CV CORONARY ANGIOGRAM  02/22/2019    CV CORONARY ANGIOGRAM N/A 02/22/2019    Procedure: 1330 CORS;  Surgeon: Marito Arriaga MD;  [facility]    CV HEART CATHETERIZATION WITH POSSIBLE INTERVENTION N/A 03/18/2019    Procedure: STAGED PCI-JALEN CARABALLO;  Surgeon: Zeus Cordero MD;  Location:  [facility]    CYSTOSCOPY      bladder cancer    D & C      ENT SURGERY  first 1967/second ?    2 Rhynoplasties    HYSTERECTOMY, PAP NO LONGER INDICATED      BSO    NOSE SURGERY      septum deviation    PHACOEMULSIFICATION CLEAR CORNEA WITH STANDARD INTRAOCULAR LENS IMPLANT Left 09/24/2018    Procedure: PHACOEMULSIFICATION CLEAR CORNEA WITH STANDARD INTRAOCULAR LENS IMPLANT;  LEFT EYE PHACOEMULSIFICATION CLEAR CORNEA WITH STANDARD INTRAOCULAR LENS IMPLANT;  Surgeon: Ryne Pascal MD;  Location:  [facility]    PHACOEMULSIFICATION WITH STANDARD INTRAOCULAR LENS IMPLANT Right 10/15/2018    Procedure: PHACOEMULSIFICATION WITH STANDARD INTRAOCULAR LENS IMPLANT ;  Surgeon: Ryne Pascal MD;  Location: [facility] OR     Current Outpatient Medications   Medication Sig Dispense Refill    allopurinol (ZYLOPRIM) 100 MG tablet Take 1 tablet (100 mg) by mouth daily 90 tablet 3    aspirin (ASA) 81 MG [facility] tablet Take 81 mg by mouth daily  LAST TOOK 10/5/23      B Complex Vitamins (VITAMIN B COMPLEX PO) Take 1,250 mg by mouth       colchicine  (COLCRYS) 0.6 MG tablet Take 0.6 mg by mouth 3 times daily on the first day of gout flare, then take 0.6 mg once or twice daily until flare resolves 30 tablet 1    EXCEDRIN TENSION HEADACHE 500-65 MG PO TABS 1 tablet twice daily as needed      Flaxseed, Linseed, (FLAXSEED OIL PO) Take 1 Tablespoonful by mouth daily      fluticasone (FLONASE) 50 MCG/ACT nasal spray Spray 2 sprays into both nostrils daily 48 g 1    hydrochlorothiazide (HYDRODIURIL) 12.5 MG tablet Take 1 tablet (12.5 mg) by mouth every morning 90 tablet 4    levalbuterol (XOPENEX HFA) 45 MCG/ACT inhaler INHALE 2 PUFFS INTO THE LUNGS EVERY 6 HOURS AS NEEDED FOR SHORTNESS OF BREATH OR DIFFICULT BREATHING OR WHEEZING 15 g 0    levothyroxine (SYNTHROID/LEVOTHROID) 88 MCG tablet TAKE 1 TABLET(88 MCG) BY MOUTH DAILY 90 tablet 4    losartan (COZAAR) 100 MG tablet Take 0.5 tablets (50 mg) by mouth 2 times daily 90 tablet 0    metoprolol succinate ER (TOPROL XL) 25 MG 24 hr tablet Take 1 tablet (25 mg) by mouth daily 90 tablet 4    montelukast (SINGULAIR) 10 MG tablet Take 1 tablet (10 mg) by mouth At Bedtime 90 tablet 3    polyethylene glycol-propylene glycol (SYSTANE ULTRA) 0.4-0.3 % SOLN ophthalmic solution Place 1 drop into both eyes 2 times daily      simvastatin (ZOCOR) 40 MG tablet Take 1 tablet (40 mg) by mouth at bedtime 90 tablet 4    traZODone (DESYREL) 50 MG tablet Take 1-2 tablets ( mg) by mouth nightly as needed for sleep 180 tablet 3    venlafaxine (EFFEXOR XR) 150 MG 24 hr capsule 2 Capsules by mouth daily 180 capsule 3       Allergies   Allergen Reactions    Duloxetine Hcl      Visual hallucinations    Zolmitriptan Other (See Comments) and Dizziness     Can't wake up  Cant wake up    Adhesive Tape      Skin tearing / sores    Aluminum Hydroxide Nausea    Atenolol Other (See Comments)     Fatigue  Fatigue    Codeine Nausea and Vomiting and Nausea     abd pain    Colestid [Colestipol Hcl]     Colestipol Unknown    Other Drug Allergy (See  Comments) Other (See Comments)     Joint pain    Potassium Nausea    Albuterol Other (See Comments)     Very jittery and couldn't tolerate  Very jittery and couldn't tolerate    Amlodipine Other (See Comments) and Swelling     Swelling- lower legs    Asa [Aspirin] GI Disturbance    Atenolol Fatigue    Atorvastatin Other (See Comments)     Swelling legs  Swelling legs    Bupropion Unknown    Crestor [Statins] Other (See Comments)     Joint pain    Ezetimibe Other (See Comments)     Fatigue    Hydralazine Other (See Comments) and Fatigue     Fatigue    Lisinopril Cough    Lovastatin Other (See Comments) and Cramps     Cramps    Morphine And Related Nausea    Niacin      Unknown by patient  Other reaction(s): *Unknown  Unknown by patient    Paroxetine Other (See Comments)     Fatigue    Paxil [Paroxetine] Fatigue    Potassium GI Disturbance    Sulfa Antibiotics Fatigue and Nausea and Vomiting    Sulfasalazine Other (See Comments)    Triamterene Unknown     Other reaction(s): *Unknown        Social History     Tobacco Use    Smoking status: Former     Packs/day: 0.10     Years: 1.00     Pack years: 0.10     Types: Cigarettes     Start date: 1969     Quit date: 10/12/1969     Years since quittin.0    Smokeless tobacco: Never    Tobacco comments:     Smoked, very little, for 2 mo, 49 years ago.   Substance Use Topics    Alcohol use: Yes     Comment: Maybe 1 or 2 drinks a year     Family History   Problem Relation Age of Onset    Diabetes Maternal Grandfather     Hypertension Maternal Grandfather     Diabetes Son     Heart Disease Father         CHF    Diabetes Mother     Depression Mother     Hyperlipidemia Mother     Anxiety Disorder Mother     Asthma Mother     Obesity Mother     Substance Abuse Brother         Not any longer    Diabetes Brother     Depression Daughter     Diabetes Maternal Uncle     Cerebrovascular Disease Maternal Uncle     Diabetes Son         after accident    Depression Daughter      Depression Son         after accident    Substance Abuse Brother         Not any longer    Diabetes Sister     Substance Abuse Sister     Diabetes Brother     Thyroid Disease No family hx of     Glaucoma No family hx of     Macular Degeneration No family hx of     Cancer No family hx of      History   Drug Use No         Objective     /82 (BP Location: Right arm, Patient Position: Sitting, Cuff Size: Adult Large)   Pulse 64   Temp 97.8  F (36.6  C) (Oral)   Resp 16   Ht 1.524 m (5')   Wt 85.2 kg (187 lb 12.8 oz)   SpO2 95%   BMI 36.68 kg/m      Physical Exam    GENERAL APPEARANCE: healthy, alert and no distress     EYES: EOMI, PERRL     HENT: ear canals and TM's normal and nose and mouth without ulcers or lesions     NECK: no adenopathy, no asymmetry, masses, or scars and thyroid normal to palpation     RESP: lungs clear to auscultation - no rales, rhonchi or wheezes     CV: regular rates and rhythm, normal S1 S2, no S3 or S4 and no murmur, click or rub     SKIN: no suspicious lesions or rashes     NEURO: Normal strength and tone, sensory exam grossly normal, mentation intact and speech normal     PSYCH: mentation appears normal. and affect normal/bright     LYMPHATICS: No cervical adenopathy    Recent Labs   Lab Test 08/30/23  0801 08/29/23  1116 07/03/23  1008 10/04/22  0828 05/18/22  1157   HGB  --   --  13.9  --  14.6   PLT  --   --  215  --  237   NA  --   --  137 138 138   POTASSIUM  --   --  4.3 3.9 4.5   CR 1.1*  --  1.18* 1.19* 1.14*   A1C  --  6.2*  --   --   --         Diagnostics:  Labs pending at this time.  Results will be reviewed when available.   No EKG this visit, completed in the last 90 days.  9/29/23 EKG showed right BBB, no significant changes from previous    7/15/22  Procedure - Echocardiogram with two-dimensional, color and spectral Doppler performed.  ______________________________________________________________________________  Interpretation Summary     Global and regional  left ventricular function is normal with an EF of 55-60%.  Global right ventricular function is normal.  Mild tricuspid and aortic insufficiency is present.  Pulmonary artery systolic pressure is normal.  The inferior vena cava was normal in size with preserved respiratory  variability.  No pericardial effusion is present.  ______________________________________________________________________________  Left Ventricle  Left ventricular size is normal. Global and regional left ventricular function  is normal with an EF of 55-60%. Borderline concentric wall thickening  consistent with left ventricular hypertrophy is present. Left ventricular  diastolic function is normal.     Right Ventricle  The right ventricle is normal size. Global right ventricular function is  normal.     Atria  Both atria appear normal.     Mitral Valve  The mitral valve is normal.     Aortic Valve  Trileaflet aortic sclerosis without stenosis. Mild aortic insufficiency is  present.     Tricuspid Valve  Mild tricuspid insufficiency is present. Estimated pulmonary artery systolic  pressure is 19 mmHg plus right atrial pressure. Pulmonary artery systolic  pressure is normal.     Pulmonic Valve  The pulmonic valve is normal.     Vessels  The aorta root is normal. The inferior vena cava was normal in size with  preserved respiratory variability.     Pericardium  No pericardial effusion is present.     Miscellaneous  No significant valvular abnormalities present.     Compared to Previous Study  This study was compared with the study from 1.14.19 . Biventricular function  is stable.    Revised Cardiac Risk Index (RCRI):  The patient has the following serious cardiovascular risks for perioperative complications:   - Coronary Artery Disease (MI, positive stress test, angina, Qs on EKG) = 1 point     RCRI Interpretation: 1 point: Class II (low risk - 0.9% complication rate)         Signed Electronically by: Sarah Vallejo NP  Copy of this evaluation  report is provided to requesting physician.    Answers submitted by the patient for this visit:  Patient Health Questionnaire (Submitted on 10/6/2023)  If you checked off any problems, how difficult have these problems made it for you to do your work, take care of things at home, or get along with other people?: Not difficult at all  PHQ9 TOTAL SCORE: 9

## 2023-10-06 NOTE — Clinical Note
I saw Abbey today for her pre op. She last took aspirin yesterday 10/5/23 and her surgery is next week. I told her to call your office to let you know and to decide if you are okay with surgery or if you want her to reschedule. She is cleared from primary care standpoint.  Sarah Vallejo, DNP, APRN, CNP

## 2023-10-08 NOTE — ANESTHESIA PREPROCEDURE EVALUATION
Anesthesia Pre-Procedure Evaluation    Patient: Abbey Omer   MRN: [id number] : 1950        Procedure : Procedure(s):  MASTECTOMY, BILATERAL, SIMPLE  BIOPSY, LYMPH NODE, SENTINEL          Past Medical History:   Diagnosis Date    Cancer of bladder (H)     Cataract 2011    Chronic nonallergic rhinitis 8/31/10 RAST    CKD (chronic kidney disease) stage 3, GFR 30-59 ml/min (H) 2010    Coronary artery disease involving native coronary artery of native heart without angina pectoris 2019    DDD (degenerative disc disease), cervical 2011    Dependent edema     Depression, major     Depressive disorder In  or     Glaucoma suspect, bilateral     History of blood transfusion ?    HTN (hypertension)     Hyperlipidemia     Hypothyroidism 10/12/2009    Migraine     resolved    Nasal septal deviation 2009    Obesity 2011    RICKEY (obstructive sleep apnea)     Osteoarthritis of shoulder region     Osteoporosis     Prediabetes     Premature menopause     Renal cyst       Past Surgical History:   Procedure Laterality Date    ARTHROPLASTY SHOULDER Right 2017    Right total shoulder arthroplasty by Dr. Go at [facility]    ARTHROPLASTY SHOULDER Left 2018    Left total shoulder arthroplasty by Dr. Go at [facility]    CATARACT IOL, RT/LT      CHOLECYSTECTOMY, LAPOROSCOPIC      COLONOSCOPY  ?    Upper & Lower    CV CORONARY ANGIOGRAM  2019    CV CORONARY ANGIOGRAM N/A 2019    Procedure: 1330 CORS;  Surgeon: Marito Arriaga MD;  Location:  [facility]    CV HEART CATHETERIZATION WITH POSSIBLE INTERVENTION N/A 2019    Procedure: STAGED PCI-JALEN CARABALLO;  Surgeon: Zeus Cordero MD;  [facility]    CYSTOSCOPY      bladder cancer    D & C      ENT SURGERY  first /second ?    2 Rhynoplasties    HYSTERECTOMY, PAP NO LONGER INDICATED      BSO    NOSE SURGERY       septum deviation    PHACOEMULSIFICATION CLEAR CORNEA WITH STANDARD INTRAOCULAR LENS IMPLANT Left 09/24/2018    Procedure: PHACOEMULSIFICATION CLEAR CORNEA WITH STANDARD INTRAOCULAR LENS IMPLANT;  LEFT EYE PHACOEMULSIFICATION CLEAR CORNEA WITH STANDARD INTRAOCULAR LENS IMPLANT;  Surgeon: Ryne Pascal MD;  Location:  [facility]    PHACOEMULSIFICATION WITH STANDARD INTRAOCULAR LENS IMPLANT Right 10/15/2018    Procedure: PHACOEMULSIFICATION WITH STANDARD INTRAOCULAR LENS IMPLANT ;  Surgeon: Ryne Pascal MD;  Location: [facility] OR      Allergies   Allergen Reactions    Duloxetine Hcl      Visual hallucinations    Zolmitriptan Other (See Comments) and Dizziness     Can't wake up  Cant wake up    Adhesive Tape      Skin tearing / sores    Aluminum Hydroxide Nausea    Atenolol Other (See Comments)     Fatigue  Fatigue    Codeine Nausea and Vomiting and Nausea     abd pain    Colestid [Colestipol Hcl]     Colestipol Unknown    Other Drug Allergy (See Comments) Other (See Comments)     Joint pain    Potassium Nausea    Albuterol Other (See Comments)     Very jittery and couldn't tolerate  Very jittery and couldn't tolerate    Amlodipine Other (See Comments) and Swelling     Swelling- lower legs    Asa [Aspirin] GI Disturbance    Atenolol Fatigue    Atorvastatin Other (See Comments)     Swelling legs  Swelling legs    Bupropion Unknown    Crestor [Statins] Other (See Comments)     Joint pain    Ezetimibe Other (See Comments)     Fatigue    Hydralazine Other (See Comments) and Fatigue     Fatigue    Lisinopril Cough    Lovastatin Other (See Comments) and Cramps     Cramps    Morphine And Related Nausea    Niacin      Unknown by patient  Other reaction(s): *Unknown  Unknown by patient    Paroxetine Other (See Comments)     Fatigue    Paxil [Paroxetine] Fatigue    Potassium GI Disturbance    Sulfa Antibiotics Fatigue and Nausea and Vomiting    Sulfasalazine Other (See Comments)    Triamterene Unknown     Other reaction(s):  *Unknown      Social History     Tobacco Use    Smoking status: Former     Packs/day: 0.10     Years: 1.00     Pack years: 0.10     Types: Cigarettes     Start date: 1969     Quit date: 10/12/1969     Years since quittin.0    Smokeless tobacco: Never    Tobacco comments:     Smoked, very little, for 2 mo, 49 years ago.   Substance Use Topics    Alcohol use: Yes     Comment: Maybe 1 or 2 drinks a year      Wt Readings from Last 1 Encounters:   10/06/23 85.2 kg (187 lb 12.8 oz)        Anesthesia Evaluation   Pt has had prior anesthetic. Type: General.        ROS/MED HX  ENT/Pulmonary: Comment: Has chronic cough, etiology undetermined. Patient on inhaler    (+) sleep apnea, severe, doesn't use CPAP,                                     Neurologic:  - neg neurologic ROS     Cardiovascular:     (+) Dyslipidemia hypertension- -  CAD -  - stent-. 3                                Previous cardiac testing     METS/Exercise Tolerance:     Hematologic:  - neg hematologic  ROS     Musculoskeletal: Comment: Cervical disc disease      GI/Hepatic:  - neg GI/hepatic ROS     Renal/Genitourinary:     (+) renal disease, type: CRI,            Endo:     (+)          thyroid problem, hypothyroidism,    Obesity,       Psychiatric/Substance Use:     (+) psychiatric history depression       Infectious Disease:  - neg infectious disease ROS     Malignancy:   (+) Malignancy, History of Breast.Breast CA Active status post.      Other:            Physical Exam    Airway        Mallampati: III   TM distance: > 3 FB   Neck ROM: full   Mouth opening: > 3 cm    Respiratory Devices and Support         Dental       (+) Modest Abnormalities - crowns, retainers, 1 or 2 missing teeth      Cardiovascular   cardiovascular exam normal       Rhythm and rate: regular     Pulmonary   pulmonary exam normal        breath sounds clear to auscultation           OUTSIDE LABS:  CBC:   Lab Results   Component Value Date    WBC 7.2 2023    WBC 8.3  05/18/2022    HGB 13.9 07/03/2023    HGB 14.6 05/18/2022    HCT 43.0 07/03/2023    HCT 44.9 05/18/2022     07/03/2023     05/18/2022     BMP:   Lab Results   Component Value Date     10/06/2023     07/03/2023    POTASSIUM 4.1 10/06/2023    POTASSIUM 4.3 07/03/2023    CHLORIDE 103 10/06/2023    CHLORIDE 99 07/03/2023    CO2 23 10/06/2023    CO2 23 07/03/2023    BUN 19.3 10/06/2023    BUN 24.9 (H) 07/03/2023    CR 1.05 (H) 10/06/2023    CR 1.1 (H) 08/30/2023    GLC 95 10/06/2023     (H) 07/03/2023     COAGS:   Lab Results   Component Value Date    INR 1.00 07/28/2011     POC: No results found for: BGM, HCG, HCGS  HEPATIC:   Lab Results   Component Value Date    ALBUMIN 3.9 10/06/2023    PROTTOTAL 6.9 10/06/2023    ALT 19 10/06/2023    AST 45 10/06/2023    ALKPHOS 90 10/06/2023    BILITOTAL 0.4 10/06/2023     OTHER:   Lab Results   Component Value Date    A1C 6.2 (H) 08/29/2023    SAMARIA 9.4 10/06/2023    PHOS 4.0 07/03/2023    TSH 3.89 08/29/2023    T4 1.01 11/09/2020       Anesthesia Plan    ASA Status:  3    NPO Status:  NPO Appropriate    Anesthesia Type: General.     - Airway: LMA   Induction: Intravenous.   Maintenance: TIVA.        Consents    Anesthesia Plan(s) and associated risks, benefits, and realistic alternatives discussed. Questions answered and patient/representative(s) expressed understanding.     - Discussed: Risks, Benefits and Alternatives for BOTH SEDATION and the PROCEDURE were discussed     - Discussed with:  Patient      - Extended Intubation/Ventilatory Support Discussed: No.      - Patient is DNR/DNI Status: No     Use of blood products discussed: No .     Postoperative Care    Pain management: IV analgesics.        Comments:    Other Comments: Discussed with patient she may need to be admitted post op due to sleep apnea. Surgeon aware       H&P reviewed: Unable to attach H&P to encounter due to EHR limitations. H&P Update: appropriate H&P reviewed, patient  examined. No interval changes since H&P (within 30 days).         Rebeca Ghosh MD

## 2023-10-09 ENCOUNTER — HOSPITAL ENCOUNTER (OUTPATIENT)
Dept: NUCLEAR MEDICINE | Facility: CLINIC | Age: 73
Setting detail: NUCLEAR MEDICINE
Discharge: HOME | End: 2023-10-09
Attending: SURGERY | Admitting: SURGERY
Payer: MEDICARE

## 2023-10-09 ENCOUNTER — HOSPITAL ENCOUNTER (OUTPATIENT)
Facility: AMBULATORY SURGERY CENTER | Age: 73
Discharge: HOME | End: 2023-10-09
Attending: SURGERY
Payer: MEDICARE

## 2023-10-09 ENCOUNTER — ANESTHESIA (OUTPATIENT)
Dept: SURGERY | Facility: AMBULATORY SURGERY CENTER | Age: 73
End: 2023-10-09
Payer: MEDICARE

## 2023-10-09 VITALS
HEIGHT: 60 IN | WEIGHT: 188 LBS | OXYGEN SATURATION: 92 % | TEMPERATURE: 98.7 F | RESPIRATION RATE: 14 BRPM | BODY MASS INDEX: 36.91 KG/M2 | DIASTOLIC BLOOD PRESSURE: 81 MMHG | HEART RATE: 64 BPM | SYSTOLIC BLOOD PRESSURE: 142 MMHG

## 2023-10-09 DIAGNOSIS — R92.8 OTHER ABNORMAL AND INCONCLUSIVE FINDINGS ON DIAGNOSTIC IMAGING OF BREAST: ICD-10-CM

## 2023-10-09 DIAGNOSIS — C50.912 INVASIVE DUCTAL CARCINOMA OF BREAST, FEMALE, LEFT (H): Primary | ICD-10-CM

## 2023-10-09 DIAGNOSIS — C50.912 INVASIVE DUCTAL CARCINOMA OF BREAST, FEMALE, LEFT (H): ICD-10-CM

## 2023-10-09 PROCEDURE — 88377 M/PHMTRC ALYS ISHQUANT/SEMIQ: CPT | Performed by: SURGERY

## 2023-10-09 PROCEDURE — 19303 MAST SIMPLE COMPLETE: CPT | Mod: LT

## 2023-10-09 PROCEDURE — 38525 BIOPSY/REMOVAL LYMPH NODES: CPT | Mod: LT | Performed by: SURGERY

## 2023-10-09 PROCEDURE — 38900 IO MAP OF SENT LYMPH NODE: CPT | Mod: LT | Performed by: SURGERY

## 2023-10-09 PROCEDURE — 88307 TISSUE EXAM BY PATHOLOGIST: CPT | Mod: TC | Performed by: SURGERY

## 2023-10-09 PROCEDURE — 38525 BIOPSY/REMOVAL LYMPH NODES: CPT | Mod: LT

## 2023-10-09 PROCEDURE — 88360 TUMOR IMMUNOHISTOCHEM/MANUAL: CPT | Mod: 26 | Performed by: STUDENT IN AN ORGANIZED HEALTH CARE EDUCATION/TRAINING PROGRAM

## 2023-10-09 PROCEDURE — 99207 NM LYMPHOSCINTIGRAPHY INJECTION ONLY: CPT | Performed by: RADIOLOGY

## 2023-10-09 PROCEDURE — 99000 SPECIMEN HANDLING OFFICE-LAB: CPT | Performed by: PATHOLOGY

## 2023-10-09 PROCEDURE — 38792 RA TRACER ID OF SENTINL NODE: CPT

## 2023-10-09 PROCEDURE — 88307 TISSUE EXAM BY PATHOLOGIST: CPT | Mod: 26 | Performed by: STUDENT IN AN ORGANIZED HEALTH CARE EDUCATION/TRAINING PROGRAM

## 2023-10-09 PROCEDURE — 88377 M/PHMTRC ALYS ISHQUANT/SEMIQ: CPT | Mod: 26 | Performed by: MEDICAL GENETICS

## 2023-10-09 PROCEDURE — 19303 MAST SIMPLE COMPLETE: CPT | Mod: 50 | Performed by: SURGERY

## 2023-10-09 RX ORDER — HEPARIN SODIUM 10000 [USP'U]/ML
5000 INJECTION, SOLUTION INTRAVENOUS; SUBCUTANEOUS
Status: COMPLETED | OUTPATIENT
Start: 2023-10-09 | End: 2023-10-09

## 2023-10-09 RX ORDER — FENTANYL CITRATE 50 UG/ML
INJECTION, SOLUTION INTRAMUSCULAR; INTRAVENOUS PRN
Status: DISCONTINUED | OUTPATIENT
Start: 2023-10-09 | End: 2023-10-09

## 2023-10-09 RX ORDER — ONDANSETRON 2 MG/ML
4 INJECTION INTRAMUSCULAR; INTRAVENOUS EVERY 30 MIN PRN
Status: DISCONTINUED | OUTPATIENT
Start: 2023-10-09 | End: 2023-10-10 | Stop reason: HOSPADM

## 2023-10-09 RX ORDER — BUPIVACAINE HYDROCHLORIDE 2.5 MG/ML
INJECTION, SOLUTION EPIDURAL; INFILTRATION; INTRACAUDAL PRN
Status: DISCONTINUED | OUTPATIENT
Start: 2023-10-09 | End: 2023-10-09 | Stop reason: HOSPADM

## 2023-10-09 RX ORDER — ONDANSETRON 2 MG/ML
INJECTION INTRAMUSCULAR; INTRAVENOUS PRN
Status: DISCONTINUED | OUTPATIENT
Start: 2023-10-09 | End: 2023-10-09

## 2023-10-09 RX ORDER — FLUMAZENIL 0.1 MG/ML
0.2 INJECTION, SOLUTION INTRAVENOUS
Status: DISCONTINUED | OUTPATIENT
Start: 2023-10-09 | End: 2023-10-09 | Stop reason: HOSPADM

## 2023-10-09 RX ORDER — EPHEDRINE SULFATE 50 MG/ML
INJECTION, SOLUTION INTRAMUSCULAR; INTRAVENOUS; SUBCUTANEOUS PRN
Status: DISCONTINUED | OUTPATIENT
Start: 2023-10-09 | End: 2023-10-09

## 2023-10-09 RX ORDER — CEFAZOLIN SODIUM 2 G/50ML
2 SOLUTION INTRAVENOUS
Status: COMPLETED | OUTPATIENT
Start: 2023-10-09 | End: 2023-10-09

## 2023-10-09 RX ORDER — ALBUTEROL SULFATE 0.83 MG/ML
1.25 SOLUTION RESPIRATORY (INHALATION)
Status: DISCONTINUED | OUTPATIENT
Start: 2023-10-09 | End: 2023-10-10 | Stop reason: HOSPADM

## 2023-10-09 RX ORDER — LIDOCAINE 40 MG/G
CREAM TOPICAL
Status: DISCONTINUED | OUTPATIENT
Start: 2023-10-09 | End: 2023-10-09 | Stop reason: HOSPADM

## 2023-10-09 RX ORDER — HYDROMORPHONE HYDROCHLORIDE 1 MG/ML
0.2 INJECTION, SOLUTION INTRAMUSCULAR; INTRAVENOUS; SUBCUTANEOUS EVERY 5 MIN PRN
Status: DISCONTINUED | OUTPATIENT
Start: 2023-10-09 | End: 2023-10-09 | Stop reason: HOSPADM

## 2023-10-09 RX ORDER — FENTANYL CITRATE 50 UG/ML
25-50 INJECTION, SOLUTION INTRAMUSCULAR; INTRAVENOUS
Status: DISCONTINUED | OUTPATIENT
Start: 2023-10-09 | End: 2023-10-09 | Stop reason: HOSPADM

## 2023-10-09 RX ORDER — OXYCODONE HYDROCHLORIDE 5 MG/1
5 TABLET ORAL EVERY 4 HOURS PRN
Qty: 12 TABLET | Refills: 0 | Status: SHIPPED | OUTPATIENT
Start: 2023-10-09 | End: 2024-02-01

## 2023-10-09 RX ORDER — PROPOFOL 10 MG/ML
INJECTION, EMULSION INTRAVENOUS CONTINUOUS PRN
Status: DISCONTINUED | OUTPATIENT
Start: 2023-10-09 | End: 2023-10-09

## 2023-10-09 RX ORDER — LIDOCAINE HYDROCHLORIDE 20 MG/ML
INJECTION, SOLUTION INFILTRATION; PERINEURAL PRN
Status: DISCONTINUED | OUTPATIENT
Start: 2023-10-09 | End: 2023-10-09

## 2023-10-09 RX ORDER — ACETAMINOPHEN 325 MG/1
975 TABLET ORAL ONCE
Status: COMPLETED | OUTPATIENT
Start: 2023-10-09 | End: 2023-10-09

## 2023-10-09 RX ORDER — NALOXONE HYDROCHLORIDE 0.4 MG/ML
0.2 INJECTION, SOLUTION INTRAMUSCULAR; INTRAVENOUS; SUBCUTANEOUS
Status: DISCONTINUED | OUTPATIENT
Start: 2023-10-09 | End: 2023-10-09 | Stop reason: HOSPADM

## 2023-10-09 RX ORDER — HYDROMORPHONE HYDROCHLORIDE 1 MG/ML
0.4 INJECTION, SOLUTION INTRAMUSCULAR; INTRAVENOUS; SUBCUTANEOUS EVERY 5 MIN PRN
Status: DISCONTINUED | OUTPATIENT
Start: 2023-10-09 | End: 2023-10-09 | Stop reason: HOSPADM

## 2023-10-09 RX ORDER — GLYCOPYRROLATE 0.2 MG/ML
INJECTION, SOLUTION INTRAMUSCULAR; INTRAVENOUS PRN
Status: DISCONTINUED | OUTPATIENT
Start: 2023-10-09 | End: 2023-10-09

## 2023-10-09 RX ORDER — ONDANSETRON 2 MG/ML
4 INJECTION INTRAMUSCULAR; INTRAVENOUS EVERY 30 MIN PRN
Status: DISCONTINUED | OUTPATIENT
Start: 2023-10-09 | End: 2023-10-09 | Stop reason: HOSPADM

## 2023-10-09 RX ORDER — NALOXONE HYDROCHLORIDE 0.4 MG/ML
0.4 INJECTION, SOLUTION INTRAMUSCULAR; INTRAVENOUS; SUBCUTANEOUS
Status: DISCONTINUED | OUTPATIENT
Start: 2023-10-09 | End: 2023-10-09 | Stop reason: HOSPADM

## 2023-10-09 RX ORDER — DEXAMETHASONE SODIUM PHOSPHATE 4 MG/ML
INJECTION, SOLUTION INTRA-ARTICULAR; INTRALESIONAL; INTRAMUSCULAR; INTRAVENOUS; SOFT TISSUE PRN
Status: DISCONTINUED | OUTPATIENT
Start: 2023-10-09 | End: 2023-10-09

## 2023-10-09 RX ORDER — ONDANSETRON 4 MG/1
4 TABLET, ORALLY DISINTEGRATING ORAL EVERY 30 MIN PRN
Status: DISCONTINUED | OUTPATIENT
Start: 2023-10-09 | End: 2023-10-10 | Stop reason: HOSPADM

## 2023-10-09 RX ORDER — KETOROLAC TROMETHAMINE 30 MG/ML
15 INJECTION, SOLUTION INTRAMUSCULAR; INTRAVENOUS
Status: DISCONTINUED | OUTPATIENT
Start: 2023-10-09 | End: 2023-10-10 | Stop reason: HOSPADM

## 2023-10-09 RX ORDER — FENTANYL CITRATE 50 UG/ML
50 INJECTION, SOLUTION INTRAMUSCULAR; INTRAVENOUS EVERY 5 MIN PRN
Status: DISCONTINUED | OUTPATIENT
Start: 2023-10-09 | End: 2023-10-09 | Stop reason: HOSPADM

## 2023-10-09 RX ORDER — KETAMINE HYDROCHLORIDE 10 MG/ML
INJECTION INTRAMUSCULAR; INTRAVENOUS PRN
Status: DISCONTINUED | OUTPATIENT
Start: 2023-10-09 | End: 2023-10-09

## 2023-10-09 RX ORDER — PROPOFOL 10 MG/ML
INJECTION, EMULSION INTRAVENOUS PRN
Status: DISCONTINUED | OUTPATIENT
Start: 2023-10-09 | End: 2023-10-09

## 2023-10-09 RX ORDER — ACETAMINOPHEN 325 MG/1
650 TABLET ORAL EVERY 4 HOURS PRN
Qty: 50 TABLET | Refills: 0 | Status: SHIPPED | OUTPATIENT
Start: 2023-10-09

## 2023-10-09 RX ORDER — FENTANYL CITRATE 50 UG/ML
25 INJECTION, SOLUTION INTRAMUSCULAR; INTRAVENOUS EVERY 5 MIN PRN
Status: DISCONTINUED | OUTPATIENT
Start: 2023-10-09 | End: 2023-10-09 | Stop reason: HOSPADM

## 2023-10-09 RX ORDER — OXYCODONE HYDROCHLORIDE 5 MG/1
5 TABLET ORAL
Status: DISCONTINUED | OUTPATIENT
Start: 2023-10-09 | End: 2023-10-10 | Stop reason: HOSPADM

## 2023-10-09 RX ORDER — ACETAMINOPHEN 325 MG/1
650 TABLET ORAL
Status: DISCONTINUED | OUTPATIENT
Start: 2023-10-09 | End: 2023-10-10 | Stop reason: HOSPADM

## 2023-10-09 RX ORDER — SODIUM CHLORIDE, SODIUM LACTATE, POTASSIUM CHLORIDE, CALCIUM CHLORIDE 600; 310; 30; 20 MG/100ML; MG/100ML; MG/100ML; MG/100ML
INJECTION, SOLUTION INTRAVENOUS CONTINUOUS
Status: DISCONTINUED | OUTPATIENT
Start: 2023-10-09 | End: 2023-10-09 | Stop reason: HOSPADM

## 2023-10-09 RX ORDER — ONDANSETRON 4 MG/1
4 TABLET, ORALLY DISINTEGRATING ORAL EVERY 30 MIN PRN
Status: DISCONTINUED | OUTPATIENT
Start: 2023-10-09 | End: 2023-10-09 | Stop reason: HOSPADM

## 2023-10-09 RX ORDER — LABETALOL HYDROCHLORIDE 5 MG/ML
10 INJECTION, SOLUTION INTRAVENOUS
Status: DISCONTINUED | OUTPATIENT
Start: 2023-10-09 | End: 2023-10-09 | Stop reason: HOSPADM

## 2023-10-09 RX ORDER — TRAMADOL HYDROCHLORIDE 50 MG/1
50 TABLET ORAL EVERY 30 MIN PRN
Status: DISCONTINUED | OUTPATIENT
Start: 2023-10-09 | End: 2023-10-10 | Stop reason: HOSPADM

## 2023-10-09 RX ORDER — HYDRALAZINE HYDROCHLORIDE 20 MG/ML
2.5-5 INJECTION INTRAMUSCULAR; INTRAVENOUS EVERY 10 MIN PRN
Status: DISCONTINUED | OUTPATIENT
Start: 2023-10-09 | End: 2023-10-09 | Stop reason: HOSPADM

## 2023-10-09 RX ORDER — CEFAZOLIN SODIUM 2 G/50ML
2 SOLUTION INTRAVENOUS SEE ADMIN INSTRUCTIONS
Status: DISCONTINUED | OUTPATIENT
Start: 2023-10-09 | End: 2023-10-09 | Stop reason: HOSPADM

## 2023-10-09 RX ADMIN — PROPOFOL 100 MCG/KG/MIN: 10 INJECTION, EMULSION INTRAVENOUS at 09:48

## 2023-10-09 RX ADMIN — SODIUM CHLORIDE, SODIUM LACTATE, POTASSIUM CHLORIDE, CALCIUM CHLORIDE: 600; 310; 30; 20 INJECTION, SOLUTION INTRAVENOUS at 07:14

## 2023-10-09 RX ADMIN — Medication 50 MCG: at 08:11

## 2023-10-09 RX ADMIN — Medication 100 MCG: at 09:23

## 2023-10-09 RX ADMIN — PROPOFOL 150 MCG/KG/MIN: 10 INJECTION, EMULSION INTRAVENOUS at 07:22

## 2023-10-09 RX ADMIN — CEFAZOLIN SODIUM 2 G: 2 SOLUTION INTRAVENOUS at 07:14

## 2023-10-09 RX ADMIN — FENTANYL CITRATE 25 MCG: 50 INJECTION, SOLUTION INTRAMUSCULAR; INTRAVENOUS at 08:10

## 2023-10-09 RX ADMIN — Medication 100 MCG: at 08:47

## 2023-10-09 RX ADMIN — Medication 50 MCG: at 07:48

## 2023-10-09 RX ADMIN — Medication 100 MCG: at 08:14

## 2023-10-09 RX ADMIN — KETAMINE HYDROCHLORIDE 10 MG: 10 INJECTION INTRAMUSCULAR; INTRAVENOUS at 08:36

## 2023-10-09 RX ADMIN — Medication 50 MCG: at 08:08

## 2023-10-09 RX ADMIN — DEXAMETHASONE SODIUM PHOSPHATE 4 MG: 4 INJECTION, SOLUTION INTRA-ARTICULAR; INTRALESIONAL; INTRAMUSCULAR; INTRAVENOUS; SOFT TISSUE at 07:35

## 2023-10-09 RX ADMIN — FENTANYL CITRATE 25 MCG: 50 INJECTION, SOLUTION INTRAMUSCULAR; INTRAVENOUS at 08:01

## 2023-10-09 RX ADMIN — Medication 100 MCG: at 08:21

## 2023-10-09 RX ADMIN — EPHEDRINE SULFATE 5 MG: 50 INJECTION, SOLUTION INTRAMUSCULAR; INTRAVENOUS; SUBCUTANEOUS at 10:18

## 2023-10-09 RX ADMIN — Medication 100 MCG: at 10:14

## 2023-10-09 RX ADMIN — KETAMINE HYDROCHLORIDE 10 MG: 10 INJECTION INTRAMUSCULAR; INTRAVENOUS at 07:56

## 2023-10-09 RX ADMIN — Medication 100 MCG: at 10:18

## 2023-10-09 RX ADMIN — GLYCOPYRROLATE 0.2 MG: 0.2 INJECTION, SOLUTION INTRAMUSCULAR; INTRAVENOUS at 07:22

## 2023-10-09 RX ADMIN — Medication 50 MCG: at 08:02

## 2023-10-09 RX ADMIN — PROPOFOL 100 MCG/KG/MIN: 10 INJECTION, EMULSION INTRAVENOUS at 08:59

## 2023-10-09 RX ADMIN — Medication 100 MCG: at 09:38

## 2023-10-09 RX ADMIN — Medication 100 MCG: at 09:53

## 2023-10-09 RX ADMIN — EPHEDRINE SULFATE 5 MG: 50 INJECTION, SOLUTION INTRAMUSCULAR; INTRAVENOUS; SUBCUTANEOUS at 09:34

## 2023-10-09 RX ADMIN — ONDANSETRON 4 MG: 2 INJECTION INTRAMUSCULAR; INTRAVENOUS at 10:02

## 2023-10-09 RX ADMIN — LIDOCAINE HYDROCHLORIDE 80 MG: 20 INJECTION, SOLUTION INFILTRATION; PERINEURAL at 07:22

## 2023-10-09 RX ADMIN — Medication 100 MCG: at 09:05

## 2023-10-09 RX ADMIN — Medication 100 MCG: at 09:34

## 2023-10-09 RX ADMIN — Medication 100 MCG: at 09:11

## 2023-10-09 RX ADMIN — KETAMINE HYDROCHLORIDE 10 MG: 10 INJECTION INTRAMUSCULAR; INTRAVENOUS at 09:00

## 2023-10-09 RX ADMIN — FENTANYL CITRATE 25 MCG: 50 INJECTION, SOLUTION INTRAMUSCULAR; INTRAVENOUS at 07:48

## 2023-10-09 RX ADMIN — Medication 0.5 MG: at 09:02

## 2023-10-09 RX ADMIN — PROPOFOL 40 MG: 10 INJECTION, EMULSION INTRAVENOUS at 09:05

## 2023-10-09 RX ADMIN — EPHEDRINE SULFATE 5 MG: 50 INJECTION, SOLUTION INTRAMUSCULAR; INTRAVENOUS; SUBCUTANEOUS at 09:28

## 2023-10-09 RX ADMIN — PROPOFOL 125 MCG/KG/MIN: 10 INJECTION, EMULSION INTRAVENOUS at 08:02

## 2023-10-09 RX ADMIN — ACETAMINOPHEN 975 MG: 325 TABLET ORAL at 11:14

## 2023-10-09 RX ADMIN — FENTANYL CITRATE 25 MCG: 50 INJECTION, SOLUTION INTRAMUSCULAR; INTRAVENOUS at 07:14

## 2023-10-09 RX ADMIN — Medication 100 MCG: at 08:26

## 2023-10-09 RX ADMIN — Medication 100 MCG: at 08:59

## 2023-10-09 RX ADMIN — HEPARIN SODIUM 5000 UNITS: 10000 INJECTION, SOLUTION INTRAVENOUS; SUBCUTANEOUS at 07:04

## 2023-10-09 RX ADMIN — PROPOFOL 200 MG: 10 INJECTION, EMULSION INTRAVENOUS at 07:22

## 2023-10-09 RX ADMIN — SODIUM CHLORIDE, SODIUM LACTATE, POTASSIUM CHLORIDE, CALCIUM CHLORIDE: 600; 310; 30; 20 INJECTION, SOLUTION INTRAVENOUS at 09:34

## 2023-10-09 RX ADMIN — Medication 100 MCG: at 09:17

## 2023-10-09 NOTE — ANESTHESIA CARE TRANSFER NOTE
Patient: Abbey Omer    Procedure: Procedure(s):  MASTECTOMY, BILATERAL, SIMPLE  BIOPSY, LYMPH NODE, SENTINEL       Diagnosis: Invasive ductal carcinoma of breast, female, left (H) [C50.912]  Other abnormal and inconclusive findings on diagnostic imaging of breast [R92.8]  Diagnosis Additional Information: No value filed.    Anesthesia Type:   General     Note:    Oropharynx: oropharynx clear of all foreign objects and spontaneously breathing  Level of Consciousness: awake  Oxygen Supplementation: face mask    Independent Airway: airway patency satisfactory and stable  Dentition: dentition unchanged  Vital Signs Stable: post-procedure vital signs reviewed and stable  Report to RN Given: handoff report given  Patient transferred to: PACU    Handoff Report: Identifed the Patient, Identified the Reponsible Provider, Reviewed the pertinent medical history, Discussed the surgical course, Reviewed Intra-OP anesthesia mangement and issues during anesthesia, Set expectations for post-procedure period and Allowed opportunity for questions and acknowledgement of understanding      Vitals:  Vitals Value Taken Time   BP 91/55 10/09/23 1045   Temp 36.3  C (97.3  F) 10/09/23 1045   Pulse 60 10/09/23 1050   Resp 9 10/09/23 1050   SpO2 92 % 10/09/23 1050   Vitals shown include unvalidated device data.    Electronically Signed By: RADHA Rosario CRNA  October 9, 2023  10:51 AM

## 2023-10-09 NOTE — DISCHARGE INSTRUCTIONS
[facility]  Home Care Following Anesthesia  For 24 hours after surgery:  Get plenty of rest.  A responsible adult must stay with you for at least 24 hours after you leave the surgery center.  Do not drive or use heavy equipment.  If you have weakness or tingling, don't drive or use heavy equipment until this feeling goes away.   Do not drink alcohol.   Avoid strenuous or risky activities.  Ask for help when climbing stairs.  You may feel lightheaded.  IF so, sit for a few minutes before standing.  Have someone help you get up.   If you have nausea (feel sick to your stomach): Drink only clear liquids such as apple juice, ginger ale, broth or 7-Up.  Rest may also help.  Be sure to drink enough fluids.  Move to a regular diet as you feel able.   You may have a slight fever.  Call the doctor if your fever is over 100 F (37.7 C) (taken under the tongue) or lasts longer than 24 hours.  You may have a dry mouth, a sore throat, muscle aches or trouble sleeping. These should go away after 24 hours.  Do not make important or legal decisions.   It is recommended to avoid smoking.               Tips for taking pain medications  To get the best pain relief possible, remember these points:  Take pain medications as directed, before pain becomes severe.  Pain medication can upset your stomach: taking it with food may help.  Constipation is a common side effect of pain medication. Drink plenty of  fluids.  Eat foods high in fiber. Take a stool softener if recommended by your doctor or pharmacist.  Do not drink alcohol, drive or operate machinery while taking pain medications.  Ask about other ways to control pain, such as with heat, ice or relaxation.    Tylenol/Acetaminophen Consumption    If you feel your pain relief is insufficient, you may take Tylenol/Acetaminophen in addition to your narcotic pain medication.   Be careful not to exceed 4,000 mg of Tylenol/Acetaminophen in a 24 hour  period from all sources.  If you are taking extra strength Tylenol/acetaminophen (500 mg), the maximum dose is 8 tablets in 24 hours.  If you are taking regular strength acetaminophen (325 mg), the maximum dose is 12 tablets in 24 hours.    Call a doctor for any of the following:  Signs of infection (fever, growing tenderness at the surgery site, a large amount of drainage or bleeding, severe pain, foul-smelling drainage, redness, swelling).  It has been over 8 to 10 hours since surgery and you are still not able to urinate (pass water).  Headache for over 24 hours.  Signs of Covid-19 infection (temperature over 100 degrees, shortness of breath, cough, loss of taste/smell, generalized body aches, persistent headache, chills, sore throat, nausea/vomiting/diarrhea)  Your doctor is:       Dr. Mikey Vega, [facility]: [phone]               Or dial [phone] and ask for the resident on call for:  [facility]  For emergency care, call the:  [facility]:  [phone] (TTY for hearing impaired: [phone])

## 2023-10-09 NOTE — OP NOTE
Preoperative diagnosis: Left breast cancer    Postoperative diagnosis: The same    Procedure: Bilateral simple mastectomies, lymphatic mapping, left axillary sentinel node biopsy, and bilateral flat aesthetic closure    Anesthesia General    Surgeons Dr. Mikey Vega and Dr. Cristobal Zamora    Indications for surgery: The patient is a 73-year-old woman who has a new diagnosis of a left breast cancer.  She is elected to undergo bilateral mastectomies.    Procedure in detail: After informed consent the patient was brought the operating room given a general anesthetic.  I injected technetium sulfur colloid and ICG into her left breast.  She was prepped and draped in the usual sterile fashion.  I started with her left side I performed a wide excision of the nipple-areolar complex and the skin.  The Bovie cautery was used to incise the subcutaneous tissues.  A superior skin flap was raised to the clavicle.  A medial skin flap was raised to the lateral border of the sternum.  An inferior skin flap was raised the inframammary fold.  Next the breast and pectoralis fashion remove the pectoralis major muscle from superior to inferior and lateral to medial after the specimen was dissected to the lateral border the pectoralis major muscle it was divided oriented and sent to pathology.  Identified a radioactive fluorescent lymph node.  Sent lymph node #1 was removed and had ex vivo counts of 1900 counts per second and was highly fluorescent.  After removal of this lymph node there were no additional fluorescent radioactive or palpable lymph nodes.  40 mirror image incision on the patient's right side.  The Bovie cautery was used to incise subcutaneous tissues.  The superior skin flap was raised to the clavicle.  A medial skin flap was raised the lateral border the sternum.  An inferior skin flap was raised the inframammary fold.  Next the breast and pectoralis fashion were removed from the pectoralis major muscle from  superior inferior and from medial to lateral at the specimen was dissected on the lateral border the pectoralis major muscle it was divided and sent pathology.  Strict hemostasis was obtained with the Bovie cautery and surgical clips.  On both sides we performed a pectoralis block using quarter sent Marcaine.  On both sides we placed a 19 round Reinaldo-Casper drain and secured to the skin with a 3-0 nylon suture.  Because of her lateral adiposity, I performed a fishtail excision and closure on both sides.  This involved removing approximately 100 cc squared of tissue including skin on both sides.  Neck the dermis was closed with a running 3 oh V-Loc suture.  The skin was closed with a running 4-0 PDS particular stitch.  Dermabond was placed and an Ace wrap was placed around the patient's chest.  She is taken to the recovery room.

## 2023-10-09 NOTE — ANESTHESIA POSTPROCEDURE EVALUATION
Patient: Abbey mOer    Procedure: Procedure(s):  MASTECTOMY, BILATERAL, SIMPLE  BIOPSY, LYMPH NODE, SENTINEL       Anesthesia Type:  General    Note:  Disposition: Outpatient   Postop Pain Control: Uneventful            Sign Out: Well controlled pain   PONV: No   Neuro/Psych: Uneventful            Sign Out: Acceptable/Baseline neuro status   Airway/Respiratory: Uneventful            Sign Out: Acceptable/Baseline resp. status   CV/Hemodynamics: Uneventful            Sign Out: Acceptable CV status; No obvious hypovolemia; No obvious fluid overload   Other NRE: NONE   DID A NON-ROUTINE EVENT OCCUR? No    Event details/Postop Comments:  Advised patient to try to avoid narcotics for pain management because of her sleep apnea. Recommended she take acetaminophen and or ibuprofen instead.            Last vitals:  Vitals Value Taken Time   /87 10/09/23 1130   Temp 36.2  C (97.2  F) 10/09/23 1130   Pulse 64 10/09/23 1131   Resp 13 10/09/23 1131   SpO2 94 10/09/23 1300   Vitals shown include unvalidated device data.    Electronically Signed By: Rebeca Ghosh MD  October 9, 2023  12:59 PM

## 2023-10-10 ENCOUNTER — PATIENT OUTREACH (OUTPATIENT)
Dept: ONCOLOGY | Facility: CLINIC | Age: 73
End: 2023-10-10
Payer: COMMERCIAL

## 2023-10-10 NOTE — PROGRESS NOTES
Post Op Discharge Call     Surgery:      Bilateral simple mastectomies, lymphatic mapping, left axillary sentinel node biopsy, and bilateral flat aesthetic closure        Surgery date: 10/9/2023     Discharge Date:  10/9/2023    Date of Post-op Call: 10/10/2023       Left message for Abbey following her surgery. Encouraged Abbey to call with any questions or concerns. Contact info provided. Reviewed follow up appointment scheduled on 10/27 with Dr. Vega.     Future Appointments   Date Time Provider Department Center   10/27/2023  9:20 AM Mikey Vega MD [facility]   11/6/2023 11:00 AM [facility] [place]           [place]IRGINIA Mabry  [facility]  Surgical Oncology

## 2023-10-17 ENCOUNTER — ANCILLARY PROCEDURE (OUTPATIENT)
Dept: CT IMAGING | Facility: CLINIC | Age: 73
End: 2023-10-17
Attending: NURSE PRACTITIONER
Payer: MEDICARE

## 2023-10-17 ENCOUNTER — ALLIED HEALTH/NURSE VISIT (OUTPATIENT)
Dept: ONCOLOGY | Facility: CLINIC | Age: 73
End: 2023-10-17
Attending: SURGERY
Payer: MEDICARE

## 2023-10-17 DIAGNOSIS — C50.912 INVASIVE DUCTAL CARCINOMA OF BREAST, FEMALE, LEFT (H): Primary | ICD-10-CM

## 2023-10-17 DIAGNOSIS — N28.9 LESION OF LEFT NATIVE KIDNEY: ICD-10-CM

## 2023-10-17 PROCEDURE — G1010 CDSM STANSON: HCPCS | Performed by: INTERNAL MEDICINE

## 2023-10-17 PROCEDURE — 74178 CT ABD&PLV WO CNTR FLWD CNTR: CPT | Mod: TC | Performed by: INTERNAL MEDICINE

## 2023-10-17 RX ORDER — IOPAMIDOL 755 MG/ML
115 INJECTION, SOLUTION INTRAVASCULAR ONCE
Status: COMPLETED | OUTPATIENT
Start: 2023-10-17 | End: 2023-10-17

## 2023-10-17 RX ADMIN — IOPAMIDOL 115 ML: 755 INJECTION, SOLUTION INTRAVASCULAR at 09:43

## 2023-10-17 NOTE — PROGRESS NOTES
[facility]: Surgical Oncology Cancer Care Short Note                                     Surgical Oncology Visit with Patient:                                                        Met with Abbey. Reviewed LOLLY drain outputs of less than 30 ml for the past three days. Drain insertion site and surrounding area were free from swelling or bruising.     Suture was cut and drain was removed with no pain or resistance. Small amount of drainage noted. Dressing applied. Abbey was instructed to not shower for 24 hours. Additional dressings were provided.     Reviewed plan for follow-up with Dr. Vega on 10/20 . [employer] and a prosthetics DME were provided. Encouraged Abbey to call with any additional questions or concerns.     Corrie Pinto, RNCC  [facility]  Surgical Onolcogy

## 2023-10-20 ENCOUNTER — OFFICE VISIT (OUTPATIENT)
Dept: ONCOLOGY | Facility: CLINIC | Age: 73
End: 2023-10-20
Attending: SURGERY
Payer: MEDICARE

## 2023-10-20 VITALS
DIASTOLIC BLOOD PRESSURE: 82 MMHG | WEIGHT: 180 LBS | BODY MASS INDEX: 35.15 KG/M2 | SYSTOLIC BLOOD PRESSURE: 136 MMHG | HEART RATE: 78 BPM | TEMPERATURE: 98.4 F | OXYGEN SATURATION: 93 %

## 2023-10-20 DIAGNOSIS — C50.912 INVASIVE DUCTAL CARCINOMA OF BREAST, FEMALE, LEFT (H): Primary | ICD-10-CM

## 2023-10-20 DIAGNOSIS — M1A.9XX0 CHRONIC GOUT INVOLVING TOE WITHOUT TOPHUS, UNSPECIFIED CAUSE, UNSPECIFIED LATERALITY: ICD-10-CM

## 2023-10-20 LAB
INTERPRETATION: NORMAL
INTERPRETATION: NORMAL
LAB PDF RESULT: NORMAL
LAB PDF RESULT: NORMAL
SIGNIFICANT RESULTS: NORMAL
SIGNIFICANT RESULTS: NORMAL
SPECIMEN DESCRIPTION: NORMAL
SPECIMEN DESCRIPTION: NORMAL
TEST DETAILS, MDL: NORMAL
TEST DETAILS, MDL: NORMAL

## 2023-10-20 PROCEDURE — 36415 COLL VENOUS BLD VENIPUNCTURE: CPT | Performed by: SURGERY

## 2023-10-20 RX ORDER — COLCHICINE 0.6 MG/1
TABLET ORAL
Qty: 180 TABLET | Refills: 1 | OUTPATIENT
Start: 2023-10-20

## 2023-10-20 ASSESSMENT — PAIN SCALES - GENERAL: PAINLEVEL: NO PAIN (0)

## 2023-10-20 NOTE — NURSING NOTE
Labs drawn in clinic by LPN via venipuncture in left hand with a 25 G.    Patient tolerated well and had no issues.    Nahum Nuñez LPN

## 2023-10-20 NOTE — PROGRESS NOTES
The patient is here for a postoperative visit after undergoing bilateral mastectomy and sent lymph node biopsy on October 9.  Her final pathology demonstrated no evidence of disease in her right breast.  In her left breast she had a 3.5 cm invasive ductal cancer grade 2 with DCIS.  The invasive breast cancer was ER positive LA positive HER2/pavithra negative.  Her sentinel node was positive.  Her drains have already been removed.  On physical examination both of her incisions are healing well with no evidence of infection or necrosis.    Impression: Postop check    Plan: She desires genetic testing we will get that done today.  She has a follow-up appointment with medical oncology in the next couple of weeks.  I have informed her that she would likely benefit from radiotherapy as well.  I will see you in the future if any problems arise.

## 2023-10-20 NOTE — LETTER
10/20/2023         RE: Abbey Omer  [address] [address]        Dear Colleague,    Thank you for referring your patient, Abbey Omer, to the [facility]. Please see a copy of my visit note below.    The patient is here for a postoperative visit after undergoing bilateral mastectomy and sent lymph node biopsy on October 9.  Her final pathology demonstrated no evidence of disease in her right breast.  In her left breast she had a 3.5 cm invasive ductal cancer grade 2 with DCIS.  The invasive breast cancer was ER positive DE positive HER2/pavithra negative.  Her sentinel node was positive.  Her drains have already been removed.  On physical examination both of her incisions are healing well with no evidence of infection or necrosis.    Impression: Postop check    Plan: She desires genetic testing we will get that done today.  She has a follow-up appointment with medical oncology in the next couple of weeks.  I have informed her that she would likely benefit from radiotherapy as well.  I will see you in the future if any problems arise.    Sincerely,        Mikey Vega MD

## 2023-10-20 NOTE — NURSING NOTE
Oncology Rooming Note    October 20, 2023 10:40 AM   Abbey Omer is a 73 year old female who presents for:    Chief Complaint   Patient presents with    Oncology Clinic Visit     Post-op 10/9/2023     Initial Vitals: /82   Pulse 78   Temp 98.4  F (36.9  C)   Wt 81.6 kg (180 lb)   SpO2 93%   BMI 35.15 kg/m   Estimated body mass index is 35.15 kg/m  as calculated from the following:    Height as of 10/9/23: 1.524 m (5').    Weight as of this encounter: 81.6 kg (180 lb). Body surface area is 1.86 meters squared.  No Pain (0) Comment: Data Unavailable   No LMP recorded. Patient has had a hysterectomy.  Allergies reviewed: Yes  Medications reviewed: Yes    Medications: Medication refills not needed today.  Pharmacy name entered into EPIC:    [id number] - [place], [place] - [address] AT CORNER OF [address] [place] [address] PHARMAC[facility] - [address]  WRITTEN PRESCRIPTION REQUESTED  [id number]- CLOSED [place], [place] - [address] [place] [id number] [place] [facility] [address] [facility] [address] [facility] [address] [place] [facility] [address] [facility] [place], [place] - [address] [facility] [id number]    Clinical concerns:  Pt feels pressure in surgical area. Takes 2 tylenol for pain. Dr. Vega  was notified.      Virginia Rodriguez

## 2023-10-24 ENCOUNTER — TELEPHONE (OUTPATIENT)
Dept: LAB | Facility: CLINIC | Age: 73
End: 2023-10-24
Payer: COMMERCIAL

## 2023-10-24 ENCOUNTER — LAB (OUTPATIENT)
Dept: LAB | Facility: CLINIC | Age: 73
End: 2023-10-24
Payer: MEDICARE

## 2023-10-24 DIAGNOSIS — C50.912 INVASIVE DUCTAL CARCINOMA OF BREAST, FEMALE, LEFT (H): Primary | ICD-10-CM

## 2023-10-24 PROCEDURE — 81162 BRCA1&2 GEN FULL SEQ DUP/DEL: CPT | Performed by: SURGERY

## 2023-10-24 PROCEDURE — G0452 MOLECULAR PATHOLOGY INTERPR: HCPCS | Mod: 26 | Performed by: PATHOLOGY

## 2023-10-24 NOTE — PROGRESS NOTES
I called Abbey to update her on insurance coverage for her genetic testing No authorization required; patient meets CMS medical necessity. However, I was unable to reach her. I left a voicemail with my name, phone number, and a brief reason for calling. I also Mychart message this information to Abbey.      Hal Gay  Genomics Billing    [facility]  Molecular Diagnostics Laboratory  [address] [address]  [email or web address]  Office: [phone]  Fax:   Employed by [facility]

## 2023-10-25 LAB — INTERPRETATION: NORMAL

## 2023-10-27 LAB — INTERPRETATION: NORMAL

## 2023-11-03 ENCOUNTER — PATIENT OUTREACH (OUTPATIENT)
Dept: ONCOLOGY | Facility: CLINIC | Age: 73
End: 2023-11-03
Payer: COMMERCIAL

## 2023-11-03 NOTE — PROGRESS NOTES
Fax received previously from [employer] stating that pathology sample received contained lymph node tissue and new sample was required.  Contacted pathology lab on 10/31/2023 to request that sample of highest grade carcinoma from primary site be sent to [facility].  Received confirmation email from [employer] that a new specimen was received on 11/2/2023 and is in process.      Patient contacted, informed that Oncotype is not available and assisted in rescheduling follow up visit to 11/13/2023 (patient preference to be seen in person) so that all data for informed decision making will be available.      Krissy Her, RN

## 2023-11-08 ENCOUNTER — TELEPHONE (OUTPATIENT)
Dept: FAMILY MEDICINE | Facility: CLINIC | Age: 73
End: 2023-11-08
Payer: COMMERCIAL

## 2023-11-08 NOTE — TELEPHONE ENCOUNTER
Forms/Letter Request    Type of form/letter:  [employer] forms    Have you been seen for this request: Yes Last seen 10/06/2023    Do we have the form/letter: Yes: team sissy mail box    Who is the form from? Patient    Where did/will the form come from? Patient or family brought in       When is form/letter needed by: ASAP    How would you like the form/letter returned: Fax : [phone] or Email     Patient Notified form requests are processed in 3-5 business days:Yes    Could we send this information to you in [employer] or would you prefer to receive a phone call?:   Patient would prefer a phone call   Okay to leave a detailed message?: Yes at Other phone number:  * [phone] (Mobile)

## 2023-11-08 NOTE — TELEPHONE ENCOUNTER
TC reached out     [employer] forms     Missed this part on why she needed this filled out for her dog she has its her emotional support         Day Concepcion    [facility]

## 2023-11-11 LAB — NONINV COLON CA DNA+OCC BLD SCRN STL QL: NEGATIVE

## 2023-11-14 ENCOUNTER — VIRTUAL VISIT (OUTPATIENT)
Dept: ONCOLOGY | Facility: CLINIC | Age: 73
End: 2023-11-14
Payer: MEDICARE

## 2023-11-14 VITALS — WEIGHT: 173 LBS | HEIGHT: 60 IN | BODY MASS INDEX: 33.96 KG/M2

## 2023-11-14 DIAGNOSIS — C50.912 INVASIVE DUCTAL CARCINOMA OF BREAST, FEMALE, LEFT (H): Primary | ICD-10-CM

## 2023-11-14 DIAGNOSIS — T38.6X5A OSTEOPOROSIS DUE TO AROMATASE INHIBITOR: ICD-10-CM

## 2023-11-14 DIAGNOSIS — M81.8 OSTEOPOROSIS DUE TO AROMATASE INHIBITOR: ICD-10-CM

## 2023-11-14 LAB — SPECIMEN STATUS: NORMAL

## 2023-11-14 PROCEDURE — 99214 OFFICE O/P EST MOD 30 MIN: CPT | Mod: VID | Performed by: INTERNAL MEDICINE

## 2023-11-14 RX ORDER — LETROZOLE 2.5 MG/1
2.5 TABLET, FILM COATED ORAL DAILY
Qty: 90 TABLET | Refills: 3 | Status: SHIPPED | OUTPATIENT
Start: 2023-11-14

## 2023-11-14 ASSESSMENT — PAIN SCALES - GENERAL: PAINLEVEL: NO PAIN (0)

## 2023-11-14 NOTE — NURSING NOTE
Is the patient currently in the state of [place]? YES    Visit mode:VIDEO    If the visit is dropped, the patient can be reconnected by: VIDEO VISIT: Send to e-mail at: [email or web address]    Will anyone else be joining the visit? NO  (If patient encounters technical issues they should call [phone])    How would you like to obtain your AVS? MyChart    Are changes needed to the allergy or medication list? No    Reason for visit: Video Visit (Follow Up )    Sharla DE LEÓN

## 2023-11-14 NOTE — LETTER
2023         RE: Abbey Omer  [address] [address]        Dear Colleague,    Thank you for referring your patient, Abbey Omer, to the [facility]. Please see a copy of my visit note below.    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  # Oncotype Dx score 1; adjuvant chemotherapy not indicated.    Staging CT CAP.  Adjuvant radiotherapy recommended; will receive at [facility] closer to [facility].  Adjuvant letrozole recommended; planning 10 years; reviewed logistics and side effects and indications.  Return to clinic w/ labs and DEXA scan; bone health will be a big focus.  Check Ki67 for adjuvant abema.    Mykel Salcido MD    --------------------    Interval History:  Abbey returns for follow-up of breast cancer.  She is delighted by her Oncotype.  She is healing up.    Social History:  Social History     Tobacco Use     Smoking status: Former     Packs/day: 0.10     Years: 1.00     Additional pack years: 0.00     Total pack years: 0.10     Types: Cigarettes     Start date: 1969     Quit date: 10/12/1969     Years since quittin.1     Smokeless tobacco: Never     Tobacco comments:     Smoked, very little, for 2 mo, 49 years ago.   Substance Use Topics     Alcohol use: Yes     Comment: Maybe 1 or 2 drinks a year     Family History:  Family History   Problem Relation Age of Onset     Diabetes Maternal Grandfather      Hypertension Maternal Grandfather      Diabetes Son      Heart Disease Father         CHF     Diabetes Mother      Depression Mother      Hyperlipidemia Mother      Anxiety Disorder  Mother      Asthma Mother      Obesity Mother      Substance Abuse Brother         Not any longer     Diabetes Brother      Depression Daughter      Diabetes Maternal Uncle      Cerebrovascular Disease Maternal Uncle      Diabetes Son         after accident     Depression Daughter      Depression Son         after accident     Substance Abuse Brother         Not any longer     Diabetes Sister      Substance Abuse Sister      Diabetes Brother      Thyroid Disease No family hx of      Glaucoma No family hx of      Macular Degeneration No family hx of      Cancer No family hx of      Immunizations:  Immunization History   Administered Date(s) Administered     COVID-19 Bivalent 12+ (Pfizer) 09/27/2022     COVID-19 MONOVALENT 12+ (Pfizer) 03/02/2021, 03/23/2021, 10/25/2021     COVID-19 Monovalent 12+ (Pfizer 2022) 05/18/2022     Flu, Unspecified 11/02/1995, 11/01/1996, 10/28/1997     Influenza (H1N1) 12/16/2009     Influenza (High Dose) 3 valent vaccine 10/27/2015, 12/01/2016, 10/04/2017, 09/13/2018, 10/07/2019     Influenza (IIV3) PF 10/16/1999, 10/04/2003, 10/05/2004, 11/12/2005, 10/25/2006, 09/26/2007, 10/12/2009, 10/20/2010, 10/24/2012     Influenza Vaccine 65+ (Fluzone HD) 10/12/2020, 10/25/2021, 09/27/2022     Influenza Vaccine >6 months (Alfuria,Fluzone) 10/25/2014     Pneumo Conj 13-V (2010&after) 04/20/2016     Pneumococcal 20 valent Conjugate (Prevnar 20) 06/16/2023     Pneumococcal 23 valent 10/04/2003, 10/25/2005, 05/28/2014     TD,PF 7+ (Tenivac) 03/21/2003     TDAP Vaccine (Boostrix) 03/06/2013     Zoster recombinant adjuvanted (SHINGRIX) 06/16/2023, 08/15/2023     Zoster vaccine, live 05/28/2014     Health Maintenance Due   Topic Date Due     DTAP/TDAP/TD IMMUNIZATION (2 - Td or Tdap) 03/06/2023     INFLUENZA VACCINE (1) 09/01/2023     COVID-19 Vaccine (6 - 2023-24 season) 09/01/2023     --------------------    Physical Exam:  Video visit.    Labs / Imaging:  Reviewed Oncotype Dx and path report.    Video  Visit:  Abbey is a 73 year old female who is being evaluated via a billable video visit.  }    Video start time:  1:39 PM  Video end time:  1:58 PM    Provider location: [facility]  Patient location: Home    Mode of transmission:  Portal / Sportingo.      Again, thank you for allowing me to participate in the care of your patient.        Sincerely,        Mykel Salcido MD

## 2023-11-14 NOTE — PATIENT INSTRUCTIONS
1) Referral to [facility] for radiation.  2) Start letrozole.  3) BJT MG end of radiation w/ labs (ALT, lipid panel, vitamin D) and DEXA scan.    Mykel Salcido MD.

## 2023-11-14 NOTE — PROGRESS NOTES
[facility] Hematology / Oncology  Progress Note 2023  Name: Abbey Omer  :  1950    MRN:  [id number]    --------------------    Assessment / Plan:  Stage IB (pT2 pN1a, grade 2), hormone-positive, HER2-negative breast cancer:  # Status post bilateral mastectomy w/ sentinel node; path 35 mm, grade 2, neg margins, 1/1 node positive (no JOANA).  # Oncotype Dx score 1; adjuvant chemotherapy not indicated.    Staging CT CAP.  Adjuvant radiotherapy recommended; will receive at [facility] closer to [facility].  Adjuvant letrozole recommended; planning 10 years; reviewed logistics and side effects and indications.  Return to clinic w/ labs and DEXA scan; bone health will be a big focus.  Check Ki67 for adjuvant abema.    Mykel Salcido MD    --------------------    Interval History:  Abeby returns for follow-up of breast cancer.  She is delighted by her Oncotype.  She is healing up.    Social History:  Social History     Tobacco Use    Smoking status: Former     Packs/day: 0.10     Years: 1.00     Additional pack years: 0.00     Total pack years: 0.10     Types: Cigarettes     Start date: 1969     Quit date: 10/12/1969     Years since quittin.1    Smokeless tobacco: Never    Tobacco comments:     Smoked, very little, for 2 mo, 49 years ago.   Substance Use Topics    Alcohol use: Yes     Comment: Maybe 1 or 2 drinks a year     Family History:  Family History   Problem Relation Age of Onset    Diabetes Maternal Grandfather     Hypertension Maternal Grandfather     Diabetes Son     Heart Disease Father         CHF    Diabetes Mother     Depression Mother     Hyperlipidemia Mother     Anxiety Disorder Mother     Asthma Mother     Obesity Mother     Substance Abuse Brother         Not any longer    Diabetes Brother     Depression Daughter     Diabetes Maternal Uncle     Cerebrovascular Disease Maternal Uncle     Diabetes Son         after accident    Depression Daughter      Depression Son         after accident    Substance Abuse Brother         Not any longer    Diabetes Sister     Substance Abuse Sister     Diabetes Brother     Thyroid Disease No family hx of     Glaucoma No family hx of     Macular Degeneration No family hx of     Cancer No family hx of      Immunizations:  Immunization History   Administered Date(s) Administered    COVID-19 Bivalent 12+ (Pfizer) 09/27/2022    COVID-19 MONOVALENT 12+ (Pfizer) 03/02/2021, 03/23/2021, 10/25/2021    COVID-19 Monovalent 12+ (Pfizer 2022) 05/18/2022    Flu, Unspecified 11/02/1995, 11/01/1996, 10/28/1997    Influenza (H1N1) 12/16/2009    Influenza (High Dose) 3 valent vaccine 10/27/2015, 12/01/2016, 10/04/2017, 09/13/2018, 10/07/2019    Influenza (IIV3) PF 10/16/1999, 10/04/2003, 10/05/2004, 11/12/2005, 10/25/2006, 09/26/2007, 10/12/2009, 10/20/2010, 10/24/2012    Influenza Vaccine 65+ (Fluzone HD) 10/12/2020, 10/25/2021, 09/27/2022    Influenza Vaccine >6 months (Alfuria,Fluzone) 10/25/2014    Pneumo Conj 13-V (2010&after) 04/20/2016    Pneumococcal 20 valent Conjugate (Prevnar 20) 06/16/2023    Pneumococcal 23 valent 10/04/2003, 10/25/2005, 05/28/2014    TD,PF 7+ (Tenivac) 03/21/2003    TDAP Vaccine (Boostrix) 03/06/2013    Zoster recombinant adjuvanted (SHINGRIX) 06/16/2023, 08/15/2023    Zoster vaccine, live 05/28/2014     Health Maintenance Due   Topic Date Due    DTAP/TDAP/TD IMMUNIZATION (2 - Td or Tdap) 03/06/2023    INFLUENZA VACCINE (1) 09/01/2023    COVID-19 Vaccine (6 - 2023-24 season) 09/01/2023     --------------------    Physical Exam:  Video visit.    Labs / Imaging:  Reviewed Oncotype Dx and path report.    Video Visit:  Abbey is a 73 year old female who is being evaluated via a billable video visit.  }    Video start time:  1:39 PM  Video end time:  1:58 PM    Provider location: [facility]  Patient location: Home    Mode of transmission:  Portal / Symcircle.

## 2023-11-14 NOTE — PROGRESS NOTES
"Virtual Visit Details    Type of service:  Video Visit   Video Start Time: {video visit start/end time for provider to select:795780}  Video End Time:{video visit start/end time for provider to select:914535}    Originating Location (pt. Location): {video visit patient location:205024::\"Home\"}  {PROVIDER LOCATION On-site should be selected for visits conducted from your clinic location or adjoining [facility] hospital, academic office, or other nearby [facility] building. Off-site should be selected for all other provider locations, including home:108705}  Distant Location (provider location):  {virtual location provider:547749}  Platform used for Video Visit: {Virtual Visit Platforms:097581::\"VAZATA\"}  "

## 2023-11-15 ENCOUNTER — TELEPHONE (OUTPATIENT)
Dept: CARDIOLOGY | Facility: CLINIC | Age: 73
End: 2023-11-15
Payer: COMMERCIAL

## 2023-11-15 DIAGNOSIS — C50.912 INVASIVE DUCTAL CARCINOMA OF BREAST, FEMALE, LEFT (H): ICD-10-CM

## 2023-11-15 DIAGNOSIS — Z98.61 CAD S/P PERCUTANEOUS CORONARY ANGIOPLASTY: Primary | ICD-10-CM

## 2023-11-15 DIAGNOSIS — I10 HYPERTENSION GOAL BP (BLOOD PRESSURE) < 140/90: ICD-10-CM

## 2023-11-15 DIAGNOSIS — I25.10 CAD S/P PERCUTANEOUS CORONARY ANGIOPLASTY: Primary | ICD-10-CM

## 2023-11-15 DIAGNOSIS — I25.10 CORONARY ARTERY DISEASE INVOLVING NATIVE CORONARY ARTERY OF NATIVE HEART WITHOUT ANGINA PECTORIS: ICD-10-CM

## 2023-11-15 NOTE — TELEPHONE ENCOUNTER
[facility] Call Center    Phone Message    May a detailed message be left on voicemail: yes     Reason for Call: Other: The patient would like to update Dr Infante, that she will be having Radiation therapy for her breast cancer. She said Dr Infante wanted to know because of some baseline testing she may want to have completed. Please call the patient.     Action Taken: Other: Cardiology    Travel Screening: Not Applicable    Thank you!  [facility]

## 2023-11-16 ENCOUNTER — MYC MEDICAL ADVICE (OUTPATIENT)
Dept: CARDIOLOGY | Facility: CLINIC | Age: 73
End: 2023-11-16
Payer: COMMERCIAL

## 2023-11-16 ENCOUNTER — PATIENT OUTREACH (OUTPATIENT)
Dept: ONCOLOGY | Facility: CLINIC | Age: 73
End: 2023-11-16
Payer: COMMERCIAL

## 2023-11-16 DIAGNOSIS — C50.912 INVASIVE DUCTAL CARCINOMA OF BREAST, FEMALE, LEFT (H): Primary | ICD-10-CM

## 2023-11-16 NOTE — PROGRESS NOTES
Patient called to inquire about CT scan order.  Per review of 11/14/2023 visit notes:      Staging CT CAP.     Forwarding to provider for order, patient requests return call when order is placed.     Krissy Her RN

## 2023-11-16 NOTE — TELEPHONE ENCOUNTER
Patient called stating she would like to update Dr Infante, that she will be having radiation therapy for her breast cancer. She said Dr. Infante wanted to know because of some baseline testing she might want to be completed.   Based upon Dr. Infante's LOV note she would recommend an echocardiogram. Will place echocardiogram order and have scheduling call patient to get scheduled.       Dr. Naresh HARP note from 10/2/23:  ASSESSMENT/PLAN:  #CAD - s/p PCI, asymptomatic. Doing well. Will continue aspirin 81 mg daily, statin, beta blocker. Echocardiogram reviewed and she has normal LV function.   #HTN - controlled, continue hydrochlorothiazide, losartan, metoprolol  #HLD - controlled, lipid panel per PCP. Continue simvastatin. Goal LDL < 70 mg/dL. Lipids are UTD.  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 No syncope.  #No cardiac limitations to upcoming surgery, if she starts chemo in future will recommend a baseline echocardiogram      One year follow up with me.      Ibeth Infante MD MSC  [facility]

## 2023-11-16 NOTE — TELEPHONE ENCOUNTER
Patient sent [employer] message asking if she needs the echocardiogram if receiving radiation, as she will be having 3.5 weeks of it. Patient's last echocardiogram was done 7/15/22. Patient would like to confirm that she should still complete the echocardiogram and if there is anything else she should do.   Will route to Dr. Infante for further recommendation.     Patient's [employer] message:  Is this the test you said I would need if I was to have radiation? Have I already had this?  I will be having three and a half weeks of radiation. Do you need me to do anything else?  Abbey Infante LOV note from 10/2/23:  ASSESSMENT/PLAN:  #CAD - s/p PCI, asymptomatic. Doing well. Will continue aspirin 81 mg daily, statin, beta blocker. Echocardiogram reviewed and she has normal LV function.   #HTN - controlled, continue hydrochlorothiazide, losartan, metoprolol  #HLD - controlled, lipid panel per PCP. Continue simvastatin. Goal LDL < 70 mg/dL. Lipids are UTD.  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 No syncope.  #No cardiac limitations to upcoming surgery, if she starts chemo in future will recommend a baseline echocardiogram      One year follow up with me.      Ibeth Infante MD MSC  [facility]    Echocardiogram Interpretation Summary from 7/15/22:  Global and regional left ventricular function is normal with an EF of 55-60%.  Global right ventricular function is normal.  Mild tricuspid and aortic insufficiency is present.  Pulmonary artery systolic pressure is normal.  The inferior vena cava was normal in size with preserved respiratory  variability.  No pericardial effusion is present.

## 2023-11-22 ENCOUNTER — ANCILLARY PROCEDURE (OUTPATIENT)
Dept: BONE DENSITY | Facility: CLINIC | Age: 73
End: 2023-11-22
Attending: INTERNAL MEDICINE
Payer: MEDICARE

## 2023-11-22 DIAGNOSIS — M81.8 OSTEOPOROSIS DUE TO AROMATASE INHIBITOR: ICD-10-CM

## 2023-11-22 DIAGNOSIS — C50.912 INVASIVE DUCTAL CARCINOMA OF BREAST, FEMALE, LEFT (H): ICD-10-CM

## 2023-11-22 DIAGNOSIS — T38.6X5A OSTEOPOROSIS DUE TO AROMATASE INHIBITOR: ICD-10-CM

## 2023-11-22 PROCEDURE — 77080 DXA BONE DENSITY AXIAL: CPT | Mod: TC | Performed by: RADIOLOGY

## 2023-11-22 NOTE — PROGRESS NOTES
Received voice mail message from patient, calling again to inquire if CT order has been placed per office visit notes on 11/14/2023:    Staging CT CAP.  Adjuvant radiotherapy recommended; will receive at [facility] closer to [facility].  Adjuvant letrozole recommended; planning 10 years; reviewed logistics and side effects and indications.  Return to clinic w/ labs and DEXA scan; bone health will be a big focus.  Check Ki67 for adjuvant abema.      Ki67 order placed per 11/14/2023 note, forwarding request to provider to CT CAP order.      Patient contacted, informed no order as of yet.  Patient had contacted [facility] and states that they did not want her to contact them until she is ready to start.  Informed patient will review plan with Dr. Salcido and return call.      Krissy Her RN

## 2023-11-24 PROCEDURE — 88313 SPECIAL STAINS GROUP 2: CPT | Mod: TC | Performed by: SURGERY

## 2023-11-24 PROCEDURE — 88313 SPECIAL STAINS GROUP 2: CPT | Mod: 26 | Performed by: STUDENT IN AN ORGANIZED HEALTH CARE EDUCATION/TRAINING PROGRAM

## 2023-11-24 PROCEDURE — 88360 TUMOR IMMUNOHISTOCHEM/MANUAL: CPT | Mod: 26 | Performed by: STUDENT IN AN ORGANIZED HEALTH CARE EDUCATION/TRAINING PROGRAM

## 2023-11-28 LAB
PATH REPORT.ADDENDUM SPEC: ABNORMAL
PATH REPORT.COMMENTS IMP SPEC: ABNORMAL
PATH REPORT.COMMENTS IMP SPEC: ABNORMAL
PATH REPORT.COMMENTS IMP SPEC: YES
PATH REPORT.FINAL DX SPEC: ABNORMAL
PATH REPORT.GROSS SPEC: ABNORMAL
PATH REPORT.MICROSCOPIC SPEC OTHER STN: ABNORMAL
PATH REPORT.RELEVANT HX SPEC: ABNORMAL
PATHOLOGY SYNOPTIC REPORT: ABNORMAL
PHOTO IMAGE: ABNORMAL

## 2023-11-28 NOTE — PROGRESS NOTES
Records including Dr. Salcido office visit notes, lab results including surgical pathology, HER2 SAMARA FISH, Oncotype, genetic test resutls and NGS results, imaging reports from 8/2/2023 - 8/30/2023 faxed to [facility] per request.  Requested return call if additional records are needed.     Krissy Her RN

## 2023-11-28 NOTE — PROGRESS NOTES
Patient contacted, informed that CT order has been placed.  Provided patient with imaging scheduling number for [place] [facility].  Informed patient ok to schedule radiation for following completion of CT.  Radiation therapy referral order placed and faxed to [facility] at [phone].  Transmission confirmed.    Krissy Her RN

## 2023-11-29 LAB
BKR LAB AP ADD'L TEST STATUS: NORMAL
BKR PATH ADDL TEST FINAL COMMENTS: NORMAL

## 2023-11-30 ENCOUNTER — ANCILLARY PROCEDURE (OUTPATIENT)
Dept: CT IMAGING | Facility: CLINIC | Age: 73
End: 2023-11-30
Attending: INTERNAL MEDICINE
Payer: MEDICARE

## 2023-11-30 DIAGNOSIS — C50.912 INVASIVE DUCTAL CARCINOMA OF BREAST, FEMALE, LEFT (H): ICD-10-CM

## 2023-11-30 LAB
CREAT BLD-MCNC: 1.1 MG/DL (ref 0.5–1)
EGFRCR SERPLBLD CKD-EPI 2021: 52 ML/MIN/1.73M2

## 2023-11-30 PROCEDURE — 74177 CT ABD & PELVIS W/CONTRAST: CPT | Mod: MG | Performed by: RADIOLOGY

## 2023-11-30 PROCEDURE — 71260 CT THORAX DX C+: CPT | Mod: MG | Performed by: RADIOLOGY

## 2023-11-30 PROCEDURE — 82565 ASSAY OF CREATININE: CPT | Performed by: PATHOLOGY

## 2023-11-30 PROCEDURE — G1010 CDSM STANSON: HCPCS | Mod: GC | Performed by: RADIOLOGY

## 2023-11-30 RX ORDER — IOPAMIDOL 755 MG/ML
88 INJECTION, SOLUTION INTRAVASCULAR ONCE
Status: COMPLETED | OUTPATIENT
Start: 2023-11-30 | End: 2023-11-30

## 2023-11-30 RX ADMIN — IOPAMIDOL 88 ML: 755 INJECTION, SOLUTION INTRAVASCULAR at 17:24

## 2023-11-30 NOTE — TELEPHONE ENCOUNTER
Message below from  sent to pt. Via [employer].     I think doing an echo after 3-6 months post radiation is a good idea. But her oncologist should also have an opinion of that as well. Over time we need to monitor for atherosclerosis progression with her coronary arteries after radiation but that can take years to change.    Best,  Dr. Naresh Stewart on 11/30/2023 at 9:00 AM

## 2023-12-03 ENCOUNTER — HEALTH MAINTENANCE LETTER (OUTPATIENT)
Age: 73
End: 2023-12-03

## 2023-12-05 ENCOUNTER — PATIENT OUTREACH (OUTPATIENT)
Dept: ONCOLOGY | Facility: CLINIC | Age: 73
End: 2023-12-05
Payer: COMMERCIAL

## 2023-12-05 ENCOUNTER — TRANSFERRED RECORDS (OUTPATIENT)
Dept: HEALTH INFORMATION MANAGEMENT | Facility: CLINIC | Age: 73
End: 2023-12-05
Payer: COMMERCIAL

## 2023-12-05 NOTE — PROGRESS NOTES
Patient contacted to inquire if she has an appointment yet with [facility].  Patient states she had a consult today, plan is for 5 weeks of radiation.  Requested that patient send [employer] message when RT will begin and with definitive length of treatment so she may be assisted in scheduling follow up.  Per Dr. Salcido last checkout appt request:  3) [facility] MG end of radiation w/ labs (ALT, lipid panel, vitamin D) and DEXA scan.     Patient has completed Dexa.  Patient started letrozole, tolerating just fine - reports she has experienced no side effects.  Encouraged to call if anything changes.    Krissy Her RN

## 2023-12-15 ENCOUNTER — TRANSFERRED RECORDS (OUTPATIENT)
Dept: HEALTH INFORMATION MANAGEMENT | Facility: CLINIC | Age: 73
End: 2023-12-15
Payer: COMMERCIAL

## 2023-12-27 DIAGNOSIS — J31.0 CHRONIC NONALLERGIC RHINITIS: Chronic | ICD-10-CM

## 2023-12-27 DIAGNOSIS — R05.9 COUGH, UNSPECIFIED TYPE: ICD-10-CM

## 2023-12-27 RX ORDER — MONTELUKAST SODIUM 10 MG/1
1 TABLET ORAL AT BEDTIME
Qty: 90 TABLET | Refills: 0 | Status: SHIPPED | OUTPATIENT
Start: 2023-12-27 | End: 2024-01-15

## 2024-01-11 ENCOUNTER — TRANSFERRED RECORDS (OUTPATIENT)
Dept: HEALTH INFORMATION MANAGEMENT | Facility: CLINIC | Age: 74
End: 2024-01-11
Payer: COMMERCIAL

## 2024-01-15 DIAGNOSIS — J31.0 CHRONIC NONALLERGIC RHINITIS: Chronic | ICD-10-CM

## 2024-01-15 DIAGNOSIS — R05.9 COUGH, UNSPECIFIED TYPE: ICD-10-CM

## 2024-01-15 RX ORDER — MONTELUKAST SODIUM 10 MG/1
1 TABLET ORAL AT BEDTIME
Qty: 90 TABLET | Refills: 0 | Status: SHIPPED | OUTPATIENT
Start: 2024-01-15 | End: 2024-03-15

## 2024-01-16 DIAGNOSIS — I10 HYPERTENSION GOAL BP (BLOOD PRESSURE) < 140/90: Chronic | ICD-10-CM

## 2024-01-16 RX ORDER — LOSARTAN POTASSIUM 100 MG/1
50 TABLET ORAL 2 TIMES DAILY
Qty: 90 TABLET | Refills: 0 | OUTPATIENT
Start: 2024-01-16

## 2024-01-16 RX ORDER — LOSARTAN POTASSIUM 100 MG/1
50 TABLET ORAL 2 TIMES DAILY
Qty: 90 TABLET | Refills: 4 | Status: SHIPPED | OUTPATIENT
Start: 2024-01-16

## 2024-01-18 ENCOUNTER — TRANSFERRED RECORDS (OUTPATIENT)
Dept: HEALTH INFORMATION MANAGEMENT | Facility: CLINIC | Age: 74
End: 2024-01-18
Payer: COMMERCIAL

## 2024-01-24 NOTE — TELEPHONE ENCOUNTER
Patient missed one dose of Effexor is wondering if she should double up today. It was not recommended she double up but suggested it may take a day to feel back on track. If symptoms continue it was suggested she go to urgent care to make sure something else isnt brewing.  Adrianna Silveira,Clinic Rn  [facility]     No

## 2024-01-30 ENCOUNTER — TRANSFERRED RECORDS (OUTPATIENT)
Dept: HEALTH INFORMATION MANAGEMENT | Facility: CLINIC | Age: 74
End: 2024-01-30
Payer: COMMERCIAL

## 2024-02-01 ENCOUNTER — OFFICE VISIT (OUTPATIENT)
Dept: FAMILY MEDICINE | Facility: CLINIC | Age: 74
End: 2024-02-01
Payer: COMMERCIAL

## 2024-02-01 VITALS
TEMPERATURE: 98.7 F | RESPIRATION RATE: 16 BRPM | OXYGEN SATURATION: 95 % | SYSTOLIC BLOOD PRESSURE: 140 MMHG | WEIGHT: 180 LBS | HEART RATE: 64 BPM | BODY MASS INDEX: 35.34 KG/M2 | DIASTOLIC BLOOD PRESSURE: 76 MMHG | HEIGHT: 60 IN

## 2024-02-01 DIAGNOSIS — C50.912 INVASIVE DUCTAL CARCINOMA OF BREAST, FEMALE, LEFT (H): ICD-10-CM

## 2024-02-01 DIAGNOSIS — E66.01 MORBID OBESITY (H): ICD-10-CM

## 2024-02-01 DIAGNOSIS — E03.4 HYPOTHYROIDISM DUE TO ACQUIRED ATROPHY OF THYROID: Chronic | ICD-10-CM

## 2024-02-01 DIAGNOSIS — I25.10 CORONARY ARTERY DISEASE INVOLVING NATIVE CORONARY ARTERY OF NATIVE HEART WITHOUT ANGINA PECTORIS: Chronic | ICD-10-CM

## 2024-02-01 DIAGNOSIS — E78.5 HYPERLIPIDEMIA LDL GOAL <130: Chronic | ICD-10-CM

## 2024-02-01 DIAGNOSIS — N28.9 LESION OF LEFT NATIVE KIDNEY: ICD-10-CM

## 2024-02-01 DIAGNOSIS — Z00.00 ROUTINE HISTORY AND PHYSICAL EXAMINATION OF ADULT: Primary | ICD-10-CM

## 2024-02-01 DIAGNOSIS — F33.0 MAJOR DEPRESSIVE DISORDER, RECURRENT EPISODE, MILD DEGREE (H): Chronic | ICD-10-CM

## 2024-02-01 DIAGNOSIS — M1A.0720 CHRONIC IDIOPATHIC GOUT INVOLVING TOE OF LEFT FOOT WITHOUT TOPHUS: ICD-10-CM

## 2024-02-01 DIAGNOSIS — Z23 NEED FOR TDAP VACCINATION: ICD-10-CM

## 2024-02-01 DIAGNOSIS — Z29.11 NEED FOR VACCINATION AGAINST RESPIRATORY SYNCYTIAL VIRUS: ICD-10-CM

## 2024-02-01 DIAGNOSIS — N18.31 STAGE 3A CHRONIC KIDNEY DISEASE (H): Chronic | ICD-10-CM

## 2024-02-01 DIAGNOSIS — I10 HYPERTENSION GOAL BP (BLOOD PRESSURE) < 140/90: Chronic | ICD-10-CM

## 2024-02-01 DIAGNOSIS — R73.03 PREDIABETES: ICD-10-CM

## 2024-02-01 LAB — HBA1C MFR BLD: 5.8 % (ref 0–5.6)

## 2024-02-01 PROCEDURE — 96127 BRIEF EMOTIONAL/BEHAV ASSMT: CPT | Performed by: NURSE PRACTITIONER

## 2024-02-01 PROCEDURE — 80053 COMPREHEN METABOLIC PANEL: CPT | Performed by: NURSE PRACTITIONER

## 2024-02-01 PROCEDURE — G0439 PPPS, SUBSEQ VISIT: HCPCS | Performed by: NURSE PRACTITIONER

## 2024-02-01 PROCEDURE — 36415 COLL VENOUS BLD VENIPUNCTURE: CPT | Performed by: NURSE PRACTITIONER

## 2024-02-01 PROCEDURE — 99214 OFFICE O/P EST MOD 30 MIN: CPT | Mod: 25 | Performed by: NURSE PRACTITIONER

## 2024-02-01 PROCEDURE — 83036 HEMOGLOBIN GLYCOSYLATED A1C: CPT | Performed by: NURSE PRACTITIONER

## 2024-02-01 PROCEDURE — 84443 ASSAY THYROID STIM HORMONE: CPT | Performed by: NURSE PRACTITIONER

## 2024-02-01 RX ORDER — COLCHICINE 0.6 MG/1
TABLET ORAL
Qty: 30 TABLET | Refills: 1 | Status: SHIPPED | OUTPATIENT
Start: 2024-02-01

## 2024-02-01 RX ORDER — RESPIRATORY SYNCYTIAL VIRUS VACCINE 120MCG/0.5
0.5 KIT INTRAMUSCULAR ONCE
Qty: 1 EACH | Refills: 0 | Status: CANCELLED | OUTPATIENT
Start: 2024-02-01 | End: 2024-02-01

## 2024-02-01 SDOH — HEALTH STABILITY: PHYSICAL HEALTH: ON AVERAGE, HOW MANY MINUTES DO YOU ENGAGE IN EXERCISE AT THIS LEVEL?: 0 MIN

## 2024-02-01 SDOH — HEALTH STABILITY: PHYSICAL HEALTH: ON AVERAGE, HOW MANY DAYS PER WEEK DO YOU ENGAGE IN MODERATE TO STRENUOUS EXERCISE (LIKE A BRISK WALK)?: 0 DAYS

## 2024-02-01 ASSESSMENT — PATIENT HEALTH QUESTIONNAIRE - PHQ9
SUM OF ALL RESPONSES TO PHQ QUESTIONS 1-9: 13
SUM OF ALL RESPONSES TO PHQ QUESTIONS 1-9: 13
10. IF YOU CHECKED OFF ANY PROBLEMS, HOW DIFFICULT HAVE THESE PROBLEMS MADE IT FOR YOU TO DO YOUR WORK, TAKE CARE OF THINGS AT HOME, OR GET ALONG WITH OTHER PEOPLE: SOMEWHAT DIFFICULT

## 2024-02-01 ASSESSMENT — SOCIAL DETERMINANTS OF HEALTH (SDOH): HOW OFTEN DO YOU GET TOGETHER WITH FRIENDS OR RELATIVES?: ONCE A WEEK

## 2024-02-01 ASSESSMENT — PAIN SCALES - GENERAL: PAINLEVEL: SEVERE PAIN (7)

## 2024-02-01 NOTE — PROGRESS NOTES
Maria Isabel Potter is a 74 year old, presenting for the following:  Wellness Visit (Fasting )        2/1/2024    11:57 AM   Additional Questions   Roomed by Catina JACKSON         Health Care Directive  Patient has a Health Care Directive on file        2/1/2024   General Health   How would you rate your overall physical health? (!) FAIR   Feel stress (tense, anxious, or unable to sleep) Only a little   (!) STRESS CONCERN      2/1/2024   Nutrition   Diet: Regular (no restrictions)         2/1/2024   Exercise   Days per week of moderate/strenous exercise 0 days   Average minutes spent exercising at this level 0 min   (!) EXERCISE CONCERN      2/1/2024   Social Factors   Frequency of gathering with friends or relatives Once a week   Worry food won't last until get money to buy more No   Food not last or not have enough money for food? No   Do you have housing?  Yes   Are you worried about losing your housing? No   Lack of transportation? No   Unable to get utilities (heat,electricity)? No         2/1/2024   Fall Risk   Fallen 2 or more times in the past year? No    No   Trouble with walking or balance? No          2/1/2024   Activities of Daily Living- Home Safety   Needs help with the following daily activites Laundry   Safety concerns in the home None of the above         2/1/2024   Dental   Dentist two times every year? Yes         2/1/2024   Hearing Screening   Hearing concerns? (!) I NEED TO ASK PEOPLE TO SPEAK UP OR REPEAT THEMSELVES.    (!) TROUBLE UNDERSTANDING SOFT OR WHISPERED SPEECH.         2/1/2024   Driving Risk Screening   Patient/family members have concerns about driving No         2/1/2024   General Alertness/Fatigue Screening   Have you been more tired than usual lately? (!) YES         2/1/2024   Urinary Incontinence Screening   Bothered by leaking urine in past 6 months Yes       Today's PHQ-9 Score:       2/1/2024    11:39 AM   PHQ-9 SCORE   PHQ-9 Total Score Thanhhart 13 (Moderate depression)   PHQ-9  Total Score 13         2024   Substance Use   Alcohol more than 3/day or more than 7/wk Not Applicable   Do you have a current opioid prescription? No   How severe/bad is pain from 1 to 10? 8/10   Do you use any other substances recreationally? No     Social History     Tobacco Use     Smoking status: Former     Packs/day: 0.10     Years: 1.00     Additional pack years: 0.00     Total pack years: 0.10     Types: Cigarettes     Start date: 1969     Quit date: 10/12/1969     Years since quittin.3     Smokeless tobacco: Never     Tobacco comments:     Smoked, very little, for 2 mo, 49 years ago.   Vaping Use     Vaping Use: Never used   Substance Use Topics     Alcohol use: Yes     Comment: Maybe 1 or 2 drinks a year     Drug use: No           History of abnormal Pap smear: NO - age 64 - see link Cervical Cytology Screening Guidelines       The 10-year ASCVD risk score (Lucia ARZATE, et al., 2019) is: 24.1%    Values used to calculate the score:      Age: 74 years      Sex: Female      Is Non- : No      Diabetic: No      Tobacco smoker: No      Systolic Blood Pressure: 140 mmHg      Is BP treated: Yes      HDL Cholesterol: 52 mg/dL      Total Cholesterol: 155 mg/dL       Meds   Med Hx  Surg Hx  Fam Hx            BP Readings from Last 3 Encounters:   24 (!) 140/76   10/20/23 136/82   10/09/23 (!) 142/81    Wt Readings from Last 3 Encounters:   24 81.6 kg (180 lb)   23 78.5 kg (173 lb)   10/20/23 81.6 kg (180 lb)                  Patient Active Problem List   Diagnosis     RICKEY (obstructive sleep apnea)     Hypothyroidism     Chronic nonallergic rhinitis     CKD (chronic kidney disease) stage 3, GFR 30-59 ml/min (H)     Hyperlipidemia LDL goal <130     Morbid obesity (H)     History of bladder cancer     Hypertension goal BP (blood pressure) < 140/90     Advance Care Planning     Dependent edema     Elevated LFTs     DDD (degenerative disc disease), cervical      Major depressive disorder, recurrent episode, mild degree (H24)     Macular drusen     Hypertriglyceridemia     Allergic conjunctivitis     Pulmonary nodules     Acne     Combined form of age-related cataract, both eyes     Glaucoma suspect, bilateral     Drusen of macula of both eyes     Renal cyst, left     Discoid atelectasis     Cough     Abnormal electrocardiogram     Coronary artery disease involving native coronary artery of native heart without angina pectoris     Epistaxis     Retrognathia     CAD S/P percutaneous coronary angioplasty     Chronic gout involving toe without tophus, unspecified cause, unspecified laterality     Lesion of left native kidney     Prediabetes     Invasive ductal carcinoma of breast, female, left (H)     Other abnormal and inconclusive findings on diagnostic imaging of breast     Past Surgical History:   Procedure Laterality Date     ARTHROPLASTY SHOULDER Right 11/01/2017    Right total shoulder arthroplasty by Dr. Go at [facility]     ARTHROPLASTY SHOULDER Left 03/28/2018    Left total shoulder arthroplasty by Dr. Go at [facility]     BIOPSY NODE SENTINEL Left 10/9/2023    Procedure: BIOPSY, LYMPH NODE, SENTINEL;  Surgeon: Mikey Vega MD;  Location: [facility] OR     CATARACT IOL, RT/LT       CHOLECYSTECTOMY, LAPOROSCOPIC       COLONOSCOPY  ?    Upper & Lower     CV CORONARY ANGIOGRAM  02/22/2019     CV CORONARY ANGIOGRAM N/A 02/22/2019    Procedure: 1330 CORS;  Surgeon: Marito Arriaga MD;  Location: [facility]     CV HEART CATHETERIZATION WITH POSSIBLE INTERVENTION N/A 03/18/2019    Procedure: STAGED PCI-JALEN CARABALLO;  Surgeon: Zeus Cordero MD;  Location: [facility]     CYSTOSCOPY      bladder cancer     D & C       ENT SURGERY  first 1967/second ?    2 Rhynoplasties     HYSTERECTOMY, PAP NO LONGER INDICATED      BSO     MASTECTOMY SIMPLE BILATERAL Bilateral 10/9/2023    Procedure:  MASTECTOMY, BILATERAL, SIMPLE;  Surgeon: Mikey Vega MD;  Location: [facility] OR     NOSE SURGERY      septum deviation     PHACOEMULSIFICATION CLEAR CORNEA WITH STANDARD INTRAOCULAR LENS IMPLANT Left 2018    Procedure: PHACOEMULSIFICATION CLEAR CORNEA WITH STANDARD INTRAOCULAR LENS IMPLANT;  LEFT EYE PHACOEMULSIFICATION CLEAR CORNEA WITH STANDARD INTRAOCULAR LENS IMPLANT;  Surgeon: Ryne Pascal MD;  Location:  [facility]     PHACOEMULSIFICATION WITH STANDARD INTRAOCULAR LENS IMPLANT Right 10/15/2018    Procedure: PHACOEMULSIFICATION WITH STANDARD INTRAOCULAR LENS IMPLANT ;  Surgeon: Ryne Pascal MD;  Location: [facility] OR       Social History     Tobacco Use     Smoking status: Former     Packs/day: 0.10     Years: 1.00     Additional pack years: 0.00     Total pack years: 0.10     Types: Cigarettes     Start date: 1969     Quit date: 10/12/1969     Years since quittin.3     Smokeless tobacco: Never     Tobacco comments:     Smoked, very little, for 2 mo, 49 years ago.   Substance Use Topics     Alcohol use: Yes     Comment: Maybe 1 or 2 drinks a year     Family History   Problem Relation Age of Onset     Diabetes Maternal Grandfather      Hypertension Maternal Grandfather      Diabetes Son      Heart Disease Father         CHF     Diabetes Mother      Depression Mother      Hyperlipidemia Mother      Anxiety Disorder Mother      Asthma Mother      Obesity Mother      Substance Abuse Brother         Not any longer     Diabetes Brother      Depression Daughter      Diabetes Maternal Uncle      Cerebrovascular Disease Maternal Uncle      Diabetes Son         after accident     Depression Daughter      Depression Son         after accident     Substance Abuse Brother         Not any longer     Diabetes Sister      Substance Abuse Sister      Diabetes Brother      Thyroid Disease No family hx of      Glaucoma No family hx of      Macular Degeneration No family hx of      Cancer No family hx of           Current Outpatient Medications   Medication Sig Dispense Refill     allopurinol (ZYLOPRIM) 100 MG tablet Take 1 tablet (100 mg) by mouth daily 90 tablet 3     aspirin (ASA) 81 MG [facility] tablet Take 81 mg by mouth daily       B Complex Vitamins (VITAMIN B COMPLEX PO) Take 1,250 mg by mouth        colchicine (COLCRYS) 0.6 MG tablet Take 0.6 mg by mouth 3 times daily on the first day of gout flare, then take 0.6 mg once or twice daily until flare resolves 30 tablet 1     EXCEDRIN TENSION HEADACHE 500-65 MG PO TABS 1 tablet twice daily as needed       Flaxseed, Linseed, (FLAXSEED OIL PO) Take 1 Tablespoonful by mouth daily       fluticasone (FLONASE) 50 MCG/ACT nasal spray Spray 2 sprays into both nostrils daily 48 g 1     hydrochlorothiazide (HYDRODIURIL) 12.5 MG tablet Take 1 tablet (12.5 mg) by mouth every morning 90 tablet 4     letrozole (FEMARA) 2.5 MG tablet Take 1 tablet (2.5 mg) by mouth daily 90 tablet 3     levothyroxine (SYNTHROID/LEVOTHROID) 88 MCG tablet TAKE 1 TABLET(88 MCG) BY MOUTH DAILY 90 tablet 4     losartan (COZAAR) 100 MG tablet Take 0.5 tablets (50 mg) by mouth 2 times daily 90 tablet 4     metoprolol succinate ER (TOPROL XL) 25 MG 24 hr tablet Take 1 tablet (25 mg) by mouth daily 90 tablet 4     montelukast (SINGULAIR) 10 MG tablet TAKE 1 TABLET BY MOUTH EVERYDAY AT BEDTIME 90 tablet 0     polyethylene glycol-propylene glycol (SYSTANE ULTRA) 0.4-0.3 % SOLN ophthalmic solution Place 1 drop into both eyes 2 times daily       simvastatin (ZOCOR) 40 MG tablet Take 1 tablet (40 mg) by mouth at bedtime 90 tablet 4     traZODone (DESYREL) 50 MG tablet Take 1-2 tablets ( mg) by mouth nightly as needed for sleep 180 tablet 3     venlafaxine (EFFEXOR XR) 150 MG 24 hr capsule 2 Capsules by mouth daily 180 capsule 3     acetaminophen (TYLENOL) 325 MG tablet Take 2 tablets (650 mg) by mouth every 4 hours as needed for mild pain 50 tablet 0     levalbuterol (XOPENEX HFA) 45 MCG/ACT inhaler INHALE 2  "PUFFS INTO THE LUNGS EVERY 6 HOURS AS NEEDED FOR SHORTNESS OF BREATH OR DIFFICULT BREATHING OR WHEEZING 15 g 0     Allergies   Allergen Reactions     Duloxetine Hcl      Visual hallucinations     Zolmitriptan Other (See Comments) and Dizziness     Can't wake up  Cant wake up     Adhesive Tape      Skin tearing / sores     Aluminum Hydroxide Nausea     Atenolol Other (See Comments)     Fatigue  Fatigue     Codeine Nausea and Vomiting and Nausea     abd pain     Colestid [Colestipol Hcl]      Colestipol Unknown     Other Drug Allergy (See Comments) Other (See Comments)     Joint pain     Potassium Nausea     Albuterol Other (See Comments)     Very jittery and couldn't tolerate  Very jittery and couldn't tolerate     Amlodipine Other (See Comments) and Swelling     Swelling- lower legs     Asa [Aspirin] GI Disturbance     Atenolol Fatigue     Atorvastatin Other (See Comments)     Swelling legs  Swelling legs     Bupropion Unknown     Crestor [Statins] Other (See Comments)     Joint pain     Ezetimibe Other (See Comments)     Fatigue     Hydralazine Other (See Comments) and Fatigue     Fatigue     Lisinopril Cough     Lovastatin Other (See Comments) and Cramps     Cramps     Morphine And Related Nausea     \"Not sure which pain medication I tolerate\"     Niacin      Unknown by patient  Other reaction(s): *Unknown  Unknown by patient     Paroxetine Other (See Comments)     Fatigue     Paxil [Paroxetine] Fatigue     Potassium GI Disturbance     Sulfa Antibiotics Fatigue and Nausea and Vomiting     Sulfasalazine Other (See Comments)     Triamterene Unknown     Other reaction(s): *Unknown     Current providers sharing in care for this patient include:  Patient Care Team:  Sarah Vallejo NP as PCP - General (Nurse Practitioner - Family)  Sarah Vallejo NP as Assigned PCP  Diane Dunn, PharmD as Pharmacist (Pharmacist)  Nydia Ramírez [facility] as Pharmacist (Pharmacist)  Ibeth Infante MD as Assigned " Heart and Vascular Provider  Berenice Skinner MD as MD (Nephrology)  Ryne Pascal MD as MD (Ophthalmology)  Berenice Skinner MD as Assigned Nephrology Provider  Mykel Salcido MD as MD (Hematology & Oncology)  Sarah Vallejo NP as Nurse Practitioner (Nurse Practitioner - Family)  Mikey Vega MD as MD (Surgery)  Corrie Pinto, RN as Specialty Care Coordinator (Hematology & Oncology)  Mykel Salcido MD as Assigned Pediatric Specialist Provider  Mikey Vega MD as Assigned Surgical Provider    The following health maintenance items are reviewed in Epic and correct as of today:  Health Maintenance   Topic Date Due     RSV VACCINE (Pregnancy & 60+) (1 - 1-dose 60+ series) Never done     DTAP/TDAP/TD IMMUNIZATION (2 - Td or Tdap) 03/06/2023     MEDICARE ANNUAL WELLNESS VISIT  10/04/2023     BMP  04/06/2024     HEMOGLOBIN  07/03/2024     A1C  08/01/2024     PHQ-9  08/01/2024     EYE EXAM  08/02/2024     LIPID  08/29/2024     TSH W/FREE T4 REFLEX  08/29/2024     ALT  10/06/2024     MICROALBUMIN  10/06/2024     ANNUAL REVIEW OF HM ORDERS  10/06/2024     FALL RISK ASSESSMENT  02/01/2025     GLUCOSE  10/06/2026     COLORECTAL CANCER SCREENING  11/04/2026     ADVANCE CARE PLANNING  02/01/2029     DEXA  11/22/2038     HEPATITIS C SCREENING  Completed     DEPRESSION ACTION PLAN  Completed     INFLUENZA VACCINE  Completed     Pneumococcal Vaccine: 65+ Years  Completed     URINALYSIS  Completed     ZOSTER IMMUNIZATION  Completed     COVID-19 Vaccine  Completed     IPV IMMUNIZATION  Aged Out     HPV IMMUNIZATION  Aged Out     MENINGITIS IMMUNIZATION  Aged Out     RSV MONOCLONAL ANTIBODY  Aged Out     MAMMO SCREENING  Discontinued          Objective    Exam  BP (!) 140/76 (BP Location: Right arm, Patient Position: Sitting, Cuff Size: Adult Large)   Pulse 64   Temp 98.7  F (37.1  C) (Oral)   Resp 16   Ht 1.524 m (5')   Wt 81.6 kg (180 lb)   SpO2 95%   BMI 35.15 kg/m     Estimated  body mass index is 35.15 kg/m  as calculated from the following:    Height as of this encounter: 1.524 m (5').    Weight as of this encounter: 81.6 kg (180 lb).    GENERAL: healthy, alert, no distress, and obese  EYES: Eyes grossly normal to inspection, PERRL and conjunctivae and sclerae normal  HENT: ear canals and TM's normal, nose and mouth without ulcers or lesions  NECK: no adenopathy, no asymmetry, masses, or scars  RESP: lungs clear to auscultation - no rales, rhonchi or wheezes  BREAST: Breast surgically absent  CV: regular rate and rhythm, normal S1 S2, no S3 or S4, no murmur, click or rub, no peripheral edema  ABDOMEN: soft, obese, non tender  SKIN: There is erythema with flaky skin left chest and left thoracic back secondary to recent radiation treatments for breast cancer  NEURO: Normal strength and tone, mentation intact and speech normal  PSYCH: mentation appears normal, affect normal/bright  LYMPH: no cervical, supraclavicular, axillary, or inguinal adenopathy    ASSESSMENT / PLAN:   Routine history and physical examination of adult    Morbid obesity (H)  Counseling on lifestyle changes including dietary and exercise efforts.    Prediabetes    - Hemoglobin A1c; Future  - Hemoglobin A1c    Hypothyroidism due to acquired atrophy of thyroid  Chronic, stable, continue current treatment.  Will adjust treatment if needed based on results.  - TSH with free T4 reflex; Future  - TSH with free T4 reflex    Hyperlipidemia LDL goal <130  Chronic, stable, continue current treatment.     Hypertension goal BP (blood pressure) < 140/90  Chronic, stable, continue current treatment.   - Comprehensive metabolic panel (BMP + Alb, Alk Phos, ALT, AST, Total. Bili, TP); Future  - Comprehensive metabolic panel (BMP + Alb, Alk Phos, ALT, AST, Total. Bili, TP)    Coronary artery disease involving native coronary artery of native heart without angina pectoris  Known issue that I take into account for their medical decisions, no  current exacerbations or new concerns.     Stage 3a chronic kidney disease (H)  Known issue that I take into account for their medical decisions, no current exacerbations or new concerns.     Lesion of left native kidney  L renal mass - Follow-up kidney U/S in 1 year (due 10/18/2024)    Major depressive disorder, recurrent episode, mild degree (H24)  Chronic, stable, continue current treatment.     Invasive ductal carcinoma of breast, female, left (H)  Known issue that I take into account for their medical decisions, no current exacerbations or new concerns.     Need for Tdap vaccination  Encouraged going to pharmacy to get tetanus booster.    Need for vaccination against respiratory syncytial virus  Encourage going to pharmacy to get RSV vaccine.    Chronic gout involving toe of left foot without tophus, idiopathic  Chronic, stable, continue current treatment.   - colchicine (COLCRYS) 0.6 MG tablet; Take 0.6 mg by mouth 3 times daily on the first day of gout flare, then take 0.6 mg once or twice daily until flare resolves      Counseling  Reviewed preventive health counseling, as reflected in patient instructions       Regular exercise       Healthy diet/nutrition      BMI  Estimated body mass index is 35.15 kg/m  as calculated from the following:    Height as of this encounter: 1.524 m (5').    Weight as of this encounter: 81.6 kg (180 lb).   Weight management plan: Discussed healthy diet and exercise guidelines      She reports that she quit smoking about 54 years ago. Her smoking use included cigarettes. She started smoking about 54 years ago. She has a 0.1 pack-year smoking history. She has never used smokeless tobacco.      Appropriate preventive services were discussed with this patient, including applicable screening as appropriate for fall prevention, nutrition, physical activity, Tobacco-use cessation, weight loss and cognition.  Checklist reviewing preventive services available has been given to the  patient.    Reviewed patients plan of care and provided an AVS. The Basic Care Plan (routine screening as documented in Health Maintenance) for Abbey meets the Care Plan requirement. This Care Plan has been established and reviewed with the Patient.          Signed Electronically by: Sarah Vallejo NP    Identified Health Risks    Answers submitted by the patient for this visit:  Patient Health Questionnaire (Submitted on 2/1/2024)  If you checked off any problems, how difficult have these problems made it for you to do your work, take care of things at home, or get along with other people?: Somewhat difficult  PHQ9 TOTAL SCORE: 13

## 2024-02-01 NOTE — PROGRESS NOTES
Preventive Care Visit  [facility]  Sarah Vallejo NP, Nurse Practitioner - Family  Feb 1, 2024  {Provider  Link to [facility] :387511}  {PROVIDER CHARTING PREFERENCE:164355}    Maria Isabel Potter is a 74 year old, presenting for the following:  Wellness Visit (Fasting )        2/1/2024    11:57 AM   Additional Questions   Roomed by Catina JACKSON     {ROOMER if patient is in their first year of Medicare a vision screen is required click here to document the Vison screen and then refresh the note to pull in results  :454841}    Health Care Directive  Patient has a Health Care Directive on file  {(AWV REQUIRED) Advanced Care Planning Reviewed:430101}    HPI  ***  {MA/LPN/RN Pre-Provider Visit Orders- hCG/UA/Strep (Optional):743087}  {SUPERLIST (Optional):815137}  {additonal problems for provider to add (Optional):321384}      2/1/2024   General Health   How would you rate your overall physical health? (!) FAIR   Feel stress (tense, anxious, or unable to sleep) Only a little   (!) STRESS CONCERN      2/1/2024   Nutrition   Diet: Regular (no restrictions)         2/1/2024   Exercise   Days per week of moderate/strenous exercise 0 days   Average minutes spent exercising at this level 0 min   (!) EXERCISE CONCERN      2/1/2024   Social Factors   Frequency of gathering with friends or relatives Once a week   Worry food won't last until get money to buy more No   Food not last or not have enough money for food? No   Do you have housing?  Yes   Are you worried about losing your housing? No   Lack of transportation? No   Unable to get utilities (heat,electricity)? No         2/1/2024   Fall Risk   Fallen 2 or more times in the past year? No    No   Trouble with walking or balance? No          2/1/2024   Activities of Daily Living- Home Safety   Needs help with the following daily activites Laundry   Safety concerns in the home None of the above         2/1/2024   Dental   Dentist two times every year?  Yes         2024   Hearing Screening   Hearing concerns? (!) I NEED TO ASK PEOPLE TO SPEAK UP OR REPEAT THEMSELVES.    (!) TROUBLE UNDERSTANDING SOFT OR WHISPERED SPEECH.         2024   Driving Risk Screening   Patient/family members have concerns about driving No         2024   General Alertness/Fatigue Screening   Have you been more tired than usual lately? (!) YES         2024   Urinary Incontinence Screening   Bothered by leaking urine in past 6 months Yes          No data to display                Today's PHQ-9 Score:       2024    11:39 AM   PHQ-9 SCORE   PHQ-9 Total Score MyChart 13 (Moderate depression)   PHQ-9 Total Score 13         2024   Substance Use   Alcohol more than 3/day or more than 7/wk Not Applicable   Do you have a current opioid prescription? No   How severe/bad is pain from 1 to 10? 8/10   Do you use any other substances recreationally? No     Social History     Tobacco Use    Smoking status: Former     Packs/day: 0.10     Years: 1.00     Additional pack years: 0.00     Total pack years: 0.10     Types: Cigarettes     Start date: 1969     Quit date: 10/12/1969     Years since quittin.3    Smokeless tobacco: Never    Tobacco comments:     Smoked, very little, for 2 mo, 49 years ago.   Vaping Use    Vaping Use: Never used   Substance Use Topics    Alcohol use: Yes     Comment: Maybe 1 or 2 drinks a year    Drug use: No     {If there are gaps in the social history shown above, please follow the link to update and then refresh the note Link to Social and Substance History :579033}       No data to display              {If any of the questions to the FHS7 are answered yes, consider referral for genetic counseling.    Additional indications for genetic referral include personal history of breast or ovarian cancer, genetic mutation in 1st degree relative which increases risk of breast cancer including BRCA1, BRCA2, DEVON, PALB 2, TP53, CHEK2, PTEN, CDH1, STK11 (per  ACS) and/or 1st degree relative with history of pancreatic or high-risk prostate cancer (per NCCN):496512}   {Mammogram Decision Support (Optional):104419}      History of abnormal Pap smear: { :616349}       The 10-year ASCVD risk score (Lucia ARZATE, et al., 2019) is: 24.1%    Values used to calculate the score:      Age: 74 years      Sex: Female      Is Non- : No      Diabetic: No      Tobacco smoker: No      Systolic Blood Pressure: 140 mmHg      Is BP treated: Yes      HDL Cholesterol: 52 mg/dL      Total Cholesterol: 155 mg/dL    {Link to Fracture Risk Assessment Tool (Optional):620198}    {Use the storyboard to review patient history, after sections have been marked as reviewed, refresh note to capture documentation:906671}  { REQUIRED AWV use this link to review and update sexual activity history  after section has been marked as reviewed, refresh note to capture documentation:873180}  Reviewed and updated as needed this visit by Provider     Meds   Med Hx  Surg Hx  Fam Hx            {HISTORY OPTIONS (Optional):750307}  Current providers sharing in care for this patient include:  Patient Care Team:  Sarah Vallejo NP as PCP - General (Nurse Practitioner - Family)  Sarah Vallejo NP as Assigned PCP  Diane Dunn, PharmD as Pharmacist (Pharmacist)  Nydia Ramírez [facility] as Pharmacist (Pharmacist)  Ibeth Infante MD as Assigned Heart and Vascular Provider  Berenice Skinner MD as MD (Nephrology)  Ryne Pascal MD as MD (Ophthalmology)  Berenice Skinner MD as Assigned Nephrology Provider  Mykel Salcido MD as MD (Hematology & Oncology)  Sarah Vallejo NP as Nurse Practitioner (Nurse Practitioner - Family)  Mikey Vega MD as MD (Surgery)  Corrie Pinto, RUSSEL as Specialty Care Coordinator (Hematology & Oncology)  Mykel Salcido MD as Assigned Pediatric Specialist Provider  Mikey Vega MD as Assigned Surgical  Provider    The following health maintenance items are reviewed in Epic and correct as of today:  Health Maintenance   Topic Date Due    RSV VACCINE (Pregnancy & 60+) (1 - 1-dose 60+ series) Never done    DTAP/TDAP/TD IMMUNIZATION (2 - Td or Tdap) 03/06/2023    MEDICARE ANNUAL WELLNESS VISIT  10/04/2023    A1C  02/29/2024    BMP  04/06/2024    HEMOGLOBIN  07/03/2024    PHQ-9  08/01/2024    EYE EXAM  08/02/2024    LIPID  08/29/2024    TSH W/FREE T4 REFLEX  08/29/2024    ALT  10/06/2024    MICROALBUMIN  10/06/2024    ANNUAL REVIEW OF HM ORDERS  10/06/2024    FALL RISK ASSESSMENT  02/01/2025    GLUCOSE  10/06/2026    COLORECTAL CANCER SCREENING  11/04/2026    ADVANCE CARE PLANNING  02/01/2029    DEXA  11/22/2038    HEPATITIS C SCREENING  Completed    DEPRESSION ACTION PLAN  Completed    INFLUENZA VACCINE  Completed    Pneumococcal Vaccine: 65+ Years  Completed    URINALYSIS  Completed    ZOSTER IMMUNIZATION  Completed    COVID-19 Vaccine  Completed    IPV IMMUNIZATION  Aged Out    HPV IMMUNIZATION  Aged Out    MENINGITIS IMMUNIZATION  Aged Out    RSV MONOCLONAL ANTIBODY  Aged Out    MAMMO SCREENING  Discontinued     Review of Systems  {ROS Picklists (Optional):071219}     Objective    Exam  BP (!) 140/76 (BP Location: Right arm, Patient Position: Sitting, Cuff Size: Adult Large)   Pulse 64   Temp 98.7  F (37.1  C) (Oral)   Resp 16   Ht 1.524 m (5')   Wt 81.6 kg (180 lb)   SpO2 95%   BMI 35.15 kg/m     Estimated body mass index is 35.15 kg/m  as calculated from the following:    Height as of this encounter: 1.524 m (5').    Weight as of this encounter: 81.6 kg (180 lb).    Physical Exam  {Exam Choices (Optional):586237}        2/1/2024   Mini Cog   Clock Draw Score 2 Normal   3 Item Recall 3 objects recalled   Mini Cog Total Score 5     {A Mini-Cog total score of 0-2 suggests the possibility of dementia, score of 3-5 suggests no dementia:293064}       Signed Electronically by: Sarah Vallejo NP  {Email  feedback regarding this note to [email or web address]   :674137}  Answers submitted by the patient for this visit:  Patient Health Questionnaire (Submitted on 2/1/2024)  If you checked off any problems, how difficult have these problems made it for you to do your work, take care of things at home, or get along with other people?: Somewhat difficult  PHQ9 TOTAL SCORE: 13

## 2024-02-02 DIAGNOSIS — R05.9 COUGH, UNSPECIFIED TYPE: ICD-10-CM

## 2024-02-02 DIAGNOSIS — J31.0 CHRONIC NONALLERGIC RHINITIS: Chronic | ICD-10-CM

## 2024-02-02 LAB
ALBUMIN SERPL BCG-MCNC: 4 G/DL (ref 3.5–5.2)
ALP SERPL-CCNC: 90 U/L (ref 40–150)
ALT SERPL W P-5'-P-CCNC: 26 U/L (ref 0–50)
ANION GAP SERPL CALCULATED.3IONS-SCNC: 13 MMOL/L (ref 7–15)
AST SERPL W P-5'-P-CCNC: 30 U/L (ref 0–45)
BILIRUB SERPL-MCNC: 0.2 MG/DL
BUN SERPL-MCNC: 24 MG/DL (ref 8–23)
CALCIUM SERPL-MCNC: 9.2 MG/DL (ref 8.8–10.2)
CHLORIDE SERPL-SCNC: 101 MMOL/L (ref 98–107)
CREAT SERPL-MCNC: 1.1 MG/DL (ref 0.51–0.95)
DEPRECATED HCO3 PLAS-SCNC: 25 MMOL/L (ref 22–29)
EGFRCR SERPLBLD CKD-EPI 2021: 52 ML/MIN/1.73M2
GLUCOSE SERPL-MCNC: 91 MG/DL (ref 70–99)
POTASSIUM SERPL-SCNC: 4.2 MMOL/L (ref 3.4–5.3)
PROT SERPL-MCNC: 7 G/DL (ref 6.4–8.3)
SODIUM SERPL-SCNC: 139 MMOL/L (ref 135–145)
TSH SERPL DL<=0.005 MIU/L-ACNC: 1.58 UIU/ML (ref 0.3–4.2)

## 2024-02-02 RX ORDER — MONTELUKAST SODIUM 10 MG/1
1 TABLET ORAL AT BEDTIME
Qty: 90 TABLET | Refills: 0 | OUTPATIENT
Start: 2024-02-02

## 2024-02-07 ENCOUNTER — LAB (OUTPATIENT)
Dept: INFUSION THERAPY | Facility: CLINIC | Age: 74
End: 2024-02-07
Payer: MEDICARE

## 2024-02-07 ENCOUNTER — ONCOLOGY VISIT (OUTPATIENT)
Dept: ONCOLOGY | Facility: CLINIC | Age: 74
End: 2024-02-07
Payer: MEDICARE

## 2024-02-07 VITALS
HEIGHT: 60 IN | SYSTOLIC BLOOD PRESSURE: 139 MMHG | DIASTOLIC BLOOD PRESSURE: 82 MMHG | BODY MASS INDEX: 34.95 KG/M2 | RESPIRATION RATE: 16 BRPM | HEART RATE: 65 BPM | OXYGEN SATURATION: 92 % | WEIGHT: 178 LBS

## 2024-02-07 DIAGNOSIS — Z12.31 BREAST CANCER SCREENING BY MAMMOGRAM: ICD-10-CM

## 2024-02-07 DIAGNOSIS — C50.912 INVASIVE DUCTAL CARCINOMA OF BREAST, FEMALE, LEFT (H): Primary | ICD-10-CM

## 2024-02-07 DIAGNOSIS — C50.912 INVASIVE DUCTAL CARCINOMA OF BREAST, FEMALE, LEFT (H): ICD-10-CM

## 2024-02-07 DIAGNOSIS — E55.9 VITAMIN D DEFICIENCY: ICD-10-CM

## 2024-02-07 LAB
ALT SERPL W P-5'-P-CCNC: 14 U/L (ref 0–50)
CHOLEST SERPL-MCNC: 142 MG/DL
FASTING STATUS PATIENT QL REPORTED: ABNORMAL
HDLC SERPL-MCNC: 50 MG/DL
HOLD SPECIMEN: NORMAL
HOLD SPECIMEN: NORMAL
LDLC SERPL CALC-MCNC: 37 MG/DL
NONHDLC SERPL-MCNC: 92 MG/DL
TRIGL SERPL-MCNC: 277 MG/DL

## 2024-02-07 PROCEDURE — 82306 VITAMIN D 25 HYDROXY: CPT

## 2024-02-07 PROCEDURE — G0463 HOSPITAL OUTPT CLINIC VISIT: HCPCS | Performed by: INTERNAL MEDICINE

## 2024-02-07 PROCEDURE — 84460 ALANINE AMINO (ALT) (SGPT): CPT

## 2024-02-07 PROCEDURE — 99214 OFFICE O/P EST MOD 30 MIN: CPT | Performed by: INTERNAL MEDICINE

## 2024-02-07 PROCEDURE — 36415 COLL VENOUS BLD VENIPUNCTURE: CPT

## 2024-02-07 PROCEDURE — 82465 ASSAY BLD/SERUM CHOLESTEROL: CPT

## 2024-02-07 ASSESSMENT — PAIN SCALES - GENERAL: PAINLEVEL: SEVERE PAIN (6)

## 2024-02-07 NOTE — NURSING NOTE
Oncology Rooming Note    February 7, 2024 1:26 PM   Abbey Omer is a 74 year old female who presents for:    Chief Complaint   Patient presents with    Oncology Clinic Visit     Follow up     Initial Vitals: /82 (BP Location: Right arm)   Pulse 65   Resp 16   Ht 1.524 m (5')   Wt 80.7 kg (178 lb)   SpO2 92%   BMI 34.76 kg/m   Estimated body mass index is 34.76 kg/m  as calculated from the following:    Height as of this encounter: 1.524 m (5').    Weight as of this encounter: 80.7 kg (178 lb). Body surface area is 1.85 meters squared.  Severe Pain (6) Comment: Data Unavailable   No LMP recorded. Patient has had a hysterectomy.  Allergies reviewed: Yes  Medications reviewed: Yes    Medications: Medication refills not needed today.  Pharmacy name entered into EPIC:    [id number] - [place], [place] - [address] OF [address] [place] [address] PHARMACY 1629 - [place] [id number] [facility] [place]  WRITTEN PRESCRIPTION REQUESTED  [id number]- [place], [place] - [address] [place] [id number] [place] [facility] [address] [facility] [address] [facility] [address] [place] [facility] [address] [facility]ARMACY [place], [place] - [address] [facility] [address]    Frailty Screening:   Is the patient here for a new oncology consult visit in cancer care? 2. No      Clinical concerns: interested in speaking with  and psychologist about support at home and depression/lack of motivation concerns.        Seble Wheeler LPN

## 2024-02-08 ENCOUNTER — PATIENT OUTREACH (OUTPATIENT)
Dept: ONCOLOGY | Facility: HOSPITAL | Age: 74
End: 2024-02-08
Payer: COMMERCIAL

## 2024-02-08 LAB — VIT D+METAB SERPL-MCNC: 40 NG/ML (ref 20–50)

## 2024-02-08 NOTE — PROGRESS NOTES
Phoned pt per request: pt is interested in a therapy appt. Will forward to schedulers.     Oncology Distress Screening    Row Name 02/07/24 5104       How concerned do you feel regarding the following common issues people with cancer face. Please answer with a number from 0-10 where 0=not concerned and 10=very concerned. PT response of >=8  will result in outreach from Support Team.   1. How concerned are you about your ability to eat? 0       2. How concerned are you about unintended weight loss or your current weight? 0       3. How concerned are you about feeling depressed or very sad? 5       4. How concerned are you about feeling anxious or very scared? 1       5. Do you struggle with the loss of meaning and janice in your life? Somewhat       6. How concerned are you about work and home life issues that may be affected by your cancer? 0       7. How concerned are you about knowing what resources are available to help you? 0       8. Do you currently have what you would describe as Jehovah's witness or spiritual struggles? Not at all       You can also ask to be contacted by one of our Oncology Supportive Care professionals.   9. If you want to be contacted by one of our professionals, I can send a message to them right now. Oncology Social Worker;Oncology Psychologist

## 2024-02-09 ENCOUNTER — PATIENT OUTREACH (OUTPATIENT)
Dept: CARE COORDINATION | Facility: CLINIC | Age: 74
End: 2024-02-09
Payer: COMMERCIAL

## 2024-02-09 NOTE — PROGRESS NOTES
Social Work - Distress Screen Intervention  [facility]    Identified Concern and Score from Distress Screenin. How concerned are you about your ability to eat? 0     2. How concerned are you about unintended weight loss or your current weight? 0     3. How concerned are you about feeling depressed or very sad?  5     4. How concerned are you about feeling anxious or very scared?  1     5. Do you struggle with the loss of meaning and janice in your life?  Somewhat     6. How concerned are you about work and home life issues that may be affected by your cancer?  0     7. How concerned are you about knowing what resources are available to help you?  0     8. Do you currently have what you would describe as Anglican or spiritual struggles? Not at all     9. If you want to be contacted by one of our professionals, I can send a message to them right now.  Oncology Social Worker; Oncology Psychologist       Date of Distress Screen: 24  Data: At time of last visit, patient scored positive on distress screening.  outreached to patient today to follow up on elevated distress and introduce psychosocial services and support.  Intervention/Education provided:  contacted patient by phone to discuss distress screening results.  SW spoke with Abbey. No SW needs identified at this time.  Likely looking to have closer care in [facility] - where she recently moved.   Follow-up Required:  will remain available to patient for support as needed    EDGAR Lennon, [facility]   Adult Oncology - [place] [facility]  [phone]  Onsite [place] on    *Please note does not work on .

## 2024-02-12 NOTE — PROGRESS NOTES
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  # Oncotype Dx score 1; adjuvant chemotherapy not indicated.    Continue letrozole; planning 10 years.  Reviewed by attending for hair loss and postit notes from cognitive change.  Continue Aquaphor and cortisone for radiation-induced dermatitis; will trial 's gabapentin for neuropathic pain; may need refill of effective; asked her to reach out to her radiation oncologist.  Reviewed strategies for bone health (calcium vitamin D, non-smoking, weightbearing exercise); reviewed bone strengthening agents, will hold at this time; repeat DEXA scan in 2 to 3 years.  Social work/mental health consult discussed.  Reviewed lipid panel; stable vitamin D and ALT.  Return to clinic 3 months.    Mykel Salcido MD    --------------------    Interval History:  Abbey returns for follow-up of breast cancer accompanied by her .  All in all, her biggest issues relate to radiation related breast pain and fatigue.  She has been using Aquaphor and cortisone 10.  She has been using some Tylenol and ibuprofen.  She is doing well on letrozole without vasomotor symptoms, arthralgias, other side effects.  No cognitive changes or hair thinning yet.    Social History:  Social History     Tobacco Use    Smoking status: Former     Packs/day: 0.10     Years: 1.00     Additional pack years: 0.00     Total pack years: 0.10     Types: Cigarettes     Start date: 1969     Quit date: 10/12/1969     Years since quittin.3    Smokeless tobacco: Never    Tobacco comments:     Smoked, very little, for 2 mo, 49 years ago.   Substance Use Topics     Alcohol use: Yes     Comment: Maybe 1 or 2 drinks a year     Family History:  Family History   Problem Relation Age of Onset    Diabetes Maternal Grandfather     Hypertension Maternal Grandfather     Diabetes Son     Heart Disease Father         CHF    Diabetes Mother     Depression Mother     Hyperlipidemia Mother     Anxiety Disorder Mother     Asthma Mother     Obesity Mother     Substance Abuse Brother         Not any longer    Diabetes Brother     Depression Daughter     Diabetes Maternal Uncle     Cerebrovascular Disease Maternal Uncle     Diabetes Son         after accident    Depression Daughter     Depression Son         after accident    Substance Abuse Brother         Not any longer    Diabetes Sister     Substance Abuse Sister     Diabetes Brother     Thyroid Disease No family hx of     Glaucoma No family hx of     Macular Degeneration No family hx of     Cancer No family hx of      Immunizations:  Immunization History   Administered Date(s) Administered    COVID-19 12+ (2023-24) (Pfizer) 12/01/2023    COVID-19 Bivalent 12+ (Pfizer) 09/27/2022    COVID-19 MONOVALENT 12+ (Pfizer) 03/02/2021, 03/23/2021, 10/25/2021    COVID-19 Monovalent 12+ (Pfizer 2022) 05/18/2022    Flu, Unspecified 11/02/1995, 11/01/1996, 10/28/1997    Influenza (H1N1) 12/16/2009    Influenza (High Dose) 3 valent vaccine 10/27/2015, 12/01/2016, 10/04/2017, 09/13/2018, 10/07/2019    Influenza (IIV3) PF 10/16/1999, 10/04/2003, 10/05/2004, 11/12/2005, 10/25/2006, 09/26/2007, 10/12/2009, 10/20/2010, 10/24/2012    Influenza Vaccine 65+ (FLUAD) 12/01/2023    Influenza Vaccine 65+ (Fluzone HD) 10/12/2020, 10/25/2021, 09/27/2022    Influenza Vaccine >6 months,quad, PF 10/25/2014    Pneumo Conj 13-V (2010&after) 04/20/2016    Pneumococcal 20 valent Conjugate (Prevnar 20) 06/16/2023    Pneumococcal 23 valent 10/04/2003, 10/25/2005, 05/28/2014    TD,PF 7+ (Tenivac) 03/21/2003    TDAP Vaccine (Boostrix) 03/06/2013    Zoster recombinant adjuvanted  (SHINGRIX) 06/16/2023, 08/15/2023    Zoster vaccine, live 05/28/2014     Health Maintenance Due   Topic Date Due    DTAP/TDAP/TD IMMUNIZATION (2 - Td or Tdap) 03/06/2023     --------------------    Physical Exam:  VS: /82 (BP Location: Right arm)   Pulse 65   Resp 16   Ht 1.524 m (5')   Wt 80.7 kg (178 lb)   SpO2 92%   BMI 34.76 kg/m  .  GEN: Well appearing.  BREAST: Grade 2-3 dermatitis breast posterior chest area; no signs of infection, but open and weeping    Labs / Imaging:  Reviewed ALT, vitamin D, lipid panel.

## 2024-03-15 DIAGNOSIS — R05.9 COUGH, UNSPECIFIED TYPE: ICD-10-CM

## 2024-03-15 DIAGNOSIS — J31.0 CHRONIC NONALLERGIC RHINITIS: Chronic | ICD-10-CM

## 2024-03-15 RX ORDER — MONTELUKAST SODIUM 10 MG/1
1 TABLET ORAL AT BEDTIME
Qty: 90 TABLET | Refills: 0 | Status: SHIPPED | OUTPATIENT
Start: 2024-03-15 | End: 2024-07-07

## 2024-04-05 ENCOUNTER — HOSPITAL ENCOUNTER (OUTPATIENT)
Dept: CARDIOLOGY | Facility: CLINIC | Age: 74
Discharge: HOME | End: 2024-04-05
Attending: INTERNAL MEDICINE | Admitting: INTERNAL MEDICINE
Payer: MEDICARE

## 2024-04-05 ENCOUNTER — TELEPHONE (OUTPATIENT)
Dept: CARDIOLOGY | Facility: CLINIC | Age: 74
End: 2024-04-05

## 2024-04-05 DIAGNOSIS — Z98.61 CAD S/P PERCUTANEOUS CORONARY ANGIOPLASTY: Primary | ICD-10-CM

## 2024-04-05 DIAGNOSIS — I25.10 CORONARY ARTERY DISEASE INVOLVING NATIVE CORONARY ARTERY OF NATIVE HEART WITHOUT ANGINA PECTORIS: ICD-10-CM

## 2024-04-05 DIAGNOSIS — I10 HYPERTENSION GOAL BP (BLOOD PRESSURE) < 140/90: ICD-10-CM

## 2024-04-05 DIAGNOSIS — I25.10 CAD S/P PERCUTANEOUS CORONARY ANGIOPLASTY: Primary | ICD-10-CM

## 2024-04-05 DIAGNOSIS — C50.912 INVASIVE DUCTAL CARCINOMA OF BREAST, FEMALE, LEFT (H): ICD-10-CM

## 2024-04-05 LAB — LVEF ECHO: NORMAL

## 2024-04-05 PROCEDURE — 93306 TTE W/DOPPLER COMPLETE: CPT | Mod: 26 | Performed by: INTERNAL MEDICINE

## 2024-04-05 PROCEDURE — 255N000002 HC RX 255 OP 636: Performed by: INTERNAL MEDICINE

## 2024-04-05 PROCEDURE — 999N000208 ECHOCARDIOGRAM COMPLETE

## 2024-04-05 RX ADMIN — HUMAN ALBUMIN MICROSPHERES AND PERFLUTREN 9 ML: 10; .22 INJECTION, SOLUTION INTRAVENOUS at 14:22

## 2024-04-05 NOTE — TELEPHONE ENCOUNTER
Results noted. No future follow up scheduled. Patient [facility] RN will send to Dr. Infante for review and inquire if any further recommendations/orders based on results.               4/5/24 ECHO  nterpretation Summary     Left ventricular systolic function is normal.  The visual ejection fraction is 55-60%.  There is mild (1+) mitral regurgitation.  There is mild (1+) aortic regurgitation.  Technically difficult, suboptimal study.

## 2024-04-09 ENCOUNTER — PATIENT OUTREACH (OUTPATIENT)
Dept: ONCOLOGY | Facility: CLINIC | Age: 74
End: 2024-04-09
Payer: COMMERCIAL

## 2024-04-09 NOTE — PROGRESS NOTES
0939  from patient, requests that future visits be cancelled.  She has found a provider in [place], where she now resides, and will receive care closer to home.     Patient contacted, informed that message was received and confirmed that all future visits are to be cancelled.  Visits cancelled as requested, wished patient well.      Krissy Her RN

## 2024-04-11 NOTE — TELEPHONE ENCOUNTER
Follow up order placed in epic. RN updated patient with results and plan via [employer].         I can see her once every 1-2 years for routine follow up. Echocardiogram looks OK.    Dr. Infante

## 2024-04-19 NOTE — Clinical Note
Can someone call her in 2 weeks.  She has been reporting that sometimes when she puts CPAP on she can't breath. She was instructed to try the ramp to see if that helps, but if ramp doesn't help we may need to increase the starting pressure. ThanksRy
Negative

## 2024-04-26 ENCOUNTER — TRANSFERRED RECORDS (OUTPATIENT)
Dept: HEALTH INFORMATION MANAGEMENT | Facility: CLINIC | Age: 74
End: 2024-04-26
Payer: COMMERCIAL

## 2024-05-31 NOTE — TELEPHONE ENCOUNTER
[employer] phone: [phone]. Tried calling to check on status of this PA but was on hold over 15 mins-- will try again tomorrow.    Mariajose Ac MA     no

## 2024-07-07 DIAGNOSIS — J31.0 CHRONIC NONALLERGIC RHINITIS: Chronic | ICD-10-CM

## 2024-07-07 DIAGNOSIS — R05.9 COUGH, UNSPECIFIED TYPE: ICD-10-CM

## 2024-07-07 RX ORDER — MONTELUKAST SODIUM 10 MG/1
1 TABLET ORAL AT BEDTIME
Qty: 90 TABLET | Refills: 1 | Status: SHIPPED | OUTPATIENT
Start: 2024-07-07

## 2024-09-08 ENCOUNTER — HEALTH MAINTENANCE LETTER (OUTPATIENT)
Age: 74
End: 2024-09-08

## 2024-10-13 DIAGNOSIS — I25.10 CORONARY ARTERY DISEASE INVOLVING NATIVE CORONARY ARTERY OF NATIVE HEART WITHOUT ANGINA PECTORIS: Chronic | ICD-10-CM

## 2024-10-13 DIAGNOSIS — F33.0 MAJOR DEPRESSIVE DISORDER, RECURRENT EPISODE, MILD DEGREE (H): Chronic | ICD-10-CM

## 2024-10-13 DIAGNOSIS — E03.4 HYPOTHYROIDISM DUE TO ACQUIRED ATROPHY OF THYROID: Chronic | ICD-10-CM

## 2024-10-14 RX ORDER — SIMVASTATIN 40 MG
40 TABLET ORAL AT BEDTIME
Qty: 90 TABLET | Refills: 0 | Status: SHIPPED | OUTPATIENT
Start: 2024-10-14

## 2024-10-14 RX ORDER — LEVOTHYROXINE SODIUM 88 UG/1
TABLET ORAL
Qty: 90 TABLET | Refills: 0 | Status: SHIPPED | OUTPATIENT
Start: 2024-10-14

## 2024-10-15 RX ORDER — VENLAFAXINE HYDROCHLORIDE 150 MG/1
CAPSULE, EXTENDED RELEASE ORAL
Qty: 180 CAPSULE | Refills: 0 | Status: SHIPPED | OUTPATIENT
Start: 2024-10-15

## 2024-11-27 DIAGNOSIS — M1A.9XX0 CHRONIC GOUT INVOLVING TOE WITHOUT TOPHUS, UNSPECIFIED CAUSE, UNSPECIFIED LATERALITY: ICD-10-CM

## 2024-11-27 RX ORDER — ALLOPURINOL 100 MG/1
100 TABLET ORAL DAILY
Qty: 90 TABLET | Refills: 0 | Status: SHIPPED | OUTPATIENT
Start: 2024-11-27

## 2024-12-12 DIAGNOSIS — G47.00 INSOMNIA, UNSPECIFIED TYPE: ICD-10-CM

## 2024-12-12 RX ORDER — TRAZODONE HYDROCHLORIDE 50 MG/1
50-100 TABLET, FILM COATED ORAL AT BEDTIME
Qty: 180 TABLET | Refills: 0 | Status: SHIPPED | OUTPATIENT
Start: 2024-12-12

## 2025-01-05 ENCOUNTER — HEALTH MAINTENANCE LETTER (OUTPATIENT)
Age: 75
End: 2025-01-05

## 2025-01-07 DIAGNOSIS — E03.4 HYPOTHYROIDISM DUE TO ACQUIRED ATROPHY OF THYROID: Chronic | ICD-10-CM

## 2025-01-07 RX ORDER — LEVOTHYROXINE SODIUM 88 UG/1
TABLET ORAL
Qty: 90 TABLET | Refills: 0 | Status: SHIPPED | OUTPATIENT
Start: 2025-01-07

## 2025-01-08 DIAGNOSIS — R05.9 COUGH, UNSPECIFIED TYPE: ICD-10-CM

## 2025-01-08 DIAGNOSIS — J31.0 CHRONIC NONALLERGIC RHINITIS: Chronic | ICD-10-CM

## 2025-01-08 DIAGNOSIS — I25.10 CORONARY ARTERY DISEASE INVOLVING NATIVE CORONARY ARTERY OF NATIVE HEART WITHOUT ANGINA PECTORIS: Chronic | ICD-10-CM

## 2025-01-08 RX ORDER — SIMVASTATIN 40 MG
40 TABLET ORAL AT BEDTIME
Qty: 90 TABLET | Refills: 0 | Status: SHIPPED | OUTPATIENT
Start: 2025-01-08

## 2025-01-09 RX ORDER — MONTELUKAST SODIUM 10 MG/1
1 TABLET ORAL AT BEDTIME
Qty: 90 TABLET | Refills: 0 | Status: SHIPPED | OUTPATIENT
Start: 2025-01-09

## 2025-01-11 DIAGNOSIS — F33.0 MAJOR DEPRESSIVE DISORDER, RECURRENT EPISODE, MILD DEGREE: Chronic | ICD-10-CM

## 2025-01-12 RX ORDER — VENLAFAXINE HYDROCHLORIDE 150 MG/1
CAPSULE, EXTENDED RELEASE ORAL
Qty: 180 CAPSULE | Refills: 0 | Status: SHIPPED | OUTPATIENT
Start: 2025-01-12

## 2025-03-08 DIAGNOSIS — G47.00 INSOMNIA, UNSPECIFIED TYPE: ICD-10-CM

## 2025-03-10 RX ORDER — TRAZODONE HYDROCHLORIDE 50 MG/1
TABLET ORAL
Qty: 180 TABLET | Refills: 0 | OUTPATIENT
Start: 2025-03-10

## 2025-03-26 DIAGNOSIS — I10 HYPERTENSION GOAL BP (BLOOD PRESSURE) < 140/90: Chronic | ICD-10-CM

## 2025-03-26 RX ORDER — METOPROLOL SUCCINATE 25 MG/1
25 TABLET, EXTENDED RELEASE ORAL DAILY
Qty: 30 TABLET | Refills: 0 | Status: SHIPPED | OUTPATIENT
Start: 2025-03-26

## 2025-03-30 ENCOUNTER — HEALTH MAINTENANCE LETTER (OUTPATIENT)
Age: 75
End: 2025-03-30

## 2025-04-04 ENCOUNTER — TELEPHONE (OUTPATIENT)
Dept: FAMILY MEDICINE | Facility: CLINIC | Age: 75
End: 2025-04-04
Payer: COMMERCIAL

## 2025-04-04 DIAGNOSIS — I25.10 CORONARY ARTERY DISEASE INVOLVING NATIVE CORONARY ARTERY OF NATIVE HEART WITHOUT ANGINA PECTORIS: Chronic | ICD-10-CM

## 2025-04-05 RX ORDER — SIMVASTATIN 40 MG
40 TABLET ORAL AT BEDTIME
Qty: 90 TABLET | Refills: 0 | OUTPATIENT
Start: 2025-04-05

## 2025-04-05 NOTE — TELEPHONE ENCOUNTER
Chart reviewed.  Since patient moved, I believe she has changed PCP to Dr. Lomeli through [place] [place] [facility].    But can you call patient to clarify so we can know if this is correct?  I am happy to keep seeing her (she would need to schedule a visit because I haven't seen her since 2/2024) but I believe her plan was to change clinic to one closer to her home.    Sarah Vallejo, DNP, APRN, CNP

## 2025-04-06 DIAGNOSIS — J31.0 CHRONIC NONALLERGIC RHINITIS: Chronic | ICD-10-CM

## 2025-04-06 DIAGNOSIS — R05.9 COUGH, UNSPECIFIED TYPE: ICD-10-CM

## 2025-04-06 RX ORDER — MONTELUKAST SODIUM 10 MG/1
TABLET ORAL
Qty: 90 TABLET | Refills: 0 | OUTPATIENT
Start: 2025-04-06

## 2025-04-07 NOTE — TELEPHONE ENCOUNTER
Called patient and she has established care with a new provider closer to where she lives.  Patient will contact pharmacy to make sure they send all refill request to her new provider.    SELINA Mukherjee  [facility]

## 2025-04-08 ENCOUNTER — OFFICE VISIT (OUTPATIENT)
Dept: CARDIOLOGY | Facility: CLINIC | Age: 75
End: 2025-04-08
Attending: INTERNAL MEDICINE
Payer: COMMERCIAL

## 2025-04-08 VITALS
OXYGEN SATURATION: 97 % | HEART RATE: 67 BPM | WEIGHT: 174 LBS | RESPIRATION RATE: 16 BRPM | DIASTOLIC BLOOD PRESSURE: 81 MMHG | SYSTOLIC BLOOD PRESSURE: 155 MMHG | BODY MASS INDEX: 34.16 KG/M2 | HEIGHT: 60 IN

## 2025-04-08 DIAGNOSIS — Z98.61 CAD S/P PERCUTANEOUS CORONARY ANGIOPLASTY: ICD-10-CM

## 2025-04-08 DIAGNOSIS — I25.10 CORONARY ARTERY DISEASE INVOLVING NATIVE CORONARY ARTERY OF NATIVE HEART WITHOUT ANGINA PECTORIS: ICD-10-CM

## 2025-04-08 DIAGNOSIS — I10 HYPERTENSION GOAL BP (BLOOD PRESSURE) < 140/90: Chronic | ICD-10-CM

## 2025-04-08 DIAGNOSIS — I25.10 CAD S/P PERCUTANEOUS CORONARY ANGIOPLASTY: ICD-10-CM

## 2025-04-08 PROCEDURE — 3079F DIAST BP 80-89 MM HG: CPT | Performed by: NURSE PRACTITIONER

## 2025-04-08 PROCEDURE — 99214 OFFICE O/P EST MOD 30 MIN: CPT | Performed by: NURSE PRACTITIONER

## 2025-04-08 PROCEDURE — 3077F SYST BP >= 140 MM HG: CPT | Performed by: NURSE PRACTITIONER

## 2025-04-08 RX ORDER — HYDROCHLOROTHIAZIDE 12.5 MG/1
12.5 TABLET ORAL EVERY MORNING
Qty: 90 TABLET | Refills: 3 | Status: SHIPPED | OUTPATIENT
Start: 2025-04-08

## 2025-04-08 RX ORDER — METOPROLOL SUCCINATE 25 MG/1
25 TABLET, EXTENDED RELEASE ORAL DAILY
Qty: 90 TABLET | Refills: 4 | Status: SHIPPED | OUTPATIENT
Start: 2025-04-08

## 2025-04-08 RX ORDER — SIMVASTATIN 40 MG
40 TABLET ORAL AT BEDTIME
Qty: 90 TABLET | Refills: 3 | Status: SHIPPED | OUTPATIENT
Start: 2025-04-08

## 2025-04-08 RX ORDER — LOSARTAN POTASSIUM 50 MG/1
50 TABLET ORAL 2 TIMES DAILY
Qty: 180 TABLET | Refills: 3 | Status: SHIPPED | OUTPATIENT
Start: 2025-04-08

## 2025-04-08 NOTE — PROGRESS NOTES
Cardiology Clinic Follow up     Abbey Omer MRN# [id number]   YOB: 1950 Age: 75 year old     Primary cardiologist: Dr. Infante       Reason for visit: Annual follow up    History of presenting illness:    Abbey Omer is a very pleasant 75 year old female with past medical history significant for HTN, CAD s/p PCI staged OM/LCx and prox-mid RCA in 2019, HLD, syncope in setting of dehydration, RICKEY, breast cancer, and hypothyroidism who presents for annual follow up.    Today, Abbey presents for follow up with her wife.  They live in [place] now. Has a new PCP. Needs meds refilled.  She noticed she was very fatigued after starting a mushroom coffee called Rise. Her TSH was high in January. She then stopped the coffee. TSH now corrected and energy improved. Has been drinking the coffee again for past 2 weeks. Occasional lightheadedness. Denies any chest pain, increased shortness of breath or edema. A little winded with stairs. Plans to get back to exercise now that energy improved.     Had labs at [facility] recently in January. TSH recheck in normal range.          Assessment and Plan:     ASSESSMENT:    CAD s/p PCI to OM/Lcx and RCA in 2019  -Echocardiogram 4/2024 with normal LVEF  -Denies chest pain  -Plans to get back to exercise. If feeling more winded with exertion or any significant change in symptoms to notify the office.   -Continue aspirin, simvastatin, metoprolol    HTN  -Elevated today, normotensive at home. Notes higher in doctor offices  -Continue hydrochlorothiazide 12.5mg, losartan 50mg twice daily, metoprolol XL 25mg  -Monitor blood pressure at home and call if consistently elevated  -Creatinine normal 0.94, K 3.9 (1/2025)    Hyperlipidemia  -LDL at goal 45 (1/2025)  -Continue simvastatin 40mg daily         PLAN:     Continue current medications  Refills authorized  Monitor home blood pressures and call if above goal  Increase routine exercise and walking  program   Follow up in 1 year with Dr. Naresh See, DNP, APRN, CNP  [facility]     Orders this Visit:  Orders Placed This Encounter   Procedures    Follow-Up with Cardiology     Orders Placed This Encounter   Medications    hydroCHLOROthiazide 12.5 MG tablet     Sig: Take 1 tablet (12.5 mg) by mouth every morning.     Dispense:  90 tablet     Refill:  3     Please fill.    losartan (COZAAR) 50 MG tablet     Sig: Take 1 tablet (50 mg) by mouth 2 times daily.     Dispense:  180 tablet     Refill:  3     Keep on file until requested.    metoprolol succinate ER (TOPROL XL) 25 MG 24 hr tablet     Sig: Take 1 tablet (25 mg) by mouth daily.     Dispense:  90 tablet     Refill:  4     Please fill    simvastatin (ZOCOR) 40 MG tablet     Sig: Take 1 tablet (40 mg) by mouth at bedtime.     Dispense:  90 tablet     Refill:  3     Medications Discontinued During This Encounter   Medication Reason    hydroCHLOROthiazide 12.5 MG tablet Reorder (No AVS)    simvastatin (ZOCOR) 40 MG tablet Reorder (No AVS)    metoprolol succinate ER (TOPROL XL) 25 MG 24 hr tablet Reorder (No AVS)    losartan (COZAAR) 100 MG tablet             Physical Exam:   Vitals: BP (!) 155/81   Pulse 67   Resp 16   Ht 1.524 m (5')   Wt 78.9 kg (174 lb)   SpO2 97%   BMI 33.98 kg/m      Body mass index is 33.98 kg/m .  Wt Readings from Last 3 Encounters:   04/08/25 78.9 kg (174 lb)   02/07/24 80.7 kg (178 lb)   02/01/24 81.6 kg (180 lb)        General :   Alert and oriented, in no acute distress.    Respiratory:   Respirations unlabored. Clear bilaterally with no rales, rhonchi, or wheezes.     Cardiovascular:   Rhythm is regular. S1 and S2 are normal. No significant murmur is present.    Extremities: Warm and dry, no lower edema   Neurologic: Moves all extremities, non focal    Psych:  Appropriate             Medications:     Current Outpatient Medications   Medication Sig Dispense Refill    acetaminophen  (TYLENOL) 325 MG tablet Take 2 tablets (650 mg) by mouth every 4 hours as needed for mild pain 50 tablet 0    allopurinol (ZYLOPRIM) 100 MG tablet TAKE 1 TABLET BY MOUTH EVERY DAY 90 tablet 0    aspirin (ASA) 81 MG EC tablet Take 81 mg by mouth daily      B Complex Vitamins (VITAMIN B COMPLEX PO) Take 1,250 mg by mouth       colchicine (COLCRYS) 0.6 MG tablet Take 0.6 mg by mouth 3 times daily on the first day of gout flare, then take 0.6 mg once or twice daily until flare resolves 30 tablet 1    EXCEDRIN TENSION HEADACHE 500-65 MG PO TABS 1 tablet twice daily as needed      fluticasone (FLONASE) 50 MCG/ACT nasal spray Spray 2 sprays into both nostrils daily 48 g 1    hydroCHLOROthiazide 12.5 MG tablet Take 1 tablet (12.5 mg) by mouth every morning. 90 tablet 3    letrozole (FEMARA) 2.5 MG tablet Take 1 tablet (2.5 mg) by mouth daily 90 tablet 3    levalbuterol (XOPENEX HFA) 45 MCG/ACT inhaler INHALE 2 PUFFS INTO THE LUNGS EVERY 6 HOURS AS NEEDED FOR SHORTNESS OF BREATH OR DIFFICULT BREATHING OR WHEEZING 15 g 0    levothyroxine (SYNTHROID/LEVOTHROID) 88 MCG tablet TAKE 1 TABLET(88 MCG) BY MOUTH DAILY 90 tablet 0    losartan (COZAAR) 50 MG tablet Take 1 tablet (50 mg) by mouth 2 times daily. 180 tablet 3    metoprolol succinate ER (TOPROL XL) 25 MG 24 hr tablet Take 1 tablet (25 mg) by mouth daily. 90 tablet 4    montelukast (SINGULAIR) 10 MG tablet Take 1 tablet (10 mg) by mouth at bedtime. Patient is due for office visit and/or labs before further refills. 90 tablet 0    polyethylene glycol-propylene glycol (SYSTANE ULTRA) 0.4-0.3 % SOLN ophthalmic solution Place 1 drop into both eyes 2 times daily      simvastatin (ZOCOR) 40 MG tablet Take 1 tablet (40 mg) by mouth at bedtime. 90 tablet 3    traZODone (DESYREL) 50 MG tablet Take 1-2 tablets ( mg) by mouth at bedtime. +++NEED APPOINTMENT+++ (Patient taking differently: Take 50 mg by mouth at bedtime. +++NEED APPOINTMENT+++) 180 tablet 0    venlafaxine  (EFFEXOR XR) 150 MG 24 hr capsule TAKE 2 CAPSULES BY MOUTH EVERY  capsule 0    Flaxseed, Linseed, (FLAXSEED OIL PO) Take 1 Tablespoonful by mouth daily (Patient not taking: Reported on 2025)         Family History   Problem Relation Age of Onset    Diabetes Maternal Grandfather     Hypertension Maternal Grandfather     Diabetes Son     Heart Disease Father         CHF    Diabetes Mother     Depression Mother     Hyperlipidemia Mother     Anxiety Disorder Mother     Asthma Mother     Obesity Mother     Substance Abuse Brother         Not any longer    Diabetes Brother     Depression Daughter     Diabetes Maternal Uncle     Cerebrovascular Disease Maternal Uncle     Diabetes Son         after accident    Depression Daughter     Depression Son         after accident    Substance Abuse Brother         Not any longer    Diabetes Sister     Substance Abuse Sister     Diabetes Brother     Thyroid Disease No family hx of     Glaucoma No family hx of     Macular Degeneration No family hx of     Cancer No family hx of        Social History     Socioeconomic History    Marital status:      Spouse name: Maggie    Number of children: 2    Years of education: 12    Highest education level: Not on file   Occupational History    Occupation: collections for [employer]     Employer: [employer]   Tobacco Use    Smoking status: Former     Current packs/day: 0.00     Average packs/day: 0.1 packs/day for 1 year (0.1 ttl pk-yrs)     Types: Cigarettes     Start date: 1969     Quit date: 10/12/1969     Years since quittin.5    Smokeless tobacco: Never    Tobacco comments:     Smoked, very little, for 2 mo, 49 years ago.   Vaping Use    Vaping status: Never Used   Substance and Sexual Activity    Alcohol use: Yes     Comment: Maybe 1 or 2 drinks a year    Drug use: No    Sexual activity: Not Currently     Partners: Female     Birth control/protection: Post-menopausal   Other Topics Concern    Parent/sibling  w/ CABG, MI or angioplasty before 65F 55M? No   Social History Narrative    Son  2012.    Wife, Maggie     Social Drivers of Health     Financial Resource Strain: Low Risk  (2024)    Financial Resource Strain     Within the past 12 months, have you or your family members you live with been unable to get utilities (heat, electricity) when it was really needed?: No   Food Insecurity: Low Risk  (2024)    Food Insecurity     Within the past 12 months, did you worry that your food would run out before you got money to buy more?: No     Within the past 12 months, did the food you bought just not last and you didn t have money to get more?: No   Transportation Needs: Low Risk  (2024)    Transportation Needs     Within the past 12 months, has lack of transportation kept you from medical appointments, getting your medicines, non-medical meetings or appointments, work, or from getting things that you need?: No   Physical Activity: Inactive (2024)    Exercise Vital Sign     Days of Exercise per Week: 0 days     Minutes of Exercise per Session: 0 min   Stress: No Stress Concern Present (2024)    [employer] [place] [employer] - Occupational Stress Questionnaire     Feeling of Stress : Only a little   Social Connections: Unknown (2024)    Social Connection and Isolation Panel [NHANES]     Frequency of Communication with Friends and Family: Not on file     Frequency of Social Gatherings with Friends and Family: Once a week     Attends Denominational Services: Not on file     Active Member of Clubs or Organizations: Not on file     Attends Club or Organization Meetings: Not on file     Marital Status: Not on file   Interpersonal Safety: Not on file   Housing Stability: Low Risk  (2024)    Housing Stability     Do you have housing? : Yes     Are you worried about losing your housing?: No          Past Medical History:     Past Medical History:   Diagnosis Date    Cancer of bladder (H)      Cataract 11/25/2011    Chronic nonallergic rhinitis 8/31/10 RAST    CKD (chronic kidney disease) stage 3, GFR 30-59 ml/min (H) 12/20/2010    Coronary artery disease involving native coronary artery of native heart without angina pectoris 03/01/2019    DDD (degenerative disc disease), cervical 06/16/2011    Dependent edema     Depression, major     Depressive disorder In 2002 or 2003    Glaucoma suspect, bilateral     History of blood transfusion ?    HTN (hypertension)     Hyperlipidemia     Hypothyroidism 10/12/2009    Migraine     resolved    Nasal septal deviation 08/11/2009    Obesity 05/17/2011    RICKEY (obstructive sleep apnea)     Osteoarthritis of shoulder region     Osteoporosis     Prediabetes     Premature menopause     Renal cyst             Past Surgical History:     Past Surgical History:   Procedure Laterality Date    ARTHROPLASTY SHOULDER Right 11/01/2017    Right total shoulder arthroplasty by Dr. Go at [facility]    ARTHROPLASTY SHOULDER Left 03/28/2018    Left total shoulder arthroplasty by Dr. Go at [facility]    BIOPSY NODE SENTINEL Left 10/9/2023    Procedure: BIOPSY, LYMPH NODE, SENTINEL;  Surgeon: Mikey Vega MD;  Location: [facility] OR    CATARACT IOL, RT/LT      CHOLECYSTECTOMY, LAPOROSCOPIC      COLONOSCOPY  ?    Upper & Lower    CV CORONARY ANGIOGRAM  02/22/2019    CV CORONARY ANGIOGRAM N/A 02/22/2019    Procedure: 1330 CORS;  Surgeon: Marito Arriaga MD;  Location: [facility]    CV HEART CATHETERIZATION WITH POSSIBLE INTERVENTION N/A 03/18/2019    Procedure: STAGED PCI-JALEN CARABALLO;  Surgeon: Zeus Cordero MD;  Location: [facility]    CYSTOSCOPY      bladder cancer    D & C      ENT SURGERY  first 1967/second ?    2 Rhynoplasties    HYSTERECTOMY, PAP NO LONGER INDICATED      BSO    MASTECTOMY SIMPLE BILATERAL Bilateral 10/9/2023    Procedure: MASTECTOMY, BILATERAL, SIMPLE;  Surgeon: Mieky Vega MD;   Location: [facility] OR    NOSE SURGERY      septum deviation    PHACOEMULSIFICATION CLEAR CORNEA WITH STANDARD INTRAOCULAR LENS IMPLANT Left 2018    Procedure: PHACOEMULSIFICATION CLEAR CORNEA WITH STANDARD INTRAOCULAR LENS IMPLANT;  LEFT EYE PHACOEMULSIFICATION CLEAR CORNEA WITH STANDARD INTRAOCULAR LENS IMPLANT;  Surgeon: Ryne Pascal MD;  Location:  [facility]    PHACOEMULSIFICATION WITH STANDARD INTRAOCULAR LENS IMPLANT Right 10/15/2018    Procedure: PHACOEMULSIFICATION WITH STANDARD INTRAOCULAR LENS IMPLANT ;  Surgeon: Ryne Pascal MD;  Location: [facility] OR            Allergies:   Duloxetine hcl, Zolmitriptan, Adhesive tape, Aluminum hydroxide, Atenolol, Codeine, Colestid [colestipol hcl], Colestipol, Other drug allergy (see comments), Potassium, Albuterol, Amlodipine, Asa [aspirin], Atenolol, Atorvastatin, Bupropion, Crestor [statins], Ezetimibe, Hydralazine, Lisinopril, Lovastatin, Morphine and codeine, Niacin, Paroxetine, Paxil [paroxetine], Potassium, Sulfa antibiotics, Sulfasalazine, and Triamterene       Data Reviewed today:   Most Recent Echocardiogram: 2024  Summary  Left ventricular systolic function is normal.  The visual ejection fraction is 55-60%.  There is mild (1+) mitral regurgitation.  There is mild (1+) aortic regurgitation.  Technically difficult, suboptimal study.    Stress testin2020    The nuclear stress test is negative for inducible myocardial ischemia or infarction.    Left ventricular function is normal.    There is no prior study for comparison.        Coronary angiogram: 3/2019  Potential access sites were evaluated for patency using ultrasound.   The right radial artery was selected. Access was obtained under with Sonosite guidance using a micropuncture 21 guage needle with direct visualization of needle entry.   SHTH INTRO 0.021IN ID 6FR NICOLE   Sheath prepped and inserted in the right radial artery.   CATH GUIDING 100CM 6FR JR 3.5 SH   Guide inserted over wire.   CATH  GUIDING COR LNCHR OD6FR L100CM   Guide inserted over wire.   CATH BALLOON EMERGE 2.0X20MM [id number]      STENT SYNERGY DRUG ELUTING 2.93H97GE  [id number]   Stent inserted over the wire.   STENT SYNERGY DRUG ELUTING 2.19X92YI  [id number]   Stent inserted over the wire.   CATH GUIDING BLUE YELLOW PTFE XB3.5 [id number]   Exchanged over wire   CATH MICRO RESERVE SYSTEM ULTRA THIN 44785   FFR wire advanced to left anterior descending.         CATH MICRO RESERVE SYSTEM ULTRA THIN 24927   Wire redirected to ramus.       All laboratory data reviewed:  [facility] labs reviewed CareEverywhere 1/2025, 3/2025    Recent Labs   Lab Test 02/07/24  1310 02/01/24  1317 08/29/23  1116 10/04/22  0828 05/08/17  0723 02/01/17  1302   LDL 37  --  58 55   < >  --    HDL 50  --  52 56   < >  --    NHDL 92  --  103 112   < >  --    CHOL 142  --  155 168   < >  --    TRIG 277*  --  224* 284*   < >  --    TSH  --  1.58 3.89 3.62   < >  --    IRON  --   --   --   --   --  56   FEB  --   --   --   --   --  334   IRONSAT  --   --   --   --   --  17   CORRIE  --   --   --   --   --  176    < > = values in this interval not displayed.       Lab Results   Component Value Date    WBC 7.2 07/03/2023    WBC 6.2 03/18/2019    RBC 4.56 07/03/2023    RBC 4.72 03/18/2019    HGB 13.9 07/03/2023    HGB 14.5 03/18/2019    HCT 43.0 07/03/2023    HCT 44.2 03/18/2019    MCV 94 07/03/2023    MCV 94 03/18/2019    MCH 30.5 07/03/2023    MCH 30.7 03/18/2019    MCHC 32.3 07/03/2023    MCHC 32.8 03/18/2019    RDW 13.8 07/03/2023    RDW 13.1 03/18/2019     07/03/2023     03/18/2019       Lab Results   Component Value Date     02/01/2024     11/09/2020    POTASSIUM 4.2 02/01/2024    POTASSIUM 3.9 10/04/2022    POTASSIUM 4.7 11/09/2020    CHLORIDE 101 02/01/2024    CHLORIDE 105 10/04/2022    CHLORIDE 102 11/09/2020    CO2 25 02/01/2024    CO2 29 10/04/2022    CO2 33 (H) 11/09/2020    ANIONGAP 13 02/01/2024    ANIONGAP 4  10/04/2022    ANIONGAP 3 11/09/2020    GLC 91 02/01/2024     (H) 10/04/2022     (H) 11/09/2020    BUN 24.0 (H) 02/01/2024    BUN 36 (H) 10/04/2022    BUN 32 (H) 11/09/2020    CR 1.10 (H) 02/01/2024    CR 1.26 (H) 11/09/2020    GFRESTIMATED 52 (L) 02/01/2024    GFRESTIMATED 52 (L) 11/30/2023    GFRESTIMATED 43 (L) 11/09/2020    GFRESTBLACK 49 (L) 11/09/2020    SMAARIA 9.2 02/01/2024    SAMARIA 8.9 11/09/2020      Lab Results   Component Value Date    AST 30 02/01/2024    AST 83 (H) 11/09/2020    ALT 14 02/07/2024     (H) 11/09/2020       Lab Results   Component Value Date    A1C 5.8 (H) 02/01/2024       The longitudinal plan of care for Abbey was addressed during this visit. Due to the added complexity in care, I will continue to support Abbey in the subsequent management of this condition(s) and with the ongoing continuity of care of this condition(s).    This note has been dictated using voice recognition software. Any grammatical, typographical, or context distortions are unintentional and inherent to the software.

## 2025-04-08 NOTE — PATIENT INSTRUCTIONS
It was nice to meet you.    Thank you for your visit with the [facility] today.    Medication changes and/or recommendations discussed today:   Continue current medications  Monitor blood pressure periodically at home. Call if readings consistently >135/85  Labs up to date  Triglycerides a little high.   Increase routine exercise with walking program or other activity you enjoy. Reduce red meat as able; increase vegetable intake    Follow up plan:   - 1 year with Dr. Infante (or Stephanie if she is booked out is okay too!)       If you have questions or concerns in the meantime please call my nurse team at [phone] or send a Slacker message for non urgent requests.       Scheduling phone number: [phone]    ________________________________    RADHA Noble, CNP    [facility]

## 2025-04-08 NOTE — LETTER
4/8/2025    Provider Outside  No address on file    RE: Abbey Omer       Dear Colleague,     I had the pleasure of seeing Abbey Omer in the [facility].        Cardiology Clinic Follow up     Abbey Omer MRN# [id number]   YOB: 1950 Age: 75 year old     Primary cardiologist: Dr. Infante       Reason for visit: Annual follow up    History of presenting illness:    Abbey Omer is a very pleasant 75 year old female with past medical history significant for HTN, CAD s/p PCI staged OM/LCx and prox-mid RCA in 2019, HLD, syncope in setting of dehydration, RICKEY, breast cancer, and hypothyroidism who presents for annual follow up.    Today, Abbey presents for follow up with her wife.  They live in [place] now. Has a new PCP. Needs meds refilled.  She noticed she was very fatigued after starting a mushroom coffee called Rise. Her TSH was high in January. She then stopped the coffee. TSH now corrected and energy improved. Has been drinking the coffee again for past 2 weeks. Occasional lightheadedness. Denies any chest pain, increased shortness of breath or edema. A little winded with stairs. Plans to get back to exercise now that energy improved.     Had labs at [facility] recently in January. TSH recheck in normal range.          Assessment and Plan:     ASSESSMENT:    CAD s/p PCI to OM/Lcx and RCA in 2019  -Echocardiogram 4/2024 with normal LVEF  -Denies chest pain  -Plans to get back to exercise. If feeling more winded with exertion or any significant change in symptoms to notify the office.   -Continue aspirin, simvastatin, metoprolol    HTN  -Elevated today, normotensive at home. Notes higher in doctor offices  -Continue hydrochlorothiazide 12.5mg, losartan 50mg twice daily, metoprolol XL 25mg  -Monitor blood pressure at home and call if consistently elevated  -Creatinine normal 0.94, K 3.9 (1/2025)    Hyperlipidemia  -LDL at goal 45  (1/2025)  -Continue simvastatin 40mg daily         PLAN:     Continue current medications  Refills authorized  Monitor home blood pressures and call if above goal  Increase routine exercise and walking program   Follow up in 1 year with Dr. Naresh See, DNP, APRN, CNP  [facility] - [place]     Orders this Visit:  Orders Placed This Encounter   Procedures     Follow-Up with Cardiology     Orders Placed This Encounter   Medications     hydroCHLOROthiazide 12.5 MG tablet     Sig: Take 1 tablet (12.5 mg) by mouth every morning.     Dispense:  90 tablet     Refill:  3     Please fill.     losartan (COZAAR) 50 MG tablet     Sig: Take 1 tablet (50 mg) by mouth 2 times daily.     Dispense:  180 tablet     Refill:  3     Keep on file until requested.     metoprolol succinate ER (TOPROL XL) 25 MG 24 hr tablet     Sig: Take 1 tablet (25 mg) by mouth daily.     Dispense:  90 tablet     Refill:  4     Please fill     simvastatin (ZOCOR) 40 MG tablet     Sig: Take 1 tablet (40 mg) by mouth at bedtime.     Dispense:  90 tablet     Refill:  3     Medications Discontinued During This Encounter   Medication Reason     hydroCHLOROthiazide 12.5 MG tablet Reorder (No AVS)     simvastatin (ZOCOR) 40 MG tablet Reorder (No AVS)     metoprolol succinate ER (TOPROL XL) 25 MG 24 hr tablet Reorder (No AVS)     losartan (COZAAR) 100 MG tablet             Physical Exam:   Vitals: BP (!) 155/81   Pulse 67   Resp 16   Ht 1.524 m (5')   Wt 78.9 kg (174 lb)   SpO2 97%   BMI 33.98 kg/m      Body mass index is 33.98 kg/m .  Wt Readings from Last 3 Encounters:   04/08/25 78.9 kg (174 lb)   02/07/24 80.7 kg (178 lb)   02/01/24 81.6 kg (180 lb)        General :   Alert and oriented, in no acute distress.    Respiratory:   Respirations unlabored. Clear bilaterally with no rales, rhonchi, or wheezes.     Cardiovascular:   Rhythm is regular. S1 and S2 are normal. No significant murmur is present.    Extremities:  Warm and dry, no lower edema   Neurologic: Moves all extremities, non focal    Psych:  Appropriate             Medications:     Current Outpatient Medications   Medication Sig Dispense Refill     acetaminophen (TYLENOL) 325 MG tablet Take 2 tablets (650 mg) by mouth every 4 hours as needed for mild pain 50 tablet 0     allopurinol (ZYLOPRIM) 100 MG tablet TAKE 1 TABLET BY MOUTH EVERY DAY 90 tablet 0     aspirin (ASA) 81 MG EC tablet Take 81 mg by mouth daily       B Complex Vitamins (VITAMIN B COMPLEX PO) Take 1,250 mg by mouth        colchicine (COLCRYS) 0.6 MG tablet Take 0.6 mg by mouth 3 times daily on the first day of gout flare, then take 0.6 mg once or twice daily until flare resolves 30 tablet 1     EXCEDRIN TENSION HEADACHE 500-65 MG PO TABS 1 tablet twice daily as needed       fluticasone (FLONASE) 50 MCG/ACT nasal spray Spray 2 sprays into both nostrils daily 48 g 1     hydroCHLOROthiazide 12.5 MG tablet Take 1 tablet (12.5 mg) by mouth every morning. 90 tablet 3     letrozole (FEMARA) 2.5 MG tablet Take 1 tablet (2.5 mg) by mouth daily 90 tablet 3     levalbuterol (XOPENEX HFA) 45 MCG/ACT inhaler INHALE 2 PUFFS INTO THE LUNGS EVERY 6 HOURS AS NEEDED FOR SHORTNESS OF BREATH OR DIFFICULT BREATHING OR WHEEZING 15 g 0     levothyroxine (SYNTHROID/LEVOTHROID) 88 MCG tablet TAKE 1 TABLET(88 MCG) BY MOUTH DAILY 90 tablet 0     losartan (COZAAR) 50 MG tablet Take 1 tablet (50 mg) by mouth 2 times daily. 180 tablet 3     metoprolol succinate ER (TOPROL XL) 25 MG 24 hr tablet Take 1 tablet (25 mg) by mouth daily. 90 tablet 4     montelukast (SINGULAIR) 10 MG tablet Take 1 tablet (10 mg) by mouth at bedtime. Patient is due for office visit and/or labs before further refills. 90 tablet 0     polyethylene glycol-propylene glycol (SYSTANE ULTRA) 0.4-0.3 % SOLN ophthalmic solution Place 1 drop into both eyes 2 times daily       simvastatin (ZOCOR) 40 MG tablet Take 1 tablet (40 mg) by mouth at bedtime. 90 tablet 3      traZODone (DESYREL) 50 MG tablet Take 1-2 tablets ( mg) by mouth at bedtime. +++NEED APPOINTMENT+++ (Patient taking differently: Take 50 mg by mouth at bedtime. +++NEED APPOINTMENT+++) 180 tablet 0     venlafaxine (EFFEXOR XR) 150 MG 24 hr capsule TAKE 2 CAPSULES BY MOUTH EVERY  capsule 0     Flaxseed, Linseed, (FLAXSEED OIL PO) Take 1 Tablespoonful by mouth daily (Patient not taking: Reported on 2025)         Family History   Problem Relation Age of Onset     Diabetes Maternal Grandfather      Hypertension Maternal Grandfather      Diabetes Son      Heart Disease Father         CHF     Diabetes Mother      Depression Mother      Hyperlipidemia Mother      Anxiety Disorder Mother      Asthma Mother      Obesity Mother      Substance Abuse Brother         Not any longer     Diabetes Brother      Depression Daughter      Diabetes Maternal Uncle      Cerebrovascular Disease Maternal Uncle      Diabetes Son         after accident     Depression Daughter      Depression Son         after accident     Substance Abuse Brother         Not any longer     Diabetes Sister      Substance Abuse Sister      Diabetes Brother      Thyroid Disease No family hx of      Glaucoma No family hx of      Macular Degeneration No family hx of      Cancer No family hx of        Social History     Socioeconomic History     Marital status:      Spouse name: Maggie     Number of children: 2     Years of education: 12     Highest education level: Not on file   Occupational History     Occupation: collections for [employer]     Employer: [employer]   Tobacco Use     Smoking status: Former     Current packs/day: 0.00     Average packs/day: 0.1 packs/day for 1 year (0.1 ttl pk-yrs)     Types: Cigarettes     Start date: 1969     Quit date: 10/12/1969     Years since quittin.5     Smokeless tobacco: Never     Tobacco comments:     Smoked, very little, for 2 mo, 49 years ago.   Vaping Use     Vaping status: Never  Used   Substance and Sexual Activity     Alcohol use: Yes     Comment: Maybe 1 or 2 drinks a year     Drug use: No     Sexual activity: Not Currently     Partners: Female     Birth control/protection: Post-menopausal   Other Topics Concern     Parent/sibling w/ CABG, MI or angioplasty before 65F 55M? No   Social History Narrative    Son  2012.    Wife, Maggie     Social Drivers of Health     Financial Resource Strain: Low Risk  (2024)    Financial Resource Strain      Within the past 12 months, have you or your family members you live with been unable to get utilities (heat, electricity) when it was really needed?: No   Food Insecurity: Low Risk  (2024)    Food Insecurity      Within the past 12 months, did you worry that your food would run out before you got money to buy more?: No      Within the past 12 months, did the food you bought just not last and you didn t have money to get more?: No   Transportation Needs: Low Risk  (2024)    Transportation Needs      Within the past 12 months, has lack of transportation kept you from medical appointments, getting your medicines, non-medical meetings or appointments, work, or from getting things that you need?: No   Physical Activity: Inactive (2024)    Exercise Vital Sign      Days of Exercise per Week: 0 days      Minutes of Exercise per Session: 0 min   Stress: No Stress Concern Present (2024)    [place] [employer] - Occupational Stress Questionnaire      Feeling of Stress : Only a little   Social Connections: Unknown (2024)    Social Connection and Isolation Panel [NHANES]      Frequency of Communication with Friends and Family: Not on file      Frequency of Social Gatherings with Friends and Family: Once a week      Attends [employer] Services: Not on file      Active Member of Clubs or Organizations: Not on file      Attends Club or Organization Meetings: Not on file      Marital Status: Not on file    Interpersonal Safety: Not on file   Housing Stability: Low Risk  (2/1/2024)    Housing Stability      Do you have housing? : Yes      Are you worried about losing your housing?: No          Past Medical History:     Past Medical History:   Diagnosis Date     Cancer of bladder (H)      Cataract 11/25/2011     Chronic nonallergic rhinitis 8/31/10 RAST     CKD (chronic kidney disease) stage 3, GFR 30-59 ml/min (H) 12/20/2010     Coronary artery disease involving native coronary artery of native heart without angina pectoris 03/01/2019     DDD (degenerative disc disease), cervical 06/16/2011     Dependent edema      Depression, major      Depressive disorder In 2002 or 2003     Glaucoma suspect, bilateral      History of blood transfusion ?     HTN (hypertension)      Hyperlipidemia      Hypothyroidism 10/12/2009     Migraine     resolved     Nasal septal deviation 08/11/2009     Obesity 05/17/2011     RICKEY (obstructive sleep apnea)      Osteoarthritis of shoulder region      Osteoporosis      Prediabetes      Premature menopause      Renal cyst             Past Surgical History:     Past Surgical History:   Procedure Laterality Date     ARTHROPLASTY SHOULDER Right 11/01/2017    Right total shoulder arthroplasty by Dr. Go at [facility]     ARTHROPLASTY SHOULDER Left 03/28/2018    Left total shoulder arthroplasty by Dr. Go at [facility]     BIOPSY NODE SENTINEL Left 10/9/2023    Procedure: BIOPSY, LYMPH NODE, SENTINEL;  Surgeon: Mikey Vega MD;  Location: [facility] OR     CATARACT IOL, RT/LT       CHOLECYSTECTOMY, LAPOROSCOPIC       COLONOSCOPY  ?    Upper & Lower     CV CORONARY ANGIOGRAM  02/22/2019     CV CORONARY ANGIOGRAM N/A 02/22/2019    Procedure: 1330 CORS;  Surgeon: Marito Arriaga MD;  Location:  HEART CARDIAC CATH LAB     CV HEART CATHETERIZATION WITH POSSIBLE INTERVENTION N/A 03/18/2019    Procedure: STAGED PCI-JALEN CARABALLO;  Surgeon: Zeus Cordero,  MD;  Location:  HEART CARDIAC CATH LAB     CYSTOSCOPY      bladder cancer     D & C       ENT SURGERY  first 1967/second ?    2 Rhynoplasties     HYSTERECTOMY, PAP NO LONGER INDICATED      BSO     MASTECTOMY SIMPLE BILATERAL Bilateral 10/9/2023    Procedure: MASTECTOMY, BILATERAL, SIMPLE;  Surgeon: Mikey Vega MD;  Location: [facility] OR     NOSE SURGERY      septum deviation     PHACOEMULSIFICATION CLEAR CORNEA WITH STANDARD INTRAOCULAR LENS IMPLANT Left 2018    Procedure: PHACOEMULSIFICATION CLEAR CORNEA WITH STANDARD INTRAOCULAR LENS IMPLANT;  LEFT EYE PHACOEMULSIFICATION CLEAR CORNEA WITH STANDARD INTRAOCULAR LENS IMPLANT;  Surgeon: Ryne Pascal MD;  Location:  [facility]     PHACOEMULSIFICATION WITH STANDARD INTRAOCULAR LENS IMPLANT Right 10/15/2018    Procedure: PHACOEMULSIFICATION WITH STANDARD INTRAOCULAR LENS IMPLANT ;  Surgeon: Ryne Pascal MD;  Location: [facility] OR            Allergies:   Duloxetine hcl, Zolmitriptan, Adhesive tape, Aluminum hydroxide, Atenolol, Codeine, Colestid [colestipol hcl], Colestipol, Other drug allergy (see comments), Potassium, Albuterol, Amlodipine, Asa [aspirin], Atenolol, Atorvastatin, Bupropion, Crestor [statins], Ezetimibe, Hydralazine, Lisinopril, Lovastatin, Morphine and codeine, Niacin, Paroxetine, Paxil [paroxetine], Potassium, Sulfa antibiotics, Sulfasalazine, and Triamterene       Data Reviewed today:   Most Recent Echocardiogram: 2024  Summary  Left ventricular systolic function is normal.  The visual ejection fraction is 55-60%.  There is mild (1+) mitral regurgitation.  There is mild (1+) aortic regurgitation.  Technically difficult, suboptimal study.    Stress testin2020     The nuclear stress test is negative for inducible myocardial ischemia or infarction.     Left ventricular function is normal.     There is no prior study for comparison.        Coronary angiogram: 3/2019  Potential access sites were evaluated for patency using ultrasound.    The right radial artery was selected. Access was obtained under with Sonosite guidance using a micropuncture 21 guage needle with direct visualization of needle entry.   SHTH INTRO 0.021IN ID 6FR NICOLE   Sheath prepped and inserted in the right radial artery.   CATH GUIDING 100CM 6FR JR 3.5 SH   Guide inserted over wire.   CATH GUIDING COR LNCHR OD6FR L100CM   Guide inserted over wire.   CATH BALLOON EMERGE 2.0X20MM [id number]      STENT SYNERGY DRUG ELUTING 2.16W34ME  [id number]   Stent inserted over the wire.   STENT SYNERGY DRUG ELUTING 2.15E88SQ  [id number]   Stent inserted over the wire.   CATH GUIDING BLUE YELLOW PTFE XB3.5 [id number]   Exchanged over wire   CATH MICRO RESERVE SYSTEM ULTRA THIN 21676   FFR wire advanced to left anterior descending.         CATH MICRO RESERVE SYSTEM ULTRA THIN 72222   Wire redirected to ramus.       All laboratory data reviewed:  [facility] labs reviewed CareEverywhere 1/2025, 3/2025    Recent Labs   Lab Test 02/07/24  1310 02/01/24  1317 08/29/23  1116 10/04/22  0828 05/08/17  0723 02/01/17  1302   LDL 37  --  58 55   < >  --    HDL 50  --  52 56   < >  --    NHDL 92  --  103 112   < >  --    CHOL 142  --  155 168   < >  --    TRIG 277*  --  224* 284*   < >  --    TSH  --  1.58 3.89 3.62   < >  --    IRON  --   --   --   --   --  56   FEB  --   --   --   --   --  334   IRONSAT  --   --   --   --   --  17   CORRIE  --   --   --   --   --  176    < > = values in this interval not displayed.       Lab Results   Component Value Date    WBC 7.2 07/03/2023    WBC 6.2 03/18/2019    RBC 4.56 07/03/2023    RBC 4.72 03/18/2019    HGB 13.9 07/03/2023    HGB 14.5 03/18/2019    HCT 43.0 07/03/2023    HCT 44.2 03/18/2019    MCV 94 07/03/2023    MCV 94 03/18/2019    MCH 30.5 07/03/2023    MCH 30.7 03/18/2019    MCHC 32.3 07/03/2023    MCHC 32.8 03/18/2019    RDW 13.8 07/03/2023    RDW 13.1 03/18/2019     07/03/2023     03/18/2019       Lab Results    Component Value Date     02/01/2024     11/09/2020    POTASSIUM 4.2 02/01/2024    POTASSIUM 3.9 10/04/2022    POTASSIUM 4.7 11/09/2020    CHLORIDE 101 02/01/2024    CHLORIDE 105 10/04/2022    CHLORIDE 102 11/09/2020    CO2 25 02/01/2024    CO2 29 10/04/2022    CO2 33 (H) 11/09/2020    ANIONGAP 13 02/01/2024    ANIONGAP 4 10/04/2022    ANIONGAP 3 11/09/2020    GLC 91 02/01/2024     (H) 10/04/2022     (H) 11/09/2020    BUN 24.0 (H) 02/01/2024    BUN 36 (H) 10/04/2022    BUN 32 (H) 11/09/2020    CR 1.10 (H) 02/01/2024    CR 1.26 (H) 11/09/2020    GFRESTIMATED 52 (L) 02/01/2024    GFRESTIMATED 52 (L) 11/30/2023    GFRESTIMATED 43 (L) 11/09/2020    GFRESTBLACK 49 (L) 11/09/2020    SAMARIA 9.2 02/01/2024    SAMARIA 8.9 11/09/2020      Lab Results   Component Value Date    AST 30 02/01/2024    AST 83 (H) 11/09/2020    ALT 14 02/07/2024     (H) 11/09/2020       Lab Results   Component Value Date    A1C 5.8 (H) 02/01/2024       The longitudinal plan of care for Abbey was addressed during this visit. Due to the added complexity in care, I will continue to support Abbey in the subsequent management of this condition(s) and with the ongoing continuity of care of this condition(s).    This note has been dictated using voice recognition software. Any grammatical, typographical, or context distortions are unintentional and inherent to the software.      Thank you for allowing me to participate in the care of your patient.      Sincerely,     RADHA Hogue [facility]  cc:   Ibeth Infante MD  [address] [place] [id number]

## 2025-04-09 DIAGNOSIS — F33.0 MAJOR DEPRESSIVE DISORDER, RECURRENT EPISODE, MILD DEGREE: Chronic | ICD-10-CM

## 2025-04-09 RX ORDER — VENLAFAXINE HYDROCHLORIDE 150 MG/1
CAPSULE, EXTENDED RELEASE ORAL
Qty: 180 CAPSULE | Refills: 0 | OUTPATIENT
Start: 2025-04-09

## 2025-05-11 ENCOUNTER — HEALTH MAINTENANCE LETTER (OUTPATIENT)
Age: 75
End: 2025-05-11

## 2025-05-24 NOTE — Clinical Note
ID Daily Progress Note, focus    Lauren Rodriguez is a 74 year old female with PNA    S: stable,      ROS:              O:        Vital Last Value 24 Hour Range   Temperature 97.5 °F (36.4 °C) (05/24/25 0743) Temp  Min: 97.5 °F (36.4 °C)  Max: 98.4 °F (36.9 °C)   Pulse 87 (05/24/25 0743) Pulse  Min: 79  Max: 87   Respiratory 18 (05/24/25 0743) Resp  Min: 16  Max: 18   Non-Invasive  Blood Pressure 132/65 (05/24/25 0743) BP  Min: 109/74  Max: 152/73   Pulse Oximetry 99 % (05/24/25 0743) SpO2  Min: 93 %  Max: 99 %   Arterial   Blood Pressure   No data recorded     O2 O2 Flow Rate (L/min)  Avg: 3.4 L/min  Min: 2 L/min   Min taken time: 05/24/25 0743  Max: 4 L/min   Max taken time: 05/24/25 0447       Vital Today Admit   Weight 92.2 kg (203 lb 4.2 oz) (05/24/25 0447) Weight: 99 kg (218 lb 4.1 oz) (05/11/25 1305)   Height N/A Height: 5' 7\" (170.2 cm) (05/11/25 1305)   BMI N/A BMI (Calculated): 34.18 (05/11/25 1305)        Vent Settings Last Value   FiO2 24 % (05/13/25 0400)   Mode     Rate     Tidal Volume     Pressure Support     PEEP/CPAC/EPAP             I/O last 3 completed shifts:  In: 640 [P.O.:440; IV Piggyback:200]  Out: 750 [Urine:750]      Intake/Output Summary (Last 24 hours) at 5/24/2025 0947  Last data filed at 5/24/2025 0447  Gross per 24 hour   Intake 400 ml   Output 750 ml   Net -350 ml            LABS:     Reviewed:  Recent Labs   Lab 05/24/25  0436 05/23/25  1351 05/23/25  0430 05/22/25  1232 05/22/25  0456 05/21/25  2159 05/21/25  1414 05/21/25  0437 05/20/25  1307 05/20/25  0511 05/19/25  0502 05/18/25  0451   HGB 12.3  --  12.3  --  12.7  --   --  13.7  --  13.3 12.8 12.3   WBC 13.5*  --  18.8*  --  20.3*  --   --  25.3*  --  19.1* 17.0* 21.2*   SEG 72  --  80  --  81  --   --  83  --  80 77 80     --  277  --  246  --   --  323  --  314 300 348   RESR  --   --   --   --   --  37*  --   --   --   --   --   --    CRP  --   --   --   --   --  32.1*   Guidewire across lesion   --   --   --   --   --   --    SODIUM 138  --  138  --  136  --   --  135  --  137 141 138   POTASSIUM 3.5 4.1 3.5 3.8 3.5  --  3.9 3.2* 4.1 3.1* 3.7 4.0   CHLORIDE 99  --  102  --  99  --   --  95*  --  94* 94* 97   BUN 19  --  15  --  17  --   --  17  --  14 20 20   CREATININE 0.65  --  0.62  --  0.62  --   --  0.65  --  0.57 0.56 0.50*   BILIRUBIN  --   --   --   --   --   --   --   --   --   --   --  0.8   GPT  --   --   --   --   --   --   --   --   --   --   --  38   AST  --   --   --   --   --   --   --   --   --   --   --  37   ALKPT  --   --   --   --   --   --   --   --   --   --   --  78   FERR  --   --   --   --   --   --   --   --  298*  --   --   --    PCT  --   --   --   --   --   --   --  0.07 0.07 0.07  --  0.06            CULTURES:  Reviewed:   Lab Results   Component Value Date    BLC No Growth 2 Days. 05/21/2025    BL No Growth 2 Days. 05/21/2025    BLC No Growth 5 Days. 05/11/2025    [facility] No Growth 5 Days. 05/11/2025    GS  05/12/2025     Inadequate quality specimen. Heavy oral contamination. No culture performed. Recommend recollection if clinically indicated.      Other:      IMAGING:    Reviewed:   CXR:   CT Scan:  MRI:  WBC Scan:  US:  Other:     MEDS:  Current Facility-Administered Medications   Medication Dose Route Frequency Provider Last Rate Last Admin    cefepime (MAXIPIME) 1,000 mg in sodium chloride 0.9 % 100 mL IVPB  1,000 mg Intravenous 3 times per day Kamron Weeks MD   Completed at 05/24/25 0611    doxycycline (VIBRAMYCIN) 100 mg in sodium chloride 0.9 % 100 mL IVPB  100 mg Intravenous 2 times per day Kamron Weeks  mL/hr at 05/24/25 0913 100 mg at 05/24/25 0913    furosemide (LASIX) tablet 40 mg  40 mg Oral Daily aKmron Weeks MD   40 mg at 05/24/25 0913    Potassium Standard Replacement Protocol (Levels 3.5 and lower)   Does not apply See Admin Instructions Kamron Weeks MD        Magnesium Standard Replacement Protocol   Does not apply See Admin Instructions  Kamron Weeks MD        enoxaparin (LOVENOX) injection 40 mg  40 mg Subcutaneous Q24H Kamron Weeks MD   40 mg at 05/24/25 0913    predniSONE (DELTASONE) tablet 20 mg  20 mg Oral Daily with breakfast Carmelo Solis NP   20 mg at 05/24/25 0913    Followed by    [START ON 5/26/2025] predniSONE (DELTASONE) tablet 10 mg  10 mg Oral Daily with breakfast Carmelo Solis NP        ipratropium-albuterol (DUONEB) 0.5-2.5 (3) MG/3ML nebulizer solution 3 mL  3 mL Nebulization 4x Daily Resp Carmelo Solis NP   3 mL at 05/24/25 0706    OXcarbazepine (TRILEPTAL) tablet 300 mg  300 mg Oral Q Evening Aleksandr Roy MD   300 mg at 05/23/25 1701    sertraline (ZOLOFT) tablet 50 mg  50 mg Oral Q Evening Aleksandr Roy MD   50 mg at 05/23/25 1701    insulin regular (human) (HumuLIN R, NovoLIN R) correction Dose   Subcutaneous 4x Daily AC & HS Aleksandr Roy MD   1 Units at 05/23/25 1700    cariprazine (VRAYLAR) capsule 3 mg  3 mg Oral Q Evening Aleksandr Roy MD   3 mg at 05/23/25 1701    aspirin chewable 81 mg  81 mg Oral Daily Zaki Dumont MD   81 mg at 05/24/25 0913    atorvastatin (LIPITOR) tablet 40 mg  40 mg Oral Nightly Zaki Dumont MD   40 mg at 05/23/25 2204    metoPROLOL succinate (TOPROL-XL) ER tablet 25 mg  25 mg Oral Daily Zaki Dumont MD   25 mg at 05/24/25 0913    ticagrelor (BRILINTA) tablet 90 mg  90 mg Oral BID Pamela Logan APNP   90 mg at 05/24/25 0913    pantoprazole (PROTONIX) EC tablet 40 mg  40 mg Oral BID Aleksandr Roy MD   40 mg at 05/24/25 0913    sodium chloride 0.9 % injection 2 mL  2 mL Intracatheter 2 times per day Aleksandr Roy MD   2 mL at 05/24/25 0918    guaiFENesin (MUCINEX) ER tablet 600 mg  600 mg Oral 2 times per day Aleksandr Roy MD   600 mg at 05/24/25 0913    nystatin (MYCOSTATIN) powder   Topical TID Aleksandr Roy MD   Given at 05/24/25 0919        ASSESSMENT:    mpression:  Pneumonia  Fever  sepsis     Recommendations:   Antiinfective:                 cefepime, doxy ,          dced vanco ,po soon                               Antiinfective duration:                                                 Stop Date:  Follow sputum, blood cultures  follow legionella antigen, pneumococcal antigen, respiratory pathogen panel  Follow final cultures results and sensitivities  Follow CBC, Cr  Follow Imaging: CXR, CT chest  Isolation: Droplet    Discharge criteria and plan:     Follow up as outpatient on discharge in 2-3 weeks    Discussed with: med team    Will follow,    Shirley Nguyễn MD  [phone], [facility] board

## 2025-07-24 ENCOUNTER — TELEPHONE (OUTPATIENT)
Dept: FAMILY MEDICINE | Facility: CLINIC | Age: 75
End: 2025-07-24
Payer: COMMERCIAL

## 2025-07-24 NOTE — TELEPHONE ENCOUNTER
Patient Quality Outreach    Patient is due for the following:   Diabetes -  A1C, LDL (Fasting), Statin, BP Check, Diabetic Follow-Up Visit, and Foot Exam  Hypertension -  Hypertension follow-up visit  Depression  -  PHQ-9 needed  Physical Annual Wellness Visit    Action(s) Taken:   Schedule a Annual Wellness Visit    Type of outreach:    Sent [employer] message.    Questions for provider review:    None         Manisha Phillips CMA  Chart routed to None.

## (undated) DEVICE — EYE KNIFE SLIT XSTAR VISITEC 2.4MM 45DEG BEVEL UP 373724

## (undated) DEVICE — DRAPE IOBAN INCISE 23X17" 6650EZ

## (undated) DEVICE — WIRE GUIDE 0.035"X260CM SAFE-T-J EXCHANGE G00517

## (undated) DEVICE — KIT PROCEDURE SPY-PHI SGL HH9001

## (undated) DEVICE — Device

## (undated) DEVICE — GUIDEWIRE ASAHI SION BLUE 0.014"X180CM J-TIP AHW14R004J

## (undated) DEVICE — ESU ELEC BLADE 6" COATED/INSULATED E1455-6

## (undated) DEVICE — CATH BALLOON EMERGE 2.5X12MM H7493918912250

## (undated) DEVICE — CATH GUIDING COR LNCHR OD6FR L100CM

## (undated) DEVICE — VALVE HEMOSTASIS .096" COPILOT MECH 1003331

## (undated) DEVICE — GLOVE PROTEXIS W/NEU-THERA 7.0  2D73TE70

## (undated) DEVICE — SOL WATER IRRIG 1000ML BOTTLE 07139-09

## (undated) DEVICE — CATH MICRO RESERVE SYSTEM ULTRA THIN 14667

## (undated) DEVICE — SYR 10ML FINGER CONTROL W/O NDL 309695

## (undated) DEVICE — BNDG ELASTIC 6" DBL LENGTH UNSTERILE 6611-16

## (undated) DEVICE — EYE SOL BSS 15ML BOTTLE 65079515

## (undated) DEVICE — MANIFOLD KIT ANGIO AUTOMATED 014613

## (undated) DEVICE — WIRE GUIDE HI-TRQ  WHISPER MS JTIP 0.014"X190CM 1005357HJ

## (undated) DEVICE — EYE SHIELD PLASTIC

## (undated) DEVICE — EYE PACK CUSTOM CATARACT AS12127-01

## (undated) DEVICE — SHTH INTRO 0.021IN ID 6FR DIA

## (undated) DEVICE — INTRO SHEATH 6FRX25CM PINNACLE RSS606

## (undated) DEVICE — SU PDS II 4-0 FS-2 27" Z422H

## (undated) DEVICE — PAD CHUX UNDERPAD 30X36" P3036C

## (undated) DEVICE — PREP POVIDONE IODINE SWABS X3

## (undated) DEVICE — EYE PACK CUSTOM ANTERIOR 30DEG TIP CENTURION PPK6682-04

## (undated) DEVICE — CATH BALLOON EMERGE 2.0X20MM H7493918920200

## (undated) DEVICE — SU SILK 3-0 SH 30" K832H

## (undated) DEVICE — KIT HAND CONTROL ACIST 014644 AR-P54

## (undated) DEVICE — ESU GROUND PAD ADULT W/CORD E7507

## (undated) DEVICE — CLIP HORIZON SM RED WIDE SLOT 001201

## (undated) DEVICE — EYE TIP IRRIGATION & ASPIRATION POLYMER 35D BENT 8065751511

## (undated) DEVICE — FASTENER CATH BALLOON CLAMPX2 STATLOCK 0684-00-493

## (undated) DEVICE — DRAIN JACKSON PRATT RESERVOIR 100ML SU130-1305

## (undated) DEVICE — BNDG COHESIVE 2"X5YDS UNSTERILE ASSORTED COLORS LF 8962

## (undated) DEVICE — PACK MINOR CUSTOM ASC

## (undated) DEVICE — WIRE GUIDE HI-TRQ PILOT 50 JTIP 0.014"X190CM 1010480-HJ

## (undated) DEVICE — SPONGE LAP 18X18" X8435

## (undated) DEVICE — GUIDEWIRE VASC 0.014INX180CM RUNTHROUGH 25-1011

## (undated) DEVICE — SU ETHILON 3-0 PS-1 18" 1663H

## (undated) DEVICE — GLOVE PROTEXIS MICRO 7.0  2D73PM70

## (undated) DEVICE — EYE SOL BSS 500ML

## (undated) DEVICE — PACK CATARACT CUSTOM SO DALE SEY32CTFCX

## (undated) DEVICE — GLOVE PROTEXIS POWDER FREE SMT 8.0  2D72PT80X

## (undated) DEVICE — SUCTION MANIFOLD NEPTUNE 2 SYS 1 PORT 702-025-000

## (undated) DEVICE — CLIP HORIZON MED BLUE 002200

## (undated) DEVICE — SLEEVE TR BAND RADIAL COMPRESSION DEVICE 24CM TRB24-REG

## (undated) DEVICE — NDL BLUNT 18GA 1" W/O FILTER 305181

## (undated) DEVICE — ESU ELEC BLADE 2.75" COATED/INSULATED E1455

## (undated) DEVICE — SOL NACL 0.9% IRRIG 500ML BOTTLE 2F7123

## (undated) DEVICE — PREP CHLORAPREP 26ML TINTED ORANGE  260815

## (undated) DEVICE — PACK HEART LEFT CUSTOM

## (undated) DEVICE — CATH ANGIO SUPERTORQUE PLUS JL4 6FRX100CM 533620

## (undated) DEVICE — SU ETHILON 10-0 TG140-6 12" 9003G

## (undated) DEVICE — KIT ACCESSORY INTRO INFLATION SYS 20/30 PRIORITY 1000186-115

## (undated) DEVICE — TUBING PRESSURE 30"

## (undated) DEVICE — NDL 25GA 2"  8881200441

## (undated) DEVICE — BLADE KNIFE SURG 10 371110

## (undated) DEVICE — SU VICRYL 3-0 SH 27" UND J416H

## (undated) DEVICE — DRAPE U SPLIT 74X120" 29440

## (undated) DEVICE — CATH GUIDING BLUE YELLOW PTFE XB3.5 6FRX100CM 67005400

## (undated) DEVICE — CATH ANGIO SUPERTORQUE PLUS JR4 6FRX100CM 533621

## (undated) DEVICE — EYE PACK BVI READYPAK KIT #1

## (undated) DEVICE — LINEN TOWEL PACK X5 5464

## (undated) DEVICE — CATH ANGIO 6FR JL3.5 100CM ST+

## (undated) DEVICE — GLIDEWIRE TERUMO .035X180CM 1.5,, J-TIP GR3525

## (undated) RX ORDER — HEPARIN SODIUM 1000 [USP'U]/ML
INJECTION, SOLUTION INTRAVENOUS; SUBCUTANEOUS
Status: DISPENSED
Start: 2019-02-22

## (undated) RX ORDER — ONDANSETRON 2 MG/ML
INJECTION INTRAMUSCULAR; INTRAVENOUS
Status: DISPENSED
Start: 2023-10-09

## (undated) RX ORDER — HYDROMORPHONE HYDROCHLORIDE 1 MG/ML
INJECTION, SOLUTION INTRAMUSCULAR; INTRAVENOUS; SUBCUTANEOUS
Status: DISPENSED
Start: 2023-10-09

## (undated) RX ORDER — BUPIVACAINE HYDROCHLORIDE 2.5 MG/ML
INJECTION, SOLUTION EPIDURAL; INFILTRATION; INTRACAUDAL
Status: DISPENSED
Start: 2023-10-09

## (undated) RX ORDER — NITROGLYCERIN 5 MG/ML
VIAL (ML) INTRAVENOUS
Status: DISPENSED
Start: 2019-03-18

## (undated) RX ORDER — FENTANYL CITRATE 50 UG/ML
INJECTION, SOLUTION INTRAMUSCULAR; INTRAVENOUS
Status: DISPENSED
Start: 2019-03-18

## (undated) RX ORDER — EPHEDRINE SULFATE 50 MG/ML
INJECTION, SOLUTION INTRAMUSCULAR; INTRAVENOUS; SUBCUTANEOUS
Status: DISPENSED
Start: 2023-10-09

## (undated) RX ORDER — CEFAZOLIN SODIUM 2 G/50ML
SOLUTION INTRAVENOUS
Status: DISPENSED
Start: 2023-10-09

## (undated) RX ORDER — FENTANYL CITRATE-0.9 % NACL/PF 10 MCG/ML
PLASTIC BAG, INJECTION (ML) INTRAVENOUS
Status: DISPENSED
Start: 2023-10-09

## (undated) RX ORDER — PROPOFOL 10 MG/ML
INJECTION, EMULSION INTRAVENOUS
Status: DISPENSED
Start: 2023-10-09

## (undated) RX ORDER — SODIUM CHLORIDE 9 MG/ML
INJECTION, SOLUTION INTRAVENOUS
Status: DISPENSED
Start: 2019-02-22

## (undated) RX ORDER — SODIUM CHLORIDE 9 MG/ML
INJECTION, SOLUTION INTRAVENOUS
Status: DISPENSED
Start: 2019-03-18

## (undated) RX ORDER — BUPIVACAINE HYDROCHLORIDE 5 MG/ML
INJECTION, SOLUTION EPIDURAL; INTRACAUDAL
Status: DISPENSED
Start: 2023-10-09

## (undated) RX ORDER — GLYCOPYRROLATE 0.2 MG/ML
INJECTION INTRAMUSCULAR; INTRAVENOUS
Status: DISPENSED
Start: 2023-10-09

## (undated) RX ORDER — ACETAMINOPHEN 325 MG/1
TABLET ORAL
Status: DISPENSED
Start: 2023-10-09

## (undated) RX ORDER — FENTANYL CITRATE 50 UG/ML
INJECTION, SOLUTION INTRAMUSCULAR; INTRAVENOUS
Status: DISPENSED
Start: 2023-10-09

## (undated) RX ORDER — ADENOSINE 3 MG/ML
INJECTION, SOLUTION INTRAVENOUS
Status: DISPENSED
Start: 2019-02-22

## (undated) RX ORDER — FENTANYL CITRATE 50 UG/ML
INJECTION, SOLUTION INTRAMUSCULAR; INTRAVENOUS
Status: DISPENSED
Start: 2019-02-22

## (undated) RX ORDER — ONDANSETRON 2 MG/ML
INJECTION INTRAMUSCULAR; INTRAVENOUS
Status: DISPENSED
Start: 2018-09-24

## (undated) RX ORDER — ADENOSINE 3 MG/ML
INJECTION, SOLUTION INTRAVENOUS
Status: DISPENSED
Start: 2019-03-18

## (undated) RX ORDER — HEPARIN SODIUM 1000 [USP'U]/ML
INJECTION, SOLUTION INTRAVENOUS; SUBCUTANEOUS
Status: DISPENSED
Start: 2019-03-18

## (undated) RX ORDER — ASPIRIN 81 MG/1
TABLET, CHEWABLE ORAL
Status: DISPENSED
Start: 2019-02-22

## (undated) RX ORDER — DEXMEDETOMIDINE HYDROCHLORIDE 4 UG/ML
INJECTION, SOLUTION INTRAVENOUS
Status: DISPENSED
Start: 2023-10-09

## (undated) RX ORDER — NITROGLYCERIN 5 MG/ML
VIAL (ML) INTRAVENOUS
Status: DISPENSED
Start: 2019-02-22

## (undated) RX ORDER — HEPARIN SODIUM 10000 [USP'U]/ML
INJECTION, SOLUTION INTRAVENOUS; SUBCUTANEOUS
Status: DISPENSED
Start: 2023-10-09

## (undated) RX ORDER — LIDOCAINE HYDROCHLORIDE AND EPINEPHRINE 10; 10 MG/ML; UG/ML
INJECTION, SOLUTION INFILTRATION; PERINEURAL
Status: DISPENSED
Start: 2023-10-09

## (undated) RX ORDER — DEXAMETHASONE SODIUM PHOSPHATE 4 MG/ML
INJECTION, SOLUTION INTRA-ARTICULAR; INTRALESIONAL; INTRAMUSCULAR; INTRAVENOUS; SOFT TISSUE
Status: DISPENSED
Start: 2023-10-09

## (undated) RX ORDER — METOPROLOL TARTRATE 100 MG
TABLET ORAL
Status: DISPENSED
Start: 2019-02-01

## (undated) RX ORDER — REGADENOSON 0.08 MG/ML
INJECTION, SOLUTION INTRAVENOUS
Status: DISPENSED
Start: 2020-01-17